# Patient Record
Sex: MALE | Race: OTHER | HISPANIC OR LATINO | URBAN - METROPOLITAN AREA
[De-identification: names, ages, dates, MRNs, and addresses within clinical notes are randomized per-mention and may not be internally consistent; named-entity substitution may affect disease eponyms.]

---

## 2017-06-02 ENCOUNTER — OUTPATIENT (OUTPATIENT)
Dept: OUTPATIENT SERVICES | Facility: HOSPITAL | Age: 62
LOS: 1 days | End: 2017-06-02
Payer: MEDICARE

## 2017-06-02 VITALS
RESPIRATION RATE: 16 BRPM | DIASTOLIC BLOOD PRESSURE: 88 MMHG | SYSTOLIC BLOOD PRESSURE: 140 MMHG | OXYGEN SATURATION: 98 % | HEART RATE: 88 BPM | WEIGHT: 229.06 LBS | HEIGHT: 68.75 IN | TEMPERATURE: 98 F

## 2017-06-02 DIAGNOSIS — C18.9 MALIGNANT NEOPLASM OF COLON, UNSPECIFIED: ICD-10-CM

## 2017-06-02 DIAGNOSIS — C18.5 MALIGNANT NEOPLASM OF SPLENIC FLEXURE: ICD-10-CM

## 2017-06-02 DIAGNOSIS — Z98.890 OTHER SPECIFIED POSTPROCEDURAL STATES: Chronic | ICD-10-CM

## 2017-06-02 DIAGNOSIS — I10 ESSENTIAL (PRIMARY) HYPERTENSION: ICD-10-CM

## 2017-06-02 LAB
ALBUMIN SERPL ELPH-MCNC: 4 G/DL — SIGNIFICANT CHANGE UP (ref 3.3–5)
ALP SERPL-CCNC: 81 U/L — SIGNIFICANT CHANGE UP (ref 40–120)
ALT FLD-CCNC: 15 U/L — SIGNIFICANT CHANGE UP (ref 4–41)
AST SERPL-CCNC: 13 U/L — SIGNIFICANT CHANGE UP (ref 4–40)
BILIRUB SERPL-MCNC: 0.2 MG/DL — SIGNIFICANT CHANGE UP (ref 0.2–1.2)
BLD GP AB SCN SERPL QL: NEGATIVE — SIGNIFICANT CHANGE UP
BUN SERPL-MCNC: 14 MG/DL — SIGNIFICANT CHANGE UP (ref 7–23)
CALCIUM SERPL-MCNC: 9.8 MG/DL — SIGNIFICANT CHANGE UP (ref 8.4–10.5)
CHLORIDE SERPL-SCNC: 101 MMOL/L — SIGNIFICANT CHANGE UP (ref 98–107)
CO2 SERPL-SCNC: 24 MMOL/L — SIGNIFICANT CHANGE UP (ref 22–31)
CREAT SERPL-MCNC: 0.85 MG/DL — SIGNIFICANT CHANGE UP (ref 0.5–1.3)
GLUCOSE SERPL-MCNC: 102 MG/DL — HIGH (ref 70–99)
HCT VFR BLD CALC: 47 % — SIGNIFICANT CHANGE UP (ref 39–50)
HGB BLD-MCNC: 15.8 G/DL — SIGNIFICANT CHANGE UP (ref 13–17)
MCHC RBC-ENTMCNC: 30.4 PG — SIGNIFICANT CHANGE UP (ref 27–34)
MCHC RBC-ENTMCNC: 33.6 % — SIGNIFICANT CHANGE UP (ref 32–36)
MCV RBC AUTO: 90.4 FL — SIGNIFICANT CHANGE UP (ref 80–100)
PLATELET # BLD AUTO: 371 K/UL — SIGNIFICANT CHANGE UP (ref 150–400)
PMV BLD: 9.6 FL — SIGNIFICANT CHANGE UP (ref 7–13)
POTASSIUM SERPL-MCNC: 4.4 MMOL/L — SIGNIFICANT CHANGE UP (ref 3.5–5.3)
POTASSIUM SERPL-SCNC: 4.4 MMOL/L — SIGNIFICANT CHANGE UP (ref 3.5–5.3)
PROT SERPL-MCNC: 7.3 G/DL — SIGNIFICANT CHANGE UP (ref 6–8.3)
RBC # BLD: 5.2 M/UL — SIGNIFICANT CHANGE UP (ref 4.2–5.8)
RBC # FLD: 13.7 % — SIGNIFICANT CHANGE UP (ref 10.3–14.5)
RH IG SCN BLD-IMP: POSITIVE — SIGNIFICANT CHANGE UP
SODIUM SERPL-SCNC: 140 MMOL/L — SIGNIFICANT CHANGE UP (ref 135–145)
WBC # BLD: 13.08 K/UL — HIGH (ref 3.8–10.5)
WBC # FLD AUTO: 13.08 K/UL — HIGH (ref 3.8–10.5)

## 2017-06-02 PROCEDURE — 93010 ELECTROCARDIOGRAM REPORT: CPT

## 2017-06-02 NOTE — H&P PST ADULT - HISTORY OF PRESENT ILLNESS
59 y/o male presents with neck ,back and shoulder arthropathy s/p incident 5 months ago .MRI and Xrays confirmed Brachial neuritis .Physical theraphy x 5 months ,symptons unrelieved .Patient schedule for Anterior cervical discectomy fusion with image on 3/18/2014 Pt is a 61 yr old male scheduled for Laparscopic Left colectomy with Dr Armenta 6/13/17 for Colon mass noted on CT scan done for recent onset of severe pain left lateral abdomen. Pt c/o of constipation - denies melena. PET done and negative. Pt hx of smoking and HTN and cervical neck fusion for injury sustained 2014 - plates and screws in place.

## 2017-06-02 NOTE — H&P PST ADULT - MUSCULOSKELETAL
details… detailed exam diminished strength/cervical neck restriction/decreased ROM/decreased ROM due to pain

## 2017-06-02 NOTE — H&P PST ADULT - NSANTHOSAYNRD_GEN_A_CORE
No. ABRAHAM screening performed.  STOP BANG Legend: 0-2 = LOW Risk; 3-4 = INTERMEDIATE Risk; 5-8 = HIGH Risk/denies

## 2017-06-02 NOTE — H&P PST ADULT - PROBLEM SELECTOR PLAN 1
Pt given pre-op instructions and Famotidine and Chlorhexidine and verbalized understanding.   OR Booking notified of ABRAHAM precautions via fax.   Pt to get CC from Cardiology - stress to be done 6/6/17- forms given.

## 2017-06-02 NOTE — H&P PST ADULT - NEUROLOGICAL SYMPTOMS
loss of sensation/headache/Pt c/o of loss of sensation/headache/Pt c/o of numbness left leg and tingling in right arm - thought to be r/t cervical neck surgery and head injury 2014

## 2017-06-02 NOTE — H&P PST ADULT - NEGATIVE NEUROLOGICAL SYMPTOMS
no vertigo/no generalized seizures/no tremors/no paresthesias/no transient paralysis/no syncope/no weakness/no focal seizures/no loss of sensation

## 2017-06-02 NOTE — H&P PST ADULT - MUSCULOSKELETAL COMMENTS
Pt c/o of cervical neck pain r/t head injury 2014 - radiates to left leg and right shoulder and arm with tingling and numbness -

## 2017-06-02 NOTE — H&P PST ADULT - GASTROINTESTINAL COMMENTS
Hx of recent onset of left lateral abdominal pain - MRI and CT Scan and PET done - mass in colon noted - pt c/o of pain and constipation Pt c/o of Left lateral abdominal pain for past 2 weeks - pain with palpation

## 2017-06-02 NOTE — H&P PST ADULT - PMH
Colon cancer    Head ache    Injury  forehead & had sutures ( 2013 )  Obesity    Vertigo Cervical disc disease    Colon cancer    Head ache    Injury  forehead & had sutures ( 2013 )  Obesity    Smoking    Vertigo

## 2017-06-02 NOTE — H&P PST ADULT - NEUROLOGICAL DETAILS
sensation intact/normal strength/alert and oriented x 3/responds to pain/responds to verbal commands

## 2017-06-12 NOTE — ASU PATIENT PROFILE, ADULT - PMH
Cervical disc disease    Colon cancer    Head ache    Injury  forehead & had sutures ( 2013 )  Obesity    Smoking    Vertigo

## 2017-06-13 ENCOUNTER — INPATIENT (INPATIENT)
Facility: HOSPITAL | Age: 62
LOS: 7 days | Discharge: HOME | End: 2017-06-21
Attending: COLON & RECTAL SURGERY | Admitting: COLON & RECTAL SURGERY
Payer: MEDICARE

## 2017-06-13 ENCOUNTER — RESULT REVIEW (OUTPATIENT)
Age: 62
End: 2017-06-13

## 2017-06-13 VITALS
HEART RATE: 104 BPM | OXYGEN SATURATION: 96 % | HEIGHT: 68.75 IN | SYSTOLIC BLOOD PRESSURE: 145 MMHG | RESPIRATION RATE: 15 BRPM | TEMPERATURE: 98 F | DIASTOLIC BLOOD PRESSURE: 72 MMHG | WEIGHT: 229.06 LBS

## 2017-06-13 DIAGNOSIS — Z98.890 OTHER SPECIFIED POSTPROCEDURAL STATES: Chronic | ICD-10-CM

## 2017-06-13 DIAGNOSIS — C18.5 MALIGNANT NEOPLASM OF SPLENIC FLEXURE: ICD-10-CM

## 2017-06-13 LAB
BASE EXCESS BLDA CALC-SCNC: -5.5 MMOL/L — SIGNIFICANT CHANGE UP
BASE EXCESS BLDA CALC-SCNC: -5.9 MMOL/L — SIGNIFICANT CHANGE UP
CA-I BLDA-SCNC: 1.14 MMOL/L — LOW (ref 1.15–1.29)
CA-I BLDA-SCNC: 1.17 MMOL/L — SIGNIFICANT CHANGE UP (ref 1.15–1.29)
GLUCOSE BLDA-MCNC: 119 MG/DL — HIGH (ref 70–99)
GLUCOSE BLDA-MCNC: 133 MG/DL — HIGH (ref 70–99)
HCO3 BLDA-SCNC: 19 MMOL/L — LOW (ref 22–26)
HCO3 BLDA-SCNC: 19 MMOL/L — LOW (ref 22–26)
HCT VFR BLDA CALC: 37.9 % — LOW (ref 39–51)
HCT VFR BLDA CALC: 42.1 % — SIGNIFICANT CHANGE UP (ref 39–51)
HGB BLDA-MCNC: 12.3 G/DL — LOW (ref 13–17)
HGB BLDA-MCNC: 13.7 G/DL — SIGNIFICANT CHANGE UP (ref 13–17)
PCO2 BLDA: 43 MMHG — SIGNIFICANT CHANGE UP (ref 35–48)
PCO2 BLDA: 48 MMHG — SIGNIFICANT CHANGE UP (ref 35–48)
PH BLDA: 7.25 PH — LOW (ref 7.35–7.45)
PH BLDA: 7.28 PH — LOW (ref 7.35–7.45)
PO2 BLDA: 113 MMHG — HIGH (ref 83–108)
PO2 BLDA: 143 MMHG — HIGH (ref 83–108)
POTASSIUM BLDA-SCNC: 4.5 MMOL/L — SIGNIFICANT CHANGE UP (ref 3.4–4.5)
POTASSIUM BLDA-SCNC: 4.7 MMOL/L — HIGH (ref 3.4–4.5)
RH IG SCN BLD-IMP: POSITIVE — SIGNIFICANT CHANGE UP
SAO2 % BLDA: 97.8 % — SIGNIFICANT CHANGE UP (ref 95–99)
SAO2 % BLDA: 98.4 % — SIGNIFICANT CHANGE UP (ref 95–99)
SODIUM BLDA-SCNC: 135 MMOL/L — LOW (ref 136–146)
SODIUM BLDA-SCNC: 137 MMOL/L — SIGNIFICANT CHANGE UP (ref 136–146)

## 2017-06-13 PROCEDURE — 88341 IMHCHEM/IMCYTCHM EA ADD ANTB: CPT | Mod: 26

## 2017-06-13 PROCEDURE — 88304 TISSUE EXAM BY PATHOLOGIST: CPT | Mod: 26

## 2017-06-13 PROCEDURE — 88342 IMHCHEM/IMCYTCHM 1ST ANTB: CPT | Mod: 26

## 2017-06-13 PROCEDURE — 88307 TISSUE EXAM BY PATHOLOGIST: CPT | Mod: 26

## 2017-06-13 RX ORDER — NALOXONE HYDROCHLORIDE 4 MG/.1ML
0.1 SPRAY NASAL
Qty: 0 | Refills: 0 | Status: DISCONTINUED | OUTPATIENT
Start: 2017-06-13 | End: 2017-06-17

## 2017-06-13 RX ORDER — ALVIMOPAN 12 MG/1
12 CAPSULE ORAL
Qty: 0 | Refills: 0 | Status: COMPLETED | OUTPATIENT
Start: 2017-06-14 | End: 2017-06-20

## 2017-06-13 RX ORDER — ONDANSETRON 8 MG/1
4 TABLET, FILM COATED ORAL EVERY 6 HOURS
Qty: 0 | Refills: 0 | Status: DISCONTINUED | OUTPATIENT
Start: 2017-06-13 | End: 2017-06-17

## 2017-06-13 RX ORDER — HYDROMORPHONE HYDROCHLORIDE 2 MG/ML
0.5 INJECTION INTRAMUSCULAR; INTRAVENOUS; SUBCUTANEOUS
Qty: 0 | Refills: 0 | Status: DISCONTINUED | OUTPATIENT
Start: 2017-06-13 | End: 2017-06-17

## 2017-06-13 RX ORDER — FINASTERIDE 5 MG/1
5 TABLET, FILM COATED ORAL DAILY
Qty: 0 | Refills: 0 | Status: DISCONTINUED | OUTPATIENT
Start: 2017-06-14 | End: 2017-06-21

## 2017-06-13 RX ORDER — SODIUM CHLORIDE 9 MG/ML
1000 INJECTION, SOLUTION INTRAVENOUS
Qty: 0 | Refills: 0 | Status: DISCONTINUED | OUTPATIENT
Start: 2017-06-13 | End: 2017-06-13

## 2017-06-13 RX ORDER — HYDROMORPHONE HYDROCHLORIDE 2 MG/ML
0.5 INJECTION INTRAMUSCULAR; INTRAVENOUS; SUBCUTANEOUS
Qty: 0 | Refills: 0 | Status: DISCONTINUED | OUTPATIENT
Start: 2017-06-13 | End: 2017-06-13

## 2017-06-13 RX ORDER — HYDROMORPHONE HYDROCHLORIDE 2 MG/ML
30 INJECTION INTRAMUSCULAR; INTRAVENOUS; SUBCUTANEOUS
Qty: 0 | Refills: 0 | Status: DISCONTINUED | OUTPATIENT
Start: 2017-06-13 | End: 2017-06-14

## 2017-06-13 RX ORDER — TAMSULOSIN HYDROCHLORIDE 0.4 MG/1
0.4 CAPSULE ORAL AT BEDTIME
Qty: 0 | Refills: 0 | Status: DISCONTINUED | OUTPATIENT
Start: 2017-06-14 | End: 2017-06-21

## 2017-06-13 RX ORDER — ONDANSETRON 8 MG/1
4 TABLET, FILM COATED ORAL ONCE
Qty: 0 | Refills: 0 | Status: DISCONTINUED | OUTPATIENT
Start: 2017-06-13 | End: 2017-06-14

## 2017-06-13 RX ORDER — HEPARIN SODIUM 5000 [USP'U]/ML
5000 INJECTION INTRAVENOUS; SUBCUTANEOUS EVERY 8 HOURS
Qty: 0 | Refills: 0 | Status: DISCONTINUED | OUTPATIENT
Start: 2017-06-14 | End: 2017-06-15

## 2017-06-13 RX ORDER — SODIUM CHLORIDE 9 MG/ML
1000 INJECTION, SOLUTION INTRAVENOUS
Qty: 0 | Refills: 0 | Status: DISCONTINUED | OUTPATIENT
Start: 2017-06-13 | End: 2017-06-14

## 2017-06-13 RX ADMIN — HYDROMORPHONE HYDROCHLORIDE 0.5 MILLIGRAM(S): 2 INJECTION INTRAMUSCULAR; INTRAVENOUS; SUBCUTANEOUS at 21:45

## 2017-06-13 RX ADMIN — HYDROMORPHONE HYDROCHLORIDE 0.5 MILLIGRAM(S): 2 INJECTION INTRAMUSCULAR; INTRAVENOUS; SUBCUTANEOUS at 23:56

## 2017-06-13 RX ADMIN — HYDROMORPHONE HYDROCHLORIDE 0.5 MILLIGRAM(S): 2 INJECTION INTRAMUSCULAR; INTRAVENOUS; SUBCUTANEOUS at 21:20

## 2017-06-13 RX ADMIN — HYDROMORPHONE HYDROCHLORIDE 0.5 MILLIGRAM(S): 2 INJECTION INTRAMUSCULAR; INTRAVENOUS; SUBCUTANEOUS at 21:54

## 2017-06-13 RX ADMIN — HYDROMORPHONE HYDROCHLORIDE 0.5 MILLIGRAM(S): 2 INJECTION INTRAMUSCULAR; INTRAVENOUS; SUBCUTANEOUS at 21:35

## 2017-06-13 RX ADMIN — HYDROMORPHONE HYDROCHLORIDE 30 MILLILITER(S): 2 INJECTION INTRAMUSCULAR; INTRAVENOUS; SUBCUTANEOUS at 23:53

## 2017-06-13 RX ADMIN — HYDROMORPHONE HYDROCHLORIDE 0.5 MILLIGRAM(S): 2 INJECTION INTRAMUSCULAR; INTRAVENOUS; SUBCUTANEOUS at 23:15

## 2017-06-13 RX ADMIN — HYDROMORPHONE HYDROCHLORIDE 0.5 MILLIGRAM(S): 2 INJECTION INTRAMUSCULAR; INTRAVENOUS; SUBCUTANEOUS at 23:30

## 2017-06-13 RX ADMIN — HYDROMORPHONE HYDROCHLORIDE 0.5 MILLIGRAM(S): 2 INJECTION INTRAMUSCULAR; INTRAVENOUS; SUBCUTANEOUS at 21:30

## 2017-06-13 RX ADMIN — SODIUM CHLORIDE 150 MILLILITER(S): 9 INJECTION, SOLUTION INTRAVENOUS at 21:00

## 2017-06-13 NOTE — BRIEF OPERATIVE NOTE - COMMENTS
The tumor was stuck to the peritoneum, which was removed en bloc  Also pt had a severe BPH requiring cysto guided elizabeth insertion. Keep elizabeth in for at least 3 days or until urology notifies us. Flomax/Finasteride

## 2017-06-13 NOTE — PROCEDURE NOTE - NSURITECHNIQUE_GU_A_CORE
Sterile gloves were worn for the duration of the procedure/A sterile drape was used to cover all adjacent areas/Proper hand hygiene was performed/The catheter was appropriately lubricated/The collection bag is below the level of the patient and urinary bladder/The site was cleaned with soap/water and sterile solution (betadine)/The catheter was secured with a securement device (e.g. StatLock)/The urinary drainage system is closed at the end of the procedure/All applicable medical record documentation is completed

## 2017-06-13 NOTE — PROCEDURE NOTE - NSPOSTCAREGUIDE_GEN_A_CORE
Verbal/written post procedure instructions were given to patient/caregiver/Continue elizabeth for 2 days. May start Flomax and Finasteride. ToV preferred after ambulation. F/U w. Dr. Rowell as outpatient

## 2017-06-14 ENCOUNTER — TRANSCRIPTION ENCOUNTER (OUTPATIENT)
Age: 62
End: 2017-06-14

## 2017-06-14 LAB
BUN SERPL-MCNC: 11 MG/DL — SIGNIFICANT CHANGE UP (ref 7–23)
CALCIUM SERPL-MCNC: 8.5 MG/DL — SIGNIFICANT CHANGE UP (ref 8.4–10.5)
CHLORIDE SERPL-SCNC: 103 MMOL/L — SIGNIFICANT CHANGE UP (ref 98–107)
CO2 SERPL-SCNC: 23 MMOL/L — SIGNIFICANT CHANGE UP (ref 22–31)
CREAT SERPL-MCNC: 0.91 MG/DL — SIGNIFICANT CHANGE UP (ref 0.5–1.3)
GLUCOSE SERPL-MCNC: 111 MG/DL — HIGH (ref 70–99)
HCT VFR BLD CALC: 42.2 % — SIGNIFICANT CHANGE UP (ref 39–50)
HGB BLD-MCNC: 13.5 G/DL — SIGNIFICANT CHANGE UP (ref 13–17)
MAGNESIUM SERPL-MCNC: 1.5 MG/DL — LOW (ref 1.6–2.6)
MCHC RBC-ENTMCNC: 29.3 PG — SIGNIFICANT CHANGE UP (ref 27–34)
MCHC RBC-ENTMCNC: 32 % — SIGNIFICANT CHANGE UP (ref 32–36)
MCV RBC AUTO: 91.5 FL — SIGNIFICANT CHANGE UP (ref 80–100)
PHOSPHATE SERPL-MCNC: 3.6 MG/DL — SIGNIFICANT CHANGE UP (ref 2.5–4.5)
PLATELET # BLD AUTO: 341 K/UL — SIGNIFICANT CHANGE UP (ref 150–400)
PMV BLD: 9 FL — SIGNIFICANT CHANGE UP (ref 7–13)
POTASSIUM SERPL-MCNC: 3.8 MMOL/L — SIGNIFICANT CHANGE UP (ref 3.5–5.3)
POTASSIUM SERPL-SCNC: 3.8 MMOL/L — SIGNIFICANT CHANGE UP (ref 3.5–5.3)
RBC # BLD: 4.61 M/UL — SIGNIFICANT CHANGE UP (ref 4.2–5.8)
RBC # FLD: 14.1 % — SIGNIFICANT CHANGE UP (ref 10.3–14.5)
SODIUM SERPL-SCNC: 141 MMOL/L — SIGNIFICANT CHANGE UP (ref 135–145)
WBC # BLD: 14.9 K/UL — HIGH (ref 3.8–10.5)
WBC # FLD AUTO: 14.9 K/UL — HIGH (ref 3.8–10.5)

## 2017-06-14 PROCEDURE — 93010 ELECTROCARDIOGRAM REPORT: CPT

## 2017-06-14 RX ORDER — SODIUM CHLORIDE 9 MG/ML
500 INJECTION INTRAMUSCULAR; INTRAVENOUS; SUBCUTANEOUS ONCE
Qty: 0 | Refills: 0 | Status: COMPLETED | OUTPATIENT
Start: 2017-06-14 | End: 2017-06-14

## 2017-06-14 RX ORDER — HYDROMORPHONE HYDROCHLORIDE 2 MG/ML
30 INJECTION INTRAMUSCULAR; INTRAVENOUS; SUBCUTANEOUS
Qty: 0 | Refills: 0 | Status: DISCONTINUED | OUTPATIENT
Start: 2017-06-14 | End: 2017-06-17

## 2017-06-14 RX ORDER — MAGNESIUM SULFATE 500 MG/ML
2 VIAL (ML) INJECTION ONCE
Qty: 0 | Refills: 0 | Status: COMPLETED | OUTPATIENT
Start: 2017-06-14 | End: 2017-06-14

## 2017-06-14 RX ORDER — DEXTROSE MONOHYDRATE, SODIUM CHLORIDE, AND POTASSIUM CHLORIDE 50; .745; 4.5 G/1000ML; G/1000ML; G/1000ML
1000 INJECTION, SOLUTION INTRAVENOUS
Qty: 0 | Refills: 0 | Status: DISCONTINUED | OUTPATIENT
Start: 2017-06-14 | End: 2017-06-15

## 2017-06-14 RX ADMIN — HEPARIN SODIUM 5000 UNIT(S): 5000 INJECTION INTRAVENOUS; SUBCUTANEOUS at 07:26

## 2017-06-14 RX ADMIN — SODIUM CHLORIDE 500 MILLILITER(S): 9 INJECTION INTRAMUSCULAR; INTRAVENOUS; SUBCUTANEOUS at 20:38

## 2017-06-14 RX ADMIN — HEPARIN SODIUM 5000 UNIT(S): 5000 INJECTION INTRAVENOUS; SUBCUTANEOUS at 21:27

## 2017-06-14 RX ADMIN — TAMSULOSIN HYDROCHLORIDE 0.4 MILLIGRAM(S): 0.4 CAPSULE ORAL at 21:27

## 2017-06-14 RX ADMIN — Medication 1.25 MILLIGRAM(S): at 12:08

## 2017-06-14 RX ADMIN — HYDROMORPHONE HYDROCHLORIDE 0.5 MILLIGRAM(S): 2 INJECTION INTRAMUSCULAR; INTRAVENOUS; SUBCUTANEOUS at 10:46

## 2017-06-14 RX ADMIN — HYDROMORPHONE HYDROCHLORIDE 30 MILLILITER(S): 2 INJECTION INTRAMUSCULAR; INTRAVENOUS; SUBCUTANEOUS at 03:11

## 2017-06-14 RX ADMIN — HEPARIN SODIUM 5000 UNIT(S): 5000 INJECTION INTRAVENOUS; SUBCUTANEOUS at 12:08

## 2017-06-14 RX ADMIN — Medication 50 GRAM(S): at 17:48

## 2017-06-14 RX ADMIN — Medication 1.25 MILLIGRAM(S): at 00:27

## 2017-06-14 RX ADMIN — HYDROMORPHONE HYDROCHLORIDE 30 MILLILITER(S): 2 INJECTION INTRAMUSCULAR; INTRAVENOUS; SUBCUTANEOUS at 20:22

## 2017-06-14 RX ADMIN — FINASTERIDE 5 MILLIGRAM(S): 5 TABLET, FILM COATED ORAL at 12:08

## 2017-06-14 RX ADMIN — ALVIMOPAN 12 MILLIGRAM(S): 12 CAPSULE ORAL at 17:48

## 2017-06-14 RX ADMIN — HYDROMORPHONE HYDROCHLORIDE 30 MILLILITER(S): 2 INJECTION INTRAMUSCULAR; INTRAVENOUS; SUBCUTANEOUS at 18:00

## 2017-06-14 RX ADMIN — Medication 1.25 MILLIGRAM(S): at 17:49

## 2017-06-14 RX ADMIN — DEXTROSE MONOHYDRATE, SODIUM CHLORIDE, AND POTASSIUM CHLORIDE 75 MILLILITER(S): 50; .745; 4.5 INJECTION, SOLUTION INTRAVENOUS at 12:09

## 2017-06-14 RX ADMIN — ALVIMOPAN 12 MILLIGRAM(S): 12 CAPSULE ORAL at 07:26

## 2017-06-14 RX ADMIN — Medication 1.25 MILLIGRAM(S): at 07:26

## 2017-06-14 RX ADMIN — HYDROMORPHONE HYDROCHLORIDE 30 MILLILITER(S): 2 INJECTION INTRAMUSCULAR; INTRAVENOUS; SUBCUTANEOUS at 08:33

## 2017-06-14 RX ADMIN — HYDROMORPHONE HYDROCHLORIDE 30 MILLILITER(S): 2 INJECTION INTRAMUSCULAR; INTRAVENOUS; SUBCUTANEOUS at 10:47

## 2017-06-14 NOTE — PROGRESS NOTE ADULT - SUBJECTIVE AND OBJECTIVE BOX
Having janina-incisional pain. PCA only moderately controlling pain. No nausea or vomiting. No fever or chills. No flatus.    AVSS  ABD: soft, janina-incisional tenderness, nd    LABS: pending    A/P POD#1 s/p left colectomy, cysto with elizabeth placement  -need better pain control with PCA. Pain service on consult. mild increase in HR most likely pain related  -adv to [facility]D. cont entereg  -keep elizabeth.  on consult d/t intra-op cysto and elizabeth placement. will keep for 3dd. cont flomax & finasteride per   -encourage incentive spirometer usage. cont supplemental O2  -OOB to chair    D Juve [phone]

## 2017-06-14 NOTE — CHART NOTE - NSCHARTNOTEFT_GEN_A_CORE
B TEAM POSTOP CHECK    Pt pod 0 s/p lap to open L [place]. Feeling well. Pain controlled with medications. No nausea vomiting SOB or chest pain.      Vital Signs Last 24 Hrs  T(C): 37.1, Max: 37.1 (06-14 @ 02:52)  T(F): 98.8, Max: 98.8 (06-14 @ 02:52)  HR: 111 (101 - 111)  BP: 149/78 (145/72 - 153/80)  BP(mean): --  RR: 18 (15 - 30)  SpO2: 91% (91% - 98%)  I&O's Detail    I & Os for current day (as of 14 Jun 2017 04:25)  =============================================  IN:    IV PiggyBack: 1000 ml    lactated ringers.: 750 ml    Total IN: 1750 ml  ---------------------------------------------  OUT:    Indwelling Catheter - Urethral: 860 ml    Total OUT: 860 ml  ---------------------------------------------  Total NET: 890 ml      62M pod 0 s/p lap to open L [place]on  -Pain control  -NPO  -Await return of bowel function  -Lopez in until cleared by urology

## 2017-06-14 NOTE — PROGRESS NOTE ADULT - SUBJECTIVE AND OBJECTIVE BOX
Day _2_ of Anesthesia Pain Management Service    Allergies    No Known Allergies    Intolerances        SUBJECTIVE: "I'm better now since the nurse gave a bolus. I was able to get out of bed."    Pain Scale Score	At rest: _4-5/10_ 	With Activity: ___ 	[ ] Refer to charted pain scores    THERAPY:    [ ] IV PCA Morphine		[ ] 5 mg/mL	[ ] 1 mg/mL  [X] IV PCA Hydromorphone	[ ] 5 mg/mL	[X] 1 mg/mL  [ ] IV PCA Fentanyl		[ ] 50 micrograms/mL    Demand dose _0.25mg_ lockout _6_ (minutes) Continuous Rate _0_ Total: _8.8mg_  Daily      MEDICATIONS  (STANDING):  heparin  Injectable 5000Unit(s) SubCutaneous every 8 hours  alvimopan 12milliGRAM(s) Oral two times a day  tamsulosin 0.4milliGRAM(s) Oral at bedtime  finasteride 5milliGRAM(s) Oral daily  enalaprilat Injectable 1.25milliGRAM(s) IV Push every 6 hours  HYDROmorphone PCA (1 mG/mL) 30milliLiter(s) PCA Continuous PCA Continuous  magnesium sulfate  IVPB 2Gram(s) IV Intermittent once  sodium chloride 0.45% with potassium chloride 20 mEq/L 1000milliLiter(s) IV Continuous <Continuous>    MEDICATIONS  (PRN):  HYDROmorphone PCA (1 mG/mL) Rescue Clinician Bolus 0.5milliGRAM(s) IV Push every 15 minutes PRN for Pain Scale GREATER THAN 6  naloxone Injectable 0.1milliGRAM(s) IV Push every 3 minutes PRN For ANY of the following changes in patient status:  A. RR LESS THAN 10 breaths per minute, B. Oxygen saturation LESS THAN 90%, C. Sedation score of 6  ondansetron Injectable 4milliGRAM(s) IV Push every 6 hours PRN Nausea      OBJECTIVE: A&Ox3, NAD, sitting up in chair    Sedation Score:	[X] Alert	[ ] Drowsy	[ ] Arousable	[ ] Asleep	[ ] Unresponsive    Side Effects:	[X] None	[ ] Nausea	[ ] Vomiting	[ ] Pruritus  		  [ ] Weakness		[ ] Numbness	[ ] Other:                              13.5   14.90 )-----------( 341      ( 14 Jun 2017 10:40 )             42.2       06-14    141  |  103  |  11  ----------------------------<  111<H>  3.8   |  23  |  0.91    Ca    8.5      14 Jun 2017 10:40  Phos  3.6     06-14  Mg     1.5     06-14        ASSESSMENT/ PLAN    Therapy to  be:	[X] Continue   [ ] Discontinued   [ ] Change to prn Analgesics    Documentation and Verification of current medications:  [X] Done	[ ] Not done, not eligible  [ ] Not done, reason not given      Comments:  Use of IVPCA reviewed.  4 hour limit increased to 5mg

## 2017-06-14 NOTE — PROGRESS NOTE ADULT - SUBJECTIVE AND OBJECTIVE BOX
Patient is a 62y old  Male who presents with a chief complaint of colon mass (02 Jun 2017 14:07)      SUBJECTIVE: Pt complains of abdominal pain not well controlled with PCA. no nausea/vomiting/headache/dizziness/chest pain/shortness of breath    OBJECTIVE:  PHYSICAL EXAM:  GA: NAD  Abdomen:  -  soft, non-distended, mild  incisional tenderness  - Incision: clean/dry/intact   - Drain:     Vitals/Labs:  Vital Signs Last 24 Hrs  T(C): 37.2, Max: 37.2 (06-14 @ 07:24)  T(F): 99, Max: 99 (06-14 @ 07:24)  HR: 110 (101 - 111)  BP: 143/79 (143/79 - 153/80)  BP(mean): --  RR: 18 (15 - 30)  SpO2: 94% (91% - 98%)    I&O's Detail    I & Os for current day (as of 14 Jun 2017 09:04)  =============================================  IN:    lactated ringers.: 1350 ml    IV PiggyBack: 1000 ml    Total IN: 2350 ml  ---------------------------------------------  OUT:    Indwelling Catheter - Urethral: 1860 ml    Total OUT: 1860 ml  ---------------------------------------------  Total NET: 490 ml                  CAPILLARY BLOOD GLUCOSE  88 (13 Jun 2017 09:16)                  ASSESSMENT/PLAN: MARISOL ALFRED 62y Male s/p  Left open colectomy     - pain control - readjust the PCA dose   - Diet advanced to Clears   - OOB to chair as tolerated   - Encourage Incentive Spirometry  - keep the elizabeth for 3 more days, Cont Flomax and Fenestride           MEDICATIONS  (STANDING):  lactated ringers. 1000milliLiter(s) IV Continuous <Continuous>  heparin  Injectable 5000Unit(s) SubCutaneous every 8 hours  alvimopan 12milliGRAM(s) Oral two times a day  tamsulosin 0.4milliGRAM(s) Oral at bedtime  finasteride 5milliGRAM(s) Oral daily  enalaprilat Injectable 1.25milliGRAM(s) IV Push every 6 hours  HYDROmorphone PCA (1 mG/mL) 30milliLiter(s) PCA Continuous PCA Continuous    MEDICATIONS  (PRN):  HYDROmorphone PCA (1 mG/mL) Rescue Clinician Bolus 0.5milliGRAM(s) IV Push every 15 minutes PRN for Pain Scale GREATER THAN 6  naloxone Injectable 0.1milliGRAM(s) IV Push every 3 minutes PRN For ANY of the following changes in patient status:  A. RR LESS THAN 10 breaths per minute, B. Oxygen saturation LESS THAN 90%, C. Sedation score of 6  ondansetron Injectable 4milliGRAM(s) IV Push every 6 hours PRN Nausea      A team   pager : [phone]

## 2017-06-14 NOTE — PROGRESS NOTE ADULT - SUBJECTIVE AND OBJECTIVE BOX
ANESTHESIA POSTOP CHECK    62y Male POSTOP DAY 1 S/P Lap hemicolectomy    Vital Signs Last 24 Hrs  T(C): 36.5, Max: 37.2 (06-14 @ 07:24)  T(F): 97.7, Max: 99 (06-14 @ 07:24)  HR: 110 (101 - 114)  BP: 146/83 (140/76 - 153/80)  BP(mean): --  RR: 18 (15 - 30)  SpO2: 95% (91% - 98%)  I&O's Summary  I & Os for 24h ending 14 Jun 2017 07:00  =============================================  IN: 2350 ml / OUT: 1860 ml / NET: 490 ml    I & Os for current day (as of 14 Jun 2017 14:02)  =============================================  IN: 1025 ml / OUT: 250 ml / NET: 775 ml      [X ] NO APPARENT ANESTHESIA COMPLICATIONS      Comments:

## 2017-06-14 NOTE — PROGRESS NOTE ADULT - SUBJECTIVE AND OBJECTIVE BOX
Anesthesia Pain Management Service- Attending Addendum    SUBJECTIVE: Pt doing well with IV PCA without problems reported.    Therapy:	  [ X] IV PCA	   [ ] Epidural           [ ] s/p Spinal Opoid              [ ] Postpartum infusion	  [ ] Patient controlled regional anesthesia (PCRA)    [ ] prn Analgesics    Allergies    No Known Allergies    Intolerances      MEDICATIONS  (STANDING):  heparin  Injectable 5000Unit(s) SubCutaneous every 8 hours  alvimopan 12milliGRAM(s) Oral two times a day  tamsulosin 0.4milliGRAM(s) Oral at bedtime  finasteride 5milliGRAM(s) Oral daily  enalaprilat Injectable 1.25milliGRAM(s) IV Push every 6 hours  HYDROmorphone PCA (1 mG/mL) 30milliLiter(s) PCA Continuous PCA Continuous  magnesium sulfate  IVPB 2Gram(s) IV Intermittent once  sodium chloride 0.45% with potassium chloride 20 mEq/L 1000milliLiter(s) IV Continuous <Continuous>    MEDICATIONS  (PRN):  HYDROmorphone PCA (1 mG/mL) Rescue Clinician Bolus 0.5milliGRAM(s) IV Push every 15 minutes PRN for Pain Scale GREATER THAN 6  naloxone Injectable 0.1milliGRAM(s) IV Push every 3 minutes PRN For ANY of the following changes in patient status:  A. RR LESS THAN 10 breaths per minute, B. Oxygen saturation LESS THAN 90%, C. Sedation score of 6  ondansetron Injectable 4milliGRAM(s) IV Push every 6 hours PRN Nausea      OBJECTIVE:   [X] No new signs     [ ] Other:    Side Effects:  [X ] None			[ ] Other:    Assessment of Catheter Site:		[ ] Intact		[ ] Other:    ASSESSMENT/PLAN  [ X] Continue current therapy    [ ] Therapy changed to:    [ ] IV PCA       [ ] Epidural     [ ] prn Analgesics     Comments: clears

## 2017-06-15 LAB
APPEARANCE UR: SIGNIFICANT CHANGE UP
BACTERIA # UR AUTO: HIGH
BASE EXCESS BLDA CALC-SCNC: 2.3 MMOL/L — SIGNIFICANT CHANGE UP
BASE EXCESS BLDA CALC-SCNC: 2.6 MMOL/L — SIGNIFICANT CHANGE UP
BILIRUB UR-MCNC: NEGATIVE — SIGNIFICANT CHANGE UP
BLOOD UR QL VISUAL: HIGH
BUN SERPL-MCNC: 11 MG/DL — SIGNIFICANT CHANGE UP (ref 7–23)
BUN SERPL-MCNC: 12 MG/DL — SIGNIFICANT CHANGE UP (ref 7–23)
CA-I BLDA-SCNC: 1.22 MMOL/L — SIGNIFICANT CHANGE UP (ref 1.15–1.29)
CALCIUM SERPL-MCNC: 8.9 MG/DL — SIGNIFICANT CHANGE UP (ref 8.4–10.5)
CALCIUM SERPL-MCNC: 9.2 MG/DL — SIGNIFICANT CHANGE UP (ref 8.4–10.5)
CHLORIDE BLDA-SCNC: 106 MMOL/L — SIGNIFICANT CHANGE UP (ref 96–108)
CHLORIDE SERPL-SCNC: 100 MMOL/L — SIGNIFICANT CHANGE UP (ref 98–107)
CHLORIDE SERPL-SCNC: 102 MMOL/L — SIGNIFICANT CHANGE UP (ref 98–107)
CK MB BLD-MCNC: 1.5 — SIGNIFICANT CHANGE UP (ref 0–2.5)
CK MB BLD-MCNC: 2.73 NG/ML — SIGNIFICANT CHANGE UP (ref 1–6.6)
CK SERPL-CCNC: 181 U/L — SIGNIFICANT CHANGE UP (ref 30–200)
CK SERPL-CCNC: 187 U/L — SIGNIFICANT CHANGE UP (ref 30–200)
CO2 SERPL-SCNC: 25 MMOL/L — SIGNIFICANT CHANGE UP (ref 22–31)
CO2 SERPL-SCNC: 27 MMOL/L — SIGNIFICANT CHANGE UP (ref 22–31)
COLOR SPEC: SIGNIFICANT CHANGE UP
CREAT BLDA-MCNC: 0.79 MG/DL — SIGNIFICANT CHANGE UP (ref 0.5–1.3)
CREAT SERPL-MCNC: 0.81 MG/DL — SIGNIFICANT CHANGE UP (ref 0.5–1.3)
CREAT SERPL-MCNC: 0.85 MG/DL — SIGNIFICANT CHANGE UP (ref 0.5–1.3)
GLUCOSE BLDA-MCNC: 129 MG/DL — HIGH (ref 70–99)
GLUCOSE BLDA-MCNC: 142 MG/DL — HIGH (ref 70–99)
GLUCOSE SERPL-MCNC: 137 MG/DL — HIGH (ref 70–99)
GLUCOSE SERPL-MCNC: 137 MG/DL — HIGH (ref 70–99)
GLUCOSE UR-MCNC: NEGATIVE — SIGNIFICANT CHANGE UP
HCO3 BLDA-SCNC: 26 MMOL/L — SIGNIFICANT CHANGE UP (ref 22–26)
HCO3 BLDA-SCNC: 27 MMOL/L — HIGH (ref 22–26)
HCT VFR BLD CALC: 40.2 % — SIGNIFICANT CHANGE UP (ref 39–50)
HCT VFR BLD CALC: 40.2 % — SIGNIFICANT CHANGE UP (ref 39–50)
HCT VFR BLDA CALC: 40.6 % — SIGNIFICANT CHANGE UP (ref 39–51)
HCT VFR BLDA CALC: 41 % — SIGNIFICANT CHANGE UP (ref 39–51)
HGB BLD-MCNC: 12.9 G/DL — LOW (ref 13–17)
HGB BLD-MCNC: 13 G/DL — SIGNIFICANT CHANGE UP (ref 13–17)
HGB BLDA-MCNC: 13.2 G/DL — SIGNIFICANT CHANGE UP (ref 13–17)
HGB BLDA-MCNC: 13.3 G/DL — SIGNIFICANT CHANGE UP (ref 13–17)
KETONES UR-MCNC: SIGNIFICANT CHANGE UP
LACTATE BLDA-SCNC: 0.8 MMOL/L — SIGNIFICANT CHANGE UP (ref 0.5–2)
LACTATE BLDA-SCNC: 0.9 MMOL/L — SIGNIFICANT CHANGE UP (ref 0.5–2)
LEUKOCYTE ESTERASE UR-ACNC: HIGH
MAGNESIUM SERPL-MCNC: 2.3 MG/DL — SIGNIFICANT CHANGE UP (ref 1.6–2.6)
MCHC RBC-ENTMCNC: 29.5 PG — SIGNIFICANT CHANGE UP (ref 27–34)
MCHC RBC-ENTMCNC: 29.7 PG — SIGNIFICANT CHANGE UP (ref 27–34)
MCHC RBC-ENTMCNC: 32.1 % — SIGNIFICANT CHANGE UP (ref 32–36)
MCHC RBC-ENTMCNC: 32.3 % — SIGNIFICANT CHANGE UP (ref 32–36)
MCV RBC AUTO: 91.8 FL — SIGNIFICANT CHANGE UP (ref 80–100)
MCV RBC AUTO: 91.8 FL — SIGNIFICANT CHANGE UP (ref 80–100)
MUCOUS THREADS # UR AUTO: SIGNIFICANT CHANGE UP
NITRITE UR-MCNC: NEGATIVE — SIGNIFICANT CHANGE UP
PCO2 BLDA: 43 MMHG — SIGNIFICANT CHANGE UP (ref 35–48)
PCO2 BLDA: 44 MMHG — SIGNIFICANT CHANGE UP (ref 35–48)
PH BLDA: 7.41 PH — SIGNIFICANT CHANGE UP (ref 7.35–7.45)
PH BLDA: 7.41 PH — SIGNIFICANT CHANGE UP (ref 7.35–7.45)
PH UR: 6.5 — SIGNIFICANT CHANGE UP (ref 4.6–8)
PHOSPHATE SERPL-MCNC: 2.5 MG/DL — SIGNIFICANT CHANGE UP (ref 2.5–4.5)
PLATELET # BLD AUTO: 350 K/UL — SIGNIFICANT CHANGE UP (ref 150–400)
PLATELET # BLD AUTO: 362 K/UL — SIGNIFICANT CHANGE UP (ref 150–400)
PMV BLD: 9.1 FL — SIGNIFICANT CHANGE UP (ref 7–13)
PMV BLD: 9.6 FL — SIGNIFICANT CHANGE UP (ref 7–13)
PO2 BLDA: 148 MMHG — HIGH (ref 83–108)
PO2 BLDA: 59 MMHG — LOW (ref 83–108)
POTASSIUM BLDA-SCNC: 3.7 MMOL/L — SIGNIFICANT CHANGE UP (ref 3.4–4.5)
POTASSIUM BLDA-SCNC: 3.8 MMOL/L — SIGNIFICANT CHANGE UP (ref 3.4–4.5)
POTASSIUM SERPL-MCNC: 4 MMOL/L — SIGNIFICANT CHANGE UP (ref 3.5–5.3)
POTASSIUM SERPL-MCNC: 4.3 MMOL/L — SIGNIFICANT CHANGE UP (ref 3.5–5.3)
POTASSIUM SERPL-SCNC: 4 MMOL/L — SIGNIFICANT CHANGE UP (ref 3.5–5.3)
POTASSIUM SERPL-SCNC: 4.3 MMOL/L — SIGNIFICANT CHANGE UP (ref 3.5–5.3)
PROT UR-MCNC: 150 — HIGH
RBC # BLD: 4.38 M/UL — SIGNIFICANT CHANGE UP (ref 4.2–5.8)
RBC # BLD: 4.38 M/UL — SIGNIFICANT CHANGE UP (ref 4.2–5.8)
RBC # FLD: 14 % — SIGNIFICANT CHANGE UP (ref 10.3–14.5)
RBC # FLD: 14.2 % — SIGNIFICANT CHANGE UP (ref 10.3–14.5)
RBC CASTS # UR COMP ASSIST: HIGH (ref 0–?)
SAO2 % BLDA: 90.2 % — LOW (ref 95–99)
SAO2 % BLDA: 98.8 % — SIGNIFICANT CHANGE UP (ref 95–99)
SODIUM BLDA-SCNC: 136 MMOL/L — SIGNIFICANT CHANGE UP (ref 136–146)
SODIUM BLDA-SCNC: 137 MMOL/L — SIGNIFICANT CHANGE UP (ref 136–146)
SODIUM SERPL-SCNC: 140 MMOL/L — SIGNIFICANT CHANGE UP (ref 135–145)
SODIUM SERPL-SCNC: 141 MMOL/L — SIGNIFICANT CHANGE UP (ref 135–145)
SP GR SPEC: 1.01 — SIGNIFICANT CHANGE UP (ref 1–1.03)
SQUAMOUS # UR AUTO: SIGNIFICANT CHANGE UP
TROPONIN T SERPL-MCNC: < 0.06 NG/ML — SIGNIFICANT CHANGE UP (ref 0–0.06)
TROPONIN T SERPL-MCNC: < 0.06 NG/ML — SIGNIFICANT CHANGE UP (ref 0–0.06)
UROBILINOGEN FLD QL: NORMAL E.U. — SIGNIFICANT CHANGE UP (ref 0.1–0.2)
WBC # BLD: 17.31 K/UL — HIGH (ref 3.8–10.5)
WBC # BLD: 17.89 K/UL — HIGH (ref 3.8–10.5)
WBC # FLD AUTO: 17.31 K/UL — HIGH (ref 3.8–10.5)
WBC # FLD AUTO: 17.89 K/UL — HIGH (ref 3.8–10.5)
WBC UR QL: >50 — HIGH (ref 0–?)

## 2017-06-15 PROCEDURE — 74177 CT ABD & PELVIS W/CONTRAST: CPT | Mod: 26

## 2017-06-15 PROCEDURE — 71275 CT ANGIOGRAPHY CHEST: CPT | Mod: 26

## 2017-06-15 PROCEDURE — 93010 ELECTROCARDIOGRAM REPORT: CPT

## 2017-06-15 PROCEDURE — 71010: CPT | Mod: 26

## 2017-06-15 RX ORDER — CEFTRIAXONE 500 MG/1
1 INJECTION, POWDER, FOR SOLUTION INTRAMUSCULAR; INTRAVENOUS ONCE
Qty: 0 | Refills: 0 | Status: COMPLETED | OUTPATIENT
Start: 2017-06-15 | End: 2017-06-15

## 2017-06-15 RX ORDER — CEFTRIAXONE 500 MG/1
1 INJECTION, POWDER, FOR SOLUTION INTRAMUSCULAR; INTRAVENOUS EVERY 12 HOURS
Qty: 0 | Refills: 0 | Status: DISCONTINUED | OUTPATIENT
Start: 2017-06-15 | End: 2017-06-15

## 2017-06-15 RX ORDER — AMLODIPINE BESYLATE 2.5 MG/1
5 TABLET ORAL DAILY
Qty: 0 | Refills: 0 | Status: DISCONTINUED | OUTPATIENT
Start: 2017-06-15 | End: 2017-06-21

## 2017-06-15 RX ORDER — ACETYLCYSTEINE 200 MG/ML
10 VIAL (ML) MISCELLANEOUS EVERY 6 HOURS
Qty: 0 | Refills: 0 | Status: DISCONTINUED | OUTPATIENT
Start: 2017-06-15 | End: 2017-06-15

## 2017-06-15 RX ORDER — FAMOTIDINE 10 MG/ML
20 INJECTION INTRAVENOUS ONCE
Qty: 0 | Refills: 0 | Status: COMPLETED | OUTPATIENT
Start: 2017-06-15 | End: 2017-06-15

## 2017-06-15 RX ORDER — IPRATROPIUM/ALBUTEROL SULFATE 18-103MCG
3 AEROSOL WITH ADAPTER (GRAM) INHALATION EVERY 6 HOURS
Qty: 0 | Refills: 0 | Status: DISCONTINUED | OUTPATIENT
Start: 2017-06-15 | End: 2017-06-19

## 2017-06-15 RX ORDER — CEFTRIAXONE 500 MG/1
1 INJECTION, POWDER, FOR SOLUTION INTRAMUSCULAR; INTRAVENOUS EVERY 24 HOURS
Qty: 0 | Refills: 0 | Status: DISCONTINUED | OUTPATIENT
Start: 2017-06-16 | End: 2017-06-17

## 2017-06-15 RX ORDER — SODIUM CHLORIDE 9 MG/ML
1000 INJECTION, SOLUTION INTRAVENOUS
Qty: 0 | Refills: 0 | Status: DISCONTINUED | OUTPATIENT
Start: 2017-06-15 | End: 2017-06-16

## 2017-06-15 RX ORDER — CEFTRIAXONE 500 MG/1
INJECTION, POWDER, FOR SOLUTION INTRAMUSCULAR; INTRAVENOUS
Qty: 0 | Refills: 0 | Status: DISCONTINUED | OUTPATIENT
Start: 2017-06-15 | End: 2017-06-17

## 2017-06-15 RX ORDER — ENOXAPARIN SODIUM 100 MG/ML
40 INJECTION SUBCUTANEOUS DAILY
Qty: 0 | Refills: 0 | Status: DISCONTINUED | OUTPATIENT
Start: 2017-06-15 | End: 2017-06-21

## 2017-06-15 RX ORDER — METOPROLOL TARTRATE 50 MG
5 TABLET ORAL EVERY 6 HOURS
Qty: 0 | Refills: 0 | Status: DISCONTINUED | OUTPATIENT
Start: 2017-06-15 | End: 2017-06-15

## 2017-06-15 RX ORDER — ACETYLCYSTEINE 200 MG/ML
5 VIAL (ML) MISCELLANEOUS EVERY 6 HOURS
Qty: 0 | Refills: 0 | Status: COMPLETED | OUTPATIENT
Start: 2017-06-15 | End: 2017-06-16

## 2017-06-15 RX ADMIN — FAMOTIDINE 20 MILLIGRAM(S): 10 INJECTION INTRAVENOUS at 16:34

## 2017-06-15 RX ADMIN — ALVIMOPAN 12 MILLIGRAM(S): 12 CAPSULE ORAL at 17:44

## 2017-06-15 RX ADMIN — AMLODIPINE BESYLATE 5 MILLIGRAM(S): 2.5 TABLET ORAL at 07:18

## 2017-06-15 RX ADMIN — ALVIMOPAN 12 MILLIGRAM(S): 12 CAPSULE ORAL at 07:18

## 2017-06-15 RX ADMIN — HEPARIN SODIUM 5000 UNIT(S): 5000 INJECTION INTRAVENOUS; SUBCUTANEOUS at 11:58

## 2017-06-15 RX ADMIN — HYDROMORPHONE HYDROCHLORIDE 30 MILLILITER(S): 2 INJECTION INTRAMUSCULAR; INTRAVENOUS; SUBCUTANEOUS at 04:30

## 2017-06-15 RX ADMIN — HYDROMORPHONE HYDROCHLORIDE 30 MILLILITER(S): 2 INJECTION INTRAMUSCULAR; INTRAVENOUS; SUBCUTANEOUS at 19:21

## 2017-06-15 RX ADMIN — Medication 5 MILLILITER(S): at 21:05

## 2017-06-15 RX ADMIN — HYDROMORPHONE HYDROCHLORIDE 30 MILLILITER(S): 2 INJECTION INTRAMUSCULAR; INTRAVENOUS; SUBCUTANEOUS at 11:40

## 2017-06-15 RX ADMIN — DEXTROSE MONOHYDRATE, SODIUM CHLORIDE, AND POTASSIUM CHLORIDE 100 MILLILITER(S): 50; .745; 4.5 INJECTION, SOLUTION INTRAVENOUS at 01:42

## 2017-06-15 RX ADMIN — CEFTRIAXONE 100 GRAM(S): 500 INJECTION, POWDER, FOR SOLUTION INTRAMUSCULAR; INTRAVENOUS at 14:53

## 2017-06-15 RX ADMIN — SODIUM CHLORIDE 100 MILLILITER(S): 9 INJECTION, SOLUTION INTRAVENOUS at 11:45

## 2017-06-15 RX ADMIN — TAMSULOSIN HYDROCHLORIDE 0.4 MILLIGRAM(S): 0.4 CAPSULE ORAL at 22:37

## 2017-06-15 RX ADMIN — SODIUM CHLORIDE 100 MILLILITER(S): 9 INJECTION, SOLUTION INTRAVENOUS at 20:00

## 2017-06-15 RX ADMIN — Medication 3 MILLILITER(S): at 15:30

## 2017-06-15 RX ADMIN — FINASTERIDE 5 MILLIGRAM(S): 5 TABLET, FILM COATED ORAL at 13:17

## 2017-06-15 RX ADMIN — HYDROMORPHONE HYDROCHLORIDE 30 MILLILITER(S): 2 INJECTION INTRAMUSCULAR; INTRAVENOUS; SUBCUTANEOUS at 08:21

## 2017-06-15 RX ADMIN — Medication 1.25 MILLIGRAM(S): at 01:41

## 2017-06-15 RX ADMIN — Medication 3 MILLILITER(S): at 21:05

## 2017-06-15 RX ADMIN — HEPARIN SODIUM 5000 UNIT(S): 5000 INJECTION INTRAVENOUS; SUBCUTANEOUS at 07:19

## 2017-06-15 NOTE — PROGRESS NOTE ADULT - ASSESSMENT
62y Male s/p left open colectomy pod 2 with tachycardia and hypoxia, ABG performed this morning with PaO2 of 59 while on nasal cannula, patient stated on nonrebreather currently saturating 96%, attending called, will get stat CTPA to rule out PE  - CTPA stat, than contrast to eval anastomosis and obtain CT Abd/pelvis  - CXR  - PCA  - NPO  -SICU consult  - OOB to chair as tolerated   - Encourage Incentive Spirometry  - keep the elizabeth for 2 more days, Cont Flomax and Fenestride   - Patient seen and discussed with Dr. An

## 2017-06-15 NOTE — PROGRESS NOTE ADULT - SUBJECTIVE AND OBJECTIVE BOX
INTERVAL HPI/OVERNIGHT EVENTS: Tachycardic, EKG NSR, hypoxic started on NC    STATUS POST:  62y Male s/p  Left open colectomy     POST OPERATIVE DAY #: 2    SUBJECTIVE: Patient seen and examined at bedside, patient complains of chest pain with deep breathing, he denies shortness of breath, nausea and vomiting, denies abdominal pain and flatus and bowel movements.     Vital Signs Last 24 Hrs  T(C): 37.4, Max: 37.4 (06-15 @ 07:30)  T(F): 99.4, Max: 99.4 (15 @ 07:30)  HR: 123 (109 - 126)  BP: 132/76 (131/74 - 161/81)  BP(mean): --  RR: 18 (18 - 18)  SpO2: 96% (90% - 96%)  I&O's Detail    I & Os for current day (as of 15 Faisal 2017 08:50)  =============================================  IN:    sodium chloride 0.45% with potassium chloride 20 mEq/L: 1115 ml    lactated ringers.: 750 ml    Oral Fluid: 560 ml    Sodium Chloride 0.9% IV Bolus: 500 ml    IV PiggyBack: 100 ml    Total IN: 3025 ml  ---------------------------------------------  OUT:    Indwelling Catheter - Urethral: 1600 ml    Total OUT: 1600 ml  ---------------------------------------------  Total NET: 1425 ml    MEDICATIONS  (STANDING):  heparin  Injectable 5000Unit(s) SubCutaneous every 8 hours  alvimopan 12milliGRAM(s) Oral two times a day  tamsulosin 0.4milliGRAM(s) Oral at bedtime  finasteride 5milliGRAM(s) Oral daily  HYDROmorphone PCA (1 mG/mL) 30milliLiter(s) PCA Continuous PCA Continuous  amLODIPine   Tablet 5milliGRAM(s) Oral daily    MEDICATIONS  (PRN):  HYDROmorphone PCA (1 mG/mL) Rescue Clinician Bolus 0.5milliGRAM(s) IV Push every 15 minutes PRN for Pain Scale GREATER THAN 6  naloxone Injectable 0.1milliGRAM(s) IV Push every 3 minutes PRN For ANY of the following changes in patient status:  A. RR LESS THAN 10 breaths per minute, B. Oxygen saturation LESS THAN 90%, C. Sedation score of 6  ondansetron Injectable 4milliGRAM(s) IV Push every 6 hours PRN Nausea      Physical Exam  General: A&Ox3, NAD, on Nasal cannula, pleasant able to speak in complete sentences.  Res: Unlabored breathing  Abdominal: Obese, soft nontender, incision staple line c/d/i    LABS:                        13.0   17.31 )-----------( 350      ( 15 Faisal 2017 06:05 )             40.2     06-15    140  |  102  |  12  ----------------------------<  137<H>  4.0   |  25  |  0.85    Ca    8.9      15 Faisal 2017 06:05  Phos  2.5     06-15  Mg     2.3     06-15    Blood Gas Arterial - Lytes,Hgb,iCa,Lact (06.15.17 @ 07:00)    pH, Arterial: 7.41 pH    pCO2, Arterial: 43 mmHg    pO2, Arterial: 59 mmHg    HCO3, Arterial: 26 mmol/L    Base Excess, Arterial: 2.3: BASE EXCESS: REFERENCE RANGE = 0 (+/-) 2 mmol/l mmol/L    Oxygen Saturation, Arterial: 90.2 %    Blood Gas Arterial - Sodium: 136 mmol/L    Blood Gas Arterial - Potassium: 3.8 mmol/L    Blood Gas Arterial - Glucose: 129 mg/dL    Blood Gas Arterial - Hemoglobin: 13.2 g/dL    Blood Gas Arterial - Hematocrit: 40.6 %    Blood Gas Arterial - Calcium, Ionized: 1.22 mmol/L    Blood Gas Arterial - Lactate: 0.9: Please note updated reference range. mmol/L      Urinalysis Basic - ( 15 Faisal 2017 07:27 )    Color: LIGHT BROWN / Appearance: CLOUDY / S.010 / pH: 6.5  Gluc: NEGATIVE / Ketone: TRACE  / Bili: NEGATIVE / Urobili: NORMAL E.U.   Blood: LARGE / Protein: 150 / Nitrite: NEGATIVE   Leuk Esterase: LARGE / RBC: 5-10 / WBC >50   Sq Epi: OCC / Non Sq Epi: x / Bacteria: MANY

## 2017-06-15 NOTE — CONSULT NOTE ADULT - SUBJECTIVE AND OBJECTIVE BOX
SICU Consultation Note  =====================================================  HPI: 62M, POD #2, s/p L colectomy with primary anastomosis for colon tumor, became tachycardic to 140s on the floor, PaO2 59, SaO2 90% on NC and switched to NRB with SaO2 improved to 96%. Pt c/o pain to L shoulder and pleuritic pain of L upper chest. WBC count     Surgery Information  · Estimated Blood Loss	200 milliLiter(s)	  · IV Infusions - Crystalloids	6000	  · IV Infusions - Colloids	500 albumin	  · Urine Output	750 milliLiter(s)	    HISTORY  62y Male  HPI:  Pt is a 61 yr old male scheduled for Laparscopic Left colectomy with Dr Armenta 17 for Colon mass noted on CT scan done for recent onset of severe pain left lateral abdomen. Pt c/o of constipation - denies melena. PET done and negative. Pt hx of smoking and HTN and cervical neck fusion for injury sustained  - plates and screws in place. (2017 14:07)    Allergies:   PAST MEDICAL & SURGICAL HISTORY:  Smoking  Cervical disc disease  Vertigo  Colon cancer  Injury: forehead &amp; had sutures (  )  Obesity  Head ache  H/O cervical spine surgery: fusion -  with plates and screws  No significant past surgical history    SOCIAL HISTORY:  Current smoker    ADVANCE DIRECTIVES: Presumed Full Code     REVIEW OF SYSTEMS:  General: Non-Contributory  Skin/Breast: Non-Contributory  Ophthalmologic: Non-Contributory  ENMT: Non-Contributory  Respiratory and Thorax: SOB, no cough  Cardiovascular: Chest pain  Gastrointestinal: Abd pain, no vomiting  Genitourinary: Non-Contributory  Musculoskeletal: Non-Contributory  Neurological: Non-Contributory  Psychiatric: Non-Contributory  Hematology/Lymphatics: Non-Contributory  Endocrine: Non-Contributory  Allergic/Immunologic: Non-Contributory    HOME MEDICATIONS:   Advil 200 mg oral tablet: Last Dose Taken:  , 2 tab(s) orally every 6 hours  last dose   Tylenol 500 mg oral tablet: Last Dose Taken:  , 2 tab(s) orally every 6 hours, As Needed  AmLODIPine 10 mg oral tablet: Last Dose Taken:  , 1 tab(s) orally once a day in am    CURRENT MEDICATIONS:   --------------------------------------------------------------------------------------  Neurologic Medications  HYDROmorphone PCA (1 mG/mL) Rescue Clinician Bolus 0.5milliGRAM(s) IV Push every 15 minutes PRN for Pain Scale GREATER THAN 6  ondansetron Injectable 4milliGRAM(s) IV Push every 6 hours PRN Nausea  HYDROmorphone PCA (1 mG/mL) 30milliLiter(s) PCA Continuous PCA Continuous    Respiratory Medications  ALBUTerol/ipratropium for Nebulization 3milliLiter(s) Nebulizer every 6 hours    Cardiovascular Medications  tamsulosin 0.4milliGRAM(s) Oral at bedtime  amLODIPine   Tablet 5milliGRAM(s) Oral daily    Gastrointestinal Medications  alvimopan 12milliGRAM(s) Oral two times a day  lactated ringers. 1000milliLiter(s) IV Continuous <Continuous>    Genitourinary Medications    Hematologic/Oncologic Medications  heparin  Injectable 5000Unit(s) SubCutaneous every 8 hours    Antimicrobial/Immunologic Medications    Endocrine/Metabolic Medications  finasteride 5milliGRAM(s) Oral daily    Topical/Other Medications  naloxone Injectable 0.1milliGRAM(s) IV Push every 3 minutes PRN For ANY of the following changes in patient status:  A. RR LESS THAN 10 breaths per minute, B. Oxygen saturation LESS THAN 90%, C. Sedation score of 6    --------------------------------------------------------------------------------------    VITAL SIGNS, INS/OUTS (last 24 hours):  --------------------------------------------------------------------------------------  T(C): 36.2, Max: 37.4 (06-15 @ 07:30)  HR: 113 (109 - 129)  BP: 156/71 (131/74 - 189/85)  BP(mean): 101 (101 - 106)  ABP: --  ABP(mean): --  RR: 28 (16 - 33)  SpO2: 97% (90% - 98%)  Wt(kg): --  CVP(mm Hg): --  CI: --  CAPILLARY BLOOD GLUCOSE   N/A    I & Os for 24h ending 06-15 @ 07:00  =============================================  IN:    sodium chloride 0.45% with potassium chloride 20 mEq/L: 1115 ml    lactated ringers.: 750 ml    Oral Fluid: 560 ml    Sodium Chloride 0.9% IV Bolus: 500 ml    IV PiggyBack: 100 ml    Total IN: 3025 ml  ---------------------------------------------  OUT:    Indwelling Catheter - Urethral: 2000 ml    Total OUT: 2000 ml  ---------------------------------------------  Total NET: 1025 ml    I & Os for current day (as of 06-15 @ 14:16)  =============================================  IN:    sodium chloride 0.45% with potassium chloride 20 mEq/L: 400 ml    lactated ringers.: 300 ml    Total IN: 700 ml  ---------------------------------------------  OUT:    Indwelling Catheter - Urethral: 1150 ml    Total OUT: 1150 ml  ---------------------------------------------  Total NET: -450 ml    --------------------------------------------------------------------------------------    EXAM  NEUROLOGY  RASS:   	GCS:    Exam: Normal, NAD, alert, oriented x 3, no focal deficits.    HEENT  Exam: Normocephalic, atraumatic.  EOMI    RESPIRATORY  Exam: Tachypneic, clear lungs, no wheezes/rales/rhonchi.    CARDIOVASCULAR  Exam: S1, S2.  Regular rate    GI/NUTRITION  Exam: Abdomen soft, appropriately tender. Midline abdominal incision is clean, no drainage, no signs of infection.    VASCULAR  Exam: Extremities warm, pink, well-perfused.    MUSCULOSKELETAL  Exam: All extremities moving spontaneously without limitations.    SKIN:  Exam: Good skin turgor, no skin breakdown.    METABOLIC/FLUIDS/ELECTROLYTES  lactated ringers. 1000milliLiter(s) IV Continuous <Continuous>    HEMATOLOGIC  [x] DVT Prophylaxis: heparin  Injectable 5000Unit(s) SubCutaneous every 8 hours    Transfusions:	[] PRBC	[] Platelets		[] FFP	[] Cryoprecipitate    INFECTIOUS DISEASE  Antimicrobials/Immunologic Medications:    Tubes/Lines/Drains  [x] Peripheral IV  [] Central Venous Line     	[] R	[] L	[] IJ	[] Fem	[] SC	Date Placed:   [] Arterial Line		[] R	[] L	[] Fem	[] Rad	[] Ax	Date Placed:   [] PICC:         	[] Midline		[] Mediport  [] Urinary Catheter		Date Placed:     LABS  --------------------------------------------------------------------------------------  CBC (06-15 @ 13:11)                          12.9<L>                   17.89<H>  )--------------(  362        --    % Neuts, --    % Lymphs, ANC: --                              40.2    CBC (06-15 @ 06:05)                          13.0                     17.31<H>  )--------------(  350        --    % Neuts, --    % Lymphs, ANC: --                              40.2      BMP (06-15 @ 13:00)       141     |  100     |  11    			Ca++ --      Ca 9.2          ---------------------------------( 137<H>		Mg --           4.3     |  27      |  0.81  			Ph --      BMP (06-15 @ 06:05)       140     |  102     |  12    			Ca++ --      Ca 8.9          ---------------------------------( 137<H>		Mg 2.3          4.0     |  25      |  0.85  			Ph 2.5           Cardiac Markers (06-15 @ 13:00)     Trop: < 0.06 -- / CKMB: -- / CK: 187  Cardiac Markers (06-15 @ 06:05)     Trop: < 0.06 -- / CKMB: 2.73 / CK: 181    ABG (06-15 @ 13:02)     7.41 / 44 / 148<H> / 27<H> / 2.6 / 98.8%     Lactate: 0.8   ABG (06-15 @ 07:00)     7.41 / 43 / 59<L> / 26 / 2.3 / 90.2<L>%     Lactate: 0.9       Urinalysis (06-15 @ 07:27):     Color: LIGHT BROWN / Appearance: CLOUDY / S.010 / pH: 6.5 / Gluc: NEGATIVE / Ketones: TRACE / Bili: NEGATIVE / Urobili: NORMAL / Protein :150<H> / Nitrites: NEGATIVE / Leuk.Est: LARGE<H> / RBC: 5-10<H> / WBC: >50<H> / Sq Epi: OCC / Non Sq Epi:  / Bacteria MANY<H>         --------------------------------------------------------------------------------------    OTHER LABS    IMAGING RESULTS  Echo:   CT:   Xray:     ASSESSMENT:  62y Male with tachycardia, tachypnea, and hypoxia. Also has leukocytosis. C/o pleuritic chest pain and L shoulder pain. Concern for PE, anastomotic leak, sepsis.    PLAN:  ***  Neurologic:   Respiratory:   Cardiovascular:   Gastrointestinal/Nutrition:   Renal/Genitourinary:   Hematologic:   Infectious Disease:   Tubes/Lines/Drains:   Endocrine:   Disposition:     --------------------------------------------------------------------------------------    Critical Care Diagnoses: SICU Consultation Note  =====================================================  HPI: 62M, POD #2, s/p L colectomy with primary anastomosis for colon tumor, became tachycardic to 140s on the floor, PaO2 59, SaO2 90% on NC and switched to NRB with SaO2 improved to 96%. Pt c/o pain to L shoulder and pleuritic pain of L upper chest. WBC count 14.9 --> 17.9.     Surgery Information  · Estimated Blood Loss	200 milliLiter(s)	  · IV Infusions - Crystalloids	6000	  · IV Infusions - Colloids	500 albumin	  · Urine Output	750 milliLiter(s)	    HISTORY  62y Male  HPI:  Pt is a 61 yr old male scheduled for Laparscopic Left colectomy with Dr Armenta 17 for Colon mass noted on CT scan done for recent onset of severe pain left lateral abdomen. Pt c/o of constipation - denies melena. PET done and negative. Pt hx of smoking and HTN and cervical neck fusion for injury sustained  - plates and screws in place. (2017 14:07)    Allergies:   PAST MEDICAL & SURGICAL HISTORY:  Smoking  Cervical disc disease  Vertigo  Colon cancer  Injury: forehead &amp; had sutures (  )  Obesity  Head ache  H/O cervical spine surgery: fusion -  with plates and screws  No significant past surgical history    SOCIAL HISTORY:  Current smoker    ADVANCE DIRECTIVES: Presumed Full Code     REVIEW OF SYSTEMS:  General: Non-Contributory  Skin/Breast: Non-Contributory  Ophthalmologic: Non-Contributory  ENMT: Non-Contributory  Respiratory and Thorax: SOB, no cough  Cardiovascular: Chest pain  Gastrointestinal: Abd pain, no vomiting  Genitourinary: Non-Contributory  Musculoskeletal: Non-Contributory  Neurological: Non-Contributory  Psychiatric: Non-Contributory  Hematology/Lymphatics: Non-Contributory  Endocrine: Non-Contributory  Allergic/Immunologic: Non-Contributory    HOME MEDICATIONS:   Advil 200 mg oral tablet: Last Dose Taken:  , 2 tab(s) orally every 6 hours  last dose   Tylenol 500 mg oral tablet: Last Dose Taken:  , 2 tab(s) orally every 6 hours, As Needed  AmLODIPine 10 mg oral tablet: Last Dose Taken:  , 1 tab(s) orally once a day in am    CURRENT MEDICATIONS:   --------------------------------------------------------------------------------------  Neurologic Medications  HYDROmorphone PCA (1 mG/mL) Rescue Clinician Bolus 0.5milliGRAM(s) IV Push every 15 minutes PRN for Pain Scale GREATER THAN 6  ondansetron Injectable 4milliGRAM(s) IV Push every 6 hours PRN Nausea  HYDROmorphone PCA (1 mG/mL) 30milliLiter(s) PCA Continuous PCA Continuous    Respiratory Medications  ALBUTerol/ipratropium for Nebulization 3milliLiter(s) Nebulizer every 6 hours    Cardiovascular Medications  tamsulosin 0.4milliGRAM(s) Oral at bedtime  amLODIPine   Tablet 5milliGRAM(s) Oral daily    Gastrointestinal Medications  alvimopan 12milliGRAM(s) Oral two times a day  lactated ringers. 1000milliLiter(s) IV Continuous <Continuous>    Genitourinary Medications    Hematologic/Oncologic Medications  heparin  Injectable 5000Unit(s) SubCutaneous every 8 hours    Antimicrobial/Immunologic Medications    Endocrine/Metabolic Medications  finasteride 5milliGRAM(s) Oral daily    Topical/Other Medications  naloxone Injectable 0.1milliGRAM(s) IV Push every 3 minutes PRN For ANY of the following changes in patient status:  A. RR LESS THAN 10 breaths per minute, B. Oxygen saturation LESS THAN 90%, C. Sedation score of 6    --------------------------------------------------------------------------------------    VITAL SIGNS, INS/OUTS (last 24 hours):  --------------------------------------------------------------------------------------  T(C): 36.2, Max: 37.4 (06-15 @ 07:30)  HR: 113 (109 - 129)  BP: 156/71 (131/74 - 189/85)  BP(mean): 101 (101 - 106)  ABP: --  ABP(mean): --  RR: 28 (16 - 33)  SpO2: 97% (90% - 98%)  Wt(kg): --  CVP(mm Hg): --  CI: --  CAPILLARY BLOOD GLUCOSE   N/A    I & Os for 24h ending 06-15 @ 07:00  =============================================  IN:    sodium chloride 0.45% with potassium chloride 20 mEq/L: 1115 ml    lactated ringers.: 750 ml    Oral Fluid: 560 ml    Sodium Chloride 0.9% IV Bolus: 500 ml    IV PiggyBack: 100 ml    Total IN: 3025 ml  ---------------------------------------------  OUT:    Indwelling Catheter - Urethral: 2000 ml    Total OUT: 2000 ml  ---------------------------------------------  Total NET: 1025 ml    I & Os for current day (as of 06-15 @ 14:16)  =============================================  IN:    sodium chloride 0.45% with potassium chloride 20 mEq/L: 400 ml    lactated ringers.: 300 ml    Total IN: 700 ml  ---------------------------------------------  OUT:    Indwelling Catheter - Urethral: 1150 ml    Total OUT: 1150 ml  ---------------------------------------------  Total NET: -450 ml    --------------------------------------------------------------------------------------    EXAM  NEUROLOGY  RASS:   	GCS:    Exam: Normal, NAD, alert, oriented x 3, no focal deficits.    HEENT  Exam: Normocephalic, atraumatic.  EOMI    RESPIRATORY  Exam: Tachypneic, clear lungs, no wheezes/rales/rhonchi.    CARDIOVASCULAR  Exam: S1, S2.  Regular rate    GI/NUTRITION  Exam: Abdomen soft, appropriately tender. Midline abdominal incision is clean, no drainage, no signs of infection.    VASCULAR  Exam: Extremities warm, pink, well-perfused.    MUSCULOSKELETAL  Exam: All extremities moving spontaneously without limitations.    SKIN:  Exam: Good skin turgor, no skin breakdown.    METABOLIC/FLUIDS/ELECTROLYTES  lactated ringers. 1000milliLiter(s) IV Continuous <Continuous>    HEMATOLOGIC  [x] DVT Prophylaxis: heparin  Injectable 5000Unit(s) SubCutaneous every 8 hours    Transfusions:	[] PRBC	[] Platelets		[] FFP	[] Cryoprecipitate    INFECTIOUS DISEASE  Antimicrobials/Immunologic Medications:    Tubes/Lines/Drains  [x] Peripheral IV  [] Central Venous Line     	[] R	[] L	[] IJ	[] Fem	[] SC	Date Placed:   [] Arterial Line		[] R	[] L	[] Fem	[] Rad	[] Ax	Date Placed:   [] PICC:         	[] Midline		[] Mediport  [] Urinary Catheter		Date Placed:     LABS  --------------------------------------------------------------------------------------  CBC (06-15 @ 13:11)                          12.9<L>                   17.89<H>  )--------------(  362        --    % Neuts, --    % Lymphs, ANC: --                              40.2    CBC (06-15 @ 06:05)                          13.0                     17.31<H>  )--------------(  350        --    % Neuts, --    % Lymphs, ANC: --                              40.2      BMP (06-15 @ 13:00)       141     |  100     |  11    			Ca++ --      Ca 9.2          ---------------------------------( 137<H>		Mg --           4.3     |  27      |  0.81  			Ph --      BMP (06-15 @ 06:05)       140     |  102     |  12    			Ca++ --      Ca 8.9          ---------------------------------( 137<H>		Mg 2.3          4.0     |  25      |  0.85  			Ph 2.5           Cardiac Markers (06-15 @ 13:00)     Trop: < 0.06 -- / CKMB: -- / CK: 187  Cardiac Markers (06-15 @ 06:05)     Trop: < 0.06 -- / CKMB: 2.73 / CK: 181    ABG (06-15 @ 13:02)     7.41 / 44 / 148<H> / 27<H> / 2.6 / 98.8%     Lactate: 0.8   ABG (06-15 @ 07:00)     7.41 / 43 / 59<L> / 26 / 2.3 / 90.2<L>%     Lactate: 0.9       Urinalysis (06-15 @ 07:27):     Color: LIGHT BROWN / Appearance: CLOUDY / S.010 / pH: 6.5 / Gluc: NEGATIVE / Ketones: TRACE / Bili: NEGATIVE / Urobili: NORMAL / Protein :150<H> / Nitrites: NEGATIVE / Leuk.Est: LARGE<H> / RBC: 5-10<H> / WBC: >50<H> / Sq Epi: OCC / Non Sq Epi:  / Bacteria MANY<H>         --------------------------------------------------------------------------------------    OTHER LABS    IMAGING RESULTS  Echo:   CT:   Xray:     ASSESSMENT:  62y Male with tachycardia, tachypnea, and hypoxia. Also has leukocytosis. C/o pleuritic chest pain and L shoulder pain. Initial concern for PE, ACS, anastomotic leak, sepsis. CTPA neg for PE. CT abd/pelvis shows expected post-op changes. EKG: sinus tachycardia. Trop neg x1.    PLAN:  Neurologic: Mentating well, no active issue.  Respiratory: CTPA neg for PE. Pt with moderate resp distress. Will start BIPAP, repeat ABG. Duonebs q 6 hrs.  Cardiovascular: Repeat troponin q 6 hrs. Repeat EKG. Monitor HR/BP.  Gastrointestinal/Nutrition: NPO for now, will discuss with primary team.  Renal/Genitourinary: Monitor UOP. LR @ 100/hr.  Hematologic: Trend WBC/platelets.  Infectious Disease: +UA, Ucx sent. Will start Ceftriaxone.  Tubes/Lines/Drains: PIV.  Endocrine: No active issue.  Disposition: SICU.    --------------------------------------------------------------------------------------    Critical Care Diagnoses:  Hypoxia  Tachycaria  s/p major abdominal surgery SICU Consultation Note  =====================================================  HPI: 62M, POD #2, s/p L colectomy with primary anastomosis for colon tumor, became tachycardic to 140s on the floor, PaO2 59, SaO2 90% on NC and switched to NRB with SaO2 improved to 96%. Pt c/o pain to L shoulder and pleuritic pain of L upper chest. WBC count 14.9 --> 17.9.     Surgery Information  · Estimated Blood Loss	200 milliLiter(s)	  · IV Infusions - Crystalloids	6000	  · IV Infusions - Colloids	500 albumin	  · Urine Output	750 milliLiter(s)	    HISTORY  62y Male  HPI:  Pt is a 61 yr old male scheduled for Laparscopic Left colectomy with Dr Armenta 17 for Colon mass noted on CT scan done for recent onset of severe pain left lateral abdomen. Pt c/o of constipation - denies melena. PET done and negative. Pt hx of smoking and HTN and cervical neck fusion for injury sustained  - plates and screws in place. (2017 14:07)    Allergies:   PAST MEDICAL & SURGICAL HISTORY:  Smoking  Cervical disc disease  Vertigo  Colon cancer  Injury: forehead &amp; had sutures (  )  Obesity  Head ache  H/O cervical spine surgery: fusion -  with plates and screws  No significant past surgical history    SOCIAL HISTORY:  Current smoker    ADVANCE DIRECTIVES: Presumed Full Code     REVIEW OF SYSTEMS:  General: Non-Contributory  Skin/Breast: Non-Contributory  Ophthalmologic: Non-Contributory  ENMT: Non-Contributory  Respiratory and Thorax: SOB, no cough  Cardiovascular: Chest pain  Gastrointestinal: Abd pain, no vomiting  Genitourinary: Non-Contributory  Musculoskeletal: Non-Contributory  Neurological: Non-Contributory  Psychiatric: Non-Contributory  Hematology/Lymphatics: Non-Contributory  Endocrine: Non-Contributory  Allergic/Immunologic: Non-Contributory    HOME MEDICATIONS:   Advil 200 mg oral tablet: Last Dose Taken:  , 2 tab(s) orally every 6 hours  last dose   Tylenol 500 mg oral tablet: Last Dose Taken:  , 2 tab(s) orally every 6 hours, As Needed  AmLODIPine 10 mg oral tablet: Last Dose Taken:  , 1 tab(s) orally once a day in am    CURRENT MEDICATIONS:   --------------------------------------------------------------------------------------  Neurologic Medications  HYDROmorphone PCA (1 mG/mL) Rescue Clinician Bolus 0.5milliGRAM(s) IV Push every 15 minutes PRN for Pain Scale GREATER THAN 6  ondansetron Injectable 4milliGRAM(s) IV Push every 6 hours PRN Nausea  HYDROmorphone PCA (1 mG/mL) 30milliLiter(s) PCA Continuous PCA Continuous    Respiratory Medications  ALBUTerol/ipratropium for Nebulization 3milliLiter(s) Nebulizer every 6 hours    Cardiovascular Medications  tamsulosin 0.4milliGRAM(s) Oral at bedtime  amLODIPine   Tablet 5milliGRAM(s) Oral daily    Gastrointestinal Medications  alvimopan 12milliGRAM(s) Oral two times a day  lactated ringers. 1000milliLiter(s) IV Continuous <Continuous>    Genitourinary Medications    Hematologic/Oncologic Medications  heparin  Injectable 5000Unit(s) SubCutaneous every 8 hours    Antimicrobial/Immunologic Medications    Endocrine/Metabolic Medications  finasteride 5milliGRAM(s) Oral daily    Topical/Other Medications  naloxone Injectable 0.1milliGRAM(s) IV Push every 3 minutes PRN For ANY of the following changes in patient status:  A. RR LESS THAN 10 breaths per minute, B. Oxygen saturation LESS THAN 90%, C. Sedation score of 6    --------------------------------------------------------------------------------------    VITAL SIGNS, INS/OUTS (last 24 hours):  --------------------------------------------------------------------------------------  T(C): 36.2, Max: 37.4 (06-15 @ 07:30)  HR: 113 (109 - 129)  BP: 156/71 (131/74 - 189/85)  BP(mean): 101 (101 - 106)  ABP: --  ABP(mean): --  RR: 28 (16 - 33)  SpO2: 97% (90% - 98%)  Wt(kg): --  CVP(mm Hg): --  CI: --  CAPILLARY BLOOD GLUCOSE   N/A    I & Os for 24h ending 06-15 @ 07:00  =============================================  IN:    sodium chloride 0.45% with potassium chloride 20 mEq/L: 1115 ml    lactated ringers.: 750 ml    Oral Fluid: 560 ml    Sodium Chloride 0.9% IV Bolus: 500 ml    IV PiggyBack: 100 ml    Total IN: 3025 ml  ---------------------------------------------  OUT:    Indwelling Catheter - Urethral: 2000 ml    Total OUT: 2000 ml  ---------------------------------------------  Total NET: 1025 ml    I & Os for current day (as of 06-15 @ 14:16)  =============================================  IN:    sodium chloride 0.45% with potassium chloride 20 mEq/L: 400 ml    lactated ringers.: 300 ml    Total IN: 700 ml  ---------------------------------------------  OUT:    Indwelling Catheter - Urethral: 1150 ml    Total OUT: 1150 ml  ---------------------------------------------  Total NET: -450 ml    --------------------------------------------------------------------------------------    EXAM  NEUROLOGY  RASS:   	GCS:    Exam: Normal, NAD, alert, oriented x 3, no focal deficits.    HEENT  Exam: Normocephalic, atraumatic.  EOMI    RESPIRATORY  Exam: Tachypneic, clear lungs, no wheezes/rales/rhonchi.    CARDIOVASCULAR  Exam: S1, S2.  Regular rate    GI/NUTRITION  Exam: Abdomen soft, appropriately tender. Midline abdominal incision is clean, no drainage, no signs of infection.    VASCULAR  Exam: Extremities warm, pink, well-perfused.    MUSCULOSKELETAL  Exam: All extremities moving spontaneously without limitations.    SKIN:  Exam: Good skin turgor, no skin breakdown.    METABOLIC/FLUIDS/ELECTROLYTES  lactated ringers. 1000milliLiter(s) IV Continuous <Continuous>    HEMATOLOGIC  [x] DVT Prophylaxis: heparin  Injectable 5000Unit(s) SubCutaneous every 8 hours    Transfusions:	[] PRBC	[] Platelets		[] FFP	[] Cryoprecipitate    INFECTIOUS DISEASE  Antimicrobials/Immunologic Medications:    Tubes/Lines/Drains  [x] Peripheral IV  [] Central Venous Line     	[] R	[] L	[] IJ	[] Fem	[] SC	Date Placed:   [] Arterial Line		[] R	[] L	[] Fem	[] Rad	[] Ax	Date Placed:   [] PICC:         	[] Midline		[] Mediport  [] Urinary Catheter		Date Placed:     LABS  --------------------------------------------------------------------------------------  CBC (06-15 @ 13:11)                          12.9<L>                   17.89<H>  )--------------(  362        --    % Neuts, --    % Lymphs, ANC: --                              40.2    CBC (06-15 @ 06:05)                          13.0                     17.31<H>  )--------------(  350        --    % Neuts, --    % Lymphs, ANC: --                              40.2      BMP (06-15 @ 13:00)       141     |  100     |  11    			Ca++ --      Ca 9.2          ---------------------------------( 137<H>		Mg --           4.3     |  27      |  0.81  			Ph --      BMP (06-15 @ 06:05)       140     |  102     |  12    			Ca++ --      Ca 8.9          ---------------------------------( 137<H>		Mg 2.3          4.0     |  25      |  0.85  			Ph 2.5           Cardiac Markers (06-15 @ 13:00)     Trop: < 0.06 -- / CKMB: -- / CK: 187  Cardiac Markers (06-15 @ 06:05)     Trop: < 0.06 -- / CKMB: 2.73 / CK: 181    ABG (06-15 @ 13:02)     7.41 / 44 / 148<H> / 27<H> / 2.6 / 98.8%     Lactate: 0.8   ABG (06-15 @ 07:00)     7.41 / 43 / 59<L> / 26 / 2.3 / 90.2<L>%     Lactate: 0.9       Urinalysis (06-15 @ 07:27):     Color: LIGHT BROWN / Appearance: CLOUDY / S.010 / pH: 6.5 / Gluc: NEGATIVE / Ketones: TRACE / Bili: NEGATIVE / Urobili: NORMAL / Protein :150<H> / Nitrites: NEGATIVE / Leuk.Est: LARGE<H> / RBC: 5-10<H> / WBC: >50<H> / Sq Epi: OCC / Non Sq Epi:  / Bacteria MANY<H>         --------------------------------------------------------------------------------------    OTHER LABS    IMAGING RESULTS  Echo:   CT:   Xray:     ASSESSMENT:  62y Male with tachycardia, tachypnea, and hypoxia. Also has leukocytosis. C/o pleuritic chest pain and L shoulder pain. Initial concern for PE, ACS, anastomotic leak, sepsis. CTPA neg for PE. CT abd/pelvis shows expected post-op changes. EKG: sinus tachycardia. Trop neg x1.    PLAN:  Neurologic: PCA for pain control.  Respiratory: CTPA neg for PE. Pt with moderate resp distress. Will start BIPAP, repeat ABG. Duonebs q 6 hrs.  Cardiovascular: Repeat troponin q 6 hrs. Repeat EKG. Monitor HR/BP.  Gastrointestinal/Nutrition: Clear liquid diet, as discussed with primary team.  Renal/Genitourinary: Monitor UOP. LR @ 100/hr.  Hematologic: Trend WBC/platelets.  Infectious Disease: +UA, Ucx sent. Will start Ceftriaxone.  Tubes/Lines/Drains: PIV.  Endocrine: No active issue.  Disposition: SICU.    --------------------------------------------------------------------------------------    Critical Care Diagnoses:  Hypoxia  Tachycaria  s/p major abdominal surgery

## 2017-06-15 NOTE — PROGRESS NOTE ADULT - SUBJECTIVE AND OBJECTIVE BOX
Anesthesia Pain Management Service    SUBJECTIVE: Patient is doing well with IV PCA and no significant problems reported.    Pain Scale Score	At rest: ___ 	With Activity: ___ 	[X ] Refer to charted pain scores    THERAPY:    [ ] IV PCA Morphine		[ ] 5 mg/mL	[ ] 1 mg/mL  [X ] IV PCA Hydromorphone	[ ] 5 mg/mL	[X ] 1 mg/mL  [ ] IV PCA Fentanyl		[ ] 50 micrograms/mL    Demand dose __0.2_ lockout __6_ (minutes) Continuous Rate _0__ Total: _1mg__  Daily      MEDICATIONS  (STANDING):  alvimopan 12milliGRAM(s) Oral two times a day  tamsulosin 0.4milliGRAM(s) Oral at bedtime  finasteride 5milliGRAM(s) Oral daily  HYDROmorphone PCA (1 mG/mL) 30milliLiter(s) PCA Continuous PCA Continuous  amLODIPine   Tablet 5milliGRAM(s) Oral daily  lactated ringers. 1000milliLiter(s) IV Continuous <Continuous>  ALBUTerol/ipratropium for Nebulization 3milliLiter(s) Nebulizer every 6 hours  cefTRIAXone   IVPB  IV Intermittent   enoxaparin Injectable 40milliGRAM(s) SubCutaneous daily  acetylcysteine 10% Inhalation 10milliLiter(s) Inhalation every 6 hours    MEDICATIONS  (PRN):  HYDROmorphone PCA (1 mG/mL) Rescue Clinician Bolus 0.5milliGRAM(s) IV Push every 15 minutes PRN for Pain Scale GREATER THAN 6  naloxone Injectable 0.1milliGRAM(s) IV Push every 3 minutes PRN For ANY of the following changes in patient status:  A. RR LESS THAN 10 breaths per minute, B. Oxygen saturation LESS THAN 90%, C. Sedation score of 6  ondansetron Injectable 4milliGRAM(s) IV Push every 6 hours PRN Nausea      OBJECTIVE:    Sedation Score:	[ X] Alert	[ ] Drowsy 	[ ] Arousable	[ ] Asleep	[ ] Unresponsive    Side Effects:	[X ] None	[ ] Nausea	[ ] Vomiting	[ ] Pruritus  		[ ] Other:    Vital Signs Last 24 Hrs  T(C): 36.7, Max: 37.4 (06-15 @ 07:30)  T(F): 98.1, Max: 99.4 (06-15 @ 07:30)  HR: 106 (106 - 129)  BP: 146/72 (131/74 - 189/85)  BP(mean): 101 (101 - 106)  RR: 19 (16 - 33)  SpO2: 96% (90% - 98%)    ASSESSMENT/ PLAN    Therapy to  be:	[ X] Continue   [ ] Discontinued   [ ] Change to prn Analgesics    Documentation and Verification of current medications:   [X] Done	[ ] Not done, not elligible    Comments: NPO

## 2017-06-15 NOTE — PROGRESS NOTE ADULT - SUBJECTIVE AND OBJECTIVE BOX
No nausea or vomiting. No fever or chills. No flatus. Had some left sided chest discomfort. Pain better controlled.    Afebrile 110-120's 140-150'/70-80's low 90's  I/O: 3.0L/1.6L  PULM: coarse bs  ABD: soft, min distension, incision c/d/i, no rigidity    LABS: reviewed in [place]    A/P POD#2 s/p left colectomy, cysto with elizabeth insertion  -with the tachy cardia and drop in O2 saturation, will obtain CTA to r/o PE. possible pneumonia with his smoking history  -will also obtain CT A/P with oral contrast to r/o intrabdominal source for incr wbc and tachycardia  -EKG obtained. cardiac enzymes pending to r/o cardiac etiology  -make NPO for now  -cont current pain regimen  -keep elizabeth per  recs. cont finasteride and tamsulosin   -further plans pending radiologic studies  -SICU consult obtained in case critical care monitoring required    NARCISO An   [phone]

## 2017-06-15 NOTE — CONSULT NOTE ADULT - ATTENDING COMMENTS
I examined this patient with the SICU team upon his arrival in the SICU on 15 Esmer 2017.  I had previously reviewed both Chest CT's and his abdominal CT with Dr. Harris Dunne of diagnositic radiology.    Critical Care Issues:    Tachycardia:  sinus tachycardia to 130-140.  Hypertension:  modest  Hypoxemia w/ mild respiratory distress    Taken together these elements were suggestive of a possible pulmonary embolism, however, none was demonstrated by CT PA.  An acute coronary syndrome could also have been the etiology, however, initial cardiac enzymes were negative and there were no obvious ST elevations or depressions.    Bilateral atelectasis with bilateral effusions (small)  Leukocytosis - WBC 17    With elevated WBC the possibility of an intraabdominal septic process was entertained, however, abdominal CT was not suggestive of this.  At this point it was unclear what had caused the patients relatively acute change in clinical condition.  We accepted the patient  to the SICU and started him on BiPAP for ventilatory support.  Cultures were sent and careful monitoring of labs, vitals signs, and gas exchange planned.  I spent 40 minutes assessing and directing initial management of this patient.

## 2017-06-15 NOTE — PROGRESS NOTE ADULT - SUBJECTIVE AND OBJECTIVE BOX
Anesthesia Pain Management Service    SUBJECTIVE: Pt doing well with IV PCA without problems reported.    Therapy:	  [ X] IV PCA	   [ ] Epidural           [ ] s/p Spinal Opoid              [ ] Postpartum infusion	  [ ] Patient controlled regional anesthesia (PCRA)    [ ] prn Analgesics    Allergies    No Known Allergies    Intolerances      MEDICATIONS  (STANDING):  alvimopan 12milliGRAM(s) Oral two times a day  tamsulosin 0.4milliGRAM(s) Oral at bedtime  finasteride 5milliGRAM(s) Oral daily  HYDROmorphone PCA (1 mG/mL) 30milliLiter(s) PCA Continuous PCA Continuous  amLODIPine   Tablet 5milliGRAM(s) Oral daily  lactated ringers. 1000milliLiter(s) IV Continuous <Continuous>  ALBUTerol/ipratropium for Nebulization 3milliLiter(s) Nebulizer every 6 hours  cefTRIAXone   IVPB  IV Intermittent   enoxaparin Injectable 40milliGRAM(s) SubCutaneous daily  famotidine Injectable 20milliGRAM(s) IV Push once    MEDICATIONS  (PRN):  HYDROmorphone PCA (1 mG/mL) Rescue Clinician Bolus 0.5milliGRAM(s) IV Push every 15 minutes PRN for Pain Scale GREATER THAN 6  naloxone Injectable 0.1milliGRAM(s) IV Push every 3 minutes PRN For ANY of the following changes in patient status:  A. RR LESS THAN 10 breaths per minute, B. Oxygen saturation LESS THAN 90%, C. Sedation score of 6  ondansetron Injectable 4milliGRAM(s) IV Push every 6 hours PRN Nausea      OBJECTIVE:   [X] No new signs     [ ] Other:    Side Effects:  [X ] None			[ ] Other:    Assessment of Catheter Site:		[ ] Intact		[ ] Other:    ASSESSMENT/PLAN  [ X] Continue current therapy    [ ] Therapy changed to:    [ ] IV PCA       [ ] Epidural     [ ] prn Analgesics     Comments:

## 2017-06-16 ENCOUNTER — TRANSCRIPTION ENCOUNTER (OUTPATIENT)
Age: 62
End: 2017-06-16

## 2017-06-16 LAB
ALBUMIN SERPL ELPH-MCNC: 3.2 G/DL — LOW (ref 3.3–5)
ALP SERPL-CCNC: 64 U/L — SIGNIFICANT CHANGE UP (ref 40–120)
ALT FLD-CCNC: 14 U/L — SIGNIFICANT CHANGE UP (ref 4–41)
AMYLASE P1 CFR SERPL: 25 U/L — SIGNIFICANT CHANGE UP (ref 25–125)
AST SERPL-CCNC: 15 U/L — SIGNIFICANT CHANGE UP (ref 4–40)
BACTERIA UR CULT: SIGNIFICANT CHANGE UP
BASE EXCESS BLDA CALC-SCNC: 2 MMOL/L — SIGNIFICANT CHANGE UP
BASE EXCESS BLDA CALC-SCNC: 2.2 MMOL/L — SIGNIFICANT CHANGE UP
BILIRUB SERPL-MCNC: 0.2 MG/DL — SIGNIFICANT CHANGE UP (ref 0.2–1.2)
BLD GP AB SCN SERPL QL: NEGATIVE — SIGNIFICANT CHANGE UP
BUN SERPL-MCNC: 11 MG/DL — SIGNIFICANT CHANGE UP (ref 7–23)
CA-I BLDA-SCNC: 1.24 MMOL/L — SIGNIFICANT CHANGE UP (ref 1.15–1.29)
CALCIUM SERPL-MCNC: 9.3 MG/DL — SIGNIFICANT CHANGE UP (ref 8.4–10.5)
CHLORIDE BLDA-SCNC: 107 MMOL/L — SIGNIFICANT CHANGE UP (ref 96–108)
CHLORIDE SERPL-SCNC: 101 MMOL/L — SIGNIFICANT CHANGE UP (ref 98–107)
CO2 SERPL-SCNC: 23 MMOL/L — SIGNIFICANT CHANGE UP (ref 22–31)
CREAT BLDA-MCNC: SIGNIFICANT CHANGE UP MG/DL (ref 0.5–1.3)
CREAT SERPL-MCNC: 0.64 MG/DL — SIGNIFICANT CHANGE UP (ref 0.5–1.3)
GLUCOSE BLDA-MCNC: 126 MG/DL — HIGH (ref 70–99)
GLUCOSE BLDA-MCNC: 133 MG/DL — HIGH (ref 70–99)
GLUCOSE SERPL-MCNC: 124 MG/DL — HIGH (ref 70–99)
HCO3 BLDA-SCNC: 26 MMOL/L — SIGNIFICANT CHANGE UP (ref 22–26)
HCO3 BLDA-SCNC: 26 MMOL/L — SIGNIFICANT CHANGE UP (ref 22–26)
HCT VFR BLD CALC: 38.9 % — LOW (ref 39–50)
HCT VFR BLDA CALC: 22.6 % — LOW (ref 39–51)
HCT VFR BLDA CALC: 39.8 % — SIGNIFICANT CHANGE UP (ref 39–51)
HGB BLD-MCNC: 12.4 G/DL — LOW (ref 13–17)
HGB BLDA-MCNC: 12.9 G/DL — LOW (ref 13–17)
HGB BLDA-MCNC: 7.2 G/DL — LOW (ref 13–17)
LACTATE BLDA-SCNC: 0.8 MMOL/L — SIGNIFICANT CHANGE UP (ref 0.5–2)
LACTATE BLDA-SCNC: 1.2 MMOL/L — SIGNIFICANT CHANGE UP (ref 0.5–2)
LIDOCAIN IGE QN: 18.9 U/L — SIGNIFICANT CHANGE UP (ref 7–60)
MAGNESIUM SERPL-MCNC: 2.2 MG/DL — SIGNIFICANT CHANGE UP (ref 1.6–2.6)
MCHC RBC-ENTMCNC: 29.5 PG — SIGNIFICANT CHANGE UP (ref 27–34)
MCHC RBC-ENTMCNC: 31.9 % — LOW (ref 32–36)
MCV RBC AUTO: 92.4 FL — SIGNIFICANT CHANGE UP (ref 80–100)
PCO2 BLDA: 40 MMHG — SIGNIFICANT CHANGE UP (ref 35–48)
PCO2 BLDA: 43 MMHG — SIGNIFICANT CHANGE UP (ref 35–48)
PH BLDA: 7.4 PH — SIGNIFICANT CHANGE UP (ref 7.35–7.45)
PH BLDA: 7.43 PH — SIGNIFICANT CHANGE UP (ref 7.35–7.45)
PHOSPHATE SERPL-MCNC: 2.1 MG/DL — LOW (ref 2.5–4.5)
PLATELET # BLD AUTO: 370 K/UL — SIGNIFICANT CHANGE UP (ref 150–400)
PMV BLD: 9.1 FL — SIGNIFICANT CHANGE UP (ref 7–13)
PO2 BLDA: 59 MMHG — LOW (ref 83–108)
PO2 BLDA: 71 MMHG — LOW (ref 83–108)
POTASSIUM BLDA-SCNC: 3.4 MMOL/L — SIGNIFICANT CHANGE UP (ref 3.4–4.5)
POTASSIUM BLDA-SCNC: 3.5 MMOL/L — SIGNIFICANT CHANGE UP (ref 3.4–4.5)
POTASSIUM SERPL-MCNC: 3.9 MMOL/L — SIGNIFICANT CHANGE UP (ref 3.5–5.3)
POTASSIUM SERPL-SCNC: 3.9 MMOL/L — SIGNIFICANT CHANGE UP (ref 3.5–5.3)
PROT SERPL-MCNC: 6.3 G/DL — SIGNIFICANT CHANGE UP (ref 6–8.3)
RBC # BLD: 4.21 M/UL — SIGNIFICANT CHANGE UP (ref 4.2–5.8)
RBC # FLD: 14.1 % — SIGNIFICANT CHANGE UP (ref 10.3–14.5)
RH IG SCN BLD-IMP: POSITIVE — SIGNIFICANT CHANGE UP
SAO2 % BLDA: 94.7 % — LOW (ref 95–99)
SAO2 % BLDA: 95.4 % — SIGNIFICANT CHANGE UP (ref 95–99)
SODIUM BLDA-SCNC: 138 MMOL/L — SIGNIFICANT CHANGE UP (ref 136–146)
SODIUM BLDA-SCNC: 138 MMOL/L — SIGNIFICANT CHANGE UP (ref 136–146)
SODIUM SERPL-SCNC: 141 MMOL/L — SIGNIFICANT CHANGE UP (ref 135–145)
SPECIMEN SOURCE: SIGNIFICANT CHANGE UP
WBC # BLD: 15.71 K/UL — HIGH (ref 3.8–10.5)
WBC # FLD AUTO: 15.71 K/UL — HIGH (ref 3.8–10.5)

## 2017-06-16 PROCEDURE — 99291 CRITICAL CARE FIRST HOUR: CPT

## 2017-06-16 PROCEDURE — 71010: CPT | Mod: 26

## 2017-06-16 RX ORDER — POTASSIUM PHOSPHATE, MONOBASIC POTASSIUM PHOSPHATE, DIBASIC 236; 224 MG/ML; MG/ML
15 INJECTION, SOLUTION INTRAVENOUS ONCE
Qty: 0 | Refills: 0 | Status: COMPLETED | OUTPATIENT
Start: 2017-06-16 | End: 2017-06-16

## 2017-06-16 RX ORDER — OXYCODONE HYDROCHLORIDE 5 MG/1
10 TABLET ORAL ONCE
Qty: 0 | Refills: 0 | Status: DISCONTINUED | OUTPATIENT
Start: 2017-06-16 | End: 2017-06-16

## 2017-06-16 RX ORDER — OXYCODONE HYDROCHLORIDE 5 MG/1
10 TABLET ORAL EVERY 12 HOURS
Qty: 0 | Refills: 0 | Status: DISCONTINUED | OUTPATIENT
Start: 2017-06-16 | End: 2017-06-21

## 2017-06-16 RX ORDER — SODIUM CHLORIDE 9 MG/ML
1000 INJECTION, SOLUTION INTRAVENOUS
Qty: 0 | Refills: 0 | Status: DISCONTINUED | OUTPATIENT
Start: 2017-06-16 | End: 2017-06-16

## 2017-06-16 RX ORDER — ACETAMINOPHEN 500 MG
650 TABLET ORAL EVERY 6 HOURS
Qty: 0 | Refills: 0 | Status: COMPLETED | OUTPATIENT
Start: 2017-06-16 | End: 2017-06-18

## 2017-06-16 RX ORDER — METOPROLOL TARTRATE 50 MG
50 TABLET ORAL
Qty: 0 | Refills: 0 | Status: DISCONTINUED | OUTPATIENT
Start: 2017-06-16 | End: 2017-06-21

## 2017-06-16 RX ORDER — FAMOTIDINE 10 MG/ML
20 INJECTION INTRAVENOUS ONCE
Qty: 0 | Refills: 0 | Status: COMPLETED | OUTPATIENT
Start: 2017-06-16 | End: 2017-06-16

## 2017-06-16 RX ADMIN — FINASTERIDE 5 MILLIGRAM(S): 5 TABLET, FILM COATED ORAL at 12:09

## 2017-06-16 RX ADMIN — HYDROMORPHONE HYDROCHLORIDE 30 MILLILITER(S): 2 INJECTION INTRAMUSCULAR; INTRAVENOUS; SUBCUTANEOUS at 19:12

## 2017-06-16 RX ADMIN — TAMSULOSIN HYDROCHLORIDE 0.4 MILLIGRAM(S): 0.4 CAPSULE ORAL at 23:08

## 2017-06-16 RX ADMIN — Medication 3 MILLILITER(S): at 03:09

## 2017-06-16 RX ADMIN — FAMOTIDINE 20 MILLIGRAM(S): 10 INJECTION INTRAVENOUS at 07:59

## 2017-06-16 RX ADMIN — Medication 650 MILLIGRAM(S): at 17:19

## 2017-06-16 RX ADMIN — ALVIMOPAN 12 MILLIGRAM(S): 12 CAPSULE ORAL at 05:37

## 2017-06-16 RX ADMIN — HYDROMORPHONE HYDROCHLORIDE 30 MILLILITER(S): 2 INJECTION INTRAMUSCULAR; INTRAVENOUS; SUBCUTANEOUS at 07:08

## 2017-06-16 RX ADMIN — ALVIMOPAN 12 MILLIGRAM(S): 12 CAPSULE ORAL at 17:19

## 2017-06-16 RX ADMIN — Medication 650 MILLIGRAM(S): at 23:37

## 2017-06-16 RX ADMIN — Medication 3 MILLILITER(S): at 21:04

## 2017-06-16 RX ADMIN — Medication 650 MILLIGRAM(S): at 18:47

## 2017-06-16 RX ADMIN — POTASSIUM PHOSPHATE, MONOBASIC POTASSIUM PHOSPHATE, DIBASIC 62.5 MILLIMOLE(S): 236; 224 INJECTION, SOLUTION INTRAVENOUS at 05:38

## 2017-06-16 RX ADMIN — HYDROMORPHONE HYDROCHLORIDE 30 MILLILITER(S): 2 INJECTION INTRAMUSCULAR; INTRAVENOUS; SUBCUTANEOUS at 03:11

## 2017-06-16 RX ADMIN — Medication 50 MILLIGRAM(S): at 17:19

## 2017-06-16 RX ADMIN — OXYCODONE HYDROCHLORIDE 10 MILLIGRAM(S): 5 TABLET ORAL at 14:30

## 2017-06-16 RX ADMIN — Medication 5 MILLILITER(S): at 10:49

## 2017-06-16 RX ADMIN — OXYCODONE HYDROCHLORIDE 10 MILLIGRAM(S): 5 TABLET ORAL at 12:09

## 2017-06-16 RX ADMIN — OXYCODONE HYDROCHLORIDE 10 MILLIGRAM(S): 5 TABLET ORAL at 23:07

## 2017-06-16 RX ADMIN — CEFTRIAXONE 100 GRAM(S): 500 INJECTION, POWDER, FOR SOLUTION INTRAMUSCULAR; INTRAVENOUS at 14:33

## 2017-06-16 RX ADMIN — Medication 650 MILLIGRAM(S): at 23:07

## 2017-06-16 RX ADMIN — Medication 3 MILLILITER(S): at 10:49

## 2017-06-16 RX ADMIN — OXYCODONE HYDROCHLORIDE 10 MILLIGRAM(S): 5 TABLET ORAL at 23:37

## 2017-06-16 RX ADMIN — SODIUM CHLORIDE 30 MILLILITER(S): 9 INJECTION, SOLUTION INTRAVENOUS at 07:59

## 2017-06-16 RX ADMIN — Medication 5 MILLILITER(S): at 03:09

## 2017-06-16 RX ADMIN — ENOXAPARIN SODIUM 40 MILLIGRAM(S): 100 INJECTION SUBCUTANEOUS at 12:09

## 2017-06-16 RX ADMIN — AMLODIPINE BESYLATE 5 MILLIGRAM(S): 2.5 TABLET ORAL at 05:37

## 2017-06-16 RX ADMIN — Medication 3 MILLILITER(S): at 15:48

## 2017-06-16 NOTE — PROGRESS NOTE ADULT - SUBJECTIVE AND OBJECTIVE BOX
Day _2_ of Anesthesia Pain Management Service    Allergies    No Known Allergies    Intolerances      SUBJECTIVE: "It hurts when I go to move or turn in bed. I didn't sleep last night."    Pain Scale Score	At rest: _4-5/10_ 	With Activity: ___ 	[ ] Refer to charted pain scores    THERAPY:    [ ] IV PCA Morphine		[ ] 5 mg/mL	[ ] 1 mg/mL  [X] IV PCA Hydromorphone	[ ] 5 mg/mL	[X] 1 mg/mL  [ ] IV PCA Fentanyl		[ ] 50 micrograms/mL    Demand dose _0.3 mg_ lockout _6_ (minutes) Continuous Rate _0_ Total: _16mg_  Daily      MEDICATIONS  (STANDING):  alvimopan 12milliGRAM(s) Oral two times a day  tamsulosin 0.4milliGRAM(s) Oral at bedtime  finasteride 5milliGRAM(s) Oral daily  HYDROmorphone PCA (1 mG/mL) 30milliLiter(s) PCA Continuous PCA Continuous  amLODIPine   Tablet 5milliGRAM(s) Oral daily  ALBUTerol/ipratropium for Nebulization 3milliLiter(s) Nebulizer every 6 hours  cefTRIAXone   IVPB  IV Intermittent   enoxaparin Injectable 40milliGRAM(s) SubCutaneous daily  cefTRIAXone   IVPB 1Gram(s) IV Intermittent every 24 hours  oxyCODONE ER Tablet 10milliGRAM(s) Oral every 12 hours  oxyCODONE ER Tablet 10milliGRAM(s) Oral once    MEDICATIONS  (PRN):  HYDROmorphone PCA (1 mG/mL) Rescue Clinician Bolus 0.5milliGRAM(s) IV Push every 15 minutes PRN for Pain Scale GREATER THAN 6  naloxone Injectable 0.1milliGRAM(s) IV Push every 3 minutes PRN For ANY of the following changes in patient status:  A. RR LESS THAN 10 breaths per minute, B. Oxygen saturation LESS THAN 90%, C. Sedation score of 6  ondansetron Injectable 4milliGRAM(s) IV Push every 6 hours PRN Nausea      OBJECTIVE: A&Ox3, NAD, sitting up in recliner    Sedation Score:	[X] Alert	[ ] Drowsy	[ ] Arousable	[ ] Asleep	[ ] Unresponsive    Side Effects:	[X] None	[ ] Nausea	[ ] Vomiting	[ ] Pruritus  		  [ ] Weakness		[ ] Numbness	[ ] Other:                              12.4   15.71 )-----------( 370      ( 16 Jun 2017 02:45 )             38.9       06-16    141  |  101  |  11  ----------------------------<  124<H>  3.9   |  23  |  0.64    Ca    9.3      16 Jun 2017 02:56  Phos  2.1     06-16  Mg     2.2     06-16    TPro  6.3  /  Alb  3.2<L>  /  TBili  0.2  /  DBili  x   /  AST  15  /  ALT  14  /  AlkPhos  64  06-16      ASSESSMENT/ PLAN    Therapy to  be:	[X] Continue   [ ] Discontinued   [ ] Change to prn Analgesics    Documentation and Verification of current medications:  [X] Done	[ ] Not done, not eligible  [ ] Not done, reason not given    [ ]  [place]  Reviewed and Copied to Chart    Comments:  Add Oxycontin 10mg PO BID, 1st dose now.  Add Tylenol 650mg PO q6hrs x2 days

## 2017-06-16 NOTE — PROGRESS NOTE ADULT - SUBJECTIVE AND OBJECTIVE BOX
Patient is a 62y old  Male who presents with a chief complaint of colon mass (2017 14:07)      SUBJECTIVE: pain controlled, no nausea/vomiting/headache/dizziness/chest pain/shortness of breath    OBJECTIVE:  PHYSICAL EXAM:  GA: NAD, high flow 73% at 50L  Abdomen:  -  soft, non-distended, appropriate incisional tenderness  - Incision: clean/dry/intact     Vitals/Labs:  Vital Signs Last 24 Hrs  T(C): 36.5, Max: 36.7 (15 @ 15:00)  T(F): 97.7, Max: 98.1 (15 @ 16:00)  HR: 99 (98 - 129)  BP: 160/75 (138/69 - 189/85)  BP(mean): 105 (85 - 107)  RR: 24 (10 - 33)  SpO2: 98% (88% - 99%)    I&O's Detail  I & Os for 24h ending 2017 07:00  =============================================  IN:    lactated ringers.: 1600 ml    sodium chloride 0.45% with potassium chloride 20 mEq/L: 400 ml    IV PiggyBack: 300 ml    lactated ringers.: 120 ml    Total IN: 2420 ml  ---------------------------------------------  OUT:    Indwelling Catheter - Urethral: 3575 ml    Total OUT: 3575 ml  ---------------------------------------------  Total NET: -1155 ml    I & Os for current day (as of 2017 11:11)  =============================================  IN:    Oral Fluid: 200 ml    lactated ringers.: 90 ml    Total IN: 290 ml  ---------------------------------------------  OUT:    Indwelling Catheter - Urethral: 800 ml    Total OUT: 800 ml  ---------------------------------------------  Total NET: -510 ml                            12.4   15.71 )-----------( 370      ( 2017 02:45 )             38.9       06-16    141  |  101  |  11  ----------------------------<  124<H>  3.9   |  23  |  0.64    Ca    9.3      2017 02:56  Phos  2.1     06-16  Mg     2.2     06-16    TPro  6.3  /  Alb  3.2<L>  /  TBili  0.2  /  DBili  x   /  AST  15  /  ALT  14  /  AlkPhos  64  06-16      CAPILLARY BLOOD GLUCOSE      LIVER FUNCTIONS - ( 2017 02:56 )  Alb: 3.2 g/dL / Pro: 6.3 g/dL / ALK PHOS: 64 u/L / ALT: 14 u/L / AST: 15 u/L / GGT: x                 Urinalysis Basic - ( 15 Faisal 2017 07:27 )    Color: LIGHT BROWN / Appearance: CLOUDY / S.010 / pH: 6.5  Gluc: NEGATIVE / Ketone: TRACE  / Bili: NEGATIVE / Urobili: NORMAL E.U.   Blood: LARGE / Protein: 150 / Nitrite: NEGATIVE   Leuk Esterase: LARGE / RBC: 5-10 / WBC >50   Sq Epi: OCC / Non Sq Epi: x / Bacteria: MANY        ASSESSMENT/PLAN: MARISOL ALFRED 62y Male s/p lap to open left colectomy   - Diet: CLD  - Ceftriaxone for UTI  - elizabeth   - Pain control with PCA  - Input and outputs   - DVT ppx  - OOB/incentive spirometry       [facility]  Pager: [phone]

## 2017-06-16 NOTE — DIETITIAN INITIAL EVALUATION ADULT. - ENERGY NEEDS
Estimated nutrition need 115 - 1455 kcal/d ( 11 - 14 kcal/kg actual wt. @ admission ) [id number] - 145 gm/d(  1.5 - 2.0 gm/kg ideal wt. )

## 2017-06-16 NOTE — DIETITIAN INITIAL EVALUATION ADULT. - NS FNS REASON FOR WEIGHT CHANG
altered GI function (specify)/other (specify)/abdominal pain when eating and stress of diagnosis and surgery

## 2017-06-16 NOTE — PROGRESS NOTE ADULT - SUBJECTIVE AND OBJECTIVE BOX
Anesthesia Pain Management Service    SUBJECTIVE: Pt doing well with IV PCA without problems reported.    Therapy:	  [ X] IV PCA	   [ ] Epidural           [ ] s/p Spinal Opoid              [ ] Postpartum infusion	  [ ] Patient controlled regional anesthesia (PCRA)    [ ] prn Analgesics    Allergies    No Known Allergies    Intolerances      MEDICATIONS  (STANDING):  alvimopan 12milliGRAM(s) Oral two times a day  tamsulosin 0.4milliGRAM(s) Oral at bedtime  finasteride 5milliGRAM(s) Oral daily  HYDROmorphone PCA (1 mG/mL) 30milliLiter(s) PCA Continuous PCA Continuous  amLODIPine   Tablet 5milliGRAM(s) Oral daily  ALBUTerol/ipratropium for Nebulization 3milliLiter(s) Nebulizer every 6 hours  cefTRIAXone   IVPB  IV Intermittent   enoxaparin Injectable 40milliGRAM(s) SubCutaneous daily  cefTRIAXone   IVPB 1Gram(s) IV Intermittent every 24 hours  oxyCODONE ER Tablet 10milliGRAM(s) Oral every 12 hours  acetaminophen   Tablet. 650milliGRAM(s) Oral every 6 hours  metoprolol 50milliGRAM(s) Oral two times a day    MEDICATIONS  (PRN):  HYDROmorphone PCA (1 mG/mL) Rescue Clinician Bolus 0.5milliGRAM(s) IV Push every 15 minutes PRN for Pain Scale GREATER THAN 6  naloxone Injectable 0.1milliGRAM(s) IV Push every 3 minutes PRN For ANY of the following changes in patient status:  A. RR LESS THAN 10 breaths per minute, B. Oxygen saturation LESS THAN 90%, C. Sedation score of 6  ondansetron Injectable 4milliGRAM(s) IV Push every 6 hours PRN Nausea      OBJECTIVE:   [X] No new signs     [ ] Other:    Side Effects:  [X ] None			[ ] Other:    Assessment of Catheter Site:		[ ] Intact		[ ] Other:    ASSESSMENT/PLAN  [ X] Continue current therapy    [ ] Therapy changed to:    [ ] IV PCA       [ ] Epidural     [ ] prn Analgesics     Comments:

## 2017-06-16 NOTE — PROGRESS NOTE ADULT - SUBJECTIVE AND OBJECTIVE BOX
Patient feels better, pain well controlled, breathing better.  No nausea or vomiting.  No flatus, tolerating clear liquid diet.    Afebrile, BP stable, HR high 9-'s to low 100's, O2 sats: 97% U.O adequate  Abd: Softly distended          Inc: C/D/I    Labs: reviewed in [place]  CT's reviewed.    A/P POD#3  PAtient improving with SICU care  Cont aggressive pulmonary toilet and treatment.  OOB  Cont abx for UTI  Await GI fxn  [place] per urology.

## 2017-06-16 NOTE — PROGRESS NOTE ADULT - SUBJECTIVE AND OBJECTIVE BOX
SICU Daily Progress Note  =====================================================  Interval/Overnight Events: Patient was transferred to the SICU for likely narcotic induced respiratory insufficiency causing hypoxia requiring Bipap. PaO2 has improved and patient was put on high flow nasal cannula for adequate oxygenation, goal O2 sat 92%. CT Chest/Abd/Pelvis obtained-negative for PE, also showed a 3.4 cm collection or cystic lesion at the pancreatic body.        POD #          	SICU Day #    ***    HPI:  ((insert from previous note)) ***    PMH:   ***    Allergies: No Known Allergies      MEDICATIONS:   --------------------------------------------------------------------------------------  Neurologic Medications  HYDROmorphone PCA (1 mG/mL) Rescue Clinician Bolus 0.5milliGRAM(s) IV Push every 15 minutes PRN for Pain Scale GREATER THAN 6  ondansetron Injectable 4milliGRAM(s) IV Push every 6 hours PRN Nausea  HYDROmorphone PCA (1 mG/mL) 30milliLiter(s) PCA Continuous PCA Continuous    Respiratory Medications  ALBUTerol/ipratropium for Nebulization 3milliLiter(s) Nebulizer every 6 hours  acetylcysteine 20% Inhalation 5milliLiter(s) Inhalation every 6 hours    Cardiovascular Medications  tamsulosin 0.4milliGRAM(s) Oral at bedtime  amLODIPine   Tablet 5milliGRAM(s) Oral daily    Gastrointestinal Medications  alvimopan 12milliGRAM(s) Oral two times a day  lactated ringers. 1000milliLiter(s) IV Continuous <Continuous>    Genitourinary Medications    Hematologic/Oncologic Medications  enoxaparin Injectable 40milliGRAM(s) SubCutaneous daily    Antimicrobial/Immunologic Medications  cefTRIAXone   IVPB  IV Intermittent   cefTRIAXone   IVPB 1Gram(s) IV Intermittent every 24 hours    Endocrine/Metabolic Medications  finasteride 5milliGRAM(s) Oral daily    Topical/Other Medications  naloxone Injectable 0.1milliGRAM(s) IV Push every 3 minutes PRN For ANY of the following changes in patient status:  A. RR LESS THAN 10 breaths per minute, B. Oxygen saturation LESS THAN 90%, C. Sedation score of 6    --------------------------------------------------------------------------------------    VITAL SIGNS, INS/OUTS (last 24 hours):  --------------------------------------------------------------------------------------  ((Insert SICU Vitals/Is+Os here))***  --------------------------------------------------------------------------------------    EXAM  NEUROLOGY  RASS:   	GCS:    Exam: Normal, NAD, alert, oriented x3, no focal deficits. ***    HEENT  Exam: Normocephalic, atraumatic, EOMI.  ***    RESPIRATORY  Exam: Lungs clear to auscultation, Normal expansion/effort. ***  Mechanical Ventilation:     CARDIOVASCULAR  Exam: S1, S2.  Regular rate and rhythm.  Peripheral edema ***    GI/NUTRITION  Exam: Abdomen soft, Non-tender, Non-distended.  Gastrostomy / Jejunostomy / Nasogastric tube in place.  Colostomy / Ileostomy.  ***  Wound:  ***  Current Diet:  NPO***    VASCULAR  Exam: Extremities warm, pink, well-perfused. ***    MUSCULOSKELETAL  Exam: All extremities moving spontaneously without limitations. ***    SKIN  Exam: Good skin turgor, no skin breakdown. ***    METABOLIC/FLUIDS/ELECTROLYTES  lactated ringers. 1000milliLiter(s) IV Continuous <Continuous>      HEMATOLOGIC  [x] VTE Prophylaxis: enoxaparin Injectable 40milliGRAM(s) SubCutaneous daily    Transfusions:	[] PRBC	[] Platelets		[] FFP	[] Cryoprecipitate    INFECTIOUS DISEASE  Antimicrobials/Immunologic Medications:  cefTRIAXone   IVPB  IV Intermittent   cefTRIAXone   IVPB 1Gram(s) IV Intermittent every 24 hours    Day #      of     ***    Tubes/Lines/Drains  ***  [x] Peripheral IV  [] Central Venous Line     	[] R	[] L	[] IJ	[] Fem	[] SC	Date Placed:   [] Arterial Line		[] R	[] L	[] Fem	[] Rad	[] Ax	Date Placed:   [] PICC		[] Midline		[] Mediport  [] Urinary Catheter		Date Placed:   [x] Necessity of urinary, arterial, and venous catheters discussed    LABS  --------------------------------------------------------------------------------------  ((Insert SICU Labs here))***  --------------------------------------------------------------------------------------    OTHER LABORATORY:     IMAGING STUDIES:   CXR:     ASSESSMENT:  62y Male    ((insert from previous))***    PLAN:   ***  Neurologic:   Respiratory:   Cardiovascular:   Gastrointestinal/Nutrition:   Renal/Genitourinary:   Hematologic:   Infectious Disease:   Tubes/Lines/Drains:   Endocrine:   Disposition:     --------------------------------------------------------------------------------------    Critical Care Diagnoses: SICU Daily Progress Note  =====================================================  Interval/Overnight Events: Patient was transferred to the SICU for likely narcotic induced respiratory insufficiency causing hypoxia requiring Bipap. PaO2 has improved and patient was put on high flow nasal cannula for adequate oxygenation, goal O2 sat 92%. CT Chest/Abd/Pelvis obtained-negative for PE, also showed a 3.4 cm collection or cystic lesion at the pancreatic body.        POD # 3         	SICU Day #  1    HPI:  61 yr old male found to have colon mass noted on CT scan done for recent onset of severe pain left lateral abdomen. Pt c/o of constipation - denies melena. PET done and negative. Pt hx of smoking and HTN and cervical neck fusion for injury sustained 2014 - plates and screws in place. Patient is now s/p laparoscopy converted to open left hemicolectomy 6/13.      PMH:      Allergies: No Known Allergies      MEDICATIONS:   --------------------------------------------------------------------------------------  Neurologic Medications  HYDROmorphone PCA (1 mG/mL) Rescue Clinician Bolus 0.5milliGRAM(s) IV Push every 15 minutes PRN for Pain Scale GREATER THAN 6  ondansetron Injectable 4milliGRAM(s) IV Push every 6 hours PRN Nausea  HYDROmorphone PCA (1 mG/mL) 30milliLiter(s) PCA Continuous PCA Continuous    Respiratory Medications  ALBUTerol/ipratropium for Nebulization 3milliLiter(s) Nebulizer every 6 hours  acetylcysteine 20% Inhalation 5milliLiter(s) Inhalation every 6 hours    Cardiovascular Medications  tamsulosin 0.4milliGRAM(s) Oral at bedtime  amLODIPine   Tablet 5milliGRAM(s) Oral daily    Gastrointestinal Medications  alvimopan 12milliGRAM(s) Oral two times a day  lactated ringers. 1000milliLiter(s) IV Continuous <Continuous>    Genitourinary Medications    Hematologic/Oncologic Medications  enoxaparin Injectable 40milliGRAM(s) SubCutaneous daily    Antimicrobial/Immunologic Medications  cefTRIAXone   IVPB  IV Intermittent   cefTRIAXone   IVPB 1Gram(s) IV Intermittent every 24 hours    Endocrine/Metabolic Medications  finasteride 5milliGRAM(s) Oral daily    Topical/Other Medications  naloxone Injectable 0.1milliGRAM(s) IV Push every 3 minutes PRN For ANY of the following changes in patient status:  A. RR LESS THAN 10 breaths per minute, B. Oxygen saturation LESS THAN 90%, C. Sedation score of 6    --------------------------------------------------------------------------------------    VITAL SIGNS, INS/OUTS (last 24 hours):  --------------------------------------------------------------------------------------  ((Insert SICU Vitals/Is+Os here))***  --------------------------------------------------------------------------------------    EXAM  NEUROLOGY  Exam: Normal, NAD, alert, oriented x3, no focal deficits.       RESPIRATORY  Exam: Lungs clear to auscultation, Normal expansion/effort. on High flow NC    CARDIOVASCULAR  Exam: S1, S2.  Regular rate and rhythm. no Peripheral edema    GI/NUTRITION  Exam: Abdomen soft, Non-tender, Non-distended.    Current Diet:  Clear liquids    VASCULAR  Exam: Extremities warm, pink, well-perfused.     MUSCULOSKELETAL  Exam: All extremities moving spontaneously without limitations.    SKIN  Exam: Good skin turgor, no skin breakdown.    METABOLIC/FLUIDS/ELECTROLYTES  lactated ringers. 1000milliLiter(s) IV Continuous <Continuous>      HEMATOLOGIC  [x] VTE Prophylaxis: enoxaparin Injectable 40milliGRAM(s) SubCutaneous daily    Transfusions:	[] PRBC	[] Platelets		[] FFP	[] Cryoprecipitate    INFECTIOUS DISEASE  Antimicrobials/Immunologic Medications:  cefTRIAXone   IVPB  IV Intermittent   cefTRIAXone   IVPB 1Gram(s) IV Intermittent every 24 hours    Day #      of     ***    Tubes/Lines/Drains  ***  [x] Peripheral IV  [] Central Venous Line     	[] R	[] L	[] IJ	[] Fem	[] SC	Date Placed:   [] Arterial Line		[] R	[] L	[] Fem	[] Rad	[] Ax	Date Placed:   [] PICC		[] Midline		[] Mediport  [] Urinary Catheter		Date Placed:   [x] Necessity of urinary, arterial, and venous catheters discussed    LABS  --------------------------------------------------------------------------------------  ((Insert SICU Labs here))***  --------------------------------------------------------------------------------------    OTHER LABORATORY:     IMAGING STUDIES:   CXR:     ASSESSMENT:  62y Male s/p Laparoscopy converted to open left hemicolectomy on 6/13, transferred to the SICU for likely narcotic induced respiratory insufficiency causing hypoxia requiring Bipap, now on high flow nasal cannula.     PLAN:    Neurologic: Dilaudid PCA for adequate pain control, out of bed to chair, ambulate as tolerated. On Alvimopan 12mgBID x 14 doses  Respiratory: wean supplemental oxygen, goal O2 sat >92%. Continue duonebs  Cardiovascular: Continue Norvasc for hypertension.  Gastrointestinal/Nutrition: on CLD, advance to regular diet as tolerated  Renal/Genitourinary: Keep glenn   Hematologic:   Infectious Disease:   Tubes/Lines/Drains:   Endocrine:   Disposition:     --------------------------------------------------------------------------------------    Critical Care Diagnoses: SICU Daily Progress Note  =====================================================  Interval/Overnight Events: Patient was transferred to the SICU for likely narcotic induced respiratory insufficiency causing hypoxia requiring Bipap. PaO2 has improved and patient was put on high flow nasal cannula for adequate oxygenation, goal O2 sat 92%. CT Chest/Abd/Pelvis obtained-negative for PE, also showed a 3.4 cm collection or cystic lesion at the pancreatic body.        POD # 3         	SICU Day #  1    HPI:  61 yr old male found to have colon mass noted on CT scan done for recent onset of severe pain left lateral abdomen. Pt c/o of constipation - denies melena. PET done and negative. Pt hx of smoking and HTN and cervical neck fusion for injury sustained 2014 - plates and screws in place. Patient is now s/p laparoscopy converted to open left hemicolectomy 6/13.      Past Medical History:  Cervical disc disease    Colon cancer    Head ache    Injury  forehead & had sutures ( 2013 )  Obesity    Smoking    Vertigo.    Past Surgical History:  H/O cervical spine surgery  fusion - 2014 with plates and screws.      Allergies: No Known Allergies      MEDICATIONS:   --------------------------------------------------------------------------------------  Neurologic Medications  HYDROmorphone PCA (1 mG/mL) Rescue Clinician Bolus 0.5milliGRAM(s) IV Push every 15 minutes PRN for Pain Scale GREATER THAN 6  ondansetron Injectable 4milliGRAM(s) IV Push every 6 hours PRN Nausea  HYDROmorphone PCA (1 mG/mL) 30milliLiter(s) PCA Continuous PCA Continuous    Respiratory Medications  ALBUTerol/ipratropium for Nebulization 3milliLiter(s) Nebulizer every 6 hours  acetylcysteine 20% Inhalation 5milliLiter(s) Inhalation every 6 hours    Cardiovascular Medications  tamsulosin 0.4milliGRAM(s) Oral at bedtime  amLODIPine   Tablet 5milliGRAM(s) Oral daily    Gastrointestinal Medications  alvimopan 12milliGRAM(s) Oral two times a day  lactated ringers. 1000milliLiter(s) IV Continuous <Continuous>    Genitourinary Medications    Hematologic/Oncologic Medications  enoxaparin Injectable 40milliGRAM(s) SubCutaneous daily    Antimicrobial/Immunologic Medications  cefTRIAXone   IVPB  IV Intermittent   cefTRIAXone   IVPB 1Gram(s) IV Intermittent every 24 hours    Endocrine/Metabolic Medications  finasteride 5milliGRAM(s) Oral daily    Topical/Other Medications  naloxone Injectable 0.1milliGRAM(s) IV Push every 3 minutes PRN For ANY of the following changes in patient status:  A. RR LESS THAN 10 breaths per minute, B. Oxygen saturation LESS THAN 90%, C. Sedation score of 6    --------------------------------------------------------------------------------------    VITAL SIGNS, INS/OUTS (last 24 hours):  --------------------------------------------------------------------------------------  ((Insert SICU Vitals/Is+Os here))***  --------------------------------------------------------------------------------------    EXAM  NEUROLOGY  Exam: Normal, NAD, alert, oriented x3, no focal deficits.       RESPIRATORY  Exam: Lungs clear to auscultation, Normal expansion/effort. on High flow NC    CARDIOVASCULAR  Exam: S1, S2.  Regular rate and rhythm. no Peripheral edema    GI/NUTRITION  Exam: Abdomen soft, Non-tender, Non-distended.    Current Diet:  Clear liquids    VASCULAR  Exam: Extremities warm, pink, well-perfused.     MUSCULOSKELETAL  Exam: All extremities moving spontaneously without limitations.    SKIN  Exam: Good skin turgor, no skin breakdown.    METABOLIC/FLUIDS/ELECTROLYTES  lactated ringers. 1000milliLiter(s) IV Continuous <Continuous>      HEMATOLOGIC  [x] VTE Prophylaxis: enoxaparin Injectable 40milliGRAM(s) SubCutaneous daily    Transfusions:	[] PRBC	[] Platelets		[] FFP	[] Cryoprecipitate    INFECTIOUS DISEASE  Antimicrobials/Immunologic Medications:  cefTRIAXone   IVPB  IV Intermittent   cefTRIAXone   IVPB 1Gram(s) IV Intermittent every 24 hours    Day #      of     ***    Tubes/Lines/Drains  ***  [x] Peripheral IV  [] Central Venous Line     	[] R	[] L	[] IJ	[] Fem	[] SC	Date Placed:   [] Arterial Line		[] R	[] L	[] Fem	[] Rad	[] Ax	Date Placed:   [] PICC		[] Midline		[] Mediport  [] Urinary Catheter		Date Placed:   [x] Necessity of urinary, arterial, and venous catheters discussed    LABS  --------------------------------------------------------------------------------------  ((Insert SICU Labs here))***  --------------------------------------------------------------------------------------    OTHER LABORATORY:     IMAGING STUDIES:   CXR:     ASSESSMENT:  62y Male s/p Laparoscopy converted to open left hemicolectomy on 6/13, transferred to the SICU for likely narcotic induced respiratory insufficiency causing hypoxia requiring Bipap, now on high flow nasal cannula.     PLAN:    Neurologic: Dilaudid PCA for adequate pain control, out of bed to chair, ambulate as tolerated. On Alvimopan 12mgBID x 14 doses  Respiratory: wean supplemental oxygen, goal O2 sat >92%. Continue duonebs and mucomyst  Cardiovascular: Continue Norvasc for hypertension.  Gastrointestinal/Nutrition: on CLD, advance to regular diet as tolerated  Renal/Genitourinary: f/u Urology recs regarding plan for elizabeth removal  Hematologic:   Infectious Disease:   Tubes/Lines/Drains:   Endocrine:   Disposition:     --------------------------------------------------------------------------------------    Critical Care Diagnoses: SICU Daily Progress Note  =====================================================  Interval/Overnight Events: Patient was transferred to the SICU for likely narcotic induced respiratory insufficiency causing hypoxia requiring Bipap. PaO2 has improved and patient was put on high flow nasal cannula for adequate oxygenation, goal O2 sat 92%. CT Chest/Abd/Pelvis obtained-negative for PE, also showed a 3.4 cm collection or cystic lesion at the pancreatic body.        POD # 3         	SICU Day #  1    HPI:  61 yr old male found to have colon mass noted on CT scan done for recent onset of severe pain left lateral abdomen. Pt c/o of constipation - denies melena. PET done and negative. Pt hx of smoking and HTN and cervical neck fusion for injury sustained 2014 - plates and screws in place. Patient is now s/p laparoscopy converted to open left hemicolectomy 6/13.      Past Medical History:  Cervical disc disease    Colon cancer    Head ache    Injury  forehead & had sutures ( 2013 )  Obesity    Smoking    Vertigo.    Past Surgical History:  H/O cervical spine surgery  fusion - 2014 with plates and screws.    Allergies: No Known Allergies    MEDICATIONS:   --------------------------------------------------------------------------------------  Neurologic Medications  HYDROmorphone PCA (1 mG/mL) Rescue Clinician Bolus 0.5milliGRAM(s) IV Push every 15 minutes PRN for Pain Scale GREATER THAN 6  ondansetron Injectable 4milliGRAM(s) IV Push every 6 hours PRN Nausea  HYDROmorphone PCA (1 mG/mL) 30milliLiter(s) PCA Continuous PCA Continuous    Respiratory Medications  ALBUTerol/ipratropium for Nebulization 3milliLiter(s) Nebulizer every 6 hours  acetylcysteine 20% Inhalation 5milliLiter(s) Inhalation every 6 hours    Cardiovascular Medications  tamsulosin 0.4milliGRAM(s) Oral at bedtime  amLODIPine   Tablet 5milliGRAM(s) Oral daily    Gastrointestinal Medications  alvimopan 12milliGRAM(s) Oral two times a day  lactated ringers. 1000milliLiter(s) IV Continuous <Continuous>    Genitourinary Medications    Hematologic/Oncologic Medications  enoxaparin Injectable 40milliGRAM(s) SubCutaneous daily    Antimicrobial/Immunologic Medications  cefTRIAXone   IVPB  IV Intermittent   cefTRIAXone   IVPB 1Gram(s) IV Intermittent every 24 hours    Endocrine/Metabolic Medications  finasteride 5milliGRAM(s) Oral daily    Topical/Other Medications  naloxone Injectable 0.1milliGRAM(s) IV Push every 3 minutes PRN For ANY of the following changes in patient status:  A. RR LESS THAN 10 breaths per minute, B. Oxygen saturation LESS THAN 90%, C. Sedation score of 6    --------------------------------------------------------------------------------------    VITAL SIGNS, INS/OUTS (last 24 hours):  --------------------------------------------------------------------------------------  ((Insert SICU Vitals/Is+Os here))***  --------------------------------------------------------------------------------------    EXAM  NEUROLOGY  Exam: Normal, NAD, alert, oriented x3, no focal deficits.       RESPIRATORY  Exam: Lungs clear to auscultation, Normal expansion/effort. on High flow NC    CARDIOVASCULAR  Exam: S1, S2.  Regular rate and rhythm. no Peripheral edema    GI/NUTRITION  Exam: Abdomen soft, Non-tender, Non-distended.    Current Diet:  Clear liquids    VASCULAR  Exam: Extremities warm, pink, well-perfused.     MUSCULOSKELETAL  Exam: All extremities moving spontaneously without limitations.    SKIN  Exam: Good skin turgor, no skin breakdown.    METABOLIC/FLUIDS/ELECTROLYTES  lactated ringers. 1000milliLiter(s) IV Continuous <Continuous>      HEMATOLOGIC  [x] VTE Prophylaxis: enoxaparin Injectable 40milliGRAM(s) SubCutaneous daily    Transfusions:	[] PRBC	[] Platelets		[] FFP	[] Cryoprecipitate    INFECTIOUS DISEASE  Antimicrobials/Immunologic Medications:  cefTRIAXone   IVPB  IV Intermittent   cefTRIAXone   IVPB 1Gram(s) IV Intermittent every 24 hours    Tubes/Lines/Drains  ***  [x] Peripheral IV  [] Central Venous Line     	[] R	[] L	[] IJ	[] Fem	[] SC	Date Placed:   [] Arterial Line		[] R	[] L	[] Fem	[] Rad	[] Ax	Date Placed:   [] PICC		[] Midline		[] Mediport  [x] Urinary Catheter		Date Placed:   [x] Necessity of urinary, arterial, and venous catheters discussed    LABS  --------------------------------------------------------------------------------------  ((Insert SICU Labs here))***  --------------------------------------------------------------------------------------    OTHER LABORATORY:     IMAGING STUDIES:   CXR:     ASSESSMENT:  62y Male s/p Laparoscopy converted to open left hemicolectomy on 6/13, transferred to the SICU for likely narcotic induced respiratory insufficiency causing hypoxia requiring Bipap, now on high flow nasal cannula.     PLAN:    Neurologic: Dilaudid PCA for adequate pain control, out of bed to chair, ambulate as tolerated. On Alvimopan 12mgBID x 14 doses  Respiratory: wean supplemental oxygen, goal O2 sat >92%. Continue duonebs and mucomyst  Cardiovascular: Continue Norvasc for hypertension.  Gastrointestinal/Nutrition: on CLD, advance to regular diet as tolerated  Renal/Genitourinary: f/u Urology recs regarding plan for elizabeth removal  Hematologic: no issues  Infectious Disease: trend leukocytosis, UTI - Ceftriaxone (6/15- ), on Flomax and Proscar  Endocrine: no issues  Disposition: SICU care    --------------------------------------------------------------------------------------    Critical Care Diagnoses: SICU Daily Progress Note  =====================================================  Interval/Overnight Events: Patient was transferred to the SICU for likely narcotic induced respiratory insufficiency causing hypoxia requiring Bipap. PaO2 has improved and patient was put on high flow nasal cannula for adequate oxygenation, goal O2 sat 92%. CT Chest/Abd/Pelvis obtained-negative for PE, also showed a 3.4 cm collection or cystic lesion at the pancreatic body.        POD # 3         	SICU Day #  1    HPI:  61 yr old male found to have colon mass noted on CT scan done for recent onset of severe pain left lateral abdomen. Pt c/o of constipation - denies melena. PET done and negative. Pt hx of smoking and HTN and cervical neck fusion for injury sustained 2014 - plates and screws in place. Patient is now s/p laparoscopy converted to open left hemicolectomy 6/13.      Past Medical History:  Cervical disc disease    Colon cancer    Head ache    Injury  forehead & had sutures ( 2013 )  Obesity    Smoking    Vertigo.    Past Surgical History:  H/O cervical spine surgery  fusion - 2014 with plates and screws.    Allergies: No Known Allergies    MEDICATIONS:   --------------------------------------------------------------------------------------  Neurologic Medications  HYDROmorphone PCA (1 mG/mL) Rescue Clinician Bolus 0.5milliGRAM(s) IV Push every 15 minutes PRN for Pain Scale GREATER THAN 6  ondansetron Injectable 4milliGRAM(s) IV Push every 6 hours PRN Nausea  HYDROmorphone PCA (1 mG/mL) 30milliLiter(s) PCA Continuous PCA Continuous    Respiratory Medications  ALBUTerol/ipratropium for Nebulization 3milliLiter(s) Nebulizer every 6 hours  acetylcysteine 20% Inhalation 5milliLiter(s) Inhalation every 6 hours    Cardiovascular Medications  tamsulosin 0.4milliGRAM(s) Oral at bedtime  amLODIPine   Tablet 5milliGRAM(s) Oral daily    Gastrointestinal Medications  alvimopan 12milliGRAM(s) Oral two times a day  lactated ringers. 1000milliLiter(s) IV Continuous <Continuous>    Genitourinary Medications    Hematologic/Oncologic Medications  enoxaparin Injectable 40milliGRAM(s) SubCutaneous daily    Antimicrobial/Immunologic Medications  cefTRIAXone   IVPB  IV Intermittent   cefTRIAXone   IVPB 1Gram(s) IV Intermittent every 24 hours    Endocrine/Metabolic Medications  finasteride 5milliGRAM(s) Oral daily    Topical/Other Medications  naloxone Injectable 0.1milliGRAM(s) IV Push every 3 minutes PRN For ANY of the following changes in patient status:  A. RR LESS THAN 10 breaths per minute, B. Oxygen saturation LESS THAN 90%, C. Sedation score of 6    --------------------------------------------------------------------------------------    VITAL SIGNS, INS/OUTS (last 24 hours):  --------------------------------------------------------------------------------------  ((Insert SICU Vitals/Is+Os here))***  --------------------------------------------------------------------------------------    EXAM  NEUROLOGY  Exam: Normal, NAD, alert, oriented x3, no focal deficits.       RESPIRATORY  Exam: Lungs clear to auscultation, Normal expansion/effort. on High flow NC    CARDIOVASCULAR  Exam: S1, S2.  Regular rate and rhythm. no Peripheral edema    GI/NUTRITION  Exam: Abdomen soft, Non-tender, Non-distended.    Current Diet:  Clear liquids    VASCULAR  Exam: Extremities warm, pink, well-perfused.     MUSCULOSKELETAL  Exam: All extremities moving spontaneously without limitations.    SKIN  Exam: Good skin turgor, no skin breakdown.    METABOLIC/FLUIDS/ELECTROLYTES  lactated ringers. 1000milliLiter(s) IV Continuous <Continuous>      HEMATOLOGIC  [x] VTE Prophylaxis: enoxaparin Injectable 40milliGRAM(s) SubCutaneous daily    Transfusions:	[] PRBC	[] Platelets		[] FFP	[] Cryoprecipitate    INFECTIOUS DISEASE  Antimicrobials/Immunologic Medications:  cefTRIAXone   IVPB  IV Intermittent   cefTRIAXone   IVPB 1Gram(s) IV Intermittent every 24 hours    Tubes/Lines/Drains  ***  [x] Peripheral IV  [] Central Venous Line     	[] R	[] L	[] IJ	[] Fem	[] SC	Date Placed:   [] Arterial Line		[] R	[] L	[] Fem	[] Rad	[] Ax	Date Placed:   [] PICC		[] Midline		[] Mediport  [x] Urinary Catheter		Date Placed:   [x] Necessity of urinary, arterial, and venous catheters discussed    LABS  --------------------------------------------------------------------------------------  ((Insert SICU Labs here))***  --------------------------------------------------------------------------------------    OTHER LABORATORY:     IMAGING STUDIES:   CXR:     ASSESSMENT:  62y Male s/p Laparoscopy converted to open left hemicolectomy on 6/13, transferred to the SICU for likely narcotic induced respiratory insufficiency causing hypoxia requiring Bipap, now on high flow nasal cannula.     PLAN:    Neurologic: Dilaudid PCA for adequate pain control, out of bed to chair, ambulate as tolerated. On Alvimopan 12mgBID x 14 doses  Respiratory: wean supplemental oxygen, goal O2 sat >92%. Continue duonebs and mucomyst  Cardiovascular: Continue Norvasc for hypertension.  Gastrointestinal/Nutrition: on CLD, advance to regular diet as tolerated  Renal/Genitourinary: f/u Urology recs regarding plan for elizabeth removal  Hematologic: Lovenox for DVT ppx.  Infectious Disease: trend leukocytosis, UTI - Ceftriaxone (6/15- ), on Flomax and Proscar  Endocrine: no issues  Disposition: SICU care    --------------------------------------------------------------------------------------    Critical Care Diagnoses: SICU Daily Progress Note  =====================================================  Interval/Overnight Events: Patient was transferred to the SICU for likely narcotic induced respiratory insufficiency causing hypoxia requiring Bipap. PaO2 has improved and patient was put on high flow nasal cannula for adequate oxygenation, goal O2 sat 92%. CT Chest/Abd/Pelvis obtained-negative for PE, also showed a 3.4 cm collection or cystic lesion at the pancreatic body.        POD # 3         	SICU Day #  1    HPI:  61 yr old male found to have colon mass noted on CT scan done for recent onset of severe pain left lateral abdomen. Pt c/o of constipation - denies melena. PET done and negative. Pt hx of smoking and HTN and cervical neck fusion for injury sustained 2014 - plates and screws in place. Patient is now s/p laparoscopy converted to open left hemicolectomy 6/13.      Past Medical History:  Cervical disc disease    Colon cancer    Head ache    Injury  forehead & had sutures ( 2013 )  Obesity    Smoking    Vertigo.    Past Surgical History:  H/O cervical spine surgery  fusion - 2014 with plates and screws.    Allergies: No Known Allergies    MEDICATIONS:   --------------------------------------------------------------------------------------  Neurologic Medications  HYDROmorphone PCA (1 mG/mL) Rescue Clinician Bolus 0.5milliGRAM(s) IV Push every 15 minutes PRN for Pain Scale GREATER THAN 6  ondansetron Injectable 4milliGRAM(s) IV Push every 6 hours PRN Nausea  HYDROmorphone PCA (1 mG/mL) 30milliLiter(s) PCA Continuous PCA Continuous    Respiratory Medications  ALBUTerol/ipratropium for Nebulization 3milliLiter(s) Nebulizer every 6 hours  acetylcysteine 20% Inhalation 5milliLiter(s) Inhalation every 6 hours    Cardiovascular Medications  tamsulosin 0.4milliGRAM(s) Oral at bedtime  amLODIPine   Tablet 5milliGRAM(s) Oral daily    Gastrointestinal Medications  alvimopan 12milliGRAM(s) Oral two times a day  lactated ringers. 1000milliLiter(s) IV Continuous <Continuous>    Genitourinary Medications    Hematologic/Oncologic Medications  enoxaparin Injectable 40milliGRAM(s) SubCutaneous daily    Antimicrobial/Immunologic Medications  cefTRIAXone   IVPB  IV Intermittent   cefTRIAXone   IVPB 1Gram(s) IV Intermittent every 24 hours    Endocrine/Metabolic Medications  finasteride 5milliGRAM(s) Oral daily    Topical/Other Medications  naloxone Injectable 0.1milliGRAM(s) IV Push every 3 minutes PRN For ANY of the following changes in patient status:  A. RR LESS THAN 10 breaths per minute, B. Oxygen saturation LESS THAN 90%, C. Sedation score of 6    --------------------------------------------------------------------------------------    VITAL SIGNS, INS/OUTS (last 24 hours):  --------------------------------------------------------------------------------------  ((Insert SICU Vitals/Is+Os here))***  --------------------------------------------------------------------------------------    EXAM  NEUROLOGY  Exam: Normal, NAD, alert, oriented x3, no focal deficits.       RESPIRATORY  Exam: Lungs clear to auscultation, Normal expansion/effort. on High flow NC    CARDIOVASCULAR  Exam: S1, S2.  Regular rate and rhythm. no Peripheral edema    GI/NUTRITION  Exam: Abdomen soft, Non-tender, Non-distended.    Current Diet:  Clear liquids    VASCULAR  Exam: Extremities warm, pink, well-perfused.     MUSCULOSKELETAL  Exam: All extremities moving spontaneously without limitations.    SKIN  Exam: Good skin turgor, no skin breakdown.    METABOLIC/FLUIDS/ELECTROLYTES  lactated ringers. 1000milliLiter(s) IV Continuous <Continuous>      HEMATOLOGIC  [x] VTE Prophylaxis: enoxaparin Injectable 40milliGRAM(s) SubCutaneous daily    Transfusions:	[] PRBC	[] Platelets		[] FFP	[] Cryoprecipitate    INFECTIOUS DISEASE  Antimicrobials/Immunologic Medications:  cefTRIAXone   IVPB  IV Intermittent   cefTRIAXone   IVPB 1Gram(s) IV Intermittent every 24 hours    Tubes/Lines/Drains  ***  [x] Peripheral IV  [] Central Venous Line     	[] R	[] L	[] IJ	[] Fem	[] SC	Date Placed:   [] Arterial Line		[] R	[] L	[] Fem	[] Rad	[] Ax	Date Placed:   [] PICC		[] Midline		[] Mediport  [x] Urinary Catheter		Date Placed:   [x] Necessity of urinary, arterial, and venous catheters discussed    LABS  --------------------------------------------------------------------------------------  ((Insert SICU Labs here))***  --------------------------------------------------------------------------------------    OTHER LABORATORY:     IMAGING STUDIES:   CXR:     ASSESSMENT:  62y Male s/p Laparoscopy converted to open left hemicolectomy on 6/13, transferred to the SICU for likely narcotic induced respiratory insufficiency causing hypoxia requiring Bipap, now on high flow nasal cannula.     PLAN:    Neurologic: Dilaudid PCA for adequate pain control, out of bed to chair, ambulate as tolerated. On Alvimopan 12mgBID x 14 doses  Respiratory: wean supplemental oxygen, goal O2 sat >92%. Continue duonebs and mucomyst  Cardiovascular: Continue Norvasc for hypertension.  Gastrointestinal/Nutrition: on CLD, advance to regular diet as tolerated. follow up   Renal/Genitourinary: f/u Urology recs regarding plan for elizabeth removal  Hematologic: Lovenox for DVT ppx.  Infectious Disease: trend leukocytosis, UTI - Ceftriaxone (6/15- ), on Flomax and Proscar  Endocrine: no issues  Disposition: SICU care    --------------------------------------------------------------------------------------    Critical Care Diagnoses:

## 2017-06-16 NOTE — DIETITIAN INITIAL EVALUATION ADULT. - OTHER INFO
Nutrition assessment initiated for critical care LOS. Pt. alert, oriented , initiated on PO clear liquids , reports abdominal pain when ingested but currently no pain , no nausea , vomiting , had this prior to admission w/ PO but MD recommended to continue PO , eat adequate 2/2 to the scheduled surgery , no known food allergies, no difficulty chewing, swallowing , 1+edema R & L leg ,

## 2017-06-17 LAB
BASE EXCESS BLDA CALC-SCNC: -8.4 MMOL/L — SIGNIFICANT CHANGE UP
BUN SERPL-MCNC: 10 MG/DL — SIGNIFICANT CHANGE UP (ref 7–23)
CA-I BLD-SCNC: 1.13 MMOL/L — SIGNIFICANT CHANGE UP (ref 1.03–1.23)
CALCIUM SERPL-MCNC: 9.3 MG/DL — SIGNIFICANT CHANGE UP (ref 8.4–10.5)
CANCER AG19-9 SERPL-ACNC: 74.6 U/ML — HIGH
CHLORIDE SERPL-SCNC: 103 MMOL/L — SIGNIFICANT CHANGE UP (ref 98–107)
CO2 SERPL-SCNC: 25 MMOL/L — SIGNIFICANT CHANGE UP (ref 22–31)
CREAT SERPL-MCNC: 0.64 MG/DL — SIGNIFICANT CHANGE UP (ref 0.5–1.3)
GLUCOSE SERPL-MCNC: 104 MG/DL — HIGH (ref 70–99)
HCO3 BLDA-SCNC: 19 MMOL/L — LOW (ref 22–26)
HCT VFR BLD CALC: 38.9 % — LOW (ref 39–50)
HGB BLD-MCNC: 12.7 G/DL — LOW (ref 13–17)
LACTATE BLDA-SCNC: 0.9 MMOL/L — SIGNIFICANT CHANGE UP (ref 0.5–2)
MAGNESIUM SERPL-MCNC: 3.4 MG/DL — HIGH (ref 1.6–2.6)
MCHC RBC-ENTMCNC: 29.4 PG — SIGNIFICANT CHANGE UP (ref 27–34)
MCHC RBC-ENTMCNC: 32.6 % — SIGNIFICANT CHANGE UP (ref 32–36)
MCV RBC AUTO: 90 FL — SIGNIFICANT CHANGE UP (ref 80–100)
PCO2 BLDA: 21 MMHG — LOW (ref 35–48)
PH BLDA: 7.46 PH — HIGH (ref 7.35–7.45)
PHOSPHATE SERPL-MCNC: 2.7 MG/DL — SIGNIFICANT CHANGE UP (ref 2.5–4.5)
PLATELET # BLD AUTO: 362 K/UL — SIGNIFICANT CHANGE UP (ref 150–400)
PMV BLD: 9.1 FL — SIGNIFICANT CHANGE UP (ref 7–13)
PO2 BLDA: 83 MMHG — SIGNIFICANT CHANGE UP (ref 83–108)
POTASSIUM SERPL-MCNC: 3.6 MMOL/L — SIGNIFICANT CHANGE UP (ref 3.5–5.3)
POTASSIUM SERPL-SCNC: 3.6 MMOL/L — SIGNIFICANT CHANGE UP (ref 3.5–5.3)
RBC # BLD: 4.32 M/UL — SIGNIFICANT CHANGE UP (ref 4.2–5.8)
RBC # FLD: 13.6 % — SIGNIFICANT CHANGE UP (ref 10.3–14.5)
SAO2 % BLDA: 96.5 % — SIGNIFICANT CHANGE UP (ref 95–99)
SODIUM SERPL-SCNC: 144 MMOL/L — SIGNIFICANT CHANGE UP (ref 135–145)
WBC # BLD: 11.37 K/UL — HIGH (ref 3.8–10.5)
WBC # FLD AUTO: 11.37 K/UL — HIGH (ref 3.8–10.5)

## 2017-06-17 PROCEDURE — 71010: CPT | Mod: 26

## 2017-06-17 PROCEDURE — 99233 SBSQ HOSP IP/OBS HIGH 50: CPT | Mod: GC

## 2017-06-17 RX ORDER — POTASSIUM CHLORIDE 20 MEQ
10 PACKET (EA) ORAL
Qty: 0 | Refills: 0 | Status: COMPLETED | OUTPATIENT
Start: 2017-06-17 | End: 2017-06-17

## 2017-06-17 RX ORDER — OXYCODONE HYDROCHLORIDE 5 MG/1
10 TABLET ORAL EVERY 4 HOURS
Qty: 0 | Refills: 0 | Status: DISCONTINUED | OUTPATIENT
Start: 2017-06-17 | End: 2017-06-21

## 2017-06-17 RX ADMIN — Medication 650 MILLIGRAM(S): at 17:41

## 2017-06-17 RX ADMIN — Medication 100 MILLIEQUIVALENT(S): at 10:57

## 2017-06-17 RX ADMIN — Medication 650 MILLIGRAM(S): at 06:18

## 2017-06-17 RX ADMIN — Medication 3 MILLILITER(S): at 16:33

## 2017-06-17 RX ADMIN — HYDROMORPHONE HYDROCHLORIDE 30 MILLILITER(S): 2 INJECTION INTRAMUSCULAR; INTRAVENOUS; SUBCUTANEOUS at 07:17

## 2017-06-17 RX ADMIN — Medication 650 MILLIGRAM(S): at 06:17

## 2017-06-17 RX ADMIN — Medication 650 MILLIGRAM(S): at 23:44

## 2017-06-17 RX ADMIN — ENOXAPARIN SODIUM 40 MILLIGRAM(S): 100 INJECTION SUBCUTANEOUS at 11:53

## 2017-06-17 RX ADMIN — Medication 50 MILLIGRAM(S): at 06:17

## 2017-06-17 RX ADMIN — Medication 50 MILLIGRAM(S): at 17:41

## 2017-06-17 RX ADMIN — Medication 3 MILLILITER(S): at 09:31

## 2017-06-17 RX ADMIN — OXYCODONE HYDROCHLORIDE 10 MILLIGRAM(S): 5 TABLET ORAL at 22:08

## 2017-06-17 RX ADMIN — AMLODIPINE BESYLATE 5 MILLIGRAM(S): 2.5 TABLET ORAL at 06:17

## 2017-06-17 RX ADMIN — OXYCODONE HYDROCHLORIDE 10 MILLIGRAM(S): 5 TABLET ORAL at 23:00

## 2017-06-17 RX ADMIN — ALVIMOPAN 12 MILLIGRAM(S): 12 CAPSULE ORAL at 06:17

## 2017-06-17 RX ADMIN — ALVIMOPAN 12 MILLIGRAM(S): 12 CAPSULE ORAL at 17:41

## 2017-06-17 RX ADMIN — Medication 650 MILLIGRAM(S): at 18:30

## 2017-06-17 RX ADMIN — Medication 100 MILLIEQUIVALENT(S): at 06:18

## 2017-06-17 RX ADMIN — Medication 100 MILLIEQUIVALENT(S): at 08:26

## 2017-06-17 RX ADMIN — OXYCODONE HYDROCHLORIDE 10 MILLIGRAM(S): 5 TABLET ORAL at 23:44

## 2017-06-17 RX ADMIN — Medication 3 MILLILITER(S): at 03:18

## 2017-06-17 RX ADMIN — Medication 650 MILLIGRAM(S): at 12:57

## 2017-06-17 RX ADMIN — OXYCODONE HYDROCHLORIDE 10 MILLIGRAM(S): 5 TABLET ORAL at 09:42

## 2017-06-17 RX ADMIN — OXYCODONE HYDROCHLORIDE 10 MILLIGRAM(S): 5 TABLET ORAL at 16:34

## 2017-06-17 RX ADMIN — OXYCODONE HYDROCHLORIDE 10 MILLIGRAM(S): 5 TABLET ORAL at 10:25

## 2017-06-17 RX ADMIN — TAMSULOSIN HYDROCHLORIDE 0.4 MILLIGRAM(S): 0.4 CAPSULE ORAL at 22:08

## 2017-06-17 RX ADMIN — Medication 650 MILLIGRAM(S): at 11:53

## 2017-06-17 RX ADMIN — FINASTERIDE 5 MILLIGRAM(S): 5 TABLET, FILM COATED ORAL at 11:54

## 2017-06-17 RX ADMIN — OXYCODONE HYDROCHLORIDE 10 MILLIGRAM(S): 5 TABLET ORAL at 17:10

## 2017-06-17 NOTE — PROGRESS NOTE ADULT - SUBJECTIVE AND OBJECTIVE BOX
Patient feels much better, breathing better.  Tolerating clears, no N/V.  No flatus.  Pain better controlled.    Afebrile, VSS  Abd: Soft, mild distension.  Inc: C/D/I    Labs reviewed in [place], WBC decreased     A/P POD#4  Clinically improving.  Cont pulmonary toilet and treatments.  Advance to low reside diet.  OOB and ambulate.

## 2017-06-17 NOTE — PROGRESS NOTE ADULT - SUBJECTIVE AND OBJECTIVE BOX
Anesthesia Pain Management Service    SUBJECTIVE: Patient is doing well with IV PCA and no significant problems reported.    Pain Scale Score	At rest: ___ 	With Activity: ___ 	[X ] Refer to charted pain scores    THERAPY:    [ ] IV PCA Morphine		[ ] 5 mg/mL	[ ] 1 mg/mL  [X ] IV PCA Hydromorphone	[ ] 5 mg/mL	[X ] 1 mg/mL  [ ] IV PCA Fentanyl		[ ] 50 micrograms/mL    Demand dose __.3_ lockout __6_ (minutes) Continuous Rate _0__ Total: _1.2__  Daily      MEDICATIONS  (STANDING):  alvimopan 12milliGRAM(s) Oral two times a day  tamsulosin 0.4milliGRAM(s) Oral at bedtime  finasteride 5milliGRAM(s) Oral daily  amLODIPine   Tablet 5milliGRAM(s) Oral daily  ALBUTerol/ipratropium for Nebulization 3milliLiter(s) Nebulizer every 6 hours  cefTRIAXone   IVPB  IV Intermittent   enoxaparin Injectable 40milliGRAM(s) SubCutaneous daily  cefTRIAXone   IVPB 1Gram(s) IV Intermittent every 24 hours  oxyCODONE ER Tablet 10milliGRAM(s) Oral every 12 hours  acetaminophen   Tablet. 650milliGRAM(s) Oral every 6 hours  metoprolol 50milliGRAM(s) Oral two times a day  potassium chloride  10 mEq/100 mL IVPB 10milliEquivalent(s) IV Intermittent every 1 hour    MEDICATIONS  (PRN):  oxyCODONE IR 10milliGRAM(s) Oral every 4 hours PRN Moderate Pain (4 - 6)      OBJECTIVE:    Sedation Score:	[ X] Alert	[ ] Drowsy 	[ ] Arousable	[ ] Asleep	[ ] Unresponsive    Side Effects:	[X ] None	[ ] Nausea	[ ] Vomiting	[ ] Pruritus  		[ ] Other:    Vital Signs Last 24 Hrs  T(C): 36.5, Max: 36.6 (06-16 @ 20:00)  T(F): 97.7, Max: 97.9 (06-16 @ 20:00)  HR: 81 (80 - 114)  BP: 155/83 (140/77 - 183/83)  BP(mean): 101 (86 - 105)  RR: 19 (13 - 29)  SpO2: 96% (90% - 100%)    ASSESSMENT/ PLAN    Therapy to  be:	[ ] Continue   [ X] Discontinued   [X ] Change to prn Analgesics    Documentation and Verification of current medications:   [X] Done	[ ] Not done, not elligible    Comments: PRN Oral/IV opioids and/or Adjuvant medication to be ordered at this point.

## 2017-06-17 NOTE — PROGRESS NOTE ADULT - ATTENDING COMMENTS
Dx:  R09.02 Hypoxia   - doing well on NC overnight.  Aggressive pulm toilet.  I10 Essential hypertension   - controlled on current meds  K63.9 Colonic mass   - s/p resection.  abd soft, wound healing well  D64.9 Anemia, unspecified   - no clinical signs of bleeding  Z91.89 At risk for malnutrition  - On diet
The patient is a critical care patient with hemodynamic and metabolic instability in SICU.  I have personally interviewed and examined this patient, reviewed labs and x-rays, discussed with other consultants, House staff and PA's.  I spent  45   minutes  in total providing critical care for the diagnoses, assessment and plan above.  These diagnoses are unrelated to the surgical procedure noted above.  Time involved in performance of separately billable procedures was not counted toward my critical care time.  There is no overlap.    Dx:  J96.01 Acute respiratory failure with hypoxia   - No PE on CT  - Likely related to mucus plugging as evidenced by CT findings.  Patient long time smoker in setting of post-op immobility.    - aggressive pulm toilet.  On high-flow  I10 Essential hypertension   - on metoprolol, amlodipine  K63.9 Colonic mass   - s/p left hemicolectomy  D64.9 Anemia, unspecified   - no clinical signs of bleeding, continue to monitor  Z91.89 At risk for malnutrition   - On CLD

## 2017-06-17 NOTE — PROGRESS NOTE ADULT - SUBJECTIVE AND OBJECTIVE BOX
SICU Daily Progress Note  =====================================================  Interval/Overnight Events:     ***    POD # 4        	SICU Day #  2    HPI:  61 yr old male found to have colon mass noted on CT scan done for recent onset of severe pain left lateral abdomen. Pt c/o of constipation - denies melena. PET done and negative. Pt hx of smoking and HTN and cervical neck fusion for injury sustained 2014 - plates and screws in place. Patient is now s/p laparoscopy converted to open left hemicolectomy 6/13.    Past Medical History:  Cervical disc disease    Colon cancer    Head ache    Injury  forehead & had sutures ( 2013 )  Obesity    Smoking    Vertigo.    Past Surgical History:  H/O cervical spine surgery  fusion - 2014 with plates and screws.    Allergies: No Known Allergies    MEDICATIONS:   --------------------------------------------------------------------------------------  Neurologic Medications  HYDROmorphone PCA (1 mG/mL) Rescue Clinician Bolus 0.5milliGRAM(s) IV Push every 15 minutes PRN for Pain Scale GREATER THAN 6  ondansetron Injectable 4milliGRAM(s) IV Push every 6 hours PRN Nausea  HYDROmorphone PCA (1 mG/mL) 30milliLiter(s) PCA Continuous PCA Continuous  oxyCODONE ER Tablet 10milliGRAM(s) Oral every 12 hours  acetaminophen   Tablet. 650milliGRAM(s) Oral every 6 hours    Respiratory Medications  ALBUTerol/ipratropium for Nebulization 3milliLiter(s) Nebulizer every 6 hours    Cardiovascular Medications  tamsulosin 0.4milliGRAM(s) Oral at bedtime  amLODIPine   Tablet 5milliGRAM(s) Oral daily  metoprolol 50milliGRAM(s) Oral two times a day    Gastrointestinal Medications  alvimopan 12milliGRAM(s) Oral two times a day    Genitourinary Medications    Hematologic/Oncologic Medications  enoxaparin Injectable 40milliGRAM(s) SubCutaneous daily    Antimicrobial/Immunologic Medications  cefTRIAXone   IVPB  IV Intermittent   cefTRIAXone   IVPB 1Gram(s) IV Intermittent every 24 hours    Endocrine/Metabolic Medications  finasteride 5milliGRAM(s) Oral daily    Topical/Other Medications  naloxone Injectable 0.1milliGRAM(s) IV Push every 3 minutes PRN For ANY of the following changes in patient status:  A. RR LESS THAN 10 breaths per minute, B. Oxygen saturation LESS THAN 90%, C. Sedation score of 6    --------------------------------------------------------------------------------------  VITAL SIGNS, INS/OUTS (last 24 hours):  --------------------------------------------------------------------------------------  ((Insert SICU Vitals/Is+Os here))***  --------------------------------------------------------------------------------------    EXAM  NEUROLOGY  Exam: Normal, NAD, alert, oriented x3, no focal deficits.     RESPIRATORY  Exam: Lungs clear to auscultation, Normal expansion/effort. No wheezes/rales/rhonchi. On 2L NC.    CARDIOVASCULAR  Exam: S1, S2.  Regular rate and rhythm. No peripheral edema.    GI/NUTRITION  Exam: Abdomen soft, Non-tender, Non-distended.    Current Diet:  Clear liquids    VASCULAR  Exam: Extremities warm, pink, well-perfused.     MUSCULOSKELETAL  Exam: All extremities moving spontaneously without limitations.    SKIN  Exam: Good skin turgor, no skin breakdown.    METABOLIC/FLUIDS/ELECTROLYTES    HEMATOLOGIC  [x] VTE Prophylaxis: enoxaparin Injectable 40milliGRAM(s) SubCutaneous daily    Transfusions:	[] PRBC	[] Platelets		[] FFP	[] Cryoprecipitate    INFECTIOUS DISEASE  Antimicrobials/Immunologic Medications:  cefTRIAXone   IVPB  IV Intermittent   cefTRIAXone   IVPB 1Gram(s) IV Intermittent every 24 hours    Tubes/Lines/Drains  [x] Peripheral IV  [] Central Venous Line     	[] R	[] L	[] IJ	[] Fem	[] SC	Date Placed:   [] Arterial Line		[] R	[] L	[] Fem	[] Rad	[] Ax	Date Placed:   [] PICC		[] Midline		[] Mediport  [] Urinary Catheter		Date Placed:   [x] Necessity of urinary, arterial, and venous catheters discussed    LABS  --------------------------------------------------------------------------------------  ((Insert SICU Labs here))***  --------------------------------------------------------------------------------------    OTHER LABORATORY:     IMAGING STUDIES:   CXR:     ASSESSMENT:  62y Male s/p Laparoscopy converted to open left hemicolectomy on 6/13, transferred to the SICU for acute respiratory insufficiency likely 2/2 mucous plugging causing hypoxia requiring Bipap, now on 2L NC.    PLAN:    Neurologic: Dilaudid PCA for adequate pain control, out of bed to chair, ambulate as tolerated. On Alvimopan 12mgBID x 14 doses  Respiratory: Continue to wean supplemental O2. Continue duonebs and mucomyst.  Cardiovascular: Continue Norvasc for hypertension.  Gastrointestinal/Nutrition: on CLD, advance to regular diet as tolerated. follow up   Renal/Genitourinary: f/u Urology recs regarding plan for elizabeth removal  Hematologic: Lovenox for DVT ppx.  Infectious Disease: trend leukocytosis, UTI - Ceftriaxone (6/15- ), on Flomax and Proscar  Endocrine: no issues  Disposition: SICU care    --------------------------------------------------------------------------------------    Critical Care Diagnoses:  Acute respiratory insufficiency  s/p L hemicolectomy SICU Daily Progress Note  =====================================================  Interval/Overnight Events:  Pt started on metoprolol 50mg bid for better BP control. Will follow up CA 19-9 for pancreatic lesion. Can d/c elizabeth today if pt ambulates. Pt titrated off HF NC, satting well on 2L NC.    POD # 4        	SICU Day #  2    HPI:  61 yr old male found to have colon mass noted on CT scan done for recent onset of severe pain left lateral abdomen. Pt c/o of constipation - denies melena. PET done and negative. Pt hx of smoking and HTN and cervical neck fusion for injury sustained  - plates and screws in place. Patient is now s/p laparoscopy converted to open left hemicolectomy .    Past Medical History:  Cervical disc disease    Colon cancer    Head ache    Injury  forehead & had sutures (  )  Obesity    Smoking    Vertigo.    Past Surgical History:  H/O cervical spine surgery  fusion -  with plates and screws.    Allergies: No Known Allergies    MEDICATIONS:   --------------------------------------------------------------------------------------  Neurologic Medications  HYDROmorphone PCA (1 mG/mL) Rescue Clinician Bolus 0.5milliGRAM(s) IV Push every 15 minutes PRN for Pain Scale GREATER THAN 6  ondansetron Injectable 4milliGRAM(s) IV Push every 6 hours PRN Nausea  HYDROmorphone PCA (1 mG/mL) 30milliLiter(s) PCA Continuous PCA Continuous  oxyCODONE ER Tablet 10milliGRAM(s) Oral every 12 hours  acetaminophen   Tablet. 650milliGRAM(s) Oral every 6 hours    Respiratory Medications  ALBUTerol/ipratropium for Nebulization 3milliLiter(s) Nebulizer every 6 hours    Cardiovascular Medications  tamsulosin 0.4milliGRAM(s) Oral at bedtime  amLODIPine   Tablet 5milliGRAM(s) Oral daily  metoprolol 50milliGRAM(s) Oral two times a day    Gastrointestinal Medications  alvimopan 12milliGRAM(s) Oral two times a day    Genitourinary Medications    Hematologic/Oncologic Medications  enoxaparin Injectable 40milliGRAM(s) SubCutaneous daily    Antimicrobial/Immunologic Medications  cefTRIAXone   IVPB  IV Intermittent   cefTRIAXone   IVPB 1Gram(s) IV Intermittent every 24 hours    Endocrine/Metabolic Medications  finasteride 5milliGRAM(s) Oral daily    Topical/Other Medications  naloxone Injectable 0.1milliGRAM(s) IV Push every 3 minutes PRN For ANY of the following changes in patient status:  A. RR LESS THAN 10 breaths per minute, B. Oxygen saturation LESS THAN 90%, C. Sedation score of 6    --------------------------------------------------------------------------------------  VITAL SIGNS, INS/OUTS (last 24 hours):  --------------------------------------------------------------------------------------  T(C): 36.4, Max: 36.6 ( @ 20:00)  HR: 82 (82 - 114)  BP: 140/77 (140/77 - 183/83)  BP(mean): 92 (86 - 105)  ABP: --  ABP(mean): --  RR: 19 (13 - 29)  SpO2: 98% (90% - 100%)  Wt(kg): --  CVP(mm Hg): --  CI: --  CAPILLARY BLOOD GLUCOSE   N/A      I & Os for current day (as of  @ 07:15)  =============================================  IN:    Oral Fluid: 300 ml    lactated ringers.: 120 ml    IV PiggyBack: 50 ml    Total IN: 470 ml  ---------------------------------------------  OUT:    Indwelling Catheter - Urethral: 3730 ml    Total OUT: 3730 ml  ---------------------------------------------  Total NET: -3260 ml    --------------------------------------------------------------------------------------    EXAM  NEUROLOGY  Exam: Normal, NAD, alert, oriented x3, no focal deficits.     RESPIRATORY  Exam: Lungs clear to auscultation, Normal expansion/effort. No wheezes/rales/rhonchi. On 2L NC.    CARDIOVASCULAR  Exam: S1, S2.  Regular rate and rhythm. No peripheral edema.    GI/NUTRITION  Exam: Abdomen soft, Non-tender, Non-distended.    Current Diet:  Clear liquids    VASCULAR  Exam: Extremities warm, pink, well-perfused.     MUSCULOSKELETAL  Exam: All extremities moving spontaneously without limitations.    SKIN  Exam: Good skin turgor, no skin breakdown.    METABOLIC/FLUIDS/ELECTROLYTES    HEMATOLOGIC  [x] VTE Prophylaxis: enoxaparin Injectable 40milliGRAM(s) SubCutaneous daily    Transfusions:	[] PRBC	[] Platelets		[] FFP	[] Cryoprecipitate    INFECTIOUS DISEASE  Antimicrobials/Immunologic Medications:  cefTRIAXone   IVPB  IV Intermittent   cefTRIAXone   IVPB 1Gram(s) IV Intermittent every 24 hours    Tubes/Lines/Drains  [x] Peripheral IV  [] Central Venous Line     	[] R	[] L	[] IJ	[] Fem	[] SC	Date Placed:   [] Arterial Line		[] R	[] L	[] Fem	[] Rad	[] Ax	Date Placed:   [] PICC		[] Midline		[] Mediport  [] Urinary Catheter		Date Placed:   [x] Necessity of urinary, arterial, and venous catheters discussed    LABS  --------------------------------------------------------------------------------------  CBC ( @ 04:03)                          12.7<L>                   11.37<H>  )--------------(  362        --    % Neuts, --    % Lymphs, ANC: --                              38.9<L>  CBC ( @ 02:45)                          12.4<L>                   15.71<H>  )--------------(  370        --    % Neuts, --    % Lymphs, ANC: --                              38.9<L>    BMP ( @ 04:03)       144     |  103     |  10    			Ca++ 1.13    Ca 9.3          ---------------------------------( 104<H>		Mg 3.4<H>       3.6     |  25      |  0.64  			Ph 2.7     BMP ( @ 02:56)       141     |  101     |  11    			Ca++ --      Ca 9.3          ---------------------------------( 124<H>		Mg 2.2          3.9     |  23      |  0.64  			Ph 2.1<L>    LFTs ( @ 02:56)      TPro 6.3 / Alb 3.2<L> / TBili 0.2 / DBili -- / AST 15 / ALT 14 / AlkPhos 64        ABG ( @ 04:03)     7.46<H> / 21<L> / 83 / 19<L> / -8.4 / 96.5%     Lactate: 0.9   ABG ( @ 06:05)     7.43 / 40 / 71<L> / 26 / 2.2 / 95.4%     Lactate: 1.2       Urinalysis (06-15 @ 07:27):     Color: LIGHT BROWN / Appearance: CLOUDY / S.010 / pH: 6.5 / Gluc: NEGATIVE / Ketones: TRACE / Bili: NEGATIVE / Urobili: NORMAL / Protein :150<H> / Nitrites: NEGATIVE / Leuk.Est: LARGE<H> / RBC: 5-10<H> / WBC: >50<H> / Sq Epi: OCC / Non Sq Epi:  / Bacteria MANY<H>       -> URINE CATHETER Culture (06-15 @ 08:34)     NG    NG  NG    --------------------------------------------------------------------------------------    OTHER LABORATORY:     IMAGING STUDIES:   CXR:     ASSESSMENT:  62y Male s/p Laparoscopy converted to open left hemicolectomy on , transferred to the SICU for acute respiratory insufficiency likely 2/2 mucous plugging causing hypoxia requiring Bipap, now on 2L NC.    PLAN:    Neurologic: Dilaudid PCA for adequate pain control, out of bed to chair, ambulate as tolerated. On Alvimopan 12mgBID x 14 doses.  Respiratory: Continue to wean supplemental O2. Continue duonebs.  Cardiovascular: Continue Norvasc and Metoprolol for hypertension.  Gastrointestinal/Nutrition: on CLD, advance to regular diet as tolerated. Pt has pancreatic mass on CT - follow up .  Renal/Genitourinary: D/c elizabeth.  Hematologic: Lovenox for DVT ppx.  Infectious Disease: trend leukocytosis, UTI - Ceftriaxone (6/15- ), on Flomax and Proscar.  Endocrine: no issues  Disposition: Transfer to floor.    --------------------------------------------------------------------------------------    Critical Care Diagnoses:  Acute respiratory insufficiency  s/p L hemicolectomy SICU Daily Progress Note  =====================================================  Interval/Overnight Events:  Pt started on metoprolol 50mg bid for better BP control. Will follow up CA 19-9 for pancreatic lesion. Can d/c elizabeth today if pt ambulates. Pt titrated off HF NC, satting well on 2L NC.    POD # 4        	SICU Day #  2    HPI:  61 yr old male found to have colon mass noted on CT scan done for recent onset of severe pain left lateral abdomen. Pt c/o of constipation - denies melena. PET done and negative. Pt hx of smoking and HTN and cervical neck fusion for injury sustained  - plates and screws in place. Patient is now s/p laparoscopy converted to open left hemicolectomy .    Past Medical History:  Cervical disc disease    Colon cancer    Head ache    Injury  forehead & had sutures (  )  Obesity    Smoking    Vertigo.    Past Surgical History:  H/O cervical spine surgery  fusion -  with plates and screws.    Allergies: No Known Allergies    MEDICATIONS:   --------------------------------------------------------------------------------------  Neurologic Medications  HYDROmorphone PCA (1 mG/mL) Rescue Clinician Bolus 0.5milliGRAM(s) IV Push every 15 minutes PRN for Pain Scale GREATER THAN 6  ondansetron Injectable 4milliGRAM(s) IV Push every 6 hours PRN Nausea  HYDROmorphone PCA (1 mG/mL) 30milliLiter(s) PCA Continuous PCA Continuous  oxyCODONE ER Tablet 10milliGRAM(s) Oral every 12 hours  acetaminophen   Tablet. 650milliGRAM(s) Oral every 6 hours    Respiratory Medications  ALBUTerol/ipratropium for Nebulization 3milliLiter(s) Nebulizer every 6 hours    Cardiovascular Medications  tamsulosin 0.4milliGRAM(s) Oral at bedtime  amLODIPine   Tablet 5milliGRAM(s) Oral daily  metoprolol 50milliGRAM(s) Oral two times a day    Gastrointestinal Medications  alvimopan 12milliGRAM(s) Oral two times a day    Genitourinary Medications    Hematologic/Oncologic Medications  enoxaparin Injectable 40milliGRAM(s) SubCutaneous daily    Antimicrobial/Immunologic Medications  cefTRIAXone   IVPB  IV Intermittent   cefTRIAXone   IVPB 1Gram(s) IV Intermittent every 24 hours    Endocrine/Metabolic Medications  finasteride 5milliGRAM(s) Oral daily    Topical/Other Medications  naloxone Injectable 0.1milliGRAM(s) IV Push every 3 minutes PRN For ANY of the following changes in patient status:  A. RR LESS THAN 10 breaths per minute, B. Oxygen saturation LESS THAN 90%, C. Sedation score of 6    --------------------------------------------------------------------------------------  VITAL SIGNS, INS/OUTS (last 24 hours):  --------------------------------------------------------------------------------------  T(C): 36.4, Max: 36.6 ( @ 20:00)  HR: 82 (82 - 114)  BP: 140/77 (140/77 - 183/83)  BP(mean): 92 (86 - 105)  ABP: --  ABP(mean): --  RR: 19 (13 - 29)  SpO2: 98% (90% - 100%)  Wt(kg): --  CVP(mm Hg): --  CI: --  CAPILLARY BLOOD GLUCOSE   N/A      I & Os for current day (as of  @ 07:15)  =============================================  IN:    Oral Fluid: 300 ml    lactated ringers.: 120 ml    IV PiggyBack: 50 ml    Total IN: 470 ml  ---------------------------------------------  OUT:    Indwelling Catheter - Urethral: 3730 ml    Total OUT: 3730 ml  ---------------------------------------------  Total NET: -3260 ml    --------------------------------------------------------------------------------------    EXAM  NEUROLOGY  Exam: Normal, NAD, alert, oriented x3, no focal deficits.     RESPIRATORY  Exam: Lungs clear to auscultation, Normal expansion/effort. No wheezes/rales/rhonchi. On 2L NC.    CARDIOVASCULAR  Exam: S1, S2.  Regular rate and rhythm. No peripheral edema.    GI/NUTRITION  Exam: Abdomen soft, Non-tender, Non-distended.    Current Diet:  Clear liquids    VASCULAR  Exam: Extremities warm, pink, well-perfused.     MUSCULOSKELETAL  Exam: All extremities moving spontaneously without limitations.    SKIN  Exam: Good skin turgor, no skin breakdown.    METABOLIC/FLUIDS/ELECTROLYTES    HEMATOLOGIC  [x] VTE Prophylaxis: enoxaparin Injectable 40milliGRAM(s) SubCutaneous daily    Transfusions:	[] PRBC	[] Platelets		[] FFP	[] Cryoprecipitate    INFECTIOUS DISEASE  Antimicrobials/Immunologic Medications:  cefTRIAXone   IVPB  IV Intermittent   cefTRIAXone   IVPB 1Gram(s) IV Intermittent every 24 hours    Tubes/Lines/Drains  [x] Peripheral IV  [] Central Venous Line     	[] R	[] L	[] IJ	[] Fem	[] SC	Date Placed:   [] Arterial Line		[] R	[] L	[] Fem	[] Rad	[] Ax	Date Placed:   [] PICC		[] Midline		[] Mediport  [] Urinary Catheter		Date Placed:   [x] Necessity of urinary, arterial, and venous catheters discussed    LABS  --------------------------------------------------------------------------------------  CBC ( @ 04:03)                          12.7<L>                   11.37<H>  )--------------(  362        --    % Neuts, --    % Lymphs, ANC: --                              38.9<L>  CBC ( @ 02:45)                          12.4<L>                   15.71<H>  )--------------(  370        --    % Neuts, --    % Lymphs, ANC: --                              38.9<L>    BMP ( @ 04:03)       144     |  103     |  10    			Ca++ 1.13    Ca 9.3          ---------------------------------( 104<H>		Mg 3.4<H>       3.6     |  25      |  0.64  			Ph 2.7     BMP ( @ 02:56)       141     |  101     |  11    			Ca++ --      Ca 9.3          ---------------------------------( 124<H>		Mg 2.2          3.9     |  23      |  0.64  			Ph 2.1<L>    LFTs ( @ 02:56)      TPro 6.3 / Alb 3.2<L> / TBili 0.2 / DBili -- / AST 15 / ALT 14 / AlkPhos 64        ABG ( @ 04:03)     7.46<H> / 21<L> / 83 / 19<L> / -8.4 / 96.5%     Lactate: 0.9   ABG ( @ 06:05)     7.43 / 40 / 71<L> / 26 / 2.2 / 95.4%     Lactate: 1.2       Urinalysis (06-15 @ 07:27):     Color: LIGHT BROWN / Appearance: CLOUDY / S.010 / pH: 6.5 / Gluc: NEGATIVE / Ketones: TRACE / Bili: NEGATIVE / Urobili: NORMAL / Protein :150<H> / Nitrites: NEGATIVE / Leuk.Est: LARGE<H> / RBC: 5-10<H> / WBC: >50<H> / Sq Epi: OCC / Non Sq Epi:  / Bacteria MANY<H>       -> URINE CATHETER Culture (06-15 @ 08:34)     NG    NG  NG    --------------------------------------------------------------------------------------    OTHER LABORATORY:     IMAGING STUDIES:   CXR:     ASSESSMENT:  62y Male s/p Laparoscopy converted to open left hemicolectomy on , transferred to the SICU for acute respiratory insufficiency likely 2/2 mucous plugging causing hypoxia requiring Bipap, now on 2L NC.    PLAN:    Neurologic: Dilaudid PCA for adequate pain control, out of bed to chair, ambulate as tolerated. On Alvimopan 12mgBID x 14 doses.  Respiratory: Continue to wean supplemental O2. Continue duonebs.  Cardiovascular: Continue Norvasc and Metoprolol for hypertension.  Gastrointestinal/Nutrition: on CLD, advance to regular diet as tolerated. Pt has pancreatic mass on CT - follow up .  Renal/Genitourinary: D/w urology re: elizabeth removal.  Hematologic: Lovenox for DVT ppx.  Infectious Disease: trend leukocytosis, UTI - Ceftriaxone (6/15- ), on Flomax and Proscar.  Endocrine: no issues  Disposition: Transfer to floor.    --------------------------------------------------------------------------------------    Critical Care Diagnoses:  Acute respiratory insufficiency  s/p L hemicolectomy

## 2017-06-18 LAB
BUN SERPL-MCNC: 14 MG/DL — SIGNIFICANT CHANGE UP (ref 7–23)
CA-I BLD-SCNC: 1.09 MMOL/L — SIGNIFICANT CHANGE UP (ref 1.03–1.23)
CALCIUM SERPL-MCNC: 8.8 MG/DL — SIGNIFICANT CHANGE UP (ref 8.4–10.5)
CHLORIDE SERPL-SCNC: 104 MMOL/L — SIGNIFICANT CHANGE UP (ref 98–107)
CO2 SERPL-SCNC: 20 MMOL/L — LOW (ref 22–31)
CREAT SERPL-MCNC: 0.64 MG/DL — SIGNIFICANT CHANGE UP (ref 0.5–1.3)
GLUCOSE SERPL-MCNC: 98 MG/DL — SIGNIFICANT CHANGE UP (ref 70–99)
HCT VFR BLD CALC: 41.5 % — SIGNIFICANT CHANGE UP (ref 39–50)
HGB BLD-MCNC: 13.6 G/DL — SIGNIFICANT CHANGE UP (ref 13–17)
MAGNESIUM SERPL-MCNC: 1.7 MG/DL — SIGNIFICANT CHANGE UP (ref 1.6–2.6)
MCHC RBC-ENTMCNC: 29.2 PG — SIGNIFICANT CHANGE UP (ref 27–34)
MCHC RBC-ENTMCNC: 32.8 % — SIGNIFICANT CHANGE UP (ref 32–36)
MCV RBC AUTO: 89.1 FL — SIGNIFICANT CHANGE UP (ref 80–100)
PHOSPHATE SERPL-MCNC: 3.2 MG/DL — SIGNIFICANT CHANGE UP (ref 2.5–4.5)
PLATELET # BLD AUTO: 420 K/UL — HIGH (ref 150–400)
PMV BLD: 8.8 FL — SIGNIFICANT CHANGE UP (ref 7–13)
POTASSIUM SERPL-MCNC: 3.6 MMOL/L — SIGNIFICANT CHANGE UP (ref 3.5–5.3)
POTASSIUM SERPL-SCNC: 3.6 MMOL/L — SIGNIFICANT CHANGE UP (ref 3.5–5.3)
RBC # BLD: 4.66 M/UL — SIGNIFICANT CHANGE UP (ref 4.2–5.8)
RBC # FLD: 13.5 % — SIGNIFICANT CHANGE UP (ref 10.3–14.5)
SODIUM SERPL-SCNC: 142 MMOL/L — SIGNIFICANT CHANGE UP (ref 135–145)
WBC # BLD: 12.28 K/UL — HIGH (ref 3.8–10.5)
WBC # FLD AUTO: 12.28 K/UL — HIGH (ref 3.8–10.5)

## 2017-06-18 RX ORDER — HYDROMORPHONE HYDROCHLORIDE 2 MG/ML
0.5 INJECTION INTRAMUSCULAR; INTRAVENOUS; SUBCUTANEOUS ONCE
Qty: 0 | Refills: 0 | Status: DISCONTINUED | OUTPATIENT
Start: 2017-06-18 | End: 2017-06-18

## 2017-06-18 RX ORDER — MAGNESIUM SULFATE 500 MG/ML
2 VIAL (ML) INJECTION ONCE
Qty: 0 | Refills: 0 | Status: COMPLETED | OUTPATIENT
Start: 2017-06-18 | End: 2017-06-18

## 2017-06-18 RX ORDER — POTASSIUM CHLORIDE 20 MEQ
20 PACKET (EA) ORAL
Qty: 0 | Refills: 0 | Status: COMPLETED | OUTPATIENT
Start: 2017-06-18 | End: 2017-06-18

## 2017-06-18 RX ADMIN — OXYCODONE HYDROCHLORIDE 10 MILLIGRAM(S): 5 TABLET ORAL at 11:15

## 2017-06-18 RX ADMIN — HYDROMORPHONE HYDROCHLORIDE 0.5 MILLIGRAM(S): 2 INJECTION INTRAMUSCULAR; INTRAVENOUS; SUBCUTANEOUS at 02:15

## 2017-06-18 RX ADMIN — OXYCODONE HYDROCHLORIDE 10 MILLIGRAM(S): 5 TABLET ORAL at 22:50

## 2017-06-18 RX ADMIN — OXYCODONE HYDROCHLORIDE 10 MILLIGRAM(S): 5 TABLET ORAL at 16:10

## 2017-06-18 RX ADMIN — Medication 50 MILLIGRAM(S): at 18:10

## 2017-06-18 RX ADMIN — OXYCODONE HYDROCHLORIDE 10 MILLIGRAM(S): 5 TABLET ORAL at 17:10

## 2017-06-18 RX ADMIN — FINASTERIDE 5 MILLIGRAM(S): 5 TABLET, FILM COATED ORAL at 11:43

## 2017-06-18 RX ADMIN — Medication 3 MILLILITER(S): at 09:22

## 2017-06-18 RX ADMIN — ALVIMOPAN 12 MILLIGRAM(S): 12 CAPSULE ORAL at 18:10

## 2017-06-18 RX ADMIN — Medication 50 MILLIGRAM(S): at 05:54

## 2017-06-18 RX ADMIN — AMLODIPINE BESYLATE 5 MILLIGRAM(S): 2.5 TABLET ORAL at 05:54

## 2017-06-18 RX ADMIN — OXYCODONE HYDROCHLORIDE 10 MILLIGRAM(S): 5 TABLET ORAL at 06:42

## 2017-06-18 RX ADMIN — Medication 650 MILLIGRAM(S): at 05:54

## 2017-06-18 RX ADMIN — Medication 650 MILLIGRAM(S): at 11:43

## 2017-06-18 RX ADMIN — OXYCODONE HYDROCHLORIDE 10 MILLIGRAM(S): 5 TABLET ORAL at 22:01

## 2017-06-18 RX ADMIN — HYDROMORPHONE HYDROCHLORIDE 0.5 MILLIGRAM(S): 2 INJECTION INTRAMUSCULAR; INTRAVENOUS; SUBCUTANEOUS at 19:00

## 2017-06-18 RX ADMIN — Medication 3 MILLILITER(S): at 15:36

## 2017-06-18 RX ADMIN — Medication 650 MILLIGRAM(S): at 12:40

## 2017-06-18 RX ADMIN — Medication 650 MILLIGRAM(S): at 06:40

## 2017-06-18 RX ADMIN — ENOXAPARIN SODIUM 40 MILLIGRAM(S): 100 INJECTION SUBCUTANEOUS at 11:44

## 2017-06-18 RX ADMIN — OXYCODONE HYDROCHLORIDE 10 MILLIGRAM(S): 5 TABLET ORAL at 07:30

## 2017-06-18 RX ADMIN — Medication 3 MILLILITER(S): at 05:58

## 2017-06-18 RX ADMIN — OXYCODONE HYDROCHLORIDE 10 MILLIGRAM(S): 5 TABLET ORAL at 12:40

## 2017-06-18 RX ADMIN — HYDROMORPHONE HYDROCHLORIDE 0.5 MILLIGRAM(S): 2 INJECTION INTRAMUSCULAR; INTRAVENOUS; SUBCUTANEOUS at 18:45

## 2017-06-18 RX ADMIN — OXYCODONE HYDROCHLORIDE 10 MILLIGRAM(S): 5 TABLET ORAL at 22:52

## 2017-06-18 RX ADMIN — TAMSULOSIN HYDROCHLORIDE 0.4 MILLIGRAM(S): 0.4 CAPSULE ORAL at 22:02

## 2017-06-18 RX ADMIN — OXYCODONE HYDROCHLORIDE 10 MILLIGRAM(S): 5 TABLET ORAL at 23:40

## 2017-06-18 RX ADMIN — Medication 50 GRAM(S): at 10:16

## 2017-06-18 RX ADMIN — OXYCODONE HYDROCHLORIDE 10 MILLIGRAM(S): 5 TABLET ORAL at 10:14

## 2017-06-18 RX ADMIN — Medication 20 MILLIEQUIVALENT(S): at 10:13

## 2017-06-18 RX ADMIN — Medication 3 MILLILITER(S): at 21:07

## 2017-06-18 RX ADMIN — OXYCODONE HYDROCHLORIDE 10 MILLIGRAM(S): 5 TABLET ORAL at 11:42

## 2017-06-18 RX ADMIN — Medication 650 MILLIGRAM(S): at 00:39

## 2017-06-18 RX ADMIN — HYDROMORPHONE HYDROCHLORIDE 0.5 MILLIGRAM(S): 2 INJECTION INTRAMUSCULAR; INTRAVENOUS; SUBCUTANEOUS at 02:30

## 2017-06-18 RX ADMIN — OXYCODONE HYDROCHLORIDE 10 MILLIGRAM(S): 5 TABLET ORAL at 00:39

## 2017-06-18 RX ADMIN — Medication 20 MILLIEQUIVALENT(S): at 11:42

## 2017-06-18 RX ADMIN — ALVIMOPAN 12 MILLIGRAM(S): 12 CAPSULE ORAL at 05:53

## 2017-06-18 NOTE — PROGRESS NOTE ADULT - SUBJECTIVE AND OBJECTIVE BOX
POD#5 S/p Lap converted to open left hemicolectomy  Feeling better today. No SOB, with some abdominal pain around incision and after having his diet yesterday.  Pain is well controlled on current meds.  No nausea or vomiting. No gas or BM's  He has been OOB and ambulated  Voiding without difficulty    PE:  AVSS  AAOX3  Abdomen: Min distended. Soft, min tenderness. Midline wound is clean. staples in place, intact. Min erythema in distal third  Ext: No edema    Labs: Reviewed: mild increase in WBC    A/P: POD#5  Doing well today. Tolerating small amounts of LRD. No bowel function yet  - Cont pain control  - Cont LRD  - Cont pulmonary treatments  - OOB, ambulate  - DVT ppx  - Follow WBC tomorrow

## 2017-06-18 NOTE — PROGRESS NOTE ADULT - SUBJECTIVE AND OBJECTIVE BOX
SUBJECTIVE: Patient had pain overnight, pain improved with 0.5 mg dilaudid. Tachycardia improved. Patient having pain with diet. Patient saturating well without supplemental O2.    OBJECTIVE:  PHYSICAL EXAM:  GA: NAD,  Abdomen:  -  soft, non-distended, appropriate incisional tenderness  - Incision: clean/dry/intact     T(C): 36.7, Max: 36.7 (06-17 @ 12:00)  HR: 82 (79 - 98)  BP: 168/96 (145/78 - 171/83)  ABP: --  ABP(mean): --  RR: 16 (16 - 27)  SpO2: 98% (93% - 99%)  Wt(kg): --  CVP(mm Hg): --      I & Os for current day (as of 06-18 @ 08:28)  =============================================  IN:    IV PiggyBack: 100 ml    Total IN: 100 ml  ---------------------------------------------  OUT:    Voided: 2000 ml    Indwelling Catheter - Urethral: 550 ml    Total OUT: 2550 ml  ---------------------------------------------  Total NET: -2450 ml                                              13.6                  Neurophils% (auto):   x      (06-18 @ 05:25):    12.28)-----------(420          Lymphocytes% (auto):  x                                             41.5                   Eosinphils% (auto):   x        Manual%: Neutrophils x    ; Lymphocytes x    ; Eosinophils x    ; Bands%: x    ; Blasts x          06-18    142  |  104  |  14  ----------------------------<  98  3.6   |  20<L>  |  0.64    Ca    8.8      18 Jun 2017 05:25  Phos  3.2     06-18  Mg     1.7     06-18        ABG - ( 17 Jun 2017 04:03 )  pH: 7.46  /  pCO2: 21    /  pO2: 83    / HCO3: 19    / Base Excess: -8.4  /  SaO2: 96.5  / Lactate: 0.9        RECENT CULTURES:  06-15 @ 08:34 URINE CATHETER                 ASSESSMENT/PLAN: MARISOL ALFRED 62y Male s/p lap to open left colectomy   - Diet: LRD, monitor Gifx  - Pain control with oral pain meds.  - Input and outputs   - DVT ppx  - OOB/incentive spirometry       [facility]  Pager: [phone]

## 2017-06-19 LAB
HCT VFR BLD CALC: 41.2 % — SIGNIFICANT CHANGE UP (ref 39–50)
HGB BLD-MCNC: 13.4 G/DL — SIGNIFICANT CHANGE UP (ref 13–17)
MCHC RBC-ENTMCNC: 28.9 PG — SIGNIFICANT CHANGE UP (ref 27–34)
MCHC RBC-ENTMCNC: 32.5 % — SIGNIFICANT CHANGE UP (ref 32–36)
MCV RBC AUTO: 88.8 FL — SIGNIFICANT CHANGE UP (ref 80–100)
PLATELET # BLD AUTO: 427 K/UL — HIGH (ref 150–400)
PMV BLD: 9.2 FL — SIGNIFICANT CHANGE UP (ref 7–13)
RBC # BLD: 4.64 M/UL — SIGNIFICANT CHANGE UP (ref 4.2–5.8)
RBC # FLD: 13.6 % — SIGNIFICANT CHANGE UP (ref 10.3–14.5)
WBC # BLD: 13.59 K/UL — HIGH (ref 3.8–10.5)
WBC # FLD AUTO: 13.59 K/UL — HIGH (ref 3.8–10.5)

## 2017-06-19 RX ORDER — HYDROMORPHONE HYDROCHLORIDE 2 MG/ML
0.5 INJECTION INTRAMUSCULAR; INTRAVENOUS; SUBCUTANEOUS ONCE
Qty: 0 | Refills: 0 | Status: DISCONTINUED | OUTPATIENT
Start: 2017-06-19 | End: 2017-06-19

## 2017-06-19 RX ORDER — SODIUM CHLORIDE 9 MG/ML
1000 INJECTION, SOLUTION INTRAVENOUS
Qty: 0 | Refills: 0 | Status: DISCONTINUED | OUTPATIENT
Start: 2017-06-19 | End: 2017-06-20

## 2017-06-19 RX ORDER — IPRATROPIUM/ALBUTEROL SULFATE 18-103MCG
3 AEROSOL WITH ADAPTER (GRAM) INHALATION EVERY 6 HOURS
Qty: 0 | Refills: 0 | Status: DISCONTINUED | OUTPATIENT
Start: 2017-06-19 | End: 2017-06-21

## 2017-06-19 RX ADMIN — AMLODIPINE BESYLATE 5 MILLIGRAM(S): 2.5 TABLET ORAL at 06:37

## 2017-06-19 RX ADMIN — OXYCODONE HYDROCHLORIDE 10 MILLIGRAM(S): 5 TABLET ORAL at 12:30

## 2017-06-19 RX ADMIN — HYDROMORPHONE HYDROCHLORIDE 0.5 MILLIGRAM(S): 2 INJECTION INTRAMUSCULAR; INTRAVENOUS; SUBCUTANEOUS at 00:55

## 2017-06-19 RX ADMIN — SODIUM CHLORIDE 75 MILLILITER(S): 9 INJECTION, SOLUTION INTRAVENOUS at 07:29

## 2017-06-19 RX ADMIN — ALVIMOPAN 12 MILLIGRAM(S): 12 CAPSULE ORAL at 19:09

## 2017-06-19 RX ADMIN — Medication 50 MILLIGRAM(S): at 06:37

## 2017-06-19 RX ADMIN — OXYCODONE HYDROCHLORIDE 10 MILLIGRAM(S): 5 TABLET ORAL at 23:18

## 2017-06-19 RX ADMIN — OXYCODONE HYDROCHLORIDE 10 MILLIGRAM(S): 5 TABLET ORAL at 06:37

## 2017-06-19 RX ADMIN — OXYCODONE HYDROCHLORIDE 10 MILLIGRAM(S): 5 TABLET ORAL at 12:00

## 2017-06-19 RX ADMIN — HYDROMORPHONE HYDROCHLORIDE 0.5 MILLIGRAM(S): 2 INJECTION INTRAMUSCULAR; INTRAVENOUS; SUBCUTANEOUS at 00:39

## 2017-06-19 RX ADMIN — OXYCODONE HYDROCHLORIDE 10 MILLIGRAM(S): 5 TABLET ORAL at 19:34

## 2017-06-19 RX ADMIN — OXYCODONE HYDROCHLORIDE 10 MILLIGRAM(S): 5 TABLET ORAL at 10:30

## 2017-06-19 RX ADMIN — ENOXAPARIN SODIUM 40 MILLIGRAM(S): 100 INJECTION SUBCUTANEOUS at 12:00

## 2017-06-19 RX ADMIN — SODIUM CHLORIDE 75 MILLILITER(S): 9 INJECTION, SOLUTION INTRAVENOUS at 23:19

## 2017-06-19 RX ADMIN — TAMSULOSIN HYDROCHLORIDE 0.4 MILLIGRAM(S): 0.4 CAPSULE ORAL at 21:13

## 2017-06-19 RX ADMIN — Medication 50 MILLIGRAM(S): at 17:58

## 2017-06-19 RX ADMIN — OXYCODONE HYDROCHLORIDE 10 MILLIGRAM(S): 5 TABLET ORAL at 21:13

## 2017-06-19 RX ADMIN — OXYCODONE HYDROCHLORIDE 10 MILLIGRAM(S): 5 TABLET ORAL at 07:20

## 2017-06-19 RX ADMIN — OXYCODONE HYDROCHLORIDE 10 MILLIGRAM(S): 5 TABLET ORAL at 22:10

## 2017-06-19 RX ADMIN — OXYCODONE HYDROCHLORIDE 10 MILLIGRAM(S): 5 TABLET ORAL at 17:58

## 2017-06-19 RX ADMIN — FINASTERIDE 5 MILLIGRAM(S): 5 TABLET, FILM COATED ORAL at 12:00

## 2017-06-19 RX ADMIN — SODIUM CHLORIDE 75 MILLILITER(S): 9 INJECTION, SOLUTION INTRAVENOUS at 12:00

## 2017-06-19 RX ADMIN — OXYCODONE HYDROCHLORIDE 10 MILLIGRAM(S): 5 TABLET ORAL at 09:51

## 2017-06-19 RX ADMIN — ALVIMOPAN 12 MILLIGRAM(S): 12 CAPSULE ORAL at 06:37

## 2017-06-19 NOTE — PROGRESS NOTE ADULT - SUBJECTIVE AND OBJECTIVE BOX
SUBJECTIVE: pain controlled, no nausea/vomiting/headache/dizziness/chest pain/shortness of breath, negative flatus/BM    OBJECTIVE:  PHYSICAL EXAM:  GA: NAD,  Abdomen:  -  soft, non-distended, appropriate incisional tenderness  - Incision: clean/dry/intact     Vital Signs Last 24 Hrs  T(C): 37, Max: 37 (06-19 @ 06:32)  T(F): 98.6, Max: 98.6 (06-19 @ 06:32)  HR: 95 (80 - 101)  BP: 180/96 (111/75 - 180/96)  BP(mean): --  RR: 18 (16 - 18)  SpO2: 96% (96% - 98%)    I&O's Detail    I & Os for current day (as of 19 Jun 2017 07:11)  =============================================  IN:    IV PiggyBack: 50 ml    Total IN: 50 ml  ---------------------------------------------  OUT:    Voided: 1450 ml    Total OUT: 1450 ml  ---------------------------------------------  Total NET: -1400 ml                            13.4   13.59 )-----------( 427      ( 19 Jun 2017 05:10 )             41.2       06-18    142  |  104  |  14  ----------------------------<  98  3.6   |  20<L>  |  0.64    Ca    8.8      18 Jun 2017 05:25  Phos  3.2     06-18  Mg     1.7     06-18        ASSESSMENT/PLAN: MARISOL ALFRED 62y Male s/p lap to open left colectomy   - Diet: LRD, monitor Gifx  - Pain control with oral pain meds.  - Input and outputs   - DVT ppx  - OOB/incentive spirometry       [facility]  Pager: [phone]

## 2017-06-19 NOTE — PROGRESS NOTE ADULT - SUBJECTIVE AND OBJECTIVE BOX
POD#6 S/p Lap converted to open left hemicolectomy  Patient doing better. Min incisional pain. Had a very loose BM this am, passed some gas.  Tolerating LRD in small quantities No nausea or vomiting  No SOB  Ambulating  Voiding    PE:  Afebrile  In NAD, AAOx3  Abdomen: Midline incision with staple in place. Dry and intact. Some erythema in lower third. No discharge. Abdomen is soft, and non tender.  Ext No edema    Labs: Reviewed. WBC up today to 13.5 from 12.1    A/P: POD#6  - Cont LRD   - Ambulation  - Will start some IVF and repeat CBC in AM  - Cont respiratory treatments  - DTV ppx

## 2017-06-20 LAB
HCT VFR BLD CALC: 41.6 % — SIGNIFICANT CHANGE UP (ref 39–50)
HGB BLD-MCNC: 13.9 G/DL — SIGNIFICANT CHANGE UP (ref 13–17)
MCHC RBC-ENTMCNC: 29.8 PG — SIGNIFICANT CHANGE UP (ref 27–34)
MCHC RBC-ENTMCNC: 33.4 % — SIGNIFICANT CHANGE UP (ref 32–36)
MCV RBC AUTO: 89.3 FL — SIGNIFICANT CHANGE UP (ref 80–100)
PLATELET # BLD AUTO: 427 K/UL — HIGH (ref 150–400)
PMV BLD: 9.5 FL — SIGNIFICANT CHANGE UP (ref 7–13)
RBC # BLD: 4.66 M/UL — SIGNIFICANT CHANGE UP (ref 4.2–5.8)
RBC # FLD: 13.7 % — SIGNIFICANT CHANGE UP (ref 10.3–14.5)
WBC # BLD: 12.04 K/UL — HIGH (ref 3.8–10.5)
WBC # FLD AUTO: 12.04 K/UL — HIGH (ref 3.8–10.5)

## 2017-06-20 RX ORDER — HYDRALAZINE HCL 50 MG
10 TABLET ORAL ONCE
Qty: 0 | Refills: 0 | Status: COMPLETED | OUTPATIENT
Start: 2017-06-20 | End: 2017-06-20

## 2017-06-20 RX ORDER — ACETAMINOPHEN 500 MG
650 TABLET ORAL ONCE
Qty: 0 | Refills: 0 | Status: COMPLETED | OUTPATIENT
Start: 2017-06-20 | End: 2017-06-20

## 2017-06-20 RX ORDER — HYDROMORPHONE HYDROCHLORIDE 2 MG/ML
0.5 INJECTION INTRAMUSCULAR; INTRAVENOUS; SUBCUTANEOUS ONCE
Qty: 0 | Refills: 0 | Status: DISCONTINUED | OUTPATIENT
Start: 2017-06-20 | End: 2017-06-20

## 2017-06-20 RX ADMIN — Medication 650 MILLIGRAM(S): at 14:26

## 2017-06-20 RX ADMIN — TAMSULOSIN HYDROCHLORIDE 0.4 MILLIGRAM(S): 0.4 CAPSULE ORAL at 22:54

## 2017-06-20 RX ADMIN — OXYCODONE HYDROCHLORIDE 10 MILLIGRAM(S): 5 TABLET ORAL at 09:20

## 2017-06-20 RX ADMIN — ALVIMOPAN 12 MILLIGRAM(S): 12 CAPSULE ORAL at 06:56

## 2017-06-20 RX ADMIN — OXYCODONE HYDROCHLORIDE 10 MILLIGRAM(S): 5 TABLET ORAL at 16:16

## 2017-06-20 RX ADMIN — OXYCODONE HYDROCHLORIDE 10 MILLIGRAM(S): 5 TABLET ORAL at 10:40

## 2017-06-20 RX ADMIN — Medication 50 MILLIGRAM(S): at 06:55

## 2017-06-20 RX ADMIN — HYDROMORPHONE HYDROCHLORIDE 0.5 MILLIGRAM(S): 2 INJECTION INTRAMUSCULAR; INTRAVENOUS; SUBCUTANEOUS at 02:30

## 2017-06-20 RX ADMIN — OXYCODONE HYDROCHLORIDE 10 MILLIGRAM(S): 5 TABLET ORAL at 06:55

## 2017-06-20 RX ADMIN — FINASTERIDE 5 MILLIGRAM(S): 5 TABLET, FILM COATED ORAL at 11:47

## 2017-06-20 RX ADMIN — OXYCODONE HYDROCHLORIDE 10 MILLIGRAM(S): 5 TABLET ORAL at 00:00

## 2017-06-20 RX ADMIN — OXYCODONE HYDROCHLORIDE 10 MILLIGRAM(S): 5 TABLET ORAL at 12:02

## 2017-06-20 RX ADMIN — Medication 50 MILLIGRAM(S): at 17:32

## 2017-06-20 RX ADMIN — AMLODIPINE BESYLATE 5 MILLIGRAM(S): 2.5 TABLET ORAL at 06:56

## 2017-06-20 RX ADMIN — OXYCODONE HYDROCHLORIDE 10 MILLIGRAM(S): 5 TABLET ORAL at 17:10

## 2017-06-20 RX ADMIN — HYDROMORPHONE HYDROCHLORIDE 0.5 MILLIGRAM(S): 2 INJECTION INTRAMUSCULAR; INTRAVENOUS; SUBCUTANEOUS at 02:18

## 2017-06-20 RX ADMIN — OXYCODONE HYDROCHLORIDE 10 MILLIGRAM(S): 5 TABLET ORAL at 22:54

## 2017-06-20 RX ADMIN — ENOXAPARIN SODIUM 40 MILLIGRAM(S): 100 INJECTION SUBCUTANEOUS at 11:47

## 2017-06-20 RX ADMIN — OXYCODONE HYDROCHLORIDE 10 MILLIGRAM(S): 5 TABLET ORAL at 19:46

## 2017-06-20 RX ADMIN — OXYCODONE HYDROCHLORIDE 10 MILLIGRAM(S): 5 TABLET ORAL at 07:45

## 2017-06-20 RX ADMIN — OXYCODONE HYDROCHLORIDE 10 MILLIGRAM(S): 5 TABLET ORAL at 13:00

## 2017-06-20 RX ADMIN — OXYCODONE HYDROCHLORIDE 10 MILLIGRAM(S): 5 TABLET ORAL at 20:21

## 2017-06-20 RX ADMIN — ALVIMOPAN 12 MILLIGRAM(S): 12 CAPSULE ORAL at 18:32

## 2017-06-20 RX ADMIN — Medication 650 MILLIGRAM(S): at 15:08

## 2017-06-20 NOTE — PROVIDER CONTACT NOTE (OTHER) - SITUATION
electronically
Patient  electronically.  manually
Patient's Hr is 111, b/p 140/76
Pt with BP of 170/87. HR 89. Pt c/o abdominal pain.
Pt with BP of 180/70. HR 88. Pt c/o 9/10 abdominal pain.
Pt with BP of 180/96. HR 95.
Post vasotec administration patient still experiencing high BP and tachycardia

## 2017-06-20 NOTE — PROVIDER CONTACT NOTE (OTHER) - NAME OF MD/NP/PA/DO NOTIFIED:
Carolyn Dimas
Susanne Phillips MD
ALLAN HIGGINS MD
Bonifacio Mayorga MD
Carolyn Dimas
FELIZ MONDRAGON MD
Susanne Phillips MD

## 2017-06-20 NOTE — PROGRESS NOTE ADULT - SUBJECTIVE AND OBJECTIVE BOX
SUBJECTIVE: Pt with minimal pain overnight, controlled with pain medication. Patient doing well overall, tolerating normal diet, BM+    OBJECTIVE:  PHYSICAL EXAM:  GA: NAD, well appearing.  Abdomen:  -  soft, non-distended, appropriate incisional tenderness  - Incision: clean/dry/intact     Vital Signs Last 24 Hrs  T(C): 36.7, Max: 36.9 (06-19 @ 18:35)  HR: 93 (88 - 99)  BP: 152/89 (140/89 - 180/90)  ABP: --  ABP(mean): --  RR: 18 (16 - 18)  SpO2: 96% (96% - 99%)  Wt(kg): --  CVP(mm Hg): --      I & Os for current day (as of 06-20 @ 09:03)  =============================================  IN:    lactated ringers.: 900 ml    Oral Fluid: 600 ml    Total IN: 1500 ml  ---------------------------------------------  OUT:    Voided: 975 ml    Total OUT: 975 ml  ---------------------------------------------  Total NET: 525 ml    ASSESSMENT/PLAN: MARISOL ALFRED 62y Male s/p lap to open left colectomy   - Diet: LRD, monitor for N/v  - Oral pain control  - OOB/ambulate  - D/c planning.    [facility] team Surgery  Pager: [phone]

## 2017-06-20 NOTE — PROGRESS NOTE ADULT - SUBJECTIVE AND OBJECTIVE BOX
Patient is a 62y old  Male who presents with a chief complaint of colon mass (02 Jun 2017 14:07)  s/p  lap to open Left colectomy     POD : 7       SUBJECTIVE: Pain controlled, no nausea/vomiting/headache/dizziness/chest pain/shortness of breath. Tolerating diet . no acute issues overnight. Leukocytosis trending down     OBJECTIVE:  PHYSICAL EXAM:  GA: NAD  Abdomen:  -  soft, non-distended, appropriate incisional tenderness  - Incision: clean/dry/intact       Vitals/Labs:  Vital Signs Last 24 Hrs  T(C): 36.7, Max: 36.9 (06-19 @ 18:35)  T(F): 98, Max: 98.5 (06-19 @ 18:35)  HR: 93 (88 - 99)  BP: 152/89 (140/89 - 180/90)  BP(mean): --  RR: 18 (16 - 18)  SpO2: 96% (96% - 99%)    I&O's Detail  I & Os for 24h ending 20 Jun 2017 07:00  =============================================  IN:    lactated ringers.: 900 ml    Oral Fluid: 600 ml    Total IN: 1500 ml  ---------------------------------------------  OUT:    Voided: 975 ml    Total OUT: 975 ml  ---------------------------------------------  Total NET: 525 ml    I & Os for current day (as of 20 Jun 2017 10:08)  =============================================  IN:    Total IN: 0 ml  ---------------------------------------------  OUT:    Voided: 250 ml    Total OUT: 250 ml  ---------------------------------------------  Total NET: -250 ml                            13.9   12.04 )-----------( 427      ( 20 Jun 2017 05:20 )             41.6               CAPILLARY BLOOD GLUCOSE        ASSESSMENT/PLAN: MARISOL ALFRED 62y Male s/p  lap to open Left colectomy   - Diet: LRD  - Pain control   - Input and outputs   - DVT ppx  - OOB/incentive spirometry   - PT Evaluation   -d/c planning for tomorrow     MEDICATIONS  (STANDING):  alvimopan 12milliGRAM(s) Oral two times a day  tamsulosin 0.4milliGRAM(s) Oral at bedtime  finasteride 5milliGRAM(s) Oral daily  amLODIPine   Tablet 5milliGRAM(s) Oral daily  enoxaparin Injectable 40milliGRAM(s) SubCutaneous daily  oxyCODONE ER Tablet 10milliGRAM(s) Oral every 12 hours  metoprolol 50milliGRAM(s) Oral two times a day    MEDICATIONS  (PRN):  oxyCODONE IR 10milliGRAM(s) Oral every 4 hours PRN Moderate Pain (4 - 6)  ALBUTerol/ipratropium for Nebulization 3milliLiter(s) Nebulizer every 6 hours PRN Shortness of Breath and/or Wheezing

## 2017-06-20 NOTE — PROVIDER CONTACT NOTE (OTHER) - REASON
Elevated BP post vasotec
Hypertension
Hypertension/Pain
Hypertension/Pain
Tachycardia

## 2017-06-20 NOTE — PROVIDER CONTACT NOTE (OTHER) - ACTION/TREATMENT ORDERED:
Carolyn Dimas made aware.
Md made aware. No actions ordered at this time. Patient not on any BP meds at home. Continue to monitor.
Preop , normal for pt to be on O2 after surgery. Keep monitoring.
Provider aware and states to administer pain and BP medication and reassess BP. Will continue to monitor.
Provider aware and will come to bedside to assess patient. Will continue to monitor.
Provider aware and will order Dilaudid. Provider states to administer Dilaudid and reassess pain level and BP. Will administer and continue to monitor.
Susanne Phillips made aware.

## 2017-06-20 NOTE — PROGRESS NOTE ADULT - SUBJECTIVE AND OBJECTIVE BOX
complains of incisional pain after having a forceful BM yesterday. pain has not worsen. stools loose. no persistent diarrhea. no BPR. no nausea or vomiting. no fever or chills. tolerating LRD. no issue. he reports not much ambulation since his return to the floor from the ICU. he doesn't believe he can maneuver the stairs leading to access to his home.    Vital Signs Last 24 Hrs  T(C): 36.7, Max: 36.9 (06-19 @ 18:35)  T(F): 98, Max: 98.5 (06-19 @ 18:35)  HR: 93 (88 - 99)  BP: 152/89 (140/89 - 180/90)  BP(mean): --  RR: 18 (16 - 18)  SpO2: 96% (96% - 99%)    I&O's Detail    I & Os for current day (as of 20 Jun 2017 09:19)  =============================================  IN:    lactated ringers.: 900 ml    Oral Fluid: 600 ml    Total IN: 1500 ml  ---------------------------------------------  OUT:    Voided: 975 ml    Total OUT: 975 ml  ---------------------------------------------  Total NET: 525 ml    ABD: soft, mild janina-incisional tenderness, incision c/d/i - min erythema at inferior aspect but without induration or drainage    LABS:                13.9   12.04 )-----------( 427      ( 20 Jun 2017 05:20 )             41.6       A/P POD#7 s/p left colectomy, cystoscopy  -PT to dc. patient needs evaluation for home stairs  -oob to chair with each meal  -pulm status much improved. pt on RA. cont neb tx and incentive spirometer usage  -cont diet  -cont current pain regimen  -no incr in wbc. no new labs unless clinically changes  -dc plans include f/u with /Dr. Rowell for the BPH and his PCP for BP managment. may dc home with current anti-HTN meds. dc patient with current pain regimen and colace BID    NARCISO An  [phone]                               CAPILLARY BLOOD GLUCOSE

## 2017-06-20 NOTE — PROVIDER CONTACT NOTE (OTHER) - DATE AND TIME:
14-Jun-2017 13:03
14-Jun-2017 10:00
14-Jun-2017 19:55
15-Faisal-2017 01:55
18-Jun-2017 01:15
19-Jun-2017 06:35
20-Jun-2017 02:07

## 2017-06-20 NOTE — PROVIDER CONTACT NOTE (OTHER) - BACKGROUND
s/p cystoscopy, open L hemicolectomy
Patient S/P Hemicolectomy
Pt s/p L open hemicolectomy.
Pt s/p open L hemicolectomy.
Pt s/p open hemicolectomy.
S/P open hemicolectomy
s/p hemicolectomy

## 2017-06-20 NOTE — PROVIDER CONTACT NOTE (OTHER) - RECOMMENDATIONS
1x dose of Dilaudid 0.5mg for breakthrough, reassess BP.
Administer BP and pain meds and reassess.
Continue to monitor patient.
Possible PE?
Provider to bedside.
notify MD.
Vasotec given.

## 2017-06-21 VITALS
OXYGEN SATURATION: 95 % | HEART RATE: 108 BPM | RESPIRATION RATE: 18 BRPM | TEMPERATURE: 97 F | DIASTOLIC BLOOD PRESSURE: 78 MMHG | SYSTOLIC BLOOD PRESSURE: 137 MMHG

## 2017-06-21 LAB
HCT VFR BLD CALC: 41.5 % — SIGNIFICANT CHANGE UP (ref 39–50)
HGB BLD-MCNC: 14 G/DL — SIGNIFICANT CHANGE UP (ref 13–17)
MCHC RBC-ENTMCNC: 30 PG — SIGNIFICANT CHANGE UP (ref 27–34)
MCHC RBC-ENTMCNC: 33.7 % — SIGNIFICANT CHANGE UP (ref 32–36)
MCV RBC AUTO: 89.1 FL — SIGNIFICANT CHANGE UP (ref 80–100)
PLATELET # BLD AUTO: 441 K/UL — HIGH (ref 150–400)
PMV BLD: 9.5 FL — SIGNIFICANT CHANGE UP (ref 7–13)
RBC # BLD: 4.66 M/UL — SIGNIFICANT CHANGE UP (ref 4.2–5.8)
RBC # FLD: 13.6 % — SIGNIFICANT CHANGE UP (ref 10.3–14.5)
WBC # BLD: 12.18 K/UL — HIGH (ref 3.8–10.5)
WBC # FLD AUTO: 12.18 K/UL — HIGH (ref 3.8–10.5)

## 2017-06-21 RX ORDER — TAMSULOSIN HYDROCHLORIDE 0.4 MG/1
1 CAPSULE ORAL
Qty: 0 | Refills: 0 | DISCHARGE
Start: 2017-06-21

## 2017-06-21 RX ORDER — METOPROLOL TARTRATE 50 MG
1 TABLET ORAL
Qty: 0 | Refills: 0 | DISCHARGE
Start: 2017-06-21

## 2017-06-21 RX ORDER — OXYCODONE HYDROCHLORIDE 5 MG/1
1 TABLET ORAL
Qty: 0 | Refills: 0 | DISCHARGE
Start: 2017-06-21

## 2017-06-21 RX ORDER — FINASTERIDE 5 MG/1
1 TABLET, FILM COATED ORAL
Qty: 0 | Refills: 0 | DISCHARGE
Start: 2017-06-21

## 2017-06-21 RX ORDER — FINASTERIDE 5 MG/1
1 TABLET, FILM COATED ORAL
Qty: 30 | Refills: 0
Start: 2017-06-21

## 2017-06-21 RX ORDER — OXYCODONE HYDROCHLORIDE 5 MG/1
1 TABLET ORAL
Qty: 30 | Refills: 0
Start: 2017-06-21 | End: 2017-06-26

## 2017-06-21 RX ORDER — TAMSULOSIN HYDROCHLORIDE 0.4 MG/1
1 CAPSULE ORAL
Qty: 30 | Refills: 0
Start: 2017-06-21

## 2017-06-21 RX ORDER — OXYCODONE HYDROCHLORIDE 5 MG/1
1 TABLET ORAL
Qty: 10 | Refills: 0
Start: 2017-06-21 | End: 2017-06-26

## 2017-06-21 RX ORDER — METOPROLOL TARTRATE 50 MG
1 TABLET ORAL
Qty: 28 | Refills: 0
Start: 2017-06-21 | End: 2017-07-05

## 2017-06-21 RX ADMIN — FINASTERIDE 5 MILLIGRAM(S): 5 TABLET, FILM COATED ORAL at 13:28

## 2017-06-21 RX ADMIN — OXYCODONE HYDROCHLORIDE 10 MILLIGRAM(S): 5 TABLET ORAL at 01:23

## 2017-06-21 RX ADMIN — OXYCODONE HYDROCHLORIDE 10 MILLIGRAM(S): 5 TABLET ORAL at 17:28

## 2017-06-21 RX ADMIN — OXYCODONE HYDROCHLORIDE 10 MILLIGRAM(S): 5 TABLET ORAL at 11:04

## 2017-06-21 RX ADMIN — OXYCODONE HYDROCHLORIDE 10 MILLIGRAM(S): 5 TABLET ORAL at 02:06

## 2017-06-21 RX ADMIN — Medication 50 MILLIGRAM(S): at 17:03

## 2017-06-21 RX ADMIN — OXYCODONE HYDROCHLORIDE 10 MILLIGRAM(S): 5 TABLET ORAL at 13:58

## 2017-06-21 RX ADMIN — ENOXAPARIN SODIUM 40 MILLIGRAM(S): 100 INJECTION SUBCUTANEOUS at 13:28

## 2017-06-21 RX ADMIN — OXYCODONE HYDROCHLORIDE 10 MILLIGRAM(S): 5 TABLET ORAL at 01:21

## 2017-06-21 RX ADMIN — OXYCODONE HYDROCHLORIDE 10 MILLIGRAM(S): 5 TABLET ORAL at 11:34

## 2017-06-21 RX ADMIN — OXYCODONE HYDROCHLORIDE 10 MILLIGRAM(S): 5 TABLET ORAL at 07:17

## 2017-06-21 RX ADMIN — AMLODIPINE BESYLATE 5 MILLIGRAM(S): 2.5 TABLET ORAL at 07:17

## 2017-06-21 RX ADMIN — Medication 50 MILLIGRAM(S): at 07:17

## 2017-06-21 RX ADMIN — OXYCODONE HYDROCHLORIDE 10 MILLIGRAM(S): 5 TABLET ORAL at 07:47

## 2017-06-21 RX ADMIN — OXYCODONE HYDROCHLORIDE 10 MILLIGRAM(S): 5 TABLET ORAL at 17:58

## 2017-06-21 RX ADMIN — OXYCODONE HYDROCHLORIDE 10 MILLIGRAM(S): 5 TABLET ORAL at 13:28

## 2017-06-21 NOTE — DISCHARGE NOTE ADULT - CARE PROVIDER_API CALL
Morgan Armenta), ColonRectal Surgery; Surgery  [address] [place] [address]  Phone: [phone]  Fax: [phone] Morgan Armenta), ColonRectal Surgery; Surgery  [address] [place] [address]  Phone: [phone]  Fax: [phone]    Jayro Rowell), Urology  [address] [place] [address]  Phone: [phone]  Fax: [phone]

## 2017-06-21 NOTE — DISCHARGE NOTE ADULT - NS AS ACTIVITY OBS
Walking-Indoors allowed/Do not drive or operate machinery/Stairs allowed/No Heavy lifting/straining/Do not make important decisions/Do not drive while taking pain medications./Showering allowed/Walking-Outdoors allowed

## 2017-06-21 NOTE — DISCHARGE NOTE ADULT - CARE PLAN
Principal Discharge DX:	Colon cancer  Goal:	status post laparoscopic left hemicolectomy  Instructions for follow-up, activity and diet:	Please call to schedule an appointment with Dr rAmenta for follow up in 1-2 weeks  Please call to make an appointment to follow up with your primary care physician regarding your recent hospitalization. You were started on Metoprolol while in the hospital for blood pressure and heart rate control.  Secondary Diagnosis:	HTN (hypertension)  Goal:	Continue current medication and Metoprolol was added Principal Discharge DX:	Colon cancer  Goal:	status post laparoscopic left hemicolectomy  Instructions for follow-up, activity and diet:	Please call to schedule an appointment with Dr Armenta for follow up in 1-2 weeks  Please call to make an appointment to follow up with your primary care physician regarding your recent hospitalization. You were started on Metoprolol while in the hospital for blood pressure and heart rate control.  Secondary Diagnosis:	HTN (hypertension)  Goal:	Continue current medication and Metoprolol was added Principal Discharge DX:	Colon cancer  Goal:	status post laparoscopic left hemicolectomy  Instructions for follow-up, activity and diet:	Please call to schedule an appointment with Dr Armenat for follow up in 1-2 weeks  Please call to make an appointment to follow up with your primary care physician regarding your recent hospitalization. You were started on Metoprolol while in the hospital for blood pressure and heart rate control.  Secondary Diagnosis:	HTN (hypertension)  Goal:	Continue current medication and Metoprolol was added

## 2017-06-21 NOTE — DISCHARGE NOTE ADULT - PATIENT PORTAL LINK FT
“You can access the FollowHealth Patient Portal, offered by [facility], by registering with the following website: [email or web address]”

## 2017-06-21 NOTE — DISCHARGE NOTE ADULT - CARE PROVIDERS DIRECT ADDRESSES
[email or web address]

## 2017-06-21 NOTE — DISCHARGE NOTE ADULT - PLAN OF CARE
status post laparoscopic left hemicolectomy Please call to schedule an appointment with Dr Armenta for follow up in 1-2 weeks  Please call to make an appointment to follow up with your primary care physician regarding your recent hospitalization. You were started on Metoprolol while in the hospital for blood pressure and heart rate control. Continue current medication and Metoprolol was added

## 2017-06-21 NOTE — DISCHARGE NOTE ADULT - MEDICATION SUMMARY - MEDICATIONS TO TAKE
I will START or STAY ON the medications listed below when I get home from the hospital:    rolling walker  -- 1 MDD:1 rolling walker  -- Indication: For for ambulation assistance    finasteride 5 mg oral tablet  -- 1 tab(s) by mouth once a day  -- Indication: For enlarged prostate    oxyCODONE 10 mg oral tablet, extended release  -- 1 tab(s) by mouth every 12 hours MDD:2  -- Indication: For pain    oxyCODONE 10 mg oral tablet  -- 1 tab(s) by mouth every 4 to 6 hours, As Needed, Moderate Pain (4 - 6) MDD:6  -- Indication: For pain    Tylenol 500 mg oral tablet  -- 2 tab(s) by mouth every 6 hours, As Needed  -- Indication: For pain    tamsulosin 0.4 mg oral capsule  -- 1 cap(s) by mouth once a day (at bedtime)  -- Indication: For enlarged prostate    metoprolol tartrate 50 mg oral tablet  -- 1 tab(s) by mouth 2 times a day MDD:2  -- Indication: For elevated blood pressure    amLODIPine 10 mg oral tablet  -- 1 tab(s) by mouth once a day in am  -- Indication: For elevated blood pressure

## 2017-06-21 NOTE — DISCHARGE NOTE ADULT - HOSPITAL COURSE
Pt is a 61 yr old male scheduled for Laparscopic Left colectomy with Dr Armenta 6/13/17 for Colon mass noted on CT scan done for recent onset of severe pain left lateral abdomen. Pt c/o of constipation - denies melena. PET done and negative. Pt hx of smoking and HTN and cervical neck fusion for injury sustained 2014 - plates and screws in place.    Pt is s/p laparoscopic converted to open left hemicolectomy.  Pt had a cysto guided elizabeth placed due to his severe BPH.    Post operatively, pt used an IV PCA for pain control. On post operative day 1, pt was transferred to the SICU for tachycardia, hypoxia and had a CTPA/ CT of abdomen/pelvis which was negative for PE or anastamotic leak.  Pt remained in SICU for 2 days for HD monitoring and was then transferred to the floor.     Pt did well on the floor and was slowly advanced from clears to regular diet, tolerating well.  Elizabeth was removed and he is voiding without difficulty. He was transitioned to oral pain medication and pain is well controlled.  He was seen by Physical Therapist and they recommend home with a rolling walker. Rx given for rolling walker.    Pt is stable for discharge home. Rx given for Oxycocone,  ISTOP reference number [id number].

## 2017-07-07 PROBLEM — Z00.00 ENCOUNTER FOR PREVENTIVE HEALTH EXAMINATION: Noted: 2017-07-07

## 2017-07-11 ENCOUNTER — APPOINTMENT (OUTPATIENT)
Dept: UROLOGY | Facility: CLINIC | Age: 62
End: 2017-07-11

## 2017-07-11 VITALS
TEMPERATURE: 97.7 F | HEIGHT: 71 IN | WEIGHT: 220 LBS | DIASTOLIC BLOOD PRESSURE: 85 MMHG | BODY MASS INDEX: 30.8 KG/M2 | RESPIRATION RATE: 16 BRPM | SYSTOLIC BLOOD PRESSURE: 142 MMHG | HEART RATE: 93 BPM

## 2017-07-11 RX ORDER — TAMSULOSIN HYDROCHLORIDE 0.4 MG/1
0.4 CAPSULE ORAL
Refills: 0 | Status: DISCONTINUED | COMMUNITY
End: 2017-07-11

## 2017-07-11 RX ORDER — FINASTERIDE 5 MG/1
5 TABLET, FILM COATED ORAL
Refills: 0 | Status: DISCONTINUED | COMMUNITY
End: 2017-07-11

## 2017-08-03 ENCOUNTER — APPOINTMENT (OUTPATIENT)
Dept: SURGICAL ONCOLOGY | Facility: CLINIC | Age: 62
End: 2017-08-03
Payer: MEDICARE

## 2017-08-03 VITALS
BODY MASS INDEX: 28.42 KG/M2 | HEIGHT: 71 IN | RESPIRATION RATE: 16 BRPM | HEART RATE: 82 BPM | TEMPERATURE: 98.1 F | OXYGEN SATURATION: 99 % | DIASTOLIC BLOOD PRESSURE: 45 MMHG | WEIGHT: 203 LBS | SYSTOLIC BLOOD PRESSURE: 183 MMHG

## 2017-08-03 DIAGNOSIS — Z86.59 PERSONAL HISTORY OF OTHER MENTAL AND BEHAVIORAL DISORDERS: ICD-10-CM

## 2017-08-03 PROCEDURE — 99204 OFFICE O/P NEW MOD 45 MIN: CPT

## 2017-09-05 ENCOUNTER — APPOINTMENT (OUTPATIENT)
Dept: CT IMAGING | Facility: IMAGING CENTER | Age: 62
End: 2017-09-05

## 2017-09-05 ENCOUNTER — OUTPATIENT (OUTPATIENT)
Dept: OUTPATIENT SERVICES | Facility: HOSPITAL | Age: 62
LOS: 1 days | End: 2017-09-05
Payer: MEDICARE

## 2017-09-05 DIAGNOSIS — K86.9 DISEASE OF PANCREAS, UNSPECIFIED: ICD-10-CM

## 2017-09-05 DIAGNOSIS — Z98.890 OTHER SPECIFIED POSTPROCEDURAL STATES: Chronic | ICD-10-CM

## 2017-09-05 PROCEDURE — 82565 ASSAY OF CREATININE: CPT

## 2017-09-05 PROCEDURE — 74177 CT ABD & PELVIS W/CONTRAST: CPT | Mod: 26

## 2017-09-05 PROCEDURE — 74177 CT ABD & PELVIS W/CONTRAST: CPT

## 2017-09-27 ENCOUNTER — CHART COPY (OUTPATIENT)
Age: 62
End: 2017-09-27

## 2017-10-06 ENCOUNTER — RESULT REVIEW (OUTPATIENT)
Age: 62
End: 2017-10-06

## 2017-10-06 ENCOUNTER — OUTPATIENT (OUTPATIENT)
Dept: OUTPATIENT SERVICES | Facility: HOSPITAL | Age: 62
LOS: 1 days | End: 2017-10-06
Payer: MEDICARE

## 2017-10-06 ENCOUNTER — APPOINTMENT (OUTPATIENT)
Dept: GASTROENTEROLOGY | Facility: HOSPITAL | Age: 62
End: 2017-10-06
Payer: MEDICARE

## 2017-10-06 DIAGNOSIS — Z98.890 OTHER SPECIFIED POSTPROCEDURAL STATES: Chronic | ICD-10-CM

## 2017-10-06 DIAGNOSIS — K86.2 CYST OF PANCREAS: ICD-10-CM

## 2017-10-06 PROCEDURE — 88307 TISSUE EXAM BY PATHOLOGIST: CPT | Mod: 26

## 2017-10-06 PROCEDURE — 43238 EGD US FINE NEEDLE BX/ASPIR: CPT

## 2017-10-06 PROCEDURE — 88305 TISSUE EXAM BY PATHOLOGIST: CPT

## 2017-10-06 PROCEDURE — 88341 IMHCHEM/IMCYTCHM EA ADD ANTB: CPT | Mod: 26

## 2017-10-06 PROCEDURE — 88307 TISSUE EXAM BY PATHOLOGIST: CPT

## 2017-10-06 PROCEDURE — 88341 IMHCHEM/IMCYTCHM EA ADD ANTB: CPT

## 2017-10-06 PROCEDURE — 88342 IMHCHEM/IMCYTCHM 1ST ANTB: CPT | Mod: 26

## 2017-10-06 PROCEDURE — 43242 EGD US FINE NEEDLE BX/ASPIR: CPT

## 2017-10-06 PROCEDURE — 43239 EGD BIOPSY SINGLE/MULTIPLE: CPT | Mod: XS

## 2017-10-06 PROCEDURE — 88342 IMHCHEM/IMCYTCHM 1ST ANTB: CPT

## 2017-10-06 PROCEDURE — 88305 TISSUE EXAM BY PATHOLOGIST: CPT | Mod: 26

## 2017-10-06 PROCEDURE — 88312 SPECIAL STAINS GROUP 1: CPT | Mod: 26

## 2017-10-06 PROCEDURE — 88312 SPECIAL STAINS GROUP 1: CPT

## 2017-10-12 ENCOUNTER — APPOINTMENT (OUTPATIENT)
Dept: GASTROENTEROLOGY | Facility: CLINIC | Age: 62
End: 2017-10-12
Payer: MEDICARE

## 2017-10-12 ENCOUNTER — APPOINTMENT (OUTPATIENT)
Dept: SURGICAL ONCOLOGY | Facility: CLINIC | Age: 62
End: 2017-10-12
Payer: MEDICARE

## 2017-10-12 VITALS
DIASTOLIC BLOOD PRESSURE: 90 MMHG | SYSTOLIC BLOOD PRESSURE: 150 MMHG | RESPIRATION RATE: 14 BRPM | TEMPERATURE: 98 F | BODY MASS INDEX: 30.52 KG/M2 | HEART RATE: 108 BPM | OXYGEN SATURATION: 98 % | WEIGHT: 218 LBS | HEIGHT: 71 IN

## 2017-10-12 VITALS
BODY MASS INDEX: 30.52 KG/M2 | HEART RATE: 90 BPM | WEIGHT: 218 LBS | DIASTOLIC BLOOD PRESSURE: 90 MMHG | HEIGHT: 71 IN | RESPIRATION RATE: 15 BRPM | SYSTOLIC BLOOD PRESSURE: 170 MMHG

## 2017-10-12 PROCEDURE — 99213 OFFICE O/P EST LOW 20 MIN: CPT

## 2017-10-12 PROCEDURE — 99203 OFFICE O/P NEW LOW 30 MIN: CPT

## 2017-10-12 PROCEDURE — 99215 OFFICE O/P EST HI 40 MIN: CPT

## 2017-10-20 ENCOUNTER — APPOINTMENT (OUTPATIENT)
Dept: NUCLEAR MEDICINE | Facility: IMAGING CENTER | Age: 62
End: 2017-10-20
Payer: MEDICARE

## 2017-10-20 ENCOUNTER — OUTPATIENT (OUTPATIENT)
Dept: OUTPATIENT SERVICES | Facility: HOSPITAL | Age: 62
LOS: 1 days | End: 2017-10-20
Payer: MEDICARE

## 2017-10-20 DIAGNOSIS — Z98.890 OTHER SPECIFIED POSTPROCEDURAL STATES: Chronic | ICD-10-CM

## 2017-10-20 DIAGNOSIS — K86.9 DISEASE OF PANCREAS, UNSPECIFIED: ICD-10-CM

## 2017-10-20 PROCEDURE — 78815 PET IMAGE W/CT SKULL-THIGH: CPT

## 2017-10-20 PROCEDURE — A9552: CPT

## 2017-10-20 PROCEDURE — 78815 PET IMAGE W/CT SKULL-THIGH: CPT | Mod: 26,PS

## 2017-10-24 ENCOUNTER — EMERGENCY (EMERGENCY)
Facility: HOSPITAL | Age: 62
LOS: 1 days | Discharge: HOME | End: 2017-10-24
Attending: EMERGENCY MEDICINE | Admitting: EMERGENCY MEDICINE
Payer: MEDICARE

## 2017-10-24 ENCOUNTER — OUTPATIENT (OUTPATIENT)
Dept: OUTPATIENT SERVICES | Facility: HOSPITAL | Age: 62
LOS: 1 days | End: 2017-10-24
Payer: MEDICARE

## 2017-10-24 VITALS
HEART RATE: 98 BPM | DIASTOLIC BLOOD PRESSURE: 97 MMHG | OXYGEN SATURATION: 100 % | TEMPERATURE: 97 F | RESPIRATION RATE: 16 BRPM | SYSTOLIC BLOOD PRESSURE: 162 MMHG

## 2017-10-24 VITALS
TEMPERATURE: 98 F | DIASTOLIC BLOOD PRESSURE: 110 MMHG | WEIGHT: 214.07 LBS | HEART RATE: 95 BPM | HEIGHT: 69 IN | RESPIRATION RATE: 16 BRPM | SYSTOLIC BLOOD PRESSURE: 160 MMHG

## 2017-10-24 VITALS
HEART RATE: 105 BPM | TEMPERATURE: 98 F | RESPIRATION RATE: 20 BRPM | OXYGEN SATURATION: 99 % | SYSTOLIC BLOOD PRESSURE: 199 MMHG | DIASTOLIC BLOOD PRESSURE: 108 MMHG

## 2017-10-24 DIAGNOSIS — Z90.49 ACQUIRED ABSENCE OF OTHER SPECIFIED PARTS OF DIGESTIVE TRACT: Chronic | ICD-10-CM

## 2017-10-24 DIAGNOSIS — K86.9 DISEASE OF PANCREAS, UNSPECIFIED: ICD-10-CM

## 2017-10-24 DIAGNOSIS — Z98.890 OTHER SPECIFIED POSTPROCEDURAL STATES: Chronic | ICD-10-CM

## 2017-10-24 DIAGNOSIS — G47.33 OBSTRUCTIVE SLEEP APNEA (ADULT) (PEDIATRIC): ICD-10-CM

## 2017-10-24 LAB
ALBUMIN SERPL ELPH-MCNC: 3.8 G/DL — SIGNIFICANT CHANGE UP (ref 3.3–5)
ALBUMIN SERPL ELPH-MCNC: 4.3 G/DL — SIGNIFICANT CHANGE UP (ref 3.3–5)
ALP SERPL-CCNC: 79 U/L — SIGNIFICANT CHANGE UP (ref 40–120)
ALP SERPL-CCNC: 84 U/L — SIGNIFICANT CHANGE UP (ref 40–120)
ALT FLD-CCNC: 14 U/L — SIGNIFICANT CHANGE UP (ref 4–41)
ALT FLD-CCNC: 14 U/L — SIGNIFICANT CHANGE UP (ref 4–41)
AST SERPL-CCNC: 12 U/L — SIGNIFICANT CHANGE UP (ref 4–40)
AST SERPL-CCNC: 15 U/L — SIGNIFICANT CHANGE UP (ref 4–40)
BASOPHILS # BLD AUTO: 0.12 K/UL — SIGNIFICANT CHANGE UP (ref 0–0.2)
BASOPHILS NFR BLD AUTO: 1.1 % — SIGNIFICANT CHANGE UP (ref 0–2)
BILIRUB SERPL-MCNC: < 0.2 MG/DL — LOW (ref 0.2–1.2)
BILIRUB SERPL-MCNC: < 0.2 MG/DL — LOW (ref 0.2–1.2)
BLD GP AB SCN SERPL QL: NEGATIVE — SIGNIFICANT CHANGE UP
BUN SERPL-MCNC: 7 MG/DL — SIGNIFICANT CHANGE UP (ref 7–23)
BUN SERPL-MCNC: 9 MG/DL — SIGNIFICANT CHANGE UP (ref 7–23)
CALCIUM SERPL-MCNC: 9 MG/DL — SIGNIFICANT CHANGE UP (ref 8.4–10.5)
CALCIUM SERPL-MCNC: 9.7 MG/DL — SIGNIFICANT CHANGE UP (ref 8.4–10.5)
CHLORIDE SERPL-SCNC: 100 MMOL/L — SIGNIFICANT CHANGE UP (ref 98–107)
CHLORIDE SERPL-SCNC: 102 MMOL/L — SIGNIFICANT CHANGE UP (ref 98–107)
CK MB BLD-MCNC: 1.07 NG/ML — SIGNIFICANT CHANGE UP (ref 1–6.6)
CK MB BLD-MCNC: 1.16 NG/ML — SIGNIFICANT CHANGE UP (ref 1–6.6)
CK SERPL-CCNC: 40 U/L — SIGNIFICANT CHANGE UP (ref 30–200)
CK SERPL-CCNC: 49 U/L — SIGNIFICANT CHANGE UP (ref 30–200)
CO2 SERPL-SCNC: 21 MMOL/L — LOW (ref 22–31)
CO2 SERPL-SCNC: 25 MMOL/L — SIGNIFICANT CHANGE UP (ref 22–31)
CREAT SERPL-MCNC: 0.67 MG/DL — SIGNIFICANT CHANGE UP (ref 0.5–1.3)
CREAT SERPL-MCNC: 0.71 MG/DL — SIGNIFICANT CHANGE UP (ref 0.5–1.3)
EOSINOPHIL # BLD AUTO: 0.27 K/UL — SIGNIFICANT CHANGE UP (ref 0–0.5)
EOSINOPHIL NFR BLD AUTO: 2.4 % — SIGNIFICANT CHANGE UP (ref 0–6)
GLUCOSE SERPL-MCNC: 86 MG/DL — SIGNIFICANT CHANGE UP (ref 70–99)
GLUCOSE SERPL-MCNC: 97 MG/DL — SIGNIFICANT CHANGE UP (ref 70–99)
HCT VFR BLD CALC: 43.8 % — SIGNIFICANT CHANGE UP (ref 39–50)
HCT VFR BLD CALC: 44.8 % — SIGNIFICANT CHANGE UP (ref 39–50)
HGB BLD-MCNC: 14.5 G/DL — SIGNIFICANT CHANGE UP (ref 13–17)
HGB BLD-MCNC: 14.7 G/DL — SIGNIFICANT CHANGE UP (ref 13–17)
IMM GRANULOCYTES # BLD AUTO: 0.04 # — SIGNIFICANT CHANGE UP
IMM GRANULOCYTES NFR BLD AUTO: 0.4 % — SIGNIFICANT CHANGE UP (ref 0–1.5)
LYMPHOCYTES # BLD AUTO: 2.37 K/UL — SIGNIFICANT CHANGE UP (ref 1–3.3)
LYMPHOCYTES # BLD AUTO: 21.2 % — SIGNIFICANT CHANGE UP (ref 13–44)
MCHC RBC-ENTMCNC: 29.2 PG — SIGNIFICANT CHANGE UP (ref 27–34)
MCHC RBC-ENTMCNC: 29.5 PG — SIGNIFICANT CHANGE UP (ref 27–34)
MCHC RBC-ENTMCNC: 32.8 % — SIGNIFICANT CHANGE UP (ref 32–36)
MCHC RBC-ENTMCNC: 33.1 % — SIGNIFICANT CHANGE UP (ref 32–36)
MCV RBC AUTO: 88.9 FL — SIGNIFICANT CHANGE UP (ref 80–100)
MCV RBC AUTO: 89.2 FL — SIGNIFICANT CHANGE UP (ref 80–100)
MONOCYTES # BLD AUTO: 0.71 K/UL — SIGNIFICANT CHANGE UP (ref 0–0.9)
MONOCYTES NFR BLD AUTO: 6.4 % — SIGNIFICANT CHANGE UP (ref 2–14)
NEUTROPHILS # BLD AUTO: 7.67 K/UL — HIGH (ref 1.8–7.4)
NEUTROPHILS NFR BLD AUTO: 68.5 % — SIGNIFICANT CHANGE UP (ref 43–77)
NRBC # FLD: 0 — SIGNIFICANT CHANGE UP
NRBC # FLD: 0 — SIGNIFICANT CHANGE UP
PLATELET # BLD AUTO: 390 K/UL — SIGNIFICANT CHANGE UP (ref 150–400)
PLATELET # BLD AUTO: 415 K/UL — HIGH (ref 150–400)
PMV BLD: 9.1 FL — SIGNIFICANT CHANGE UP (ref 7–13)
PMV BLD: 9.2 FL — SIGNIFICANT CHANGE UP (ref 7–13)
POTASSIUM SERPL-MCNC: 4.4 MMOL/L — SIGNIFICANT CHANGE UP (ref 3.5–5.3)
POTASSIUM SERPL-MCNC: 4.7 MMOL/L — SIGNIFICANT CHANGE UP (ref 3.5–5.3)
POTASSIUM SERPL-SCNC: 4.4 MMOL/L — SIGNIFICANT CHANGE UP (ref 3.5–5.3)
POTASSIUM SERPL-SCNC: 4.7 MMOL/L — SIGNIFICANT CHANGE UP (ref 3.5–5.3)
PROT SERPL-MCNC: 7.5 G/DL — SIGNIFICANT CHANGE UP (ref 6–8.3)
PROT SERPL-MCNC: 8 G/DL — SIGNIFICANT CHANGE UP (ref 6–8.3)
RBC # BLD: 4.91 M/UL — SIGNIFICANT CHANGE UP (ref 4.2–5.8)
RBC # BLD: 5.04 M/UL — SIGNIFICANT CHANGE UP (ref 4.2–5.8)
RBC # FLD: 12.4 % — SIGNIFICANT CHANGE UP (ref 10.3–14.5)
RBC # FLD: 12.7 % — SIGNIFICANT CHANGE UP (ref 10.3–14.5)
RH IG SCN BLD-IMP: POSITIVE — SIGNIFICANT CHANGE UP
SODIUM SERPL-SCNC: 139 MMOL/L — SIGNIFICANT CHANGE UP (ref 135–145)
SODIUM SERPL-SCNC: 140 MMOL/L — SIGNIFICANT CHANGE UP (ref 135–145)
TROPONIN T SERPL-MCNC: < 0.06 NG/ML — SIGNIFICANT CHANGE UP (ref 0–0.06)
TROPONIN T SERPL-MCNC: < 0.06 NG/ML — SIGNIFICANT CHANGE UP (ref 0–0.06)
WBC # BLD: 11.18 K/UL — HIGH (ref 3.8–10.5)
WBC # BLD: 9.12 K/UL — SIGNIFICANT CHANGE UP (ref 3.8–10.5)
WBC # FLD AUTO: 11.18 K/UL — HIGH (ref 3.8–10.5)
WBC # FLD AUTO: 9.12 K/UL — SIGNIFICANT CHANGE UP (ref 3.8–10.5)

## 2017-10-24 PROCEDURE — 93010 ELECTROCARDIOGRAM REPORT: CPT

## 2017-10-24 PROCEDURE — 99285 EMERGENCY DEPT VISIT HI MDM: CPT | Mod: 25,GC

## 2017-10-24 RX ORDER — SODIUM CHLORIDE 9 MG/ML
1000 INJECTION, SOLUTION INTRAVENOUS
Qty: 0 | Refills: 0 | Status: DISCONTINUED | OUTPATIENT
Start: 2017-11-03 | End: 2017-11-07

## 2017-10-24 RX ORDER — OXYCODONE HYDROCHLORIDE 5 MG/1
5 TABLET ORAL ONCE
Qty: 0 | Refills: 0 | Status: DISCONTINUED | OUTPATIENT
Start: 2017-10-24 | End: 2017-10-24

## 2017-10-24 RX ORDER — OXYCODONE AND ACETAMINOPHEN 5; 325 MG/1; MG/1
1 TABLET ORAL ONCE
Qty: 0 | Refills: 0 | Status: DISCONTINUED | OUTPATIENT
Start: 2017-10-24 | End: 2017-10-24

## 2017-10-24 RX ORDER — HYDRALAZINE HCL 50 MG
10 TABLET ORAL ONCE
Qty: 0 | Refills: 0 | Status: COMPLETED | OUTPATIENT
Start: 2017-10-24 | End: 2017-10-24

## 2017-10-24 RX ORDER — SODIUM CHLORIDE 9 MG/ML
1000 INJECTION INTRAMUSCULAR; INTRAVENOUS; SUBCUTANEOUS ONCE
Qty: 0 | Refills: 0 | Status: COMPLETED | OUTPATIENT
Start: 2017-10-24 | End: 2017-10-24

## 2017-10-24 RX ORDER — METOPROLOL TARTRATE 50 MG
50 TABLET ORAL ONCE
Qty: 0 | Refills: 0 | Status: COMPLETED | OUTPATIENT
Start: 2017-10-24 | End: 2017-10-24

## 2017-10-24 RX ADMIN — Medication 10 MILLIGRAM(S): at 20:15

## 2017-10-24 RX ADMIN — OXYCODONE HYDROCHLORIDE 5 MILLIGRAM(S): 5 TABLET ORAL at 18:12

## 2017-10-24 RX ADMIN — OXYCODONE AND ACETAMINOPHEN 1 TABLET(S): 5; 325 TABLET ORAL at 20:03

## 2017-10-24 RX ADMIN — SODIUM CHLORIDE 1000 MILLILITER(S): 9 INJECTION INTRAMUSCULAR; INTRAVENOUS; SUBCUTANEOUS at 16:13

## 2017-10-24 RX ADMIN — OXYCODONE AND ACETAMINOPHEN 1 TABLET(S): 5; 325 TABLET ORAL at 19:23

## 2017-10-24 RX ADMIN — Medication 50 MILLIGRAM(S): at 19:23

## 2017-10-24 RX ADMIN — OXYCODONE HYDROCHLORIDE 5 MILLIGRAM(S): 5 TABLET ORAL at 17:19

## 2017-10-24 NOTE — H&P PST ADULT - HISTORY OF PRESENT ILLNESS
This is a 62 y.o. male s/p colon resection 6/2017 . Pt complaining of adnominal pain radiates to back , CT with contrast 1 week ago. Pt has disease of pancrease , unspecified . Pt now for surgery .

## 2017-10-24 NOTE — H&P PST ADULT - ATTENDING COMMENTS
D/w pt plan for robotic lap poss open distal panc splenectomy.      Discussed r/b/a post op expectations poss complications incl bleeding infection leak azeem for additional procedures.      Pt understands and agrees to proceed.    PMH/PSH/FH/SH:    Past Medical History:  Cervical disc disease    Colon cancer    Head ache    Hypertension, unspecified type  diet control  Injury  forehead & had sutures ( 2013 )  Obesity    Smoking    Vertigo.     Past Surgical History:  H/O cervical spine surgery  fusion - 2014 with plates and screws  S/P colon resection  6/2017.

## 2017-10-24 NOTE — ED PROVIDER NOTE - MEDICAL DECISION MAKING DETAILS
62M with diaphoresis, r/o acs. check cbc, cmp, cardiac enzymes, ekg, ivf. asa given by EMS. 62M with diaphoresis before, now resolved, no cp/sob, r/o acs. check cbc, cmp, cardiac enzymes, ekg, ivf. asa given by EMS.

## 2017-10-24 NOTE — H&P PST ADULT - RS GEN PE MLT RESP DETAILS PC
no rales/clear to auscultation bilaterally/breath sounds equal/no rhonchi/no wheezes/good air movement/respirations non-labored

## 2017-10-24 NOTE — H&P PST ADULT - NSANTHOSAYNRD_GEN_A_CORE
No. ABRAHAM screening performed.  STOP BANG Legend: 0-2 = LOW Risk; 3-4 = INTERMEDIATE Risk; 5-8 = HIGH Risk

## 2017-10-24 NOTE — ED PROVIDER NOTE - ATTENDING CONTRIBUTION TO CARE
KENNY Wells: I have personally performed a face to face bedside history and physical examination of this patient. I have discussed the history, examination, review of systems, assessment and plan of management with the resident. I have reviewed the electronic medical record and amended it to reflect my history, review of systems, physical exam, assessment and plan.    Patient w/ h/o htn and colon CA (did not take his bp med today bibems from presurgical testing for diaphoresis. Patient has chronic mid abd pain for which he was found to have a pancreatic mass which he will be having surgically removed. Today while in pst, had an episode of the same pain, became diaphoretic, and sent to ed. Denies any concurrent cp, sob, nausea, vomiting, diarrhea, fevers.  Resolved prior to ed arrival. Last stress done 2m ago was normal.  exam  GEN - NAD; well appearing; A+O x3   HEAD - NC/AT   EYES- PERRL, EOMI  ENT: Airway patent, mmm, Oral cavity and pharynx normal. No inflammation, swelling, exudate, or lesions.  NECK: Neck supple, non-tender without lymphadenopathy, no masses.  PULMONARY - CTA b/l, symmetric breath sounds.   CARDIAC -s1s2, RRR, no M,G,R  ABDOMEN - +BS, ND, NT, soft, no guarding, no rebound, no masses   BACK - no CVA tenderness, Normal  spine   EXTREMITIES - symmetric pulses, capillary refill < 2 seconds, no clubbing, no cyanosis, no edema   SKIN - no rash or bruising   NEUROLOGIC - alert, CN 2-12 intact, nl strength/sensation, no focal deficits  PSYCH -nl mood/affect, nl insight.  a/p-chronic intermittent abd pain 2/2 intrabdominal mass, scheduled to have surgical removal, p/w episode of diaphoresis and same abd pain. no cp/sob, recent negative cardiac w/u, now asymptomatic. Will check labs, ekg, cxr, monitoring, reass.

## 2017-10-24 NOTE — H&P PST ADULT - PROBLEM SELECTOR PLAN 1
Laparoscopic robotic assisted distal pancreatectomy , splenenectomy , possible open Laparoscopic robotic assisted distal pancreatectomy , splenenectomy , possible open  Pt is hypertensive , diaphoretic , complaining of abdominal pain. EMS called , pt for evaluation at [facility] ED , call placed to Dr Hager , message left regarding pt status. Laparoscopic robotic assisted distal pancreatectomy , splenenectomy , possible open  Pt is hypertensive , diaphoretic , complaining of abdominal pain. EKG changes noted ,  EMS called , pt for evaluation at [facility] ED , call placed to Dr Hager , message left with Taniqua regarding pt status.

## 2017-10-24 NOTE — H&P PST ADULT - MUSCULOSKELETAL
details… detailed exam no joint warmth/normal strength/no calf tenderness/no joint swelling/no joint erythema

## 2017-10-24 NOTE — H&P PST ADULT - PMH
Cervical disc disease    Colon cancer    Head ache    Hypertension, unspecified type  diet control  Injury  forehead & had sutures ( 2013 )  Obesity    Smoking    Vertigo

## 2017-10-24 NOTE — ED PROVIDER NOTE - OBJECTIVE STATEMENT
62M with hx of Colon ca s/p resection, HTN BPH sent from PST for episode of diaphoresis and abd pain. Patient states the abd pain is chronic since the dx of colon ca. He states he became diaphoretic and sweaty during the physical exam there. No chest pain, nausea, vomiting. Abd pain is currently better, but states what he was feeling earlier is chronic, crampy lower bad pain.

## 2017-10-24 NOTE — ED PROVIDER NOTE - CONSTITUTIONAL, MLM
normal... Well appearing, well nourished, awake, alert, oriented to person, place, time/situation and in no apparent distress. Mildly anxious

## 2017-10-24 NOTE — ED ADULT NURSE NOTE - OBJECTIVE STATEMENT
Facilitator note pt received to room TR C alert and oriented x 3 states he had and episode of abdominal pain and became diaphoretic, pt denies chest pain no c/o of pain skin is dry intact respirations are even unlabored.  An iv was accessed labs sent Md is here to evaluate the patient.

## 2017-11-03 ENCOUNTER — RESULT REVIEW (OUTPATIENT)
Age: 62
End: 2017-11-03

## 2017-11-03 ENCOUNTER — INPATIENT (INPATIENT)
Facility: HOSPITAL | Age: 62
LOS: 4 days | Discharge: HOME HEALTH | End: 2017-11-08
Attending: SURGERY | Admitting: SURGERY
Payer: MEDICARE

## 2017-11-03 ENCOUNTER — APPOINTMENT (OUTPATIENT)
Dept: SURGICAL ONCOLOGY | Facility: HOSPITAL | Age: 62
End: 2017-11-03

## 2017-11-03 VITALS
HEART RATE: 106 BPM | OXYGEN SATURATION: 97 % | WEIGHT: 214.07 LBS | RESPIRATION RATE: 16 BRPM | SYSTOLIC BLOOD PRESSURE: 150 MMHG | TEMPERATURE: 98 F | HEIGHT: 69 IN | DIASTOLIC BLOOD PRESSURE: 84 MMHG

## 2017-11-03 DIAGNOSIS — Z90.49 ACQUIRED ABSENCE OF OTHER SPECIFIED PARTS OF DIGESTIVE TRACT: Chronic | ICD-10-CM

## 2017-11-03 DIAGNOSIS — Z98.890 OTHER SPECIFIED POSTPROCEDURAL STATES: Chronic | ICD-10-CM

## 2017-11-03 DIAGNOSIS — K86.9 DISEASE OF PANCREAS, UNSPECIFIED: ICD-10-CM

## 2017-11-03 LAB
BUN SERPL-MCNC: 10 MG/DL — SIGNIFICANT CHANGE UP (ref 7–23)
CALCIUM SERPL-MCNC: 8.3 MG/DL — LOW (ref 8.4–10.5)
CHLORIDE SERPL-SCNC: 104 MMOL/L — SIGNIFICANT CHANGE UP (ref 98–107)
CO2 SERPL-SCNC: 22 MMOL/L — SIGNIFICANT CHANGE UP (ref 22–31)
CREAT SERPL-MCNC: 0.68 MG/DL — SIGNIFICANT CHANGE UP (ref 0.5–1.3)
GLUCOSE SERPL-MCNC: 164 MG/DL — HIGH (ref 70–99)
HCT VFR BLD CALC: 37.1 % — LOW (ref 39–50)
HGB BLD-MCNC: 13 G/DL — SIGNIFICANT CHANGE UP (ref 13–17)
MCHC RBC-ENTMCNC: 30.9 PG — SIGNIFICANT CHANGE UP (ref 27–34)
MCHC RBC-ENTMCNC: 35 % — SIGNIFICANT CHANGE UP (ref 32–36)
MCV RBC AUTO: 88.1 FL — SIGNIFICANT CHANGE UP (ref 80–100)
NRBC # FLD: 0 — SIGNIFICANT CHANGE UP
PLATELET # BLD AUTO: 298 K/UL — SIGNIFICANT CHANGE UP (ref 150–400)
PMV BLD: 9.3 FL — SIGNIFICANT CHANGE UP (ref 7–13)
POTASSIUM SERPL-MCNC: 3.9 MMOL/L — SIGNIFICANT CHANGE UP (ref 3.5–5.3)
POTASSIUM SERPL-SCNC: 3.9 MMOL/L — SIGNIFICANT CHANGE UP (ref 3.5–5.3)
RBC # BLD: 4.21 M/UL — SIGNIFICANT CHANGE UP (ref 4.2–5.8)
RBC # FLD: 12.8 % — SIGNIFICANT CHANGE UP (ref 10.3–14.5)
SODIUM SERPL-SCNC: 138 MMOL/L — SIGNIFICANT CHANGE UP (ref 135–145)
WBC # BLD: 11.51 K/UL — HIGH (ref 3.8–10.5)
WBC # FLD AUTO: 11.51 K/UL — HIGH (ref 3.8–10.5)

## 2017-11-03 PROCEDURE — 49320 DIAG LAPARO SEPARATE PROC: CPT

## 2017-11-03 PROCEDURE — 93010 ELECTROCARDIOGRAM REPORT: CPT

## 2017-11-03 PROCEDURE — 51702 INSERT TEMP BLADDER CATH: CPT | Mod: 59

## 2017-11-03 PROCEDURE — 49320 DIAG LAPARO SEPARATE PROC: CPT | Mod: 82

## 2017-11-03 PROCEDURE — S2900 ROBOTIC SURGICAL SYSTEM: CPT | Mod: NC

## 2017-11-03 PROCEDURE — 88313 SPECIAL STAINS GROUP 2: CPT | Mod: 26

## 2017-11-03 PROCEDURE — 88307 TISSUE EXAM BY PATHOLOGIST: CPT | Mod: 26

## 2017-11-03 PROCEDURE — 88331 PATH CONSLTJ SURG 1 BLK 1SPC: CPT | Mod: 26

## 2017-11-03 RX ORDER — HYDROMORPHONE HYDROCHLORIDE 2 MG/ML
30 INJECTION INTRAMUSCULAR; INTRAVENOUS; SUBCUTANEOUS
Qty: 0 | Refills: 0 | Status: DISCONTINUED | OUTPATIENT
Start: 2017-11-03 | End: 2017-11-08

## 2017-11-03 RX ORDER — ACETAMINOPHEN 500 MG
650 TABLET ORAL EVERY 6 HOURS
Qty: 0 | Refills: 0 | Status: DISCONTINUED | OUTPATIENT
Start: 2017-11-03 | End: 2017-11-08

## 2017-11-03 RX ORDER — ONDANSETRON 8 MG/1
4 TABLET, FILM COATED ORAL EVERY 6 HOURS
Qty: 0 | Refills: 0 | Status: DISCONTINUED | OUTPATIENT
Start: 2017-11-03 | End: 2017-11-08

## 2017-11-03 RX ORDER — POTASSIUM CHLORIDE 20 MEQ
10 PACKET (EA) ORAL ONCE
Qty: 0 | Refills: 0 | Status: COMPLETED | OUTPATIENT
Start: 2017-11-03 | End: 2017-11-03

## 2017-11-03 RX ORDER — ENOXAPARIN SODIUM 100 MG/ML
40 INJECTION SUBCUTANEOUS DAILY
Qty: 0 | Refills: 0 | Status: DISCONTINUED | OUTPATIENT
Start: 2017-11-03 | End: 2017-11-08

## 2017-11-03 RX ORDER — HYDROMORPHONE HYDROCHLORIDE 2 MG/ML
0.5 INJECTION INTRAMUSCULAR; INTRAVENOUS; SUBCUTANEOUS
Qty: 0 | Refills: 0 | Status: DISCONTINUED | OUTPATIENT
Start: 2017-11-03 | End: 2017-11-08

## 2017-11-03 RX ORDER — METOPROLOL TARTRATE 50 MG
50 TABLET ORAL
Qty: 0 | Refills: 0 | Status: DISCONTINUED | OUTPATIENT
Start: 2017-11-03 | End: 2017-11-04

## 2017-11-03 RX ORDER — KETOROLAC TROMETHAMINE 30 MG/ML
15 SYRINGE (ML) INJECTION EVERY 6 HOURS
Qty: 0 | Refills: 0 | Status: DISCONTINUED | OUTPATIENT
Start: 2017-11-03 | End: 2017-11-06

## 2017-11-03 RX ORDER — NALOXONE HYDROCHLORIDE 4 MG/.1ML
0.1 SPRAY NASAL
Qty: 0 | Refills: 0 | Status: DISCONTINUED | OUTPATIENT
Start: 2017-11-03 | End: 2017-11-08

## 2017-11-03 RX ORDER — TAMSULOSIN HYDROCHLORIDE 0.4 MG/1
0.4 CAPSULE ORAL AT BEDTIME
Qty: 0 | Refills: 0 | Status: DISCONTINUED | OUTPATIENT
Start: 2017-11-03 | End: 2017-11-08

## 2017-11-03 RX ORDER — HYDROMORPHONE HYDROCHLORIDE 2 MG/ML
0.5 INJECTION INTRAMUSCULAR; INTRAVENOUS; SUBCUTANEOUS
Qty: 0 | Refills: 0 | Status: DISCONTINUED | OUTPATIENT
Start: 2017-11-03 | End: 2017-11-03

## 2017-11-03 RX ORDER — METOPROLOL TARTRATE 50 MG
5 TABLET ORAL ONCE
Qty: 0 | Refills: 0 | Status: COMPLETED | OUTPATIENT
Start: 2017-11-03 | End: 2017-11-03

## 2017-11-03 RX ORDER — FENTANYL CITRATE 50 UG/ML
25 INJECTION INTRAVENOUS
Qty: 0 | Refills: 0 | Status: DISCONTINUED | OUTPATIENT
Start: 2017-11-03 | End: 2017-11-03

## 2017-11-03 RX ORDER — LABETALOL HCL 100 MG
10 TABLET ORAL ONCE
Qty: 0 | Refills: 0 | Status: COMPLETED | OUTPATIENT
Start: 2017-11-03 | End: 2017-11-03

## 2017-11-03 RX ORDER — HEPARIN SODIUM 5000 [USP'U]/ML
5000 INJECTION INTRAVENOUS; SUBCUTANEOUS ONCE
Qty: 0 | Refills: 0 | Status: COMPLETED | OUTPATIENT
Start: 2017-11-03 | End: 2017-11-03

## 2017-11-03 RX ORDER — ONDANSETRON 8 MG/1
4 TABLET, FILM COATED ORAL ONCE
Qty: 0 | Refills: 0 | Status: DISCONTINUED | OUTPATIENT
Start: 2017-11-03 | End: 2017-11-03

## 2017-11-03 RX ADMIN — Medication 5 MILLIGRAM(S): at 18:05

## 2017-11-03 RX ADMIN — HYDROMORPHONE HYDROCHLORIDE 30 MILLILITER(S): 2 INJECTION INTRAMUSCULAR; INTRAVENOUS; SUBCUTANEOUS at 20:41

## 2017-11-03 RX ADMIN — HEPARIN SODIUM 5000 UNIT(S): 5000 INJECTION INTRAVENOUS; SUBCUTANEOUS at 07:31

## 2017-11-03 RX ADMIN — Medication 650 MILLIGRAM(S): at 18:09

## 2017-11-03 RX ADMIN — SODIUM CHLORIDE 100 MILLILITER(S): 9 INJECTION, SOLUTION INTRAVENOUS at 16:11

## 2017-11-03 RX ADMIN — Medication 10 MILLIGRAM(S): at 14:41

## 2017-11-03 RX ADMIN — HYDROMORPHONE HYDROCHLORIDE 0.5 MILLIGRAM(S): 2 INJECTION INTRAMUSCULAR; INTRAVENOUS; SUBCUTANEOUS at 19:54

## 2017-11-03 RX ADMIN — Medication 50 MILLIGRAM(S): at 16:47

## 2017-11-03 RX ADMIN — TAMSULOSIN HYDROCHLORIDE 0.4 MILLIGRAM(S): 0.4 CAPSULE ORAL at 21:18

## 2017-11-03 RX ADMIN — ENOXAPARIN SODIUM 40 MILLIGRAM(S): 100 INJECTION SUBCUTANEOUS at 21:18

## 2017-11-03 RX ADMIN — HYDROMORPHONE HYDROCHLORIDE 30 MILLILITER(S): 2 INJECTION INTRAMUSCULAR; INTRAVENOUS; SUBCUTANEOUS at 15:35

## 2017-11-03 RX ADMIN — Medication 100 MILLIEQUIVALENT(S): at 16:28

## 2017-11-03 RX ADMIN — SODIUM CHLORIDE 100 MILLILITER(S): 9 INJECTION, SOLUTION INTRAVENOUS at 21:18

## 2017-11-03 NOTE — PROGRESS NOTE ADULT - ASSESSMENT
A: 62M s/p lap robotic converted to open surgery for a pancreatic mass that ended up being unresectable.    P:  - Lovenox DVT ppx  - Transfer to floor when bed available  - PCA for pain control  - Clear liquid diet  - Keep elizabeth in overnight  - f/u HR and morning labs

## 2017-11-03 NOTE — PROGRESS NOTE ADULT - SUBJECTIVE AND OBJECTIVE BOX
D TEAM SURGERY POST-OP CHECK:    S: Patient seen and examined in the PACU. He said he had a discussion with Dr. Hager about the unresectable nature of his mass. He says he is upset about it. He is currently tachycardic, but EKG is unchanged. He denies any pain- he is using his PCA. He denies any nausea/vomiting/flatus/bowel movements.    O:  Vital Signs Last 24 Hrs  T(C): 36.7 (03 Nov 2017 14:45), Max: 37.1 (03 Nov 2017 13:30)  T(F): 98.1 (03 Nov 2017 14:45), Max: 98.8 (03 Nov 2017 13:30)  HR: 106 (03 Nov 2017 17:30) (100 - 124)  BP: 149/90 (03 Nov 2017 17:30) (114/68 - 158/84)  BP(mean): 90 (03 Nov 2017 13:45) (86 - 90)  RR: 24 (03 Nov 2017 17:30) (15 - 28)  SpO2: 99% (03 Nov 2017 17:30) (93% - 100%)    I&O's Detail    03 Nov 2017 07:01  -  03 Nov 2017 17:55  --------------------------------------------------------  IN:    IV PiggyBack: 100 mL    lactated ringers.: 300 mL  Total IN: 400 mL    OUT:    Indwelling Catheter - Urethral: 322 mL  Total OUT: 322 mL    Total NET: 78 mL    MEDICATIONS  (STANDING):  acetaminophen   Tablet 650 milliGRAM(s) Oral every 6 hours  enoxaparin Injectable 40 milliGRAM(s) SubCutaneous daily  HYDROmorphone PCA (1 mG/mL) 30 milliLiter(s) PCA Continuous PCA Continuous  lactated ringers. 1000 milliLiter(s) (100 mL/Hr) IV Continuous <Continuous>  metoprolol     tartrate 50 milliGRAM(s) Oral two times a day  tamsulosin 0.4 milliGRAM(s) Oral at bedtime    MEDICATIONS  (PRN):  fentaNYL    Injectable 25 MICROGram(s) IV Push every 10 minutes PRN Mild Pain (1 - 3)  HYDROmorphone  Injectable 0.5 milliGRAM(s) IV Push every 10 minutes PRN Moderate Pain  ketorolac   Injectable 15 milliGRAM(s) IV Push every 6 hours PRN Severe Pain (7 - 10)  naloxone Injectable 0.1 milliGRAM(s) IV Push every 3 minutes PRN For ANY of the following changes in patient status:  A. RR LESS THAN 10 breaths per minute, B. Oxygen saturation LESS THAN 90%, C. Sedation score of 6  ondansetron Injectable 4 milliGRAM(s) IV Push every 6 hours PRN Nausea  ondansetron Injectable 4 milliGRAM(s) IV Push once PRN Nausea and/or Vomiting                13.0   11.51 )-----------( 298      ( 03 Nov 2017 13:54 )             37.1     11-03    138  |  104  |  10  ----------------------------<  164<H>  3.9   |  22  |  0.68    Ca    8.3<L>      03 Nov 2017 13:54    Physical Exam:  Gen: Laying in bed, NAD, alert and oriented.   Resp: Unlabored breathing  Abd: soft, mildly distended, non-tender, 4 OpSites and midline incision with clean dressing    Lines:   IV: patent, in place.

## 2017-11-03 NOTE — BRIEF OPERATIVE NOTE - PROCEDURE
<<-----Click on this checkbox to enter Procedure Exploratory laparotomy  11/03/2017    Active  [id number]

## 2017-11-03 NOTE — BRIEF OPERATIVE NOTE - OPERATION/FINDINGS
Robotic converted to exploratory laparotomy for planned distal pancreatectomy and splenectomy for pancreatic adenocarcinoma. Mass noted to be encasing SMA and invading mesentery of small bowel. Unresectable pancreatic mass.

## 2017-11-04 LAB
BUN SERPL-MCNC: 14 MG/DL — SIGNIFICANT CHANGE UP (ref 7–23)
CALCIUM SERPL-MCNC: 8.5 MG/DL — SIGNIFICANT CHANGE UP (ref 8.4–10.5)
CHLORIDE SERPL-SCNC: 101 MMOL/L — SIGNIFICANT CHANGE UP (ref 98–107)
CK MB BLD-MCNC: 0.8 — SIGNIFICANT CHANGE UP (ref 0–2.5)
CK MB BLD-MCNC: 3.19 NG/ML — SIGNIFICANT CHANGE UP (ref 1–6.6)
CK MB BLD-MCNC: 5.08 NG/ML — SIGNIFICANT CHANGE UP (ref 1–6.6)
CK SERPL-CCNC: 386 U/L — HIGH (ref 30–200)
CK SERPL-CCNC: 705 U/L — HIGH (ref 30–200)
CO2 SERPL-SCNC: 24 MMOL/L — SIGNIFICANT CHANGE UP (ref 22–31)
CREAT SERPL-MCNC: 0.92 MG/DL — SIGNIFICANT CHANGE UP (ref 0.5–1.3)
GLUCOSE BLDC GLUCOMTR-MCNC: 124 MG/DL — HIGH (ref 70–99)
GLUCOSE SERPL-MCNC: 125 MG/DL — HIGH (ref 70–99)
HCT VFR BLD CALC: 44.5 % — SIGNIFICANT CHANGE UP (ref 39–50)
HGB BLD-MCNC: 14.8 G/DL — SIGNIFICANT CHANGE UP (ref 13–17)
MAGNESIUM SERPL-MCNC: 1.6 MG/DL — SIGNIFICANT CHANGE UP (ref 1.6–2.6)
MCHC RBC-ENTMCNC: 29.7 PG — SIGNIFICANT CHANGE UP (ref 27–34)
MCHC RBC-ENTMCNC: 33.3 % — SIGNIFICANT CHANGE UP (ref 32–36)
MCV RBC AUTO: 89.4 FL — SIGNIFICANT CHANGE UP (ref 80–100)
NRBC # FLD: 0 — SIGNIFICANT CHANGE UP
PHOSPHATE SERPL-MCNC: 4.5 MG/DL — SIGNIFICANT CHANGE UP (ref 2.5–4.5)
PLATELET # BLD AUTO: 371 K/UL — SIGNIFICANT CHANGE UP (ref 150–400)
PMV BLD: 9.7 FL — SIGNIFICANT CHANGE UP (ref 7–13)
POTASSIUM SERPL-MCNC: 4.4 MMOL/L — SIGNIFICANT CHANGE UP (ref 3.5–5.3)
POTASSIUM SERPL-SCNC: 4.4 MMOL/L — SIGNIFICANT CHANGE UP (ref 3.5–5.3)
RBC # BLD: 4.98 M/UL — SIGNIFICANT CHANGE UP (ref 4.2–5.8)
RBC # FLD: 13.2 % — SIGNIFICANT CHANGE UP (ref 10.3–14.5)
SODIUM SERPL-SCNC: 138 MMOL/L — SIGNIFICANT CHANGE UP (ref 135–145)
TROPONIN T SERPL-MCNC: < 0.06 NG/ML — SIGNIFICANT CHANGE UP (ref 0–0.06)
TROPONIN T SERPL-MCNC: < 0.06 NG/ML — SIGNIFICANT CHANGE UP (ref 0–0.06)
WBC # BLD: 12.31 K/UL — HIGH (ref 3.8–10.5)
WBC # FLD AUTO: 12.31 K/UL — HIGH (ref 3.8–10.5)

## 2017-11-04 PROCEDURE — 71010: CPT | Mod: 26

## 2017-11-04 PROCEDURE — 71275 CT ANGIOGRAPHY CHEST: CPT | Mod: 26

## 2017-11-04 PROCEDURE — 93010 ELECTROCARDIOGRAM REPORT: CPT | Mod: 76

## 2017-11-04 RX ORDER — PANTOPRAZOLE SODIUM 20 MG/1
40 TABLET, DELAYED RELEASE ORAL DAILY
Qty: 0 | Refills: 0 | Status: DISCONTINUED | OUTPATIENT
Start: 2017-11-04 | End: 2017-11-06

## 2017-11-04 RX ORDER — METOPROLOL TARTRATE 50 MG
5 TABLET ORAL EVERY 6 HOURS
Qty: 0 | Refills: 0 | Status: DISCONTINUED | OUTPATIENT
Start: 2017-11-04 | End: 2017-11-05

## 2017-11-04 RX ORDER — DOCUSATE SODIUM 100 MG
100 CAPSULE ORAL THREE TIMES A DAY
Qty: 0 | Refills: 0 | Status: DISCONTINUED | OUTPATIENT
Start: 2017-11-04 | End: 2017-11-08

## 2017-11-04 RX ORDER — MAGNESIUM SULFATE 500 MG/ML
1 VIAL (ML) INJECTION ONCE
Qty: 0 | Refills: 0 | Status: COMPLETED | OUTPATIENT
Start: 2017-11-04 | End: 2017-11-04

## 2017-11-04 RX ORDER — SENNA PLUS 8.6 MG/1
2 TABLET ORAL AT BEDTIME
Qty: 0 | Refills: 0 | Status: DISCONTINUED | OUTPATIENT
Start: 2017-11-04 | End: 2017-11-08

## 2017-11-04 RX ORDER — PANTOPRAZOLE SODIUM 20 MG/1
40 TABLET, DELAYED RELEASE ORAL ONCE
Qty: 0 | Refills: 0 | Status: COMPLETED | OUTPATIENT
Start: 2017-11-04 | End: 2017-11-04

## 2017-11-04 RX ADMIN — PANTOPRAZOLE SODIUM 40 MILLIGRAM(S): 20 TABLET, DELAYED RELEASE ORAL at 22:41

## 2017-11-04 RX ADMIN — PANTOPRAZOLE SODIUM 40 MILLIGRAM(S): 20 TABLET, DELAYED RELEASE ORAL at 15:00

## 2017-11-04 RX ADMIN — ENOXAPARIN SODIUM 40 MILLIGRAM(S): 100 INJECTION SUBCUTANEOUS at 12:05

## 2017-11-04 RX ADMIN — HYDROMORPHONE HYDROCHLORIDE 30 MILLILITER(S): 2 INJECTION INTRAMUSCULAR; INTRAVENOUS; SUBCUTANEOUS at 07:11

## 2017-11-04 RX ADMIN — SODIUM CHLORIDE 100 MILLILITER(S): 9 INJECTION, SOLUTION INTRAVENOUS at 07:13

## 2017-11-04 RX ADMIN — Medication 650 MILLIGRAM(S): at 12:04

## 2017-11-04 RX ADMIN — Medication 650 MILLIGRAM(S): at 05:42

## 2017-11-04 RX ADMIN — Medication 650 MILLIGRAM(S): at 00:16

## 2017-11-04 RX ADMIN — Medication 100 GRAM(S): at 12:04

## 2017-11-04 RX ADMIN — Medication 50 MILLIGRAM(S): at 05:42

## 2017-11-04 RX ADMIN — HYDROMORPHONE HYDROCHLORIDE 0.5 MILLIGRAM(S): 2 INJECTION INTRAMUSCULAR; INTRAVENOUS; SUBCUTANEOUS at 05:46

## 2017-11-04 RX ADMIN — Medication 5 MILLIGRAM(S): at 17:55

## 2017-11-04 RX ADMIN — HYDROMORPHONE HYDROCHLORIDE 30 MILLILITER(S): 2 INJECTION INTRAMUSCULAR; INTRAVENOUS; SUBCUTANEOUS at 23:45

## 2017-11-04 RX ADMIN — HYDROMORPHONE HYDROCHLORIDE 0.5 MILLIGRAM(S): 2 INJECTION INTRAMUSCULAR; INTRAVENOUS; SUBCUTANEOUS at 00:44

## 2017-11-04 NOTE — PROGRESS NOTE ADULT - ASSESSMENT
A: 62M POD 1 s/p lap robotic converted to open surgery for a pancreatic mass that ended up being unresectable.    P:  - Lovenox DVT ppx  - f/u cardiac enzymes  - PCA for pain control  - Clear liquid diet  - d/c elizabeth and trial void  - Patient seen and examined with Dr. Fenton

## 2017-11-04 NOTE — PROGRESS NOTE ADULT - SUBJECTIVE AND OBJECTIVE BOX
D TEAM SURGERY DAILY PROGRESS NOTE:    S: Patient seen and examined. He was transferred from the PACU to the floor overnight. He was tachycardic overnight and an EKG was performed that showed sinus tachycardia. We added on cardiac enzymes to his morning labs.    O:  Vital Signs Last 24 Hrs  T(C): 36.7 (04 Nov 2017 07:28), Max: 37.1 (03 Nov 2017 13:30)  T(F): 98 (04 Nov 2017 07:28), Max: 98.8 (03 Nov 2017 13:30)  HR: 100 (04 Nov 2017 07:28) (96 - 124)  BP: 110/73 (04 Nov 2017 07:28) (110/73 - 158/84)  BP(mean): 90 (03 Nov 2017 13:45) (86 - 90)  RR: 20 (04 Nov 2017 07:28) (15 - 28)  SpO2: 94% (04 Nov 2017 07:28) (93% - 100%)    I&O's Detail    03 Nov 2017 07:01  -  04 Nov 2017 07:00  --------------------------------------------------------  IN:    IV PiggyBack: 100 mL    lactated ringers.: 1600 mL    Oral Fluid: 50 mL  Total IN: 1750 mL    OUT:    Indwelling Catheter - Urethral: 822 mL  Total OUT: 822 mL    Total NET: 928 mL    MEDICATIONS  (STANDING):  acetaminophen   Tablet 650 milliGRAM(s) Oral every 6 hours  enoxaparin Injectable 40 milliGRAM(s) SubCutaneous daily  HYDROmorphone PCA (1 mG/mL) 30 milliLiter(s) PCA Continuous PCA Continuous  lactated ringers. 1000 milliLiter(s) (100 mL/Hr) IV Continuous <Continuous>  magnesium sulfate  IVPB 1 Gram(s) IV Intermittent once  metoprolol     tartrate 50 milliGRAM(s) Oral two times a day  tamsulosin 0.4 milliGRAM(s) Oral at bedtime    MEDICATIONS  (PRN):  HYDROmorphone PCA (1 mG/mL) Rescue Clinician Bolus 0.5 milliGRAM(s) IV Push every 15 minutes PRN for Pain Scale GREATER THAN 6  ketorolac   Injectable 15 milliGRAM(s) IV Push every 6 hours PRN Severe Pain (7 - 10)  naloxone Injectable 0.1 milliGRAM(s) IV Push every 3 minutes PRN For ANY of the following changes in patient status:  A. RR LESS THAN 10 breaths per minute, B. Oxygen saturation LESS THAN 90%, C. Sedation score of 6  ondansetron Injectable 4 milliGRAM(s) IV Push every 6 hours PRN Nausea                        14.8   12.31 )-----------( 371      ( 04 Nov 2017 06:00 )             44.5     11-04    138  |  101  |  14  ----------------------------<  125<H>  4.4   |  24  |  0.92    Ca    8.5      04 Nov 2017 06:00  Phos  4.5     11-04  Mg     1.6     11-04    Physical Exam:  Gen: Laying in bed, NAD, alert and oriented.   Resp: Unlabored breathing  Abd: soft, mildly distended, non-tender, 4 OpSites and midline incision with clean dressing    Lines:   IV: patent, in place.

## 2017-11-04 NOTE — CHART NOTE - NSCHARTNOTEFT_GEN_A_CORE
Patient refused advancement of NGT beyond nasopharynx. This was following two episodes of dark hematemesis and CTA demonstrating debris in his esophagus. He was explained the risks of not having nasogastric intubation including aspiration, and still refused. His head of bed is currently elevated. We will re-evaluate at a later time.

## 2017-11-05 LAB
BASOPHILS # BLD AUTO: 0.06 K/UL — SIGNIFICANT CHANGE UP (ref 0–0.2)
BASOPHILS NFR BLD AUTO: 0.4 % — SIGNIFICANT CHANGE UP (ref 0–2)
BUN SERPL-MCNC: 13 MG/DL — SIGNIFICANT CHANGE UP (ref 7–23)
BUN SERPL-MCNC: 17 MG/DL — SIGNIFICANT CHANGE UP (ref 7–23)
CALCIUM SERPL-MCNC: 7.9 MG/DL — LOW (ref 8.4–10.5)
CALCIUM SERPL-MCNC: 8.8 MG/DL — SIGNIFICANT CHANGE UP (ref 8.4–10.5)
CHLORIDE SERPL-SCNC: 102 MMOL/L — SIGNIFICANT CHANGE UP (ref 98–107)
CHLORIDE SERPL-SCNC: 91 MMOL/L — LOW (ref 98–107)
CO2 SERPL-SCNC: 23 MMOL/L — SIGNIFICANT CHANGE UP (ref 22–31)
CO2 SERPL-SCNC: 24 MMOL/L — SIGNIFICANT CHANGE UP (ref 22–31)
CREAT SERPL-MCNC: 0.62 MG/DL — SIGNIFICANT CHANGE UP (ref 0.5–1.3)
CREAT SERPL-MCNC: 0.65 MG/DL — SIGNIFICANT CHANGE UP (ref 0.5–1.3)
EOSINOPHIL # BLD AUTO: 0.02 K/UL — SIGNIFICANT CHANGE UP (ref 0–0.5)
EOSINOPHIL NFR BLD AUTO: 0.1 % — SIGNIFICANT CHANGE UP (ref 0–6)
GLUCOSE BLDC GLUCOMTR-MCNC: 109 MG/DL — HIGH (ref 70–99)
GLUCOSE SERPL-MCNC: 129 MG/DL — HIGH (ref 70–99)
GLUCOSE SERPL-MCNC: 437 MG/DL — HIGH (ref 70–99)
HCT VFR BLD CALC: 33.9 % — LOW (ref 39–50)
HCT VFR BLD CALC: 39.2 % — SIGNIFICANT CHANGE UP (ref 39–50)
HCT VFR BLD CALC: 41.2 % — SIGNIFICANT CHANGE UP (ref 39–50)
HGB BLD-MCNC: 11.1 G/DL — LOW (ref 13–17)
HGB BLD-MCNC: 13.2 G/DL — SIGNIFICANT CHANGE UP (ref 13–17)
HGB BLD-MCNC: 13.4 G/DL — SIGNIFICANT CHANGE UP (ref 13–17)
IMM GRANULOCYTES # BLD AUTO: 0.11 # — SIGNIFICANT CHANGE UP
IMM GRANULOCYTES NFR BLD AUTO: 0.7 % — SIGNIFICANT CHANGE UP (ref 0–1.5)
LYMPHOCYTES # BLD AUTO: 1.62 K/UL — SIGNIFICANT CHANGE UP (ref 1–3.3)
LYMPHOCYTES # BLD AUTO: 10.9 % — LOW (ref 13–44)
MAGNESIUM SERPL-MCNC: 1.8 MG/DL — SIGNIFICANT CHANGE UP (ref 1.6–2.6)
MAGNESIUM SERPL-MCNC: 2.1 MG/DL — SIGNIFICANT CHANGE UP (ref 1.6–2.6)
MCHC RBC-ENTMCNC: 28.9 PG — SIGNIFICANT CHANGE UP (ref 27–34)
MCHC RBC-ENTMCNC: 29.1 PG — SIGNIFICANT CHANGE UP (ref 27–34)
MCHC RBC-ENTMCNC: 30 PG — SIGNIFICANT CHANGE UP (ref 27–34)
MCHC RBC-ENTMCNC: 32.5 % — SIGNIFICANT CHANGE UP (ref 32–36)
MCHC RBC-ENTMCNC: 32.7 % — SIGNIFICANT CHANGE UP (ref 32–36)
MCHC RBC-ENTMCNC: 33.7 % — SIGNIFICANT CHANGE UP (ref 32–36)
MCV RBC AUTO: 88.8 FL — SIGNIFICANT CHANGE UP (ref 80–100)
MCV RBC AUTO: 89 FL — SIGNIFICANT CHANGE UP (ref 80–100)
MCV RBC AUTO: 89.1 FL — SIGNIFICANT CHANGE UP (ref 80–100)
MONOCYTES # BLD AUTO: 1.09 K/UL — HIGH (ref 0–0.9)
MONOCYTES NFR BLD AUTO: 7.3 % — SIGNIFICANT CHANGE UP (ref 2–14)
NEUTROPHILS # BLD AUTO: 12.02 K/UL — HIGH (ref 1.8–7.4)
NEUTROPHILS NFR BLD AUTO: 80.6 % — HIGH (ref 43–77)
NRBC # FLD: 0 — SIGNIFICANT CHANGE UP
PHOSPHATE SERPL-MCNC: 18.6 MG/DL — HIGH (ref 2.5–4.5)
PHOSPHATE SERPL-MCNC: 2.1 MG/DL — LOW (ref 2.5–4.5)
PLATELET # BLD AUTO: 321 K/UL — SIGNIFICANT CHANGE UP (ref 150–400)
PLATELET # BLD AUTO: 322 K/UL — SIGNIFICANT CHANGE UP (ref 150–400)
PLATELET # BLD AUTO: 345 K/UL — SIGNIFICANT CHANGE UP (ref 150–400)
PMV BLD: 9.3 FL — SIGNIFICANT CHANGE UP (ref 7–13)
PMV BLD: 9.3 FL — SIGNIFICANT CHANGE UP (ref 7–13)
PMV BLD: 9.7 FL — SIGNIFICANT CHANGE UP (ref 7–13)
POTASSIUM SERPL-MCNC: 3.5 MMOL/L — SIGNIFICANT CHANGE UP (ref 3.5–5.3)
POTASSIUM SERPL-MCNC: 4.1 MMOL/L — SIGNIFICANT CHANGE UP (ref 3.5–5.3)
POTASSIUM SERPL-SCNC: 3.5 MMOL/L — SIGNIFICANT CHANGE UP (ref 3.5–5.3)
POTASSIUM SERPL-SCNC: 4.1 MMOL/L — SIGNIFICANT CHANGE UP (ref 3.5–5.3)
RBC # BLD: 3.81 M/UL — LOW (ref 4.2–5.8)
RBC # BLD: 4.4 M/UL — SIGNIFICANT CHANGE UP (ref 4.2–5.8)
RBC # BLD: 4.64 M/UL — SIGNIFICANT CHANGE UP (ref 4.2–5.8)
RBC # FLD: 13.2 % — SIGNIFICANT CHANGE UP (ref 10.3–14.5)
SODIUM SERPL-SCNC: 135 MMOL/L — SIGNIFICANT CHANGE UP (ref 135–145)
SODIUM SERPL-SCNC: 139 MMOL/L — SIGNIFICANT CHANGE UP (ref 135–145)
WBC # BLD: 14.92 K/UL — HIGH (ref 3.8–10.5)
WBC # BLD: 17.03 K/UL — HIGH (ref 3.8–10.5)
WBC # BLD: 17.71 K/UL — HIGH (ref 3.8–10.5)
WBC # FLD AUTO: 14.92 K/UL — HIGH (ref 3.8–10.5)
WBC # FLD AUTO: 17.03 K/UL — HIGH (ref 3.8–10.5)
WBC # FLD AUTO: 17.71 K/UL — HIGH (ref 3.8–10.5)

## 2017-11-05 RX ORDER — IPRATROPIUM/ALBUTEROL SULFATE 18-103MCG
3 AEROSOL WITH ADAPTER (GRAM) INHALATION ONCE
Qty: 0 | Refills: 0 | Status: COMPLETED | OUTPATIENT
Start: 2017-11-05 | End: 2017-11-05

## 2017-11-05 RX ORDER — METOPROLOL TARTRATE 50 MG
5 TABLET ORAL EVERY 4 HOURS
Qty: 0 | Refills: 0 | Status: DISCONTINUED | OUTPATIENT
Start: 2017-11-05 | End: 2017-11-06

## 2017-11-05 RX ORDER — HYDRALAZINE HCL 50 MG
20 TABLET ORAL ONCE
Qty: 0 | Refills: 0 | Status: COMPLETED | OUTPATIENT
Start: 2017-11-05 | End: 2017-11-05

## 2017-11-05 RX ADMIN — HYDROMORPHONE HYDROCHLORIDE 30 MILLILITER(S): 2 INJECTION INTRAMUSCULAR; INTRAVENOUS; SUBCUTANEOUS at 19:12

## 2017-11-05 RX ADMIN — Medication 63.75 MILLIMOLE(S): at 12:46

## 2017-11-05 RX ADMIN — HYDROMORPHONE HYDROCHLORIDE 30 MILLILITER(S): 2 INJECTION INTRAMUSCULAR; INTRAVENOUS; SUBCUTANEOUS at 07:47

## 2017-11-05 RX ADMIN — ENOXAPARIN SODIUM 40 MILLIGRAM(S): 100 INJECTION SUBCUTANEOUS at 11:01

## 2017-11-05 RX ADMIN — Medication 5 MILLIGRAM(S): at 11:01

## 2017-11-05 RX ADMIN — HYDROMORPHONE HYDROCHLORIDE 0.5 MILLIGRAM(S): 2 INJECTION INTRAMUSCULAR; INTRAVENOUS; SUBCUTANEOUS at 00:35

## 2017-11-05 RX ADMIN — SODIUM CHLORIDE 100 MILLILITER(S): 9 INJECTION, SOLUTION INTRAVENOUS at 07:02

## 2017-11-05 RX ADMIN — SODIUM CHLORIDE 100 MILLILITER(S): 9 INJECTION, SOLUTION INTRAVENOUS at 05:24

## 2017-11-05 RX ADMIN — PANTOPRAZOLE SODIUM 40 MILLIGRAM(S): 20 TABLET, DELAYED RELEASE ORAL at 11:01

## 2017-11-05 RX ADMIN — Medication 5 MILLIGRAM(S): at 05:18

## 2017-11-05 RX ADMIN — Medication 5 MILLIGRAM(S): at 01:47

## 2017-11-05 RX ADMIN — Medication 5 MILLIGRAM(S): at 21:27

## 2017-11-05 RX ADMIN — SODIUM CHLORIDE 100 MILLILITER(S): 9 INJECTION, SOLUTION INTRAVENOUS at 21:28

## 2017-11-05 RX ADMIN — Medication 20 MILLIGRAM(S): at 13:27

## 2017-11-05 RX ADMIN — Medication 5 MILLIGRAM(S): at 17:19

## 2017-11-05 RX ADMIN — Medication 3 MILLILITER(S): at 05:10

## 2017-11-05 RX ADMIN — Medication 650 MILLIGRAM(S): at 05:19

## 2017-11-05 NOTE — PROGRESS NOTE ADULT - SUBJECTIVE AND OBJECTIVE BOX
D TEAM SURGERY DAILY PROGRESS NOTE:    S: Patient seen and examined multiple times over the course of the evening. Due to his tachycardia, chest pain, and other risk factors for PE, a CTA was ordered. In the CT scan room, he had an episode of dark hematemesis. The scan performed showed significant debris in his esophagus. He had two more episodes of hematemesis, but refused NGT placement even after being made aware of the risks. We have the head of his bed elevated. Repeat CBC shows that his H/H went from 14.8/44.5 to 13.4/41.2. He is still negative for flatus and bowel movements.    O:  Vital Signs Last 24 Hrs  T(C): 36.7 (05 Nov 2017 00:02), Max: 36.8 (04 Nov 2017 16:59)  T(F): 98 (05 Nov 2017 00:02), Max: 98.3 (04 Nov 2017 16:59)  HR: 118 (05 Nov 2017 00:02) (100 - 118)  BP: 153/78 (05 Nov 2017 00:02) (110/73 - 179/85)  BP(mean): --  RR: 18 (05 Nov 2017 00:02) (18 - 20)  SpO2: 93% (05 Nov 2017 00:02) (90% - 95%)    I&O's Detail    03 Nov 2017 07:01  -  04 Nov 2017 07:00  --------------------------------------------------------  IN:    IV PiggyBack: 100 mL    lactated ringers.: 1600 mL    Oral Fluid: 50 mL  Total IN: 1750 mL    OUT:    Indwelling Catheter - Urethral: 822 mL  Total OUT: 822 mL    Total NET: 928 mL      04 Nov 2017 08:01  -  05 Nov 2017 02:57  --------------------------------------------------------  IN:    lactated ringers.: 1000 mL    Oral Fluid: 360 mL  Total IN: 1360 mL    OUT:    Indwelling Catheter - Urethral: 300 mL    Voided: 875 mL  Total OUT: 1175 mL    Total NET: 185 mL    MEDICATIONS  (STANDING):  acetaminophen   Tablet 650 milliGRAM(s) Oral every 6 hours  ALBUTerol/ipratropium for Nebulization. 3 milliLiter(s) Nebulizer once  docusate sodium 100 milliGRAM(s) Oral three times a day  enoxaparin Injectable 40 milliGRAM(s) SubCutaneous daily  HYDROmorphone PCA (1 mG/mL) 30 milliLiter(s) PCA Continuous PCA Continuous  lactated ringers. 1000 milliLiter(s) (100 mL/Hr) IV Continuous <Continuous>  metoprolol    tartrate Injectable 5 milliGRAM(s) IV Push every 6 hours  pantoprazole  Injectable 40 milliGRAM(s) IV Push daily  senna 2 Tablet(s) Oral at bedtime  tamsulosin 0.4 milliGRAM(s) Oral at bedtime    MEDICATIONS  (PRN):  HYDROmorphone PCA (1 mG/mL) Rescue Clinician Bolus 0.5 milliGRAM(s) IV Push every 15 minutes PRN for Pain Scale GREATER THAN 6  ketorolac   Injectable 15 milliGRAM(s) IV Push every 6 hours PRN Severe Pain (7 - 10)  naloxone Injectable 0.1 milliGRAM(s) IV Push every 3 minutes PRN For ANY of the following changes in patient status:  A. RR LESS THAN 10 breaths per minute, B. Oxygen saturation LESS THAN 90%, C. Sedation score of 6  ondansetron Injectable 4 milliGRAM(s) IV Push every 6 hours PRN Nausea                        13.4   17.71 )-----------( 345      ( 05 Nov 2017 01:50 )             41.2     11-04    138  |  101  |  14  ----------------------------<  125<H>  4.4   |  24  |  0.92    Ca    8.5      04 Nov 2017 06:00  Phos  4.5     11-04  Mg     1.6     11-04    Physical Exam:  Gen: Laying in bed, NAD, alert and oriented.   Resp: Unlabored breathing  Abd: soft, mildly distended, non-tender, 4 OpSites and midline incision with clean dressing    Lines:   IV: patent, in place.

## 2017-11-06 ENCOUNTER — TRANSCRIPTION ENCOUNTER (OUTPATIENT)
Age: 62
End: 2017-11-06

## 2017-11-06 LAB
BUN SERPL-MCNC: 8 MG/DL — SIGNIFICANT CHANGE UP (ref 7–23)
CALCIUM SERPL-MCNC: 8.5 MG/DL — SIGNIFICANT CHANGE UP (ref 8.4–10.5)
CHLORIDE SERPL-SCNC: 98 MMOL/L — SIGNIFICANT CHANGE UP (ref 98–107)
CO2 SERPL-SCNC: 23 MMOL/L — SIGNIFICANT CHANGE UP (ref 22–31)
CREAT SERPL-MCNC: 0.43 MG/DL — LOW (ref 0.5–1.3)
GLUCOSE SERPL-MCNC: 101 MG/DL — HIGH (ref 70–99)
HCT VFR BLD CALC: 37.5 % — LOW (ref 39–50)
HGB BLD-MCNC: 12.2 G/DL — LOW (ref 13–17)
MAGNESIUM SERPL-MCNC: 1.7 MG/DL — SIGNIFICANT CHANGE UP (ref 1.6–2.6)
MCHC RBC-ENTMCNC: 29.3 PG — SIGNIFICANT CHANGE UP (ref 27–34)
MCHC RBC-ENTMCNC: 32.5 % — SIGNIFICANT CHANGE UP (ref 32–36)
MCV RBC AUTO: 90.1 FL — SIGNIFICANT CHANGE UP (ref 80–100)
NRBC # FLD: 0 — SIGNIFICANT CHANGE UP
PHOSPHATE SERPL-MCNC: 2 MG/DL — LOW (ref 2.5–4.5)
PLATELET # BLD AUTO: 330 K/UL — SIGNIFICANT CHANGE UP (ref 150–400)
PMV BLD: 9.5 FL — SIGNIFICANT CHANGE UP (ref 7–13)
POTASSIUM SERPL-MCNC: 3.4 MMOL/L — LOW (ref 3.5–5.3)
POTASSIUM SERPL-SCNC: 3.4 MMOL/L — LOW (ref 3.5–5.3)
RBC # BLD: 4.16 M/UL — LOW (ref 4.2–5.8)
RBC # FLD: 13.1 % — SIGNIFICANT CHANGE UP (ref 10.3–14.5)
SODIUM SERPL-SCNC: 136 MMOL/L — SIGNIFICANT CHANGE UP (ref 135–145)
WBC # BLD: 12.19 K/UL — HIGH (ref 3.8–10.5)
WBC # FLD AUTO: 12.19 K/UL — HIGH (ref 3.8–10.5)

## 2017-11-06 RX ORDER — POTASSIUM PHOSPHATE, MONOBASIC POTASSIUM PHOSPHATE, DIBASIC 236; 224 MG/ML; MG/ML
15 INJECTION, SOLUTION INTRAVENOUS ONCE
Qty: 0 | Refills: 0 | Status: COMPLETED | OUTPATIENT
Start: 2017-11-06 | End: 2017-11-06

## 2017-11-06 RX ORDER — HYDRALAZINE HCL 50 MG
10 TABLET ORAL
Qty: 0 | Refills: 0 | Status: DISCONTINUED | OUTPATIENT
Start: 2017-11-06 | End: 2017-11-06

## 2017-11-06 RX ORDER — METOPROLOL TARTRATE 50 MG
50 TABLET ORAL
Qty: 0 | Refills: 0 | Status: DISCONTINUED | OUTPATIENT
Start: 2017-11-06 | End: 2017-11-06

## 2017-11-06 RX ORDER — METOPROLOL TARTRATE 50 MG
100 TABLET ORAL
Qty: 0 | Refills: 0 | Status: DISCONTINUED | OUTPATIENT
Start: 2017-11-06 | End: 2017-11-08

## 2017-11-06 RX ORDER — POTASSIUM CHLORIDE 20 MEQ
10 PACKET (EA) ORAL
Qty: 0 | Refills: 0 | Status: COMPLETED | OUTPATIENT
Start: 2017-11-06 | End: 2017-11-06

## 2017-11-06 RX ORDER — LEVALBUTEROL 1.25 MG/.5ML
0.63 SOLUTION, CONCENTRATE RESPIRATORY (INHALATION) ONCE
Qty: 0 | Refills: 0 | Status: COMPLETED | OUTPATIENT
Start: 2017-11-06 | End: 2017-11-06

## 2017-11-06 RX ORDER — LABETALOL HCL 100 MG
10 TABLET ORAL ONCE
Qty: 0 | Refills: 0 | Status: COMPLETED | OUTPATIENT
Start: 2017-11-06 | End: 2017-11-06

## 2017-11-06 RX ORDER — PANTOPRAZOLE SODIUM 20 MG/1
40 TABLET, DELAYED RELEASE ORAL
Qty: 0 | Refills: 0 | Status: DISCONTINUED | OUTPATIENT
Start: 2017-11-06 | End: 2017-11-08

## 2017-11-06 RX ORDER — MAGNESIUM SULFATE 500 MG/ML
2 VIAL (ML) INJECTION ONCE
Qty: 0 | Refills: 0 | Status: COMPLETED | OUTPATIENT
Start: 2017-11-06 | End: 2017-11-06

## 2017-11-06 RX ADMIN — Medication 10 MILLIGRAM(S): at 06:29

## 2017-11-06 RX ADMIN — Medication 5 MILLIGRAM(S): at 05:13

## 2017-11-06 RX ADMIN — Medication 100 MILLIEQUIVALENT(S): at 07:53

## 2017-11-06 RX ADMIN — PANTOPRAZOLE SODIUM 40 MILLIGRAM(S): 20 TABLET, DELAYED RELEASE ORAL at 17:44

## 2017-11-06 RX ADMIN — Medication 100 MILLIEQUIVALENT(S): at 10:09

## 2017-11-06 RX ADMIN — HYDROMORPHONE HYDROCHLORIDE 30 MILLILITER(S): 2 INJECTION INTRAMUSCULAR; INTRAVENOUS; SUBCUTANEOUS at 07:31

## 2017-11-06 RX ADMIN — Medication 10 MILLIGRAM(S): at 09:03

## 2017-11-06 RX ADMIN — Medication 100 MILLIEQUIVALENT(S): at 08:42

## 2017-11-06 RX ADMIN — ENOXAPARIN SODIUM 40 MILLIGRAM(S): 100 INJECTION SUBCUTANEOUS at 12:52

## 2017-11-06 RX ADMIN — POTASSIUM PHOSPHATE, MONOBASIC POTASSIUM PHOSPHATE, DIBASIC 62.5 MILLIMOLE(S): 236; 224 INJECTION, SOLUTION INTRAVENOUS at 10:44

## 2017-11-06 RX ADMIN — SODIUM CHLORIDE 100 MILLILITER(S): 9 INJECTION, SOLUTION INTRAVENOUS at 07:53

## 2017-11-06 RX ADMIN — HYDROMORPHONE HYDROCHLORIDE 30 MILLILITER(S): 2 INJECTION INTRAMUSCULAR; INTRAVENOUS; SUBCUTANEOUS at 19:14

## 2017-11-06 RX ADMIN — Medication 50 GRAM(S): at 10:09

## 2017-11-06 RX ADMIN — Medication 50 MILLIGRAM(S): at 12:52

## 2017-11-06 RX ADMIN — Medication 5 MILLIGRAM(S): at 01:24

## 2017-11-06 RX ADMIN — LEVALBUTEROL 0.63 MILLIGRAM(S): 1.25 SOLUTION, CONCENTRATE RESPIRATORY (INHALATION) at 21:50

## 2017-11-06 RX ADMIN — SODIUM CHLORIDE 100 MILLILITER(S): 9 INJECTION, SOLUTION INTRAVENOUS at 06:34

## 2017-11-06 RX ADMIN — HYDROMORPHONE HYDROCHLORIDE 30 MILLILITER(S): 2 INJECTION INTRAMUSCULAR; INTRAVENOUS; SUBCUTANEOUS at 17:33

## 2017-11-06 RX ADMIN — Medication 100 MILLIGRAM(S): at 17:50

## 2017-11-06 NOTE — DISCHARGE NOTE ADULT - MEDICATION SUMMARY - MEDICATIONS TO CHANGE
I will SWITCH the dose or number of times a day I take the medications listed below when I get home from the hospital:    Tylenol 500 mg oral tablet  -- 2 tab(s) by mouth every 6 hours, As Needed    Metoprolol Tartrate 50 mg oral tablet  -- 1 tab(s) by mouth 2 times a day

## 2017-11-06 NOTE — DISCHARGE NOTE ADULT - PLAN OF CARE
return to normal activity BATHING: Please do not submerge wound underwater. You may shower and/or sponge bathe. It is OK to wash drain wound site.  ACTIVITY: No heavy lifting or straining. Otherwise, you may return to your usual level of physical activity. If you are taking narcotic pain medication (such as Percocet) DO NOT drive a car, operate machinery or make important decisions.  DIET: Return to your usual diet.  NOTIFY YOUR SURGEON IF: You have any bleeding that does not stop, any pus draining from your wound(s), any fever (over 100.4 F) or chills, persistent nausea/vomiting, persistent diarrhea, or if your pain is not controlled on your discharge pain medications.  FOLLOW-UP: Please follow up with your primary care physician in one week regarding your hospitalization. Follow up with Dr Hager within 1 week. BATHING: Please do not submerge wound underwater. You may shower and/or sponge bathe. It is OK to wash drain wound site.  Please place gauze and/or ABD pad over incision daily after shower and cover with paper tape  ACTIVITY: No heavy lifting or straining. Otherwise, you may return to your usual level of physical activity. If you are taking narcotic pain medication (such as Percocet) DO NOT drive a car, operate machinery or make important decisions.  DIET: Return to your usual diet.  NOTIFY YOUR SURGEON IF: You have any bleeding that does not stop, any pus draining from your wound(s), any fever (over 100.4 F) or chills, persistent nausea/vomiting, persistent diarrhea, or if your pain is not controlled on your discharge pain medications.  FOLLOW-UP: Please follow up with your primary care physician in one week regarding your hospitalization. Follow up with Dr Hager within 1 week. Do not take NSAIDS at this time (ex. motrin, advil and/or aleve) Continue your new blood pressure medication and your new  metoprolol which was increased from your home dose Continue your new blood pressure medication and your new  metoprolol which was increased from your home dose.  Please follow up with your cardiologist and primary care doctor within 1 week of discharge.  Please bring your discharge paperwork with you to your follow up appointment and discuss your hospitalization.

## 2017-11-06 NOTE — DISCHARGE NOTE ADULT - MEDICATION SUMMARY - MEDICATIONS TO STOP TAKING
I will STOP taking the medications listed below when I get home from the hospital:    Advil 200 mg oral tablet  -- 4 tab(s) by mouth every 6 hours, As Needed last dose 10/24    oxyCODONE-acetaminophen 5 mg-325 mg oral tablet  -- 1 tab(s) by mouth every4- 6 hours, As Needed

## 2017-11-06 NOTE — DISCHARGE NOTE ADULT - CARE PLAN
Principal Discharge DX:	Disease of pancreas  Goal:	return to normal activity  Secondary Diagnosis:	Hypertension, unspecified type Principal Discharge DX:	Disease of pancreas  Goal:	return to normal activity  Instructions for follow-up, activity and diet:	BATHING: Please do not submerge wound underwater. You may shower and/or sponge bathe. It is OK to wash drain wound site.  ACTIVITY: No heavy lifting or straining. Otherwise, you may return to your usual level of physical activity. If you are taking narcotic pain medication (such as Percocet) DO NOT drive a car, operate machinery or make important decisions.  DIET: Return to your usual diet.  NOTIFY YOUR SURGEON IF: You have any bleeding that does not stop, any pus draining from your wound(s), any fever (over 100.4 F) or chills, persistent nausea/vomiting, persistent diarrhea, or if your pain is not controlled on your discharge pain medications.  FOLLOW-UP: Please follow up with your primary care physician in one week regarding your hospitalization. Follow up with Dr Hager within 1 week.  Secondary Diagnosis:	Hypertension, unspecified type Principal Discharge DX:	Disease of pancreas  Goal:	return to normal activity  Instructions for follow-up, activity and diet:	BATHING: Please do not submerge wound underwater. You may shower and/or sponge bathe. It is OK to wash drain wound site.  Please place gauze and/or ABD pad over incision daily after shower and cover with paper tape  ACTIVITY: No heavy lifting or straining. Otherwise, you may return to your usual level of physical activity. If you are taking narcotic pain medication (such as Percocet) DO NOT drive a car, operate machinery or make important decisions.  DIET: Return to your usual diet.  NOTIFY YOUR SURGEON IF: You have any bleeding that does not stop, any pus draining from your wound(s), any fever (over 100.4 F) or chills, persistent nausea/vomiting, persistent diarrhea, or if your pain is not controlled on your discharge pain medications.  FOLLOW-UP: Please follow up with your primary care physician in one week regarding your hospitalization. Follow up with Dr Hager within 1 week. Do not take NSAIDS at this time (ex. motrin, advil and/or aleve)  Secondary Diagnosis:	Hypertension, unspecified type  Goal:	Continue your new blood pressure medication and your new  metoprolol which was increased from your home dose  Instructions for follow-up, activity and diet:	Continue your new blood pressure medication and your new  metoprolol which was increased from your home dose.  Please follow up with your cardiologist and primary care doctor within 1 week of discharge.  Please bring your discharge paperwork with you to your follow up appointment and discuss your hospitalization.

## 2017-11-06 NOTE — DISCHARGE NOTE ADULT - CARE PROVIDER_API CALL
Blayne Hager (MD), Surgery  [address] [place] [address]  Phone: [phone]  Fax: [phone]

## 2017-11-06 NOTE — DISCHARGE NOTE ADULT - PATIENT PORTAL LINK FT
“You can access the [employer] Patient Portal, offered by [facility], by registering with the following website: [email or web address]”

## 2017-11-06 NOTE — PROGRESS NOTE ADULT - SUBJECTIVE AND OBJECTIVE BOX
Day _3_ of Anesthesia Pain Management Service    Allergies  No Known Allergies    SUBJECTIVE: "The pain medicine is working well."    Pain Scale Score	At rest: _3-4/10_ 	With Activity: ___ 	[ ] Refer to charted pain scores    THERAPY:    [ ] IV PCA Morphine		[ ] 5 mg/mL	[ ] 1 mg/mL  [X] IV PCA Hydromorphone	[ ] 5 mg/mL	[X] 1 mg/mL  [ ] IV PCA Fentanyl		[ ] 50 micrograms/mL    Demand dose _0.2mg_ lockout _6_ (minutes) Continuous Rate _0_ Total: _18.3mg_  Daily      MEDICATIONS  (STANDING):  acetaminophen   Tablet 650 milliGRAM(s) Oral every 6 hours  docusate sodium 100 milliGRAM(s) Oral three times a day  enoxaparin Injectable 40 milliGRAM(s) SubCutaneous daily  hydrALAZINE 10 milliGRAM(s) Oral four times a day  HYDROmorphone PCA (1 mG/mL) 30 milliLiter(s) PCA Continuous PCA Continuous  lactated ringers. 1000 milliLiter(s) (75 mL/Hr) IV Continuous <Continuous>  metoprolol     tartrate 50 milliGRAM(s) Oral two times a day  pantoprazole  Injectable 40 milliGRAM(s) IV Push two times a day  senna 2 Tablet(s) Oral at bedtime  tamsulosin 0.4 milliGRAM(s) Oral at bedtime    MEDICATIONS  (PRN):  HYDROmorphone PCA (1 mG/mL) Rescue Clinician Bolus 0.5 milliGRAM(s) IV Push every 15 minutes PRN for Pain Scale GREATER THAN 6  naloxone Injectable 0.1 milliGRAM(s) IV Push every 3 minutes PRN For ANY of the following changes in patient status:  A. RR LESS THAN 10 breaths per minute, B. Oxygen saturation LESS THAN 90%, C. Sedation score of 6  ondansetron Injectable 4 milliGRAM(s) IV Push every 6 hours PRN Nausea      OBJECTIVE: A&Ox3, NAD, sitting up in bed    Sedation Score:	[X] Alert	[ ] Drowsy	[ ] Arousable	[ ] Asleep	[ ] Unresponsive    Side Effects:	[X] None	[ ] Nausea	[ ] Vomiting	[ ] Pruritus  		  [ ] Weakness		[ ] Numbness	[ ] Other:                          12.2   12.19 )-----------( 330      ( 06 Nov 2017 05:30 )             37.5       11-06    136  |  98  |  8   ----------------------------<  101<H>  3.4<L>   |  23  |  0.43<L>    Ca    8.5      06 Nov 2017 05:30  Phos  2.0     11-06  Mg     1.7     11-06        ASSESSMENT/ PLAN    Therapy to  be:	[X] Continue   [ ] Discontinued   [ ] Change to prn Analgesics    Documentation and Verification of current medications:  [X] Done	[ ] Not done, not eligible  [ ] Not done, reason not given    Comments:  Remains NPO

## 2017-11-06 NOTE — PROGRESS NOTE ADULT - SUBJECTIVE AND OBJECTIVE BOX
Anesthesia Pain Management Service    SUBJECTIVE: Pt doing well with IV PCA without problems reported.    Therapy:	  [ X] IV PCA	   [ ] Epidural           [ ] s/p Spinal Opoid              [ ] Postpartum infusion	  [ ] Patient controlled regional anesthesia (PCRA)    [ ] prn Analgesics    Allergies    No Known Allergies    Intolerances      MEDICATIONS  (STANDING):  acetaminophen   Tablet 650 milliGRAM(s) Oral every 6 hours  docusate sodium 100 milliGRAM(s) Oral three times a day  enoxaparin Injectable 40 milliGRAM(s) SubCutaneous daily  HYDROmorphone PCA (1 mG/mL) 30 milliLiter(s) PCA Continuous PCA Continuous  lactated ringers. 1000 milliLiter(s) (75 mL/Hr) IV Continuous <Continuous>  levalbuterol Inhalation 0.63 milliGRAM(s) Inhalation once  metoprolol     tartrate 100 milliGRAM(s) Oral two times a day  pantoprazole  Injectable 40 milliGRAM(s) IV Push two times a day  senna 2 Tablet(s) Oral at bedtime  tamsulosin 0.4 milliGRAM(s) Oral at bedtime    MEDICATIONS  (PRN):  HYDROmorphone PCA (1 mG/mL) Rescue Clinician Bolus 0.5 milliGRAM(s) IV Push every 15 minutes PRN for Pain Scale GREATER THAN 6  naloxone Injectable 0.1 milliGRAM(s) IV Push every 3 minutes PRN For ANY of the following changes in patient status:  A. RR LESS THAN 10 breaths per minute, B. Oxygen saturation LESS THAN 90%, C. Sedation score of 6  ondansetron Injectable 4 milliGRAM(s) IV Push every 6 hours PRN Nausea      OBJECTIVE:   [X] No new signs     [ ] Other:    Side Effects:  [X ] None			[ ] Other:    Assessment of Catheter Site:		[ ] Intact		[ ] Other:    ASSESSMENT/PLAN  [ X] Continue current therapy    [ ] Therapy changed to:    [ ] IV PCA       [ ] Epidural     [ ] prn Analgesics     Comments:    Progress Note written now but Patient was seen earlier.

## 2017-11-06 NOTE — PROGRESS NOTE ADULT - ASSESSMENT
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    P:  - Lovenox DVT ppx  - PCA for pain control  - NPO w/ sips (remains distended), Protonix 40mg BID, restart home meds PO  - Monitor GI fn  - Start Hydralazine 10mg 4xday (q6H) (in addition to home metoprolol 50mg BID)

## 2017-11-06 NOTE — PROGRESS NOTE ADULT - SUBJECTIVE AND OBJECTIVE BOX
Surgery Team D Progress Note:    Hospital Day: 4  Post-Operative Day: 3; s/p laparoscopic robotic converted to open for unresectable pancreatic mass.    Subjective:  Overnight, the patient had episodes of hypertension (SBP 170s-180s) - this appears to be the patient's baseline, per patient.  He was given Hydralazine one time yesterday and his pressure came down to sbp 150s.  Last night, the patient's metoprolol was increased to q4h (from BID) but this had a mild effect; discussed starting labetalol with the patient this morning, and he agrees.  He reports that his pain today is better than yesterday.  Admits to belching a lot, and is +/-. No complaints at this time.     Objective:  T(C): 36.7 (11-06-17 @ 07:45), Max: 37.2 (11-05-17 @ 20:15)  HR: 99 (11-06-17 @ 07:45) (96 - 122)  BP: 181/85 (11-06-17 @ 07:45) (146/76 - 190/85)  RR: 18 (11-06-17 @ 07:45) (18 - 18)  SpO2: 99% (11-06-17 @ 07:45) (93% - 99%)    Labs:                        12.2   12.19 )-----------( 330      ( 06 Nov 2017 05:30 )             37.5       11-06    136  |  98  |  8   ----------------------------<  101<H>  3.4<L>   |  23  |  0.43<L>    Ca    8.5      06 Nov 2017 05:30  Phos  2.0     11-06  Mg     1.7     11-06        I&O's Detail    05 Nov 2017 07:01  -  06 Nov 2017 07:00  --------------------------------------------------------  IN:    IV PiggyBack: 250 mL    lactated ringers.: 2300 mL    Oral Fluid: 240 mL  Total IN: 2790 mL    OUT:    Voided: 2850 mL  Total OUT: 2850 mL    Total NET: -60 mL      06 Nov 2017 07:01  -  06 Nov 2017 08:27  --------------------------------------------------------  IN:    lactated ringers.: 100 mL  Total IN: 100 mL    OUT:    Voided: 600 mL  Total OUT: 600 mL    Total NET: -500 mL      Focused Physical Exam:  General: NAD  Respiratory: Nonlabored breathing  Abdomen: Moderately distended, NT    Medications:  acetaminophen   Tablet 650 milliGRAM(s) Oral every 6 hours  docusate sodium 100 milliGRAM(s) Oral three times a day  enoxaparin Injectable 40 milliGRAM(s) SubCutaneous daily  HYDROmorphone PCA (1 mG/mL) 30 milliLiter(s) PCA Continuous PCA Continuous  HYDROmorphone PCA (1 mG/mL) Rescue Clinician Bolus 0.5 milliGRAM(s) IV Push every 15 minutes PRN  lactated ringers. 1000 milliLiter(s) IV Continuous <Continuous>  magnesium sulfate  IVPB 2 Gram(s) IV Intermittent once  metoprolol     tartrate 50 milliGRAM(s) Oral two times a day  naloxone Injectable 0.1 milliGRAM(s) IV Push every 3 minutes PRN  ondansetron Injectable 4 milliGRAM(s) IV Push every 6 hours PRN  pantoprazole  Injectable 40 milliGRAM(s) IV Push two times a day  potassium chloride  10 mEq/100 mL IVPB 10 milliEquivalent(s) IV Intermittent every 1 hour  potassium phosphate IVPB 15 milliMole(s) IV Intermittent once  senna 2 Tablet(s) Oral at bedtime  tamsulosin 0.4 milliGRAM(s) Oral at bedtime

## 2017-11-06 NOTE — DISCHARGE NOTE ADULT - HOSPITAL COURSE
62 y.o. male s/p colon resection 6/2017. Pt noted to have abdominal pain that radiated to his back for which he underwent CT and noted to have pancreatic mass.    11/3 pt same day admission for scheduled distal pancreatectomy and splenectomy for pancreatic adenocarcinoma. Pt underwent robotic converted to exploratory laparotomy because pt was found to have an unresectable pancreatic mass. The mass was encasing the SMA and invading mesentery of small bowel.     11/4 pt tachycardic, -119bpm, diaphoretic, + tachypnea and c/o chest pain. EKG performed and showed sinus tachycardia. Cardiac enzymes negative x2. CXR performed and showed Heart size cannot be evaluated on this AP projection. There are bibasilar areas of linear or subsegmental atelectasis. There is no pneumothorax. CTA performed to r/o PE and was markedly limited study for pulmonary embolism, as above. No main, right  or left pulmonary artery defects, however the lobar, segmental and subsegmental pulmonary arteries cannot be evaluated. Small bilateral pleural effusions with adjacent atelectasis. Enlarging pancreatic body mass, consistent with known adenocarcinoma. Small volume ascites. Distended esophagus, stomach, small and large bowel loops are incompletely evaluated, question postoperative ileus versus bowel  obstruction. In the CT scan room, pt had an episode of dark hematemesis, and then had two more episodes. Attempted to place NGT but pt refused even after being made aware of the risks.    11/5 patient with episodes of hypertension (SBP 170s-180s) Pt received IV antihypertensive meds while NPO and then restarted home BP medication on 11/6 62 y.o. male s/p colon resection 6/2017. Pt noted to have abdominal pain that radiated to his back for which he underwent CT and noted to have pancreatic mass.    11/3 pt same day admission for scheduled distal pancreatectomy and splenectomy for pancreatic adenocarcinoma. Pt underwent robotic converted to exploratory laparotomy because pt was found to have an unresectable pancreatic mass. The mass was encasing the SMA and invading mesentery of small bowel.     11/4 pt tachycardic, -119bpm, diaphoretic, + tachypnea and c/o chest pain. EKG performed and showed sinus tachycardia. Cardiac enzymes negative x2. CXR performed and showed Heart size cannot be evaluated on this AP projection. There are bibasilar areas of linear or subsegmental atelectasis. There is no pneumothorax. CTA performed to r/o PE and was markedly limited study for pulmonary embolism, as above. No main, right  or left pulmonary artery defects, however the lobar, segmental and subsegmental pulmonary arteries cannot be evaluated. Small bilateral pleural effusions with adjacent atelectasis. Enlarging pancreatic body mass, consistent with known adenocarcinoma. Small volume ascites. Distended esophagus, stomach, small and large bowel loops are incompletely evaluated, question postoperative ileus versus bowel  obstruction. In the CT scan room, pt had an episode of dark hematemesis, and then had two more episodes. Attempted to place NGT but pt refused even after being made aware of the risks.    11/5 patient with episodes of hypertension (SBP 170s-180s) Pt received IV antihypertensive meds while NPO and then restarted home BP medication on 11/6 11/8 patient continued to have hypertensive episodes, lopressor dose increased to 125 mg BID. PCA discontinued and patient trialed on PO pain medications. 62 y.o. male s/p colon resection 6/2017. Pt noted to have abdominal pain that radiated to his back for which he underwent CT and noted to have pancreatic mass.    11/3 pt same day admission for scheduled distal pancreatectomy and splenectomy for pancreatic adenocarcinoma. Pt underwent robotic converted to exploratory laparotomy because pt was found to have an unresectable pancreatic mass. The mass was encasing the SMA and invading mesentery of small bowel.     11/4 pt tachycardic, -119bpm, diaphoretic, + tachypnea and c/o chest pain. EKG performed and showed sinus tachycardia. Cardiac enzymes negative x2. CXR performed and showed Heart size cannot be evaluated on this AP projection. There are bibasilar areas of linear or subsegmental atelectasis. There is no pneumothorax. CTA performed to r/o PE and was markedly limited study for pulmonary embolism, as above. No main, right  or left pulmonary artery defects, however the lobar, segmental and subsegmental pulmonary arteries cannot be evaluated. Small bilateral pleural effusions with adjacent atelectasis. Enlarging pancreatic body mass, consistent with known adenocarcinoma. Small volume ascites. Distended esophagus, stomach, small and large bowel loops are incompletely evaluated, question postoperative ileus versus bowel  obstruction. In the CT scan room, pt had an episode of dark hematemesis, and then had two more episodes. Attempted to place NGT but pt refused even after being made aware of the risks.    11/5 patient with episodes of hypertension (SBP 170s-180s) Pt received IV antihypertensive meds while NPO and then restarted home BP medication on 11/6.    Losartan also started for pt's uncontrolled HTN.     11/8 patient continued to have hypertensive episodes, lopressor dose increased to 125 mg BID. PCA discontinued and patient trialed on PO pain medication.     Pt tolerating a regular diet, passing flatus and having BM.  Pt was given pain recommendations by pain service and will be discharged with current pain medication for about 1 weeks worth of medication. ISTOP was performed and pt last prescribed 10 days of percocet 10 days prior to his admission at [facility]. Pt stable for discharge home at this time with follow up with Dr. Hager and his cardiologist.

## 2017-11-06 NOTE — DISCHARGE NOTE ADULT - MEDICATION SUMMARY - MEDICATIONS TO TAKE
I will START or STAY ON the medications listed below when I get home from the hospital:    fentaNYL 12 mcg/hr transdermal film, extended release  -- 1 patch by transdermal patch every 72 hours MDD:1  -- Indication: For pain control    oxyCODONE 10 mg oral tablet  -- 1 tab(s) by mouth every 4 hours, As Needed -Moderate Pain (4 - 6) MDD:6. DO NOT TAKE WITHIN 4 HOURS OF TAKING ANY OTHER OXYCODONE PILL  -- Indication: For moderate pain    oxyCODONE 15 mg oral tablet  -- 1 tab(s) by mouth every 4 hours, As Needed -Severe Pain (7 - 10) MDD:6. DO NOT TAKE WITHIN 4 HOURS OF TAKING ANY OTHER OXYCODONE PILL  -- Indication: For Severe pain    acetaminophen 325 mg oral tablet  -- 2 tab(s) by mouth every 6 hours  -- Indication: For mild pain    losartan 25 mg oral tablet  -- 1 tab(s) by mouth once a day  -- Indication: For Htn    tamsulosin 0.4 mg oral capsule  -- 1 cap(s) by mouth once a day (at bedtime)  -- Indication: For bph    Lovenox 40 mg/0.4 mL injectable solution  -- 40 milligram(s) injectable once a day   -- It is very important that you take or use this exactly as directed.  Do not skip doses or discontinue unless directed by your doctor.    -- Indication: For DVT prophylaxis    Neurontin 100 mg oral capsule  -- 2 cap(s) by mouth every 8 hours MDD:6  -- Indication: For pain control    metoprolol tartrate 25 mg oral tablet  -- 5 tab(s) by mouth 2 times a day  -- Indication: For HTN    senna oral tablet  -- 2 tab(s) by mouth once a day (at bedtime)  -- Indication: For constipation    docusate sodium 100 mg oral capsule  -- 1 cap(s) by mouth 3 times a day  -- Indication: For Stool softener    pantoprazole 40 mg oral delayed release tablet  -- 1 tab(s) by mouth once a day (before a meal)  -- Indication: For Heart burn

## 2017-11-06 NOTE — DISCHARGE NOTE ADULT - CONDITIONS AT DISCHARGE
Pt A&Ox4, VSS, No shortness of breath or chest pain. Surgical incision is well approximated and clean. Dressing is clean, dry and intact. Pt had bowel movents today and passed gas. Pt voids with out any pain or discomfort.  Pain managed with oral analgesics.  A safe environment was maintained.  Pt was educated about his situation and medication. Pt is discharged.

## 2017-11-06 NOTE — DISCHARGE NOTE ADULT - NS AS ACTIVITY OBS
Showering allowed/Do not drive or operate machinery/No Heavy lifting/straining DO NOT DRIVE WHILE TAKING PAIN MEDICATION/Showering allowed/Walking-Indoors allowed/Do not drive or operate machinery/No Heavy lifting/straining/Walking-Outdoors allowed

## 2017-11-07 LAB
BUN SERPL-MCNC: 10 MG/DL — SIGNIFICANT CHANGE UP (ref 7–23)
CALCIUM SERPL-MCNC: 8.7 MG/DL — SIGNIFICANT CHANGE UP (ref 8.4–10.5)
CHLORIDE SERPL-SCNC: 96 MMOL/L — LOW (ref 98–107)
CO2 SERPL-SCNC: 21 MMOL/L — LOW (ref 22–31)
CREAT SERPL-MCNC: 0.48 MG/DL — LOW (ref 0.5–1.3)
GLUCOSE SERPL-MCNC: 102 MG/DL — HIGH (ref 70–99)
HCT VFR BLD CALC: 39 % — SIGNIFICANT CHANGE UP (ref 39–50)
HGB BLD-MCNC: 13.3 G/DL — SIGNIFICANT CHANGE UP (ref 13–17)
MAGNESIUM SERPL-MCNC: 1.9 MG/DL — SIGNIFICANT CHANGE UP (ref 1.6–2.6)
MCHC RBC-ENTMCNC: 29.6 PG — SIGNIFICANT CHANGE UP (ref 27–34)
MCHC RBC-ENTMCNC: 34.1 % — SIGNIFICANT CHANGE UP (ref 32–36)
MCV RBC AUTO: 86.7 FL — SIGNIFICANT CHANGE UP (ref 80–100)
NRBC # FLD: 0 — SIGNIFICANT CHANGE UP
PHOSPHATE SERPL-MCNC: 2.5 MG/DL — SIGNIFICANT CHANGE UP (ref 2.5–4.5)
PLATELET # BLD AUTO: 362 K/UL — SIGNIFICANT CHANGE UP (ref 150–400)
PMV BLD: 9.2 FL — SIGNIFICANT CHANGE UP (ref 7–13)
POTASSIUM SERPL-MCNC: 3.7 MMOL/L — SIGNIFICANT CHANGE UP (ref 3.5–5.3)
POTASSIUM SERPL-SCNC: 3.7 MMOL/L — SIGNIFICANT CHANGE UP (ref 3.5–5.3)
RBC # BLD: 4.5 M/UL — SIGNIFICANT CHANGE UP (ref 4.2–5.8)
RBC # FLD: 12.7 % — SIGNIFICANT CHANGE UP (ref 10.3–14.5)
SODIUM SERPL-SCNC: 133 MMOL/L — LOW (ref 135–145)
WBC # BLD: 10.3 K/UL — SIGNIFICANT CHANGE UP (ref 3.8–10.5)
WBC # FLD AUTO: 10.3 K/UL — SIGNIFICANT CHANGE UP (ref 3.8–10.5)

## 2017-11-07 RX ORDER — SODIUM CHLORIDE 9 MG/ML
1000 INJECTION, SOLUTION INTRAVENOUS
Qty: 0 | Refills: 0 | Status: DISCONTINUED | OUTPATIENT
Start: 2017-11-07 | End: 2017-11-08

## 2017-11-07 RX ORDER — LOSARTAN POTASSIUM 100 MG/1
25 TABLET, FILM COATED ORAL DAILY
Qty: 0 | Refills: 0 | Status: DISCONTINUED | OUTPATIENT
Start: 2017-11-07 | End: 2017-11-08

## 2017-11-07 RX ORDER — MAGNESIUM SULFATE 500 MG/ML
2 VIAL (ML) INJECTION ONCE
Qty: 0 | Refills: 0 | Status: COMPLETED | OUTPATIENT
Start: 2017-11-07 | End: 2017-11-07

## 2017-11-07 RX ORDER — POTASSIUM CHLORIDE 20 MEQ
10 PACKET (EA) ORAL
Qty: 0 | Refills: 0 | Status: COMPLETED | OUTPATIENT
Start: 2017-11-07 | End: 2017-11-07

## 2017-11-07 RX ADMIN — Medication 50 GRAM(S): at 10:36

## 2017-11-07 RX ADMIN — Medication 100 MILLIGRAM(S): at 17:26

## 2017-11-07 RX ADMIN — PANTOPRAZOLE SODIUM 40 MILLIGRAM(S): 20 TABLET, DELAYED RELEASE ORAL at 17:26

## 2017-11-07 RX ADMIN — Medication 100 MILLIEQUIVALENT(S): at 10:36

## 2017-11-07 RX ADMIN — LOSARTAN POTASSIUM 25 MILLIGRAM(S): 100 TABLET, FILM COATED ORAL at 12:07

## 2017-11-07 RX ADMIN — PANTOPRAZOLE SODIUM 40 MILLIGRAM(S): 20 TABLET, DELAYED RELEASE ORAL at 06:06

## 2017-11-07 RX ADMIN — SODIUM CHLORIDE 75 MILLILITER(S): 9 INJECTION, SOLUTION INTRAVENOUS at 06:10

## 2017-11-07 RX ADMIN — Medication 100 MILLIGRAM(S): at 06:06

## 2017-11-07 RX ADMIN — SODIUM CHLORIDE 75 MILLILITER(S): 9 INJECTION, SOLUTION INTRAVENOUS at 10:36

## 2017-11-07 RX ADMIN — HYDROMORPHONE HYDROCHLORIDE 30 MILLILITER(S): 2 INJECTION INTRAMUSCULAR; INTRAVENOUS; SUBCUTANEOUS at 19:09

## 2017-11-07 RX ADMIN — HYDROMORPHONE HYDROCHLORIDE 30 MILLILITER(S): 2 INJECTION INTRAMUSCULAR; INTRAVENOUS; SUBCUTANEOUS at 07:10

## 2017-11-07 RX ADMIN — ENOXAPARIN SODIUM 40 MILLIGRAM(S): 100 INJECTION SUBCUTANEOUS at 11:57

## 2017-11-07 RX ADMIN — Medication 100 MILLIEQUIVALENT(S): at 09:37

## 2017-11-07 RX ADMIN — Medication 100 MILLIEQUIVALENT(S): at 11:56

## 2017-11-07 NOTE — PROGRESS NOTE ADULT - SUBJECTIVE AND OBJECTIVE BOX
Anesthesia Pain Management Service    SUBJECTIVE: Pt doing well with IV PCA without problems reported.    Therapy:	  [ X] IV PCA	   [ ] Epidural           [ ] s/p Spinal Opoid              [ ] Postpartum infusion	  [ ] Patient controlled regional anesthesia (PCRA)    [ ] prn Analgesics    Allergies    No Known Allergies    Intolerances      MEDICATIONS  (STANDING):  acetaminophen   Tablet 650 milliGRAM(s) Oral every 6 hours  docusate sodium 100 milliGRAM(s) Oral three times a day  enoxaparin Injectable 40 milliGRAM(s) SubCutaneous daily  HYDROmorphone PCA (1 mG/mL) 30 milliLiter(s) PCA Continuous PCA Continuous  lactated ringers. 1000 milliLiter(s) (75 mL/Hr) IV Continuous <Continuous>  losartan 25 milliGRAM(s) Oral daily  metoprolol     tartrate 100 milliGRAM(s) Oral two times a day  pantoprazole  Injectable 40 milliGRAM(s) IV Push two times a day  senna 2 Tablet(s) Oral at bedtime  tamsulosin 0.4 milliGRAM(s) Oral at bedtime    MEDICATIONS  (PRN):  HYDROmorphone PCA (1 mG/mL) Rescue Clinician Bolus 0.5 milliGRAM(s) IV Push every 15 minutes PRN for Pain Scale GREATER THAN 6  naloxone Injectable 0.1 milliGRAM(s) IV Push every 3 minutes PRN For ANY of the following changes in patient status:  A. RR LESS THAN 10 breaths per minute, B. Oxygen saturation LESS THAN 90%, C. Sedation score of 6  ondansetron Injectable 4 milliGRAM(s) IV Push every 6 hours PRN Nausea      OBJECTIVE:   [X] No new signs     [ ] Other:    Side Effects:  [X ] None			[ ] Other:    Assessment of Catheter Site:		[ ] Intact		[ ] Other:    ASSESSMENT/PLAN  [ X] Continue current therapy    [ ] Therapy changed to:    [ ] IV PCA       [ ] Epidural     [ ] prn Analgesics     Comments:    Progress Note written now but Patient was seen earlier.

## 2017-11-07 NOTE — PROGRESS NOTE ADULT - ASSESSMENT
A: 62y M POD 4 s/p lap robotic converted to open surgery for a pancreatic mass that ended up being unresectable.    P:  - Lovenox DVT ppx, OOB  - PCA for pain control  - +/+, and distension improved, patient feels "less bloated", will advance to CLD; continue Protonix 40mg BID  - Continue Metoprolol 100mg BID

## 2017-11-07 NOTE — PROGRESS NOTE ADULT - SUBJECTIVE AND OBJECTIVE BOX
Day _5_ of Anesthesia Pain Management Service    Allergies  No Known Allergies    SUBJECTIVE: "The pain medicine is working very well."    Pain Scale Score	At rest: _3/10_ 	With Activity: ___ 	[ ] Refer to charted pain scores    THERAPY:    [ ] IV PCA Morphine		[ ] 5 mg/mL	[ ] 1 mg/mL  [X] IV PCA Hydromorphone	[ ] 5 mg/mL	[X] 1 mg/mL  [ ] IV PCA Fentanyl		[ ] 50 micrograms/mL    Demand dose _0.2mg_ lockout _6_ (minutes) Continuous Rate _0_ Total: _13.4mg_  Daily      MEDICATIONS  (STANDING):  acetaminophen   Tablet 650 milliGRAM(s) Oral every 6 hours  docusate sodium 100 milliGRAM(s) Oral three times a day  enoxaparin Injectable 40 milliGRAM(s) SubCutaneous daily  HYDROmorphone PCA (1 mG/mL) 30 milliLiter(s) PCA Continuous PCA Continuous  lactated ringers. 1000 milliLiter(s) (75 mL/Hr) IV Continuous <Continuous>  magnesium sulfate  IVPB 2 Gram(s) IV Intermittent once  metoprolol     tartrate 100 milliGRAM(s) Oral two times a day  pantoprazole  Injectable 40 milliGRAM(s) IV Push two times a day  potassium chloride  10 mEq/100 mL IVPB 10 milliEquivalent(s) IV Intermittent every 1 hour  senna 2 Tablet(s) Oral at bedtime  tamsulosin 0.4 milliGRAM(s) Oral at bedtime    MEDICATIONS  (PRN):  HYDROmorphone PCA (1 mG/mL) Rescue Clinician Bolus 0.5 milliGRAM(s) IV Push every 15 minutes PRN for Pain Scale GREATER THAN 6  naloxone Injectable 0.1 milliGRAM(s) IV Push every 3 minutes PRN For ANY of the following changes in patient status:  A. RR LESS THAN 10 breaths per minute, B. Oxygen saturation LESS THAN 90%, C. Sedation score of 6  ondansetron Injectable 4 milliGRAM(s) IV Push every 6 hours PRN Nausea      OBJECTIVE: A&Ox3, NAD, sitting up in bed    Sedation Score:	[X] Alert	[ ] Drowsy	[ ] Arousable	[ ] Asleep	[ ] Unresponsive    Side Effects:	[X] None	[ ] Nausea	[ ] Vomiting	[ ] Pruritus  		  [ ] Weakness		[ ] Numbness	[ ] Other:                            13.3   10.30 )-----------( 362      ( 07 Nov 2017 06:27 )             39.0       11-07    133<L>  |  96<L>  |  10  ----------------------------<  102<H>  3.7   |  21<L>  |  0.48<L>    Ca    8.7      07 Nov 2017 06:27  Phos  2.5     11-07  Mg     1.9     11-07        ASSESSMENT/ PLAN    Therapy to  be:	[X] Continue   [ ] Discontinued   [ ] Change to prn Analgesics    Documentation and Verification of current medications:  [X] Done	[ ] Not done, not eligible  [ ] Not done, reason not given    Comments:  Remains NPO

## 2017-11-07 NOTE — PROGRESS NOTE ADULT - SUBJECTIVE AND OBJECTIVE BOX
Surgery Team D Progress Note:    Hospital Day:5  Post-Operative Day:4; s/p lap robotic converted to open for unresectable pancreatic mass    Subjective:  Overnight, the patient continued to have hypertensive episodes (170s-180s) - his Metoprolol dosage was increased from 50mg bid to 100mg bid, and had his 2nd dose of 100mg given in the evening; his sbp around midnight (after said dose) decreased to 150s.  Informed the patient that we will likely continue this regimen, and if it worsens then we will consult cardiology - he agrees. +/+ overnight. No other issues at this time.      Objective:  T(C): 36.3 (11-07-17 @ 00:09), Max: 36.9 (11-06-17 @ 17:19)  HR: 91 (11-07-17 @ 00:09) (91 - 114)  BP: 156/88 (11-07-17 @ 00:09) (156/88 - 190/85)  RR: 18 (11-07-17 @ 00:09) (18 - 18)  SpO2: 98% (11-07-17 @ 00:09) (93% - 100%)    Labs:                        12.2   12.19 )-----------( 330      ( 06 Nov 2017 05:30 )             37.5       11-06    136  |  98  |  8   ----------------------------<  101<H>  3.4<L>   |  23  |  0.43<L>    Ca    8.5      06 Nov 2017 05:30  Phos  2.0     11-06  Mg     1.7     11-06    I&O's Detail    05 Nov 2017 07:01  -  06 Nov 2017 07:00  --------------------------------------------------------  IN:    IV PiggyBack: 250 mL    lactated ringers.: 2300 mL    Oral Fluid: 240 mL  Total IN: 2790 mL    OUT:    Voided: 2850 mL  Total OUT: 2850 mL    Total NET: -60 mL      06 Nov 2017 07:01  -  07 Nov 2017 04:55  --------------------------------------------------------  IN:    lactated ringers.: 925 mL  Total IN: 925 mL    OUT:    Voided: 1700 mL  Total OUT: 1700 mL    Total NET: -775 mL    Focused Physical Exam:  General: NAD  Respiratory: Nonlabored breathing  Abdomen: soft, nt, nd    Medications:  acetaminophen   Tablet 650 milliGRAM(s) Oral every 6 hours  docusate sodium 100 milliGRAM(s) Oral three times a day  enoxaparin Injectable 40 milliGRAM(s) SubCutaneous daily  HYDROmorphone PCA (1 mG/mL) 30 milliLiter(s) PCA Continuous PCA Continuous  HYDROmorphone PCA (1 mG/mL) Rescue Clinician Bolus 0.5 milliGRAM(s) IV Push every 15 minutes PRN  lactated ringers. 1000 milliLiter(s) IV Continuous <Continuous>  metoprolol     tartrate 100 milliGRAM(s) Oral two times a day  naloxone Injectable 0.1 milliGRAM(s) IV Push every 3 minutes PRN  ondansetron Injectable 4 milliGRAM(s) IV Push every 6 hours PRN  pantoprazole  Injectable 40 milliGRAM(s) IV Push two times a day  senna 2 Tablet(s) Oral at bedtime  tamsulosin 0.4 milliGRAM(s) Oral at bedtime

## 2017-11-08 VITALS
RESPIRATION RATE: 18 BRPM | HEART RATE: 105 BPM | TEMPERATURE: 98 F | DIASTOLIC BLOOD PRESSURE: 84 MMHG | OXYGEN SATURATION: 98 % | SYSTOLIC BLOOD PRESSURE: 152 MMHG

## 2017-11-08 LAB
BUN SERPL-MCNC: 13 MG/DL — SIGNIFICANT CHANGE UP (ref 7–23)
CALCIUM SERPL-MCNC: 8.9 MG/DL — SIGNIFICANT CHANGE UP (ref 8.4–10.5)
CHLORIDE SERPL-SCNC: 96 MMOL/L — LOW (ref 98–107)
CO2 SERPL-SCNC: 23 MMOL/L — SIGNIFICANT CHANGE UP (ref 22–31)
CREAT SERPL-MCNC: 0.57 MG/DL — SIGNIFICANT CHANGE UP (ref 0.5–1.3)
GLUCOSE SERPL-MCNC: 92 MG/DL — SIGNIFICANT CHANGE UP (ref 70–99)
MAGNESIUM SERPL-MCNC: 1.9 MG/DL — SIGNIFICANT CHANGE UP (ref 1.6–2.6)
PHOSPHATE SERPL-MCNC: 3.1 MG/DL — SIGNIFICANT CHANGE UP (ref 2.5–4.5)
POTASSIUM SERPL-MCNC: 4.2 MMOL/L — SIGNIFICANT CHANGE UP (ref 3.5–5.3)
POTASSIUM SERPL-SCNC: 4.2 MMOL/L — SIGNIFICANT CHANGE UP (ref 3.5–5.3)
SODIUM SERPL-SCNC: 134 MMOL/L — LOW (ref 135–145)

## 2017-11-08 PROCEDURE — 99238 HOSP IP/OBS DSCHRG MGMT 30/<: CPT | Mod: 24

## 2017-11-08 RX ORDER — SENNA PLUS 8.6 MG/1
2 TABLET ORAL
Qty: 0 | Refills: 0 | DISCHARGE
Start: 2017-11-08

## 2017-11-08 RX ORDER — ENOXAPARIN SODIUM 100 MG/ML
40 INJECTION SUBCUTANEOUS
Qty: 28 | Refills: 0
Start: 2017-11-08 | End: 2017-12-06

## 2017-11-08 RX ORDER — FENTANYL CITRATE 50 UG/ML
1 INJECTION INTRAVENOUS
Qty: 0 | Refills: 0 | Status: DISCONTINUED | OUTPATIENT
Start: 2017-11-08 | End: 2017-11-08

## 2017-11-08 RX ORDER — GABAPENTIN 400 MG/1
200 CAPSULE ORAL EVERY 8 HOURS
Qty: 0 | Refills: 0 | Status: DISCONTINUED | OUTPATIENT
Start: 2017-11-08 | End: 2017-11-08

## 2017-11-08 RX ORDER — OXYCODONE HYDROCHLORIDE 5 MG/1
1 TABLET ORAL
Qty: 22 | Refills: 0
Start: 2017-11-08

## 2017-11-08 RX ORDER — OXYCODONE HYDROCHLORIDE 5 MG/1
15 TABLET ORAL
Qty: 0 | Refills: 0 | Status: DISCONTINUED | OUTPATIENT
Start: 2017-11-08 | End: 2017-11-08

## 2017-11-08 RX ORDER — LOSARTAN POTASSIUM 100 MG/1
1 TABLET, FILM COATED ORAL
Qty: 14 | Refills: 0
Start: 2017-11-08

## 2017-11-08 RX ORDER — METOPROLOL TARTRATE 50 MG
5 TABLET ORAL
Qty: 140 | Refills: 0
Start: 2017-11-08

## 2017-11-08 RX ORDER — PANTOPRAZOLE SODIUM 20 MG/1
1 TABLET, DELAYED RELEASE ORAL
Qty: 14 | Refills: 0
Start: 2017-11-08

## 2017-11-08 RX ORDER — FENTANYL CITRATE 50 UG/ML
1 INJECTION INTRAVENOUS
Qty: 3 | Refills: 0
Start: 2017-11-08

## 2017-11-08 RX ORDER — PANTOPRAZOLE SODIUM 20 MG/1
40 TABLET, DELAYED RELEASE ORAL
Qty: 0 | Refills: 0 | Status: DISCONTINUED | OUTPATIENT
Start: 2017-11-08 | End: 2017-11-08

## 2017-11-08 RX ORDER — ACETAMINOPHEN 500 MG
2 TABLET ORAL
Qty: 0 | Refills: 0 | DISCHARGE
Start: 2017-11-08

## 2017-11-08 RX ORDER — OXYCODONE HYDROCHLORIDE 5 MG/1
10 TABLET ORAL
Qty: 0 | Refills: 0 | Status: DISCONTINUED | OUTPATIENT
Start: 2017-11-08 | End: 2017-11-08

## 2017-11-08 RX ORDER — DOCUSATE SODIUM 100 MG
1 CAPSULE ORAL
Qty: 0 | Refills: 0 | DISCHARGE
Start: 2017-11-08

## 2017-11-08 RX ORDER — HYDROMORPHONE HYDROCHLORIDE 2 MG/ML
0.5 INJECTION INTRAMUSCULAR; INTRAVENOUS; SUBCUTANEOUS ONCE
Qty: 0 | Refills: 0 | Status: DISCONTINUED | OUTPATIENT
Start: 2017-11-08 | End: 2017-11-08

## 2017-11-08 RX ORDER — GABAPENTIN 400 MG/1
2 CAPSULE ORAL
Qty: 45 | Refills: 0
Start: 2017-11-08

## 2017-11-08 RX ORDER — HYDROMORPHONE HYDROCHLORIDE 2 MG/ML
1 INJECTION INTRAMUSCULAR; INTRAVENOUS; SUBCUTANEOUS EVERY 6 HOURS
Qty: 0 | Refills: 0 | Status: DISCONTINUED | OUTPATIENT
Start: 2017-11-08 | End: 2017-11-08

## 2017-11-08 RX ORDER — METOPROLOL TARTRATE 50 MG
125 TABLET ORAL
Qty: 0 | Refills: 0 | Status: DISCONTINUED | OUTPATIENT
Start: 2017-11-08 | End: 2017-11-08

## 2017-11-08 RX ADMIN — FENTANYL CITRATE 1 PATCH: 50 INJECTION INTRAVENOUS at 10:15

## 2017-11-08 RX ADMIN — HYDROMORPHONE HYDROCHLORIDE 0.5 MILLIGRAM(S): 2 INJECTION INTRAMUSCULAR; INTRAVENOUS; SUBCUTANEOUS at 12:55

## 2017-11-08 RX ADMIN — PANTOPRAZOLE SODIUM 40 MILLIGRAM(S): 20 TABLET, DELAYED RELEASE ORAL at 06:06

## 2017-11-08 RX ADMIN — Medication 100 MILLIGRAM(S): at 06:06

## 2017-11-08 RX ADMIN — HYDROMORPHONE HYDROCHLORIDE 30 MILLILITER(S): 2 INJECTION INTRAMUSCULAR; INTRAVENOUS; SUBCUTANEOUS at 07:22

## 2017-11-08 RX ADMIN — ENOXAPARIN SODIUM 40 MILLIGRAM(S): 100 INJECTION SUBCUTANEOUS at 11:27

## 2017-11-08 RX ADMIN — Medication 100 MILLIGRAM(S): at 13:27

## 2017-11-08 RX ADMIN — GABAPENTIN 200 MILLIGRAM(S): 400 CAPSULE ORAL at 13:27

## 2017-11-08 RX ADMIN — OXYCODONE HYDROCHLORIDE 10 MILLIGRAM(S): 5 TABLET ORAL at 11:26

## 2017-11-08 RX ADMIN — HYDROMORPHONE HYDROCHLORIDE 30 MILLILITER(S): 2 INJECTION INTRAMUSCULAR; INTRAVENOUS; SUBCUTANEOUS at 03:54

## 2017-11-08 RX ADMIN — OXYCODONE HYDROCHLORIDE 10 MILLIGRAM(S): 5 TABLET ORAL at 11:56

## 2017-11-08 RX ADMIN — HYDROMORPHONE HYDROCHLORIDE 0.5 MILLIGRAM(S): 2 INJECTION INTRAMUSCULAR; INTRAVENOUS; SUBCUTANEOUS at 13:25

## 2017-11-08 RX ADMIN — OXYCODONE HYDROCHLORIDE 15 MILLIGRAM(S): 5 TABLET ORAL at 17:35

## 2017-11-08 RX ADMIN — LOSARTAN POTASSIUM 25 MILLIGRAM(S): 100 TABLET, FILM COATED ORAL at 06:08

## 2017-11-08 RX ADMIN — OXYCODONE HYDROCHLORIDE 15 MILLIGRAM(S): 5 TABLET ORAL at 17:05

## 2017-11-08 NOTE — PROVIDER CONTACT NOTE (OTHER) - REASON
/78, 
/81 
BP & HR high
BP and HR high
BP and HR high
Diaphoretic, Tachycardic
Kd Owens midnight BP update: 176/100. HR: 104
Pt had chest pain
Pt. /73
Pt. /81
Pt. /84
Pt. /85
Pt. /86
Pt. /92
Pt. having episodes of coughing up blood.
Pt. refusing NG tube by team.
Pt. went for CT sca, started having hemoptysis episode. (coughing up blood) - Large amount
Pts. /88
pt. stated he had "white" bowel movement.
/90, HR 98
Pt. /98
Pt is still in pain after oral pain med

## 2017-11-08 NOTE — PROVIDER CONTACT NOTE (OTHER) - NAME OF MD/NP/PA/DO NOTIFIED:
Komal Mon MD surgery team D
Lory AMBRIZ
Tala Salter surgery team D resident
Bonita AMBRIZ
Bonita AMBRIZ
Lory STUART team
MD Bonita
MD NARCISO Henley team
MD Yaa
Roxane FRANZ
Roxane FRANZ
SAMUEL HARPER MD
SAMUEL HARPER MD
Surg NARCISO AMBRIZ. Surg A MD in contact with NARCISO AMBRIZ as well.
Team D
Team D, Gaetano
Team D, Gaetano
Yaa AMBRIZ.
Yaa AMBRIZ.
Team D

## 2017-11-08 NOTE — PROVIDER CONTACT NOTE (OTHER) - SITUATION
pt asymptomatic
/85, HR 99, pt is asymptomatic
Pt took Oxycodone 10 mg and no change in pain
Pt /88 and 
Pt BP was 178/78 and HR was 118
Pt is having pain and burning in this chest and going up his throat
Pt morning vitals BP is 156/85  and 
Pt. /81
Pt. /85
Pt. /88
Pt. /92 post  attempted pancreactomy.
Pt. asymptomatic, BP: 175/84.
Pt. stable, /86
Pt. status post pancreactomy.
PtVinny Owens, /73.
patient complaining of being diaphoretic x2 times and abdominal pain.
pt received 50mg of lopressor PO at 1300

## 2017-11-08 NOTE — PROGRESS NOTE ADULT - SUBJECTIVE AND OBJECTIVE BOX
Day _6_ of Anesthesia Pain Management Service    Allergies  No Known Allergies    SUBJECTIVE: "I'm feeling better today."    Pain Scale Score	At rest: _3-4/10_ 	With Activity: ___ 	[ ] Refer to charted pain scores    THERAPY:    [ ] IV PCA Morphine		[ ] 5 mg/mL	[ ] 1 mg/mL  [X] IV PCA Hydromorphone	[ ] 5 mg/mL	[X] 1 mg/mL  [ ] IV PCA Fentanyl		[ ] 50 micrograms/mL    Demand dose _0.2mg_ lockout _6_ (minutes) Continuous Rate _0_ Total: _19.2mg_  Daily      MEDICATIONS  (STANDING):  acetaminophen   Tablet 650 milliGRAM(s) Oral every 6 hours  docusate sodium 100 milliGRAM(s) Oral three times a day  enoxaparin Injectable 40 milliGRAM(s) SubCutaneous daily  fentaNYL   Patch  12 MICROgram(s)/Hr 1 Patch Transdermal every 72 hours  losartan 25 milliGRAM(s) Oral daily  metoprolol     tartrate 125 milliGRAM(s) Oral two times a day  pantoprazole  Injectable 40 milliGRAM(s) IV Push two times a day  senna 2 Tablet(s) Oral at bedtime  tamsulosin 0.4 milliGRAM(s) Oral at bedtime    MEDICATIONS  (PRN):  HYDROmorphone  Injectable 1 milliGRAM(s) IV Push every 6 hours PRN Severe Pain unrelieved by Oxycodone IR  naloxone Injectable 0.1 milliGRAM(s) IV Push every 3 minutes PRN For ANY of the following changes in patient status:  A. RR LESS THAN 10 breaths per minute, B. Oxygen saturation LESS THAN 90%, C. Sedation score of 6  ondansetron Injectable 4 milliGRAM(s) IV Push every 6 hours PRN Nausea  oxyCODONE    IR 10 milliGRAM(s) Oral every 3 hours PRN Moderate Pain (4 - 6)  oxyCODONE    IR 15 milliGRAM(s) Oral every 3 hours PRN Severe Pain (7 - 10)      OBJECTIVE: A&Ox3, NAD, supine in bed    Sedation Score:	[X] Alert	[ ] Drowsy	[ ] Arousable	[ ] Asleep	[ ] Unresponsive    Side Effects:	[X] None	[ ] Nausea	[ ] Vomiting	[ ] Pruritus  		  [ ] Weakness		[ ] Numbness	[ ] Other:                            13.3   10.30 )-----------( 362      ( 07 Nov 2017 06:27 )             39.0       11-08    134<L>  |  96<L>  |  13  ----------------------------<  92  4.2   |  23  |  0.57    Ca    8.9      08 Nov 2017 05:50  Phos  3.1     11-08  Mg     1.9     11-08        ASSESSMENT/ PLAN    Therapy to  be:	[] Continue   [x] Discontinued   [x] Change to prn Analgesics    Documentation and Verification of current medications:  [X] Done	[ ] Not done, not eligible  [ ] Not done, reason not given    Comments:  Tolerates regular diet  Continue with Duragesic q72hrs and Oxycodone PRN  Add Gabapentin TID

## 2017-11-08 NOTE — PROGRESS NOTE ADULT - SUBJECTIVE AND OBJECTIVE BOX
SURGICAL ONCOLOGY PROGRESS NOTE    No complaints.  Karine diet. Pain controlled    Vital Signs Last 24 Hrs  T(C): 36.6 (08 Nov 2017 16:56), Max: 37 (08 Nov 2017 00:20)  T(F): 97.8 (08 Nov 2017 16:56), Max: 98.6 (08 Nov 2017 00:20)  HR: 105 (08 Nov 2017 16:56) (94 - 108)  BP: 152/84 (08 Nov 2017 16:56) (136/83 - 181/88)  BP(mean): --  RR: 18 (08 Nov 2017 16:56) (18 - 18)  SpO2: 98% (08 Nov 2017 16:56) (96% - 99%)  I&O's Detail    07 Nov 2017 07:01  -  08 Nov 2017 07:00  --------------------------------------------------------  IN:    lactated ringers.: 750 mL  Total IN: 750 mL    OUT:    Voided: 2300 mL  Total OUT: 2300 mL    Total NET: -1550 mL      08 Nov 2017 07:01  -  08 Nov 2017 18:40  --------------------------------------------------------  IN:  Total IN: 0 mL    OUT:    Voided: 300 mL  Total OUT: 300 mL    Total NET: -300 mL          PE:    A&A  NAD    soft, NT, ND, No peritoneal signs    inc  c/d/i                          13.3   10.30 )-----------( 362      ( 07 Nov 2017 06:27 )             39.0     11-08    134<L>  |  96<L>  |  13  ----------------------------<  92  4.2   |  23  |  0.57    Ca    8.9      08 Nov 2017 05:50  Phos  3.1     11-08  Mg     1.9     11-08

## 2017-11-08 NOTE — PROVIDER CONTACT NOTE (OTHER) - ACTION/TREATMENT ORDERED:
Will continue to monitor.
Team aware and at bedside. Pt. keeps refusing to get NG tube.
Team at bedside. Will continue to monitor.
Team aware, will continue to monitor.
Team aware. Will continue to monitor.
Gave AM Lopressor IV Push. Team ordered to monitor and will address it to AM team while in rounds. Will continue to monitor.
RN made team aware from unit, team with patient.
Team aware and at bedside, team ordered blood work and attempted putting in NG tube, pt. refused. Will continue to monitor.
1 time IV dose of Dilaudid
Team at bedside, will recheck BP two hours from now. Will continue to monitor.
Team aware, pt. will be given ordered BP med at 9PM.
1 time does of Hydralazine and continue to monitor
Continue to monitor
EKG, troponin and protonix
Give noon metoperolol early
Team at bedside. Will continue to monitor.
Team aware, MD ordered to monitor and stated "will notify AM Primary Team."
Team aware, will continue to monitor.
Team aware, will continue to monitor.
Team aware. Will continue to monitor.

## 2017-11-08 NOTE — PROGRESS NOTE ADULT - SUBJECTIVE AND OBJECTIVE BOX
Day _6__ of Anesthesia Pain Management Service    SUBJECTIVE:    Therapy:	  [ ] IV PCA	   [ ] Epidural           [ ] s/p Spinal Opoid              [ ] Postpartum infusion	  [ ] Patient controlled regional anesthesia (PCRA)    [ ] prn Analgesics    OBJECTIVE:   [x] No new signs     [ ] Other:    Side Effects:  [ x] None			[ ] Other:    Assessment of Catheter Site:		[ ] Intact		[ ] Other:    ASSESSMENT/PLAN  [ ] Continue current therapy    [x ] Therapy changed to:    [ ] IV PCA       [ ] Epidural     [x ] prn Analgesics     Comments:

## 2017-11-08 NOTE — PROVIDER CONTACT NOTE (OTHER) - RECOMMENDATIONS
1 time order of pain meds
Will be given ordered BP med at 9PM.
Continue to monitor
Continue to monitor.
EKG done showing sinus tachycardia. 0.5mg Dilaudid bolus given via PCA pump. tylenol PO administered. will continue to monitor.
EKG, and antiacid
Give noon metoperolol early
Monitor and 1 time does of hydralazine
PRN BP medication should be ordered.
PRN BP medications should be ordered. Will continue to monitor.
Pushed IV Lopressor as per 0100 medication orders. Will recheck BP in 15 min and will continue to monitor.
Should have PRN BP meds.

## 2017-11-08 NOTE — PROVIDER CONTACT NOTE (OTHER) - DATE AND TIME:
07-Nov-2017 22:40
06-Nov-2017 08:00
08-Nov-2017 12:50
04-Nov-2017 00:40
04-Nov-2017 13:20
04-Nov-2017 22:48
04-Nov-2017 22:50
04-Nov-2017 22:52
05-Nov-2017 08:26
05-Nov-2017 10:50
05-Nov-2017 12:25
05-Nov-2017 20:34
06-Nov-2017 01:30
06-Nov-2017 01:55
06-Nov-2017 02:58
06-Nov-2017 05:10
06-Nov-2017 06:46
06-Nov-2017 11:45
06-Nov-2017 14:30
06-Nov-2017 20:23
07-Nov-2017 05:27
07-Nov-2017 06:03
07-Nov-2017 20:28
08-Nov-2017 00:30

## 2017-11-08 NOTE — PROVIDER CONTACT NOTE (OTHER) - BACKGROUND
Pt had abdomen open and closed
Pt. post open/closed pancreatomy, started coughing up large amounts of blood during CT scan.
Pt. post open/closed pancreatomy.
Pt. s/p attempted pancreactomy.
Pt had pancreatectomy and splenectomy with no intervention
Pt had pancreatectomy and splenectomy with no intervention on 11/3/17
Pt had pancreatectomy and splenectomy with out intervention, open and close on 11/3/17
Pt had pancreatectomy and splenectomy without intervention, open and close on 11/3/17
Pt. post pancreatomy.
Pt. s/p attempted pancreactomy
Pt. s/p attempted pancreactomy.
Pt. s/p attempted pancreactomy.
Pt. s/p pancreactomy
S/P Pancreactomy.
s/p ex-lap to open

## 2017-11-08 NOTE — PROGRESS NOTE ADULT - ATTENDING COMMENTS
DC today  Will need Med Onc and Rad onc outpt evals -- D/w pt and family
Clears,  advance as tolerated to reg diet for lunch  hep lock ivf  dc pca.  start oral analgesia if tolerating reg diet  OOB and ambulate  updated t re intraop findings
seen on 11/6

## 2017-11-08 NOTE — PROVIDER CONTACT NOTE (OTHER) - ASSESSMENT
Pt. asymptomatic. Watching TV comfortably.
Kd Owens midnight BP update: 176/100. HR: 104   Pt. sleeping comfortably. Asymptomatic.
Pt. asymptomatic. BP: 168/84 after giving Lobetolol.
Pt is in pain
Pt. asymptomatic. Pt. watching tv. BP elevated to 188/85.
Pt. stable, /84
Pt. stable, VSS. Pt. coughing out dark blood.
RN made team aware from unit, team with patient.
BP: 181/92, pt. asleep comfortably.
Pt asymptomatic
Pt complaining of chest pain and is diaphoretic
Pt is asymptomatic
Pt is asymptomatic
Pt. asymptomatic. Pt. watching tv. Pt. BP: 175/84.
Pt. comfortable, watching tv, asymptomatic. BP: 181/88, 
Pt. comfortably laying in bed, watching tv. Elevated BP: 168/73
Pt. stable, BP elevated. Pt. sleeping.
Pt. stable, sleeping. /86
blood pressure stable. tachycardic -119bpm. patient very diaphoretic and complaining of localized pain in LUQ.

## 2017-11-09 ENCOUNTER — TRANSCRIPTION ENCOUNTER (OUTPATIENT)
Age: 62
End: 2017-11-09

## 2017-11-13 ENCOUNTER — RX RENEWAL (OUTPATIENT)
Age: 62
End: 2017-11-13

## 2017-11-19 LAB — SURGICAL PATHOLOGY STUDY: SIGNIFICANT CHANGE UP

## 2017-11-20 ENCOUNTER — APPOINTMENT (OUTPATIENT)
Dept: SURGICAL ONCOLOGY | Facility: CLINIC | Age: 62
End: 2017-11-20
Payer: MEDICARE

## 2017-11-20 VITALS
HEART RATE: 118 BPM | DIASTOLIC BLOOD PRESSURE: 80 MMHG | HEIGHT: 69 IN | SYSTOLIC BLOOD PRESSURE: 140 MMHG | OXYGEN SATURATION: 100 % | WEIGHT: 230 LBS | BODY MASS INDEX: 34.07 KG/M2 | TEMPERATURE: 97.6 F

## 2017-11-20 PROCEDURE — 99024 POSTOP FOLLOW-UP VISIT: CPT

## 2017-11-21 ENCOUNTER — APPOINTMENT (OUTPATIENT)
Dept: RADIATION ONCOLOGY | Facility: CLINIC | Age: 62
End: 2017-11-21
Payer: MEDICARE

## 2017-11-21 ENCOUNTER — OUTPATIENT (OUTPATIENT)
Dept: OUTPATIENT SERVICES | Facility: HOSPITAL | Age: 62
LOS: 1 days | Discharge: HOME | End: 2017-11-21

## 2017-11-21 ENCOUNTER — CHART COPY (OUTPATIENT)
Age: 62
End: 2017-11-21

## 2017-11-21 VITALS
TEMPERATURE: 98.1 F | OXYGEN SATURATION: 98 % | BODY MASS INDEX: 28.9 KG/M2 | SYSTOLIC BLOOD PRESSURE: 130 MMHG | WEIGHT: 195.11 LBS | HEIGHT: 69 IN | RESPIRATION RATE: 16 BRPM | HEART RATE: 112 BPM | DIASTOLIC BLOOD PRESSURE: 73 MMHG

## 2017-11-21 DIAGNOSIS — Z90.49 ACQUIRED ABSENCE OF OTHER SPECIFIED PARTS OF DIGESTIVE TRACT: Chronic | ICD-10-CM

## 2017-11-21 DIAGNOSIS — Z78.9 OTHER SPECIFIED HEALTH STATUS: ICD-10-CM

## 2017-11-21 DIAGNOSIS — Z98.890 OTHER SPECIFIED POSTPROCEDURAL STATES: Chronic | ICD-10-CM

## 2017-11-21 PROCEDURE — 99205 OFFICE O/P NEW HI 60 MIN: CPT | Mod: 25

## 2017-11-22 ENCOUNTER — OUTPATIENT (OUTPATIENT)
Dept: OUTPATIENT SERVICES | Facility: HOSPITAL | Age: 62
LOS: 1 days | Discharge: HOME | End: 2017-11-22

## 2017-11-22 DIAGNOSIS — Z90.49 ACQUIRED ABSENCE OF OTHER SPECIFIED PARTS OF DIGESTIVE TRACT: Chronic | ICD-10-CM

## 2017-11-22 DIAGNOSIS — C25.9 MALIGNANT NEOPLASM OF PANCREAS, UNSPECIFIED: ICD-10-CM

## 2017-11-22 DIAGNOSIS — Z98.890 OTHER SPECIFIED POSTPROCEDURAL STATES: Chronic | ICD-10-CM

## 2017-11-24 ENCOUNTER — RESULT REVIEW (OUTPATIENT)
Age: 62
End: 2017-11-24

## 2017-11-24 ENCOUNTER — APPOINTMENT (OUTPATIENT)
Age: 62
End: 2017-11-24
Payer: MEDICARE

## 2017-11-24 VITALS
BODY MASS INDEX: 29.33 KG/M2 | DIASTOLIC BLOOD PRESSURE: 90 MMHG | RESPIRATION RATE: 16 BRPM | OXYGEN SATURATION: 100 % | WEIGHT: 195.77 LBS | SYSTOLIC BLOOD PRESSURE: 166 MMHG | TEMPERATURE: 97.5 F | HEART RATE: 94 BPM | HEIGHT: 68.5 IN

## 2017-11-24 DIAGNOSIS — Z85.038 PERSONAL HISTORY OF OTHER MALIGNANT NEOPLASM OF LARGE INTESTINE: ICD-10-CM

## 2017-11-24 DIAGNOSIS — Z87.891 PERSONAL HISTORY OF NICOTINE DEPENDENCE: ICD-10-CM

## 2017-11-24 LAB
BASOPHILS # BLD AUTO: 0 K/UL — SIGNIFICANT CHANGE UP (ref 0–0.2)
BASOPHILS NFR BLD AUTO: 0.4 % — SIGNIFICANT CHANGE UP (ref 0–2)
EOSINOPHIL # BLD AUTO: 0.1 K/UL — SIGNIFICANT CHANGE UP (ref 0–0.5)
EOSINOPHIL NFR BLD AUTO: 1.3 % — SIGNIFICANT CHANGE UP (ref 0–6)
HCT VFR BLD CALC: 40.9 % — SIGNIFICANT CHANGE UP (ref 39–50)
HGB BLD-MCNC: 13.9 G/DL — SIGNIFICANT CHANGE UP (ref 13–17)
LYMPHOCYTES # BLD AUTO: 1.8 K/UL — SIGNIFICANT CHANGE UP (ref 1–3.3)
LYMPHOCYTES # BLD AUTO: 18.9 % — SIGNIFICANT CHANGE UP (ref 13–44)
MCHC RBC-ENTMCNC: 30 PG — SIGNIFICANT CHANGE UP (ref 27–34)
MCHC RBC-ENTMCNC: 34 G/DL — SIGNIFICANT CHANGE UP (ref 32–36)
MCV RBC AUTO: 88.3 FL — SIGNIFICANT CHANGE UP (ref 80–100)
MONOCYTES # BLD AUTO: 0.5 K/UL — SIGNIFICANT CHANGE UP (ref 0–0.9)
MONOCYTES NFR BLD AUTO: 5 % — SIGNIFICANT CHANGE UP (ref 2–14)
NEUTROPHILS # BLD AUTO: 6.9 K/UL — SIGNIFICANT CHANGE UP (ref 1.8–7.4)
NEUTROPHILS NFR BLD AUTO: 74.3 % — SIGNIFICANT CHANGE UP (ref 43–77)
PLATELET # BLD AUTO: 384 K/UL — SIGNIFICANT CHANGE UP (ref 150–400)
RBC # BLD: 4.64 M/UL — SIGNIFICANT CHANGE UP (ref 4.2–5.8)
RBC # FLD: 12.2 % — SIGNIFICANT CHANGE UP (ref 10.3–14.5)
WBC # BLD: 9.3 K/UL — SIGNIFICANT CHANGE UP (ref 3.8–10.5)
WBC # FLD AUTO: 9.3 K/UL — SIGNIFICANT CHANGE UP (ref 3.8–10.5)

## 2017-11-24 PROCEDURE — 99205 OFFICE O/P NEW HI 60 MIN: CPT

## 2017-11-24 RX ORDER — OXYCODONE 10 MG/1
10 TABLET ORAL EVERY 4 HOURS
Qty: 60 | Refills: 0 | Status: DISCONTINUED | COMMUNITY
Start: 2017-11-13 | End: 2017-11-24

## 2017-11-24 RX ORDER — OXYCODONE 10 MG/1
10 TABLET ORAL EVERY 4 HOURS
Qty: 60 | Refills: 0 | Status: DISCONTINUED | COMMUNITY
Start: 2017-11-21 | End: 2017-11-24

## 2017-11-26 LAB
APTT BLD: 35.3 SEC
CANCER AG19-9 SERPL-ACNC: 81.3 U/ML
CEA SERPL-MCNC: 29 NG/ML
HBV CORE IGG+IGM SER QL: NONREACTIVE
HBV CORE IGM SER QL: NONREACTIVE
HBV SURFACE AB SER QL: NONREACTIVE
HBV SURFACE AG SER QL: NONREACTIVE
HCV AB SER QL: NONREACTIVE
HCV S/CO RATIO: 0.15 S/CO
INR PPP: 1.14 RATIO
PT BLD: 12.9 SEC

## 2017-11-27 ENCOUNTER — FORM ENCOUNTER (OUTPATIENT)
Age: 62
End: 2017-11-27

## 2017-11-28 ENCOUNTER — OUTPATIENT (OUTPATIENT)
Dept: OUTPATIENT SERVICES | Facility: HOSPITAL | Age: 62
LOS: 1 days | End: 2017-11-28
Payer: MEDICARE

## 2017-11-28 ENCOUNTER — APPOINTMENT (OUTPATIENT)
Dept: CT IMAGING | Facility: IMAGING CENTER | Age: 62
End: 2017-11-28
Payer: MEDICARE

## 2017-11-28 DIAGNOSIS — Z90.49 ACQUIRED ABSENCE OF OTHER SPECIFIED PARTS OF DIGESTIVE TRACT: Chronic | ICD-10-CM

## 2017-11-28 DIAGNOSIS — Z98.890 OTHER SPECIFIED POSTPROCEDURAL STATES: Chronic | ICD-10-CM

## 2017-11-28 DIAGNOSIS — C25.9 MALIGNANT NEOPLASM OF PANCREAS, UNSPECIFIED: ICD-10-CM

## 2017-11-28 PROCEDURE — 71260 CT THORAX DX C+: CPT | Mod: 26

## 2017-11-28 PROCEDURE — 74177 CT ABD & PELVIS W/CONTRAST: CPT | Mod: 26

## 2017-11-28 PROCEDURE — 74177 CT ABD & PELVIS W/CONTRAST: CPT

## 2017-11-28 PROCEDURE — 71260 CT THORAX DX C+: CPT

## 2017-11-29 ENCOUNTER — INPATIENT (INPATIENT)
Facility: HOSPITAL | Age: 62
LOS: 8 days | Discharge: HOME | DRG: 436 | End: 2017-12-08
Attending: INTERNAL MEDICINE | Admitting: SURGERY
Payer: MEDICARE

## 2017-11-29 VITALS
SYSTOLIC BLOOD PRESSURE: 134 MMHG | WEIGHT: 175.93 LBS | TEMPERATURE: 98 F | OXYGEN SATURATION: 98 % | RESPIRATION RATE: 18 BRPM | HEART RATE: 117 BPM | DIASTOLIC BLOOD PRESSURE: 80 MMHG

## 2017-11-29 DIAGNOSIS — K25.5 CHRONIC OR UNSPECIFIED GASTRIC ULCER WITH PERFORATION: Chronic | ICD-10-CM

## 2017-11-29 DIAGNOSIS — Z98.890 OTHER SPECIFIED POSTPROCEDURAL STATES: Chronic | ICD-10-CM

## 2017-11-29 DIAGNOSIS — Z90.49 ACQUIRED ABSENCE OF OTHER SPECIFIED PARTS OF DIGESTIVE TRACT: Chronic | ICD-10-CM

## 2017-11-29 DIAGNOSIS — K25.5 CHRONIC OR UNSPECIFIED GASTRIC ULCER WITH PERFORATION: ICD-10-CM

## 2017-11-29 LAB
ALBUMIN SERPL ELPH-MCNC: 4 G/DL — SIGNIFICANT CHANGE UP (ref 3.3–5)
ALP SERPL-CCNC: 78 U/L — SIGNIFICANT CHANGE UP (ref 40–120)
ALT FLD-CCNC: 10 U/L RC — SIGNIFICANT CHANGE UP (ref 10–45)
ANION GAP SERPL CALC-SCNC: 14 MMOL/L — SIGNIFICANT CHANGE UP (ref 5–17)
APTT BLD: 25 SEC — LOW (ref 27.5–37.4)
AST SERPL-CCNC: 13 U/L — SIGNIFICANT CHANGE UP (ref 10–40)
BASE EXCESS BLDV CALC-SCNC: 1.2 MMOL/L — SIGNIFICANT CHANGE UP (ref -2–2)
BASOPHILS # BLD AUTO: 0 K/UL — SIGNIFICANT CHANGE UP (ref 0–0.2)
BASOPHILS NFR BLD AUTO: 0.2 % — SIGNIFICANT CHANGE UP (ref 0–2)
BILIRUB SERPL-MCNC: 0.2 MG/DL — SIGNIFICANT CHANGE UP (ref 0.2–1.2)
BLD GP AB SCN SERPL QL: NEGATIVE — SIGNIFICANT CHANGE UP
BUN SERPL-MCNC: 14 MG/DL — SIGNIFICANT CHANGE UP (ref 7–23)
CA-I SERPL-SCNC: 1.3 MMOL/L — SIGNIFICANT CHANGE UP (ref 1.12–1.3)
CALCIUM SERPL-MCNC: 10.2 MG/DL — SIGNIFICANT CHANGE UP (ref 8.4–10.5)
CHLORIDE BLDV-SCNC: 101 MMOL/L — SIGNIFICANT CHANGE UP (ref 96–108)
CHLORIDE SERPL-SCNC: 96 MMOL/L — SIGNIFICANT CHANGE UP (ref 96–108)
CO2 BLDV-SCNC: 29 MMOL/L — SIGNIFICANT CHANGE UP (ref 22–30)
CO2 SERPL-SCNC: 23 MMOL/L — SIGNIFICANT CHANGE UP (ref 22–31)
CREAT SERPL-MCNC: 0.74 MG/DL — SIGNIFICANT CHANGE UP (ref 0.5–1.3)
EOSINOPHIL # BLD AUTO: 0.2 K/UL — SIGNIFICANT CHANGE UP (ref 0–0.5)
EOSINOPHIL NFR BLD AUTO: 1.9 % — SIGNIFICANT CHANGE UP (ref 0–6)
GAS PNL BLDV: 133 MMOL/L — LOW (ref 136–145)
GAS PNL BLDV: SIGNIFICANT CHANGE UP
GLUCOSE BLDV-MCNC: 108 MG/DL — HIGH (ref 70–99)
GLUCOSE SERPL-MCNC: 108 MG/DL — HIGH (ref 70–99)
HCO3 BLDV-SCNC: 27 MMOL/L — SIGNIFICANT CHANGE UP (ref 21–29)
HCT VFR BLD CALC: 38.7 % — LOW (ref 39–50)
HCT VFR BLDA CALC: 44 % — SIGNIFICANT CHANGE UP (ref 39–50)
HGB BLD CALC-MCNC: 14.2 G/DL — SIGNIFICANT CHANGE UP (ref 13–17)
HGB BLD-MCNC: 13.1 G/DL — SIGNIFICANT CHANGE UP (ref 13–17)
INR BLD: 1.16 RATIO — SIGNIFICANT CHANGE UP (ref 0.88–1.16)
LACTATE BLDV-MCNC: 1.9 MMOL/L — SIGNIFICANT CHANGE UP (ref 0.7–2)
LYMPHOCYTES # BLD AUTO: 2 K/UL — SIGNIFICANT CHANGE UP (ref 1–3.3)
LYMPHOCYTES # BLD AUTO: 21.9 % — SIGNIFICANT CHANGE UP (ref 13–44)
MCHC RBC-ENTMCNC: 30.8 PG — SIGNIFICANT CHANGE UP (ref 27–34)
MCHC RBC-ENTMCNC: 33.9 GM/DL — SIGNIFICANT CHANGE UP (ref 32–36)
MCV RBC AUTO: 90.7 FL — SIGNIFICANT CHANGE UP (ref 80–100)
MONOCYTES # BLD AUTO: 0.7 K/UL — SIGNIFICANT CHANGE UP (ref 0–0.9)
MONOCYTES NFR BLD AUTO: 7.3 % — SIGNIFICANT CHANGE UP (ref 2–14)
NEUTROPHILS # BLD AUTO: 6.1 K/UL — SIGNIFICANT CHANGE UP (ref 1.8–7.4)
NEUTROPHILS NFR BLD AUTO: 68.7 % — SIGNIFICANT CHANGE UP (ref 43–77)
PCO2 BLDV: 51 MMHG — HIGH (ref 35–50)
PH BLDV: 7.34 — LOW (ref 7.35–7.45)
PLATELET # BLD AUTO: 389 K/UL — SIGNIFICANT CHANGE UP (ref 150–400)
PO2 BLDV: 20 MMHG — LOW (ref 25–45)
POTASSIUM BLDV-SCNC: 4.5 MMOL/L — SIGNIFICANT CHANGE UP (ref 3.5–5)
POTASSIUM SERPL-MCNC: 5.2 MMOL/L — SIGNIFICANT CHANGE UP (ref 3.5–5.3)
POTASSIUM SERPL-SCNC: 5.2 MMOL/L — SIGNIFICANT CHANGE UP (ref 3.5–5.3)
PROT SERPL-MCNC: 7.5 G/DL — SIGNIFICANT CHANGE UP (ref 6–8.3)
PROTHROM AB SERPL-ACNC: 12.6 SEC — SIGNIFICANT CHANGE UP (ref 9.8–12.7)
RBC # BLD: 4.27 M/UL — SIGNIFICANT CHANGE UP (ref 4.2–5.8)
RBC # FLD: 12.6 % — SIGNIFICANT CHANGE UP (ref 10.3–14.5)
RH IG SCN BLD-IMP: POSITIVE — SIGNIFICANT CHANGE UP
RH IG SCN BLD-IMP: POSITIVE — SIGNIFICANT CHANGE UP
SAO2 % BLDV: 23 % — LOW (ref 67–88)
SODIUM SERPL-SCNC: 133 MMOL/L — LOW (ref 135–145)
WBC # BLD: 8.9 K/UL — SIGNIFICANT CHANGE UP (ref 3.8–10.5)
WBC # FLD AUTO: 8.9 K/UL — SIGNIFICANT CHANGE UP (ref 3.8–10.5)

## 2017-11-29 PROCEDURE — 99285 EMERGENCY DEPT VISIT HI MDM: CPT

## 2017-11-29 PROCEDURE — 74177 CT ABD & PELVIS W/CONTRAST: CPT | Mod: 26

## 2017-11-29 RX ORDER — CIPROFLOXACIN LACTATE 400MG/40ML
400 VIAL (ML) INTRAVENOUS ONCE
Qty: 0 | Refills: 0 | Status: COMPLETED | OUTPATIENT
Start: 2017-11-29 | End: 2017-11-29

## 2017-11-29 RX ORDER — ACETAMINOPHEN 500 MG
1000 TABLET ORAL ONCE
Qty: 0 | Refills: 0 | Status: COMPLETED | OUTPATIENT
Start: 2017-11-29 | End: 2017-11-29

## 2017-11-29 RX ORDER — SODIUM CHLORIDE 9 MG/ML
1000 INJECTION INTRAMUSCULAR; INTRAVENOUS; SUBCUTANEOUS ONCE
Qty: 0 | Refills: 0 | Status: COMPLETED | OUTPATIENT
Start: 2017-11-29 | End: 2017-11-29

## 2017-11-29 RX ORDER — METRONIDAZOLE 500 MG
500 TABLET ORAL ONCE
Qty: 0 | Refills: 0 | Status: COMPLETED | OUTPATIENT
Start: 2017-11-29 | End: 2017-11-29

## 2017-11-29 RX ORDER — MORPHINE SULFATE 50 MG/1
4 CAPSULE, EXTENDED RELEASE ORAL ONCE
Qty: 0 | Refills: 0 | Status: DISCONTINUED | OUTPATIENT
Start: 2017-11-29 | End: 2017-11-29

## 2017-11-29 RX ORDER — OXYCODONE HYDROCHLORIDE 5 MG/1
5 TABLET ORAL ONCE
Qty: 0 | Refills: 0 | Status: DISCONTINUED | OUTPATIENT
Start: 2017-11-29 | End: 2017-11-29

## 2017-11-29 RX ORDER — PIPERACILLIN AND TAZOBACTAM 4; .5 G/20ML; G/20ML
3.38 INJECTION, POWDER, LYOPHILIZED, FOR SOLUTION INTRAVENOUS ONCE
Qty: 0 | Refills: 0 | Status: DISCONTINUED | OUTPATIENT
Start: 2017-11-29 | End: 2017-12-13

## 2017-11-29 RX ADMIN — MORPHINE SULFATE 4 MILLIGRAM(S): 50 CAPSULE, EXTENDED RELEASE ORAL at 21:07

## 2017-11-29 RX ADMIN — OXYCODONE HYDROCHLORIDE 5 MILLIGRAM(S): 5 TABLET ORAL at 22:07

## 2017-11-29 RX ADMIN — MORPHINE SULFATE 4 MILLIGRAM(S): 50 CAPSULE, EXTENDED RELEASE ORAL at 17:41

## 2017-11-29 RX ADMIN — SODIUM CHLORIDE 1000 MILLILITER(S): 9 INJECTION INTRAMUSCULAR; INTRAVENOUS; SUBCUTANEOUS at 17:23

## 2017-11-29 RX ADMIN — Medication 100 MILLIGRAM(S): at 18:51

## 2017-11-29 RX ADMIN — Medication 400 MILLIGRAM(S): at 22:10

## 2017-11-29 RX ADMIN — Medication 200 MILLIGRAM(S): at 17:23

## 2017-11-29 NOTE — ED PROVIDER NOTE - PHYSICAL EXAMINATION
Gen: AAO x 3, NAD  Skin: No rashes or lesions  HEENT: NC/AT, PERRLA, EOMI, MMM  Resp: unlabored CTAB  Cardiac: rrr s1s2, no murmurs, rubs or gallops  GI: Midline abdominal scar, ND, +BS, Soft, NT  Ext: no pedal edema, FROM in all extremities  Neuro: no focal deficits

## 2017-11-29 NOTE — ED ADULT TRIAGE NOTE - CHIEF COMPLAINT QUOTE
abdominal pain s/p abdominal surgery 11/3/17, had follow up abdominal xray today and was sent here for "blood leak in the stomach"

## 2017-11-29 NOTE — ED ADULT NURSE NOTE - CHPI ED SYMPTOMS NEG
no chills/no fever/no hematuria/no diarrhea/no blood in stool/no abdominal distension/no dysuria/no burning urination

## 2017-11-29 NOTE — ED PROVIDER NOTE - ATTENDING CONTRIBUTION TO CARE
63 yo M presents with concern for gastric perforation. He had a CT a/p done yesterday that demonstrated "possible gastric perforation". patient has a h/o colon and pancreatic cancer. he had a colectomy 4 months ago and then they attempted a revision on 11/3, which was cancelled. for the past 4 months patient has had chronic abdominal pain, mild at this time as he just took pain meds at home. He denies any worsening pain, N/V, F/Ch, or urinary complaints. surgeon Dr. Hager  PE with large vertical midline incision, C/D/I. mild diffuse abdominal TTP, with no peritoneal signs.   surgery was consulted. 61 yo M presents with concern for gastric perforation. He had a CT a/p done yesterday that demonstrated "possible gastric perforation". patient has a h/o colon and pancreatic cancer. he had a colectomy 4 months ago and then they attempted a revision on 11/3, which was cancelled. for the past 4 months patient has had chronic abdominal pain, mild at this time as he just took pain meds at home. He denies any worsening pain, N/V, F/Ch, or urinary complaints. surgeon Dr. Hager  PE with large vertical midline incision, C/D/I. mild diffuse abdominal TTP, with no peritoneal signs.   surgery was consulted. They wanted a repeat CAT scan with PO and IV contrast.   repeat CAT scan demonstrated Focal thinning of the gastric fundus wall without definite wall   dehiscence, which may represent ulceration versus perforation.  there were other incidental findings which I did discuss with patient and recommended follow-up with primary care doctor.   patient was treated with IV fluids, pain medication, and empiric antibiotics with zosyn abx in the ER.  general surgery admitted patient to their service  In stable condition    Based on patient's HPI as well as the results of today's workup, I felt that patient warranted admission to the hospital for further workup/evaluation and continued management. I discussed the findings of today's workup with the patient and addressed the patient's questions and concerns. The patient was agreeable with admission. I spoke with  Surgery who accepted the patient for admission and subsequently took over the patient's care.

## 2017-11-29 NOTE — ED ADULT NURSE NOTE - CHIEF COMPLAINT QUOTE
abdominal pain s/p abdominal surgery 11/3/17, had follow up abdominal xray today and was sent here for "blood leak in the stomach". No BM for 2 days

## 2017-11-29 NOTE — ED PROVIDER NOTE - OBJECTIVE STATEMENT
63 yo male with PMHx of colon CA s/p resection, pancreatic mass p/w concern for gastric perforation.  The patient reports that he had an exploratory lap 3 weeks ago for pancreatic mass.  Mass was found to be unresectable as there was invasion into the portal confluence and SMV/SMA.  Patient was discharged to start chemo.  Yesterday patient had a CT chest abd/pelvis for chemo planning which showing ?gastric fundal perforation.  Patient reports that he has some abdominal pain, which has been chronic for the past few month.  Currently on morphine and oxycodone at home.  Denies fevers/chills, CP, SOB, dysuria, NVDC.  Onc: Louann Maloney  Surgery: Dr. Blayne Hager

## 2017-11-30 ENCOUNTER — TRANSCRIPTION ENCOUNTER (OUTPATIENT)
Age: 62
End: 2017-11-30

## 2017-11-30 ENCOUNTER — CLINICAL ADVICE (OUTPATIENT)
Age: 62
End: 2017-11-30

## 2017-11-30 LAB
ANION GAP SERPL CALC-SCNC: 17 MMOL/L — SIGNIFICANT CHANGE UP (ref 5–17)
BUN SERPL-MCNC: 10 MG/DL — SIGNIFICANT CHANGE UP (ref 7–23)
CALCIUM SERPL-MCNC: 9.6 MG/DL — SIGNIFICANT CHANGE UP (ref 8.4–10.5)
CHLORIDE SERPL-SCNC: 96 MMOL/L — SIGNIFICANT CHANGE UP (ref 96–108)
CO2 SERPL-SCNC: 21 MMOL/L — LOW (ref 22–31)
CREAT SERPL-MCNC: 0.6 MG/DL — SIGNIFICANT CHANGE UP (ref 0.5–1.3)
GLUCOSE SERPL-MCNC: 121 MG/DL — HIGH (ref 70–99)
HCT VFR BLD CALC: 36.7 % — LOW (ref 39–50)
HGB BLD-MCNC: 12.4 G/DL — LOW (ref 13–17)
MAGNESIUM SERPL-MCNC: 2 MG/DL — SIGNIFICANT CHANGE UP (ref 1.6–2.6)
MCHC RBC-ENTMCNC: 29.2 PG — SIGNIFICANT CHANGE UP (ref 27–34)
MCHC RBC-ENTMCNC: 33.8 GM/DL — SIGNIFICANT CHANGE UP (ref 32–36)
MCV RBC AUTO: 86.4 FL — SIGNIFICANT CHANGE UP (ref 80–100)
PHOSPHATE SERPL-MCNC: 3.5 MG/DL — SIGNIFICANT CHANGE UP (ref 2.5–4.5)
PLATELET # BLD AUTO: 342 K/UL — SIGNIFICANT CHANGE UP (ref 150–400)
POTASSIUM SERPL-MCNC: 4.1 MMOL/L — SIGNIFICANT CHANGE UP (ref 3.5–5.3)
POTASSIUM SERPL-SCNC: 4.1 MMOL/L — SIGNIFICANT CHANGE UP (ref 3.5–5.3)
RBC # BLD: 4.25 M/UL — SIGNIFICANT CHANGE UP (ref 4.2–5.8)
RBC # FLD: 13.6 % — SIGNIFICANT CHANGE UP (ref 10.3–14.5)
SODIUM SERPL-SCNC: 134 MMOL/L — LOW (ref 135–145)
WBC # BLD: 8.73 K/UL — SIGNIFICANT CHANGE UP (ref 3.8–10.5)
WBC # FLD AUTO: 8.73 K/UL — SIGNIFICANT CHANGE UP (ref 3.8–10.5)

## 2017-11-30 PROCEDURE — 99223 1ST HOSP IP/OBS HIGH 75: CPT

## 2017-11-30 PROCEDURE — 99233 SBSQ HOSP IP/OBS HIGH 50: CPT

## 2017-11-30 RX ORDER — ACETAMINOPHEN 500 MG
1000 TABLET ORAL ONCE
Qty: 0 | Refills: 0 | Status: COMPLETED | OUTPATIENT
Start: 2017-11-30 | End: 2017-11-30

## 2017-11-30 RX ORDER — MORPHINE SULFATE 50 MG/1
15 CAPSULE, EXTENDED RELEASE ORAL EVERY 12 HOURS
Qty: 0 | Refills: 0 | Status: DISCONTINUED | OUTPATIENT
Start: 2017-11-30 | End: 2017-11-30

## 2017-11-30 RX ORDER — HYDROMORPHONE HYDROCHLORIDE 2 MG/ML
2 INJECTION INTRAMUSCULAR; INTRAVENOUS; SUBCUTANEOUS
Qty: 0 | Refills: 0 | Status: DISCONTINUED | OUTPATIENT
Start: 2017-11-30 | End: 2017-11-30

## 2017-11-30 RX ORDER — ENOXAPARIN SODIUM 100 MG/ML
40 INJECTION SUBCUTANEOUS DAILY
Qty: 0 | Refills: 0 | Status: DISCONTINUED | OUTPATIENT
Start: 2017-11-30 | End: 2017-12-08

## 2017-11-30 RX ORDER — OXYCODONE HYDROCHLORIDE 5 MG/1
10 TABLET ORAL EVERY 4 HOURS
Qty: 0 | Refills: 0 | Status: DISCONTINUED | OUTPATIENT
Start: 2017-11-30 | End: 2017-12-01

## 2017-11-30 RX ORDER — OXYCODONE HYDROCHLORIDE 5 MG/1
10 TABLET ORAL EVERY 4 HOURS
Qty: 0 | Refills: 0 | Status: DISCONTINUED | OUTPATIENT
Start: 2017-11-30 | End: 2017-11-30

## 2017-11-30 RX ORDER — HYDROMORPHONE HYDROCHLORIDE 2 MG/ML
1 INJECTION INTRAMUSCULAR; INTRAVENOUS; SUBCUTANEOUS ONCE
Qty: 0 | Refills: 0 | Status: DISCONTINUED | OUTPATIENT
Start: 2017-11-30 | End: 2017-11-30

## 2017-11-30 RX ORDER — MORPHINE SULFATE 50 MG/1
15 CAPSULE, EXTENDED RELEASE ORAL EVERY 12 HOURS
Qty: 0 | Refills: 0 | Status: DISCONTINUED | OUTPATIENT
Start: 2017-11-30 | End: 2017-12-01

## 2017-11-30 RX ORDER — OXYCODONE HYDROCHLORIDE 5 MG/1
5 TABLET ORAL EVERY 4 HOURS
Qty: 0 | Refills: 0 | Status: DISCONTINUED | OUTPATIENT
Start: 2017-11-30 | End: 2017-11-30

## 2017-11-30 RX ORDER — DEXTROSE MONOHYDRATE, SODIUM CHLORIDE, AND POTASSIUM CHLORIDE 50; .745; 4.5 G/1000ML; G/1000ML; G/1000ML
1000 INJECTION, SOLUTION INTRAVENOUS
Qty: 0 | Refills: 0 | Status: DISCONTINUED | OUTPATIENT
Start: 2017-11-30 | End: 2017-11-30

## 2017-11-30 RX ORDER — OXYCODONE HYDROCHLORIDE 5 MG/1
5 TABLET ORAL ONCE
Qty: 0 | Refills: 0 | Status: DISCONTINUED | OUTPATIENT
Start: 2017-11-30 | End: 2017-11-30

## 2017-11-30 RX ORDER — DOCUSATE SODIUM 100 MG
100 CAPSULE ORAL AT BEDTIME
Qty: 0 | Refills: 0 | Status: DISCONTINUED | OUTPATIENT
Start: 2017-11-30 | End: 2017-12-08

## 2017-11-30 RX ORDER — DIPHENHYDRAMINE HCL 50 MG
25 CAPSULE ORAL ONCE
Qty: 0 | Refills: 0 | Status: COMPLETED | OUTPATIENT
Start: 2017-11-30 | End: 2017-11-30

## 2017-11-30 RX ORDER — HYDROMORPHONE HYDROCHLORIDE 2 MG/ML
2 INJECTION INTRAMUSCULAR; INTRAVENOUS; SUBCUTANEOUS
Qty: 0 | Refills: 0 | Status: DISCONTINUED | OUTPATIENT
Start: 2017-11-30 | End: 2017-12-01

## 2017-11-30 RX ORDER — OXYCODONE HYDROCHLORIDE 5 MG/1
15 TABLET ORAL EVERY 4 HOURS
Qty: 0 | Refills: 0 | Status: DISCONTINUED | OUTPATIENT
Start: 2017-11-30 | End: 2017-11-30

## 2017-11-30 RX ORDER — HYDROMORPHONE HYDROCHLORIDE 2 MG/ML
1 INJECTION INTRAMUSCULAR; INTRAVENOUS; SUBCUTANEOUS
Qty: 0 | Refills: 0 | Status: DISCONTINUED | OUTPATIENT
Start: 2017-11-30 | End: 2017-11-30

## 2017-11-30 RX ORDER — SENNA PLUS 8.6 MG/1
2 TABLET ORAL AT BEDTIME
Qty: 0 | Refills: 0 | Status: DISCONTINUED | OUTPATIENT
Start: 2017-11-30 | End: 2017-12-08

## 2017-11-30 RX ORDER — GABAPENTIN 400 MG/1
300 CAPSULE ORAL THREE TIMES A DAY
Qty: 0 | Refills: 0 | Status: DISCONTINUED | OUTPATIENT
Start: 2017-11-30 | End: 2017-12-01

## 2017-11-30 RX ADMIN — GABAPENTIN 300 MILLIGRAM(S): 400 CAPSULE ORAL at 21:41

## 2017-11-30 RX ADMIN — Medication 25 MILLIGRAM(S): at 01:44

## 2017-11-30 RX ADMIN — Medication 1000 MILLIGRAM(S): at 16:50

## 2017-11-30 RX ADMIN — SENNA PLUS 2 TABLET(S): 8.6 TABLET ORAL at 21:41

## 2017-11-30 RX ADMIN — OXYCODONE HYDROCHLORIDE 10 MILLIGRAM(S): 5 TABLET ORAL at 23:54

## 2017-11-30 RX ADMIN — HYDROMORPHONE HYDROCHLORIDE 2 MILLIGRAM(S): 2 INJECTION INTRAMUSCULAR; INTRAVENOUS; SUBCUTANEOUS at 17:37

## 2017-11-30 RX ADMIN — HYDROMORPHONE HYDROCHLORIDE 1 MILLIGRAM(S): 2 INJECTION INTRAMUSCULAR; INTRAVENOUS; SUBCUTANEOUS at 11:24

## 2017-11-30 RX ADMIN — OXYCODONE HYDROCHLORIDE 5 MILLIGRAM(S): 5 TABLET ORAL at 02:08

## 2017-11-30 RX ADMIN — DEXTROSE MONOHYDRATE, SODIUM CHLORIDE, AND POTASSIUM CHLORIDE 100 MILLILITER(S): 50; .745; 4.5 INJECTION, SOLUTION INTRAVENOUS at 03:01

## 2017-11-30 RX ADMIN — HYDROMORPHONE HYDROCHLORIDE 2 MILLIGRAM(S): 2 INJECTION INTRAMUSCULAR; INTRAVENOUS; SUBCUTANEOUS at 21:41

## 2017-11-30 RX ADMIN — HYDROMORPHONE HYDROCHLORIDE 1 MILLIGRAM(S): 2 INJECTION INTRAMUSCULAR; INTRAVENOUS; SUBCUTANEOUS at 09:50

## 2017-11-30 RX ADMIN — OXYCODONE HYDROCHLORIDE 15 MILLIGRAM(S): 5 TABLET ORAL at 08:57

## 2017-11-30 RX ADMIN — HYDROMORPHONE HYDROCHLORIDE 2 MILLIGRAM(S): 2 INJECTION INTRAMUSCULAR; INTRAVENOUS; SUBCUTANEOUS at 13:10

## 2017-11-30 RX ADMIN — Medication 400 MILLIGRAM(S): at 15:58

## 2017-11-30 RX ADMIN — HYDROMORPHONE HYDROCHLORIDE 1 MILLIGRAM(S): 2 INJECTION INTRAMUSCULAR; INTRAVENOUS; SUBCUTANEOUS at 09:35

## 2017-11-30 RX ADMIN — DEXTROSE MONOHYDRATE, SODIUM CHLORIDE, AND POTASSIUM CHLORIDE 100 MILLILITER(S): 50; .745; 4.5 INJECTION, SOLUTION INTRAVENOUS at 08:56

## 2017-11-30 RX ADMIN — HYDROMORPHONE HYDROCHLORIDE 2 MILLIGRAM(S): 2 INJECTION INTRAMUSCULAR; INTRAVENOUS; SUBCUTANEOUS at 13:25

## 2017-11-30 RX ADMIN — HYDROMORPHONE HYDROCHLORIDE 1 MILLIGRAM(S): 2 INJECTION INTRAMUSCULAR; INTRAVENOUS; SUBCUTANEOUS at 06:20

## 2017-11-30 RX ADMIN — OXYCODONE HYDROCHLORIDE 10 MILLIGRAM(S): 5 TABLET ORAL at 19:43

## 2017-11-30 RX ADMIN — OXYCODONE HYDROCHLORIDE 15 MILLIGRAM(S): 5 TABLET ORAL at 09:50

## 2017-11-30 RX ADMIN — OXYCODONE HYDROCHLORIDE 10 MILLIGRAM(S): 5 TABLET ORAL at 20:40

## 2017-11-30 RX ADMIN — HYDROMORPHONE HYDROCHLORIDE 1 MILLIGRAM(S): 2 INJECTION INTRAMUSCULAR; INTRAVENOUS; SUBCUTANEOUS at 06:35

## 2017-11-30 RX ADMIN — HYDROMORPHONE HYDROCHLORIDE 2 MILLIGRAM(S): 2 INJECTION INTRAMUSCULAR; INTRAVENOUS; SUBCUTANEOUS at 21:56

## 2017-11-30 RX ADMIN — Medication 400 MILLIGRAM(S): at 03:01

## 2017-11-30 RX ADMIN — MORPHINE SULFATE 15 MILLIGRAM(S): 50 CAPSULE, EXTENDED RELEASE ORAL at 11:46

## 2017-11-30 RX ADMIN — HYDROMORPHONE HYDROCHLORIDE 1 MILLIGRAM(S): 2 INJECTION INTRAMUSCULAR; INTRAVENOUS; SUBCUTANEOUS at 11:39

## 2017-11-30 RX ADMIN — HYDROMORPHONE HYDROCHLORIDE 2 MILLIGRAM(S): 2 INJECTION INTRAMUSCULAR; INTRAVENOUS; SUBCUTANEOUS at 17:22

## 2017-11-30 RX ADMIN — OXYCODONE HYDROCHLORIDE 5 MILLIGRAM(S): 5 TABLET ORAL at 02:50

## 2017-11-30 RX ADMIN — Medication 1000 MILLIGRAM(S): at 03:16

## 2017-11-30 RX ADMIN — DEXTROSE MONOHYDRATE, SODIUM CHLORIDE, AND POTASSIUM CHLORIDE 100 MILLILITER(S): 50; .745; 4.5 INJECTION, SOLUTION INTRAVENOUS at 09:39

## 2017-11-30 RX ADMIN — Medication 100 MILLIGRAM(S): at 21:41

## 2017-11-30 RX ADMIN — MORPHINE SULFATE 15 MILLIGRAM(S): 50 CAPSULE, EXTENDED RELEASE ORAL at 21:41

## 2017-11-30 NOTE — H&P ADULT - NSHPPHYSICALEXAM_GEN_ALL_CORE
Gen: WD, WN, NAD  HEENT: PERRLA, EOMI, Oropharynx clear  Neck: Supple, no JVD/Bruit, No thyromegaly  Lungs: CTA B/L  Heart: RRR, S1 S2, No m/r/g  Abd: Soft, ND, NT, No HSM, No rebound or guarding, well healed midline scar  Ext: WWP, No clubbing, cyanosis, or edema  Neuro: AAOx3, CN II-XII grossly intact, No focal deficits

## 2017-11-30 NOTE — H&P ADULT - NSHPLABSRESULTS_GEN_ALL_CORE
LABS:  CBC (11-29 @ 18:17)                              13.1                           8.9     )----------------(  389        68.7  % Neutrophils, 21.9  % Lymphocytes, ANC: 6.1                                 38.7<L>                BMP (11-29 @ 16:51)             133<L>  |  96      |  14    		Ca++ --      Ca 10.2               ---------------------------------( 108<H>		Mg --                 5.2     |  23      |  0.74  			Ph --        LFTs (11-29 @ 16:51)      TPro 7.5 / Alb 4.0 / TBili 0.2 / DBili -- / AST 13 / ALT 10 / AlkPhos 78    Coags (11-29 @ 16:51)  aPTT 25.0<L> / INR 1.16 / PT 12.6    ABG (11-29 @ 16:48)      /  /  /  /  / %     Lactate:   1.9    VBG (11-29 @ 16:48)     7.34<L> / 51<H> / 20<L> / 27 / 1.2 / 23<L>%      IMAGING:  < from: CT Abdomen and Pelvis w/ Oral Cont and w/ IV Cont (11.29.17 @ 20:11) >    IMPRESSION:     Focal thinning of the gastric fundus wall without definite wall   dehiscence, which may represent ulceration versus perforation. Small   amount of adjacent free fluid is not significantly changed since   11/28/2017. No extravasation of oral contrast. Recommend correlation with   endoscopy.    Pancreatic tail mass. Chronic splenic vein occlusion.Narrowing and   likely invasion of SMV/portal confluence.    < end of copied text >

## 2017-11-30 NOTE — DISCHARGE NOTE ADULT - MEDICATION SUMMARY - MEDICATIONS TO STOP TAKING
I will STOP taking the medications listed below when I get home from the hospital:    morphine    oxyCODONE

## 2017-11-30 NOTE — PROGRESS NOTE ADULT - ASSESSMENT
62yM w/ abdominal pain    - Pain control, will reach out to patient's outpatient oncologist for pain control regimen recs  - PO challenge  - Serial abdominal exams  - OOB/ambulating  - DVT PPX  - Poss d/c home if patient tolerates diet and pain is controlled      MARBIN LoweC

## 2017-11-30 NOTE — DISCHARGE NOTE ADULT - PROVIDER TOKENS
TOKEN:'[id number]',TOKEN:'[id number]' TOKEN:'[id number]:'[id number]',TOKEN:'[id number]'

## 2017-11-30 NOTE — DISCHARGE NOTE ADULT - PATIENT PORTAL LINK FT
“You can access the [employer] Patient Portal, offered by [facility], by registering with the following website: [email or web address]”

## 2017-11-30 NOTE — PROGRESS NOTE ADULT - SUBJECTIVE AND OBJECTIVE BOX
BLUE SURGERY DAILY PROGRESS NOTE:       SUBJECTIVE/ROS: Patient complaining of abdominal pain and back pain, which has been moderately controlled with Tylenol.  Patient states that pain is controlled by regimen at home by OP oncologist.  Last PO intake was yesterday.  Patient states he has been passing flatus and had a bowel movement.  Denies nausea, vomiting, chest pain, shortness of breath.       MEDICATIONS  (STANDING):  dextrose 5% + sodium chloride 0.45% with potassium chloride 20 mEq/L 1000 milliLiter(s) (100 mL/Hr) IV Continuous <Continuous>  enoxaparin Injectable 40 milliGRAM(s) SubCutaneous daily    MEDICATIONS  (PRN):  OBJECTIVE:    Vital Signs Last 24 Hrs  T(C): 36.3 (30 Nov 2017 07:32), Max: 36.8 (30 Nov 2017 06:07)  T(F): 97.3 (30 Nov 2017 07:32), Max: 98.2 (30 Nov 2017 06:07)  HR: 106 (30 Nov 2017 07:32) (100 - 117)  BP: 145/75 (30 Nov 2017 07:32) (134/80 - 158/95)  BP(mean): --  RR: 19 (30 Nov 2017 07:32) (16 - 20)  SpO2: 100% (30 Nov 2017 07:32) (98% - 100%)        I&O's Detail    29 Nov 2017 07:01  -  30 Nov 2017 07:00  --------------------------------------------------------  IN:    dextrose 5% + sodium chloride 0.45% with potassium chloride 20 mEq/L: 300 mL    IV PiggyBack: 100 mL  Total IN: 400 mL    OUT:  Total OUT: 0 mL    Total NET: 400 mL    Daily Height in cm: 175.26 (29 Nov 2017 23:50)      LABS:                        13.1   8.9   )-----------( 389      ( 29 Nov 2017 18:17 )             38.7     11-29    133<L>  |  96  |  14  ----------------------------<  108<H>  5.2   |  23  |  0.74    Ca    10.2      29 Nov 2017 16:51    TPro  7.5  /  Alb  4.0  /  TBili  0.2  /  DBili  x   /  AST  13  /  ALT  10  /  AlkPhos  78  11-29    PT/INR - ( 29 Nov 2017 16:51 )   PT: 12.6 sec;   INR: 1.16 ratio         PTT - ( 29 Nov 2017 16:51 )  PTT:25.0 sec      PHYSICAL EXAM:  Constitutional: NAD  Neck: supple  Respiratory: no increased WOB  Abdominal: Soft, mild tenderness, non-distended, well healed midline scar, no rebound tenderness, guarding, or other peritoneal signs  Extremities: FROM, warm  Psychiatric: oriented x 3; appropriate

## 2017-11-30 NOTE — CHART NOTE - NSCHARTNOTEFT_GEN_A_CORE
Spoke to patients oncologist, Dr. Drummond regarding patient's usual pain regimen- his pain is usually controlled with MS contin 15 BID and oxycodone 5mg q4h PRN.      Farheen Diggs PA-C

## 2017-11-30 NOTE — CONSULT NOTE ADULT - SUBJECTIVE AND OBJECTIVE BOX
62yM w/ low risk stage II colon CA s/p L colectomy in June 2017, locally advanced pancreatic CA s/p robotic to open distal pancreatectomy 3 weeks ago aborted when mass was found to incorporate SMA. Pt presented to [facility] ED on 11/29/2017 after seeing his oncologist and having a CT scan concerning for gastric perforation. Pt has been having abdominal pain for the past 4 months. Denies any change in characteristics of pain. States he has poor PO intake over the past 4 months secondary to decreased appetite. Has lost approximately 65 pounds over the past 4 months. Denies fever, chills, nausea, emesis, diarrhea, constipation, melena. (30 Nov 2017 01:12)       ROS: as above       PAST MEDICAL & SURGICAL HISTORY:  Colon cancer  Cancer of pancreas  Gastric perforation  H/O exploratory laparotomy  H/O colectomy      SOCIAL HISTORY:    FAMILY HISTORY:  No pertinent family history in first degree relatives      MEDICATIONS  (STANDING):  enoxaparin Injectable 40 milliGRAM(s) SubCutaneous daily  gabapentin 300 milliGRAM(s) Oral three times a day  morphine ER Tablet 15 milliGRAM(s) Oral every 12 hours    MEDICATIONS  (PRN):  HYDROmorphone  Injectable 2 milliGRAM(s) IV Push every 3 hours PRN Severe Pain (7 - 10)  oxyCODONE    IR 10 milliGRAM(s) Oral every 4 hours PRN Moderate Pain (4 - 6)      Allergies    No Known Allergies    Intolerances        Vital Signs Last 24 Hrs  T(C): 37.1 (30 Nov 2017 14:11), Max: 37.1 (30 Nov 2017 14:11)  T(F): 98.8 (30 Nov 2017 14:11), Max: 98.8 (30 Nov 2017 14:11)  HR: 111 (30 Nov 2017 14:11) (100 - 112)  BP: 193/90 (30 Nov 2017 14:11) (142/80 - 203/85)  BP(mean): --  RR: 18 (30 Nov 2017 14:11) (17 - 20)  SpO2: 95% (30 Nov 2017 14:11) (95% - 100%)    PHYSICAL EXAM  General: adult in NAD  HEENT: clear oropharynx, anicteric sclera, pink conjunctiva  Neck: supple  CV: normal S1/S2 with no murmur rubs or gallops  Lungs: positive air movement b/l ant lungs,clear to auscultation, no wheezes, no rales  Abdomen: soft non-tender non-distended, no hepatosplenomegaly  Ext: no clubbing cyanosis or edema  Skin: no rashes and no petechiae  Neuro: alert and oriented X 4, no focal deficits      LABS:                          12.4   8.73  )-----------( 342      ( 30 Nov 2017 07:32 )             36.7         Mean Cell Volume : 86.4 fl  Mean Cell Hemoglobin : 29.2 pg  Mean Cell Hemoglobin Concentration : 33.8 gm/dL  Auto Neutrophil # : x  Auto Lymphocyte # : x  Auto Monocyte # : x  Auto Eosinophil # : x  Auto Basophil # : x  Auto Neutrophil % : x  Auto Lymphocyte % : x  Auto Monocyte % : x  Auto Eosinophil % : x  Auto Basophil % : x      Serial CBC's  11-30 @ 07:32  Hct-36.7 / Hgb-12.4 / Plat-342 / RBC-4.25 / WBC-8.73  Serial CBC's  11-29 @ 18:17  Hct-38.7 / Hgb-13.1 / Plat-389 / RBC-4.27 / WBC-8.9      11-30    134<L>  |  96  |  10  ----------------------------<  121<H>  4.1   |  21<L>  |  0.60    Ca    9.6      30 Nov 2017 08:51  Phos  3.5     11-30  Mg     2.0     11-30    TPro  7.5  /  Alb  4.0  /  TBili  0.2  /  DBili  x   /  AST  13  /  ALT  10  /  AlkPhos  78  11-29      PT/INR - ( 29 Nov 2017 16:51 )   PT: 12.6 sec;   INR: 1.16 ratio         PTT - ( 29 Nov 2017 16:51 )  PTT:25.0 sec                RADIOLOGY & ADDITIONAL STUDIES: 62yM w/ low risk stage II colon CA s/p L colectomy in June 2017, more recently locally advanced pancreatic CA s/p robotic to open distal pancreatectomy 3 weeks ago aborted when mass was found to incorporate SMA. Pt presented to [facility] ED on 11/29/2017 after seeing his oncologist and having a CT scan concerning for gastric perforation. Pt has been having abdominal pain for the past 4 months. Denies any change in characteristics of pain. States he has poor PO intake over the past 4 months secondary to decreased appetite. Has lost approximately 65 pounds over the past 4 months. Denies fever, chills, nausea, emesis, diarrhea, constipation, melena.  He has been experiencing severe abdominal pain radiating to the back. Currrently he c/o severe 11/10 abdominal and back pain. He is not getting all of the pain medications that are prescribed to him.      ROS: as above       PAST MEDICAL & SURGICAL HISTORY:  Colon cancer  Cancer of pancreas  Gastric perforation  H/O exploratory laparotomy  H/O colectomy      SOCIAL HISTORY: lives with wife, no tobacco, no alcohol use    FAMILY HISTORY:  No pertinent family history in first degree relatives      MEDICATIONS  (STANDING):  enoxaparin Injectable 40 milliGRAM(s) SubCutaneous daily  gabapentin 300 milliGRAM(s) Oral three times a day  morphine ER Tablet 15 milliGRAM(s) Oral every 12 hours    MEDICATIONS  (PRN):  HYDROmorphone  Injectable 2 milliGRAM(s) IV Push every 3 hours PRN Severe Pain (7 - 10)  oxyCODONE    IR 10 milliGRAM(s) Oral every 4 hours PRN Moderate Pain (4 - 6)      Allergies    No Known Allergies    Intolerances        Vital Signs Last 24 Hrs  T(C): 37.1 (30 Nov 2017 14:11), Max: 37.1 (30 Nov 2017 14:11)  T(F): 98.8 (30 Nov 2017 14:11), Max: 98.8 (30 Nov 2017 14:11)  HR: 111 (30 Nov 2017 14:11) (100 - 112)  BP: 193/90 (30 Nov 2017 14:11) (142/80 - 203/85)  BP(mean): --  RR: 18 (30 Nov 2017 14:11) (17 - 20)  SpO2: 95% (30 Nov 2017 14:11) (95% - 100%)    PHYSICAL EXAM  General: adult in NAD  HEENT: clear oropharynx, anicteric sclera, pink conjunctiva  Neck: supple  CV: normal S1/S2 with no murmur rubs or gallops  Lungs: positive air movement b/l ant lungs,clear to auscultation, no wheezes, no rales  Abdomen: soft non-tender non-distended, no hepatosplenomegaly  Ext: no clubbing cyanosis or edema  Skin: no rashes and no petechiae  Neuro: alert and oriented X 4, no focal deficits      LABS:                          12.4   8.73  )-----------( 342      ( 30 Nov 2017 07:32 )             36.7         Mean Cell Volume : 86.4 fl  Mean Cell Hemoglobin : 29.2 pg  Mean Cell Hemoglobin Concentration : 33.8 gm/dL  Auto Neutrophil # : x  Auto Lymphocyte # : x  Auto Monocyte # : x  Auto Eosinophil # : x  Auto Basophil # : x  Auto Neutrophil % : x  Auto Lymphocyte % : x  Auto Monocyte % : x  Auto Eosinophil % : x  Auto Basophil % : x      Serial CBC's  11-30 @ 07:32  Hct-36.7 / Hgb-12.4 / Plat-342 / RBC-4.25 / WBC-8.73  Serial CBC's  11-29 @ 18:17  Hct-38.7 / Hgb-13.1 / Plat-389 / RBC-4.27 / WBC-8.9      11-30    134<L>  |  96  |  10  ----------------------------<  121<H>  4.1   |  21<L>  |  0.60    Ca    9.6      30 Nov 2017 08:51  Phos  3.5     11-30  Mg     2.0     11-30    TPro  7.5  /  Alb  4.0  /  TBili  0.2  /  DBili  x   /  AST  13  /  ALT  10  /  AlkPhos  78  11-29      PT/INR - ( 29 Nov 2017 16:51 )   PT: 12.6 sec;   INR: 1.16 ratio         PTT - ( 29 Nov 2017 16:51 )  PTT:25.0 sec                RADIOLOGY & ADDITIONAL STUDIES:

## 2017-11-30 NOTE — DISCHARGE NOTE ADULT - CARE PROVIDERS DIRECT ADDRESSES
,DirectAddress_Unknown,[email or web address] ,DirectAddress_Unknown,[email or web address]

## 2017-11-30 NOTE — DISCHARGE NOTE ADULT - PLAN OF CARE
Improved Continue current pain medications. F/U with your oncologist. Continue current bowel regimen

## 2017-11-30 NOTE — DISCHARGE NOTE ADULT - HOSPITAL COURSE
Mr. Owens is a 62-year-old man with PMHx significant for colon CA s/p L colectomy approximately 4 months ago, Pancreatic CA s/p robotic to open distal pancreatectomy 3 weeks ago aborted when mass was found to incorporate SMA. Pt presented to [facility] ED on 11/29/2017 after seeing his oncologist and having a CT scan concerning for gastric perforation. Pt has been having abdominal pain for the past 4 months. He did not have any change in characteristics of pain. He stated that he has poor PO intake over the past 4 months secondary to decreased appetite. He has lost approximately 65 pounds over the past 4 months.   In the ED, he had CT abdomen. He was hemodynamically stable. His lab showed mild hyponatremia, but otherwise it was unremarkable.  He was made NPO and was admitted to surgical floor for further observation, serial abdominal exam, and pain management. His pain was well managed with IV dilaudid and PO oxycodone. CT showed focal thinning of the gastric fundus wall without definite wall dehiscence. He also did not have any sign of peritonitis. In HD2, his diet was advanced to CLD for breakfast, and he tolerated it well. He received regular diet for lunch and did not have any difficulty tolerating it. The medical team decided not to proceed with any surgical intervention at this admission. He is hemodynamically stable, has no nausea or vomiting and his abdominal pain has improved significantly. He can safely be discharged home on his home pain medication.  Please schedule a follow up appointment with Dr. Hager (surgical attending), Dr. Maloney, and your PCP in 7-10 days after your discharge Mr. Owens is a 62-year-old man with PMHx significant for colon CA s/p L colectomy approximately 4 months ago, Pancreatic CA s/p robotic to open distal pancreatectomy 3 weeks ago aborted when mass was found to incorporate SMA. Pt presented to [facility] ED on 11/29/2017 after seeing his oncologist and having a CT scan concerning for gastric perforation. Pt has been having abdominal pain for the past 4 months. He did not have any change in characteristics of pain. He stated that he has poor PO intake over the past 4 months secondary to decreased appetite. He has lost approximately 65 pounds over the past 4 months.   In the ED, he had CT abdomen. He was hemodynamically stable. His lab showed mild hyponatremia, but otherwise it was unremarkable.  He was made NPO and was admitted to surgical floor for further observation, serial abdominal exam, and pain management. His pain was well managed with IV dilaudid and PO oxycodone. CT showed focal thinning of the gastric fundus wall without definite wall dehiscence. He also did not have any sign of peritonitis. In HD2, his diet was advanced to CLD for breakfast, and he tolerated it well. He received regular diet for lunch and did not have any difficulty tolerating it. The medical team decided not to proceed with any surgical intervention at this admission. He is hemodynamically stable, has no nausea or vomiting and his abdominal pain has improved significantly with dilaudid and methadone. Palliative team was on board for pain management. Received 1st cycle of chemo this admission. F/U with Dr Maloney on 12/15 8:30 am, and [place] [facility] [place] 61 [place] male with locally advanced pancreatic cancer aw worsening epigastric pain.   CT on admission revealed focal thinning of the gastric fundus wall without definite wall dehiscence, which may represent ulceration versus perforation, pancreatic tail mass, chronic splenic vein occlusion, narrowing and likely invasion of SMV/portal confluence.    Seen by surgery, felt not to have perforation- likely ulcer. Started on iv protonix bid. Diet advanced. To avoid endoscopy at time per surgery.    Pt started on pain control with iv dilaudid, methadone added for long acting pain control.    Decision made to given inpatient chemo as pt treatment naive. Mediport placed by IR. Pt given FOLFIRI chemo- tolerated well.     Pain slowly improved. Able to tolerate diet. Transition to po meds.    Dced with follow up.    Diagnoses: Severe pain due to cancer; Inpatient chemotherapy; Pancreatic cancer; Dehydration; HTN; Constipation 63 yo male with locally advanced pancreatic cancer aw worsening epigastric pain.   CT on admission revealed focal thinning of the gastric fundus wall without definite wall dehiscence- may represent ulceration versus perforation, pancreatic tail mass, chronic splenic vein occlusion, narrowing and likely invasion of SMV/portal confluence.    Seen by surgery, felt not to have perforation- likely ulcer. Started on iv protonix bid. Diet advanced. To avoid endoscopy at time.    Pt started on pain control with iv dilaudid, methadone added for long acting pain control.    Decision made to given inpatient chemo as pt treatment naive. Mediport placed by IR. Pt given FOLFIRI chemo- tolerated well.     Pain slowly improved. Able to tolerate diet. Transition to po meds.    Dced with follow up.    Diagnoses: Severe pain due to cancer; Inpatient chemotherapy; Pancreatic cancer; Dehydration; HTN; Constipation; Anxiety 61 yo male with locally advanced pancreatic cancer aw worsening epigastric pain.   CT on admission revealed focal thinning of the gastric fundus wall without definite wall dehiscence- may represent ulceration versus perforation, pancreatic tail mass, chronic splenic vein occlusion, narrowing and likely invasion of SMV/portal confluence.    Seen by surgery, felt not to have perforation- likely ulcer. Started on iv protonix bid. Diet advanced. To avoid endoscopy at time.    Pt started on pain control with iv dilaudid, methadone added for long acting pain control.    Decision made to given inpatient chemo as pt treatment naive. Mediport placed by IR. Pt given FOLFIRI chemo- tolerated well.     Pain slowly improved. Able to tolerate diet. Transition to po meds.    Dced with follow up.    Diagnoses: Severe pain due to cancer; Inpatient chemotherapy; Pancreatic cancer; Hyponatremia; Moderate protein calorie malnutrition; Dehydration; HTN; Constipation; Anxiety

## 2017-11-30 NOTE — DISCHARGE NOTE ADULT - CARE PROVIDER_API CALL
Russell Maloney), Family Medicine  [address] [place] [address]  Phone: [phone]  Fax: [phone]    Blayne Hager), Surgery  [address] [place] [address]  Phone: [phone]  Fax: [phone] Russell Maloney), Family Medicine  [address] [place] [address]  Phone: [phone]  Fax: [phone]    Blayne Hager), Surgery  [address] [place] [address]  Phone: [phone]  Fax: [phone]    Roslyn Galvez), [facility]; Internal Medicine  [address] [place] [address]  Phone: [phone]  Fax: [phone]

## 2017-11-30 NOTE — H&P ADULT - HISTORY OF PRESENT ILLNESS
62yM w/ PMH sig for colon CA s/p L colectomy approximately 4 months ago, Pancreatic CA s/p robotic to open distal pancreatectomy 3 weeks ago aborted when mass was found to incorporate SMA. Pt presented to [facility] ED on 11/29/2017 after seeing his oncologist and having a CT scan concerning for gastric perforation. Pt has been having abdominal pain for the past 4 months. Juliano any change in characteristics of pain. States he has poor PO intake over the past 4 months secondary to decreased appetite. Has lost approximately 65 pounds over the past 4 months. Denies fever, chills, nausea, emesis, diarrhea, constipation, melena. 62yM w/ PMH sig for colon CA s/p L colectomy approximately 4 months ago, Pancreatic CA s/p robotic to open distal pancreatectomy 3 weeks ago aborted when mass was found to incorporate SMA. Pt presented to [facility] ED on 11/29/2017 after seeing his oncologist and having a CT scan concerning for gastric perforation. Pt has been having abdominal pain for the past 4 months. Denies any change in characteristics of pain. States he has poor PO intake over the past 4 months secondary to decreased appetite. Has lost approximately 65 pounds over the past 4 months. Denies fever, chills, nausea, emesis, diarrhea, constipation, melena.

## 2017-11-30 NOTE — H&P ADULT - ASSESSMENT
62yM w/ abdominal pain    - Admit to [facility], Dr. Hager  - NPO/IVF  - Serial abdominal exams  - No acute surgical intervention at this time  - Pt discussed w/ Dr. Hager  - Please page [phone] w/ any questions    ALEXYS Albright PGY-2

## 2017-11-30 NOTE — DISCHARGE NOTE ADULT - ADDITIONAL INSTRUCTIONS
Neulasta at [facility] day after discharge  F/u with Dr Maloney on 12/15 8:30 am,   2nd cycle of chemo on 12/19 at [facility] Neulasta at [facility] day after discharge-12/9/17  F/u with Dr Maloney on 12/15 8:30 am,   2nd cycle of chemo on 12/19 at [facility] Neulasta at [facility] day after discharge-12/9/17 at 12:20 pm  F/u with Dr Maloney on 12/15 8:30 am, Dr Geovanny Larson on 12/15 at 3 pm  2nd cycle of chemo on 12/19  at 9 am at [facility]  Dr Hager on 12/14 at 10 am (please call and confirm the appointment with Dr Hager)

## 2017-11-30 NOTE — PROGRESS NOTE ADULT - SUBJECTIVE AND OBJECTIVE BOX
Patient is a 62y old  Male who presents with a chief complaint of Abdominal pain (30 Nov 2017 12:26)    HPI: Pt with persistent severe pain today. IV dilaudid started and increased. Passing gas. Able to tolerate liquids.    Vital Signs Last 24 Hrs  T(C): 37.1 (30 Nov 2017 14:11), Max: 37.1 (30 Nov 2017 14:11)  T(F): 98.8 (30 Nov 2017 14:11), Max: 98.8 (30 Nov 2017 14:11)  HR: 111 (30 Nov 2017 14:11) (100 - 112)  BP: 193/90 (30 Nov 2017 14:11) (140/78 - 203/85)  BP(mean): --  RR: 18 (30 Nov 2017 14:11) (16 - 20)  SpO2: 95% (30 Nov 2017 14:11) (95% - 100%)                          12.4   8.73  )-----------( 342      ( 30 Nov 2017 07:32 )             36.7     11-30    134<L>  |  96  |  10  ----------------------------<  121<H>  4.1   |  21<L>  |  0.60    Ca    9.6      30 Nov 2017 08:51  Phos  3.5     11-30  Mg     2.0     11-30    TPro  7.5  /  Alb  4.0  /  TBili  0.2  /  DBili  x   /  AST  13  /  ALT  10  /  AlkPhos  78  11-29    MEDICATIONS  (STANDING):  dextrose 5% + sodium chloride 0.45% with potassium chloride 20 mEq/L 1000 milliLiter(s) (100 mL/Hr) IV Continuous <Continuous>  enoxaparin Injectable 40 milliGRAM(s) SubCutaneous daily  gabapentin 300 milliGRAM(s) Oral three times a day  morphine ER Tablet 15 milliGRAM(s) Oral every 12 hours    MEDICATIONS  (PRN):  HYDROmorphone  Injectable 2 milliGRAM(s) IV Push every 3 hours PRN Severe Pain (7 - 10)  oxyCODONE    IR 10 milliGRAM(s) Oral every 4 hours PRN Moderate Pain (4 - 6)

## 2017-11-30 NOTE — DISCHARGE NOTE ADULT - CARE PLAN
Principal Discharge DX:	Pain of metastatic malignancy  Secondary Diagnosis:	Pancreatic cancer  Secondary Diagnosis:	Constipation Principal Discharge DX:	Pain of metastatic malignancy  Goal:	Improved  Secondary Diagnosis:	Pancreatic cancer  Secondary Diagnosis:	Constipation Principal Discharge DX:	Pain of metastatic malignancy  Goal:	Improved  Instructions for follow-up, activity and diet:	Continue current pain medications.  Secondary Diagnosis:	Pancreatic cancer  Instructions for follow-up, activity and diet:	F/U with your oncologist.  Secondary Diagnosis:	Constipation  Instructions for follow-up, activity and diet:	Continue current bowel regimen

## 2017-11-30 NOTE — DISCHARGE NOTE ADULT - MEDICATION SUMMARY - MEDICATIONS TO TAKE
I will START or STAY ON the medications listed below when I get home from the hospital:    HYDROmorphone 2 mg oral tablet  -- 5 tab(s) by mouth every 4 hours, As Needed - 10) for severe pain MDD:6 doses  -- Indication: For Pain due to neoplasm    methadone 10 mg oral tablet  -- 1 tab(s) by mouth every 8 hours- take at 6 am, 2 pm and 10 pm MDD:3  -- Indication: For Pain due to neoplasm    losartan 25 mg oral tablet  -- 1 tab(s) by mouth once a day  -- Indication: For Hypertension, unspecified type    tamsulosin 0.4 mg oral capsule  -- 1 cap(s) by mouth once a day (at bedtime)  -- Indication: For Prostate    ALPRAZolam 0.5 mg oral tablet  -- 1 tab(s) by mouth every 8 hours, As Needed for anxiety MDD:3  -- Indication: For agitation    metoprolol tartrate 25 mg oral tablet  -- 2 tab(s) by mouth 2 times a day   -- Indication: For blood pressure    lactulose 10 g/15 mL oral syrup  -- 22.5 milliliter(s) by mouth every other day as needed for constipation  -- Indication: For Constipation    senna oral tablet  -- 2 tab(s) by mouth once a day (at bedtime)  -- Indication: For Constipation    docusate sodium 100 mg oral capsule  -- 1 cap(s) by mouth 3 times a day  -- Indication: For Constipation    pantoprazole 40 mg oral delayed release tablet  -- 1 tab(s) by mouth once a day (before a meal)  -- Indication: For Stomach

## 2017-11-30 NOTE — PROGRESS NOTE ADULT - ASSESSMENT
62yM w/ colon CA s/p L colectomy approximately 4 months ago, pancreatic CA s/p robotic to open distal pancreatectomy 3 weeks ago aborted when mass was found to incorporate SMA aw worsening abdominal pain.    CT with focal thinning of the gastric fundus wall without definite wall dehiscence, which may represent ulceration versus perforation.  Pancreatic tail mass. Narrowing and likely invasion of SMV/portal confluence.    Per surgery, no evidence of perforation- transferred to medicine for persistent severe pain.    Dx:     1. Pancreatic ca  2. Pain due to above- home MS contin resumed by onc. Continue iv dilaudid. Palliative consult called for pain management. Continue clears for now.

## 2017-12-01 DIAGNOSIS — K59.01 SLOW TRANSIT CONSTIPATION: ICD-10-CM

## 2017-12-01 DIAGNOSIS — K59.00 CONSTIPATION, UNSPECIFIED: ICD-10-CM

## 2017-12-01 DIAGNOSIS — C25.2 MALIGNANT NEOPLASM OF TAIL OF PANCREAS: ICD-10-CM

## 2017-12-01 DIAGNOSIS — G89.3 NEOPLASM RELATED PAIN (ACUTE) (CHRONIC): ICD-10-CM

## 2017-12-01 DIAGNOSIS — C25.9 MALIGNANT NEOPLASM OF PANCREAS, UNSPECIFIED: ICD-10-CM

## 2017-12-01 DIAGNOSIS — K25.9 GASTRIC ULCER, UNSPECIFIED AS ACUTE OR CHRONIC, WITHOUT HEMORRHAGE OR PERFORATION: ICD-10-CM

## 2017-12-01 LAB
ANION GAP SERPL CALC-SCNC: 16 MMOL/L — SIGNIFICANT CHANGE UP (ref 5–17)
BUN SERPL-MCNC: 7 MG/DL — SIGNIFICANT CHANGE UP (ref 7–23)
CALCIUM SERPL-MCNC: 9.6 MG/DL — SIGNIFICANT CHANGE UP (ref 8.4–10.5)
CHLORIDE SERPL-SCNC: 96 MMOL/L — SIGNIFICANT CHANGE UP (ref 96–108)
CO2 SERPL-SCNC: 22 MMOL/L — SIGNIFICANT CHANGE UP (ref 22–31)
CREAT SERPL-MCNC: 0.5 MG/DL — SIGNIFICANT CHANGE UP (ref 0.5–1.3)
GLUCOSE SERPL-MCNC: 109 MG/DL — HIGH (ref 70–99)
HCT VFR BLD CALC: 36.6 % — LOW (ref 39–50)
HGB BLD-MCNC: 12.7 G/DL — LOW (ref 13–17)
MCHC RBC-ENTMCNC: 30 PG — SIGNIFICANT CHANGE UP (ref 27–34)
MCHC RBC-ENTMCNC: 34.7 GM/DL — SIGNIFICANT CHANGE UP (ref 32–36)
MCV RBC AUTO: 86.5 FL — SIGNIFICANT CHANGE UP (ref 80–100)
PLATELET # BLD AUTO: 385 K/UL — SIGNIFICANT CHANGE UP (ref 150–400)
POTASSIUM SERPL-MCNC: 4.6 MMOL/L — SIGNIFICANT CHANGE UP (ref 3.5–5.3)
POTASSIUM SERPL-SCNC: 4.6 MMOL/L — SIGNIFICANT CHANGE UP (ref 3.5–5.3)
RBC # BLD: 4.23 M/UL — SIGNIFICANT CHANGE UP (ref 4.2–5.8)
RBC # FLD: 14.1 % — SIGNIFICANT CHANGE UP (ref 10.3–14.5)
SODIUM SERPL-SCNC: 134 MMOL/L — LOW (ref 135–145)
WBC # BLD: 9.7 K/UL — SIGNIFICANT CHANGE UP (ref 3.8–10.5)
WBC # FLD AUTO: 9.7 K/UL — SIGNIFICANT CHANGE UP (ref 3.8–10.5)

## 2017-12-01 PROCEDURE — 93010 ELECTROCARDIOGRAM REPORT: CPT

## 2017-12-01 PROCEDURE — 99223 1ST HOSP IP/OBS HIGH 75: CPT | Mod: GC

## 2017-12-01 PROCEDURE — 99233 SBSQ HOSP IP/OBS HIGH 50: CPT

## 2017-12-01 PROCEDURE — 99232 SBSQ HOSP IP/OBS MODERATE 35: CPT

## 2017-12-01 RX ORDER — POLYETHYLENE GLYCOL 3350 17 G/17G
17 POWDER, FOR SOLUTION ORAL DAILY
Qty: 0 | Refills: 0 | Status: DISCONTINUED | OUTPATIENT
Start: 2017-12-01 | End: 2017-12-05

## 2017-12-01 RX ORDER — HYDROMORPHONE HYDROCHLORIDE 2 MG/ML
10 INJECTION INTRAMUSCULAR; INTRAVENOUS; SUBCUTANEOUS EVERY 4 HOURS
Qty: 0 | Refills: 0 | Status: DISCONTINUED | OUTPATIENT
Start: 2017-12-01 | End: 2017-12-02

## 2017-12-01 RX ORDER — METHADONE HYDROCHLORIDE 40 MG/1
5 TABLET ORAL EVERY 8 HOURS
Qty: 0 | Refills: 0 | Status: DISCONTINUED | OUTPATIENT
Start: 2017-12-01 | End: 2017-12-04

## 2017-12-01 RX ORDER — HYDROMORPHONE HYDROCHLORIDE 2 MG/ML
1 INJECTION INTRAMUSCULAR; INTRAVENOUS; SUBCUTANEOUS ONCE
Qty: 0 | Refills: 0 | Status: DISCONTINUED | OUTPATIENT
Start: 2017-12-01 | End: 2017-12-01

## 2017-12-01 RX ORDER — METHADONE HYDROCHLORIDE 40 MG/1
5 TABLET ORAL EVERY 6 HOURS
Qty: 0 | Refills: 0 | Status: DISCONTINUED | OUTPATIENT
Start: 2017-12-01 | End: 2017-12-01

## 2017-12-01 RX ORDER — HYDROMORPHONE HYDROCHLORIDE 2 MG/ML
6 INJECTION INTRAMUSCULAR; INTRAVENOUS; SUBCUTANEOUS EVERY 4 HOURS
Qty: 0 | Refills: 0 | Status: DISCONTINUED | OUTPATIENT
Start: 2017-12-01 | End: 2017-12-02

## 2017-12-01 RX ORDER — PANTOPRAZOLE SODIUM 20 MG/1
40 TABLET, DELAYED RELEASE ORAL
Qty: 0 | Refills: 0 | Status: DISCONTINUED | OUTPATIENT
Start: 2017-12-01 | End: 2017-12-08

## 2017-12-01 RX ADMIN — METHADONE HYDROCHLORIDE 5 MILLIGRAM(S): 40 TABLET ORAL at 13:54

## 2017-12-01 RX ADMIN — HYDROMORPHONE HYDROCHLORIDE 2 MILLIGRAM(S): 2 INJECTION INTRAMUSCULAR; INTRAVENOUS; SUBCUTANEOUS at 03:12

## 2017-12-01 RX ADMIN — OXYCODONE HYDROCHLORIDE 10 MILLIGRAM(S): 5 TABLET ORAL at 09:57

## 2017-12-01 RX ADMIN — GABAPENTIN 300 MILLIGRAM(S): 400 CAPSULE ORAL at 05:46

## 2017-12-01 RX ADMIN — HYDROMORPHONE HYDROCHLORIDE 10 MILLIGRAM(S): 2 INJECTION INTRAMUSCULAR; INTRAVENOUS; SUBCUTANEOUS at 21:33

## 2017-12-01 RX ADMIN — HYDROMORPHONE HYDROCHLORIDE 1 MILLIGRAM(S): 2 INJECTION INTRAMUSCULAR; INTRAVENOUS; SUBCUTANEOUS at 01:12

## 2017-12-01 RX ADMIN — PANTOPRAZOLE SODIUM 40 MILLIGRAM(S): 20 TABLET, DELAYED RELEASE ORAL at 12:39

## 2017-12-01 RX ADMIN — HYDROMORPHONE HYDROCHLORIDE 2 MILLIGRAM(S): 2 INJECTION INTRAMUSCULAR; INTRAVENOUS; SUBCUTANEOUS at 08:50

## 2017-12-01 RX ADMIN — HYDROMORPHONE HYDROCHLORIDE 6 MILLIGRAM(S): 2 INJECTION INTRAMUSCULAR; INTRAVENOUS; SUBCUTANEOUS at 15:04

## 2017-12-01 RX ADMIN — HYDROMORPHONE HYDROCHLORIDE 10 MILLIGRAM(S): 2 INJECTION INTRAMUSCULAR; INTRAVENOUS; SUBCUTANEOUS at 12:19

## 2017-12-01 RX ADMIN — HYDROMORPHONE HYDROCHLORIDE 10 MILLIGRAM(S): 2 INJECTION INTRAMUSCULAR; INTRAVENOUS; SUBCUTANEOUS at 22:30

## 2017-12-01 RX ADMIN — ENOXAPARIN SODIUM 40 MILLIGRAM(S): 100 INJECTION SUBCUTANEOUS at 12:38

## 2017-12-01 RX ADMIN — POLYETHYLENE GLYCOL 3350 17 GRAM(S): 17 POWDER, FOR SOLUTION ORAL at 12:39

## 2017-12-01 RX ADMIN — PANTOPRAZOLE SODIUM 40 MILLIGRAM(S): 20 TABLET, DELAYED RELEASE ORAL at 18:10

## 2017-12-01 RX ADMIN — HYDROMORPHONE HYDROCHLORIDE 1 MILLIGRAM(S): 2 INJECTION INTRAMUSCULAR; INTRAVENOUS; SUBCUTANEOUS at 01:27

## 2017-12-01 RX ADMIN — HYDROMORPHONE HYDROCHLORIDE 6 MILLIGRAM(S): 2 INJECTION INTRAMUSCULAR; INTRAVENOUS; SUBCUTANEOUS at 20:50

## 2017-12-01 RX ADMIN — SENNA PLUS 2 TABLET(S): 8.6 TABLET ORAL at 21:33

## 2017-12-01 RX ADMIN — HYDROMORPHONE HYDROCHLORIDE 10 MILLIGRAM(S): 2 INJECTION INTRAMUSCULAR; INTRAVENOUS; SUBCUTANEOUS at 12:49

## 2017-12-01 RX ADMIN — HYDROMORPHONE HYDROCHLORIDE 1 MILLIGRAM(S): 2 INJECTION INTRAMUSCULAR; INTRAVENOUS; SUBCUTANEOUS at 23:24

## 2017-12-01 RX ADMIN — HYDROMORPHONE HYDROCHLORIDE 2 MILLIGRAM(S): 2 INJECTION INTRAMUSCULAR; INTRAVENOUS; SUBCUTANEOUS at 06:00

## 2017-12-01 RX ADMIN — MORPHINE SULFATE 15 MILLIGRAM(S): 50 CAPSULE, EXTENDED RELEASE ORAL at 10:00

## 2017-12-01 RX ADMIN — HYDROMORPHONE HYDROCHLORIDE 2 MILLIGRAM(S): 2 INJECTION INTRAMUSCULAR; INTRAVENOUS; SUBCUTANEOUS at 02:57

## 2017-12-01 RX ADMIN — HYDROMORPHONE HYDROCHLORIDE 2 MILLIGRAM(S): 2 INJECTION INTRAMUSCULAR; INTRAVENOUS; SUBCUTANEOUS at 05:46

## 2017-12-01 RX ADMIN — HYDROMORPHONE HYDROCHLORIDE 2 MILLIGRAM(S): 2 INJECTION INTRAMUSCULAR; INTRAVENOUS; SUBCUTANEOUS at 08:35

## 2017-12-01 RX ADMIN — OXYCODONE HYDROCHLORIDE 10 MILLIGRAM(S): 5 TABLET ORAL at 10:27

## 2017-12-01 RX ADMIN — HYDROMORPHONE HYDROCHLORIDE 6 MILLIGRAM(S): 2 INJECTION INTRAMUSCULAR; INTRAVENOUS; SUBCUTANEOUS at 14:34

## 2017-12-01 RX ADMIN — Medication 100 MILLIGRAM(S): at 21:32

## 2017-12-01 RX ADMIN — Medication 10 MILLIGRAM(S): at 12:38

## 2017-12-01 RX ADMIN — HYDROMORPHONE HYDROCHLORIDE 10 MILLIGRAM(S): 2 INJECTION INTRAMUSCULAR; INTRAVENOUS; SUBCUTANEOUS at 16:50

## 2017-12-01 RX ADMIN — OXYCODONE HYDROCHLORIDE 10 MILLIGRAM(S): 5 TABLET ORAL at 00:10

## 2017-12-01 RX ADMIN — METHADONE HYDROCHLORIDE 5 MILLIGRAM(S): 40 TABLET ORAL at 21:33

## 2017-12-01 RX ADMIN — HYDROMORPHONE HYDROCHLORIDE 1 MILLIGRAM(S): 2 INJECTION INTRAMUSCULAR; INTRAVENOUS; SUBCUTANEOUS at 23:39

## 2017-12-01 RX ADMIN — HYDROMORPHONE HYDROCHLORIDE 6 MILLIGRAM(S): 2 INJECTION INTRAMUSCULAR; INTRAVENOUS; SUBCUTANEOUS at 19:56

## 2017-12-01 RX ADMIN — HYDROMORPHONE HYDROCHLORIDE 10 MILLIGRAM(S): 2 INJECTION INTRAMUSCULAR; INTRAVENOUS; SUBCUTANEOUS at 17:20

## 2017-12-01 NOTE — PROGRESS NOTE ADULT - ASSESSMENT
62yM w/ colon CA s/p L colectomy approximately 4 months ago, pancreatic CA s/p attempted robotic to open distal pancreatectomy 3 weeks ago- aborted when mass was found to incorporate SMA aw worsening abdominal pain.    CT with focal thinning of the gastric fundus wall without definite wall dehiscence, pancreatic tail mass, narrowing and likely invasion of SMV/portal confluence.    Per surgery, no evidence of perforation- transferred to medicine for persistent severe pain. 62yM w/ colon CA s/p L colectomy approximately 4 months ago, pancreatic CA s/p attempted robotic to open distal pancreatectomy 3 weeks ago- aborted when mass was found to incorporate SMA- aw worsening abdominal pain.    CT with focal thinning of the gastric fundus wall without definite wall dehiscence, pancreatic tail mass, narrowing and likely invasion of SMV/portal confluence.    Per surgery, no evidence of perforation- transferred to medicine for persistent severe pain.

## 2017-12-01 NOTE — CONSULT NOTE ADULT - PROBLEM SELECTOR RECOMMENDATION 2
Added miralax to present regimen.
d/w surg onc. There is no clear evidence of perforation, however EGD would be risky as it involves inflating the stomach which can result in a perforation.   Clear diet for now. Serial abdominal exams  Will d/w surg on to make sure its ok to start chemo

## 2017-12-01 NOTE — PROGRESS NOTE ADULT - PROBLEM SELECTOR PLAN 1
Pathologist is favoring a pancreatic primary at this time rather than metastatic disease from colon.   Discussed with pt plan for FOLFIRINOX for Neville Matos to be placed on monday   discussed possible SE including but not limited to hair loss, neuropathy, diarrhea,  cytopenia, risk of infection, bleeding, weakness, change in taste, appetite, n/v  Discussed goals of treatment which are to shrink tumor and possibly be able to resect it in the future. Pt understands the change of this is low in which case, disease control will be the goal, not cure. Pt agrees to proceed.   Chemo consent to be signed next week prior to treatment.

## 2017-12-01 NOTE — PROGRESS NOTE ADULT - SUBJECTIVE AND OBJECTIVE BOX
Patient is a 62y old  Male who presents with a chief complaint of Abdominal pain (30 Nov 2017 12:26)    HPI: Required iv dilaudid every 3 hours overnight. Pain somewhat improved today. No BM for 4 days.    Vital Signs Last 24 Hrs  T(C): 36.6 (01 Dec 2017 07:57), Max: 37.1 (30 Nov 2017 14:11)  T(F): 97.9 (01 Dec 2017 07:57), Max: 98.8 (30 Nov 2017 14:11)  HR: 113 (01 Dec 2017 07:57) (101 - 113)  BP: 168/96 (01 Dec 2017 07:57) (147/79 - 203/85)  BP(mean): --  RR: 18 (01 Dec 2017 07:57) (18 - 20)  SpO2: 95% (01 Dec 2017 07:57) (95% - 99%)                          12.4   8.73  )-----------( 342      ( 30 Nov 2017 07:32 )             36.7     12-01    134<L>  |  96  |  7   ----------------------------<  109<H>  4.6   |  22  |  0.50    Ca    9.6      01 Dec 2017 10:34  Phos  3.5     11-30  Mg     2.0     11-30    TPro  7.5  /  Alb  4.0  /  TBili  0.2  /  DBili  x   /  AST  13  /  ALT  10  /  AlkPhos  78  11-29    MEDICATIONS  (STANDING):  docusate sodium 100 milliGRAM(s) Oral at bedtime  enoxaparin Injectable 40 milliGRAM(s) SubCutaneous daily  gabapentin 300 milliGRAM(s) Oral three times a day  morphine ER Tablet 15 milliGRAM(s) Oral every 12 hours  senna 2 Tablet(s) Oral at bedtime    MEDICATIONS  (PRN):  HYDROmorphone  Injectable 2 milliGRAM(s) IV Push every 3 hours PRN Severe Pain (7 - 10)  oxyCODONE    IR 10 milliGRAM(s) Oral every 4 hours PRN Moderate Pain (4 - 6)

## 2017-12-01 NOTE — PROGRESS NOTE ADULT - SUBJECTIVE AND OBJECTIVE BOX
Chief Complaint: pancreatic ca    INTERVAL HPI/OVERNIGHT EVENTS: Pain better controlled with change in pain regimen.     MEDICATIONS  (STANDING):  docusate sodium 100 milliGRAM(s) Oral at bedtime  enoxaparin Injectable 40 milliGRAM(s) SubCutaneous daily  methadone    Tablet 5 milliGRAM(s) Oral every 8 hours  pantoprazole  Injectable 40 milliGRAM(s) IV Push two times a day  polyethylene glycol 3350 17 Gram(s) Oral daily  senna 2 Tablet(s) Oral at bedtime    MEDICATIONS  (PRN):  HYDROmorphone   Tablet 10 milliGRAM(s) Oral every 4 hours PRN Severe Pain (7 - 10)  HYDROmorphone   Tablet 6 milliGRAM(s) Oral every 4 hours PRN Moderate Pain (4 - 6)      Allergies    No Known Allergies    Intolerances        ROS: as above     Vital Signs Last 24 Hrs  T(C): 36.6 (02 Dec 2017 02:30), Max: 36.9 (01 Dec 2017 21:56)  T(F): 97.9 (02 Dec 2017 02:30), Max: 98.4 (01 Dec 2017 21:56)  HR: 108 (02 Dec 2017 02:30) (107 - 113)  BP: 155/94 (02 Dec 2017 02:30) (152/87 - 177/91)  BP(mean): --  RR: 18 (02 Dec 2017 02:30) (16 - 18)  SpO2: 97% (02 Dec 2017 02:30) (95% - 99%)    Physical Exam:   AAO x 3, NAD   RRR S1S2  CTA b/l   soft NTNDBS+  no c/c/e    LABS:                        12.7   9.70  )-----------( 385      ( 01 Dec 2017 10:34 )             36.6     12-01    134<L>  |  96  |  7   ----------------------------<  109<H>  4.6   |  22  |  0.50    Ca    9.6      01 Dec 2017 10:34  Phos  3.5     11-30  Mg     2.0     11-30 Chief Complaint: pancreatic ca    INTERVAL HPI/OVERNIGHT EVENTS: Pain better controlled with change in pain regimen. Still on clear diet, tolerating well. Would like to have a regular diet.     MEDICATIONS  (STANDING):  docusate sodium 100 milliGRAM(s) Oral at bedtime  enoxaparin Injectable 40 milliGRAM(s) SubCutaneous daily  methadone    Tablet 5 milliGRAM(s) Oral every 8 hours  pantoprazole  Injectable 40 milliGRAM(s) IV Push two times a day  polyethylene glycol 3350 17 Gram(s) Oral daily  senna 2 Tablet(s) Oral at bedtime    MEDICATIONS  (PRN):  HYDROmorphone   Tablet 10 milliGRAM(s) Oral every 4 hours PRN Severe Pain (7 - 10)  HYDROmorphone   Tablet 6 milliGRAM(s) Oral every 4 hours PRN Moderate Pain (4 - 6)      Allergies    No Known Allergies    Intolerances        ROS: as above     Vital Signs Last 24 Hrs  T(C): 36.6 (02 Dec 2017 02:30), Max: 36.9 (01 Dec 2017 21:56)  T(F): 97.9 (02 Dec 2017 02:30), Max: 98.4 (01 Dec 2017 21:56)  HR: 108 (02 Dec 2017 02:30) (107 - 113)  BP: 155/94 (02 Dec 2017 02:30) (152/87 - 177/91)  BP(mean): --  RR: 18 (02 Dec 2017 02:30) (16 - 18)  SpO2: 97% (02 Dec 2017 02:30) (95% - 99%)    Physical Exam:   AAO x 3, NAD   RRR S1S2  CTA b/l   soft TTP diffusely   no c/c/e    LABS:                        12.7   9.70  )-----------( 385      ( 01 Dec 2017 10:34 )             36.6     12-01    134<L>  |  96  |  7   ----------------------------<  109<H>  4.6   |  22  |  0.50    Ca    9.6      01 Dec 2017 10:34  Phos  3.5     11-30  Mg     2.0     11-30

## 2017-12-01 NOTE — PROGRESS NOTE ADULT - PROBLEM SELECTOR PLAN 3
CT with focal thinning of gastric fundus- possible ulcer. Started on iv protonix bid. Would avoid an EGD at this time to avoid gastric dilatation in the presence of wall thinning- d/w surgery by onc

## 2017-12-01 NOTE — PROGRESS NOTE ADULT - ASSESSMENT
62 M w/ h/o stage II colon cancer s/p resection in May 2017, now diagnosed with locally advanced unresectable pancreatic cancer c/b severe abdominal/back pain, admitted for concern of gastric perforation based on CT

## 2017-12-01 NOTE — CONSULT NOTE ADULT - PROBLEM SELECTOR RECOMMENDATION 3
DDT as per oncology.
Will add MS Contin 15 mg BID  Neurontin 200 mg TID   Diaudid 2 mg Q 3 hrs PRN breathrough pain

## 2017-12-01 NOTE — CONSULT NOTE ADULT - PROBLEM SELECTOR RECOMMENDATION 9
Patient requiring 2mg IV dilaudid to control pain.  Chronically on MS ER 15mg BID and oxycodone 10mg for BTP  Recently started on Gabapentin.  Methadone would be an ideal drug for this patient.  Started on 5mg every eight hours.  Can increase to 5mg every six hours if pain reoccurs prior to the 8 hour trey.  Dilaudid 6mg po for moderate and 10mg po for severe pain.  ECG reviewed.  QTC well within limits.
Unresectable disease, plan for systemic chemotherapy with the goals of shrinking the tumor and potentially become resectable.   Awaiting to hear from pathology regarding comparison of pancreatic mass and colon mass to determine if same disease or different primaries. 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 First cycle may be done in house depending on pt's symptom control.

## 2017-12-01 NOTE — CONSULT NOTE ADULT - ASSESSMENT
62 year old male with pancreatic tail mass, weight loss, abdominal pain.
62 M w/ h/o stage II colon cancer s/p resection in May 2017, now diagnosed with locally advanced unresectable pancreatic cancer c/b severe abdominal/back pain, admitted for concern of gastric perforation based on CT

## 2017-12-01 NOTE — PROGRESS NOTE ADULT - PROBLEM SELECTOR PLAN 2
Not started on chemo yet- might need treatment this admission. Message left with IR to schedule mediport placement

## 2017-12-01 NOTE — CONSULT NOTE ADULT - SUBJECTIVE AND OBJECTIVE BOX
HPI:  62yM w/ PMH sig for colon CA s/p L colectomy approximately 4 months ago, Pancreatic CA s/p robotic to open distal pancreatectomy 3 weeks ago aborted when mass was found to incorporate SMA. Pt presented to [facility] ED on 11/29/2017 after seeing his oncologist and having a CT scan concerning for gastric perforation. Pt has been having abdominal pain for the past 4 months. Denies any change in characteristics of pain. States he has poor PO intake over the past 4 months secondary to decreased appetite. Has lost approximately 65 pounds over the past 4 months. Denies fever, chills, nausea, emesis, diarrhea, constipation, melena. (30 Nov 2017 01:12)  We are asked to see for pain management.      PERTINENT PM/SXH:   Colon cancer  Cancer of pancreas    Gastric perforation  H/O exploratory laparotomy  H/O colectomy    SOCIAL HISTORY:   Significant other/partner:  [x]YES  [ ]NO*  Children:  [x ]YES  [ ]NO*  Mu-ism/Spirituality:  Substance hx:  [ ]YES  [x ]NO*  Tobacco hx:  [ ]YES  [x ]NO*  Alcohol hx: [ ]YES  [ x]NO*    Home Opioid hx:  [x ]YES  [ ]NO   Living Situation: [x ]Home  [ ]Long term care  [ ]Rehab [ ]Other    FAMILY HISTORY:  No pertinent family history in first degree relatives    [ ]Family history non-contributory     BASELINE (I)ADLs (prior to admission):  [place]: [ ]total  [ x] moderate [ ]dependent    ADVANCE DIRECTIVES:    DNR   MOLST  [ ]YES [x ]NO                      [ ]Completed  Health Care Proxy [ ]YES  [x ]NO                   [ ]Completed  Living Will  [ ]YES [x]NO           [x ]Surrogate  [ ]HCP  [ ]Guardian:  Phone#: Wife see emr    Allergies    No Known Allergies    Intolerances      MEDICATIONS  (STANDING):  docusate sodium 100 milliGRAM(s) Oral at bedtime  enoxaparin Injectable 40 milliGRAM(s) SubCutaneous daily  methadone    Tablet 5 milliGRAM(s) Oral every 8 hours  pantoprazole  Injectable 40 milliGRAM(s) IV Push two times a day  polyethylene glycol 3350 17 Gram(s) Oral daily  senna 2 Tablet(s) Oral at bedtime    MEDICATIONS  (PRN):  HYDROmorphone   Tablet 10 milliGRAM(s) Oral every 4 hours PRN Severe Pain (7 - 10)  HYDROmorphone   Tablet 6 milliGRAM(s) Oral every 4 hours PRN Moderate Pain (4 - 6)      PRESENT SYMPTOMS:  Source: [x ]Patient   [ ]Family   [ ]Team     Pain:                        [ ]No [x ]Yes             [ ]Mild [ ]Moderate [x ]Severe  Onset - x 4 months  Location - mid to low abdomen  Duration - weeks,   Character - deep aching boring through to back  Alleviating/Aggravating - aggrevated by constipation  Radiation -  Timing -    Dyspnea:                [ x]No [ ]Yes             [ ]Mild [ ]Moderate [ ]Severe  Anxiety:                  [ x]No [ ]Yes             [ ]Mild [ ]Moderate [ ]Severe  Fatigue:                  [ ]No [ x]Yes             [x ]Mild [ ]Moderate [ ]Severe  Nausea:                  [ x]No [ ]Yes             [ ]Mild [ ]Moderate [ ]Severe  Loss of appetite:   [ ]No [x ]Yes             [ ]Mild [x ]Moderate [ ]Severe  Constipation:        [ ]No [ x]Yes             [ ]Mild [ ]Moderate [x ]Severe    Other Symptoms:  [x ]All other review of systems negative   [ ]Unable to obtain due to poor mentation     Karnofsky Performance Score/Palliative Performance Status Version 2:   60      %  PHYSICAL EXAM:  Vital Signs Last 24 Hrs  T(C): 36.4 (01 Dec 2017 15:49), Max: 36.9 (01 Dec 2017 00:37)  T(F): 97.6 (01 Dec 2017 15:49), Max: 98.5 (01 Dec 2017 00:37)  HR: 113 (01 Dec 2017 15:49) (101 - 113)  BP: 177/91 (01 Dec 2017 15:49) (147/79 - 178/97)  BP(mean): --  RR: 16 (01 Dec 2017 15:49) (16 - 18)  SpO2: 97% (01 Dec 2017 15:49) (95% - 99%) I&O's Summary    30 Nov 2017 07:01  -  01 Dec 2017 07:00  --------------------------------------------------------  IN: 480 mL / OUT: 0 mL / NET: 480 mL      General:  [x ]Alert  x]Oriented x 3  [ ]Lethargic  [ ]Agitated   [ ]Cachexia   [ ]Unarousable  [ x]Verbal  [ ]Non-Verbal    HEENT:  [x ]Normal   [ ]Dry mouth   [ ]ET Tube/Trach  [ ]Oral lesions    Lungs:   [x ]Clear [ ]Tachypnea  [ ]Audible excessive secretions   [ ]Rhonchi        [ ]Right [ ]Left [ ]Bilateral  [ ]Crackles        [ ]Right [ ]Left [ ]Bilateral  [ ]Wheezing     [ ]Right [ ]Left [ ]Bilateral    Cardiovascular:  [x ]Regular [ ]Irregular [ ]Tachycardia  [ ]Bradycardia  [x ]Murmur  "click" [ ]Other +S1 +S2   Abdomen: [x ]Soft  [ ]Distended   [ x]+BS  [ ]Non tender [x ]Tender  as above, no rebound, soft sub-umbilical [ ]PEG/]OGT/ NGT   Last BM:  See RN documentation in the EMR which I have reviewed.   Genitourinary: [ x]Normal [  Incontinent   [ ]Oliguria/Anuria   [ ]Lopez  Musculoskeletal:  [ x]Normal   [ ]Weakness  [ ]Bedbound/Wheelchair bound [ ]Edema  Neurological: [x ]No focal deficits  [ ] Cognitive impairment  [ ] Dysphagia [ ]Dysarthria [ ] Paresis [ ]Other     Skin: [ x]Normal   [ ]Pressure ulcer(s)  [ ]Rash    LABS:                        12.7   9.70  )-----------( 385      ( 01 Dec 2017 10:34 )             36.6     12-01    134<L>  |  96  |  7   ----------------------------<  109<H>  4.6   |  22  |  0.50    Ca    9.6      01 Dec 2017 10:34  Phos  3.5     11-30  Mg     2.0     11-30    TPro  7.5  /  Alb  4.0  /  TBili  0.2  /  DBili  x   /  AST  13  /  ALT  10  /  AlkPhos  78  11-29    PT/INR - ( 29 Nov 2017 16:51 )   PT: 12.6 sec;   INR: 1.16 ratio         PTT - ( 29 Nov 2017 16:51 )  PTT:25.0 sec      Shock: [ ]Septic [ ]Cardiogenic [ ]Neurologic [ ]Hypovolemic  Vasopressors x   Inotrops x     Protein Calorie Malnutrition: [ ]Mild [ ]Moderatw [ ]Severe    Oral Intake: [ ]Unable/mouth care only [ ]Minimal [ ]Moderate [ ]Full Capability  Diet: [ ]NPO [ ]Tube feeds [ ]TPN [ ]Other     RADIOLOGY & ADDITIONAL STUDIES: reviewed IMPRESSION:    Focal thinning of the gastric fundus wall without definite wall dehiscence,  which may represent ulceration versus perforation. Small amount of adjacent  free fluid is not significantly changed since 11/28/2017. No extravasation  of oral contrast. Recommend correlation with endoscopy.    Pancreatic tail mass. 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            REFERRALS:   [ ]Chaplaincy  [  Hospice  [ ]Child Life  [ ]Social Work  [ ]Case management [ ]Holistic Therapy

## 2017-12-02 DIAGNOSIS — I10 ESSENTIAL (PRIMARY) HYPERTENSION: ICD-10-CM

## 2017-12-02 DIAGNOSIS — G47.00 INSOMNIA, UNSPECIFIED: ICD-10-CM

## 2017-12-02 DIAGNOSIS — K59.00 CONSTIPATION, UNSPECIFIED: ICD-10-CM

## 2017-12-02 DIAGNOSIS — G89.3 NEOPLASM RELATED PAIN (ACUTE) (CHRONIC): ICD-10-CM

## 2017-12-02 DIAGNOSIS — K25.5 CHRONIC OR UNSPECIFIED GASTRIC ULCER WITH PERFORATION: ICD-10-CM

## 2017-12-02 DIAGNOSIS — Z51.5 ENCOUNTER FOR PALLIATIVE CARE: ICD-10-CM

## 2017-12-02 PROCEDURE — 99233 SBSQ HOSP IP/OBS HIGH 50: CPT

## 2017-12-02 RX ORDER — HYDROMORPHONE HYDROCHLORIDE 2 MG/ML
10 INJECTION INTRAMUSCULAR; INTRAVENOUS; SUBCUTANEOUS
Qty: 0 | Refills: 0 | Status: DISCONTINUED | OUTPATIENT
Start: 2017-12-02 | End: 2017-12-03

## 2017-12-02 RX ORDER — HYDROMORPHONE HYDROCHLORIDE 2 MG/ML
1 INJECTION INTRAMUSCULAR; INTRAVENOUS; SUBCUTANEOUS
Qty: 0 | Refills: 0 | Status: DISCONTINUED | OUTPATIENT
Start: 2017-12-02 | End: 2017-12-07

## 2017-12-02 RX ORDER — LOSARTAN POTASSIUM 100 MG/1
25 TABLET, FILM COATED ORAL DAILY
Qty: 0 | Refills: 0 | Status: DISCONTINUED | OUTPATIENT
Start: 2017-12-02 | End: 2017-12-08

## 2017-12-02 RX ORDER — DIPHENHYDRAMINE HCL 50 MG
25 CAPSULE ORAL AT BEDTIME
Qty: 0 | Refills: 0 | Status: DISCONTINUED | OUTPATIENT
Start: 2017-12-02 | End: 2017-12-08

## 2017-12-02 RX ORDER — HYDROMORPHONE HYDROCHLORIDE 2 MG/ML
6 INJECTION INTRAMUSCULAR; INTRAVENOUS; SUBCUTANEOUS
Qty: 0 | Refills: 0 | Status: DISCONTINUED | OUTPATIENT
Start: 2017-12-02 | End: 2017-12-03

## 2017-12-02 RX ORDER — AMLODIPINE BESYLATE 2.5 MG/1
5 TABLET ORAL ONCE
Qty: 0 | Refills: 0 | Status: COMPLETED | OUTPATIENT
Start: 2017-12-02 | End: 2017-12-02

## 2017-12-02 RX ADMIN — HYDROMORPHONE HYDROCHLORIDE 1 MILLIGRAM(S): 2 INJECTION INTRAMUSCULAR; INTRAVENOUS; SUBCUTANEOUS at 17:42

## 2017-12-02 RX ADMIN — HYDROMORPHONE HYDROCHLORIDE 1 MILLIGRAM(S): 2 INJECTION INTRAMUSCULAR; INTRAVENOUS; SUBCUTANEOUS at 10:27

## 2017-12-02 RX ADMIN — METHADONE HYDROCHLORIDE 5 MILLIGRAM(S): 40 TABLET ORAL at 22:01

## 2017-12-02 RX ADMIN — HYDROMORPHONE HYDROCHLORIDE 10 MILLIGRAM(S): 2 INJECTION INTRAMUSCULAR; INTRAVENOUS; SUBCUTANEOUS at 13:25

## 2017-12-02 RX ADMIN — HYDROMORPHONE HYDROCHLORIDE 1 MILLIGRAM(S): 2 INJECTION INTRAMUSCULAR; INTRAVENOUS; SUBCUTANEOUS at 17:27

## 2017-12-02 RX ADMIN — METHADONE HYDROCHLORIDE 5 MILLIGRAM(S): 40 TABLET ORAL at 13:36

## 2017-12-02 RX ADMIN — HYDROMORPHONE HYDROCHLORIDE 10 MILLIGRAM(S): 2 INJECTION INTRAMUSCULAR; INTRAVENOUS; SUBCUTANEOUS at 09:33

## 2017-12-02 RX ADMIN — HYDROMORPHONE HYDROCHLORIDE 10 MILLIGRAM(S): 2 INJECTION INTRAMUSCULAR; INTRAVENOUS; SUBCUTANEOUS at 23:41

## 2017-12-02 RX ADMIN — AMLODIPINE BESYLATE 5 MILLIGRAM(S): 2.5 TABLET ORAL at 18:04

## 2017-12-02 RX ADMIN — HYDROMORPHONE HYDROCHLORIDE 10 MILLIGRAM(S): 2 INJECTION INTRAMUSCULAR; INTRAVENOUS; SUBCUTANEOUS at 21:00

## 2017-12-02 RX ADMIN — HYDROMORPHONE HYDROCHLORIDE 6 MILLIGRAM(S): 2 INJECTION INTRAMUSCULAR; INTRAVENOUS; SUBCUTANEOUS at 02:59

## 2017-12-02 RX ADMIN — HYDROMORPHONE HYDROCHLORIDE 10 MILLIGRAM(S): 2 INJECTION INTRAMUSCULAR; INTRAVENOUS; SUBCUTANEOUS at 12:24

## 2017-12-02 RX ADMIN — HYDROMORPHONE HYDROCHLORIDE 6 MILLIGRAM(S): 2 INJECTION INTRAMUSCULAR; INTRAVENOUS; SUBCUTANEOUS at 03:50

## 2017-12-02 RX ADMIN — HYDROMORPHONE HYDROCHLORIDE 1 MILLIGRAM(S): 2 INJECTION INTRAMUSCULAR; INTRAVENOUS; SUBCUTANEOUS at 14:40

## 2017-12-02 RX ADMIN — HYDROMORPHONE HYDROCHLORIDE 10 MILLIGRAM(S): 2 INJECTION INTRAMUSCULAR; INTRAVENOUS; SUBCUTANEOUS at 17:05

## 2017-12-02 RX ADMIN — HYDROMORPHONE HYDROCHLORIDE 10 MILLIGRAM(S): 2 INJECTION INTRAMUSCULAR; INTRAVENOUS; SUBCUTANEOUS at 16:05

## 2017-12-02 RX ADMIN — PANTOPRAZOLE SODIUM 40 MILLIGRAM(S): 20 TABLET, DELAYED RELEASE ORAL at 05:54

## 2017-12-02 RX ADMIN — HYDROMORPHONE HYDROCHLORIDE 1 MILLIGRAM(S): 2 INJECTION INTRAMUSCULAR; INTRAVENOUS; SUBCUTANEOUS at 21:10

## 2017-12-02 RX ADMIN — METHADONE HYDROCHLORIDE 5 MILLIGRAM(S): 40 TABLET ORAL at 05:48

## 2017-12-02 RX ADMIN — Medication 100 MILLIGRAM(S): at 22:01

## 2017-12-02 RX ADMIN — POLYETHYLENE GLYCOL 3350 17 GRAM(S): 17 POWDER, FOR SOLUTION ORAL at 12:26

## 2017-12-02 RX ADMIN — ENOXAPARIN SODIUM 40 MILLIGRAM(S): 100 INJECTION SUBCUTANEOUS at 12:26

## 2017-12-02 RX ADMIN — HYDROMORPHONE HYDROCHLORIDE 1 MILLIGRAM(S): 2 INJECTION INTRAMUSCULAR; INTRAVENOUS; SUBCUTANEOUS at 20:55

## 2017-12-02 RX ADMIN — HYDROMORPHONE HYDROCHLORIDE 10 MILLIGRAM(S): 2 INJECTION INTRAMUSCULAR; INTRAVENOUS; SUBCUTANEOUS at 02:51

## 2017-12-02 RX ADMIN — HYDROMORPHONE HYDROCHLORIDE 1 MILLIGRAM(S): 2 INJECTION INTRAMUSCULAR; INTRAVENOUS; SUBCUTANEOUS at 14:25

## 2017-12-02 RX ADMIN — PANTOPRAZOLE SODIUM 40 MILLIGRAM(S): 20 TABLET, DELAYED RELEASE ORAL at 17:28

## 2017-12-02 RX ADMIN — SENNA PLUS 2 TABLET(S): 8.6 TABLET ORAL at 22:01

## 2017-12-02 RX ADMIN — HYDROMORPHONE HYDROCHLORIDE 10 MILLIGRAM(S): 2 INJECTION INTRAMUSCULAR; INTRAVENOUS; SUBCUTANEOUS at 05:49

## 2017-12-02 RX ADMIN — HYDROMORPHONE HYDROCHLORIDE 10 MILLIGRAM(S): 2 INJECTION INTRAMUSCULAR; INTRAVENOUS; SUBCUTANEOUS at 01:51

## 2017-12-02 RX ADMIN — HYDROMORPHONE HYDROCHLORIDE 10 MILLIGRAM(S): 2 INJECTION INTRAMUSCULAR; INTRAVENOUS; SUBCUTANEOUS at 10:26

## 2017-12-02 RX ADMIN — HYDROMORPHONE HYDROCHLORIDE 10 MILLIGRAM(S): 2 INJECTION INTRAMUSCULAR; INTRAVENOUS; SUBCUTANEOUS at 20:00

## 2017-12-02 RX ADMIN — HYDROMORPHONE HYDROCHLORIDE 10 MILLIGRAM(S): 2 INJECTION INTRAMUSCULAR; INTRAVENOUS; SUBCUTANEOUS at 06:39

## 2017-12-02 NOTE — PROGRESS NOTE ADULT - PROBLEM SELECTOR PLAN 4
Benadryl 25mg PO QHS for insomnia PRN added. Patient instructed not to use IV or PO opioids solely for insomnia.

## 2017-12-02 NOTE — PROGRESS NOTE ADULT - PROBLEM SELECTOR PLAN 1
Uncontrolled pain overnight, with end-of-dose failure for oral dilaudid Q4. Will change frequency to Q3, and added 1mg IV Q3 for breakthrough pain unrelieved with oral medication. Methadone started yesterday, can uptitrate at the very earliest on Monday 12/4. Patient agrees with current plan.

## 2017-12-02 NOTE — PROGRESS NOTE ADULT - PROBLEM SELECTOR PLAN 1
Pathologist is favoring a pancreatic primary at this time rather than metastatic disease from colon.   Plan for [facility] es  [facility]   Goals of treatment are to shrink tumor and possibly be able to resect it in the future.

## 2017-12-02 NOTE — PROGRESS NOTE ADULT - SUBJECTIVE AND OBJECTIVE BOX
Patient is a 62y old  Male who presents with a chief complaint of Abdominal pain (30 Nov 2017 12:26)      SUBJECTIVE / OVERNIGHT EVENTS: Pain localized to left side of abdomen. Usually relieved with dilaudid but not lasting the full time. No nausea or vomiting. Denies chest pain or SOB. +po intake. Normal bowel movements. Awaiting mediport placement.     All other review of systems negative    MEDICATIONS  (STANDING):  docusate sodium 100 milliGRAM(s) Oral at bedtime  enoxaparin Injectable 40 milliGRAM(s) SubCutaneous daily  methadone    Tablet 5 milliGRAM(s) Oral every 8 hours  pantoprazole  Injectable 40 milliGRAM(s) IV Push two times a day  polyethylene glycol 3350 17 Gram(s) Oral daily  senna 2 Tablet(s) Oral at bedtime    MEDICATIONS  (PRN):  diphenhydrAMINE   Capsule 25 milliGRAM(s) Oral at bedtime PRN Insomnia  HYDROmorphone   Tablet 10 milliGRAM(s) Oral every 3 hours PRN Severe Pain (7 - 10)  HYDROmorphone   Tablet 6 milliGRAM(s) Oral every 3 hours PRN Moderate Pain (4 - 6)  HYDROmorphone  Injectable 1 milliGRAM(s) IV Push every 3 hours PRN pain (4-10) unrelieved with oral medication      Vital Signs Last 24 Hrs  T(C): 36.8 (02 Dec 2017 07:46), Max: 36.9 (01 Dec 2017 21:56)  T(F): 98.3 (02 Dec 2017 07:46), Max: 98.4 (01 Dec 2017 21:56)  HR: 111 (02 Dec 2017 07:46) (107 - 113)  BP: 164/88 (02 Dec 2017 07:46) (152/87 - 177/91)  BP(mean): --  RR: 18 (02 Dec 2017 07:46) (16 - 18)  SpO2: 97% (02 Dec 2017 07:46) (97% - 99%)  CAPILLARY BLOOD GLUCOSE        I&O's Summary    01 Dec 2017 07:01  -  02 Dec 2017 07:00  --------------------------------------------------------  IN: 500 mL / OUT: 0 mL / NET: 500 mL    02 Dec 2017 07:01  -  02 Dec 2017 10:56  --------------------------------------------------------  IN: 320 mL / OUT: 0 mL / NET: 320 mL        TELEMETRY:    PHYSICAL EXAM:  GENERAL: NAD, well-developed  EYES: EOMI, conjunctiva and sclera clear  NECK: Supple  CHEST/LUNG: Clear to auscultation bilaterally; full excursion, nonlabored  HEART: Regular rate and rhythm; No murmurs, rubs, or gallops  ABDOMEN: Soft, +tenderness on left side of abdomen without guarding or rebound, mild distention;  Bowel sounds present  EXTREMITIES:  No clubbing, cyanosis, or edema  PSYCH: AAOx3, normal affect  NEUROLOGY: non-focal; no gross sensory deficits; muscle strength 5/5 b/l UE and LE  SKIN: warm and dry    LABS:                        12.7   9.70  )-----------( 385      ( 01 Dec 2017 10:34 )             36.6     12-01    134<L>  |  96  |  7   ----------------------------<  109<H>  4.6   |  22  |  0.50    Ca    9.6      01 Dec 2017 10:34                  RADIOLOGY & ADDITIONAL TESTS:    Imaging Personally Reviewed:    Consultant(s) Notes Reviewed:  Palliative and Oncology    Care Discussed with Consultants/Other Providers:    Contact Number: [facility] [phone]

## 2017-12-02 NOTE — PROGRESS NOTE ADULT - PROBLEM SELECTOR PLAN 3
Uncontrolled pain overnight, with end-of-dose failure for oral dilaudid Q4. Appreciate pallative recs. Changed frequency to Q3, and added 1mg IV Q3 for breakthrough pain unrelieved with oral medication. Methadone started yesterday, can uptitrate at the very earliest on Monday 12/4.  Bowel regimen as above. Uncontrolled pain overnight, with end-of-dose failure for oral dilaudid Q4. Appreciate palliative recs. Changed frequency to Q3, and added 1mg IV Q3 for breakthrough pain unrelieved with oral medication. Methadone started yesterday, can uptitrate at the very earliest on Monday 12/4.  Bowel regimen as above.

## 2017-12-02 NOTE — PROGRESS NOTE ADULT - ASSESSMENT
62 M w/ stage II colon CA s/p resection in May 2017, now diagnosed with locally advanced unresectable pancreatic cancer c/b severe abdominal/back pain.

## 2017-12-02 NOTE — PROGRESS NOTE ADULT - SUBJECTIVE AND OBJECTIVE BOX
INTERVAL HPI/OVERNIGHT EVENTS: Pain uncontrolled overnight, states having "15 out of 10" pain, relieved with oral meds but only for about 2.5-3 hours, and unable to get any extra pain medication.     Allergies    No Known Allergies    Intolerances        ADVANCE DIRECTIVES:    DNR: [X ] NO [ ] YES (Date) MOLST [ ] NO [ ] YES (Date)    MEDICATIONS  (STANDING):  docusate sodium 100 milliGRAM(s) Oral at bedtime  enoxaparin Injectable 40 milliGRAM(s) SubCutaneous daily  methadone    Tablet 5 milliGRAM(s) Oral every 8 hours  pantoprazole  Injectable 40 milliGRAM(s) IV Push two times a day  polyethylene glycol 3350 17 Gram(s) Oral daily  senna 2 Tablet(s) Oral at bedtime    MEDICATIONS  (PRN):  diphenhydrAMINE   Capsule 25 milliGRAM(s) Oral at bedtime PRN Insomnia  HYDROmorphone   Tablet 10 milliGRAM(s) Oral every 3 hours PRN Severe Pain (7 - 10)  HYDROmorphone   Tablet 6 milliGRAM(s) Oral every 3 hours PRN Moderate Pain (4 - 6)  HYDROmorphone  Injectable 1 milliGRAM(s) IV Push every 3 hours PRN pain (4-10) unrelieved with oral medication      PRESENT SYMPTOMS:  Source: [X ] Patient   [ ] Family   [X ] Team     Pain:                        [ ] No [X ] Yes             [ ] Mild [ ] Moderate [ ] Severe    Pain:                        [ ]No [x ]Yes             [ ]Mild [ ]Moderate [x ]Severe  Onset - x 4 months  Location - mid to low abdomen  Duration - weeks   Character - deep aching boring through to back  Alleviating/Aggravating - aggravated by constipation, better with opioids  Radiation -  Timing -    Dyspnea:                [x ] No [ ] Yes             [ ] Mild [ ] Moderate [ ] Severe    Anxiety:                  [x ] No [ ] Yes             [ ] Mild [ ] Moderate [ ] Severe    Fatigue:                  [x ] No [ ] Yes             [ ] Mild [ ] Moderate [ ] Severe    Nausea:                  [x ] No [ ] Yes             [ ] Mild [ ] Moderate [ ] Severe    Loss of appetite:   [x ] No [ ] Yes             [ ] Mild [ ] Moderate [ ] Severe    Constipation:        [x ] No [ ] Yes             [ ] Mild [ ] Moderate [ ] Severe  Last BM 12/1    Other Symptoms:  [ ] All other review of systems negative   [ ] Unable to obtain due to poor mentation     Karnofsky Performance Score/Palliative Performance Status Version 2:  80%    PHYSICAL EXAM:  Vital Signs Last 24 Hrs  T(C): 36.8 (02 Dec 2017 07:46), Max: 36.9 (01 Dec 2017 21:56)  T(F): 98.3 (02 Dec 2017 07:46), Max: 98.4 (01 Dec 2017 21:56)  HR: 111 (02 Dec 2017 07:46) (107 - 113)  BP: 164/88 (02 Dec 2017 07:46) (152/87 - 177/91)  BP(mean): --  RR: 18 (02 Dec 2017 07:46) (16 - 18)  SpO2: 97% (02 Dec 2017 07:46) (97% - 99%) I&O's Summary    01 Dec 2017 07:01  -  02 Dec 2017 07:00  --------------------------------------------------------  IN: 500 mL / OUT: 0 mL / NET: 500 mL        General:  [ x] Alert  [ ] Oriented x      [ ] Lethargic  [ ] Agitated   [ ] Cachexia   [ ] Unarousable  [x ] Verbal  [ ] Non-Verbal  [ ] Sedated    HEENT:  [ ] Normal   [ ] Dry mouth   [ ] ET Tube    [ ] Trach  [ ] Oral lesions    Lungs:   [x ] Clear [ ] Tachypnea  [ ] Audible excessive secretions   [ ] Rhonchi        [ ] Right [ ] Left [ ] Bilateral  [ ] Crackles        [ ] Right [ ] Left [ ] Bilateral  [ ] Wheezing     [ ] Right [ ] Left [ ] Bilateral    Cardiovascular:  [ x] Regular [ ] Irregular [ ] Tachycardia   [ ] Bradycardia  [ ] Murmur [ ] Other    Abdomen: [x ] Soft  [ ] Distended   [ x] +BS  [x ] Non tender [ ] Tender  [ ]PEG   [ ]OGT/ NGT   [ ] Ostomy   Last BM:       Genitourinary: [ ] Normal [ ] Incontinent   [ ] Oliguria/Anuria   [ ] Elizabeth   [x ] No elizabeth    Musculoskeletal:  [x ] Normal   [ ] Weakness  [ ] Bedbound/Wheelchair bound [ ] Edema    Neurological: [x ] No focal deficits  [ ] Cognitive impairment  [ ] Dysphagia [ ] Dysarthria [ ] Paresis [ ] Other     Skin: [x ] Normal   [ ] Pressure ulcer(s)     [ ] Rash   [ ] Other    LABS: no AM labs currently available for review      Oral Intake: [ ] Unable/mouth care only [ ] Minimal [ ] Moderate [X ] Full Capability  Diet: [ ] NPO [ ] Tube feeds [ ] TPN [ ] Other     RADIOLOGY & ADDITIONAL STUDIES: no new imaging for review    REFERRALS:   [ ] Chaplaincy  [ ] Hospice  [ ] Child Life  [ ] Social Work  [ ] Case management [ ] Holistic Therapy

## 2017-12-03 LAB
ANION GAP SERPL CALC-SCNC: 13 MMOL/L — SIGNIFICANT CHANGE UP (ref 5–17)
BUN SERPL-MCNC: 13 MG/DL — SIGNIFICANT CHANGE UP (ref 7–23)
CALCIUM SERPL-MCNC: 9.9 MG/DL — SIGNIFICANT CHANGE UP (ref 8.4–10.5)
CHLORIDE SERPL-SCNC: 98 MMOL/L — SIGNIFICANT CHANGE UP (ref 96–108)
CO2 SERPL-SCNC: 23 MMOL/L — SIGNIFICANT CHANGE UP (ref 22–31)
CREAT SERPL-MCNC: 0.63 MG/DL — SIGNIFICANT CHANGE UP (ref 0.5–1.3)
GLUCOSE SERPL-MCNC: 98 MG/DL — SIGNIFICANT CHANGE UP (ref 70–99)
HCT VFR BLD CALC: 37.6 % — LOW (ref 39–50)
HGB BLD-MCNC: 13.3 G/DL — SIGNIFICANT CHANGE UP (ref 13–17)
MCHC RBC-ENTMCNC: 30.5 PG — SIGNIFICANT CHANGE UP (ref 27–34)
MCHC RBC-ENTMCNC: 35.4 GM/DL — SIGNIFICANT CHANGE UP (ref 32–36)
MCV RBC AUTO: 86.2 FL — SIGNIFICANT CHANGE UP (ref 80–100)
PLATELET # BLD AUTO: 391 K/UL — SIGNIFICANT CHANGE UP (ref 150–400)
POTASSIUM SERPL-MCNC: 4.2 MMOL/L — SIGNIFICANT CHANGE UP (ref 3.5–5.3)
POTASSIUM SERPL-SCNC: 4.2 MMOL/L — SIGNIFICANT CHANGE UP (ref 3.5–5.3)
RBC # BLD: 4.36 M/UL — SIGNIFICANT CHANGE UP (ref 4.2–5.8)
RBC # FLD: 13.9 % — SIGNIFICANT CHANGE UP (ref 10.3–14.5)
SODIUM SERPL-SCNC: 134 MMOL/L — LOW (ref 135–145)
WBC # BLD: 8.9 K/UL — SIGNIFICANT CHANGE UP (ref 3.8–10.5)
WBC # FLD AUTO: 8.9 K/UL — SIGNIFICANT CHANGE UP (ref 3.8–10.5)

## 2017-12-03 PROCEDURE — 99233 SBSQ HOSP IP/OBS HIGH 50: CPT

## 2017-12-03 RX ORDER — LACTULOSE 10 G/15ML
10 SOLUTION ORAL ONCE
Qty: 0 | Refills: 0 | Status: COMPLETED | OUTPATIENT
Start: 2017-12-03 | End: 2017-12-03

## 2017-12-03 RX ORDER — HYDROMORPHONE HYDROCHLORIDE 2 MG/ML
14 INJECTION INTRAMUSCULAR; INTRAVENOUS; SUBCUTANEOUS
Qty: 0 | Refills: 0 | Status: DISCONTINUED | OUTPATIENT
Start: 2017-12-03 | End: 2017-12-08

## 2017-12-03 RX ORDER — LACTULOSE 10 G/15ML
15 SOLUTION ORAL DAILY
Qty: 0 | Refills: 0 | Status: DISCONTINUED | OUTPATIENT
Start: 2017-12-03 | End: 2017-12-08

## 2017-12-03 RX ADMIN — POLYETHYLENE GLYCOL 3350 17 GRAM(S): 17 POWDER, FOR SOLUTION ORAL at 11:57

## 2017-12-03 RX ADMIN — HYDROMORPHONE HYDROCHLORIDE 1 MILLIGRAM(S): 2 INJECTION INTRAMUSCULAR; INTRAVENOUS; SUBCUTANEOUS at 13:17

## 2017-12-03 RX ADMIN — HYDROMORPHONE HYDROCHLORIDE 10 MILLIGRAM(S): 2 INJECTION INTRAMUSCULAR; INTRAVENOUS; SUBCUTANEOUS at 00:41

## 2017-12-03 RX ADMIN — HYDROMORPHONE HYDROCHLORIDE 14 MILLIGRAM(S): 2 INJECTION INTRAMUSCULAR; INTRAVENOUS; SUBCUTANEOUS at 20:02

## 2017-12-03 RX ADMIN — PANTOPRAZOLE SODIUM 40 MILLIGRAM(S): 20 TABLET, DELAYED RELEASE ORAL at 05:54

## 2017-12-03 RX ADMIN — HYDROMORPHONE HYDROCHLORIDE 14 MILLIGRAM(S): 2 INJECTION INTRAMUSCULAR; INTRAVENOUS; SUBCUTANEOUS at 17:25

## 2017-12-03 RX ADMIN — HYDROMORPHONE HYDROCHLORIDE 1 MILLIGRAM(S): 2 INJECTION INTRAMUSCULAR; INTRAVENOUS; SUBCUTANEOUS at 18:08

## 2017-12-03 RX ADMIN — HYDROMORPHONE HYDROCHLORIDE 1 MILLIGRAM(S): 2 INJECTION INTRAMUSCULAR; INTRAVENOUS; SUBCUTANEOUS at 13:32

## 2017-12-03 RX ADMIN — HYDROMORPHONE HYDROCHLORIDE 14 MILLIGRAM(S): 2 INJECTION INTRAMUSCULAR; INTRAVENOUS; SUBCUTANEOUS at 11:54

## 2017-12-03 RX ADMIN — HYDROMORPHONE HYDROCHLORIDE 1 MILLIGRAM(S): 2 INJECTION INTRAMUSCULAR; INTRAVENOUS; SUBCUTANEOUS at 18:23

## 2017-12-03 RX ADMIN — HYDROMORPHONE HYDROCHLORIDE 1 MILLIGRAM(S): 2 INJECTION INTRAMUSCULAR; INTRAVENOUS; SUBCUTANEOUS at 01:02

## 2017-12-03 RX ADMIN — HYDROMORPHONE HYDROCHLORIDE 1 MILLIGRAM(S): 2 INJECTION INTRAMUSCULAR; INTRAVENOUS; SUBCUTANEOUS at 09:22

## 2017-12-03 RX ADMIN — HYDROMORPHONE HYDROCHLORIDE 10 MILLIGRAM(S): 2 INJECTION INTRAMUSCULAR; INTRAVENOUS; SUBCUTANEOUS at 07:59

## 2017-12-03 RX ADMIN — HYDROMORPHONE HYDROCHLORIDE 14 MILLIGRAM(S): 2 INJECTION INTRAMUSCULAR; INTRAVENOUS; SUBCUTANEOUS at 16:25

## 2017-12-03 RX ADMIN — METHADONE HYDROCHLORIDE 5 MILLIGRAM(S): 40 TABLET ORAL at 05:53

## 2017-12-03 RX ADMIN — HYDROMORPHONE HYDROCHLORIDE 10 MILLIGRAM(S): 2 INJECTION INTRAMUSCULAR; INTRAVENOUS; SUBCUTANEOUS at 08:59

## 2017-12-03 RX ADMIN — ENOXAPARIN SODIUM 40 MILLIGRAM(S): 100 INJECTION SUBCUTANEOUS at 11:57

## 2017-12-03 RX ADMIN — HYDROMORPHONE HYDROCHLORIDE 1 MILLIGRAM(S): 2 INJECTION INTRAMUSCULAR; INTRAVENOUS; SUBCUTANEOUS at 09:27

## 2017-12-03 RX ADMIN — PANTOPRAZOLE SODIUM 40 MILLIGRAM(S): 20 TABLET, DELAYED RELEASE ORAL at 18:10

## 2017-12-03 RX ADMIN — LACTULOSE 10 GRAM(S): 10 SOLUTION ORAL at 11:56

## 2017-12-03 RX ADMIN — METHADONE HYDROCHLORIDE 5 MILLIGRAM(S): 40 TABLET ORAL at 22:05

## 2017-12-03 RX ADMIN — METHADONE HYDROCHLORIDE 5 MILLIGRAM(S): 40 TABLET ORAL at 13:41

## 2017-12-03 RX ADMIN — HYDROMORPHONE HYDROCHLORIDE 1 MILLIGRAM(S): 2 INJECTION INTRAMUSCULAR; INTRAVENOUS; SUBCUTANEOUS at 01:17

## 2017-12-03 RX ADMIN — HYDROMORPHONE HYDROCHLORIDE 14 MILLIGRAM(S): 2 INJECTION INTRAMUSCULAR; INTRAVENOUS; SUBCUTANEOUS at 12:54

## 2017-12-03 RX ADMIN — HYDROMORPHONE HYDROCHLORIDE 10 MILLIGRAM(S): 2 INJECTION INTRAMUSCULAR; INTRAVENOUS; SUBCUTANEOUS at 03:46

## 2017-12-03 RX ADMIN — HYDROMORPHONE HYDROCHLORIDE 10 MILLIGRAM(S): 2 INJECTION INTRAMUSCULAR; INTRAVENOUS; SUBCUTANEOUS at 04:01

## 2017-12-03 RX ADMIN — HYDROMORPHONE HYDROCHLORIDE 14 MILLIGRAM(S): 2 INJECTION INTRAMUSCULAR; INTRAVENOUS; SUBCUTANEOUS at 21:00

## 2017-12-03 RX ADMIN — LOSARTAN POTASSIUM 25 MILLIGRAM(S): 100 TABLET, FILM COATED ORAL at 05:53

## 2017-12-03 NOTE — PROGRESS NOTE ADULT - SUBJECTIVE AND OBJECTIVE BOX
INTERVAL HPI/OVERNIGHT EVENTS: Pain better overnight, but needed 5 doses of 1mg dilaudid IV and 6 doses of 10mg PO.     Allergies    No Known Allergies    Intolerances        ADVANCE DIRECTIVES:    DNR: [X ] NO [ ] YES (Date) MOLST [ ] NO [ ] YES (Date)    MEDICATIONS  (STANDING):  docusate sodium 100 milliGRAM(s) Oral at bedtime  enoxaparin Injectable 40 milliGRAM(s) SubCutaneous daily  losartan 25 milliGRAM(s) Oral daily  methadone    Tablet 5 milliGRAM(s) Oral every 8 hours  pantoprazole  Injectable 40 milliGRAM(s) IV Push two times a day  polyethylene glycol 3350 17 Gram(s) Oral daily  senna 2 Tablet(s) Oral at bedtime    MEDICATIONS  (PRN):  diphenhydrAMINE   Capsule 25 milliGRAM(s) Oral at bedtime PRN Insomnia  HYDROmorphone   Tablet 14 milliGRAM(s) Oral every 3 hours PRN Severe Pain (7 - 10)  HYDROmorphone  Injectable 1 milliGRAM(s) IV Push every 3 hours PRN pain (4-10) unrelieved with oral medication    PRESENT SYMPTOMS:  Source: [X ] Patient   [ ] Family   [X ] Team     Pain:                        [ ] No [X ] Yes             [ ] Mild [ ] Moderate [ ] Severe    Pain:                        [ ]No [x ]Yes             [ ]Mild [ ]Moderate [x ]Severe  Onset - x 4 months  Location - mid to low abdomen  Duration - weeks   Character - deep aching boring through to back  Alleviating/Aggravating - aggravated by constipation, better with opioids  Radiation -  Timing -    Dyspnea:                [x ] No [ ] Yes             [ ] Mild [ ] Moderate [ ] Severe    Anxiety:                  [x ] No [ ] Yes             [ ] Mild [ ] Moderate [ ] Severe    Fatigue:                  [x ] No [ ] Yes             [ ] Mild [ ] Moderate [ ] Severe    Nausea:                  [x ] No [ ] Yes             [ ] Mild [ ] Moderate [ ] Severe    Loss of appetite:   [x ] No [ ] Yes             [ ] Mild [ ] Moderate [ ] Severe    Constipation:        [x ] No [ ] Yes             [ ] Mild [ ] Moderate [ ] Severe    Other Symptoms:  [X ] All other review of systems negative   [ ] Unable to obtain due to poor mentation     Karnofsky Performance Score/Palliative Performance Status Version 2:  80%    PHYSICAL EXAM:  Vital Signs Last 24 Hrs  T(C): 37.1 (03 Dec 2017 08:09), Max: 37.1 (03 Dec 2017 08:09)  T(F): 98.8 (03 Dec 2017 08:09), Max: 98.8 (03 Dec 2017 08:09)  HR: 86 (03 Dec 2017 08:09) (86 - 110)  BP: 136/83 (03 Dec 2017 08:09) (130/77 - 170/92)  BP(mean): --  RR: 18 (03 Dec 2017 08:09) (18 - 18)  SpO2: 96% (03 Dec 2017 08:09) (96% - 98%)    General:  [ x] Alert  [ ] Oriented x      [ ] Lethargic  [ ] Agitated   [ ] Cachexia   [ ] Unarousable  [x ] Verbal  [ ] Non-Verbal  [ ] Sedated    HEENT:  [ ] Normal   [ ] Dry mouth   [ ] ET Tube    [ ] Trach  [ ] Oral lesions    Lungs:   [x ] Clear [ ] Tachypnea  [ ] Audible excessive secretions   [ ] Rhonchi        [ ] Right [ ] Left [ ] Bilateral  [ ] Crackles        [ ] Right [ ] Left [ ] Bilateral  [ ] Wheezing     [ ] Right [ ] Left [ ] Bilateral    Cardiovascular:  [ x] Regular [ ] Irregular [ ] Tachycardia   [ ] Bradycardia  [ ] Murmur [ ] Other    Abdomen: [x ] Soft  [ ] Distended   [ x] +BS  [x ] Non tender [ ] Tender  [ ]PEG   [ ]OGT/ NGT   [ ] Ostomy   Last BM:       Genitourinary: [ ] Normal [ ] Incontinent   [ ] Oliguria/Anuria   [ ] Elizabeth   [x ] No elizabeth    Musculoskeletal:  [x ] Normal   [ ] Weakness  [ ] Bedbound/Wheelchair bound [ ] Edema    Neurological: [x ] No focal deficits  [ ] Cognitive impairment  [ ] Dysphagia [ ] Dysarthria [ ] Paresis [ ] Other     Skin: [x ] Normal   [ ] Pressure ulcer(s)     [ ] Rash   [ ] Other    LABS:                         12.7   9.70  )-----------( 385      ( 01 Dec 2017 10:34 )             36.6   12-01    134<L>  |  96  |  7   ----------------------------<  109<H>  4.6   |  22  |  0.50    Ca    9.6      01 Dec 2017 10:34    Oral Intake: [ ] Unable/mouth care only [ ] Minimal [ ] Moderate [X ] Full Capability  Diet: [ ] NPO [ ] Tube feeds [ ] TPN [ ] Other     RADIOLOGY & ADDITIONAL STUDIES: no new imaging for review    REFERRALS:   [ ] Chaplaincy  [ ] Hospice  [ ] Child Life  [ ] Social Work  [ ] Case management [ ] Holistic Therapy

## 2017-12-03 NOTE — PROGRESS NOTE ADULT - PROBLEM SELECTOR PLAN 3
Uncontrolled pain overnight, with end-of-dose failure for oral dilaudid Q4. Appreciate palliative recs. Changed frequency to Q3, and added 1mg IV Q3 for breakthrough pain unrelieved with oral medication. Methadone started yesterday, can uptitrate at the very earliest on Monday 12/4.  Bowel regimen as above.

## 2017-12-03 NOTE — PROGRESS NOTE ADULT - SUBJECTIVE AND OBJECTIVE BOX
Patient is a 62y old  Male who presents with a chief complaint of Abdominal pain (30 Nov 2017 12:26)      SUBJECTIVE / OVERNIGHT EVENTS: Abdominal pain improved from yesterday but now complains of increased constipation with discomfort and "turning sensation in abdomen" also noticed increased firmness of abdomen especially on the left side. Denies chest pain, SOB, nausea or vomiting, PO intake limited by constipation and symptoms.     All other review of systems negative    MEDICATIONS  (STANDING):  docusate sodium 100 milliGRAM(s) Oral at bedtime  enoxaparin Injectable 40 milliGRAM(s) SubCutaneous daily  lactulose Syrup 15 Gram(s) Oral daily  losartan 25 milliGRAM(s) Oral daily  methadone    Tablet 5 milliGRAM(s) Oral every 8 hours  pantoprazole  Injectable 40 milliGRAM(s) IV Push two times a day  polyethylene glycol 3350 17 Gram(s) Oral daily  senna 2 Tablet(s) Oral at bedtime    MEDICATIONS  (PRN):  diphenhydrAMINE   Capsule 25 milliGRAM(s) Oral at bedtime PRN Insomnia  HYDROmorphone   Tablet 14 milliGRAM(s) Oral every 3 hours PRN Severe Pain (7 - 10)  HYDROmorphone  Injectable 1 milliGRAM(s) IV Push every 3 hours PRN pain (4-10) unrelieved with oral medication      Vital Signs Last 24 Hrs  T(C): 37.1 (03 Dec 2017 08:09), Max: 37.1 (03 Dec 2017 08:09)  T(F): 98.8 (03 Dec 2017 08:09), Max: 98.8 (03 Dec 2017 08:09)  HR: 86 (03 Dec 2017 08:09) (86 - 110)  BP: 136/83 (03 Dec 2017 08:09) (130/77 - 170/92)  BP(mean): --  RR: 18 (03 Dec 2017 08:09) (18 - 18)  SpO2: 96% (03 Dec 2017 08:09) (96% - 98%)  CAPILLARY BLOOD GLUCOSE        I&O's Summary    02 Dec 2017 07:01  -  03 Dec 2017 07:00  --------------------------------------------------------  IN: 960 mL / OUT: 0 mL / NET: 960 mL    03 Dec 2017 07:01  -  03 Dec 2017 12:15  --------------------------------------------------------  IN: 320 mL / OUT: 0 mL / NET: 320 mL        TELEMETRY:    PHYSICAL EXAM:  GENERAL: NAD, well-developed  EYES: EOMI, conjunctiva and sclera clear  NECK: Supple  CHEST/LUNG: Clear to auscultation bilaterally; full excursion, nonlabored  HEART: Regular rate and rhythm; No murmurs, rubs, or gallops  ABDOMEN: Soft, mild tenderness to palpation on left, firmly distended, Bowel sounds present  EXTREMITIES:  No clubbing, cyanosis, or edema  PSYCH: AAOx3, normal affect  NEUROLOGY: non-focal; no gross sensory deficits; muscle strength 5/5 b/l UE and LE  SKIN: warm and dry    LABS:                        13.3   8.90  )-----------( 391      ( 03 Dec 2017 08:24 )             37.6     12-03    134<L>  |  98  |  13  ----------------------------<  98  4.2   |  23  |  0.63    Ca    9.9      03 Dec 2017 08:19                  RADIOLOGY & ADDITIONAL TESTS:    Imaging Personally Reviewed:    Consultant(s) Notes Reviewed:      Care Discussed with Consultants/Other Providers:    Contact Number: [facility] [phone]

## 2017-12-03 NOTE — PROGRESS NOTE ADULT - PROBLEM SELECTOR PLAN 2
Continue with senna and colace, c/w Miralax, added lactulose given increased constipation on previous regimen.

## 2017-12-03 NOTE — PROGRESS NOTE ADULT - PROBLEM SELECTOR PLAN 1
Methadone started 12/1, can possibly uptitrate tomorrow. Has been needing both IV and PO for pain control; uptitrate PO to 14mg from 10mg, told to use PO first. Continue with 1mg IV dilaudid. Please give benadryl for sleep, not opioids, patient instructed to not use opioids for sleep.

## 2017-12-03 NOTE — PROGRESS NOTE ADULT - PROBLEM SELECTOR PLAN 1
Pathologist is favoring a pancreatic primary at this time rather than metastatic disease from colon.   Plan for [facility]   Goals of treatment are to shrink tumor and possibly be able to resect it in the future.

## 2017-12-04 LAB
CULTURE RESULTS: SIGNIFICANT CHANGE UP
CULTURE RESULTS: SIGNIFICANT CHANGE UP
SPECIMEN SOURCE: SIGNIFICANT CHANGE UP
SPECIMEN SOURCE: SIGNIFICANT CHANGE UP

## 2017-12-04 PROCEDURE — 99232 SBSQ HOSP IP/OBS MODERATE 35: CPT

## 2017-12-04 PROCEDURE — 36561 INSERT TUNNELED CV CATH: CPT

## 2017-12-04 PROCEDURE — 77001 FLUOROGUIDE FOR VEIN DEVICE: CPT | Mod: 26

## 2017-12-04 PROCEDURE — 99233 SBSQ HOSP IP/OBS HIGH 50: CPT

## 2017-12-04 PROCEDURE — 76937 US GUIDE VASCULAR ACCESS: CPT | Mod: 26

## 2017-12-04 RX ORDER — METHADONE HYDROCHLORIDE 40 MG/1
7.5 TABLET ORAL THREE TIMES A DAY
Qty: 0 | Refills: 0 | Status: DISCONTINUED | OUTPATIENT
Start: 2017-12-04 | End: 2017-12-07

## 2017-12-04 RX ADMIN — LOSARTAN POTASSIUM 25 MILLIGRAM(S): 100 TABLET, FILM COATED ORAL at 05:34

## 2017-12-04 RX ADMIN — METHADONE HYDROCHLORIDE 7.5 MILLIGRAM(S): 40 TABLET ORAL at 21:54

## 2017-12-04 RX ADMIN — HYDROMORPHONE HYDROCHLORIDE 14 MILLIGRAM(S): 2 INJECTION INTRAMUSCULAR; INTRAVENOUS; SUBCUTANEOUS at 15:54

## 2017-12-04 RX ADMIN — PANTOPRAZOLE SODIUM 40 MILLIGRAM(S): 20 TABLET, DELAYED RELEASE ORAL at 05:34

## 2017-12-04 RX ADMIN — HYDROMORPHONE HYDROCHLORIDE 1 MILLIGRAM(S): 2 INJECTION INTRAMUSCULAR; INTRAVENOUS; SUBCUTANEOUS at 17:06

## 2017-12-04 RX ADMIN — PANTOPRAZOLE SODIUM 40 MILLIGRAM(S): 20 TABLET, DELAYED RELEASE ORAL at 17:11

## 2017-12-04 RX ADMIN — SENNA PLUS 2 TABLET(S): 8.6 TABLET ORAL at 21:54

## 2017-12-04 RX ADMIN — HYDROMORPHONE HYDROCHLORIDE 14 MILLIGRAM(S): 2 INJECTION INTRAMUSCULAR; INTRAVENOUS; SUBCUTANEOUS at 12:49

## 2017-12-04 RX ADMIN — HYDROMORPHONE HYDROCHLORIDE 1 MILLIGRAM(S): 2 INJECTION INTRAMUSCULAR; INTRAVENOUS; SUBCUTANEOUS at 20:49

## 2017-12-04 RX ADMIN — HYDROMORPHONE HYDROCHLORIDE 1 MILLIGRAM(S): 2 INJECTION INTRAMUSCULAR; INTRAVENOUS; SUBCUTANEOUS at 14:17

## 2017-12-04 RX ADMIN — HYDROMORPHONE HYDROCHLORIDE 1 MILLIGRAM(S): 2 INJECTION INTRAMUSCULAR; INTRAVENOUS; SUBCUTANEOUS at 17:21

## 2017-12-04 RX ADMIN — HYDROMORPHONE HYDROCHLORIDE 1 MILLIGRAM(S): 2 INJECTION INTRAMUSCULAR; INTRAVENOUS; SUBCUTANEOUS at 08:01

## 2017-12-04 RX ADMIN — HYDROMORPHONE HYDROCHLORIDE 14 MILLIGRAM(S): 2 INJECTION INTRAMUSCULAR; INTRAVENOUS; SUBCUTANEOUS at 16:54

## 2017-12-04 RX ADMIN — Medication 100 MILLIGRAM(S): at 21:54

## 2017-12-04 RX ADMIN — HYDROMORPHONE HYDROCHLORIDE 14 MILLIGRAM(S): 2 INJECTION INTRAMUSCULAR; INTRAVENOUS; SUBCUTANEOUS at 19:57

## 2017-12-04 RX ADMIN — HYDROMORPHONE HYDROCHLORIDE 14 MILLIGRAM(S): 2 INJECTION INTRAMUSCULAR; INTRAVENOUS; SUBCUTANEOUS at 01:12

## 2017-12-04 RX ADMIN — ENOXAPARIN SODIUM 40 MILLIGRAM(S): 100 INJECTION SUBCUTANEOUS at 17:30

## 2017-12-04 RX ADMIN — HYDROMORPHONE HYDROCHLORIDE 14 MILLIGRAM(S): 2 INJECTION INTRAMUSCULAR; INTRAVENOUS; SUBCUTANEOUS at 19:27

## 2017-12-04 RX ADMIN — HYDROMORPHONE HYDROCHLORIDE 1 MILLIGRAM(S): 2 INJECTION INTRAMUSCULAR; INTRAVENOUS; SUBCUTANEOUS at 20:19

## 2017-12-04 RX ADMIN — LACTULOSE 15 GRAM(S): 10 SOLUTION ORAL at 17:17

## 2017-12-04 RX ADMIN — HYDROMORPHONE HYDROCHLORIDE 1 MILLIGRAM(S): 2 INJECTION INTRAMUSCULAR; INTRAVENOUS; SUBCUTANEOUS at 08:16

## 2017-12-04 RX ADMIN — HYDROMORPHONE HYDROCHLORIDE 14 MILLIGRAM(S): 2 INJECTION INTRAMUSCULAR; INTRAVENOUS; SUBCUTANEOUS at 03:45

## 2017-12-04 RX ADMIN — HYDROMORPHONE HYDROCHLORIDE 14 MILLIGRAM(S): 2 INJECTION INTRAMUSCULAR; INTRAVENOUS; SUBCUTANEOUS at 13:49

## 2017-12-04 RX ADMIN — METHADONE HYDROCHLORIDE 7.5 MILLIGRAM(S): 40 TABLET ORAL at 14:01

## 2017-12-04 RX ADMIN — METHADONE HYDROCHLORIDE 5 MILLIGRAM(S): 40 TABLET ORAL at 05:34

## 2017-12-04 RX ADMIN — HYDROMORPHONE HYDROCHLORIDE 1 MILLIGRAM(S): 2 INJECTION INTRAMUSCULAR; INTRAVENOUS; SUBCUTANEOUS at 14:02

## 2017-12-04 NOTE — PROGRESS NOTE ADULT - SUBJECTIVE AND OBJECTIVE BOX
Patient is a 62y old  Male who presents with a chief complaint of Abdominal pain (30 Nov 2017 12:26)    HPI: Mediport placed by IR today.     Vital Signs Last 24 Hrs  T(C): 36.8 (04 Dec 2017 12:51), Max: 36.9 (04 Dec 2017 00:56)  T(F): 98.2 (04 Dec 2017 12:51), Max: 98.4 (04 Dec 2017 00:56)  HR: 104 (04 Dec 2017 12:51) (101 - 120)  BP: 147/85 (04 Dec 2017 12:51) (110/73 - 147/85)  BP(mean): --  RR: 18 (04 Dec 2017 12:51) (18 - 18)  SpO2: 98% (04 Dec 2017 12:51) (96% - 98%)                          13.3   8.90  )-----------( 391      ( 03 Dec 2017 08:24 )             37.6     12-03    134<L>  |  98  |  13  ----------------------------<  98  4.2   |  23  |  0.63    Ca    9.9      03 Dec 2017 08:19    MEDICATIONS  (STANDING):  docusate sodium 100 milliGRAM(s) Oral at bedtime  enoxaparin Injectable 40 milliGRAM(s) SubCutaneous daily  lactulose Syrup 15 Gram(s) Oral daily  losartan 25 milliGRAM(s) Oral daily  methadone    Tablet 7.5 milliGRAM(s) Oral three times a day  pantoprazole  Injectable 40 milliGRAM(s) IV Push two times a day  polyethylene glycol 3350 17 Gram(s) Oral daily  senna 2 Tablet(s) Oral at bedtime    MEDICATIONS  (PRN):  diphenhydrAMINE   Capsule 25 milliGRAM(s) Oral at bedtime PRN Insomnia  HYDROmorphone   Tablet 14 milliGRAM(s) Oral every 3 hours PRN Severe Pain (7 - 10)  HYDROmorphone  Injectable 1 milliGRAM(s) IV Push every 3 hours PRN pain (4-10) unrelieved with oral medication Patient is a 62y old  Male who presents with a chief complaint of Abdominal pain (30 Nov 2017 12:26)    HPI: Mediport placed by IR today. Persistent severe pain- requiring frequent iv and po dilaudid.    Vital Signs Last 24 Hrs  T(C): 36.8 (04 Dec 2017 12:51), Max: 36.9 (04 Dec 2017 00:56)  T(F): 98.2 (04 Dec 2017 12:51), Max: 98.4 (04 Dec 2017 00:56)  HR: 104 (04 Dec 2017 12:51) (101 - 120)  BP: 147/85 (04 Dec 2017 12:51) (110/73 - 147/85)  BP(mean): --  RR: 18 (04 Dec 2017 12:51) (18 - 18)  SpO2: 98% (04 Dec 2017 12:51) (96% - 98%)                          13.3   8.90  )-----------( 391      ( 03 Dec 2017 08:24 )             37.6     12-03    134<L>  |  98  |  13  ----------------------------<  98  4.2   |  23  |  0.63    Ca    9.9      03 Dec 2017 08:19    MEDICATIONS  (STANDING):  docusate sodium 100 milliGRAM(s) Oral at bedtime  enoxaparin Injectable 40 milliGRAM(s) SubCutaneous daily  lactulose Syrup 15 Gram(s) Oral daily  losartan 25 milliGRAM(s) Oral daily  methadone    Tablet 7.5 milliGRAM(s) Oral three times a day  pantoprazole  Injectable 40 milliGRAM(s) IV Push two times a day  polyethylene glycol 3350 17 Gram(s) Oral daily  senna 2 Tablet(s) Oral at bedtime    MEDICATIONS  (PRN):  diphenhydrAMINE   Capsule 25 milliGRAM(s) Oral at bedtime PRN Insomnia  HYDROmorphone   Tablet 14 milliGRAM(s) Oral every 3 hours PRN Severe Pain (7 - 10)  HYDROmorphone  Injectable 1 milliGRAM(s) IV Push every 3 hours PRN pain (4-10) unrelieved with oral medication

## 2017-12-04 NOTE — PROGRESS NOTE ADULT - SUBJECTIVE AND OBJECTIVE BOX
INTERVAL HPI/OVERNIGHT EVENTS:  Pt with pain in abdomen.  Relieved with dilaudd    Allergies    No Known Allergies    Intolerances        ADVANCE DIRECTIVES:    DNR: [ ] NO [ ] YES (Date) MOLST [ ] NO [ ] YES (Date)    MEDICATIONS  (STANDING):  docusate sodium 100 milliGRAM(s) Oral at bedtime  enoxaparin Injectable 40 milliGRAM(s) SubCutaneous daily  lactulose Syrup 15 Gram(s) Oral daily  losartan 25 milliGRAM(s) Oral daily  methadone    Tablet 7.5 milliGRAM(s) Oral three times a day  pantoprazole  Injectable 40 milliGRAM(s) IV Push two times a day  polyethylene glycol 3350 17 Gram(s) Oral daily  senna 2 Tablet(s) Oral at bedtime    MEDICATIONS  (PRN):  diphenhydrAMINE   Capsule 25 milliGRAM(s) Oral at bedtime PRN Insomnia  HYDROmorphone   Tablet 14 milliGRAM(s) Oral every 3 hours PRN Severe Pain (7 - 10)  HYDROmorphone  Injectable 1 milliGRAM(s) IV Push every 3 hours PRN pain (4-10) unrelieved with oral medication      PRESENT SYMPTOMS:  Source: [x ] Patient   [ ] Family   [ ] Team     Pain:   abdomen                     [ ] No [x ] Yes             [ ] Mild [ x] Moderate [ ] Severe    Onset - last few months  Location -generalized abdomen  Duration - worsens when dilaudid wears off  Character -dull  Alleviating/Aggravating -pain meds improve  Radiation -non radiating  Timing -    Dyspnea:                [ x] No [ ] Yes             [ ] Mild [ ] Moderate [ ] Severe    Anxiety:                  [ ] No [ x] Yes             [ ] Mild [ ] Moderate [ ] Severe    Fatigue:                  [x ] No [ ] Yes             [ ] Mild [ ] Moderate [ ] Severe    Nausea:                  [x ] No [ ] Yes             [ ] Mild [ ] Moderate [ ] Severe    Loss of appetite:   [x ] No [ ] Yes             [ ] Mild [ ] Moderate [ ] Severe    Constipation:        [ x] No [ ] Yes             [ ] Mild [ ] Moderate [ ] Severe    Other Symptoms:  [x ] All other review of systems negative   [ ] Unable to obtain due to poor mentation     Karnofsky Performance Score/Palliative Performance Status Version 2:     60    %    PHYSICAL EXAM:  Vital Signs Last 24 Hrs  T(C): 36.8 (04 Dec 2017 12:51), Max: 36.9 (04 Dec 2017 00:56)  T(F): 98.2 (04 Dec 2017 12:51), Max: 98.4 (04 Dec 2017 00:56)  HR: 104 (04 Dec 2017 12:51) (101 - 120)  BP: 147/85 (04 Dec 2017 12:51) (110/73 - 147/85)  BP(mean): --  RR: 18 (04 Dec 2017 12:51) (18 - 18)  SpO2: 98% (04 Dec 2017 12:51) (96% - 98%) I&O's Summary    03 Dec 2017 07:01  -  04 Dec 2017 07:00  --------------------------------------------------------  IN: 1160 mL / OUT: 0 mL / NET: 1160 mL    04 Dec 2017 07:01  -  04 Dec 2017 17:03  --------------------------------------------------------  IN: 0 mL / OUT: 0 mL / NET: 0 mL        General:  [x ] Alert  [ x] Oriented x  3    [ ] Lethargic  [ ] Agitated   [ ] Cachexia   [ ] Unarousable  [ ] Verbal  [ ] Non-Verbal    HEENT:  [ x] Normal   [ ] Dry mouth   [ ] ET Tube    [ ] Trach  [ ] Oral lesions    Lungs:   [x ] Clear [ ] Tachypnea  [ ] Audible excessive secretions   [ ] Rhonchi        [ ] Right [ ] Left [ ] Bilateral  [ ] Crackles        [ ] Right [ ] Left [ ] Bilateral  [ ] Wheezing     [ ] Right [ ] Left [ ] Bilateral    Cardiovascular:  [x ] Regular [ ] Irregular [ ] Tachycardia   [ ] Bradycardia  [ ] Murmur [ ] Other    Abdomen: [x ] Soft  [ ] Distended   [x ] +BS  [ ] Non tender [ x] Tender  [ ]PEG   [ ]OGT/ NGT   Last BM:       Genitourinary: [x ] Normal [ ] Incontinent   [ ] Oliguria/Anuria   [ ] Lopez    Musculoskeletal:  [ x] Normal   [ ] Weakness  [ ] Bedbound/Wheelchair bound [ ] Edema    Neurological: [x ] No focal deficits  [ ] Cognitive impairment  [ ] Dysphagia [ ] Dysarthria [ ] Paresis [ ] Other     Skin: x[ ] Normal   [ ] Pressure ulcer(s)                  [ ] Rash    LABS:  [ ] Critical Care time for unstable patient with organ failure.  Total Time:                 minutes  12-03    134<L>  |  98  |  13  ----------------------------<  98  4.2   |  23  |  0.63    Ca    9.9      03 Dec 2017 08:19            Shock: [ ] Septic [ ] Cardiogenic [ ] Neurologic [ ] Hypovolemic  Vasopressors x   Inotrophs x     Oral Intake: [ ] Unable/mouth care only [ ] Minimal [ ] Moderate [ ] Full Capability  Diet: [ ] NPO [ ] Tube feeds [ ] TPN [ ] Other     RADIOLOGY & ADDITIONAL STUDIES:    REFERRALS:   [ ] Chaplaincy  [ ] Hospice  [ ] Child Life  [ ] Social Work  [ ] Case management [ ] Holistic Therapy       RADIOLOGY & ADDITIONAL STUDIES:

## 2017-12-04 NOTE — PROGRESS NOTE ADULT - ASSESSMENT
62 M w/ stage II colon CA s/p resection in May 2017, now diagnosed with locally advanced unresectable pancreatic cancer c/b severe abdominal/back pain. 62yM w locally advanced pancreatic ca aw severe pain due to ca.

## 2017-12-04 NOTE — PROGRESS NOTE ADULT - PROBLEM SELECTOR PLAN 1
Pathologist is favoring a pancreatic primary at this time rather than metastatic disease from colon.   Plan for FOLFIRINOX for es  [facility] [place] to [facility] on monday   Goals of treatment are to shrink tumor and possibly be able to resect it in the future. Persistent severe pain- methadone increased today. Continue to monitor

## 2017-12-04 NOTE — PROGRESS NOTE ADULT - SUBJECTIVE AND OBJECTIVE BOX
SURGICAL ONCOLOGY PROGRESS NOTE    Pain better controlled    Vital Signs Last 24 Hrs  T(C): 36.6 (04 Dec 2017 22:09), Max: 36.9 (04 Dec 2017 00:56)  T(F): 97.9 (04 Dec 2017 22:09), Max: 98.4 (04 Dec 2017 00:56)  HR: 109 (04 Dec 2017 22:09) (104 - 120)  BP: 133/81 (04 Dec 2017 22:09) (110/73 - 147/85)  BP(mean): --  RR: 18 (04 Dec 2017 22:09) (18 - 18)  SpO2: 96% (04 Dec 2017 22:09) (95% - 98%)  I&O's Detail    03 Dec 2017 07:01  -  04 Dec 2017 07:00  --------------------------------------------------------  IN:    Oral Fluid: 1160 mL  Total IN: 1160 mL    OUT:  Total OUT: 0 mL    Total NET: 1160 mL      04 Dec 2017 07:01  -  04 Dec 2017 23:05  --------------------------------------------------------  IN:    Oral Fluid: 320 mL  Total IN: 320 mL    OUT:  Total OUT: 0 mL    Total NET: 320 mL          PE:    A&A  NAD    soft, NT, ND, No peritoneal signs                             13.3   8.90  )-----------( 391      ( 03 Dec 2017 08:24 )             37.6     12-03    134<L>  |  98  |  13  ----------------------------<  98  4.2   |  23  |  0.63    Ca    9.9      03 Dec 2017 08:19

## 2017-12-04 NOTE — PROGRESS NOTE ADULT - PROBLEM SELECTOR PLAN 1
Pathologist is favoring a pancreatic primary at this time rather than metastatic disease from colon.   s/p mediport placement   Discussed with pt plan for FOLFIRINOX tomorrow. Goal of therapy is mostly palliative, with a small (15% chance) of decreasing size of tumor to get to a possible resection. We discussed side effects of chemo and informed consent was signed.   Will plan to proceed with modified dose reduced FOLFIRINOX (5FU infusion 2400 mg/m2, Oxaliplatin 70 mg/m2, Leucovorin 200 mg/m2, Irinotecan 150 mg/m2). Will omit 5FU bolus. Antiemetic therapy and PRN atropine to be provided.   Neulasta at [facility] the day after completion of therapy or will start Neupogen if pt still in the hospital   All questions answered. Pt is hopefully for disease control and improvement in his symptoms.

## 2017-12-04 NOTE — PROGRESS NOTE ADULT - PROBLEM SELECTOR PLAN 3
Uncontrolled pain overnight, with end-of-dose failure for oral dilaudid Q4. Appreciate palliative recs. Changed frequency to Q3, and added 1mg IV Q3 for breakthrough pain unrelieved with oral medication. Methadone started yesterday, can uptitrate at the very earliest on Monday 12/4.  Bowel regimen as above. CT with focal thinning of gastric fundus- possible ulcer. Started on iv protonix bid. Would avoid an EGD at this time to avoid gastric dilatation in the presence of wall thinning- d/w surgery by onc 1 BM today

## 2017-12-04 NOTE — PROGRESS NOTE ADULT - PROBLEM SELECTOR PLAN 2
Continue with senna and colace, c/w Miralax, added lactulose given increased constipation on previous regimen. Carlo placed. To start inpatient chemo tomorrow

## 2017-12-04 NOTE — PROGRESS NOTE ADULT - PROBLEM SELECTOR PLAN 2
CLinically stable. No evidence of perforation on exam.   Spoke with surg onc with no objections to starting chemotherapy

## 2017-12-04 NOTE — PROGRESS NOTE ADULT - SUBJECTIVE AND OBJECTIVE BOX
Chief Complaint: locally advanced pancreatic ca     INTERVAL HPI/OVERNIGHT EVENTS:    MEDICATIONS  (STANDING):  docusate sodium 100 milliGRAM(s) Oral at bedtime  enoxaparin Injectable 40 milliGRAM(s) SubCutaneous daily  lactulose Syrup 15 Gram(s) Oral daily  losartan 25 milliGRAM(s) Oral daily  methadone    Tablet 7.5 milliGRAM(s) Oral three times a day  pantoprazole  Injectable 40 milliGRAM(s) IV Push two times a day  polyethylene glycol 3350 17 Gram(s) Oral daily  senna 2 Tablet(s) Oral at bedtime    MEDICATIONS  (PRN):  diphenhydrAMINE   Capsule 25 milliGRAM(s) Oral at bedtime PRN Insomnia  HYDROmorphone   Tablet 14 milliGRAM(s) Oral every 3 hours PRN Severe Pain (7 - 10)  HYDROmorphone  Injectable 1 milliGRAM(s) IV Push every 3 hours PRN pain (4-10) unrelieved with oral medication      Allergies    No Known Allergies    Intolerances        ROS: as above     Vital Signs Last 24 Hrs  T(C): 36.6 (04 Dec 2017 22:09), Max: 36.9 (04 Dec 2017 00:56)  T(F): 97.9 (04 Dec 2017 22:09), Max: 98.4 (04 Dec 2017 00:56)  HR: 109 (04 Dec 2017 22:09) (104 - 120)  BP: 133/81 (04 Dec 2017 22:09) (110/73 - 147/85)  BP(mean): --  RR: 18 (04 Dec 2017 22:09) (18 - 18)  SpO2: 96% (04 Dec 2017 22:09) (95% - 98%)    Physical Exam:   AAO x 3, NAD   RRR S1S2  CTA b/l   soft NTNDBS+  no c/c/e    LABS:                        13.3   8.90  )-----------( 391      ( 03 Dec 2017 08:24 )             37.6     12-03    134<L>  |  98  |  13  ----------------------------<  98  4.2   |  23  |  0.63    Ca    9.9      03 Dec 2017 08:19 Chief Complaint: locally advanced pancreatic ca     INTERVAL HPI/OVERNIGHT EVENTS: Tolerated regular diet well over the weekend. Pain is better controlled. Mediport placed today. Denies any N/V.     MEDICATIONS  (STANDING):  docusate sodium 100 milliGRAM(s) Oral at bedtime  enoxaparin Injectable 40 milliGRAM(s) SubCutaneous daily  lactulose Syrup 15 Gram(s) Oral daily  losartan 25 milliGRAM(s) Oral daily  methadone    Tablet 7.5 milliGRAM(s) Oral three times a day  pantoprazole  Injectable 40 milliGRAM(s) IV Push two times a day  polyethylene glycol 3350 17 Gram(s) Oral daily  senna 2 Tablet(s) Oral at bedtime    MEDICATIONS  (PRN):  diphenhydrAMINE   Capsule 25 milliGRAM(s) Oral at bedtime PRN Insomnia  HYDROmorphone   Tablet 14 milliGRAM(s) Oral every 3 hours PRN Severe Pain (7 - 10)  HYDROmorphone  Injectable 1 milliGRAM(s) IV Push every 3 hours PRN pain (4-10) unrelieved with oral medication      Allergies    No Known Allergies    Intolerances        ROS: as above     Vital Signs Last 24 Hrs  T(C): 36.6 (04 Dec 2017 22:09), Max: 36.9 (04 Dec 2017 00:56)  T(F): 97.9 (04 Dec 2017 22:09), Max: 98.4 (04 Dec 2017 00:56)  HR: 109 (04 Dec 2017 22:09) (104 - 120)  BP: 133/81 (04 Dec 2017 22:09) (110/73 - 147/85)  BP(mean): --  RR: 18 (04 Dec 2017 22:09) (18 - 18)  SpO2: 96% (04 Dec 2017 22:09) (95% - 98%)    Physical Exam:   AAO x 3, NAD   RRR S1S2  CTA b/l   soft TTP diffusely   no c/c/e    LABS:                        13.3   8.90  )-----------( 391      ( 03 Dec 2017 08:24 )             37.6     12-03    134<L>  |  98  |  13  ----------------------------<  98  4.2   |  23  |  0.63    Ca    9.9      03 Dec 2017 08:19

## 2017-12-04 NOTE — PROGRESS NOTE ADULT - PROBLEM SELECTOR PLAN 4
Benadryl 25mg PO QHS for insomnia PRN added. Patient instructed not to use IV or PO opioids solely for insomnia. Start bowel regimen

## 2017-12-04 NOTE — PROGRESS NOTE ADULT - ASSESSMENT
62 M w/ h/o stage II colon cancer s/p resection in May 2017, now diagnosed with locally advanced unresectable pancreatic cancer c/b severe abdominal/back pain, admitted for concern of gastric perforation based on CT however, clinically stable, tolerating PO

## 2017-12-05 LAB
ANION GAP SERPL CALC-SCNC: 16 MMOL/L — SIGNIFICANT CHANGE UP (ref 5–17)
BUN SERPL-MCNC: 21 MG/DL — SIGNIFICANT CHANGE UP (ref 7–23)
CALCIUM SERPL-MCNC: 9.7 MG/DL — SIGNIFICANT CHANGE UP (ref 8.4–10.5)
CHLORIDE SERPL-SCNC: 98 MMOL/L — SIGNIFICANT CHANGE UP (ref 96–108)
CO2 SERPL-SCNC: 23 MMOL/L — SIGNIFICANT CHANGE UP (ref 22–31)
CREAT SERPL-MCNC: 0.8 MG/DL — SIGNIFICANT CHANGE UP (ref 0.5–1.3)
GLUCOSE SERPL-MCNC: 107 MG/DL — HIGH (ref 70–99)
HCT VFR BLD CALC: 39.4 % — SIGNIFICANT CHANGE UP (ref 39–50)
HGB BLD-MCNC: 13.3 G/DL — SIGNIFICANT CHANGE UP (ref 13–17)
MCHC RBC-ENTMCNC: 30.2 PG — SIGNIFICANT CHANGE UP (ref 27–34)
MCHC RBC-ENTMCNC: 33.8 GM/DL — SIGNIFICANT CHANGE UP (ref 32–36)
MCV RBC AUTO: 89.3 FL — SIGNIFICANT CHANGE UP (ref 80–100)
PLATELET # BLD AUTO: 433 K/UL — HIGH (ref 150–400)
POTASSIUM SERPL-MCNC: 4.1 MMOL/L — SIGNIFICANT CHANGE UP (ref 3.5–5.3)
POTASSIUM SERPL-SCNC: 4.1 MMOL/L — SIGNIFICANT CHANGE UP (ref 3.5–5.3)
RBC # BLD: 4.41 M/UL — SIGNIFICANT CHANGE UP (ref 4.2–5.8)
RBC # FLD: 14.1 % — SIGNIFICANT CHANGE UP (ref 10.3–14.5)
SODIUM SERPL-SCNC: 137 MMOL/L — SIGNIFICANT CHANGE UP (ref 135–145)
WBC # BLD: 9.98 K/UL — SIGNIFICANT CHANGE UP (ref 3.8–10.5)
WBC # FLD AUTO: 9.98 K/UL — SIGNIFICANT CHANGE UP (ref 3.8–10.5)

## 2017-12-05 PROCEDURE — 99233 SBSQ HOSP IP/OBS HIGH 50: CPT

## 2017-12-05 PROCEDURE — 99232 SBSQ HOSP IP/OBS MODERATE 35: CPT

## 2017-12-05 PROCEDURE — 99233 SBSQ HOSP IP/OBS HIGH 50: CPT | Mod: GC

## 2017-12-05 RX ORDER — FLUOROURACIL 50 MG/ML
2400 INJECTION, SOLUTION INTRAVENOUS DAILY
Qty: 0 | Refills: 0 | Status: DISCONTINUED | OUTPATIENT
Start: 2017-12-05 | End: 2017-12-08

## 2017-12-05 RX ORDER — METOCLOPRAMIDE HCL 10 MG
10 TABLET ORAL EVERY 6 HOURS
Qty: 0 | Refills: 0 | Status: DISCONTINUED | OUTPATIENT
Start: 2017-12-05 | End: 2017-12-08

## 2017-12-05 RX ORDER — DEXAMETHASONE 0.5 MG/5ML
12 ELIXIR ORAL ONCE
Qty: 0 | Refills: 0 | Status: COMPLETED | OUTPATIENT
Start: 2017-12-05 | End: 2017-12-05

## 2017-12-05 RX ORDER — LEUCOVORIN CALCIUM 5 MG
400 TABLET ORAL ONCE
Qty: 0 | Refills: 0 | Status: COMPLETED | OUTPATIENT
Start: 2017-12-05 | End: 2017-12-05

## 2017-12-05 RX ORDER — FOSAPREPITANT DIMEGLUMINE 150 MG/5ML
150 INJECTION, POWDER, LYOPHILIZED, FOR SOLUTION INTRAVENOUS ONCE
Qty: 0 | Refills: 0 | Status: COMPLETED | OUTPATIENT
Start: 2017-12-05 | End: 2017-12-05

## 2017-12-05 RX ORDER — ATROPINE SULFATE 0.1 MG/ML
0.25 SYRINGE (ML) INJECTION ONCE
Qty: 0 | Refills: 0 | Status: DISCONTINUED | OUTPATIENT
Start: 2017-12-05 | End: 2017-12-08

## 2017-12-05 RX ORDER — OXALIPLATIN 5 MG/ML
140 INJECTION, SOLUTION INTRAVENOUS ONCE
Qty: 0 | Refills: 0 | Status: DISCONTINUED | OUTPATIENT
Start: 2017-12-05 | End: 2017-12-08

## 2017-12-05 RX ORDER — ONDANSETRON 8 MG/1
16 TABLET, FILM COATED ORAL DAILY
Qty: 0 | Refills: 0 | Status: COMPLETED | OUTPATIENT
Start: 2017-12-05 | End: 2017-12-08

## 2017-12-05 RX ORDER — DEXAMETHASONE 0.5 MG/5ML
8 ELIXIR ORAL DAILY
Qty: 0 | Refills: 0 | Status: COMPLETED | OUTPATIENT
Start: 2017-12-06 | End: 2017-12-07

## 2017-12-05 RX ORDER — IRINOTECAN HYDROCHLORIDE 100 MG/5ML
300 INJECTION, SOLUTION INTRAVENOUS ONCE
Qty: 0 | Refills: 0 | Status: DISCONTINUED | OUTPATIENT
Start: 2017-12-05 | End: 2017-12-08

## 2017-12-05 RX ADMIN — PANTOPRAZOLE SODIUM 40 MILLIGRAM(S): 20 TABLET, DELAYED RELEASE ORAL at 05:56

## 2017-12-05 RX ADMIN — METHADONE HYDROCHLORIDE 7.5 MILLIGRAM(S): 40 TABLET ORAL at 22:41

## 2017-12-05 RX ADMIN — Medication 135 MILLIGRAM(S): at 16:00

## 2017-12-05 RX ADMIN — HYDROMORPHONE HYDROCHLORIDE 14 MILLIGRAM(S): 2 INJECTION INTRAMUSCULAR; INTRAVENOUS; SUBCUTANEOUS at 20:24

## 2017-12-05 RX ADMIN — HYDROMORPHONE HYDROCHLORIDE 1 MILLIGRAM(S): 2 INJECTION INTRAMUSCULAR; INTRAVENOUS; SUBCUTANEOUS at 20:48

## 2017-12-05 RX ADMIN — HYDROMORPHONE HYDROCHLORIDE 14 MILLIGRAM(S): 2 INJECTION INTRAMUSCULAR; INTRAVENOUS; SUBCUTANEOUS at 04:30

## 2017-12-05 RX ADMIN — HYDROMORPHONE HYDROCHLORIDE 14 MILLIGRAM(S): 2 INJECTION INTRAMUSCULAR; INTRAVENOUS; SUBCUTANEOUS at 00:12

## 2017-12-05 RX ADMIN — ONDANSETRON 116 MILLIGRAM(S): 8 TABLET, FILM COATED ORAL at 12:06

## 2017-12-05 RX ADMIN — METHADONE HYDROCHLORIDE 7.5 MILLIGRAM(S): 40 TABLET ORAL at 05:56

## 2017-12-05 RX ADMIN — LOSARTAN POTASSIUM 25 MILLIGRAM(S): 100 TABLET, FILM COATED ORAL at 05:56

## 2017-12-05 RX ADMIN — HYDROMORPHONE HYDROCHLORIDE 1 MILLIGRAM(S): 2 INJECTION INTRAMUSCULAR; INTRAVENOUS; SUBCUTANEOUS at 01:34

## 2017-12-05 RX ADMIN — HYDROMORPHONE HYDROCHLORIDE 1 MILLIGRAM(S): 2 INJECTION INTRAMUSCULAR; INTRAVENOUS; SUBCUTANEOUS at 09:35

## 2017-12-05 RX ADMIN — PANTOPRAZOLE SODIUM 40 MILLIGRAM(S): 20 TABLET, DELAYED RELEASE ORAL at 16:53

## 2017-12-05 RX ADMIN — HYDROMORPHONE HYDROCHLORIDE 1 MILLIGRAM(S): 2 INJECTION INTRAMUSCULAR; INTRAVENOUS; SUBCUTANEOUS at 20:33

## 2017-12-05 RX ADMIN — HYDROMORPHONE HYDROCHLORIDE 14 MILLIGRAM(S): 2 INJECTION INTRAMUSCULAR; INTRAVENOUS; SUBCUTANEOUS at 11:34

## 2017-12-05 RX ADMIN — HYDROMORPHONE HYDROCHLORIDE 1 MILLIGRAM(S): 2 INJECTION INTRAMUSCULAR; INTRAVENOUS; SUBCUTANEOUS at 09:20

## 2017-12-05 RX ADMIN — HYDROMORPHONE HYDROCHLORIDE 14 MILLIGRAM(S): 2 INJECTION INTRAMUSCULAR; INTRAVENOUS; SUBCUTANEOUS at 12:04

## 2017-12-05 RX ADMIN — HYDROMORPHONE HYDROCHLORIDE 14 MILLIGRAM(S): 2 INJECTION INTRAMUSCULAR; INTRAVENOUS; SUBCUTANEOUS at 15:13

## 2017-12-05 RX ADMIN — HYDROMORPHONE HYDROCHLORIDE 14 MILLIGRAM(S): 2 INJECTION INTRAMUSCULAR; INTRAVENOUS; SUBCUTANEOUS at 19:24

## 2017-12-05 RX ADMIN — LACTULOSE 15 GRAM(S): 10 SOLUTION ORAL at 11:36

## 2017-12-05 RX ADMIN — HYDROMORPHONE HYDROCHLORIDE 14 MILLIGRAM(S): 2 INJECTION INTRAMUSCULAR; INTRAVENOUS; SUBCUTANEOUS at 05:00

## 2017-12-05 RX ADMIN — ENOXAPARIN SODIUM 40 MILLIGRAM(S): 100 INJECTION SUBCUTANEOUS at 16:54

## 2017-12-05 RX ADMIN — HYDROMORPHONE HYDROCHLORIDE 14 MILLIGRAM(S): 2 INJECTION INTRAMUSCULAR; INTRAVENOUS; SUBCUTANEOUS at 08:03

## 2017-12-05 RX ADMIN — HYDROMORPHONE HYDROCHLORIDE 14 MILLIGRAM(S): 2 INJECTION INTRAMUSCULAR; INTRAVENOUS; SUBCUTANEOUS at 15:43

## 2017-12-05 RX ADMIN — HYDROMORPHONE HYDROCHLORIDE 14 MILLIGRAM(S): 2 INJECTION INTRAMUSCULAR; INTRAVENOUS; SUBCUTANEOUS at 01:05

## 2017-12-05 RX ADMIN — HYDROMORPHONE HYDROCHLORIDE 1 MILLIGRAM(S): 2 INJECTION INTRAMUSCULAR; INTRAVENOUS; SUBCUTANEOUS at 16:45

## 2017-12-05 RX ADMIN — Medication 106 MILLIGRAM(S): at 13:09

## 2017-12-05 RX ADMIN — METHADONE HYDROCHLORIDE 7.5 MILLIGRAM(S): 40 TABLET ORAL at 13:15

## 2017-12-05 RX ADMIN — HYDROMORPHONE HYDROCHLORIDE 1 MILLIGRAM(S): 2 INJECTION INTRAMUSCULAR; INTRAVENOUS; SUBCUTANEOUS at 17:15

## 2017-12-05 RX ADMIN — HYDROMORPHONE HYDROCHLORIDE 1 MILLIGRAM(S): 2 INJECTION INTRAMUSCULAR; INTRAVENOUS; SUBCUTANEOUS at 01:04

## 2017-12-05 RX ADMIN — FOSAPREPITANT DIMEGLUMINE 300 MILLIGRAM(S): 150 INJECTION, POWDER, LYOPHILIZED, FOR SOLUTION INTRAVENOUS at 12:06

## 2017-12-05 RX ADMIN — HYDROMORPHONE HYDROCHLORIDE 14 MILLIGRAM(S): 2 INJECTION INTRAMUSCULAR; INTRAVENOUS; SUBCUTANEOUS at 23:50

## 2017-12-05 RX ADMIN — HYDROMORPHONE HYDROCHLORIDE 14 MILLIGRAM(S): 2 INJECTION INTRAMUSCULAR; INTRAVENOUS; SUBCUTANEOUS at 08:33

## 2017-12-05 NOTE — DIETITIAN INITIAL EVALUATION ADULT. - ENERGY NEEDS
ht: 69", wt:187.1 pounds, BMI:27.6 kg/m2, IBW:160 pounds +/- 10%     pt admitted c abdominal pain, locally advanced unresectable pancreatic cancer, now plan for chemotherapy to shrink tumor and possible resection in future. pt S/P Mediport placement 12/4.

## 2017-12-05 NOTE — DIETITIAN INITIAL EVALUATION ADULT. - FACTORS AFF FOOD INTAKE
pt reports over the last few months he had taste changes which is getting better now; reports he had some trouble swallowing before but not anymore; states he did not have a BM for about 1-1.5 weeks but had a BM yesterday and is feeling better

## 2017-12-05 NOTE — PROGRESS NOTE ADULT - SUBJECTIVE AND OBJECTIVE BOX
INTERVAL HPI/OVERNIGHT EVENTS:  Pt's pain fairly well controlled.  Anxious about treatment  Allergies    No Known Allergies    Intolerances        ADVANCE DIRECTIVES:    DNR: [ ] NO [ ] YES (Date) MOLST [ ] NO [ ] YES (Date)    MEDICATIONS  (STANDING):  docusate sodium 100 milliGRAM(s) Oral at bedtime  enoxaparin Injectable 40 milliGRAM(s) SubCutaneous daily  fluorouracil IVPB 2400 milliGRAM(s) IV Intermittent daily  irinotecan IVPB 300 milliGRAM(s) IV Intermittent once  lactulose Syrup 15 Gram(s) Oral daily  leucovorin IVPB (eMAR) 400 milliGRAM(s) IV Intermittent once  losartan 25 milliGRAM(s) Oral daily  methadone    Tablet 7.5 milliGRAM(s) Oral three times a day  ondansetron  IVPB 16 milliGRAM(s) IV Intermittent daily  OXALIplatin IVPB 140 milliGRAM(s) IV Intermittent once  pantoprazole  Injectable 40 milliGRAM(s) IV Push two times a day  senna 2 Tablet(s) Oral at bedtime    MEDICATIONS  (PRN):  atropine Injectable 0.25 milliGRAM(s) SubCutaneous once PRN cramps,flushing,diarrhea immediately and during chemo administration  diphenhydrAMINE   Capsule 25 milliGRAM(s) Oral at bedtime PRN Insomnia  HYDROmorphone   Tablet 14 milliGRAM(s) Oral every 3 hours PRN Severe Pain (7 - 10)  HYDROmorphone  Injectable 1 milliGRAM(s) IV Push every 3 hours PRN pain (4-10) unrelieved with oral medication  metoclopramide Injectable 10 milliGRAM(s) IV Push every 6 hours PRN nausea and or vomiting      PRESENT SYMPTOMS:  Source: [ x] Patient   [ ] Family   [ ] Team     Pain:                        [ x] No [ ] Yes             [ ] Mild [ ] Moderate [ ] Severe    Onset -  Location -  Duration -  Character -  Alleviating/Aggravating -  Radiation -  Timing -    Dyspnea:                [x ] No [ ] Yes             [ ] Mild [ ] Moderate [ ] Severe    Anxiety:                  [ ] No [x ] Yes             [ ] Mild [ ] Moderate [ ] Severe    Fatigue:                  [ ] No [x ] Yes             [ ] Mild [ ] Moderate [ ] Severe    Nausea:                  [x ] No [ ] Yes             [ ] Mild [ ] Moderate [ ] Severe    Loss of appetite:   [x ] No [ ] Yes             [ ] Mild [ ] Moderate [ ] Severe    Constipation:        [x ] No [ ] Yes             [ ] Mild [ ] Moderate [ ] Severe    Other Symptoms:  [xAll other review of systems negative   [ ] Unable to obtain due to poor mentation     Karnofsky Performance Score/Palliative Performance Status Version 2: 60        %    PHYSICAL EXAM:  Vital Signs Last 24 Hrs  T(C): 36.3 (05 Dec 2017 14:15), Max: 36.8 (05 Dec 2017 05:52)  T(F): 97.3 (05 Dec 2017 14:15), Max: 98.2 (05 Dec 2017 05:52)  HR: 95 (05 Dec 2017 14:15) (95 - 117)  BP: 104/55 (05 Dec 2017 14:15) (104/55 - 138/92)  BP(mean): --  RR: 18 (05 Dec 2017 14:15) (18 - 18)  SpO2: 96% (05 Dec 2017 14:15) (95% - 97%) I&O's Summary    04 Dec 2017 07:01  -  05 Dec 2017 07:00  --------------------------------------------------------  IN: 320 mL / OUT: 0 mL / NET: 320 mL    05 Dec 2017 07:01  -  05 Dec 2017 16:19  --------------------------------------------------------  IN: 690 mL / OUT: 0 mL / NET: 690 mL        General:  [x ] Alert  [x ] Oriented x   3   [ ] Lethargic  [ ] Agitated   [ ] Cachexia   [ ] Unarousable  [ ] Verbal  [ ] Non-Verbal    HEENT:  [x ] Normal   [ ] Dry mouth   [ ] ET Tube    [ ] Trach  [ ] Oral lesions    Lungs:   [x ] Clear [ ] Tachypnea  [ ] Audible excessive secretions   [ ] Rhonchi        [ ] Right [ ] Left [ ] Bilateral  [ ] Crackles        [ ] Right [ ] Left [ ] Bilateral  [ ] Wheezing     [ ] Right [ ] Left [ ] Bilateral    Cardiovascular:  [x ] Regular [ ] Irregular [ ] Tachycardia   [ ] Bradycardia  [ ] Murmur [ ] Other    Abdomen: [ x] Soft  [ ] Distended   [x ] +BS  [x] Non tender [ ] Tender  [ ]PEG   [ ]OGT/ NGT   Last BM:       Genitourinary: [ x] Normal [ ] Incontinent   [ ] Oliguria/Anuria   [ ] Lopez    Musculoskeletal:  [x ] Normal   [ ] Weakness  [ ] Bedbound/Wheelchair bound [ ] Edema    Neurological: [x ] No focal deficits  [ ] Cognitive impairment  [ ] Dysphagia [ ] Dysarthria [ ] Paresis [ ] Other     Skin: [ x] Normal   [ ] Pressure ulcer(s)                  [ ] Rash    LABS:  [ ] Critical Care time for unstable patient with organ failure.  Total Time:                 minutes  12-05    137  |  98  |  21  ----------------------------<  107<H>  4.1   |  23  |  0.80    Ca    9.7      05 Dec 2017 08:48            Shock: [ ] Septic [ ] Cardiogenic [ ] Neurologic [ ] Hypovolemic  Vasopressors x   Inotrophs x     Oral Intake: [ ] Unable/mouth care only [ ] Minimal [ ] Moderate [ ] Full Capability  Diet: [ ] NPO [ ] Tube feeds [ ] TPN [ ] Other     RADIOLOGY & ADDITIONAL STUDIES:    REFERRALS:   [ ] Chaplaincy  [ ] Hospice  [ ] Child Life  [ ] Social Work  [ ] Case management [ ] Holistic Therapy       RADIOLOGY & ADDITIONAL STUDIES:

## 2017-12-05 NOTE — DIETITIAN INITIAL EVALUATION ADULT. - NS AS NUTRI INTERV ED CONTENT
Encouraged PO intake-small, frequent meals and nutrient dense snacks. Discussed protein rich foods available on menu. Discussed consuming protein first at meal times and then eating the other foods. Discussed ideas for nutrient dense snacks and when home. Encouraged adequate fiber and fluid as well to promote regularity of BM.

## 2017-12-05 NOTE — PROGRESS NOTE ADULT - PROBLEM SELECTOR PLAN 1
High dilaudid requirement. Will increase methadone tomorrow and attempt to wean to po meds in 1-2 days

## 2017-12-05 NOTE — DIETITIAN INITIAL EVALUATION ADULT. - ORAL INTAKE PTA
poor/pt reports suboptimal appetite and intake for about 3-4 months PTA; states he was mostly picking on foods at home, reports he was taking 2 Ensure shakes daily though

## 2017-12-05 NOTE — PROGRESS NOTE ADULT - PROBLEM SELECTOR PLAN 3
Appreciate palliative consult.   Pain seems to be better controlled.   To transition to oral regimen tomorrow.

## 2017-12-05 NOTE — DIETITIAN INITIAL EVALUATION ADULT. - OTHER INFO
pt seen for LOS. pt reports he was losing wt due to lack of appetite/intake and stress over his situation PTA. Noted admit wt of 194 pounds and standing wt today of 187 pounds- pt reports his wt was around 187 pounds PTA and he has not lost wt in house. pt reports his room mate has shared some Ensure Enlive shakes with him and he is agreeable to having 2 daily in house. pt reports since he is starting to have BMs he is able to eat a little better- had all of roll and coffee and some juice for breakfast. States he is afraid of eating too much though since he feels that will increase his pain.

## 2017-12-05 NOTE — CHART NOTE - NSCHARTNOTEFT_GEN_A_CORE
Upon Nutritional Assessment by the Registered Dietitian your patient was determined to meet criteria / has evidence of the following diagnosis/diagnoses:          [ ]  Mild Protein Calorie Malnutrition        [x]  Moderate Protein Calorie Malnutrition        [ ] Severe Protein Calorie Malnutrition        [ ] Unspecified Protein Calorie Malnutrition        [ ] Underweight / BMI <19        [ ] Morbid Obesity / BMI > 40      Findings as based on:  [x] Comprehensive nutrition assessment- suboptimal po intake/appetite PTA, wt loss    [x] Nutrition Focused Physical Exam- muscle and fat loss   [ ] Other:       Nutrition Plan/Recommendations:      Recommend Ensure Enlive x 2 daily (350kcal, 20g protein per 8 ounces).     PROVIDER Section:     By signing this assessment you are acknowledging and agree with the diagnosis/diagnoses assigned by the Registered Dietitian    Comments:

## 2017-12-05 NOTE — DIETITIAN INITIAL EVALUATION ADULT. - PHYSICAL APPEARANCE
pt agreeable to nutrition focused physical exam- mild temporal wasting; mild muscle loss around shoulder, clavicles and deltoids; decreased muscle tone to calves; mild-moderate fat loss around orbital region, triceps and buccal fat

## 2017-12-05 NOTE — PROGRESS NOTE ADULT - PROBLEM SELECTOR PLAN 1
Tolerating FOLFIRINOX well. Will [place] [facility] [place]  C2 will be [place] 12[place]19 [place] [facility]. Pt will see me in f/u on 12/15 at 8:30 am.

## 2017-12-05 NOTE — PROGRESS NOTE ADULT - SUBJECTIVE AND OBJECTIVE BOX
Chief Complaint: pancreatic ca    INTERVAL HPI/OVERNIGHT EVENTS:    MEDICATIONS  (STANDING):  dexamethasone  IVPB 8 milliGRAM(s) IV Intermittent daily  docusate sodium 100 milliGRAM(s) Oral at bedtime  enoxaparin Injectable 40 milliGRAM(s) SubCutaneous daily  fluorouracil IVPB 2400 milliGRAM(s) IV Intermittent daily  irinotecan IVPB 300 milliGRAM(s) IV Intermittent once  lactulose Syrup 15 Gram(s) Oral daily  losartan 25 milliGRAM(s) Oral daily  methadone    Tablet 7.5 milliGRAM(s) Oral three times a day  ondansetron  IVPB 16 milliGRAM(s) IV Intermittent daily  OXALIplatin IVPB 140 milliGRAM(s) IV Intermittent once  pantoprazole  Injectable 40 milliGRAM(s) IV Push two times a day  senna 2 Tablet(s) Oral at bedtime    MEDICATIONS  (PRN):  atropine Injectable 0.25 milliGRAM(s) SubCutaneous once PRN cramps,flushing,diarrhea immediately and during chemo administration  diphenhydrAMINE   Capsule 25 milliGRAM(s) Oral at bedtime PRN Insomnia  HYDROmorphone   Tablet 14 milliGRAM(s) Oral every 3 hours PRN Severe Pain (7 - 10)  HYDROmorphone  Injectable 1 milliGRAM(s) IV Push every 3 hours PRN pain (4-10) unrelieved with oral medication  metoclopramide Injectable 10 milliGRAM(s) IV Push every 6 hours PRN nausea and or vomiting      Allergies    No Known Allergies    Intolerances        ROS: as above     Vital Signs Last 24 Hrs  T(C): 36.6 (05 Dec 2017 22:35), Max: 36.8 (05 Dec 2017 05:52)  T(F): 97.8 (05 Dec 2017 22:35), Max: 98.2 (05 Dec 2017 05:52)  HR: 101 (05 Dec 2017 22:35) (95 - 112)  BP: 135/84 (05 Dec 2017 22:35) (104/55 - 144/88)  BP(mean): --  RR: 15 (05 Dec 2017 22:35) (15 - 20)  SpO2: 98% (05 Dec 2017 22:35) (95% - 98%)    Physical Exam:   AAO x 3, NAD   RRR S1S2  CTA b/l   soft NTNDBS+  no c/c/e    LABS:                        13.3   9.98  )-----------( 433      ( 05 Dec 2017 08:46 )             39.4     12-05    137  |  98  |  21  ----------------------------<  107<H>  4.1   |  23  |  0.80    Ca    9.7      05 Dec 2017 08:48 Chief Complaint: pancreatic ca    INTERVAL HPI/OVERNIGHT EVENTS: Tolerating chemo well. Eating. Pain is controlled, being transitioned to oral opiods. No n/v. Moving bowels. Wife at bedside.     MEDICATIONS  (STANDING):  dexamethasone  IVPB 8 milliGRAM(s) IV Intermittent daily  docusate sodium 100 milliGRAM(s) Oral at bedtime  enoxaparin Injectable 40 milliGRAM(s) SubCutaneous daily  fluorouracil IVPB 2400 milliGRAM(s) IV Intermittent daily  irinotecan IVPB 300 milliGRAM(s) IV Intermittent once  lactulose Syrup 15 Gram(s) Oral daily  losartan 25 milliGRAM(s) Oral daily  methadone    Tablet 7.5 milliGRAM(s) Oral three times a day  ondansetron  IVPB 16 milliGRAM(s) IV Intermittent daily  OXALIplatin IVPB 140 milliGRAM(s) IV Intermittent once  pantoprazole  Injectable 40 milliGRAM(s) IV Push two times a day  senna 2 Tablet(s) Oral at bedtime    MEDICATIONS  (PRN):  atropine Injectable 0.25 milliGRAM(s) SubCutaneous once PRN cramps,flushing,diarrhea immediately and during chemo administration  diphenhydrAMINE   Capsule 25 milliGRAM(s) Oral at bedtime PRN Insomnia  HYDROmorphone   Tablet 14 milliGRAM(s) Oral every 3 hours PRN Severe Pain (7 - 10)  HYDROmorphone  Injectable 1 milliGRAM(s) IV Push every 3 hours PRN pain (4-10) unrelieved with oral medication  metoclopramide Injectable 10 milliGRAM(s) IV Push every 6 hours PRN nausea and or vomiting      Allergies    No Known Allergies    Intolerances        ROS: as above     Vital Signs Last 24 Hrs  T(C): 36.6 (05 Dec 2017 22:35), Max: 36.8 (05 Dec 2017 05:52)  T(F): 97.8 (05 Dec 2017 22:35), Max: 98.2 (05 Dec 2017 05:52)  HR: 101 (05 Dec 2017 22:35) (95 - 112)  BP: 135/84 (05 Dec 2017 22:35) (104/55 - 144/88)  BP(mean): --  RR: 15 (05 Dec 2017 22:35) (15 - 20)  SpO2: 98% (05 Dec 2017 22:35) (95% - 98%)    Physical Exam:   AAO x 3, NAD   RRR S1S2  CTA b/l   soft NTNDBS+  no c/c/e    LABS:                        13.3   9.98  )-----------( 433      ( 05 Dec 2017 08:46 )             39.4     12-05    137  |  98  |  21  ----------------------------<  107<H>  4.1   |  23  |  0.80    Ca    9.7      05 Dec 2017 08:48

## 2017-12-05 NOTE — PROGRESS NOTE ADULT - SUBJECTIVE AND OBJECTIVE BOX
Patient is a 62y old  Male who presents with a chief complaint of Abdominal pain (30 Nov 2017 12:26)    HPI: Required 7 doses of 14 mg po dilaudid and 5 doses of 1 mg iv dilaudid since yesterday. Eating. 1 BM today.    Vital Signs Last 24 Hrs  T(C): 36.3 (05 Dec 2017 12:11), Max: 36.8 (05 Dec 2017 05:52)  T(F): 97.4 (05 Dec 2017 12:11), Max: 98.2 (05 Dec 2017 05:52)  HR: 104 (05 Dec 2017 12:11) (104 - 117)  BP: 134/84 (05 Dec 2017 12:11) (111/76 - 138/92)  BP(mean): --  RR: 18 (05 Dec 2017 12:11) (18 - 18)  SpO2: 95% (05 Dec 2017 12:11) (95% - 97%)                          13.3   9.98  )-----------( 433      ( 05 Dec 2017 08:46 )             39.4     12-05    137  |  98  |  21  ----------------------------<  107<H>  4.1   |  23  |  0.80    Ca    9.7      05 Dec 2017 08:48    MEDICATIONS  (STANDING):  docusate sodium 100 milliGRAM(s) Oral at bedtime  enoxaparin Injectable 40 milliGRAM(s) SubCutaneous daily  fluorouracil IVPB 2400 milliGRAM(s) IV Intermittent daily  irinotecan IVPB 300 milliGRAM(s) IV Intermittent once  lactulose Syrup 15 Gram(s) Oral daily  leucovorin IVPB (eMAR) 400 milliGRAM(s) IV Intermittent once  losartan 25 milliGRAM(s) Oral daily  methadone    Tablet 7.5 milliGRAM(s) Oral three times a day  ondansetron  IVPB 16 milliGRAM(s) IV Intermittent daily  OXALIplatin IVPB 140 milliGRAM(s) IV Intermittent once  pantoprazole  Injectable 40 milliGRAM(s) IV Push two times a day  polyethylene glycol 3350 17 Gram(s) Oral daily  senna 2 Tablet(s) Oral at bedtime    MEDICATIONS  (PRN):  diphenhydrAMINE   Capsule 25 milliGRAM(s) Oral at bedtime PRN Insomnia  HYDROmorphone   Tablet 14 milliGRAM(s) Oral every 3 hours PRN Severe Pain (7 - 10)  HYDROmorphone  Injectable 1 milliGRAM(s) IV Push every 3 hours PRN pain (4-10) unrelieved with oral medication  metoclopramide Injectable 10 milliGRAM(s) IV Push every 6 hours PRN nausea and or vomiting

## 2017-12-05 NOTE — PROGRESS NOTE ADULT - ASSESSMENT
62 M w/ h/o stage II colon cancer s/p resection in May 2017, now diagnosed with locally advanced unresectable pancreatic cancer c/b severe abdominal/back pain, admitted for concern of gastric perforation based on CT however, clinically stable, tolerating PO, D2 of chemotherapy

## 2017-12-05 NOTE — ADVANCED PRACTICE NURSE CONSULT - ASSESSMENT
Cycle #1 day 1/2.Height and weight verified. Lab results as per Md joseph aware of same. Vital signs stable prior to the start of chemotherapy, see sunrise.  Pt educated on the importance of saving urine, verbalize understanding of same.Pt education done re chemo regimen, drug effects and potential side effects, written material provided, pt states understanding. Pt with rt chest wall mediport accessed as per protocol with good blood return pt premed with emend 150mg ivssx1 , zofran 16mg ivss, decadron 12mg ivss  pt started on oxaliplatin 70mg/x8=738zs iv infuse over 2 hrs intiated at 1355 pt resting in bed completed infusion with no adverse effects post v/s stable pt started on leucovorin 200mg/o6=653jp iv infuse over 2hrs intiated at 1600 30min into the infusion started ironetecan 150mg/d7=267mo iv over 90 min intiated at 1630 pt resting in bed no complaints Cycle #1 day 1/2.Height and weight verified. Lab results as per Md joseph aware of same. Vital signs stable prior to the start of chemotherapy, see sunrise.  Pt educated on the importance of saving urine, verbalize understanding of same.Pt education done re chemo regimen, drug effects and potential side effects, written material provided, pt states understanding. Pt with rt chest wall mediport accessed as per protocol with good blood return pt premed with emend 150mg ivssx1 , zofran 16mg ivss, decadron 12mg ivss  pt started on oxaliplatin 70mg/s0=911yz iv infuse over 2 hrs intiated at 1355 pt resting in bed completed infusion with no adverse effects post v/s stable pt started on leucovorin 200mg/n2=932vz iv infuse over 2hrs intiated at 1600 30min into the infusion started ironetecan 150mg/w9=700ib iv over 90 min intiated at 1630 pt resting in bed no complaints pt with episode of diarrhoea dr Maloney made aware pt received dose of lactulose today diarrhoea must be secondary to lactulose Cycle #1 day 1/2.Height and weight verified. Lab results as per Md joseph aware of same. Vital signs stable prior to the start of chemotherapy, see sunrise.  Pt educated on the importance of saving urine, verbalize understanding of same.Pt education done re chemo regimen, drug effects and potential side effects, written material provided, pt states understanding. Pt with rt chest wall mediport accessed as per protocol with good blood return pt premed with emend 150mg ivssx1 , zofran 16mg ivss, decadron 12mg ivss  pt started on oxaliplatin 70mg/x8=245qn iv infuse over 2 hrs intiated at 1355 pt resting in bed completed infusion with no adverse effects post v/s stable pt started on leucovorin 200mg/m9=495mv iv infuse over 2hrs intiated at 1600 30min into the infusion started ironetecan 150mg/l4=187um iv over 90 min intiated at 1630 pt resting in bed no complaints pt with episode of diarrhoea dr Maloney made aware pt received dose of lactulose today diarrhoea must be secondary to lactulose pt been sweating prior to chemotheraphy and still sweating dr maloney aware pt completed with leucovorin and ironetecan with no adverse effects post v/s stable pt started on fluorouracil 1200mg/m2=24oomg CI Iv infuse over 23hrs intiated at 1800 at 24cc/hr report given to the area nurse

## 2017-12-06 LAB
ANION GAP SERPL CALC-SCNC: 13 MMOL/L — SIGNIFICANT CHANGE UP (ref 5–17)
BASOPHILS # BLD AUTO: 0.01 K/UL — SIGNIFICANT CHANGE UP (ref 0–0.2)
BASOPHILS NFR BLD AUTO: 0.1 % — SIGNIFICANT CHANGE UP (ref 0–2)
BUN SERPL-MCNC: 16 MG/DL — SIGNIFICANT CHANGE UP (ref 7–23)
CALCIUM SERPL-MCNC: 9.6 MG/DL — SIGNIFICANT CHANGE UP (ref 8.4–10.5)
CHLORIDE SERPL-SCNC: 96 MMOL/L — SIGNIFICANT CHANGE UP (ref 96–108)
CO2 SERPL-SCNC: 24 MMOL/L — SIGNIFICANT CHANGE UP (ref 22–31)
CREAT SERPL-MCNC: 0.61 MG/DL — SIGNIFICANT CHANGE UP (ref 0.5–1.3)
EOSINOPHIL # BLD AUTO: 0 K/UL — SIGNIFICANT CHANGE UP (ref 0–0.5)
EOSINOPHIL NFR BLD AUTO: 0 % — SIGNIFICANT CHANGE UP (ref 0–6)
GLUCOSE SERPL-MCNC: 141 MG/DL — HIGH (ref 70–99)
HCT VFR BLD CALC: 36.5 % — LOW (ref 39–50)
HGB BLD-MCNC: 12.3 G/DL — LOW (ref 13–17)
IMM GRANULOCYTES NFR BLD AUTO: 0.1 % — SIGNIFICANT CHANGE UP (ref 0–1.5)
LYMPHOCYTES # BLD AUTO: 0.6 K/UL — LOW (ref 1–3.3)
LYMPHOCYTES # BLD AUTO: 7.7 % — LOW (ref 13–44)
MCHC RBC-ENTMCNC: 29.3 PG — SIGNIFICANT CHANGE UP (ref 27–34)
MCHC RBC-ENTMCNC: 33.7 GM/DL — SIGNIFICANT CHANGE UP (ref 32–36)
MCV RBC AUTO: 86.9 FL — SIGNIFICANT CHANGE UP (ref 80–100)
MONOCYTES # BLD AUTO: 0.25 K/UL — SIGNIFICANT CHANGE UP (ref 0–0.9)
MONOCYTES NFR BLD AUTO: 3.2 % — SIGNIFICANT CHANGE UP (ref 2–14)
NEUTROPHILS # BLD AUTO: 6.88 K/UL — SIGNIFICANT CHANGE UP (ref 1.8–7.4)
NEUTROPHILS NFR BLD AUTO: 88.9 % — HIGH (ref 43–77)
PLATELET # BLD AUTO: 395 K/UL — SIGNIFICANT CHANGE UP (ref 150–400)
POTASSIUM SERPL-MCNC: 4.4 MMOL/L — SIGNIFICANT CHANGE UP (ref 3.5–5.3)
POTASSIUM SERPL-SCNC: 4.4 MMOL/L — SIGNIFICANT CHANGE UP (ref 3.5–5.3)
RBC # BLD: 4.2 M/UL — SIGNIFICANT CHANGE UP (ref 4.2–5.8)
RBC # FLD: 13.8 % — SIGNIFICANT CHANGE UP (ref 10.3–14.5)
SODIUM SERPL-SCNC: 133 MMOL/L — LOW (ref 135–145)
WBC # BLD: 7.75 K/UL — SIGNIFICANT CHANGE UP (ref 3.8–10.5)
WBC # FLD AUTO: 7.75 K/UL — SIGNIFICANT CHANGE UP (ref 3.8–10.5)

## 2017-12-06 PROCEDURE — 93010 ELECTROCARDIOGRAM REPORT: CPT

## 2017-12-06 PROCEDURE — 99233 SBSQ HOSP IP/OBS HIGH 50: CPT | Mod: GC

## 2017-12-06 PROCEDURE — 99232 SBSQ HOSP IP/OBS MODERATE 35: CPT

## 2017-12-06 PROCEDURE — 99233 SBSQ HOSP IP/OBS HIGH 50: CPT

## 2017-12-06 RX ORDER — ALPRAZOLAM 0.25 MG
0.5 TABLET ORAL EVERY 6 HOURS
Qty: 0 | Refills: 0 | Status: DISCONTINUED | OUTPATIENT
Start: 2017-12-06 | End: 2017-12-08

## 2017-12-06 RX ADMIN — HYDROMORPHONE HYDROCHLORIDE 14 MILLIGRAM(S): 2 INJECTION INTRAMUSCULAR; INTRAVENOUS; SUBCUTANEOUS at 17:26

## 2017-12-06 RX ADMIN — HYDROMORPHONE HYDROCHLORIDE 14 MILLIGRAM(S): 2 INJECTION INTRAMUSCULAR; INTRAVENOUS; SUBCUTANEOUS at 12:51

## 2017-12-06 RX ADMIN — PANTOPRAZOLE SODIUM 40 MILLIGRAM(S): 20 TABLET, DELAYED RELEASE ORAL at 05:17

## 2017-12-06 RX ADMIN — HYDROMORPHONE HYDROCHLORIDE 14 MILLIGRAM(S): 2 INJECTION INTRAMUSCULAR; INTRAVENOUS; SUBCUTANEOUS at 04:21

## 2017-12-06 RX ADMIN — HYDROMORPHONE HYDROCHLORIDE 14 MILLIGRAM(S): 2 INJECTION INTRAMUSCULAR; INTRAVENOUS; SUBCUTANEOUS at 00:50

## 2017-12-06 RX ADMIN — METHADONE HYDROCHLORIDE 7.5 MILLIGRAM(S): 40 TABLET ORAL at 05:16

## 2017-12-06 RX ADMIN — HYDROMORPHONE HYDROCHLORIDE 14 MILLIGRAM(S): 2 INJECTION INTRAMUSCULAR; INTRAVENOUS; SUBCUTANEOUS at 08:05

## 2017-12-06 RX ADMIN — PANTOPRAZOLE SODIUM 40 MILLIGRAM(S): 20 TABLET, DELAYED RELEASE ORAL at 17:25

## 2017-12-06 RX ADMIN — HYDROMORPHONE HYDROCHLORIDE 14 MILLIGRAM(S): 2 INJECTION INTRAMUSCULAR; INTRAVENOUS; SUBCUTANEOUS at 07:35

## 2017-12-06 RX ADMIN — HYDROMORPHONE HYDROCHLORIDE 1 MILLIGRAM(S): 2 INJECTION INTRAMUSCULAR; INTRAVENOUS; SUBCUTANEOUS at 01:14

## 2017-12-06 RX ADMIN — HYDROMORPHONE HYDROCHLORIDE 1 MILLIGRAM(S): 2 INJECTION INTRAMUSCULAR; INTRAVENOUS; SUBCUTANEOUS at 13:56

## 2017-12-06 RX ADMIN — HYDROMORPHONE HYDROCHLORIDE 14 MILLIGRAM(S): 2 INJECTION INTRAMUSCULAR; INTRAVENOUS; SUBCUTANEOUS at 12:20

## 2017-12-06 RX ADMIN — HYDROMORPHONE HYDROCHLORIDE 14 MILLIGRAM(S): 2 INJECTION INTRAMUSCULAR; INTRAVENOUS; SUBCUTANEOUS at 17:56

## 2017-12-06 RX ADMIN — HYDROMORPHONE HYDROCHLORIDE 14 MILLIGRAM(S): 2 INJECTION INTRAMUSCULAR; INTRAVENOUS; SUBCUTANEOUS at 03:21

## 2017-12-06 RX ADMIN — HYDROMORPHONE HYDROCHLORIDE 1 MILLIGRAM(S): 2 INJECTION INTRAMUSCULAR; INTRAVENOUS; SUBCUTANEOUS at 00:59

## 2017-12-06 RX ADMIN — HYDROMORPHONE HYDROCHLORIDE 1 MILLIGRAM(S): 2 INJECTION INTRAMUSCULAR; INTRAVENOUS; SUBCUTANEOUS at 08:56

## 2017-12-06 RX ADMIN — METHADONE HYDROCHLORIDE 7.5 MILLIGRAM(S): 40 TABLET ORAL at 14:02

## 2017-12-06 RX ADMIN — ENOXAPARIN SODIUM 40 MILLIGRAM(S): 100 INJECTION SUBCUTANEOUS at 17:25

## 2017-12-06 RX ADMIN — HYDROMORPHONE HYDROCHLORIDE 1 MILLIGRAM(S): 2 INJECTION INTRAMUSCULAR; INTRAVENOUS; SUBCUTANEOUS at 13:41

## 2017-12-06 RX ADMIN — ONDANSETRON 116 MILLIGRAM(S): 8 TABLET, FILM COATED ORAL at 14:01

## 2017-12-06 RX ADMIN — METHADONE HYDROCHLORIDE 7.5 MILLIGRAM(S): 40 TABLET ORAL at 21:05

## 2017-12-06 RX ADMIN — LOSARTAN POTASSIUM 25 MILLIGRAM(S): 100 TABLET, FILM COATED ORAL at 05:17

## 2017-12-06 RX ADMIN — HYDROMORPHONE HYDROCHLORIDE 1 MILLIGRAM(S): 2 INJECTION INTRAMUSCULAR; INTRAVENOUS; SUBCUTANEOUS at 08:41

## 2017-12-06 RX ADMIN — Medication 101.6 MILLIGRAM(S): at 12:19

## 2017-12-06 RX ADMIN — Medication 0.5 MILLIGRAM(S): at 13:41

## 2017-12-06 RX ADMIN — HYDROMORPHONE HYDROCHLORIDE 14 MILLIGRAM(S): 2 INJECTION INTRAMUSCULAR; INTRAVENOUS; SUBCUTANEOUS at 23:13

## 2017-12-06 NOTE — PROGRESS NOTE ADULT - PROBLEM SELECTOR PLAN 1
Persistent sever pain. Will increase Methadone today. Will attempt to transition to po pain meds tomorrow

## 2017-12-06 NOTE — PROGRESS NOTE ADULT - SUBJECTIVE AND OBJECTIVE BOX
INTERVAL HPI/OVERNIGHT EVENTS:  Pt still taking many breakthroughs.  States the pain is controlled with the regimen.  Still receiving chemo    Allergies    No Known Allergies    Intolerances        ADVANCE DIRECTIVES:    DNR: [ ] NO [ ] YES (Date) MOLST [ ] NO [ ] YES (Date)    MEDICATIONS  (STANDING):  dexamethasone  IVPB 8 milliGRAM(s) IV Intermittent daily  docusate sodium 100 milliGRAM(s) Oral at bedtime  enoxaparin Injectable 40 milliGRAM(s) SubCutaneous daily  fluorouracil IVPB 2400 milliGRAM(s) IV Intermittent daily  irinotecan IVPB 300 milliGRAM(s) IV Intermittent once  lactulose Syrup 15 Gram(s) Oral daily  losartan 25 milliGRAM(s) Oral daily  methadone    Tablet 7.5 milliGRAM(s) Oral three times a day  ondansetron  IVPB 16 milliGRAM(s) IV Intermittent daily  OXALIplatin IVPB 140 milliGRAM(s) IV Intermittent once  pantoprazole  Injectable 40 milliGRAM(s) IV Push two times a day  senna 2 Tablet(s) Oral at bedtime    MEDICATIONS  (PRN):  ALPRAZolam 0.5 milliGRAM(s) Oral every 6 hours PRN Anxiety  atropine Injectable 0.25 milliGRAM(s) SubCutaneous once PRN cramps,flushing,diarrhea immediately and during chemo administration  diphenhydrAMINE   Capsule 25 milliGRAM(s) Oral at bedtime PRN Insomnia  HYDROmorphone   Tablet 14 milliGRAM(s) Oral every 3 hours PRN Severe Pain (7 - 10)  HYDROmorphone  Injectable 1 milliGRAM(s) IV Push every 3 hours PRN pain (4-10) unrelieved with oral medication  metoclopramide Injectable 10 milliGRAM(s) IV Push every 6 hours PRN nausea and or vomiting      PRESENT SYMPTOMS:  Source: [x ] Patient   [ ] Family   [ ] Team     Pain:                        [ ] No [ x] Yes             [ ] Mild [ x] Moderate [ ] Severe    Onset - comes and goes  Location -abdomen  Duration - lasts a few hours  Character -dull  Alleviating/Aggravating - movement  Radiation - to back  Timing -    Dyspnea:                [ x] No [ ] Yes             [ ] Mild [ ] Moderate [ ] Severe    Anxiety:                  [ ] No [x ] Yes             [ ] Mild [ ] Moderate [x ] Severe    Fatigue:                  [ x] No [ ] Yes             [ ] Mild [ ] Moderate [ ] Severe    Nausea:                  [x ] No [ ] Yes             [ ] Mild [ ] Moderate [ ] Severe    Loss of appetite:   [ x] No [ ] Yes             [ ] Mild [ ] Moderate [ ] Severe    Constipation:        [x ] No [ ] Yes             [ ] Mild [ ] Moderate [ ] Severe    Other Symptoms:  [x ] All other review of systems negative   [ ] Unable to obtain due to poor mentation     Karnofsky Performance Score/Palliative Performance Status Version 2:   60      %    PHYSICAL EXAM:  Vital Signs Last 24 Hrs  T(C): 36.3 (06 Dec 2017 15:16), Max: 36.6 (05 Dec 2017 22:35)  T(F): 97.4 (06 Dec 2017 15:16), Max: 97.9 (06 Dec 2017 03:26)  HR: 93 (06 Dec 2017 15:16) (86 - 104)  BP: 153/83 (06 Dec 2017 15:16) (135/84 - 160/74)  BP(mean): --  RR: 17 (06 Dec 2017 15:16) (15 - 20)  SpO2: 94% (06 Dec 2017 15:16) (94% - 98%) I&O's Summary    05 Dec 2017 07:01  -  06 Dec 2017 07:00  --------------------------------------------------------  IN: 1611 mL / OUT: 325 mL / NET: 1286 mL    06 Dec 2017 07:01  -  06 Dec 2017 15:21  --------------------------------------------------------  IN: 650 mL / OUT: 300 mL / NET: 350 mL        General:  [ x] Alert  [x ] Oriented x  3    [ ] Lethargic  [ ] Agitated   [ ] Cachexia   [ ] Unarousable  [ ] Verbal  [ ] Non-Verbal    HEENT:  [x ] Normal   [ ] Dry mouth   [ ] ET Tube    [ ] Trach  [ ] Oral lesions    Lungs:   [x ] Clear [ ] Tachypnea  [ ] Audible excessive secretions   [ ] Rhonchi        [ ] Right [ ] Left [ ] Bilateral  [ ] Crackles        [ ] Right [ ] Left [ ] Bilateral  [ ] Wheezing     [ ] Right [ ] Left [ ] Bilateral    Cardiovascular:  [x ] Regular [ ] Irregular [ ] Tachycardia   [ ] Bradycardia  [ ] Murmur [ ] Other    Abdomen: [x ] Soft  [x ] Distended   [x ] +BS  [ ] Non tender [ ] Tender  [ ]PEG   [ ]OGT/ NGT   Last BM:       Genitourinary: [x ] Normal [ ] Incontinent   [ ] Oliguria/Anuria   [ ] Lopez    Musculoskeletal:  [ x] Normal   [ ] Weakness  [ ] Bedbound/Wheelchair bound [ ] Edema    Neurological: [x ] No focal deficits  [ ] Cognitive impairment  [ ] Dysphagia [ ] Dysarthria [ ] Paresis [ ] Other     Skin: [x ] Normal   [ ] Pressure ulcer(s)                  [ ] Rash    LABS:  [ ] Critical Care time for unstable patient with organ failure.  Total Time:                 minutes  12-06    133<L>  |  96  |  16  ----------------------------<  141<H>  4.4   |  24  |  0.61    Ca    9.6      06 Dec 2017 07:31            Shock: [ ] Septic [ ] Cardiogenic [ ] Neurologic [ ] Hypovolemic  Vasopressors x   Inotrophs x     Oral Intake: [ ] Unable/mouth care only [ ] Minimal [ ] Moderate [ ] Full Capability  Diet: [ ] NPO [ ] Tube feeds [ ] TPN [ ] Other     RADIOLOGY & ADDITIONAL STUDIES:    REFERRALS:   [ ] Chaplaincy  [ ] Hospice  [ ] Child Life  [ ] Social Work  [ ] Case management [ ] Holistic Therapy       RADIOLOGY & ADDITIONAL STUDIES:

## 2017-12-06 NOTE — PROGRESS NOTE ADULT - PROBLEM SELECTOR PLAN 1
continue methadone to 7.5mg tid with dilaudid 14mg po prn  may increase to 10mg tid tomorrow. Will evaluated then

## 2017-12-06 NOTE — PROGRESS NOTE ADULT - ASSESSMENT
62yM w locally advanced pancreatic ca aw severe pain. Mediport placed on 12/4. Started on FOLFIRINOX chemo yesterday.

## 2017-12-06 NOTE — ADVANCED PRACTICE NURSE CONSULT - ASSESSMENT
Cycle # 1day 2/2.. Lab results as per Md joseph aware of same. Vital signs stable prior to the start of chemotherapy, see sunrise.  Pt educated on the importance of saving urine, verbalize understanding of same.Pt education done re chemo regimen, drug effects and potential side effects, written material provided, pt states understanding. Pt reports the he has tolerated previous day chemotherapy without side effects.Pt with rt chest wall mediport site free from signs and symptoms of infection, positive blood return obtained. Pre-medicated with zofran 16mg ivss, decadron 8mg ivss, Cycle # 1day 2/2.. Lab results as per Md joseph aware of same. Vital signs stable prior to the start of chemotherapy, see sunrise.  Pt educated on the importance of saving urine, verbalize understanding of same.Pt education done re chemo regimen, drug effects and potential side effects,  pt states understanding. Pt reports the he has tolerated previous day chemotherapy without side effects.Pt with rt chest wall mediport site free from signs and symptoms of infection, positive blood return obtained. Pre-medicated with zofran 16mg ivss, decadron 8mg ivss,pt completed the day infusion with no adverse effects pt started on fluorouracil 1200mg/v1=1536ai CI iv at 25cchr intiated at 1700 pt resting in bed report given to the area nurse

## 2017-12-06 NOTE — PROGRESS NOTE ADULT - SUBJECTIVE AND OBJECTIVE BOX
Patient is a 62y old  Male who presents with a chief complaint of Abdominal pain (30 Nov 2017 12:26)    HPI: Started on chemo yesterday. Tolerating well. Continues to require frequent dilaudid.    Vital Signs Last 24 Hrs  T(C): 36.3 (06 Dec 2017 07:44), Max: 36.6 (05 Dec 2017 22:35)  T(F): 97.4 (06 Dec 2017 07:44), Max: 97.9 (06 Dec 2017 03:26)  HR: 90 (06 Dec 2017 07:44) (86 - 104)  BP: 160/74 (06 Dec 2017 07:44) (104/55 - 160/74)  BP(mean): --  RR: 18 (06 Dec 2017 07:44) (15 - 20)  SpO2: 96% (06 Dec 2017 07:44) (95% - 98%)                          12.3   7.75  )-----------( 395      ( 06 Dec 2017 07:28 )             36.5     12-06    133<L>  |  96  |  16  ----------------------------<  141<H>  4.4   |  24  |  0.61    Ca    9.6      06 Dec 2017 07:31      MEDICATIONS  (STANDING):  dexamethasone  IVPB 8 milliGRAM(s) IV Intermittent daily  docusate sodium 100 milliGRAM(s) Oral at bedtime  enoxaparin Injectable 40 milliGRAM(s) SubCutaneous daily  fluorouracil IVPB 2400 milliGRAM(s) IV Intermittent daily  irinotecan IVPB 300 milliGRAM(s) IV Intermittent once  lactulose Syrup 15 Gram(s) Oral daily  losartan 25 milliGRAM(s) Oral daily  methadone    Tablet 7.5 milliGRAM(s) Oral three times a day  ondansetron  IVPB 16 milliGRAM(s) IV Intermittent daily  OXALIplatin IVPB 140 milliGRAM(s) IV Intermittent once  pantoprazole  Injectable 40 milliGRAM(s) IV Push two times a day  senna 2 Tablet(s) Oral at bedtime    MEDICATIONS  (PRN):  atropine Injectable 0.25 milliGRAM(s) SubCutaneous once PRN cramps,flushing,diarrhea immediately and during chemo administration  diphenhydrAMINE   Capsule 25 milliGRAM(s) Oral at bedtime PRN Insomnia  HYDROmorphone   Tablet 14 milliGRAM(s) Oral every 3 hours PRN Severe Pain (7 - 10)  HYDROmorphone  Injectable 1 milliGRAM(s) IV Push every 3 hours PRN pain (4-10) unrelieved with oral medication  metoclopramide Injectable 10 milliGRAM(s) IV Push every 6 hours PRN nausea and or vomiting

## 2017-12-07 LAB
ALBUMIN SERPL ELPH-MCNC: 3.4 G/DL — SIGNIFICANT CHANGE UP (ref 3.3–5)
ALP SERPL-CCNC: 81 U/L — SIGNIFICANT CHANGE UP (ref 40–120)
ALT FLD-CCNC: 9 U/L — LOW (ref 10–45)
ANION GAP SERPL CALC-SCNC: 12 MMOL/L — SIGNIFICANT CHANGE UP (ref 5–17)
AST SERPL-CCNC: 12 U/L — SIGNIFICANT CHANGE UP (ref 10–40)
BASOPHILS # BLD AUTO: 0 K/UL — SIGNIFICANT CHANGE UP (ref 0–0.2)
BASOPHILS NFR BLD AUTO: 0 % — SIGNIFICANT CHANGE UP (ref 0–2)
BILIRUB SERPL-MCNC: 0.2 MG/DL — SIGNIFICANT CHANGE UP (ref 0.2–1.2)
BUN SERPL-MCNC: 19 MG/DL — SIGNIFICANT CHANGE UP (ref 7–23)
CALCIUM SERPL-MCNC: 9.6 MG/DL — SIGNIFICANT CHANGE UP (ref 8.4–10.5)
CHLORIDE SERPL-SCNC: 95 MMOL/L — LOW (ref 96–108)
CO2 SERPL-SCNC: 26 MMOL/L — SIGNIFICANT CHANGE UP (ref 22–31)
CREAT SERPL-MCNC: 0.69 MG/DL — SIGNIFICANT CHANGE UP (ref 0.5–1.3)
EOSINOPHIL # BLD AUTO: 0.02 K/UL — SIGNIFICANT CHANGE UP (ref 0–0.5)
EOSINOPHIL NFR BLD AUTO: 0.2 % — SIGNIFICANT CHANGE UP (ref 0–6)
GLUCOSE SERPL-MCNC: 108 MG/DL — HIGH (ref 70–99)
HCT VFR BLD CALC: 33.9 % — LOW (ref 39–50)
HGB BLD-MCNC: 11.6 G/DL — LOW (ref 13–17)
IMM GRANULOCYTES NFR BLD AUTO: 0.2 % — SIGNIFICANT CHANGE UP (ref 0–1.5)
LYMPHOCYTES # BLD AUTO: 1.5 K/UL — SIGNIFICANT CHANGE UP (ref 1–3.3)
LYMPHOCYTES # BLD AUTO: 14 % — SIGNIFICANT CHANGE UP (ref 13–44)
MCHC RBC-ENTMCNC: 29.7 PG — SIGNIFICANT CHANGE UP (ref 27–34)
MCHC RBC-ENTMCNC: 34.2 GM/DL — SIGNIFICANT CHANGE UP (ref 32–36)
MCV RBC AUTO: 86.9 FL — SIGNIFICANT CHANGE UP (ref 80–100)
MONOCYTES # BLD AUTO: 0.7 K/UL — SIGNIFICANT CHANGE UP (ref 0–0.9)
MONOCYTES NFR BLD AUTO: 6.5 % — SIGNIFICANT CHANGE UP (ref 2–14)
NEUTROPHILS # BLD AUTO: 8.45 K/UL — HIGH (ref 1.8–7.4)
NEUTROPHILS NFR BLD AUTO: 79.1 % — HIGH (ref 43–77)
PLATELET # BLD AUTO: 398 K/UL — SIGNIFICANT CHANGE UP (ref 150–400)
POTASSIUM SERPL-MCNC: 4.2 MMOL/L — SIGNIFICANT CHANGE UP (ref 3.5–5.3)
POTASSIUM SERPL-SCNC: 4.2 MMOL/L — SIGNIFICANT CHANGE UP (ref 3.5–5.3)
PROT SERPL-MCNC: 6.8 G/DL — SIGNIFICANT CHANGE UP (ref 6–8.3)
RBC # BLD: 3.9 M/UL — LOW (ref 4.2–5.8)
RBC # FLD: 13.8 % — SIGNIFICANT CHANGE UP (ref 10.3–14.5)
SODIUM SERPL-SCNC: 133 MMOL/L — LOW (ref 135–145)
WBC # BLD: 10.69 K/UL — HIGH (ref 3.8–10.5)
WBC # FLD AUTO: 10.69 K/UL — HIGH (ref 3.8–10.5)

## 2017-12-07 PROCEDURE — 99233 SBSQ HOSP IP/OBS HIGH 50: CPT | Mod: GC

## 2017-12-07 PROCEDURE — 99233 SBSQ HOSP IP/OBS HIGH 50: CPT

## 2017-12-07 PROCEDURE — 99232 SBSQ HOSP IP/OBS MODERATE 35: CPT

## 2017-12-07 RX ORDER — METHADONE HYDROCHLORIDE 40 MG/1
10 TABLET ORAL
Qty: 0 | Refills: 0 | Status: DISCONTINUED | OUTPATIENT
Start: 2017-12-07 | End: 2017-12-07

## 2017-12-07 RX ORDER — METHADONE HYDROCHLORIDE 40 MG/1
10 TABLET ORAL
Qty: 0 | Refills: 0 | Status: DISCONTINUED | OUTPATIENT
Start: 2017-12-07 | End: 2017-12-08

## 2017-12-07 RX ADMIN — HYDROMORPHONE HYDROCHLORIDE 14 MILLIGRAM(S): 2 INJECTION INTRAMUSCULAR; INTRAVENOUS; SUBCUTANEOUS at 18:29

## 2017-12-07 RX ADMIN — METHADONE HYDROCHLORIDE 10 MILLIGRAM(S): 40 TABLET ORAL at 15:09

## 2017-12-07 RX ADMIN — Medication 101.6 MILLIGRAM(S): at 13:12

## 2017-12-07 RX ADMIN — Medication 10 MILLIGRAM(S): at 00:32

## 2017-12-07 RX ADMIN — METHADONE HYDROCHLORIDE 7.5 MILLIGRAM(S): 40 TABLET ORAL at 06:42

## 2017-12-07 RX ADMIN — Medication 0.5 MILLIGRAM(S): at 19:39

## 2017-12-07 RX ADMIN — METHADONE HYDROCHLORIDE 10 MILLIGRAM(S): 40 TABLET ORAL at 22:10

## 2017-12-07 RX ADMIN — PANTOPRAZOLE SODIUM 40 MILLIGRAM(S): 20 TABLET, DELAYED RELEASE ORAL at 06:42

## 2017-12-07 RX ADMIN — LACTULOSE 15 GRAM(S): 10 SOLUTION ORAL at 13:13

## 2017-12-07 RX ADMIN — HYDROMORPHONE HYDROCHLORIDE 14 MILLIGRAM(S): 2 INJECTION INTRAMUSCULAR; INTRAVENOUS; SUBCUTANEOUS at 13:23

## 2017-12-07 RX ADMIN — Medication 0.5 MILLIGRAM(S): at 00:32

## 2017-12-07 RX ADMIN — HYDROMORPHONE HYDROCHLORIDE 14 MILLIGRAM(S): 2 INJECTION INTRAMUSCULAR; INTRAVENOUS; SUBCUTANEOUS at 00:13

## 2017-12-07 RX ADMIN — HYDROMORPHONE HYDROCHLORIDE 14 MILLIGRAM(S): 2 INJECTION INTRAMUSCULAR; INTRAVENOUS; SUBCUTANEOUS at 13:53

## 2017-12-07 RX ADMIN — HYDROMORPHONE HYDROCHLORIDE 14 MILLIGRAM(S): 2 INJECTION INTRAMUSCULAR; INTRAVENOUS; SUBCUTANEOUS at 18:36

## 2017-12-07 RX ADMIN — PANTOPRAZOLE SODIUM 40 MILLIGRAM(S): 20 TABLET, DELAYED RELEASE ORAL at 18:30

## 2017-12-07 RX ADMIN — SENNA PLUS 2 TABLET(S): 8.6 TABLET ORAL at 22:10

## 2017-12-07 RX ADMIN — ENOXAPARIN SODIUM 40 MILLIGRAM(S): 100 INJECTION SUBCUTANEOUS at 18:30

## 2017-12-07 RX ADMIN — ONDANSETRON 116 MILLIGRAM(S): 8 TABLET, FILM COATED ORAL at 13:12

## 2017-12-07 RX ADMIN — Medication 100 MILLIGRAM(S): at 22:10

## 2017-12-07 RX ADMIN — LOSARTAN POTASSIUM 25 MILLIGRAM(S): 100 TABLET, FILM COATED ORAL at 06:42

## 2017-12-07 NOTE — PROGRESS NOTE ADULT - ASSESSMENT
62 M w/ h/o stage II colon cancer s/p resection in May 2017, now diagnosed with locally advanced unresectable pancreatic cancer c/b severe abdominal/back pain, admitted for concern of gastric perforation based on CT however, clinically stable, tolerating PO, D3 of chemotherapy

## 2017-12-07 NOTE — PROGRESS NOTE ADULT - ASSESSMENT
62yM w locally advanced pancreatic ca aw severe pain. Mediport placed on 12/4. Started on FOLFIRINOX chemo on 12/5.

## 2017-12-07 NOTE — PROGRESS NOTE ADULT - PROBLEM SELECTOR PLAN 5
Pain and symptom management as above. Will follow, please call [phone] with any issues this weekend.
Pt very anxious about his prognosis.  Onc very clear with him that this is palliative chemo and not curative.  His support is his wife and daughter  emotional and spiritual support provided
Improving now that pain is better controlled. Continue to monitor.
Likely due to pain, associated with tachycardia. Reassess once pain controlled.
Pain and symptom management as above. Will follow, please call [phone] with any issues this weekend.
Pt very anxious about his prognosis.  Onc very clear with him that this is palliative chemo and not curative.  His support is his wife and daughter  emotional and spiritual support provided
Pt very anxious about his prognosis.  Onc very clear with him that this is palliative chemo and not curative.  His support is his wife and daughter  emotional and spiritual support provided
Pt very anxious about his prognosis.  Onc very clear with him that this is palliative chemo and not curative.  His support is his wife and daughter  emotional and spiritual support provided  Discharge home tomorrow
Improving now that pain is better controlled. Continue to monitor.

## 2017-12-07 NOTE — PROGRESS NOTE ADULT - PROBLEM SELECTOR PLAN 1
Tolerating FOLFIRINOX well. To finish 5FU infusion tonight.   Neulasta apt scheduled for 12:20 on 12/9/17 at [facility].   C2 will be on 12/19 at [facility]. Pt will see me in f/u on 12/15 at 8:30 am.

## 2017-12-07 NOTE — PROGRESS NOTE ADULT - PROBLEM SELECTOR PROBLEM 5
Encounter for palliative care
Encounter for palliative care
Hypertension, unspecified type
Encounter for palliative care
Hypertension, unspecified type
Hypertension, unspecified type

## 2017-12-07 NOTE — PROGRESS NOTE ADULT - PROBLEM SELECTOR PLAN 3
Appreciate palliative consult.   Pain seems to be better controlled.   To f/u with Dr. Huffman on 12/15 at 3 pm.

## 2017-12-07 NOTE — PROGRESS NOTE ADULT - SUBJECTIVE AND OBJECTIVE BOX
Patient is a 62y old  Male who presents with a chief complaint of Abdominal pain (30 Nov 2017 12:26)    HPI: Tolerating chemo. Ate well today. Pain improved.  Xanax added for anxiety yesterday. Received 3 doses of oral dilaudid since yesterday, no iv dilaudid overnight.    Vital Signs Last 24 Hrs  T(C): 36.5 (07 Dec 2017 08:19), Max: 37.2 (06 Dec 2017 22:04)  T(F): 97.7 (07 Dec 2017 08:19), Max: 98.9 (06 Dec 2017 22:04)  HR: 94 (07 Dec 2017 08:19) (93 - 98)  BP: 130/72 (07 Dec 2017 08:19) (130/72 - 153/83)  BP(mean): --  RR: 18 (07 Dec 2017 08:19) (17 - 18)  SpO2: 99% (07 Dec 2017 08:19) (94% - 99%)                          11.6   10.69 )-----------( 398      ( 07 Dec 2017 08:30 )             33.9     12-07    133<L>  |  95<L>  |  19  ----------------------------<  108<H>  4.2   |  26  |  0.69    Ca    9.6      07 Dec 2017 08:32    TPro  6.8  /  Alb  3.4  /  TBili  0.2  /  DBili  x   /  AST  12  /  ALT  9<L>  /  AlkPhos  81  12-07    MEDICATIONS  (STANDING):  dexamethasone  IVPB 8 milliGRAM(s) IV Intermittent daily  docusate sodium 100 milliGRAM(s) Oral at bedtime  enoxaparin Injectable 40 milliGRAM(s) SubCutaneous daily  fluorouracil IVPB 2400 milliGRAM(s) IV Intermittent daily  irinotecan IVPB 300 milliGRAM(s) IV Intermittent once  lactulose Syrup 15 Gram(s) Oral daily  losartan 25 milliGRAM(s) Oral daily  methadone    Tablet 10 milliGRAM(s) Oral <User Schedule>  ondansetron  IVPB 16 milliGRAM(s) IV Intermittent daily  OXALIplatin IVPB 140 milliGRAM(s) IV Intermittent once  pantoprazole  Injectable 40 milliGRAM(s) IV Push two times a day  senna 2 Tablet(s) Oral at bedtime    MEDICATIONS  (PRN):  ALPRAZolam 0.5 milliGRAM(s) Oral every 6 hours PRN Anxiety  atropine Injectable 0.25 milliGRAM(s) SubCutaneous once PRN cramps,flushing,diarrhea immediately and during chemo administration  diphenhydrAMINE   Capsule 25 milliGRAM(s) Oral at bedtime PRN Insomnia  HYDROmorphone   Tablet 14 milliGRAM(s) Oral every 3 hours PRN Severe Pain (7 - 10)  metoclopramide Injectable 10 milliGRAM(s) IV Push every 6 hours PRN nausea and or vomiting Patient is a 62y old  Male who presents with a chief complaint of Abdominal pain (30 Nov 2017 12:26)    HPI: Tolerating chemo. Ate well today. Pain improved.    Xanax added for anxiety yesterday. Received 3 doses of oral dilaudid since yesterday, no iv dilaudid overnight.    Vital Signs Last 24 Hrs  T(C): 36.5 (07 Dec 2017 08:19), Max: 37.2 (06 Dec 2017 22:04)  T(F): 97.7 (07 Dec 2017 08:19), Max: 98.9 (06 Dec 2017 22:04)  HR: 94 (07 Dec 2017 08:19) (93 - 98)  BP: 130/72 (07 Dec 2017 08:19) (130/72 - 153/83)  BP(mean): --  RR: 18 (07 Dec 2017 08:19) (17 - 18)  SpO2: 99% (07 Dec 2017 08:19) (94% - 99%)                          11.6   10.69 )-----------( 398      ( 07 Dec 2017 08:30 )             33.9     12-07    133<L>  |  95<L>  |  19  ----------------------------<  108<H>  4.2   |  26  |  0.69    Ca    9.6      07 Dec 2017 08:32    TPro  6.8  /  Alb  3.4  /  TBili  0.2  /  DBili  x   /  AST  12  /  ALT  9<L>  /  AlkPhos  81  12-07    MEDICATIONS  (STANDING):  dexamethasone  IVPB 8 milliGRAM(s) IV Intermittent daily  docusate sodium 100 milliGRAM(s) Oral at bedtime  enoxaparin Injectable 40 milliGRAM(s) SubCutaneous daily  fluorouracil IVPB 2400 milliGRAM(s) IV Intermittent daily  irinotecan IVPB 300 milliGRAM(s) IV Intermittent once  lactulose Syrup 15 Gram(s) Oral daily  losartan 25 milliGRAM(s) Oral daily  methadone    Tablet 10 milliGRAM(s) Oral <User Schedule>  ondansetron  IVPB 16 milliGRAM(s) IV Intermittent daily  OXALIplatin IVPB 140 milliGRAM(s) IV Intermittent once  pantoprazole  Injectable 40 milliGRAM(s) IV Push two times a day  senna 2 Tablet(s) Oral at bedtime    MEDICATIONS  (PRN):  ALPRAZolam 0.5 milliGRAM(s) Oral every 6 hours PRN Anxiety  atropine Injectable 0.25 milliGRAM(s) SubCutaneous once PRN cramps,flushing,diarrhea immediately and during chemo administration  diphenhydrAMINE   Capsule 25 milliGRAM(s) Oral at bedtime PRN Insomnia  HYDROmorphone   Tablet 14 milliGRAM(s) Oral every 3 hours PRN Severe Pain (7 - 10)  metoclopramide Injectable 10 milliGRAM(s) IV Push every 6 hours PRN nausea and or vomiting

## 2017-12-07 NOTE — PROGRESS NOTE ADULT - SUBJECTIVE AND OBJECTIVE BOX
INTERVAL HPI/OVERNIGHT EVENTS:  Pt pain ok. Agree to increase methadone 10mg tid  Allergies    No Known Allergies    Intolerances        ADVANCE DIRECTIVES:    DNR: [ ] NO [ ] YES (Date) MOLST [ ] NO [ ] YES (Date)    MEDICATIONS  (STANDING):  docusate sodium 100 milliGRAM(s) Oral at bedtime  enoxaparin Injectable 40 milliGRAM(s) SubCutaneous daily  fluorouracil IVPB 2400 milliGRAM(s) IV Intermittent daily  irinotecan IVPB 300 milliGRAM(s) IV Intermittent once  lactulose Syrup 15 Gram(s) Oral daily  losartan 25 milliGRAM(s) Oral daily  methadone    Tablet 10 milliGRAM(s) Oral <User Schedule>  ondansetron  IVPB 16 milliGRAM(s) IV Intermittent daily  OXALIplatin IVPB 140 milliGRAM(s) IV Intermittent once  pantoprazole  Injectable 40 milliGRAM(s) IV Push two times a day  senna 2 Tablet(s) Oral at bedtime    MEDICATIONS  (PRN):  ALPRAZolam 0.5 milliGRAM(s) Oral every 6 hours PRN Anxiety  atropine Injectable 0.25 milliGRAM(s) SubCutaneous once PRN cramps,flushing,diarrhea immediately and during chemo administration  diphenhydrAMINE   Capsule 25 milliGRAM(s) Oral at bedtime PRN Insomnia  HYDROmorphone   Tablet 14 milliGRAM(s) Oral every 3 hours PRN Severe Pain (7 - 10)  metoclopramide Injectable 10 milliGRAM(s) IV Push every 6 hours PRN nausea and or vomiting      PRESENT SYMPTOMS:  Source: [ x] Patient   [ ] Family   [ ] Team     Pain:                        [x ] No [ ] Yes             [ ] Mild [ ] Moderate [ ] Severe    Onset -  Location -  Duration -  Character -  Alleviating/Aggravating -  Radiation -  Timing -    Dyspnea:                [x ] No [ ] Yes             [ ] Mild [ ] Moderate [ ] Severe    Anxiety:                  [x ] No [ ] Yes             [ ] Mild [ ] Moderate [ ] Severe    Fatigue:                  [x ] No [ ] Yes             [ ] Mild [ ] Moderate [ ] Severe    Nausea:                  [ x] No [ ] Yes             [ ] Mild [ ] Moderate [ ] Severe    Loss of appetite:   [x ] No [ ] Yes             [ ] Mild [ ] Moderate [ ] Severe    Constipation:        [ x] No [ ] Yes             [ ] Mild [ ] Moderate [ ] Severe    Other Symptoms:  [ ] All other review of systems negative   [ ] Unable to obtain due to poor mentation     Karnofsky Performance Score/Palliative Performance Status Version 2:      60   %    PHYSICAL EXAM:  Vital Signs Last 24 Hrs  T(C): 36.5 (07 Dec 2017 08:19), Max: 37.2 (06 Dec 2017 22:04)  T(F): 97.7 (07 Dec 2017 08:19), Max: 98.9 (06 Dec 2017 22:04)  HR: 94 (07 Dec 2017 08:19) (94 - 98)  BP: 130/72 (07 Dec 2017 08:19) (130/72 - 140/74)  BP(mean): --  RR: 18 (07 Dec 2017 08:19) (18 - 18)  SpO2: 99% (07 Dec 2017 08:19) (94% - 99%) I&O's Summary    06 Dec 2017 07:01  -  07 Dec 2017 07:00  --------------------------------------------------------  IN: 1680 mL / OUT: 825 mL / NET: 855 mL        General:  [ x] Alert  [x ] Oriented x3      [ ] Lethargic  [ ] Agitated   [ ] Cachexia   [ ] Unarousable  [ ] Verbal  [ ] Non-Verbal    HEENT:  [ x] Normal   [ ] Dry mouth   [ ] ET Tube    [ ] Trach  [ ] Oral lesions    Lungs:   [x ] Clear [ ] Tachypnea  [ ] Audible excessive secretions   [ ] Rhonchi        [ ] Right [ ] Left [ ] Bilateral  [ ] Crackles        [ ] Right [ ] Left [ ] Bilateral  [ ] Wheezing     [ ] Right [ ] Left [ ] Bilateral    Cardiovascular:  [ x] Regular [ ] Irregular [ ] Tachycardia   [ ] Bradycardia  [ ] Murmur [ ] Other    Abdomen: [x ] Soft  [x ] Distended   [x ] +BS  [ ] Non tender [ ] Tender  [ ]PEG   [ ]OGT/ NGT   Last BM:       Genitourinary: [ x] Normal [ ] Incontinent   [ ] Oliguria/Anuria   [ ] Lopez    Musculoskeletal:  [ x] Normal   [ ] Weakness  [ ] Bedbound/Wheelchair bound [ ] Edema    Neurological: [x ] No focal deficits  [ ] Cognitive impairment  [ ] Dysphagia [ ] Dysarthria [ ] Paresis [ ] Other     Skin: [ x] Normal   [ ] Pressure ulcer(s)                  [ ] Rash    LABS:  [ ] Critical Care time for unstable patient with organ failure.  Total Time:                 minutes  12-07    133<L>  |  95<L>  |  19  ----------------------------<  108<H>  4.2   |  26  |  0.69    Ca    9.6      07 Dec 2017 08:32    TPro  6.8  /  Alb  3.4  /  TBili  0.2  /  DBili  x   /  AST  12  /  ALT  9<L>  /  AlkPhos  81  12-07          Shock: [ ] Septic [ ] Cardiogenic [ ] Neurologic [ ] Hypovolemic  Vasopressors x   Inotrophs x     Oral Intake: [ ] Unable/mouth care only [ ] Minimal [ ] Moderate [ ] Full Capability  Diet: [ ] NPO [ ] Tube feeds [ ] TPN [ ] Other     RADIOLOGY & ADDITIONAL STUDIES:    REFERRALS:   [ ] Chaplaincy  [ ] Hospice  [ ] Child Life  [ ] Social Work  [ ] Case management [ ] Holistic Therapy       RADIOLOGY & ADDITIONAL STUDIES:

## 2017-12-08 VITALS
HEART RATE: 80 BPM | SYSTOLIC BLOOD PRESSURE: 165 MMHG | TEMPERATURE: 98 F | DIASTOLIC BLOOD PRESSURE: 83 MMHG | RESPIRATION RATE: 18 BRPM | OXYGEN SATURATION: 99 %

## 2017-12-08 LAB
ANION GAP SERPL CALC-SCNC: 12 MMOL/L — SIGNIFICANT CHANGE UP (ref 5–17)
BUN SERPL-MCNC: 16 MG/DL — SIGNIFICANT CHANGE UP (ref 7–23)
CALCIUM SERPL-MCNC: 9.2 MG/DL — SIGNIFICANT CHANGE UP (ref 8.4–10.5)
CHLORIDE SERPL-SCNC: 97 MMOL/L — SIGNIFICANT CHANGE UP (ref 96–108)
CO2 SERPL-SCNC: 25 MMOL/L — SIGNIFICANT CHANGE UP (ref 22–31)
CREAT SERPL-MCNC: 0.67 MG/DL — SIGNIFICANT CHANGE UP (ref 0.5–1.3)
GLUCOSE SERPL-MCNC: 92 MG/DL — SIGNIFICANT CHANGE UP (ref 70–99)
HCT VFR BLD CALC: 36.4 % — LOW (ref 39–50)
HGB BLD-MCNC: 12.4 G/DL — LOW (ref 13–17)
MCHC RBC-ENTMCNC: 31.3 PG — SIGNIFICANT CHANGE UP (ref 27–34)
MCHC RBC-ENTMCNC: 34.2 GM/DL — SIGNIFICANT CHANGE UP (ref 32–36)
MCV RBC AUTO: 91.7 FL — SIGNIFICANT CHANGE UP (ref 80–100)
PLATELET # BLD AUTO: 351 K/UL — SIGNIFICANT CHANGE UP (ref 150–400)
POTASSIUM SERPL-MCNC: 4.1 MMOL/L — SIGNIFICANT CHANGE UP (ref 3.5–5.3)
POTASSIUM SERPL-SCNC: 4.1 MMOL/L — SIGNIFICANT CHANGE UP (ref 3.5–5.3)
RBC # BLD: 3.97 M/UL — LOW (ref 4.2–5.8)
RBC # FLD: 12.4 % — SIGNIFICANT CHANGE UP (ref 10.3–14.5)
SODIUM SERPL-SCNC: 134 MMOL/L — LOW (ref 135–145)
WBC # BLD: 7.9 K/UL — SIGNIFICANT CHANGE UP (ref 3.8–10.5)
WBC # FLD AUTO: 7.9 K/UL — SIGNIFICANT CHANGE UP (ref 3.8–10.5)

## 2017-12-08 PROCEDURE — 99239 HOSP IP/OBS DSCHRG MGMT >30: CPT

## 2017-12-08 RX ORDER — LACTULOSE 10 G/15ML
22.5 SOLUTION ORAL
Qty: 225 | Refills: 0
Start: 2017-12-08

## 2017-12-08 RX ORDER — HYDROMORPHONE HYDROCHLORIDE 2 MG/ML
5 INJECTION INTRAMUSCULAR; INTRAVENOUS; SUBCUTANEOUS
Qty: 600 | Refills: 0
Start: 2017-12-08 | End: 2017-12-28

## 2017-12-08 RX ORDER — METHADONE HYDROCHLORIDE 40 MG/1
1 TABLET ORAL
Qty: 90 | Refills: 0
Start: 2017-12-08 | End: 2018-01-07

## 2017-12-08 RX ORDER — METOPROLOL TARTRATE 50 MG
2 TABLET ORAL
Qty: 120 | Refills: 0
Start: 2017-12-08 | End: 2018-01-07

## 2017-12-08 RX ORDER — ALPRAZOLAM 0.25 MG
1 TABLET ORAL
Qty: 45 | Refills: 0
Start: 2017-12-08 | End: 2017-12-23

## 2017-12-08 RX ORDER — METHADONE HYDROCHLORIDE 40 MG/1
1 TABLET ORAL
Qty: 0 | Refills: 0 | DISCHARGE
Start: 2017-12-08

## 2017-12-08 RX ORDER — LACTULOSE 10 G/15ML
22.5 SOLUTION ORAL
Qty: 0 | Refills: 0 | DISCHARGE
Start: 2017-12-08

## 2017-12-08 RX ORDER — HYDROMORPHONE HYDROCHLORIDE 2 MG/ML
5 INJECTION INTRAMUSCULAR; INTRAVENOUS; SUBCUTANEOUS
Qty: 0 | Refills: 0 | DISCHARGE
Start: 2017-12-08

## 2017-12-08 RX ADMIN — METHADONE HYDROCHLORIDE 10 MILLIGRAM(S): 40 TABLET ORAL at 13:24

## 2017-12-08 RX ADMIN — METHADONE HYDROCHLORIDE 10 MILLIGRAM(S): 40 TABLET ORAL at 05:22

## 2017-12-08 RX ADMIN — Medication 25 MILLIGRAM(S): at 01:57

## 2017-12-08 RX ADMIN — HYDROMORPHONE HYDROCHLORIDE 14 MILLIGRAM(S): 2 INJECTION INTRAMUSCULAR; INTRAVENOUS; SUBCUTANEOUS at 09:17

## 2017-12-08 RX ADMIN — ONDANSETRON 116 MILLIGRAM(S): 8 TABLET, FILM COATED ORAL at 11:54

## 2017-12-08 RX ADMIN — HYDROMORPHONE HYDROCHLORIDE 14 MILLIGRAM(S): 2 INJECTION INTRAMUSCULAR; INTRAVENOUS; SUBCUTANEOUS at 00:44

## 2017-12-08 RX ADMIN — HYDROMORPHONE HYDROCHLORIDE 14 MILLIGRAM(S): 2 INJECTION INTRAMUSCULAR; INTRAVENOUS; SUBCUTANEOUS at 08:47

## 2017-12-08 RX ADMIN — PANTOPRAZOLE SODIUM 40 MILLIGRAM(S): 20 TABLET, DELAYED RELEASE ORAL at 05:22

## 2017-12-08 RX ADMIN — Medication 0.5 MILLIGRAM(S): at 11:09

## 2017-12-08 RX ADMIN — HYDROMORPHONE HYDROCHLORIDE 14 MILLIGRAM(S): 2 INJECTION INTRAMUSCULAR; INTRAVENOUS; SUBCUTANEOUS at 01:45

## 2017-12-08 RX ADMIN — LOSARTAN POTASSIUM 25 MILLIGRAM(S): 100 TABLET, FILM COATED ORAL at 05:22

## 2017-12-08 NOTE — PROGRESS NOTE ADULT - PSYCHIATRIC
Affect and characteristics of appearance, verbalizations, behaviors are appropriate

## 2017-12-08 NOTE — PROGRESS NOTE ADULT - NEUROLOGICAL
Alert & oriented; no sensory, motor or coordination deficits, normal reflexes

## 2017-12-08 NOTE — PROGRESS NOTE ADULT - ENMT
No oral lesions; no gross abnormalities

## 2017-12-08 NOTE — PROGRESS NOTE ADULT - ATTENDING COMMENTS
Cont pain regimen.  OK to start chemotherapy from my perspective
No evidence of gastric perf on repeat exams and clinically  Pain management regimen
[phone]
[phone]
[phone]
[phone]
D/c planning after completion of chemo
D/c home. D/c time 45 minutes

## 2017-12-08 NOTE — PROGRESS NOTE ADULT - GASTROINTESTINAL
Soft, non-tender, no hepatosplenomegaly, normal bowel sounds

## 2017-12-08 NOTE — PROGRESS NOTE ADULT - BACK
No deformity or limitation of movement

## 2017-12-08 NOTE — PROGRESS NOTE ADULT - SUBJECTIVE AND OBJECTIVE BOX
Patient is a 62y old  Male who presents with a chief complaint of Abdominal pain (30 Nov 2017 12:26)    HPI: Pt feels well. Required 2 doses of pain meds overnight.     Vital Signs Last 24 Hrs  T(C): 36.6 (08 Dec 2017 07:37), Max: 36.9 (07 Dec 2017 21:35)  T(F): 97.8 (08 Dec 2017 07:37), Max: 98.5 (07 Dec 2017 21:35)  HR: 80 (08 Dec 2017 07:37) (80 - 90)  BP: 165/83 (08 Dec 2017 07:37) (146/71 - 165/83)  BP(mean): --  RR: 18 (08 Dec 2017 07:37) (18 - 18)  SpO2: 99% (08 Dec 2017 07:37) (97% - 99%)                          12.4   7.9   )-----------( 351      ( 08 Dec 2017 06:21 )             36.4     12-08    134<L>  |  97  |  16  ----------------------------<  92  4.1   |  25  |  0.67    Ca    9.2      08 Dec 2017 06:21    TPro  6.8  /  Alb  3.4  /  TBili  0.2  /  DBili  x   /  AST  12  /  ALT  9<L>  /  AlkPhos  81  12-07    MEDICATIONS  (STANDING):  docusate sodium 100 milliGRAM(s) Oral at bedtime  enoxaparin Injectable 40 milliGRAM(s) SubCutaneous daily  fluorouracil IVPB 2400 milliGRAM(s) IV Intermittent daily  irinotecan IVPB 300 milliGRAM(s) IV Intermittent once  lactulose Syrup 15 Gram(s) Oral daily  losartan 25 milliGRAM(s) Oral daily  methadone    Tablet 10 milliGRAM(s) Oral <User Schedule>  OXALIplatin IVPB 140 milliGRAM(s) IV Intermittent once  pantoprazole  Injectable 40 milliGRAM(s) IV Push two times a day  senna 2 Tablet(s) Oral at bedtime    MEDICATIONS  (PRN):  ALPRAZolam 0.5 milliGRAM(s) Oral every 6 hours PRN Anxiety  atropine Injectable 0.25 milliGRAM(s) SubCutaneous once PRN cramps,flushing,diarrhea immediately and during chemo administration  diphenhydrAMINE   Capsule 25 milliGRAM(s) Oral at bedtime PRN Insomnia  HYDROmorphone   Tablet 14 milliGRAM(s) Oral every 3 hours PRN Severe Pain (7 - 10)  metoclopramide Injectable 10 milliGRAM(s) IV Push every 6 hours PRN nausea and or vomiting

## 2017-12-08 NOTE — PROGRESS NOTE ADULT - CONSTITUTIONAL
Well-developed, well nourished
detailed exam
Well-developed, well nourished
Well-developed, well nourished

## 2017-12-08 NOTE — PROGRESS NOTE ADULT - MUSCULOSKELETAL
No joint pain, swelling or deformity; no limitation of movement

## 2017-12-08 NOTE — PROGRESS NOTE ADULT - ASSESSMENT
62yM w locally advanced pancreatic ca aw pain. Given inpatient chemo and pain meds. Pain now improved.

## 2017-12-08 NOTE — PROGRESS NOTE ADULT - PROVIDER SPECIALTY LIST ADULT
Heme/Onc
Hospitalist
Intervent Radiology
Palliative Care
Surgery
Surgery
Hospitalist
Palliative Care
Palliative Care

## 2017-12-08 NOTE — PROGRESS NOTE ADULT - LYMPH NODES
No lymphadedenopathy

## 2017-12-09 ENCOUNTER — APPOINTMENT (OUTPATIENT)
Age: 62
End: 2017-12-09

## 2017-12-11 ENCOUNTER — MEDICATION RENEWAL (OUTPATIENT)
Age: 62
End: 2017-12-11

## 2017-12-12 DIAGNOSIS — Z51.89 ENCOUNTER FOR OTHER SPECIFIED AFTERCARE: ICD-10-CM

## 2017-12-14 ENCOUNTER — APPOINTMENT (OUTPATIENT)
Dept: SURGICAL ONCOLOGY | Facility: CLINIC | Age: 62
End: 2017-12-14
Payer: MEDICARE

## 2017-12-14 ENCOUNTER — FORM ENCOUNTER (OUTPATIENT)
Age: 62
End: 2017-12-14

## 2017-12-14 VITALS
WEIGHT: 195 LBS | OXYGEN SATURATION: 100 % | DIASTOLIC BLOOD PRESSURE: 57 MMHG | SYSTOLIC BLOOD PRESSURE: 91 MMHG | HEART RATE: 81 BPM | RESPIRATION RATE: 16 BRPM | HEIGHT: 68.5 IN | BODY MASS INDEX: 29.21 KG/M2

## 2017-12-14 PROBLEM — Z85.038 H/O COLON CANCER, STAGE II: Status: RESOLVED | Noted: 2017-11-24 | Resolved: 2017-12-14

## 2017-12-14 PROBLEM — Z87.891 FORMER SMOKER: Status: ACTIVE | Noted: 2017-11-21

## 2017-12-14 LAB
ALBUMIN SERPL ELPH-MCNC: 4.3 G/DL
ALP BLD-CCNC: 84 U/L
ALT SERPL-CCNC: 11 U/L
ANION GAP SERPL CALC-SCNC: 15 MMOL/L
AST SERPL-CCNC: 13 U/L
BILIRUB SERPL-MCNC: 0.2 MG/DL
BUN SERPL-MCNC: 16 MG/DL
CALCIUM SERPL-MCNC: 10 MG/DL
CHLORIDE SERPL-SCNC: 96 MMOL/L
CO2 SERPL-SCNC: 24 MMOL/L
CREAT SERPL-MCNC: 0.71 MG/DL
GLUCOSE SERPL-MCNC: 115 MG/DL
POTASSIUM SERPL-SCNC: 4.7 MMOL/L
PROT SERPL-MCNC: 7.5 G/DL
SODIUM SERPL-SCNC: 135 MMOL/L

## 2017-12-14 PROCEDURE — 99024 POSTOP FOLLOW-UP VISIT: CPT

## 2017-12-14 RX ORDER — PNEUMOCOCCAL VACCINE POLYVALENT 25; 25; 25; 25; 25; 25; 25; 25; 25; 25; 25; 25; 25; 25; 25; 25; 25; 25; 25; 25; 25; 25; 25 UG/.5ML; UG/.5ML; UG/.5ML; UG/.5ML; UG/.5ML; UG/.5ML; UG/.5ML; UG/.5ML; UG/.5ML; UG/.5ML; UG/.5ML; UG/.5ML; UG/.5ML; UG/.5ML; UG/.5ML; UG/.5ML; UG/.5ML; UG/.5ML; UG/.5ML; UG/.5ML; UG/.5ML; UG/.5ML; UG/.5ML
25 INJECTION, SOLUTION INTRAMUSCULAR; SUBCUTANEOUS
Qty: 0.5 | Refills: 0 | Status: DISCONTINUED | COMMUNITY
Start: 2017-10-16 | End: 2017-12-14

## 2017-12-14 RX ORDER — AMITRIPTYLINE HYDROCHLORIDE 100 MG/1
100 TABLET, FILM COATED ORAL
Refills: 0 | Status: DISCONTINUED | COMMUNITY
End: 2017-12-14

## 2017-12-14 RX ORDER — AMLODIPINE BESYLATE 10 MG/1
10 TABLET ORAL
Refills: 0 | Status: DISCONTINUED | COMMUNITY
End: 2017-12-14

## 2017-12-14 RX ORDER — TRAMADOL HYDROCHLORIDE 50 MG/1
50 TABLET, COATED ORAL
Qty: 60 | Refills: 0 | Status: DISCONTINUED | COMMUNITY
Start: 2017-10-06 | End: 2017-12-14

## 2017-12-14 RX ORDER — OXYCODONE AND ACETAMINOPHEN 5; 325 MG/1; MG/1
5-325 TABLET ORAL
Qty: 40 | Refills: 0 | Status: DISCONTINUED | COMMUNITY
Start: 2017-10-24 | End: 2017-12-14

## 2017-12-14 RX ORDER — CYCLOBENZAPRINE HYDROCHLORIDE 10 MG/1
10 TABLET, FILM COATED ORAL
Refills: 0 | Status: DISCONTINUED | COMMUNITY
End: 2017-12-14

## 2017-12-14 RX ORDER — OXYCODONE 10 MG/1
10 TABLET ORAL
Refills: 0 | Status: DISCONTINUED | COMMUNITY
End: 2017-12-14

## 2017-12-15 ENCOUNTER — RESULT REVIEW (OUTPATIENT)
Age: 62
End: 2017-12-15

## 2017-12-15 ENCOUNTER — APPOINTMENT (OUTPATIENT)
Dept: HEMATOLOGY ONCOLOGY | Facility: CLINIC | Age: 62
End: 2017-12-15
Payer: MEDICARE

## 2017-12-15 ENCOUNTER — OUTPATIENT (OUTPATIENT)
Dept: OUTPATIENT SERVICES | Facility: HOSPITAL | Age: 62
LOS: 1 days | End: 2017-12-15
Payer: MEDICARE

## 2017-12-15 ENCOUNTER — APPOINTMENT (OUTPATIENT)
Dept: GERIATRICS | Facility: CLINIC | Age: 62
End: 2017-12-15
Payer: MEDICARE

## 2017-12-15 ENCOUNTER — APPOINTMENT (OUTPATIENT)
Dept: RADIOLOGY | Facility: IMAGING CENTER | Age: 62
End: 2017-12-15
Payer: MEDICARE

## 2017-12-15 VITALS
DIASTOLIC BLOOD PRESSURE: 68 MMHG | HEART RATE: 92 BPM | WEIGHT: 185.19 LBS | RESPIRATION RATE: 16 BRPM | TEMPERATURE: 98.4 F | SYSTOLIC BLOOD PRESSURE: 106 MMHG | OXYGEN SATURATION: 97 % | BODY MASS INDEX: 27.75 KG/M2

## 2017-12-15 DIAGNOSIS — Z98.890 OTHER SPECIFIED POSTPROCEDURAL STATES: Chronic | ICD-10-CM

## 2017-12-15 DIAGNOSIS — R05 COUGH: ICD-10-CM

## 2017-12-15 DIAGNOSIS — Z90.49 ACQUIRED ABSENCE OF OTHER SPECIFIED PARTS OF DIGESTIVE TRACT: Chronic | ICD-10-CM

## 2017-12-15 DIAGNOSIS — K25.5 CHRONIC OR UNSPECIFIED GASTRIC ULCER WITH PERFORATION: Chronic | ICD-10-CM

## 2017-12-15 LAB
ALBUMIN SERPL ELPH-MCNC: 3.7 G/DL
ALP BLD-CCNC: 124 U/L
ALT SERPL-CCNC: 15 U/L
ANION GAP SERPL CALC-SCNC: 17 MMOL/L
AST SERPL-CCNC: 15 U/L
BASOPHILS # BLD AUTO: 0.1 K/UL — SIGNIFICANT CHANGE UP (ref 0–0.2)
BILIRUB SERPL-MCNC: 0.2 MG/DL
BUN SERPL-MCNC: 8 MG/DL
CALCIUM SERPL-MCNC: 9.6 MG/DL
CHLORIDE SERPL-SCNC: 97 MMOL/L
CO2 SERPL-SCNC: 22 MMOL/L
CREAT SERPL-MCNC: 0.86 MG/DL
DOHLE BOD BLD QL SMEAR: PRESENT — SIGNIFICANT CHANGE UP
EOSINOPHIL # BLD AUTO: 0.2 K/UL — SIGNIFICANT CHANGE UP (ref 0–0.5)
EOSINOPHIL NFR BLD AUTO: 3 % — SIGNIFICANT CHANGE UP (ref 0–6)
GLUCOSE SERPL-MCNC: 94 MG/DL
HCT VFR BLD CALC: 35.1 % — LOW (ref 39–50)
HGB BLD-MCNC: 12.1 G/DL — LOW (ref 13–17)
LG PLATELETS BLD QL AUTO: SLIGHT — SIGNIFICANT CHANGE UP
LYMPHOCYTES # BLD AUTO: 10 % — LOW (ref 13–44)
LYMPHOCYTES # BLD AUTO: 2 K/UL — SIGNIFICANT CHANGE UP (ref 1–3.3)
MCHC RBC-ENTMCNC: 31 PG — SIGNIFICANT CHANGE UP (ref 27–34)
MCHC RBC-ENTMCNC: 34.5 G/DL — SIGNIFICANT CHANGE UP (ref 32–36)
MCV RBC AUTO: 89.8 FL — SIGNIFICANT CHANGE UP (ref 80–100)
MONOCYTES # BLD AUTO: 1.8 K/UL — HIGH (ref 0–0.9)
MONOCYTES NFR BLD AUTO: 14 % — SIGNIFICANT CHANGE UP (ref 2–14)
NEUTROPHILS # BLD AUTO: 13.9 K/UL — HIGH (ref 1.8–7.4)
NEUTROPHILS NFR BLD AUTO: 73 % — SIGNIFICANT CHANGE UP (ref 43–77)
PLAT MORPH BLD: NORMAL — SIGNIFICANT CHANGE UP
PLATELET # BLD AUTO: 282 K/UL — SIGNIFICANT CHANGE UP (ref 150–400)
POTASSIUM SERPL-SCNC: 4.8 MMOL/L
PROT SERPL-MCNC: 6.4 G/DL
RBC # BLD: 3.91 M/UL — LOW (ref 4.2–5.8)
RBC # FLD: 12.8 % — SIGNIFICANT CHANGE UP (ref 10.3–14.5)
RBC BLD AUTO: SIGNIFICANT CHANGE UP
SODIUM SERPL-SCNC: 136 MMOL/L
WBC # BLD: 18 K/UL — HIGH (ref 3.8–10.5)
WBC # FLD AUTO: 18 K/UL — HIGH (ref 3.8–10.5)

## 2017-12-15 PROCEDURE — 99215 OFFICE O/P EST HI 40 MIN: CPT

## 2017-12-15 PROCEDURE — 71020: CPT | Mod: 26

## 2017-12-15 PROCEDURE — 99205 OFFICE O/P NEW HI 60 MIN: CPT

## 2017-12-15 PROCEDURE — 71046 X-RAY EXAM CHEST 2 VIEWS: CPT

## 2017-12-15 RX ORDER — OXYCODONE 10 MG/1
10 TABLET ORAL
Qty: 180 | Refills: 0 | Status: DISCONTINUED | COMMUNITY
Start: 2017-11-24 | End: 2017-12-15

## 2017-12-15 RX ORDER — LOSARTAN POTASSIUM 25 MG/1
25 TABLET, FILM COATED ORAL DAILY
Refills: 0 | Status: DISCONTINUED | COMMUNITY
Start: 2017-12-14 | End: 2017-12-15

## 2017-12-15 RX ORDER — MORPHINE SULFATE 15 MG/1
15 TABLET, FILM COATED, EXTENDED RELEASE ORAL
Qty: 60 | Refills: 0 | Status: DISCONTINUED | COMMUNITY
Start: 2017-11-24 | End: 2017-12-15

## 2017-12-15 RX ORDER — GABAPENTIN 100 MG/1
100 CAPSULE ORAL
Qty: 90 | Refills: 0 | Status: DISCONTINUED | COMMUNITY
Start: 2017-11-24 | End: 2017-12-15

## 2017-12-18 ENCOUNTER — MESSAGE (OUTPATIENT)
Age: 62
End: 2017-12-18

## 2017-12-18 ENCOUNTER — MEDICATION RENEWAL (OUTPATIENT)
Age: 62
End: 2017-12-18

## 2017-12-19 ENCOUNTER — RESULT REVIEW (OUTPATIENT)
Age: 62
End: 2017-12-19

## 2017-12-19 ENCOUNTER — APPOINTMENT (OUTPATIENT)
Age: 62
End: 2017-12-19

## 2017-12-19 ENCOUNTER — OTHER (OUTPATIENT)
Age: 62
End: 2017-12-19

## 2017-12-19 LAB
BASOPHILS # BLD AUTO: 0.1 K/UL — SIGNIFICANT CHANGE UP (ref 0–0.2)
BASOPHILS NFR BLD AUTO: 0.5 % — SIGNIFICANT CHANGE UP (ref 0–2)
EOSINOPHIL # BLD AUTO: 0.3 K/UL — SIGNIFICANT CHANGE UP (ref 0–0.5)
EOSINOPHIL NFR BLD AUTO: 1.5 % — SIGNIFICANT CHANGE UP (ref 0–6)
HCT VFR BLD CALC: 33.8 % — LOW (ref 39–50)
HGB BLD-MCNC: 11.8 G/DL — LOW (ref 13–17)
LYMPHOCYTES # BLD AUTO: 11.8 % — LOW (ref 13–44)
LYMPHOCYTES # BLD AUTO: 2.1 K/UL — SIGNIFICANT CHANGE UP (ref 1–3.3)
MCHC RBC-ENTMCNC: 30.5 PG — SIGNIFICANT CHANGE UP (ref 27–34)
MCHC RBC-ENTMCNC: 34.8 G/DL — SIGNIFICANT CHANGE UP (ref 32–36)
MCV RBC AUTO: 87.6 FL — SIGNIFICANT CHANGE UP (ref 80–100)
MONOCYTES # BLD AUTO: 1.2 K/UL — HIGH (ref 0–0.9)
MONOCYTES NFR BLD AUTO: 6.6 % — SIGNIFICANT CHANGE UP (ref 2–14)
NEUTROPHILS # BLD AUTO: 14.4 K/UL — HIGH (ref 1.8–7.4)
NEUTROPHILS NFR BLD AUTO: 79.6 % — HIGH (ref 43–77)
PLATELET # BLD AUTO: 287 K/UL — SIGNIFICANT CHANGE UP (ref 150–400)
RBC # BLD: 3.86 M/UL — LOW (ref 4.2–5.8)
RBC # FLD: 12.7 % — SIGNIFICANT CHANGE UP (ref 10.3–14.5)
WBC # BLD: 18.1 K/UL — HIGH (ref 3.8–10.5)
WBC # FLD AUTO: 18.1 K/UL — HIGH (ref 3.8–10.5)

## 2017-12-19 PROCEDURE — 82947 ASSAY GLUCOSE BLOOD QUANT: CPT

## 2017-12-19 PROCEDURE — 71260 CT THORAX DX C+: CPT

## 2017-12-19 PROCEDURE — 77001 FLUOROGUIDE FOR VEIN DEVICE: CPT

## 2017-12-19 PROCEDURE — 85027 COMPLETE CBC AUTOMATED: CPT

## 2017-12-19 PROCEDURE — 36561 INSERT TUNNELED CV CATH: CPT

## 2017-12-19 PROCEDURE — 84132 ASSAY OF SERUM POTASSIUM: CPT

## 2017-12-19 PROCEDURE — C1894: CPT

## 2017-12-19 PROCEDURE — 76937 US GUIDE VASCULAR ACCESS: CPT

## 2017-12-19 PROCEDURE — 74177 CT ABD & PELVIS W/CONTRAST: CPT

## 2017-12-19 PROCEDURE — 82330 ASSAY OF CALCIUM: CPT

## 2017-12-19 PROCEDURE — 82803 BLOOD GASES ANY COMBINATION: CPT

## 2017-12-19 PROCEDURE — 84100 ASSAY OF PHOSPHORUS: CPT

## 2017-12-19 PROCEDURE — 85610 PROTHROMBIN TIME: CPT

## 2017-12-19 PROCEDURE — 82435 ASSAY OF BLOOD CHLORIDE: CPT

## 2017-12-19 PROCEDURE — 99285 EMERGENCY DEPT VISIT HI MDM: CPT | Mod: 25

## 2017-12-19 PROCEDURE — 83735 ASSAY OF MAGNESIUM: CPT

## 2017-12-19 PROCEDURE — 96375 TX/PRO/DX INJ NEW DRUG ADDON: CPT

## 2017-12-19 PROCEDURE — 86901 BLOOD TYPING SEROLOGIC RH(D): CPT

## 2017-12-19 PROCEDURE — 85730 THROMBOPLASTIN TIME PARTIAL: CPT

## 2017-12-19 PROCEDURE — 93005 ELECTROCARDIOGRAM TRACING: CPT

## 2017-12-19 PROCEDURE — 80053 COMPREHEN METABOLIC PANEL: CPT

## 2017-12-19 PROCEDURE — 85014 HEMATOCRIT: CPT

## 2017-12-19 PROCEDURE — C1788: CPT

## 2017-12-19 PROCEDURE — C1769: CPT

## 2017-12-19 PROCEDURE — 83605 ASSAY OF LACTIC ACID: CPT

## 2017-12-19 PROCEDURE — 86900 BLOOD TYPING SEROLOGIC ABO: CPT

## 2017-12-19 PROCEDURE — 84295 ASSAY OF SERUM SODIUM: CPT

## 2017-12-19 PROCEDURE — 96374 THER/PROPH/DIAG INJ IV PUSH: CPT | Mod: XU

## 2017-12-19 PROCEDURE — 87040 BLOOD CULTURE FOR BACTERIA: CPT

## 2017-12-19 PROCEDURE — 80048 BASIC METABOLIC PNL TOTAL CA: CPT

## 2017-12-19 PROCEDURE — 86850 RBC ANTIBODY SCREEN: CPT

## 2017-12-20 DIAGNOSIS — R11.2 NAUSEA WITH VOMITING, UNSPECIFIED: ICD-10-CM

## 2017-12-20 DIAGNOSIS — Z51.11 ENCOUNTER FOR ANTINEOPLASTIC CHEMOTHERAPY: ICD-10-CM

## 2017-12-21 ENCOUNTER — OUTPATIENT (OUTPATIENT)
Dept: OUTPATIENT SERVICES | Facility: HOSPITAL | Age: 62
LOS: 1 days | Discharge: HOME | End: 2017-12-21

## 2017-12-21 DIAGNOSIS — Z98.890 OTHER SPECIFIED POSTPROCEDURAL STATES: Chronic | ICD-10-CM

## 2017-12-21 DIAGNOSIS — Z90.49 ACQUIRED ABSENCE OF OTHER SPECIFIED PARTS OF DIGESTIVE TRACT: Chronic | ICD-10-CM

## 2017-12-21 DIAGNOSIS — K25.5 CHRONIC OR UNSPECIFIED GASTRIC ULCER WITH PERFORATION: Chronic | ICD-10-CM

## 2017-12-21 DIAGNOSIS — C25.9 MALIGNANT NEOPLASM OF PANCREAS, UNSPECIFIED: ICD-10-CM

## 2017-12-22 ENCOUNTER — APPOINTMENT (OUTPATIENT)
Age: 62
End: 2017-12-22

## 2017-12-26 DIAGNOSIS — Z51.89 ENCOUNTER FOR OTHER SPECIFIED AFTERCARE: ICD-10-CM

## 2017-12-29 ENCOUNTER — RESULT REVIEW (OUTPATIENT)
Age: 62
End: 2017-12-29

## 2017-12-29 ENCOUNTER — APPOINTMENT (OUTPATIENT)
Dept: HEMATOLOGY ONCOLOGY | Facility: CLINIC | Age: 62
End: 2017-12-29
Payer: MEDICARE

## 2017-12-29 VITALS
RESPIRATION RATE: 16 BRPM | SYSTOLIC BLOOD PRESSURE: 135 MMHG | HEART RATE: 77 BPM | TEMPERATURE: 97.8 F | OXYGEN SATURATION: 99 % | BODY MASS INDEX: 27.02 KG/M2 | WEIGHT: 180.34 LBS | DIASTOLIC BLOOD PRESSURE: 73 MMHG

## 2017-12-29 LAB
BASOPHILS # BLD AUTO: 0.1 K/UL — SIGNIFICANT CHANGE UP (ref 0–0.2)
BASOPHILS NFR BLD AUTO: 0.6 % — SIGNIFICANT CHANGE UP (ref 0–2)
EOSINOPHIL # BLD AUTO: 0.2 K/UL — SIGNIFICANT CHANGE UP (ref 0–0.5)
EOSINOPHIL NFR BLD AUTO: 1 % — SIGNIFICANT CHANGE UP (ref 0–6)
HCT VFR BLD CALC: 36.5 % — LOW (ref 39–50)
HGB BLD-MCNC: 12.3 G/DL — LOW (ref 13–17)
LYMPHOCYTES # BLD AUTO: 15.7 % — SIGNIFICANT CHANGE UP (ref 13–44)
LYMPHOCYTES # BLD AUTO: 2.9 K/UL — SIGNIFICANT CHANGE UP (ref 1–3.3)
MCHC RBC-ENTMCNC: 29.9 PG — SIGNIFICANT CHANGE UP (ref 27–34)
MCHC RBC-ENTMCNC: 33.6 G/DL — SIGNIFICANT CHANGE UP (ref 32–36)
MCV RBC AUTO: 88.9 FL — SIGNIFICANT CHANGE UP (ref 80–100)
MONOCYTES # BLD AUTO: 1.3 K/UL — HIGH (ref 0–0.9)
MONOCYTES NFR BLD AUTO: 7.2 % — SIGNIFICANT CHANGE UP (ref 2–14)
NEUTROPHILS # BLD AUTO: 14 K/UL — HIGH (ref 1.8–7.4)
NEUTROPHILS NFR BLD AUTO: 75.5 % — SIGNIFICANT CHANGE UP (ref 43–77)
PLATELET # BLD AUTO: 331 K/UL — SIGNIFICANT CHANGE UP (ref 150–400)
RBC # BLD: 4.11 M/UL — LOW (ref 4.2–5.8)
RBC # FLD: 13.7 % — SIGNIFICANT CHANGE UP (ref 10.3–14.5)
WBC # BLD: 18.5 K/UL — HIGH (ref 3.8–10.5)
WBC # FLD AUTO: 18.5 K/UL — HIGH (ref 3.8–10.5)

## 2017-12-29 PROCEDURE — 99214 OFFICE O/P EST MOD 30 MIN: CPT

## 2017-12-29 RX ORDER — HYDROMORPHONE HYDROCHLORIDE 2 MG/1
2 TABLET ORAL
Refills: 0 | Status: DISCONTINUED | COMMUNITY
Start: 2017-12-15 | End: 2017-12-29

## 2017-12-29 RX ORDER — ALPRAZOLAM 0.5 MG/1
0.5 TABLET ORAL
Refills: 0 | Status: DISCONTINUED | COMMUNITY
Start: 2017-12-15 | End: 2017-12-29

## 2017-12-29 RX ORDER — METHADONE HYDROCHLORIDE 10 MG/1
10 TABLET ORAL 3 TIMES DAILY
Refills: 0 | Status: DISCONTINUED | COMMUNITY
Start: 2017-12-15 | End: 2017-12-29

## 2018-01-01 LAB
ALBUMIN SERPL ELPH-MCNC: 3.9 G/DL
ALP BLD-CCNC: 127 U/L
ALT SERPL-CCNC: 16 U/L
ANION GAP SERPL CALC-SCNC: 18 MMOL/L
AST SERPL-CCNC: 13 U/L
BILIRUB SERPL-MCNC: <0.2 MG/DL
BUN SERPL-MCNC: 12 MG/DL
CALCIUM SERPL-MCNC: 10 MG/DL
CHLORIDE SERPL-SCNC: 99 MMOL/L
CO2 SERPL-SCNC: 23 MMOL/L
CREAT SERPL-MCNC: 0.8 MG/DL
GLUCOSE SERPL-MCNC: 119 MG/DL
MAGNESIUM SERPL-MCNC: 2 MG/DL
PHOSPHATE SERPL-MCNC: 3.8 MG/DL
POTASSIUM SERPL-SCNC: 4.2 MMOL/L
PROT SERPL-MCNC: 6.6 G/DL
SODIUM SERPL-SCNC: 140 MMOL/L

## 2018-01-02 ENCOUNTER — RESULT REVIEW (OUTPATIENT)
Age: 63
End: 2018-01-02

## 2018-01-02 ENCOUNTER — APPOINTMENT (OUTPATIENT)
Age: 63
End: 2018-01-02

## 2018-01-02 LAB
BASOPHILS # BLD AUTO: 0.1 K/UL — SIGNIFICANT CHANGE UP (ref 0–0.2)
BASOPHILS NFR BLD AUTO: 0.4 % — SIGNIFICANT CHANGE UP (ref 0–2)
EOSINOPHIL # BLD AUTO: 0.3 K/UL — SIGNIFICANT CHANGE UP (ref 0–0.5)
EOSINOPHIL NFR BLD AUTO: 2.2 % — SIGNIFICANT CHANGE UP (ref 0–6)
HCT VFR BLD CALC: 34.7 % — LOW (ref 39–50)
HGB BLD-MCNC: 11.8 G/DL — LOW (ref 13–17)
LYMPHOCYTES # BLD AUTO: 14.5 % — SIGNIFICANT CHANGE UP (ref 13–44)
LYMPHOCYTES # BLD AUTO: 2 K/UL — SIGNIFICANT CHANGE UP (ref 1–3.3)
MCHC RBC-ENTMCNC: 30.1 PG — SIGNIFICANT CHANGE UP (ref 27–34)
MCHC RBC-ENTMCNC: 34.1 G/DL — SIGNIFICANT CHANGE UP (ref 32–36)
MCV RBC AUTO: 88.4 FL — SIGNIFICANT CHANGE UP (ref 80–100)
MONOCYTES # BLD AUTO: 1.1 K/UL — HIGH (ref 0–0.9)
MONOCYTES NFR BLD AUTO: 8 % — SIGNIFICANT CHANGE UP (ref 2–14)
NEUTROPHILS # BLD AUTO: 10.5 K/UL — HIGH (ref 1.8–7.4)
NEUTROPHILS NFR BLD AUTO: 74.9 % — SIGNIFICANT CHANGE UP (ref 43–77)
PLATELET # BLD AUTO: 312 K/UL — SIGNIFICANT CHANGE UP (ref 150–400)
RBC # BLD: 3.93 M/UL — LOW (ref 4.2–5.8)
RBC # FLD: 13.4 % — SIGNIFICANT CHANGE UP (ref 10.3–14.5)
WBC # BLD: 14 K/UL — HIGH (ref 3.8–10.5)
WBC # FLD AUTO: 14 K/UL — HIGH (ref 3.8–10.5)

## 2018-01-02 RX ORDER — METOPROLOL TARTRATE 100 MG/1
100 TABLET, FILM COATED ORAL
Refills: 0 | Status: DISCONTINUED | COMMUNITY
End: 2018-01-02

## 2018-01-03 DIAGNOSIS — R11.2 NAUSEA WITH VOMITING, UNSPECIFIED: ICD-10-CM

## 2018-01-03 DIAGNOSIS — Z51.11 ENCOUNTER FOR ANTINEOPLASTIC CHEMOTHERAPY: ICD-10-CM

## 2018-01-05 ENCOUNTER — APPOINTMENT (OUTPATIENT)
Age: 63
End: 2018-01-05

## 2018-01-12 ENCOUNTER — RESULT REVIEW (OUTPATIENT)
Age: 63
End: 2018-01-12

## 2018-01-12 ENCOUNTER — APPOINTMENT (OUTPATIENT)
Dept: HEMATOLOGY ONCOLOGY | Facility: CLINIC | Age: 63
End: 2018-01-12
Payer: MEDICARE

## 2018-01-12 VITALS
RESPIRATION RATE: 16 BRPM | TEMPERATURE: 98.2 F | SYSTOLIC BLOOD PRESSURE: 130 MMHG | WEIGHT: 179.68 LBS | HEART RATE: 78 BPM | BODY MASS INDEX: 26.92 KG/M2 | DIASTOLIC BLOOD PRESSURE: 76 MMHG | OXYGEN SATURATION: 98 %

## 2018-01-12 LAB
BASOPHILS # BLD AUTO: 0.1 K/UL — SIGNIFICANT CHANGE UP (ref 0–0.2)
BASOPHILS NFR BLD AUTO: 0.5 % — SIGNIFICANT CHANGE UP (ref 0–2)
EOSINOPHIL # BLD AUTO: 0.2 K/UL — SIGNIFICANT CHANGE UP (ref 0–0.5)
EOSINOPHIL NFR BLD AUTO: 0.9 % — SIGNIFICANT CHANGE UP (ref 0–6)
HCT VFR BLD CALC: 35.4 % — LOW (ref 39–50)
HGB BLD-MCNC: 11.9 G/DL — LOW (ref 13–17)
LYMPHOCYTES # BLD AUTO: 14.5 % — SIGNIFICANT CHANGE UP (ref 13–44)
LYMPHOCYTES # BLD AUTO: 2.5 K/UL — SIGNIFICANT CHANGE UP (ref 1–3.3)
MCHC RBC-ENTMCNC: 30.3 PG — SIGNIFICANT CHANGE UP (ref 27–34)
MCHC RBC-ENTMCNC: 33.6 G/DL — SIGNIFICANT CHANGE UP (ref 32–36)
MCV RBC AUTO: 90.2 FL — SIGNIFICANT CHANGE UP (ref 80–100)
MONOCYTES # BLD AUTO: 1.5 K/UL — HIGH (ref 0–0.9)
MONOCYTES NFR BLD AUTO: 8.6 % — SIGNIFICANT CHANGE UP (ref 2–14)
NEUTROPHILS # BLD AUTO: 13.1 K/UL — HIGH (ref 1.8–7.4)
NEUTROPHILS NFR BLD AUTO: 75.5 % — SIGNIFICANT CHANGE UP (ref 43–77)
PLATELET # BLD AUTO: 277 K/UL — SIGNIFICANT CHANGE UP (ref 150–400)
RBC # BLD: 3.93 M/UL — LOW (ref 4.2–5.8)
RBC # FLD: 14.8 % — HIGH (ref 10.3–14.5)
WBC # BLD: 17.4 K/UL — HIGH (ref 3.8–10.5)
WBC # FLD AUTO: 17.4 K/UL — HIGH (ref 3.8–10.5)

## 2018-01-12 PROCEDURE — 99213 OFFICE O/P EST LOW 20 MIN: CPT

## 2018-01-16 ENCOUNTER — APPOINTMENT (OUTPATIENT)
Age: 63
End: 2018-01-16

## 2018-01-16 ENCOUNTER — RESULT REVIEW (OUTPATIENT)
Age: 63
End: 2018-01-16

## 2018-01-16 LAB
BASOPHILS # BLD AUTO: 0.1 K/UL — SIGNIFICANT CHANGE UP (ref 0–0.2)
BASOPHILS NFR BLD AUTO: 0.6 % — SIGNIFICANT CHANGE UP (ref 0–2)
EOSINOPHIL # BLD AUTO: 0.3 K/UL — SIGNIFICANT CHANGE UP (ref 0–0.5)
EOSINOPHIL NFR BLD AUTO: 2.9 % — SIGNIFICANT CHANGE UP (ref 0–6)
HCT VFR BLD CALC: 33.7 % — LOW (ref 39–50)
HGB BLD-MCNC: 11.5 G/DL — LOW (ref 13–17)
LYMPHOCYTES # BLD AUTO: 19.5 % — SIGNIFICANT CHANGE UP (ref 13–44)
LYMPHOCYTES # BLD AUTO: 2.1 K/UL — SIGNIFICANT CHANGE UP (ref 1–3.3)
MCHC RBC-ENTMCNC: 30.6 PG — SIGNIFICANT CHANGE UP (ref 27–34)
MCHC RBC-ENTMCNC: 34.2 G/DL — SIGNIFICANT CHANGE UP (ref 32–36)
MCV RBC AUTO: 89.4 FL — SIGNIFICANT CHANGE UP (ref 80–100)
MONOCYTES # BLD AUTO: 1.1 K/UL — HIGH (ref 0–0.9)
MONOCYTES NFR BLD AUTO: 10.3 % — SIGNIFICANT CHANGE UP (ref 2–14)
NEUTROPHILS # BLD AUTO: 7.3 K/UL — SIGNIFICANT CHANGE UP (ref 1.8–7.4)
NEUTROPHILS NFR BLD AUTO: 66.8 % — SIGNIFICANT CHANGE UP (ref 43–77)
PLATELET # BLD AUTO: 271 K/UL — SIGNIFICANT CHANGE UP (ref 150–400)
RBC # BLD: 3.77 M/UL — LOW (ref 4.2–5.8)
RBC # FLD: 14.4 % — SIGNIFICANT CHANGE UP (ref 10.3–14.5)
WBC # BLD: 11 K/UL — HIGH (ref 3.8–10.5)
WBC # FLD AUTO: 11 K/UL — HIGH (ref 3.8–10.5)

## 2018-01-19 ENCOUNTER — APPOINTMENT (OUTPATIENT)
Dept: GERIATRICS | Facility: CLINIC | Age: 63
End: 2018-01-19
Payer: MEDICARE

## 2018-01-19 ENCOUNTER — APPOINTMENT (OUTPATIENT)
Age: 63
End: 2018-01-19

## 2018-01-19 ENCOUNTER — FORM ENCOUNTER (OUTPATIENT)
Age: 63
End: 2018-01-19

## 2018-01-19 PROCEDURE — 99214 OFFICE O/P EST MOD 30 MIN: CPT

## 2018-01-20 ENCOUNTER — APPOINTMENT (OUTPATIENT)
Dept: CT IMAGING | Facility: IMAGING CENTER | Age: 63
End: 2018-01-20
Payer: MEDICARE

## 2018-01-20 ENCOUNTER — OUTPATIENT (OUTPATIENT)
Dept: OUTPATIENT SERVICES | Facility: HOSPITAL | Age: 63
LOS: 1 days | End: 2018-01-20
Payer: MEDICARE

## 2018-01-20 DIAGNOSIS — C25.9 MALIGNANT NEOPLASM OF PANCREAS, UNSPECIFIED: ICD-10-CM

## 2018-01-20 DIAGNOSIS — Z98.890 OTHER SPECIFIED POSTPROCEDURAL STATES: Chronic | ICD-10-CM

## 2018-01-20 DIAGNOSIS — K25.5 CHRONIC OR UNSPECIFIED GASTRIC ULCER WITH PERFORATION: Chronic | ICD-10-CM

## 2018-01-20 DIAGNOSIS — Z90.49 ACQUIRED ABSENCE OF OTHER SPECIFIED PARTS OF DIGESTIVE TRACT: Chronic | ICD-10-CM

## 2018-01-20 PROCEDURE — 71260 CT THORAX DX C+: CPT

## 2018-01-20 PROCEDURE — 74177 CT ABD & PELVIS W/CONTRAST: CPT | Mod: 26

## 2018-01-20 PROCEDURE — 71260 CT THORAX DX C+: CPT | Mod: 26

## 2018-01-20 PROCEDURE — 74177 CT ABD & PELVIS W/CONTRAST: CPT

## 2018-01-25 ENCOUNTER — OUTPATIENT (OUTPATIENT)
Dept: OUTPATIENT SERVICES | Facility: HOSPITAL | Age: 63
LOS: 1 days | Discharge: HOME | End: 2018-01-25

## 2018-01-25 DIAGNOSIS — Z98.890 OTHER SPECIFIED POSTPROCEDURAL STATES: Chronic | ICD-10-CM

## 2018-01-25 DIAGNOSIS — Z90.49 ACQUIRED ABSENCE OF OTHER SPECIFIED PARTS OF DIGESTIVE TRACT: Chronic | ICD-10-CM

## 2018-01-25 DIAGNOSIS — C25.9 MALIGNANT NEOPLASM OF PANCREAS, UNSPECIFIED: ICD-10-CM

## 2018-01-25 DIAGNOSIS — K25.5 CHRONIC OR UNSPECIFIED GASTRIC ULCER WITH PERFORATION: Chronic | ICD-10-CM

## 2018-01-28 LAB
ALBUMIN SERPL ELPH-MCNC: 3.8 G/DL
ALP BLD-CCNC: 126 U/L
ALT SERPL-CCNC: 15 U/L
ANION GAP SERPL CALC-SCNC: 17 MMOL/L
AST SERPL-CCNC: 9 U/L
BILIRUB SERPL-MCNC: <0.2 MG/DL
BUN SERPL-MCNC: 9 MG/DL
CALCIUM SERPL-MCNC: 10 MG/DL
CANCER AG19-9 SERPL-ACNC: 69.8 U/ML
CHLORIDE SERPL-SCNC: 103 MMOL/L
CO2 SERPL-SCNC: 21 MMOL/L
CREAT SERPL-MCNC: 0.69 MG/DL
GLUCOSE SERPL-MCNC: 106 MG/DL
POTASSIUM SERPL-SCNC: 5.1 MMOL/L
PROT SERPL-MCNC: 6.2 G/DL
SODIUM SERPL-SCNC: 141 MMOL/L

## 2018-01-29 ENCOUNTER — RESULT REVIEW (OUTPATIENT)
Age: 63
End: 2018-01-29

## 2018-01-29 ENCOUNTER — APPOINTMENT (OUTPATIENT)
Dept: HEMATOLOGY ONCOLOGY | Facility: CLINIC | Age: 63
End: 2018-01-29
Payer: MEDICARE

## 2018-01-29 VITALS
WEIGHT: 175.47 LBS | BODY MASS INDEX: 26.29 KG/M2 | DIASTOLIC BLOOD PRESSURE: 79 MMHG | TEMPERATURE: 97.6 F | SYSTOLIC BLOOD PRESSURE: 134 MMHG | RESPIRATION RATE: 16 BRPM | HEART RATE: 67 BPM | OXYGEN SATURATION: 100 %

## 2018-01-29 LAB
BASOPHILS # BLD AUTO: 0.1 K/UL — SIGNIFICANT CHANGE UP (ref 0–0.2)
BASOPHILS NFR BLD AUTO: 0.7 % — SIGNIFICANT CHANGE UP (ref 0–2)
EOSINOPHIL # BLD AUTO: 0.4 K/UL — SIGNIFICANT CHANGE UP (ref 0–0.5)
EOSINOPHIL NFR BLD AUTO: 3.4 % — SIGNIFICANT CHANGE UP (ref 0–6)
HCT VFR BLD CALC: 38.8 % — LOW (ref 39–50)
HGB BLD-MCNC: 13.2 G/DL — SIGNIFICANT CHANGE UP (ref 13–17)
LYMPHOCYTES # BLD AUTO: 2.2 K/UL — SIGNIFICANT CHANGE UP (ref 1–3.3)
LYMPHOCYTES # BLD AUTO: 20.9 % — SIGNIFICANT CHANGE UP (ref 13–44)
MCHC RBC-ENTMCNC: 30.5 PG — SIGNIFICANT CHANGE UP (ref 27–34)
MCHC RBC-ENTMCNC: 34.1 G/DL — SIGNIFICANT CHANGE UP (ref 32–36)
MCV RBC AUTO: 89.7 FL — SIGNIFICANT CHANGE UP (ref 80–100)
MONOCYTES # BLD AUTO: 1 K/UL — HIGH (ref 0–0.9)
MONOCYTES NFR BLD AUTO: 9.7 % — SIGNIFICANT CHANGE UP (ref 2–14)
NEUTROPHILS # BLD AUTO: 7 K/UL — SIGNIFICANT CHANGE UP (ref 1.8–7.4)
NEUTROPHILS NFR BLD AUTO: 65.3 % — SIGNIFICANT CHANGE UP (ref 43–77)
PLATELET # BLD AUTO: 321 K/UL — SIGNIFICANT CHANGE UP (ref 150–400)
RBC # BLD: 4.33 M/UL — SIGNIFICANT CHANGE UP (ref 4.2–5.8)
RBC # FLD: 15.6 % — HIGH (ref 10.3–14.5)
WBC # BLD: 10.6 K/UL — HIGH (ref 3.8–10.5)
WBC # FLD AUTO: 10.6 K/UL — HIGH (ref 3.8–10.5)

## 2018-01-29 PROCEDURE — 99214 OFFICE O/P EST MOD 30 MIN: CPT

## 2018-01-30 ENCOUNTER — LABORATORY RESULT (OUTPATIENT)
Age: 63
End: 2018-01-30

## 2018-01-30 ENCOUNTER — RESULT REVIEW (OUTPATIENT)
Age: 63
End: 2018-01-30

## 2018-01-30 ENCOUNTER — APPOINTMENT (OUTPATIENT)
Age: 63
End: 2018-01-30

## 2018-01-30 LAB
BASOPHILS # BLD AUTO: 0.1 K/UL — SIGNIFICANT CHANGE UP (ref 0–0.2)
BASOPHILS NFR BLD AUTO: 0.8 % — SIGNIFICANT CHANGE UP (ref 0–2)
EOSINOPHIL # BLD AUTO: 0.4 K/UL — SIGNIFICANT CHANGE UP (ref 0–0.5)
EOSINOPHIL NFR BLD AUTO: 3.4 % — SIGNIFICANT CHANGE UP (ref 0–6)
HCT VFR BLD CALC: 35.8 % — LOW (ref 39–50)
HGB BLD-MCNC: 12.4 G/DL — LOW (ref 13–17)
LYMPHOCYTES # BLD AUTO: 1.8 K/UL — SIGNIFICANT CHANGE UP (ref 1–3.3)
LYMPHOCYTES # BLD AUTO: 17.5 % — SIGNIFICANT CHANGE UP (ref 13–44)
MCHC RBC-ENTMCNC: 31.2 PG — SIGNIFICANT CHANGE UP (ref 27–34)
MCHC RBC-ENTMCNC: 34.7 G/DL — SIGNIFICANT CHANGE UP (ref 32–36)
MCV RBC AUTO: 90 FL — SIGNIFICANT CHANGE UP (ref 80–100)
MONOCYTES # BLD AUTO: 1.1 K/UL — HIGH (ref 0–0.9)
MONOCYTES NFR BLD AUTO: 11.1 % — SIGNIFICANT CHANGE UP (ref 2–14)
NEUTROPHILS # BLD AUTO: 6.9 K/UL — SIGNIFICANT CHANGE UP (ref 1.8–7.4)
NEUTROPHILS NFR BLD AUTO: 67.2 % — SIGNIFICANT CHANGE UP (ref 43–77)
PLATELET # BLD AUTO: 297 K/UL — SIGNIFICANT CHANGE UP (ref 150–400)
RBC # BLD: 3.97 M/UL — LOW (ref 4.2–5.8)
RBC # FLD: 15.8 % — HIGH (ref 10.3–14.5)
WBC # BLD: 10.3 K/UL — SIGNIFICANT CHANGE UP (ref 3.8–10.5)
WBC # FLD AUTO: 10.3 K/UL — SIGNIFICANT CHANGE UP (ref 3.8–10.5)

## 2018-01-31 DIAGNOSIS — Z51.11 ENCOUNTER FOR ANTINEOPLASTIC CHEMOTHERAPY: ICD-10-CM

## 2018-01-31 DIAGNOSIS — R11.2 NAUSEA WITH VOMITING, UNSPECIFIED: ICD-10-CM

## 2018-02-02 ENCOUNTER — RESULT REVIEW (OUTPATIENT)
Age: 63
End: 2018-02-02

## 2018-02-02 ENCOUNTER — APPOINTMENT (OUTPATIENT)
Age: 63
End: 2018-02-02

## 2018-02-02 LAB
ALBUMIN SERPL ELPH-MCNC: 4.2 G/DL
ALP BLD-CCNC: 111 U/L
ALT SERPL-CCNC: 21 U/L
ANION GAP SERPL CALC-SCNC: 16 MMOL/L
AST SERPL-CCNC: 20 U/L
BILIRUB SERPL-MCNC: 0.3 MG/DL
BUN SERPL-MCNC: 12 MG/DL
CALCIUM SERPL-MCNC: 10 MG/DL
CHLORIDE SERPL-SCNC: 98 MMOL/L
CO2 SERPL-SCNC: 24 MMOL/L
CREAT SERPL-MCNC: 0.77 MG/DL
GLUCOSE SERPL-MCNC: 121 MG/DL
POTASSIUM SERPL-SCNC: 4.9 MMOL/L
PROT SERPL-MCNC: 6.6 G/DL
SODIUM SERPL-SCNC: 138 MMOL/L

## 2018-02-03 ENCOUNTER — APPOINTMENT (OUTPATIENT)
Age: 63
End: 2018-02-03

## 2018-02-05 ENCOUNTER — APPOINTMENT (OUTPATIENT)
Dept: GERIATRICS | Facility: CLINIC | Age: 63
End: 2018-02-05
Payer: MEDICARE

## 2018-02-05 VITALS
RESPIRATION RATE: 16 BRPM | DIASTOLIC BLOOD PRESSURE: 85 MMHG | BODY MASS INDEX: 26.69 KG/M2 | TEMPERATURE: 98.3 F | OXYGEN SATURATION: 99 % | HEART RATE: 85 BPM | WEIGHT: 178.13 LBS | SYSTOLIC BLOOD PRESSURE: 147 MMHG

## 2018-02-05 DIAGNOSIS — Z51.89 ENCOUNTER FOR OTHER SPECIFIED AFTERCARE: ICD-10-CM

## 2018-02-05 PROCEDURE — 99215 OFFICE O/P EST HI 40 MIN: CPT

## 2018-02-06 ENCOUNTER — OUTPATIENT (OUTPATIENT)
Dept: OUTPATIENT SERVICES | Facility: HOSPITAL | Age: 63
LOS: 1 days | End: 2018-02-06
Payer: MEDICARE

## 2018-02-06 ENCOUNTER — RESULT REVIEW (OUTPATIENT)
Age: 63
End: 2018-02-06

## 2018-02-06 DIAGNOSIS — Z90.49 ACQUIRED ABSENCE OF OTHER SPECIFIED PARTS OF DIGESTIVE TRACT: Chronic | ICD-10-CM

## 2018-02-06 DIAGNOSIS — Z98.890 OTHER SPECIFIED POSTPROCEDURAL STATES: Chronic | ICD-10-CM

## 2018-02-06 DIAGNOSIS — K25.5 CHRONIC OR UNSPECIFIED GASTRIC ULCER WITH PERFORATION: Chronic | ICD-10-CM

## 2018-02-06 DIAGNOSIS — C80.1 MALIGNANT (PRIMARY) NEOPLASM, UNSPECIFIED: ICD-10-CM

## 2018-02-06 PROCEDURE — 88342 IMHCHEM/IMCYTCHM 1ST ANTB: CPT | Mod: 26

## 2018-02-06 PROCEDURE — 88342 IMHCHEM/IMCYTCHM 1ST ANTB: CPT

## 2018-02-13 ENCOUNTER — APPOINTMENT (OUTPATIENT)
Dept: HEMATOLOGY ONCOLOGY | Facility: CLINIC | Age: 63
End: 2018-02-13
Payer: MEDICARE

## 2018-02-13 ENCOUNTER — RESULT REVIEW (OUTPATIENT)
Age: 63
End: 2018-02-13

## 2018-02-13 ENCOUNTER — LABORATORY RESULT (OUTPATIENT)
Age: 63
End: 2018-02-13

## 2018-02-13 ENCOUNTER — APPOINTMENT (OUTPATIENT)
Age: 63
End: 2018-02-13

## 2018-02-13 LAB
BASOPHILS # BLD AUTO: 0.1 K/UL — SIGNIFICANT CHANGE UP (ref 0–0.2)
BASOPHILS NFR BLD AUTO: 0.5 % — SIGNIFICANT CHANGE UP (ref 0–2)
EOSINOPHIL # BLD AUTO: 0.3 K/UL — SIGNIFICANT CHANGE UP (ref 0–0.5)
EOSINOPHIL NFR BLD AUTO: 2.7 % — SIGNIFICANT CHANGE UP (ref 0–6)
HCT VFR BLD CALC: 33.4 % — LOW (ref 39–50)
HGB BLD-MCNC: 11.3 G/DL — LOW (ref 13–17)
LYMPHOCYTES # BLD AUTO: 17.7 % — SIGNIFICANT CHANGE UP (ref 13–44)
LYMPHOCYTES # BLD AUTO: 2.2 K/UL — SIGNIFICANT CHANGE UP (ref 1–3.3)
MCHC RBC-ENTMCNC: 30.9 PG — SIGNIFICANT CHANGE UP (ref 27–34)
MCHC RBC-ENTMCNC: 33.7 G/DL — SIGNIFICANT CHANGE UP (ref 32–36)
MCV RBC AUTO: 91.6 FL — SIGNIFICANT CHANGE UP (ref 80–100)
MONOCYTES # BLD AUTO: 1 K/UL — HIGH (ref 0–0.9)
MONOCYTES NFR BLD AUTO: 8 % — SIGNIFICANT CHANGE UP (ref 2–14)
NEUTROPHILS # BLD AUTO: 8.9 K/UL — HIGH (ref 1.8–7.4)
NEUTROPHILS NFR BLD AUTO: 71 % — SIGNIFICANT CHANGE UP (ref 43–77)
PLATELET # BLD AUTO: 187 K/UL — SIGNIFICANT CHANGE UP (ref 150–400)
RBC # BLD: 3.65 M/UL — LOW (ref 4.2–5.8)
RBC # FLD: 16.2 % — HIGH (ref 10.3–14.5)
WBC # BLD: 12.5 K/UL — HIGH (ref 3.8–10.5)
WBC # FLD AUTO: 12.5 K/UL — HIGH (ref 3.8–10.5)

## 2018-02-13 PROCEDURE — 99213 OFFICE O/P EST LOW 20 MIN: CPT

## 2018-02-16 ENCOUNTER — APPOINTMENT (OUTPATIENT)
Age: 63
End: 2018-02-16

## 2018-02-20 ENCOUNTER — APPOINTMENT (OUTPATIENT)
Dept: RADIATION ONCOLOGY | Facility: CLINIC | Age: 63
End: 2018-02-20
Payer: MEDICARE

## 2018-02-20 ENCOUNTER — MEDICATION RENEWAL (OUTPATIENT)
Age: 63
End: 2018-02-20

## 2018-02-20 VITALS
SYSTOLIC BLOOD PRESSURE: 154 MMHG | BODY MASS INDEX: 27.01 KG/M2 | RESPIRATION RATE: 16 BRPM | DIASTOLIC BLOOD PRESSURE: 85 MMHG | HEIGHT: 68 IN | HEART RATE: 80 BPM | WEIGHT: 178.24 LBS | TEMPERATURE: 97.8 F | OXYGEN SATURATION: 99 %

## 2018-02-20 PROCEDURE — 99214 OFFICE O/P EST MOD 30 MIN: CPT

## 2018-02-26 ENCOUNTER — OUTPATIENT (OUTPATIENT)
Dept: OUTPATIENT SERVICES | Facility: HOSPITAL | Age: 63
LOS: 1 days | Discharge: HOME | End: 2018-02-26

## 2018-02-26 DIAGNOSIS — C25.9 MALIGNANT NEOPLASM OF PANCREAS, UNSPECIFIED: ICD-10-CM

## 2018-02-26 DIAGNOSIS — Z98.890 OTHER SPECIFIED POSTPROCEDURAL STATES: Chronic | ICD-10-CM

## 2018-02-26 DIAGNOSIS — K25.5 CHRONIC OR UNSPECIFIED GASTRIC ULCER WITH PERFORATION: Chronic | ICD-10-CM

## 2018-02-26 DIAGNOSIS — Z90.49 ACQUIRED ABSENCE OF OTHER SPECIFIED PARTS OF DIGESTIVE TRACT: Chronic | ICD-10-CM

## 2018-02-27 ENCOUNTER — LABORATORY RESULT (OUTPATIENT)
Age: 63
End: 2018-02-27

## 2018-02-27 ENCOUNTER — APPOINTMENT (OUTPATIENT)
Age: 63
End: 2018-02-27

## 2018-02-27 ENCOUNTER — RESULT REVIEW (OUTPATIENT)
Age: 63
End: 2018-02-27

## 2018-02-27 LAB
BASOPHILS # BLD AUTO: 0.1 K/UL — SIGNIFICANT CHANGE UP (ref 0–0.2)
BASOPHILS NFR BLD AUTO: 0.7 % — SIGNIFICANT CHANGE UP (ref 0–2)
EOSINOPHIL # BLD AUTO: 0.3 K/UL — SIGNIFICANT CHANGE UP (ref 0–0.5)
EOSINOPHIL NFR BLD AUTO: 2.4 % — SIGNIFICANT CHANGE UP (ref 0–6)
HCT VFR BLD CALC: 34.6 % — LOW (ref 39–50)
HGB BLD-MCNC: 12.1 G/DL — LOW (ref 13–17)
LYMPHOCYTES # BLD AUTO: 18 % — SIGNIFICANT CHANGE UP (ref 13–44)
LYMPHOCYTES # BLD AUTO: 2 K/UL — SIGNIFICANT CHANGE UP (ref 1–3.3)
MCHC RBC-ENTMCNC: 31.9 PG — SIGNIFICANT CHANGE UP (ref 27–34)
MCHC RBC-ENTMCNC: 34.8 G/DL — SIGNIFICANT CHANGE UP (ref 32–36)
MCV RBC AUTO: 91.6 FL — SIGNIFICANT CHANGE UP (ref 80–100)
MONOCYTES # BLD AUTO: 0.9 K/UL — SIGNIFICANT CHANGE UP (ref 0–0.9)
MONOCYTES NFR BLD AUTO: 8 % — SIGNIFICANT CHANGE UP (ref 2–14)
NEUTROPHILS # BLD AUTO: 7.7 K/UL — HIGH (ref 1.8–7.4)
NEUTROPHILS NFR BLD AUTO: 70.9 % — SIGNIFICANT CHANGE UP (ref 43–77)
PLATELET # BLD AUTO: 208 K/UL — SIGNIFICANT CHANGE UP (ref 150–400)
RBC # BLD: 3.78 M/UL — LOW (ref 4.2–5.8)
RBC # FLD: 16.3 % — HIGH (ref 10.3–14.5)
WBC # BLD: 10.9 K/UL — HIGH (ref 3.8–10.5)
WBC # FLD AUTO: 10.9 K/UL — HIGH (ref 3.8–10.5)

## 2018-02-28 ENCOUNTER — APPOINTMENT (OUTPATIENT)
Dept: RADIATION ONCOLOGY | Facility: CLINIC | Age: 63
End: 2018-02-28

## 2018-02-28 DIAGNOSIS — R11.2 NAUSEA WITH VOMITING, UNSPECIFIED: ICD-10-CM

## 2018-02-28 DIAGNOSIS — Z51.11 ENCOUNTER FOR ANTINEOPLASTIC CHEMOTHERAPY: ICD-10-CM

## 2018-03-02 ENCOUNTER — APPOINTMENT (OUTPATIENT)
Age: 63
End: 2018-03-02

## 2018-03-05 DIAGNOSIS — Z51.89 ENCOUNTER FOR OTHER SPECIFIED AFTERCARE: ICD-10-CM

## 2018-03-12 ENCOUNTER — APPOINTMENT (OUTPATIENT)
Dept: HEMATOLOGY ONCOLOGY | Facility: CLINIC | Age: 63
End: 2018-03-12
Payer: MEDICARE

## 2018-03-12 VITALS
RESPIRATION RATE: 16 BRPM | WEIGHT: 171.96 LBS | OXYGEN SATURATION: 99 % | HEART RATE: 71 BPM | BODY MASS INDEX: 26.15 KG/M2 | TEMPERATURE: 98.9 F | DIASTOLIC BLOOD PRESSURE: 80 MMHG | SYSTOLIC BLOOD PRESSURE: 129 MMHG

## 2018-03-12 PROCEDURE — 99214 OFFICE O/P EST MOD 30 MIN: CPT

## 2018-03-13 ENCOUNTER — RESULT REVIEW (OUTPATIENT)
Age: 63
End: 2018-03-13

## 2018-03-13 ENCOUNTER — LABORATORY RESULT (OUTPATIENT)
Age: 63
End: 2018-03-13

## 2018-03-13 ENCOUNTER — APPOINTMENT (OUTPATIENT)
Dept: INFUSION THERAPY | Facility: HOSPITAL | Age: 63
End: 2018-03-13

## 2018-03-13 LAB
BASOPHILS # BLD AUTO: 0.1 K/UL — SIGNIFICANT CHANGE UP (ref 0–0.2)
BASOPHILS NFR BLD AUTO: 0.9 % — SIGNIFICANT CHANGE UP (ref 0–2)
EOSINOPHIL # BLD AUTO: 0.3 K/UL — SIGNIFICANT CHANGE UP (ref 0–0.5)
EOSINOPHIL NFR BLD AUTO: 3.5 % — SIGNIFICANT CHANGE UP (ref 0–6)
HCT VFR BLD CALC: 35.7 % — LOW (ref 39–50)
HGB BLD-MCNC: 12.2 G/DL — LOW (ref 13–17)
LYMPHOCYTES # BLD AUTO: 1.6 K/UL — SIGNIFICANT CHANGE UP (ref 1–3.3)
LYMPHOCYTES # BLD AUTO: 20.5 % — SIGNIFICANT CHANGE UP (ref 13–44)
MCHC RBC-ENTMCNC: 31.3 PG — SIGNIFICANT CHANGE UP (ref 27–34)
MCHC RBC-ENTMCNC: 34.3 G/DL — SIGNIFICANT CHANGE UP (ref 32–36)
MCV RBC AUTO: 91.4 FL — SIGNIFICANT CHANGE UP (ref 80–100)
MONOCYTES # BLD AUTO: 0.9 K/UL — SIGNIFICANT CHANGE UP (ref 0–0.9)
MONOCYTES NFR BLD AUTO: 11 % — SIGNIFICANT CHANGE UP (ref 2–14)
NEUTROPHILS # BLD AUTO: 5 K/UL — SIGNIFICANT CHANGE UP (ref 1.8–7.4)
NEUTROPHILS NFR BLD AUTO: 64 % — SIGNIFICANT CHANGE UP (ref 43–77)
PLATELET # BLD AUTO: 196 K/UL — SIGNIFICANT CHANGE UP (ref 150–400)
RBC # BLD: 3.91 M/UL — LOW (ref 4.2–5.8)
RBC # FLD: 15.6 % — HIGH (ref 10.3–14.5)
WBC # BLD: 7.8 K/UL — SIGNIFICANT CHANGE UP (ref 3.8–10.5)
WBC # FLD AUTO: 7.8 K/UL — SIGNIFICANT CHANGE UP (ref 3.8–10.5)

## 2018-03-16 ENCOUNTER — APPOINTMENT (OUTPATIENT)
Dept: INFUSION THERAPY | Facility: HOSPITAL | Age: 63
End: 2018-03-16

## 2018-03-17 ENCOUNTER — INPATIENT (INPATIENT)
Facility: HOSPITAL | Age: 63
LOS: 5 days | Discharge: HOME | End: 2018-03-23
Attending: INTERNAL MEDICINE | Admitting: INTERNAL MEDICINE
Payer: MEDICARE

## 2018-03-17 ENCOUNTER — APPOINTMENT (OUTPATIENT)
Age: 63
End: 2018-03-17

## 2018-03-17 VITALS
DIASTOLIC BLOOD PRESSURE: 87 MMHG | HEART RATE: 94 BPM | SYSTOLIC BLOOD PRESSURE: 148 MMHG | OXYGEN SATURATION: 100 % | RESPIRATION RATE: 18 BRPM | TEMPERATURE: 98 F

## 2018-03-17 DIAGNOSIS — Z90.49 ACQUIRED ABSENCE OF OTHER SPECIFIED PARTS OF DIGESTIVE TRACT: Chronic | ICD-10-CM

## 2018-03-17 DIAGNOSIS — R10.9 UNSPECIFIED ABDOMINAL PAIN: ICD-10-CM

## 2018-03-17 DIAGNOSIS — K25.5 CHRONIC OR UNSPECIFIED GASTRIC ULCER WITH PERFORATION: Chronic | ICD-10-CM

## 2018-03-17 DIAGNOSIS — Z98.890 OTHER SPECIFIED POSTPROCEDURAL STATES: Chronic | ICD-10-CM

## 2018-03-17 LAB
ALBUMIN SERPL ELPH-MCNC: 4 G/DL — SIGNIFICANT CHANGE UP (ref 3.3–5)
ALP SERPL-CCNC: 115 U/L — SIGNIFICANT CHANGE UP (ref 40–120)
ALT FLD-CCNC: 20 U/L — SIGNIFICANT CHANGE UP (ref 4–41)
AST SERPL-CCNC: 23 U/L — SIGNIFICANT CHANGE UP (ref 4–40)
BASE EXCESS BLDV CALC-SCNC: 2.7 MMOL/L — SIGNIFICANT CHANGE UP
BASOPHILS # BLD AUTO: 0.05 K/UL — SIGNIFICANT CHANGE UP (ref 0–0.2)
BASOPHILS NFR BLD AUTO: 0.8 % — SIGNIFICANT CHANGE UP (ref 0–2)
BILIRUB SERPL-MCNC: 0.4 MG/DL — SIGNIFICANT CHANGE UP (ref 0.2–1.2)
BLOOD GAS VENOUS - CREATININE: 0.54 MG/DL — SIGNIFICANT CHANGE UP (ref 0.5–1.3)
BUN SERPL-MCNC: 15 MG/DL — SIGNIFICANT CHANGE UP (ref 7–23)
BUN SERPL-MCNC: 17 MG/DL — SIGNIFICANT CHANGE UP (ref 7–23)
CALCIUM SERPL-MCNC: 8.5 MG/DL — SIGNIFICANT CHANGE UP (ref 8.4–10.5)
CALCIUM SERPL-MCNC: 9.9 MG/DL — SIGNIFICANT CHANGE UP (ref 8.4–10.5)
CHLORIDE BLDV-SCNC: 102 MMOL/L — SIGNIFICANT CHANGE UP (ref 96–108)
CHLORIDE SERPL-SCNC: 96 MMOL/L — LOW (ref 98–107)
CHLORIDE SERPL-SCNC: 99 MMOL/L — SIGNIFICANT CHANGE UP (ref 98–107)
CO2 SERPL-SCNC: 24 MMOL/L — SIGNIFICANT CHANGE UP (ref 22–31)
CO2 SERPL-SCNC: 25 MMOL/L — SIGNIFICANT CHANGE UP (ref 22–31)
CREAT SERPL-MCNC: 0.6 MG/DL — SIGNIFICANT CHANGE UP (ref 0.5–1.3)
CREAT SERPL-MCNC: 0.71 MG/DL — SIGNIFICANT CHANGE UP (ref 0.5–1.3)
EOSINOPHIL # BLD AUTO: 0.13 K/UL — SIGNIFICANT CHANGE UP (ref 0–0.5)
EOSINOPHIL NFR BLD AUTO: 2.1 % — SIGNIFICANT CHANGE UP (ref 0–6)
GAS PNL BLDV: 137 MMOL/L — SIGNIFICANT CHANGE UP (ref 136–146)
GLUCOSE BLDV-MCNC: 114 — HIGH (ref 70–99)
GLUCOSE SERPL-MCNC: 116 MG/DL — HIGH (ref 70–99)
GLUCOSE SERPL-MCNC: 92 MG/DL — SIGNIFICANT CHANGE UP (ref 70–99)
HCO3 BLDV-SCNC: 24 MMOL/L — SIGNIFICANT CHANGE UP (ref 20–27)
HCT VFR BLD CALC: 41.4 % — SIGNIFICANT CHANGE UP (ref 39–50)
HCT VFR BLDV CALC: 43.5 % — SIGNIFICANT CHANGE UP (ref 39–51)
HGB BLD-MCNC: 13.6 G/DL — SIGNIFICANT CHANGE UP (ref 13–17)
HGB BLDV-MCNC: 14.2 G/DL — SIGNIFICANT CHANGE UP (ref 13–17)
IMM GRANULOCYTES # BLD AUTO: 0.02 # — SIGNIFICANT CHANGE UP
IMM GRANULOCYTES NFR BLD AUTO: 0.3 % — SIGNIFICANT CHANGE UP (ref 0–1.5)
LACTATE BLDV-MCNC: 3.1 MMOL/L — HIGH (ref 0.5–2)
LIDOCAIN IGE QN: 27.5 U/L — SIGNIFICANT CHANGE UP (ref 7–60)
LYMPHOCYTES # BLD AUTO: 1.47 K/UL — SIGNIFICANT CHANGE UP (ref 1–3.3)
LYMPHOCYTES # BLD AUTO: 23.5 % — SIGNIFICANT CHANGE UP (ref 13–44)
MCHC RBC-ENTMCNC: 29.6 PG — SIGNIFICANT CHANGE UP (ref 27–34)
MCHC RBC-ENTMCNC: 32.9 % — SIGNIFICANT CHANGE UP (ref 32–36)
MCV RBC AUTO: 90.2 FL — SIGNIFICANT CHANGE UP (ref 80–100)
MONOCYTES # BLD AUTO: 0.16 K/UL — SIGNIFICANT CHANGE UP (ref 0–0.9)
MONOCYTES NFR BLD AUTO: 2.6 % — SIGNIFICANT CHANGE UP (ref 2–14)
NEUTROPHILS # BLD AUTO: 4.42 K/UL — SIGNIFICANT CHANGE UP (ref 1.8–7.4)
NEUTROPHILS NFR BLD AUTO: 70.7 % — SIGNIFICANT CHANGE UP (ref 43–77)
NRBC # FLD: 0 — SIGNIFICANT CHANGE UP
PCO2 BLDV: 54 MMHG — HIGH (ref 41–51)
PH BLDV: 7.33 PH — SIGNIFICANT CHANGE UP (ref 7.32–7.43)
PLATELET # BLD AUTO: 218 K/UL — SIGNIFICANT CHANGE UP (ref 150–400)
PMV BLD: 9.3 FL — SIGNIFICANT CHANGE UP (ref 7–13)
PO2 BLDV: < 24 MMHG — LOW (ref 35–40)
POTASSIUM BLDV-SCNC: 4.9 MMOL/L — HIGH (ref 3.4–4.5)
POTASSIUM SERPL-MCNC: 3.9 MMOL/L — SIGNIFICANT CHANGE UP (ref 3.5–5.3)
POTASSIUM SERPL-MCNC: 5.7 MMOL/L — HIGH (ref 3.5–5.3)
POTASSIUM SERPL-SCNC: 3.9 MMOL/L — SIGNIFICANT CHANGE UP (ref 3.5–5.3)
POTASSIUM SERPL-SCNC: 5.7 MMOL/L — HIGH (ref 3.5–5.3)
PROT SERPL-MCNC: 7.2 G/DL — SIGNIFICANT CHANGE UP (ref 6–8.3)
RBC # BLD: 4.59 M/UL — SIGNIFICANT CHANGE UP (ref 4.2–5.8)
RBC # FLD: 15.7 % — HIGH (ref 10.3–14.5)
SAO2 % BLDV: 14.9 % — LOW (ref 60–85)
SODIUM SERPL-SCNC: 133 MMOL/L — LOW (ref 135–145)
SODIUM SERPL-SCNC: 138 MMOL/L — SIGNIFICANT CHANGE UP (ref 135–145)
WBC # BLD: 6.25 K/UL — SIGNIFICANT CHANGE UP (ref 3.8–10.5)
WBC # FLD AUTO: 6.25 K/UL — SIGNIFICANT CHANGE UP (ref 3.8–10.5)

## 2018-03-17 PROCEDURE — 74177 CT ABD & PELVIS W/CONTRAST: CPT | Mod: 26

## 2018-03-17 RX ORDER — SODIUM CHLORIDE 9 MG/ML
1000 INJECTION INTRAMUSCULAR; INTRAVENOUS; SUBCUTANEOUS ONCE
Qty: 0 | Refills: 0 | Status: COMPLETED | OUTPATIENT
Start: 2018-03-17 | End: 2018-03-17

## 2018-03-17 RX ORDER — ACETAMINOPHEN 500 MG
650 TABLET ORAL EVERY 6 HOURS
Qty: 0 | Refills: 0 | Status: DISCONTINUED | OUTPATIENT
Start: 2018-03-17 | End: 2018-03-18

## 2018-03-17 RX ORDER — HYDROMORPHONE HYDROCHLORIDE 2 MG/ML
1 INJECTION INTRAMUSCULAR; INTRAVENOUS; SUBCUTANEOUS ONCE
Qty: 0 | Refills: 0 | Status: DISCONTINUED | OUTPATIENT
Start: 2018-03-17 | End: 2018-03-17

## 2018-03-17 RX ORDER — HYDROMORPHONE HYDROCHLORIDE 2 MG/ML
2 INJECTION INTRAMUSCULAR; INTRAVENOUS; SUBCUTANEOUS ONCE
Qty: 0 | Refills: 0 | Status: DISCONTINUED | OUTPATIENT
Start: 2018-03-17 | End: 2018-03-17

## 2018-03-17 RX ORDER — ENOXAPARIN SODIUM 100 MG/ML
40 INJECTION SUBCUTANEOUS EVERY 24 HOURS
Qty: 0 | Refills: 0 | Status: DISCONTINUED | OUTPATIENT
Start: 2018-03-17 | End: 2018-03-23

## 2018-03-17 RX ORDER — ACETAMINOPHEN 500 MG
1000 TABLET ORAL ONCE
Qty: 0 | Refills: 0 | Status: COMPLETED | OUTPATIENT
Start: 2018-03-17 | End: 2018-03-17

## 2018-03-17 RX ADMIN — HYDROMORPHONE HYDROCHLORIDE 1 MILLIGRAM(S): 2 INJECTION INTRAMUSCULAR; INTRAVENOUS; SUBCUTANEOUS at 16:55

## 2018-03-17 RX ADMIN — SODIUM CHLORIDE 1000 MILLILITER(S): 9 INJECTION INTRAMUSCULAR; INTRAVENOUS; SUBCUTANEOUS at 16:55

## 2018-03-17 RX ADMIN — SODIUM CHLORIDE 1000 MILLILITER(S): 9 INJECTION INTRAMUSCULAR; INTRAVENOUS; SUBCUTANEOUS at 15:13

## 2018-03-17 RX ADMIN — HYDROMORPHONE HYDROCHLORIDE 1 MILLIGRAM(S): 2 INJECTION INTRAMUSCULAR; INTRAVENOUS; SUBCUTANEOUS at 15:12

## 2018-03-17 RX ADMIN — HYDROMORPHONE HYDROCHLORIDE 2 MILLIGRAM(S): 2 INJECTION INTRAMUSCULAR; INTRAVENOUS; SUBCUTANEOUS at 19:38

## 2018-03-17 RX ADMIN — HYDROMORPHONE HYDROCHLORIDE 1 MILLIGRAM(S): 2 INJECTION INTRAMUSCULAR; INTRAVENOUS; SUBCUTANEOUS at 19:20

## 2018-03-17 RX ADMIN — HYDROMORPHONE HYDROCHLORIDE 1 MILLIGRAM(S): 2 INJECTION INTRAMUSCULAR; INTRAVENOUS; SUBCUTANEOUS at 17:10

## 2018-03-17 RX ADMIN — Medication 400 MILLIGRAM(S): at 21:03

## 2018-03-17 RX ADMIN — HYDROMORPHONE HYDROCHLORIDE 1 MILLIGRAM(S): 2 INJECTION INTRAMUSCULAR; INTRAVENOUS; SUBCUTANEOUS at 18:53

## 2018-03-17 RX ADMIN — HYDROMORPHONE HYDROCHLORIDE 2 MILLIGRAM(S): 2 INJECTION INTRAMUSCULAR; INTRAVENOUS; SUBCUTANEOUS at 19:53

## 2018-03-17 RX ADMIN — HYDROMORPHONE HYDROCHLORIDE 1 MILLIGRAM(S): 2 INJECTION INTRAMUSCULAR; INTRAVENOUS; SUBCUTANEOUS at 15:40

## 2018-03-17 RX ADMIN — Medication 1000 MILLIGRAM(S): at 21:39

## 2018-03-17 NOTE — ED PROVIDER NOTE - ATTENDING CONTRIBUTION TO CARE
62M with colon ca, pancreatic ca, currently on chemo, presents with abdominal pain beginning yesterday. Upper, severe, L>R, radiates around sides. No alleviation with oral narcotics. No exacerbating factors. Denies n/v, f/c, cp, sob, cough, congestion. Few episodes of non-bloody diarrhea.    PE: NAD, NCAT, MMM, Trachea midline, Normal conjunctiva, lungs CTAB, S1/S2 RRR, Normal perfusion, Abdomen Soft, +L upper ttp, No rebound/guarding, No LE edema, No deformity of extremities, No rashes,  No focal motor or sensory deficits.     62M with colon ca, pancreatic ca, with abd pain, diarrhea. To obtain labs eval for anemia, leukocytosis, metabolic derangement, elevated lactate, elevated lipase. Obtain ct a/p eval for sbo, perforation, expanding mass, colitis. Analgesia, IVF, will likely require admission. - Janak Henry MD

## 2018-03-17 NOTE — ED ADULT TRIAGE NOTE - CHIEF COMPLAINT QUOTE
Pt c/o abd pain since last night.  Denies N/V/D.  Seen by Hem/Onc across the street who gave him an injection.  Was told to come here.  PMH colon cancer and pancreatic cancer.  Last chemo Thursday

## 2018-03-17 NOTE — ED ADULT NURSE NOTE - OBJECTIVE STATEMENT
Received pt in room 22, pt A&Ox4, respirations even and unlabored b/l. Pt c/o abd pain, relieved with IV tylenol. Denies any pain/discomfort at this time. Will continue to monitor.

## 2018-03-17 NOTE — ED ADULT NURSE NOTE - CHIEF COMPLAINT
The patient is a 62y Male complaining of abdominal pain, last chemo x4 days ago at [facility].

## 2018-03-17 NOTE — ED PROVIDER NOTE - CARE PLAN
Principal Discharge DX:	Abdominal pain, unspecified abdominal location  Secondary Diagnosis:	Pancreatic cancer

## 2018-03-17 NOTE — ED PROVIDER NOTE - PMH
Cancer of pancreas    Cervical disc disease    Colon cancer    Colon cancer    Head ache    Hypertension, unspecified type  diet control  Injury  forehead & had sutures ( 2013 )  Obesity    Smoking    Vertigo

## 2018-03-17 NOTE — ED PROVIDER NOTE - PROGRESS NOTE DETAILS
pt complaining of back pain, s/p BM in bed - s/s intact LE, rectal tone intact (chaperone Jose Luis ROBERTS) d/w Dr. Coombs - text page MAR

## 2018-03-17 NOTE — ED PROVIDER NOTE - MEDICAL DECISION MAKING DETAILS
Pt with pancreatic CA p/w worsening abd pain, tender LUQ. Will obtain labs, CT r/o perf/sbo, Dilaudid for pain, IVF, reassess

## 2018-03-17 NOTE — ED ADULT NURSE REASSESSMENT NOTE - NS ED NURSE REASSESS COMMENT FT1
RN Break Coverage : Alert and oriented x 4 . Pt received from ARMANDO aWy in no distress. Pt complaining of pain. Medication given. VSS. Will continue to monitor.

## 2018-03-17 NOTE — ED PROVIDER NOTE - OBJECTIVE STATEMENT
62M with colon CA s/p surgery, pancreatic CA on chemo (last chemo 4 days ago) p/w abd pain. Reports severe upper abd pain L > R, intermittent, started yesterday but worse today. Not relieved by oral Dilaudid/methadone. Endorses mild NB diarrhea. Denies N/V, fever, SOB, CP. Never had this pain before.    Onc: Jorge Alberto

## 2018-03-18 DIAGNOSIS — C25.9 MALIGNANT NEOPLASM OF PANCREAS, UNSPECIFIED: ICD-10-CM

## 2018-03-18 DIAGNOSIS — Z29.9 ENCOUNTER FOR PROPHYLACTIC MEASURES, UNSPECIFIED: ICD-10-CM

## 2018-03-18 DIAGNOSIS — R63.8 OTHER SYMPTOMS AND SIGNS CONCERNING FOOD AND FLUID INTAKE: ICD-10-CM

## 2018-03-18 DIAGNOSIS — R10.9 UNSPECIFIED ABDOMINAL PAIN: ICD-10-CM

## 2018-03-18 DIAGNOSIS — D72.829 ELEVATED WHITE BLOOD CELL COUNT, UNSPECIFIED: ICD-10-CM

## 2018-03-18 DIAGNOSIS — R19.7 DIARRHEA, UNSPECIFIED: ICD-10-CM

## 2018-03-18 DIAGNOSIS — I10 ESSENTIAL (PRIMARY) HYPERTENSION: ICD-10-CM

## 2018-03-18 DIAGNOSIS — M54.9 DORSALGIA, UNSPECIFIED: ICD-10-CM

## 2018-03-18 LAB
ANISOCYTOSIS BLD QL: SLIGHT — SIGNIFICANT CHANGE UP
BASOPHILS NFR SPEC: 0 % — SIGNIFICANT CHANGE UP (ref 0–2)
BUN SERPL-MCNC: 12 MG/DL — SIGNIFICANT CHANGE UP (ref 7–23)
C DIFF TOX GENS STL QL NAA+PROBE: SIGNIFICANT CHANGE UP
CALCIUM SERPL-MCNC: 8.8 MG/DL — SIGNIFICANT CHANGE UP (ref 8.4–10.5)
CHLORIDE SERPL-SCNC: 102 MMOL/L — SIGNIFICANT CHANGE UP (ref 98–107)
CO2 SERPL-SCNC: 22 MMOL/L — SIGNIFICANT CHANGE UP (ref 22–31)
CREAT SERPL-MCNC: 0.52 MG/DL — SIGNIFICANT CHANGE UP (ref 0.5–1.3)
EOSINOPHIL NFR FLD: 0 % — SIGNIFICANT CHANGE UP (ref 0–6)
GLUCOSE SERPL-MCNC: 97 MG/DL — SIGNIFICANT CHANGE UP (ref 70–99)
HCT VFR BLD CALC: 34.3 % — LOW (ref 39–50)
HCT VFR BLD CALC: 35.3 % — LOW (ref 39–50)
HGB BLD-MCNC: 11.6 G/DL — LOW (ref 13–17)
HGB BLD-MCNC: 11.7 G/DL — LOW (ref 13–17)
LYMPHOCYTES NFR SPEC AUTO: 3.5 % — LOW (ref 13–44)
MACROCYTES BLD QL: SLIGHT — SIGNIFICANT CHANGE UP
MAGNESIUM SERPL-MCNC: 1.8 MG/DL — SIGNIFICANT CHANGE UP (ref 1.6–2.6)
MCHC RBC-ENTMCNC: 30.2 PG — SIGNIFICANT CHANGE UP (ref 27–34)
MCHC RBC-ENTMCNC: 30.5 PG — SIGNIFICANT CHANGE UP (ref 27–34)
MCHC RBC-ENTMCNC: 33.1 % — SIGNIFICANT CHANGE UP (ref 32–36)
MCHC RBC-ENTMCNC: 33.8 % — SIGNIFICANT CHANGE UP (ref 32–36)
MCV RBC AUTO: 90.3 FL — SIGNIFICANT CHANGE UP (ref 80–100)
MCV RBC AUTO: 91 FL — SIGNIFICANT CHANGE UP (ref 80–100)
MONOCYTES NFR BLD: 0 % — LOW (ref 2–9)
NEUTROPHIL AB SER-ACNC: 94.7 % — HIGH (ref 43–77)
NRBC # FLD: 0 — SIGNIFICANT CHANGE UP
NRBC # FLD: 0 — SIGNIFICANT CHANGE UP
PHOSPHATE SERPL-MCNC: 3.4 MG/DL — SIGNIFICANT CHANGE UP (ref 2.5–4.5)
PLATELET # BLD AUTO: 162 K/UL — SIGNIFICANT CHANGE UP (ref 150–400)
PLATELET # BLD AUTO: 169 K/UL — SIGNIFICANT CHANGE UP (ref 150–400)
PLATELET COUNT - ESTIMATE: NORMAL — SIGNIFICANT CHANGE UP
PMV BLD: 9.3 FL — SIGNIFICANT CHANGE UP (ref 7–13)
PMV BLD: 9.6 FL — SIGNIFICANT CHANGE UP (ref 7–13)
POIKILOCYTOSIS BLD QL AUTO: SLIGHT — SIGNIFICANT CHANGE UP
POTASSIUM SERPL-MCNC: 4.5 MMOL/L — SIGNIFICANT CHANGE UP (ref 3.5–5.3)
POTASSIUM SERPL-SCNC: 4.5 MMOL/L — SIGNIFICANT CHANGE UP (ref 3.5–5.3)
RBC # BLD: 3.8 M/UL — LOW (ref 4.2–5.8)
RBC # BLD: 3.88 M/UL — LOW (ref 4.2–5.8)
RBC # FLD: 15.7 % — HIGH (ref 10.3–14.5)
RBC # FLD: 15.9 % — HIGH (ref 10.3–14.5)
SODIUM SERPL-SCNC: 136 MMOL/L — SIGNIFICANT CHANGE UP (ref 135–145)
VARIANT LYMPHS # BLD: 1.8 % — SIGNIFICANT CHANGE UP
WBC # BLD: 47.82 K/UL — CRITICAL HIGH (ref 3.8–10.5)
WBC # BLD: 51.38 K/UL — CRITICAL HIGH (ref 3.8–10.5)
WBC # FLD AUTO: 47.82 K/UL — CRITICAL HIGH (ref 3.8–10.5)
WBC # FLD AUTO: 51.38 K/UL — CRITICAL HIGH (ref 3.8–10.5)

## 2018-03-18 PROCEDURE — 99222 1ST HOSP IP/OBS MODERATE 55: CPT | Mod: GC

## 2018-03-18 PROCEDURE — 99223 1ST HOSP IP/OBS HIGH 75: CPT | Mod: GC

## 2018-03-18 PROCEDURE — 12345: CPT | Mod: NC

## 2018-03-18 PROCEDURE — 72131 CT LUMBAR SPINE W/O DYE: CPT | Mod: 26

## 2018-03-18 PROCEDURE — 72128 CT CHEST SPINE W/O DYE: CPT | Mod: 26

## 2018-03-18 RX ORDER — ACETAMINOPHEN 500 MG
1000 TABLET ORAL ONCE
Qty: 0 | Refills: 0 | Status: COMPLETED | OUTPATIENT
Start: 2018-03-18 | End: 2018-03-18

## 2018-03-18 RX ORDER — FINASTERIDE 5 MG/1
5 TABLET, FILM COATED ORAL DAILY
Qty: 0 | Refills: 0 | Status: DISCONTINUED | OUTPATIENT
Start: 2018-03-18 | End: 2018-03-23

## 2018-03-18 RX ORDER — MIRTAZAPINE 45 MG/1
15 TABLET, ORALLY DISINTEGRATING ORAL AT BEDTIME
Qty: 0 | Refills: 0 | Status: DISCONTINUED | OUTPATIENT
Start: 2018-03-18 | End: 2018-03-23

## 2018-03-18 RX ORDER — HYDROMORPHONE HYDROCHLORIDE 2 MG/ML
1 INJECTION INTRAMUSCULAR; INTRAVENOUS; SUBCUTANEOUS ONCE
Qty: 0 | Refills: 0 | Status: DISCONTINUED | OUTPATIENT
Start: 2018-03-18 | End: 2018-03-18

## 2018-03-18 RX ORDER — HYDROMORPHONE HYDROCHLORIDE 2 MG/ML
4 INJECTION INTRAMUSCULAR; INTRAVENOUS; SUBCUTANEOUS EVERY 4 HOURS
Qty: 0 | Refills: 0 | Status: DISCONTINUED | OUTPATIENT
Start: 2018-03-18 | End: 2018-03-19

## 2018-03-18 RX ORDER — PANTOPRAZOLE SODIUM 20 MG/1
40 TABLET, DELAYED RELEASE ORAL
Qty: 0 | Refills: 0 | Status: DISCONTINUED | OUTPATIENT
Start: 2018-03-18 | End: 2018-03-23

## 2018-03-18 RX ORDER — METHADONE HYDROCHLORIDE 40 MG/1
10 TABLET ORAL THREE TIMES A DAY
Qty: 0 | Refills: 0 | Status: DISCONTINUED | OUTPATIENT
Start: 2018-03-18 | End: 2018-03-22

## 2018-03-18 RX ORDER — METOPROLOL TARTRATE 50 MG
25 TABLET ORAL EVERY 12 HOURS
Qty: 0 | Refills: 0 | Status: DISCONTINUED | OUTPATIENT
Start: 2018-03-18 | End: 2018-03-23

## 2018-03-18 RX ORDER — LANOLIN ALCOHOL/MO/W.PET/CERES
6 CREAM (GRAM) TOPICAL AT BEDTIME
Qty: 0 | Refills: 0 | Status: DISCONTINUED | OUTPATIENT
Start: 2018-03-18 | End: 2018-03-23

## 2018-03-18 RX ORDER — HYDROMORPHONE HYDROCHLORIDE 2 MG/ML
8 INJECTION INTRAMUSCULAR; INTRAVENOUS; SUBCUTANEOUS EVERY 4 HOURS
Qty: 0 | Refills: 0 | Status: DISCONTINUED | OUTPATIENT
Start: 2018-03-18 | End: 2018-03-19

## 2018-03-18 RX ORDER — ALPRAZOLAM 0.25 MG
0.5 TABLET ORAL THREE TIMES A DAY
Qty: 0 | Refills: 0 | Status: DISCONTINUED | OUTPATIENT
Start: 2018-03-18 | End: 2018-03-22

## 2018-03-18 RX ORDER — SODIUM CHLORIDE 9 MG/ML
1000 INJECTION INTRAMUSCULAR; INTRAVENOUS; SUBCUTANEOUS
Qty: 0 | Refills: 0 | Status: DISCONTINUED | OUTPATIENT
Start: 2018-03-18 | End: 2018-03-23

## 2018-03-18 RX ORDER — TAMSULOSIN HYDROCHLORIDE 0.4 MG/1
0.4 CAPSULE ORAL AT BEDTIME
Qty: 0 | Refills: 0 | Status: DISCONTINUED | OUTPATIENT
Start: 2018-03-18 | End: 2018-03-23

## 2018-03-18 RX ORDER — LOSARTAN POTASSIUM 100 MG/1
25 TABLET, FILM COATED ORAL DAILY
Qty: 0 | Refills: 0 | Status: DISCONTINUED | OUTPATIENT
Start: 2018-03-18 | End: 2018-03-23

## 2018-03-18 RX ORDER — LANOLIN ALCOHOL/MO/W.PET/CERES
5 CREAM (GRAM) TOPICAL AT BEDTIME
Qty: 0 | Refills: 0 | Status: DISCONTINUED | OUTPATIENT
Start: 2018-03-18 | End: 2018-03-18

## 2018-03-18 RX ADMIN — Medication 1000 MILLIGRAM(S): at 10:00

## 2018-03-18 RX ADMIN — Medication 6 MILLIGRAM(S): at 00:40

## 2018-03-18 RX ADMIN — Medication 6 MILLIGRAM(S): at 21:34

## 2018-03-18 RX ADMIN — HYDROMORPHONE HYDROCHLORIDE 1 MILLIGRAM(S): 2 INJECTION INTRAMUSCULAR; INTRAVENOUS; SUBCUTANEOUS at 21:01

## 2018-03-18 RX ADMIN — PANTOPRAZOLE SODIUM 40 MILLIGRAM(S): 20 TABLET, DELAYED RELEASE ORAL at 05:12

## 2018-03-18 RX ADMIN — Medication 0.5 MILLIGRAM(S): at 13:30

## 2018-03-18 RX ADMIN — Medication 400 MILLIGRAM(S): at 09:33

## 2018-03-18 RX ADMIN — ENOXAPARIN SODIUM 40 MILLIGRAM(S): 100 INJECTION SUBCUTANEOUS at 23:50

## 2018-03-18 RX ADMIN — Medication 400 MILLIGRAM(S): at 03:01

## 2018-03-18 RX ADMIN — SODIUM CHLORIDE 125 MILLILITER(S): 9 INJECTION INTRAMUSCULAR; INTRAVENOUS; SUBCUTANEOUS at 23:50

## 2018-03-18 RX ADMIN — Medication 0.5 MILLIGRAM(S): at 02:40

## 2018-03-18 RX ADMIN — Medication 1000 MILLIGRAM(S): at 03:16

## 2018-03-18 RX ADMIN — HYDROMORPHONE HYDROCHLORIDE 8 MILLIGRAM(S): 2 INJECTION INTRAMUSCULAR; INTRAVENOUS; SUBCUTANEOUS at 13:26

## 2018-03-18 RX ADMIN — HYDROMORPHONE HYDROCHLORIDE 1 MILLIGRAM(S): 2 INJECTION INTRAMUSCULAR; INTRAVENOUS; SUBCUTANEOUS at 21:16

## 2018-03-18 RX ADMIN — Medication 25 MILLIGRAM(S): at 18:04

## 2018-03-18 RX ADMIN — HYDROMORPHONE HYDROCHLORIDE 1 MILLIGRAM(S): 2 INJECTION INTRAMUSCULAR; INTRAVENOUS; SUBCUTANEOUS at 16:05

## 2018-03-18 RX ADMIN — LOSARTAN POTASSIUM 25 MILLIGRAM(S): 100 TABLET, FILM COATED ORAL at 05:12

## 2018-03-18 RX ADMIN — Medication 1000 MILLIGRAM(S): at 23:37

## 2018-03-18 RX ADMIN — HYDROMORPHONE HYDROCHLORIDE 1 MILLIGRAM(S): 2 INJECTION INTRAMUSCULAR; INTRAVENOUS; SUBCUTANEOUS at 16:47

## 2018-03-18 RX ADMIN — HYDROMORPHONE HYDROCHLORIDE 8 MILLIGRAM(S): 2 INJECTION INTRAMUSCULAR; INTRAVENOUS; SUBCUTANEOUS at 02:00

## 2018-03-18 RX ADMIN — FINASTERIDE 5 MILLIGRAM(S): 5 TABLET, FILM COATED ORAL at 13:33

## 2018-03-18 RX ADMIN — MIRTAZAPINE 15 MILLIGRAM(S): 45 TABLET, ORALLY DISINTEGRATING ORAL at 21:34

## 2018-03-18 RX ADMIN — ENOXAPARIN SODIUM 40 MILLIGRAM(S): 100 INJECTION SUBCUTANEOUS at 00:09

## 2018-03-18 RX ADMIN — HYDROMORPHONE HYDROCHLORIDE 8 MILLIGRAM(S): 2 INJECTION INTRAMUSCULAR; INTRAVENOUS; SUBCUTANEOUS at 14:08

## 2018-03-18 RX ADMIN — HYDROMORPHONE HYDROCHLORIDE 1 MILLIGRAM(S): 2 INJECTION INTRAMUSCULAR; INTRAVENOUS; SUBCUTANEOUS at 18:19

## 2018-03-18 RX ADMIN — Medication 25 MILLIGRAM(S): at 05:12

## 2018-03-18 RX ADMIN — Medication 400 MILLIGRAM(S): at 23:22

## 2018-03-18 RX ADMIN — HYDROMORPHONE HYDROCHLORIDE 8 MILLIGRAM(S): 2 INJECTION INTRAMUSCULAR; INTRAVENOUS; SUBCUTANEOUS at 07:06

## 2018-03-18 RX ADMIN — HYDROMORPHONE HYDROCHLORIDE 8 MILLIGRAM(S): 2 INJECTION INTRAMUSCULAR; INTRAVENOUS; SUBCUTANEOUS at 02:58

## 2018-03-18 RX ADMIN — HYDROMORPHONE HYDROCHLORIDE 1 MILLIGRAM(S): 2 INJECTION INTRAMUSCULAR; INTRAVENOUS; SUBCUTANEOUS at 20:28

## 2018-03-18 RX ADMIN — HYDROMORPHONE HYDROCHLORIDE 8 MILLIGRAM(S): 2 INJECTION INTRAMUSCULAR; INTRAVENOUS; SUBCUTANEOUS at 08:06

## 2018-03-18 RX ADMIN — TAMSULOSIN HYDROCHLORIDE 0.4 MILLIGRAM(S): 0.4 CAPSULE ORAL at 21:34

## 2018-03-18 RX ADMIN — METHADONE HYDROCHLORIDE 10 MILLIGRAM(S): 40 TABLET ORAL at 21:37

## 2018-03-18 RX ADMIN — Medication 0.5 MILLIGRAM(S): at 21:00

## 2018-03-18 RX ADMIN — METHADONE HYDROCHLORIDE 10 MILLIGRAM(S): 40 TABLET ORAL at 13:26

## 2018-03-18 RX ADMIN — SODIUM CHLORIDE 125 MILLILITER(S): 9 INJECTION INTRAMUSCULAR; INTRAVENOUS; SUBCUTANEOUS at 16:22

## 2018-03-18 NOTE — PROGRESS NOTE ADULT - PROBLEM SELECTOR PLAN 2
-Patient received Neulasta yesterday with WBC count increasing from 6 to 47 within 24hrs. Spoke with oncology who stated it is possible for this to happen from Neulasta  -Will c/s ID to ensure nothing else to do and that this is purely from Neulasta. Will check C. difficile, GI PCR, stool cultures, O/P  -Patient non-toxic appearing

## 2018-03-18 NOTE — H&P ADULT - HISTORY OF PRESENT ILLNESS
62M h/o colon cancer s/p resection, stage III borderline resectable pancreatic cancer on FOLFIRINOX (last tx 3/13/18) who presents with abdominal pain.    L-sided abdominal pain  got meds  pain is better    In the ED, Tmax 98.2F, HR , -169/77-87, SpO2 100% RA.  The patient received NS 1L x2, IV acetaminophen x1, hydromorphone 1mg x3, 2mg x1.  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    Regarding his oncologic history, the patient was dx with colon cancer 03/2017 after developing abd pain.  05/2017, the patient was found to have mod diff colonic adenoca, PET   scan with regional metastatic disease.  The patient underwent open left colectomy, omentectomy, and mobilization of the splenic flexure 6/13/17.  The patient was then noted to   have a mass on the pancreatic body 6/15/17.  MRI 7/25/17 showed a mass in the distal pancreatic body, initially thought to be pseudocyst.  The patient then had an EUS with FNA   10/6/17, which confirmed mod diff pancreatic adenocarcinoma.  On 11/9/17, the patient underwent open ex lap for possible resection.  However, the SMA was encased by tumor and   could not be resected.  Liver bx negative for malignancy.  The patient was started on FOLFIRINOX 12/5/17.  His last dose was 3/13/18.  RT is on hold at this time.  He may be   switched to immunotherapy in the future.    I-STOP Reference #: [id number] 62M h/o colon cancer s/p resection, stage III borderline resectable pancreatic cancer on FOLFIRINOX (last tx 3/13/18) who presents with abdominal pain.    L-sided abdominal pain  got meds  pain is better    In the ED, Tmax 98.2F, HR , -169/77-87, SpO2 100% RA.  The patient received NS 1L x2, IV acetaminophen x1, hydromorphone 1mg x3, 2mg x1.  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    Regarding his oncologic history, the patient was dx with colon cancer 03/2017 after developing abd pain.  05/2017, the patient was found to have mod diff colonic adenoca, PET scan with regional metastatic disease.  The patient underwent open left colectomy, omentectomy, and mobilization of the splenic flexure 6/13/17.  The patient was then noted to have a mass on the pancreatic body 6/15/17.  MRI 7/25/17 showed a mass in the distal pancreatic body, initially thought to be pseudocyst.  The patient then had an EUS with FNA 10/6/17, which confirmed mod diff pancreatic adenocarcinoma.  On 11/9/17, the patient underwent open ex lap for possible resection.  However, the SMA was encased by tumor and could not be resected.  Liver bx negative for malignancy.  The patient was started on FOLFIRINOX 12/5/17.  His last dose was 3/13/18.  RT is on hold at this time.  He may be switched to immunotherapy in the future.    I-STOP Reference #: [id number] 62M h/o HTN, colon cancer s/p resection, stage III borderline resectable pancreatic cancer on FOLFIRINOX (last tx 3/13/18) who presents with abdominal and back pain.    The patient states that starting yesterday morning, he began to have lower back pain, radiating in a band-like pattern to his epigastric region, 5/10 in severity, stabbing in character.  He states that the pain became more focused on his back rather than his abdomen over the course of the day, to the point where he was unable to walk yesterday due to pain.  He was unable to sleep due to pain as well.  He tried taking his oral hydromorphone without relief.  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  In the ED, Tmax 98.2F, HR , -169/77-87, SpO2 100% RA.  The patient received NS 1L x2, IV acetaminophen x1, hydromorphone 1mg x3, 2mg x1.  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    Currently, the patient reports near-complete resolution of his back pain after IV acetaminophen.  He states that the IV hydromorphone did not help with his pain.  He denies numbness / weakness of the lower extremities.  However, while in the ED, the patient was incontinent of stool x1.  He states that the stool was watery in character and brown in color.  He then had a second episode of watery brown diarrhea after being transferred to his room.  He states that he has not taken his stool softeners / laxatives in days and takes them PRN, after he has not had a BM for ~2 days.    Regarding his oncologic history, the patient was dx with colon cancer 03/2017 after developing abd pain.  05/2017, the patient was found to have mod diff colonic adenoca, PET scan with regional metastatic disease.  The patient underwent open left colectomy, omentectomy, and mobilization of the splenic flexure 6/13/17.  The patient was then noted to have a mass on the pancreatic body 6/15/17.  MRI 7/25/17 showed a mass in the distal pancreatic body, initially thought to be pseudocyst.  The patient then had an EUS with FNA 10/6/17, which confirmed mod diff pancreatic adenocarcinoma.  On 11/9/17, the patient underwent open ex lap for possible resection.  However, the SMA was encased by tumor and could not be resected.  Liver bx negative for malignancy.  The patient was started on FOLFIRINOX 12/5/17.  His last dose was 3/13/18.  RT is on hold at this time.  He may be switched to immunotherapy in the future.    I-STOP Reference #: [id number] 62M h/o HTN, colon cancer s/p resection, stage III borderline resectable pancreatic cancer on FOLFIRINOX (last tx 3/13/18) who presents with abdominal and back pain.    The patient states that starting yesterday morning, he began to have lower back pain, radiating in a band-like pattern to his epigastric region, 5/10 in severity, stabbing in character.  He states that the pain became more focused on his back rather than his abdomen over the course of the day, to the point where he was unable to walk yesterday due to pain.  He was unable to sleep due to pain as well.  He tried taking his oral hydromorphone without relief.  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    In the ED, Tmax 98.2F, HR , -169/77-87, SpO2 100% RA.  The patient received NS 1L x2, IV acetaminophen x1, hydromorphone 1mg x3, 2mg x1.  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    Currently, the patient reports near-complete resolution of his back pain after IV acetaminophen.  He states that the IV hydromorphone did not help with his pain.  He denies numbness / weakness of the lower extremities.  However, while in the ED, the patient was incontinent of stool x1.  He states that the stool was watery in character and brown in color.  He then had a second episode of watery brown diarrhea after being transferred to his room.  He states that he has not taken his stool softeners / laxatives in days and takes them PRN, after he has not had a BM for ~2 days.    Regarding his oncologic history, the patient was dx with colon cancer 03/2017 after developing abd pain.  05/2017, the patient was found to have mod diff colonic adenoca, PET scan with regional metastatic disease.  The patient underwent open left colectomy, omentectomy, and mobilization of the splenic flexure 6/13/17.  The patient was then noted to have a mass on the pancreatic body 6/15/17.  MRI 7/25/17 showed a mass in the distal pancreatic body, initially thought to be pseudocyst.  The patient then had an EUS with FNA 10/6/17, which confirmed mod diff pancreatic adenocarcinoma.  On 11/9/17, the patient underwent open ex lap for possible resection.  However, the SMA was encased by tumor and could not be resected.  Liver bx negative for malignancy.  The patient was started on FOLFIRINOX 12/5/17.  His last dose was 3/13/18.  RT is on hold at this time.  He may be switched to immunotherapy in the future.    I-STOP Reference #: [id number] 62M h/o HTN, colon cancer s/p resection, stage III borderline resectable pancreatic cancer on FOLFIRINOX (last tx 3/13/18) who presents with abdominal and back pain.    The patient states that starting yesterday morning, he began to have lower back pain, radiating in a band-like pattern to his epigastric region, 5/10 in severity, stabbing in character.  He states that the pain became more focused on his back rather than his abdomen over the course of the day, to the point where he was unable to walk yesterday due to pain.  He was unable to sleep due to pain as well.  He tried taking his oral hydromorphone without relief.  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    In the ED, Tmax 98.2F, HR , -169/77-87, SpO2 100% RA.  The patient received NS 1L x2, IV acetaminophen x1, hydromorphone 1mg x3, 2mg x1.  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    Currently, the patient reports near-complete resolution of his back pain after IV acetaminophen.  He states that the IV hydromorphone did not help with his pain.  He denies numbness / weakness of the lower extremities.  However, while in the ED, the patient was incontinent of stool x1.  He states that the stool was watery in character and brown in color.  He then had a second episode of watery brown diarrhea after being transferred to his room.  He states that he has not taken his stool softeners / laxatives in days and takes them PRN, after he has not had a BM for ~2 days.  He states his abdominal pain is mild and is the same quality as his chronic cancer pain.    Regarding his oncologic history, the patient was dx with colon cancer 03/2017 after developing abd pain.  05/2017, the patient was found to have mod diff colonic adenoca, PET scan with regional metastatic disease.  The patient underwent open left colectomy, omentectomy, and mobilization of the splenic flexure 6/13/17.  The patient was then noted to have a mass on the pancreatic body 6/15/17.  MRI 7/25/17 showed a mass in the distal pancreatic body, initially thought to be pseudocyst.  The patient then had an EUS with FNA 10/6/17, which confirmed mod diff pancreatic adenocarcinoma.  On 11/9/17, the patient underwent open ex lap for possible resection.  However, the SMA was encased by tumor and could not be resected.  Liver bx negative for malignancy.  The patient was started on FOLFIRINOX 12/5/17.  His last dose was 3/13/18.  RT is on hold at this time.  He may be switched to immunotherapy in the future per Onc note. 62M h/o HTN, colon cancer s/p resection, stage III borderline resectable pancreatic cancer on FOLFIRINOX (last tx 3/13/18) who presents with abdominal and back pain.    The patient states that starting yesterday morning, he began to have lower back pain, radiating in a band-like pattern to his epigastric region, 5/10 in severity, stabbing in character.  He states that the pain became more focused on his back rather than his abdomen over the course of the day, to the point where he was unable to walk yesterday due to pain.  He was unable to sleep due to pain as well.  He tried taking his oral hydromorphone without relief.  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    In the ED, Tmax 98.2F, HR , -169/77-87, SpO2 100% RA.  The patient received NS 1L x2, IV acetaminophen x1, hydromorphone 1mg x3, 2mg x1.  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    Currently, the patient reports near-complete resolution of his back pain after IV acetaminophen.  He states that the IV hydromorphone did not help with his pain.  He denies numbness / weakness of the lower extremities.  However, while in the ED, the patient was unable to make it to the restroom and had a BM in his bed.  He states that the stool was watery in character and brown in color.  He then had a second episode of watery brown diarrhea after being transferred to his room -- he was able to make it to the restroom this time.  The patient was aware both times that he needed to use the restroom.  He states that he has not taken his stool softeners / laxatives in days and takes them PRN, after he has not had a BM for ~2 days.  He states his abdominal pain is mild and is the same quality as his chronic cancer pain.    Regarding his oncologic history, the patient was dx with colon cancer 03/2017 after developing abd pain.  05/2017, the patient was found to have mod diff colonic adenoca, PET scan with regional metastatic disease.  The patient underwent open left colectomy, omentectomy, and mobilization of the splenic flexure 6/13/17.  The patient was then noted to have a mass on the pancreatic body 6/15/17.  MRI 7/25/17 showed a mass in the distal pancreatic body, initially thought to be pseudocyst.  The patient then had an EUS with FNA 10/6/17, which confirmed mod diff pancreatic adenocarcinoma.  On 11/9/17, the patient underwent open ex lap for possible resection.  However, the SMA was encased by tumor and could not be resected.  Liver bx negative for malignancy.  The patient was started on FOLFIRINOX 12/5/17.  His last dose was 3/13/18.  RT is on hold at this time.  He may be switched to immunotherapy in the future per Onc note. 62M h/o HTN, colon cancer s/p resection, stage III borderline resectable pancreatic cancer on FOLFIRINOX (last tx 3/13/18) who presents with abdominal and back pain.    The patient states that starting yesterday morning, he began to have lower back pain, radiating in a band-like pattern to his epigastric region, 5/10 in severity, stabbing in character.  He states that the pain became more focused on his back rather than his abdomen over the course of the day, to the point where he was unable to walk yesterday due to pain.  He was unable to sleep due to pain as well.  He tried taking his oral hydromorphone without relief.    In the ED, Tmax 98.2F, HR , -169/77-87, SpO2 100% RA.  The patient received NS 1L x2, IV acetaminophen x1, hydromorphone 1mg x3, 2mg x1.  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    Currently, the patient reports near-complete resolution of his back pain after IV acetaminophen.  He states that the IV hydromorphone did not help with his pain.  He denies numbness / weakness of the lower extremities.  However, while in the ED, the patient was unable to make it to the restroom and had a BM in his bed.  He states that the stool was watery in character and brown in color.  He then had a second episode of watery brown diarrhea after being transferred to his room -- he was able to make it to the restroom this time.  The patient was aware both times that he needed to use the restroom.  He states that he has not taken his stool softeners / laxatives in days and takes them PRN, after he has not had a BM for ~2 days.  He states his abdominal pain is mild and is the same quality as his chronic cancer pain.    Regarding his oncologic history, the patient was dx with colon cancer 03/2017 after developing abd pain.  05/2017, the patient was found to have mod diff colonic adenoca, PET scan with regional metastatic disease.  The patient underwent open left colectomy, omentectomy, and mobilization of the splenic flexure 6/13/17.  The patient was then noted to have a mass on the pancreatic body 6/15/17.  MRI 7/25/17 showed a mass in the distal pancreatic body, initially thought to be pseudocyst.  The patient then had an EUS with FNA 10/6/17, which confirmed mod diff pancreatic adenocarcinoma.  On 11/9/17, the patient underwent open ex lap for possible resection.  However, the SMA was encased by tumor and could not be resected.  Liver bx negative for malignancy.  The patient was started on FOLFIRINOX 12/5/17.  His last dose was 3/13/18.  RT is on hold at this time.  He may be switched to immunotherapy in the future per Onc note. 62M h/o HTN, colon cancer s/p resection, stage III borderline resectable pancreatic cancer on FOLFIRINOX (last tx 3/13/18) who presents with abdominal and back pain.    The patient states that starting yesterday morning, he began to have lower back pain, radiating in a band-like pattern to his epigastric region, 5/10 in severity, stabbing in character.  He states that the pain became more focused on his back rather than his abdomen over the course of the day, to the point where he was unable to walk yesterday due to pain.  He was unable to sleep due to pain as well.  He tried taking his oral hydromorphone without relief.  The patient then went to [facility] today, where he received a neulasta shot.  His back pain worsened after that, so he decided to come to the hospital.    In the ED, Tmax 98.2F, HR , -169/77-87, SpO2 100% RA.  The patient received NS 1L x2, IV acetaminophen x1, hydromorphone 1mg x3, 2mg x1.  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    Currently, the patient reports near-complete resolution of his back pain after IV acetaminophen.  He states that the IV hydromorphone did not help with his pain.  He denies numbness / weakness of the lower extremities.  However, while in the ED, the patient was unable to make it to the restroom and had a BM in his bed.  He states that the stool was watery in character and brown in color.  He then had a second episode of watery brown diarrhea after being transferred to his room -- he was able to make it to the restroom this time.  The patient was aware both times that he needed to use the restroom.  He states that he has not taken his stool softeners / laxatives in days and takes them PRN, after he has not had a BM for ~2 days.  He states his abdominal pain is mild and is the same quality as his chronic cancer pain.    Regarding his oncologic history, the patient was dx with colon cancer 03/2017 after developing abd pain.  05/2017, the patient was found to have mod diff colonic adenoca, PET scan with regional metastatic disease.  The patient underwent open left colectomy, omentectomy, and mobilization of the splenic flexure 6/13/17.  The patient was then noted to have a mass on the pancreatic body 6/15/17.  MRI 7/25/17 showed a mass in the distal pancreatic body, initially thought to be pseudocyst.  The patient then had an EUS with FNA 10/6/17, which confirmed mod diff pancreatic adenocarcinoma.  On 11/9/17, the patient underwent open ex lap for possible resection.  However, the SMA was encased by tumor and could not be resected.  Liver bx negative for malignancy.  The patient was started on FOLFIRINOX 12/5/17.  His last dose was 3/13/18.  RT is on hold at this time.  He may be switched to immunotherapy in the future per Onc note.

## 2018-03-18 NOTE — PROVIDER CONTACT NOTE (CRITICAL VALUE NOTIFICATION) - BACKGROUND
Pt admitted for ABD and Back pain. Previous WBC on 03/17/2018 @ 1310 was 6.25 Pt admitted for ABD and Back pain. Previous WBC on 03/17/2018 @ 1310 was 6.25. As per pt, he received CSF yesterday.

## 2018-03-18 NOTE — H&P ADULT - PROBLEM SELECTOR PLAN 1
- Given stool incontinence today in the setting of cancer, there is concern for impending cord compression.  Neurological exam of lower extremities unremarkable, rectal exam performed in ED with intact rectal tone.  No emergent findings at this time.  - MRI ordered.  - Pain control.  - Neulasta may be contributing to back pain as well. - Given stool incontinence today in the setting of cancer, there is concern for impending cord compression.  Neurological exam of lower extremities unremarkable, rectal exam performed in ED with intact rectal tone.  No emergent findings at this time.  - MRI ordered.  - Pain control - hydromorphone, IV acetaminophen PRN.  - Neulasta may be contributing to back pain as well. - Low suspicion for cord compression.  However given active cancer and question of stool incontinence today vs pt being unable to get to the restroom due to immobility from pain, there is concern for cord compression.  Neurological exam of lower extremities unremarkable, rectal exam performed in ED with intact rectal tone.  No emergent findings at this time.  - MRI ordered.  - Pain control - hydromorphone, IV acetaminophen PRN.  - Neulasta may be contributing to back pain as well. - Low suspicion for cord compression.  However given active cancer and question of stool incontinence today vs pt being unable to get to the restroom (likely due to immobility from pain), there is concern for cord compression.  Neurological exam of lower extremities unremarkable, rectal exam performed in ED with intact rectal tone.  No emergent findings at this time.  - MRI ordered.  - Pain control - hydromorphone, IV acetaminophen PRN.  - Neulasta may be contributing to back pain as well; oncology consult in the AM

## 2018-03-18 NOTE — CONSULT NOTE ADULT - SUBJECTIVE AND OBJECTIVE BOX
HPI:    61 yo M PMH Colon Cancer s/p Resection, Stage III Borderline resectable Pancreatic Cancer on FOLFIRINOX (Last Dose 3/13/18) who presented to [facility] on 3/17/18 with abdominal pain and back pain. Patient notes that the day prior to admission   he began to have lower back pain, radiating in a band-like pattern to his epigastric region, 5/10 in severity, stabbing in character.    62M h/o HTN, colon cancer s/p resection, stage III borderline resectable pancreatic cancer on FOLFIRINOX (last tx 3/13/18) who presents with abdominal and back pain.    The patient states that starting yesterday morning, he began to have lower back pain, radiating in a band-like pattern to his epigastric region, 5/10 in severity, stabbing in character.  He states that the pain became more focused on his back rather than his abdomen over the course of the day, to the point where he was unable to walk yesterday due to pain.  He was unable to sleep due to pain as well.  He tried taking his oral hydromorphone without relief.  The patient then went to [facility] today, where he received a neulasta shot.  His back pain worsened after that, so he decided to come to the hospital.    In the ED, Tmax 98.2F, HR , -169/77-87, SpO2 100% RA.  The patient received NS 1L x2, IV acetaminophen x1, hydromorphone 1mg x3, 2mg x1.  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    Currently, the patient reports near-complete resolution of his back pain after IV acetaminophen.  He states that the IV hydromorphone did not help with his pain.  He denies numbness / weakness of the lower extremities.  However, while in the ED, the patient was unable to make it to the restroom and had a BM in his bed.  He states that the stool was watery in character and brown in color.  He then had a second episode of watery brown diarrhea after being transferred to his room -- he was able to make it to the restroom this time.  The patient was aware both times that he needed to use the restroom.  He states that he has not taken his stool softeners / laxatives in days and takes them PRN, after he has not had a BM for ~2 days.  He states his abdominal pain is mild and is the same quality as his chronic cancer pain.    Regarding his oncologic history, the patient was dx with colon cancer 03/2017 after developing abd pain.  05/2017, the patient was found to have mod diff colonic adenoca, PET scan with regional metastatic disease.  The patient underwent open left colectomy, omentectomy, and mobilization of the splenic flexure 6/13/17.  The patient was then noted to have a mass on the pancreatic body 6/15/17.  MRI 7/25/17 showed a mass in the distal pancreatic body, initially thought to be pseudocyst.  The patient then had an EUS with FNA 10/6/17, which confirmed mod diff pancreatic adenocarcinoma.  On 11/9/17, the patient underwent open ex lap for possible resection.  However, the SMA was encased by tumor and could not be resected.  Liver bx negative for malignancy.  The patient was started on FOLFIRINOX 12/5/17.  His last dose was 3/13/18.  RT is on hold at this time.  He may be switched to immunotherapy in the future per Onc note. (18 Mar 2018 00:14)      PAST MEDICAL & SURGICAL HISTORY:  Colon cancer  Cancer of pancreas  Hypertension, unspecified type: diet control  Smoking  Cervical disc disease  Vertigo  Colon cancer  Injury: forehead &amp; had sutures ( 2013 )  Obesity  Head ache  Gastric perforation  H/O exploratory laparotomy  H/O colectomy  S/P colon resection: 6/2017  H/O cervical spine surgery: fusion - 2014 with plates and screws      Allergies  No Known Allergies        ANTIMICROBIALS:      OTHER MEDS: MEDICATIONS  (STANDING):  ALPRAZolam 0.5 three times a day PRN  enoxaparin Injectable 40 every 24 hours  finasteride 5 daily  HYDROmorphone   Tablet 4 every 4 hours PRN  HYDROmorphone   Tablet 8 every 4 hours PRN  losartan 25 daily  melatonin 6 at bedtime  methadone    Tablet 10 three times a day  metoprolol     tartrate 25 every 12 hours  mirtazapine 15 at bedtime  pantoprazole    Tablet 40 before breakfast  tamsulosin 0.4 at bedtime      SOCIAL HISTORY:  [ ] etoh [ ] tobacco [ ] former smoker [ ] IVDU    FAMILY HISTORY:  No pertinent family history in first degree relatives      REVIEW OF SYSTEMS  [  ] ROS unobtainable because:    [  ] All other systems negative except as noted below:	    Constitutional:  [ ] fever [ ] chills  [ ] weight loss  [ ] weakness  Skin:  [ ] rash [ ] phlebitis	  Eyes: [ ] icterus [ ] pain  [ ] discharge	  ENMT: [ ] sore throat  [ ] thrush [ ] ulcers [ ] exudates  Respiratory: [ ] dyspnea [ ] hemoptysis [ ] cough [ ] sputum	  Cardiovascular:  [ ] chest pain [ ] palpitations [ ] edema	  Gastrointestinal:  [ ] nausea [ ] vomiting [ ] diarrhea [ ] constipation [ ] pain	  Genitourinary:  [ ] dysuria [ ] frequency [ ] hematuria [ ] discharge [ ] flank pain  [ ] incontinence  Musculoskeletal:  [ ] myalgias [ ] arthralgias [ ] arthritis  [ ] back pain  Neurological:  [ ] headache [ ] seizures  [ ] confusion/altered mental status  Psychiatric:  [ ] anxiety [ ] depression	  Hematology/Lymphatics:  [ ] lymphadenopathy  Endocrine:  [ ] adrenal [ ] thyroid  Allergic/Immunologic:	 [ ] transplant [ ] seasonal    Vital Signs Last 24 Hrs  T(F): 97.9 (03-18-18 @ 09:37), Max: 98.3 (03-13-18 @ 11:47)    Vital Signs Last 24 Hrs  HR: 92 (03-18-18 @ 09:37) (79 - 102)  BP: 130/62 (03-18-18 @ 09:37) (130/62 - 169/77)  RR: 19 (03-18-18 @ 09:37)  SpO2: 98% (03-18-18 @ 09:37) (98% - 100%)  Wt(kg): --    PHYSICAL EXAM:  General: non-toxic  HEAD/EYES: anicteric, PERRL  ENT:  supple  Cardiovascular:   S1, S2  Respiratory:  clear bilaterally  GI:  soft, non-tender, normal bowel sounds  :  no CVA tenderness   Musculoskeletal:  no synovitis  Neurologic:  grossly non-focal  Skin:  no rash  Lymph: no lymphadenopathy  Psychiatric:  appropriate affect  Vascular:  no phlebitis                                11.7   51.38 )-----------( 169      ( 18 Mar 2018 09:15 )             35.3       03-18    136  |  102  |  12  ----------------------------<  97  4.5   |  22  |  0.52    Ca    8.8      18 Mar 2018 05:48  Phos  3.4     03-18  Mg     1.8     03-18    TPro  7.2  /  Alb  4.0  /  TBili  0.4  /  DBili  x   /  AST  23  /  ALT  20  /  AlkPhos  115  03-17          MICROBIOLOGY:          v            RADIOLOGY: HPI:    61 yo M PMH Colon Cancer s/p Resection, Stage III Borderline resectable Pancreatic Cancer on FOLFIRINOX (Last Dose 3/13/18) who presented to [facility] on 3/17/18 with abdominal pain and back pain. Patient notes that the day prior to admission he began to have lower back pain, radiating in a band-like pattern to his epigastric region, 5/10 in severity, stabbing in character. Of note he also received a dose of Neulasta on the day of admission. Developed diarrhea on the day of admission.     On presentation afebrile, normotensive but tachycardic to > 100. On presentation WBC 6.25 with no bands. Lactic acid of 3.1. CT Abdomen/Pelvis without any acute abnormality. WBC began to rise after admission to max of 51.38 on day of consultation.         PAST MEDICAL & SURGICAL HISTORY:  Colon cancer  Cancer of pancreas  Hypertension, unspecified type: diet control  Smoking  Cervical disc disease  Vertigo  Colon cancer  Injury: forehead &amp; had sutures ( 2013 )  Obesity  Head ache  Gastric perforation  H/O exploratory laparotomy  H/O colectomy  S/P colon resection: 6/2017  H/O cervical spine surgery: fusion - 2014 with plates and screws    Regarding his oncologic history, the patient was dx with colon cancer 03/2017 after developing abd pain.  05/2017, the patient was found to have mod diff colonic adenoca, PET scan with regional metastatic disease.  The patient underwent open left colectomy, omentectomy, and mobilization of the splenic flexure 6/13/17.  The patient was then noted to have a mass on the pancreatic body 6/15/17.  MRI 7/25/17 showed a mass in the distal pancreatic body, initially thought to be pseudocyst.  The patient then had an EUS with FNA 10/6/17, which confirmed mod diff pancreatic adenocarcinoma.  On 11/9/17, the patient underwent open ex lap for possible resection.  However, the SMA was encased by tumor and could not be resected.  Liver bx negative for malignancy.  The patient was started on FOLFIRINOX 12/5/17.  His last dose was 3/13/18.  RT is on hold at this time.  He may be switched to immunotherapy in the future per Onc note. (18 Mar 2018 00:14)    Allergies  No Known Allergies        ANTIMICROBIALS:      OTHER MEDS: MEDICATIONS  (STANDING):  ALPRAZolam 0.5 three times a day PRN  enoxaparin Injectable 40 every 24 hours  finasteride 5 daily  HYDROmorphone   Tablet 4 every 4 hours PRN  HYDROmorphone   Tablet 8 every 4 hours PRN  losartan 25 daily  melatonin 6 at bedtime  methadone    Tablet 10 three times a day  metoprolol     tartrate 25 every 12 hours  mirtazapine 15 at bedtime  pantoprazole    Tablet 40 before breakfast  tamsulosin 0.4 at bedtime      SOCIAL HISTORY:  [ ] etoh [ ] tobacco [ ] former smoker [ ] IVDU    FAMILY HISTORY:  No pertinent family history in first degree relatives      REVIEW OF SYSTEMS  [  ] ROS unobtainable because:    [  ] All other systems negative except as noted below:	    Constitutional:  [ ] fever [ ] chills  [ ] weight loss  [ ] weakness  Skin:  [ ] rash [ ] phlebitis	  Eyes: [ ] icterus [ ] pain  [ ] discharge	  ENMT: [ ] sore throat  [ ] thrush [ ] ulcers [ ] exudates  Respiratory: [ ] dyspnea [ ] hemoptysis [ ] cough [ ] sputum	  Cardiovascular:  [ ] chest pain [ ] palpitations [ ] edema	  Gastrointestinal:  [ ] nausea [ ] vomiting [ ] diarrhea [ ] constipation [ ] pain	  Genitourinary:  [ ] dysuria [ ] frequency [ ] hematuria [ ] discharge [ ] flank pain  [ ] incontinence  Musculoskeletal:  [ ] myalgias [ ] arthralgias [ ] arthritis  [ ] back pain  Neurological:  [ ] headache [ ] seizures  [ ] confusion/altered mental status  Psychiatric:  [ ] anxiety [ ] depression	  Hematology/Lymphatics:  [ ] lymphadenopathy  Endocrine:  [ ] adrenal [ ] thyroid  Allergic/Immunologic:	 [ ] transplant [ ] seasonal    Vital Signs Last 24 Hrs  T(F): 97.9 (03-18-18 @ 09:37), Max: 98.3 (03-13-18 @ 11:47)    Vital Signs Last 24 Hrs  HR: 92 (03-18-18 @ 09:37) (79 - 102)  BP: 130/62 (03-18-18 @ 09:37) (130/62 - 169/77)  RR: 19 (03-18-18 @ 09:37)  SpO2: 98% (03-18-18 @ 09:37) (98% - 100%)  Wt(kg): --    PHYSICAL EXAM:  General: non-toxic  HEAD/EYES: anicteric, PERRL  ENT:  supple  Cardiovascular:   S1, S2  Respiratory:  clear bilaterally  GI:  soft, non-tender, normal bowel sounds  :  no CVA tenderness   Musculoskeletal:  no synovitis  Neurologic:  grossly non-focal  Skin:  no rash  Lymph: no lymphadenopathy  Psychiatric:  appropriate affect  Vascular:  no phlebitis                                11.7   51.38 )-----------( 169      ( 18 Mar 2018 09:15 )             35.3       03-18    136  |  102  |  12  ----------------------------<  97  4.5   |  22  |  0.52    Ca    8.8      18 Mar 2018 05:48  Phos  3.4     03-18  Mg     1.8     03-18    TPro  7.2  /  Alb  4.0  /  TBili  0.4  /  DBili  x   /  AST  23  /  ALT  20  /  AlkPhos  115  03-17          MICROBIOLOGY:    RADIOLOGY:    EXAM:  CT ABDOMEN AND PELVIS OC IC    PROCEDURE DATE:  Mar 17 2018   Mass within the distal pancreatic body/tail slightly decreased in size as compared to 1/20/2018.   Focal wall thickening and surrounding mesenteric fat stranding of the gastric body adjacent to the patient's pancreatic mass is unchanged from 1/20/2018.  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 HPI:    61 yo M PMH Colon Cancer s/p Resection, Stage III Borderline resectable Pancreatic Cancer on FOLFIRINOX (Last Dose 3/13/18) who presented to [facility] on 3/17/18 with abdominal pain and back pain. Patient notes that the day prior to admission he began to have lower back pain, radiating in a band-like pattern to his epigastric region, 5/10 in severity, stabbing in character. Of note he also received a dose of Neulasta on the day of admission. Developed diarrhea on the day of admission. 3-4 BM per day which were watery in consistency. He also developed midline back pain in the thoracic and lumbar areas at same time of abdominal pain onset.     On presentation afebrile, normotensive but tachycardic to > 100. On presentation WBC 6.25 with no bands. Lactic acid of 3.1. CT Abdomen/Pelvis without any acute abnormality. WBC began to rise after admission to max of 51.38 on day of consultation. Notes that his abdominal pain and back pain are minimally improved. However, he does note that his         PAST MEDICAL & SURGICAL HISTORY:  Colon cancer  Cancer of pancreas  Hypertension, unspecified type: diet control  Smoking  Cervical disc disease  Vertigo  Colon cancer  Injury: forehead &amp; had sutures ( 2013 )  Obesity  Head ache  Gastric perforation  H/O exploratory laparotomy  H/O colectomy  S/P colon resection: 6/2017  H/O cervical spine surgery: fusion - 2014 with plates and screws    Regarding his oncologic history, the patient was dx with colon cancer 03/2017 after developing abd pain.  05/2017, the patient was found to have mod diff colonic adenoca, PET scan with regional metastatic disease.  The patient underwent open left colectomy, omentectomy, and mobilization of the splenic flexure 6/13/17.  The patient was then noted to have a mass on the pancreatic body 6/15/17.  MRI 7/25/17 showed a mass in the distal pancreatic body, initially thought to be pseudocyst.  The patient then had an EUS with FNA 10/6/17, which confirmed mod diff pancreatic adenocarcinoma.  On 11/9/17, the patient underwent open ex lap for possible resection.  However, the SMA was encased by tumor and could not be resected.  Liver bx negative for malignancy.  The patient was started on FOLFIRINOX 12/5/17.  His last dose was 3/13/18.  RT is on hold at this time.  He may be switched to immunotherapy in the future per Onc note. (18 Mar 2018 00:14)    Allergies  No Known Allergies        ANTIMICROBIALS:      OTHER MEDS: MEDICATIONS  (STANDING):  ALPRAZolam 0.5 three times a day PRN  enoxaparin Injectable 40 every 24 hours  finasteride 5 daily  HYDROmorphone   Tablet 4 every 4 hours PRN  HYDROmorphone   Tablet 8 every 4 hours PRN  losartan 25 daily  melatonin 6 at bedtime  methadone    Tablet 10 three times a day  metoprolol     tartrate 25 every 12 hours  mirtazapine 15 at bedtime  pantoprazole    Tablet 40 before breakfast  tamsulosin 0.4 at bedtime      SOCIAL HISTORY:  [ ] etoh [ ] tobacco [ ] former smoker [ ] IVDU    FAMILY HISTORY:  No pertinent family history in first degree relatives      REVIEW OF SYSTEMS  [  ] ROS unobtainable because:    [  ] All other systems negative except as noted below:	    Constitutional:  [ ] fever [ ] chills  [ ] weight loss  [ ] weakness  Skin:  [ ] rash [ ] phlebitis	  Eyes: [ ] icterus [ ] pain  [ ] discharge	  ENMT: [ ] sore throat  [ ] thrush [ ] ulcers [ ] exudates  Respiratory: [ ] dyspnea [ ] hemoptysis [ ] cough [ ] sputum	  Cardiovascular:  [ ] chest pain [ ] palpitations [ ] edema	  Gastrointestinal:  [ ] nausea [ ] vomiting [ ] diarrhea [ ] constipation [ ] pain	  Genitourinary:  [ ] dysuria [ ] frequency [ ] hematuria [ ] discharge [ ] flank pain  [ ] incontinence  Musculoskeletal:  [ ] myalgias [ ] arthralgias [ ] arthritis  [ ] back pain  Neurological:  [ ] headache [ ] seizures  [ ] confusion/altered mental status  Psychiatric:  [ ] anxiety [ ] depression	  Hematology/Lymphatics:  [ ] lymphadenopathy  Endocrine:  [ ] adrenal [ ] thyroid  Allergic/Immunologic:	 [ ] transplant [ ] seasonal    Vital Signs Last 24 Hrs  T(F): 97.9 (03-18-18 @ 09:37), Max: 98.3 (03-13-18 @ 11:47)    Vital Signs Last 24 Hrs  HR: 92 (03-18-18 @ 09:37) (79 - 102)  BP: 130/62 (03-18-18 @ 09:37) (130/62 - 169/77)  RR: 19 (03-18-18 @ 09:37)  SpO2: 98% (03-18-18 @ 09:37) (98% - 100%)  Wt(kg): --    PHYSICAL EXAM:  General: non-toxic  HEAD/EYES: anicteric, PERRL  ENT:  supple  Cardiovascular:   S1, S2  Respiratory:  clear bilaterally  GI:  soft, non-tender, normal bowel sounds  :  no CVA tenderness   Musculoskeletal:  no synovitis  Neurologic:  grossly non-focal  Skin:  no rash  Lymph: no lymphadenopathy  Psychiatric:  appropriate affect  Vascular:  no phlebitis                                11.7   51.38 )-----------( 169      ( 18 Mar 2018 09:15 )             35.3       03-18    136  |  102  |  12  ----------------------------<  97  4.5   |  22  |  0.52    Ca    8.8      18 Mar 2018 05:48  Phos  3.4     03-18  Mg     1.8     03-18    TPro  7.2  /  Alb  4.0  /  TBili  0.4  /  DBili  x   /  AST  23  /  ALT  20  /  AlkPhos  115  03-17          MICROBIOLOGY:    RADIOLOGY:    EXAM:  CT ABDOMEN AND PELVIS OC IC    PROCEDURE DATE:  Mar 17 2018   Mass within the distal pancreatic body/tail slightly decreased in size as compared to 1/20/2018.   Focal wall thickening and surrounding mesenteric fat stranding of the gastric body adjacent to the patient's pancreatic mass is unchanged from 1/20/2018.  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 HPI:    61 yo M PMH Colon Cancer s/p Resection, Stage III Borderline resectable Pancreatic Cancer on FOLFIRINOX (Last Dose 3/13/18) who presented to [facility] on 3/17/18 with abdominal pain and back pain. Patient notes that the day prior to admission he began to have lower back pain, radiating in a band-like pattern to his epigastric region, 5/10 in severity, stabbing in character. Of note he also received a dose of Neulasta on the day of admission. Developed diarrhea on the day of admission. 3-4 BM per day which were watery in consistency. He also developed midline back pain in the thoracic and lumbar areas at same time of abdominal pain onset.     On presentation afebrile, normotensive but tachycardic to > 100. On presentation WBC 6.25 with no bands. Lactic acid of 3.1. CT Abdomen/Pelvis without any acute abnormality. WBC began to rise after admission to max of 51.38 on day of consultation. Notes that his abdominal pain and back pain are minimally improved. However, he does note that his bowel movements are more solid in consistency. No recent chills but no fever.         PAST MEDICAL & SURGICAL HISTORY:  Colon cancer  Cancer of pancreas  Hypertension, unspecified type: diet control  Smoking  Cervical disc disease  Vertigo  Colon cancer  Injury: forehead &amp; had sutures ( 2013 )  Obesity  Head ache  Gastric perforation  H/O exploratory laparotomy  H/O colectomy  S/P colon resection: 6/2017  H/O cervical spine surgery: fusion - 2014 with plates and screws    Regarding his oncologic history, the patient was dx with colon cancer 03/2017 after developing abd pain.  05/2017, the patient was found to have mod diff colonic adenoca, PET scan with regional metastatic disease.  The patient underwent open left colectomy, omentectomy, and mobilization of the splenic flexure 6/13/17.  The patient was then noted to have a mass on the pancreatic body 6/15/17.  MRI 7/25/17 showed a mass in the distal pancreatic body, initially thought to be pseudocyst.  The patient then had an EUS with FNA 10/6/17, which confirmed mod diff pancreatic adenocarcinoma.  On 11/9/17, the patient underwent open ex lap for possible resection.  However, the SMA was encased by tumor and could not be resected.  Liver bx negative for malignancy.  The patient was started on FOLFIRINOX 12/5/17.  His last dose was 3/13/18.  RT is on hold at this time.  He may be switched to immunotherapy in the future per Onc note. (18 Mar 2018 00:14)    Allergies  No Known Allergies        ANTIMICROBIALS:      OTHER MEDS: MEDICATIONS  (STANDING):  ALPRAZolam 0.5 three times a day PRN  enoxaparin Injectable 40 every 24 hours  finasteride 5 daily  HYDROmorphone   Tablet 4 every 4 hours PRN  HYDROmorphone   Tablet 8 every 4 hours PRN  losartan 25 daily  melatonin 6 at bedtime  methadone    Tablet 10 three times a day  metoprolol     tartrate 25 every 12 hours  mirtazapine 15 at bedtime  pantoprazole    Tablet 40 before breakfast  tamsulosin 0.4 at bedtime      SOCIAL HISTORY:  >50 pack year smoking history but quit ~1 year ago. Remote EtOH use. no recreational drug use. No recent travel. Worked in construction.     FAMILY HISTORY:  No pertinent family history in first degree relatives      REVIEW OF SYSTEMS  [  ] ROS unobtainable because:    [x  ] All other systems negative except as noted below:	    Constitutional:  [ ] fever [ x] chills  [ ] weight loss  [ ] weakness  Skin:  [ ] rash [ ] phlebitis	  Eyes: [ ] icterus [ ] pain  [ ] discharge	  ENMT: [ ] sore throat  [ ] thrush [ ] ulcers [ ] exudates  Respiratory: [ ] dyspnea [ ] hemoptysis [ ] cough [ ] sputum	  Cardiovascular:  [ ] chest pain [ ] palpitations [ ] edema	  Gastrointestinal:  [ ] nausea [ ] vomiting [ ] diarrhea [ ] constipation [x ] pain	  Genitourinary:  [ ] dysuria [ ] frequency [ ] hematuria [ ] discharge [ ] flank pain  [ ] incontinence  Musculoskeletal:  [ ] myalgias [ ] arthralgias [ ] arthritis  [x ] back pain  Neurological:  [ ] headache [ ] seizures  [ ] confusion/altered mental status  Psychiatric:  [ ] anxiety [ ] depression	  Hematology/Lymphatics:  [ ] lymphadenopathy  Endocrine:  [ ] adrenal [ ] thyroid  Allergic/Immunologic:	 [ ] transplant [ ] seasonal    Vital Signs Last 24 Hrs  T(F): 97.9 (03-18-18 @ 09:37), Max: 98.3 (03-13-18 @ 11:47)    Vital Signs Last 24 Hrs  HR: 92 (03-18-18 @ 09:37) (79 - 102)  BP: 130/62 (03-18-18 @ 09:37) (130/62 - 169/77)  RR: 19 (03-18-18 @ 09:37)  SpO2: 98% (03-18-18 @ 09:37) (98% - 100%)  Wt(kg): --    PHYSICAL EXAMINATION:  General: Alert and Awake, NAD  HEENT: PERRL, EOMI, No subconjunctival hemorrhages, Oropharynx Clear, MMM  Neck: Supple, No ZANA  Cardiac: RRR, No M/R/G  Resp: CTAB, No Wh/Rh/Ra  Abdomen: NBS, ND, Tender to palpation diffusely but worst in the LLQ, , No HSM, No rigidity or guarding  MSK: No LE edema. No stigmata of IE. No evidence of phlebitis. No evidence of synovitis.  Back: No CVA Tenderness, Mild tenderness to percussion at the lumbar and thoracic spine  Skin: No rashes or lesions. Skin is warm and dry to the touch.   Neuro: Alert and Awake. CN 2-12 Grossly intact. Moves all four extremities spontaneously.  Psych: Calm, Pleasant, Cooperative                          11.7   51.38 )-----------( 169      ( 18 Mar 2018 09:15 )             35.3       03-18    136  |  102  |  12  ----------------------------<  97  4.5   |  22  |  0.52    Ca    8.8      18 Mar 2018 05:48  Phos  3.4     03-18  Mg     1.8     03-18    TPro  7.2  /  Alb  4.0  /  TBili  0.4  /  DBili  x   /  AST  23  /  ALT  20  /  AlkPhos  115  03-17          MICROBIOLOGY:    RADIOLOGY:    EXAM:  CT ABDOMEN AND PELVIS OC IC    PROCEDURE DATE:  Mar 17 2018   Mass within the distal pancreatic body/tail slightly decreased in size as compared to 1/20/2018.   Focal wall thickening and surrounding mesenteric fat stranding of the gastric body adjacent to the patient's pancreatic mass is unchanged from 1/20/2018.  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

## 2018-03-18 NOTE — H&P ADULT - NSHPREVIEWOFSYSTEMS_GEN_ALL_CORE
Gen: denies fever, chills  HENT: denies throat pain, nasal congestion  Eye: denies eye pain  CV: denies chest pain, palpitations  Pulm: denies SOB, cough  Abd: + abd pain, diarrhea, incontinence of stool, denies N/V/C  : denies hematuria, dysuria, urinary incontinence  Skin: denies rash, itching  Ext: +back pain, denies LE edema, pain, weakness, numbness  Neuro: denies HA, dizziness, lightheadedness

## 2018-03-18 NOTE — H&P ADULT - ASSESSMENT
62M h/o HTN, colon cancer s/p resection, stage III borderline resectable pancreatic cancer on FOLFIRINOX (last tx 3/13/18) who presents with abdominal and back pain.

## 2018-03-18 NOTE — H&P ADULT - PROBLEM SELECTOR PLAN 2
- Pt states pain is similar to his cancer pain.  - Pain not severe at this time.  - Pain control. - Pt states pain is similar to his cancer pain.  - Pain not severe at this time.  - Pain control as above.

## 2018-03-18 NOTE — CONSULT NOTE ADULT - SUBJECTIVE AND OBJECTIVE BOX
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  He states that the pain became more focused on his back rather than his abdomen over the course of the day, to the point where he was unable to walk yesterday due to pain.  He was unable to sleep due to pain as well.  He tried taking his oral hydromorphone without relief.  The patient then went to [facility] on day of presentation, where he received a neulasta shot.  His back pain worsened after that, so he decided to come to the hospital. 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  He states that the IV hydromorphone did not help with his pain.  He denies numbness / weakness of the lower extremities.  However, while in the ED, the patient was unable to make it to the restroom and had a BM in his bed.  He states that the stool was watery in character and brown in color.  He then had a second episode of watery brown diarrhea after being transferred to his room -- he was able to make it to the restroom this time.  The patient was aware both times that he needed to use the restroom.  He states that he has not taken his stool softeners / laxatives in days and takes them PRN, after he has not had a BM for ~2 days.  He states his abdominal pain is mild and is the same quality as his chronic cancer pain.    PAST MEDICAL & SURGICAL HISTORY:  Colon cancer  Cancer of pancreas  Hypertension, unspecified type: diet control  Smoking  Cervical disc disease  Vertigo  Colon cancer  Injury: forehead &amp; had sutures ( 2013 )  Obesity  Head ache  Gastric perforation  H/O exploratory laparotomy  H/O colectomy  S/P colon resection: 6/2017  H/O cervical spine surgery: fusion - 2014 with plates and screws      Allergies    No Known Allergies    Intolerances        MEDICATIONS  (STANDING):  enoxaparin Injectable 40 milliGRAM(s) SubCutaneous every 24 hours  finasteride 5 milliGRAM(s) Oral daily  losartan 25 milliGRAM(s) Oral daily  melatonin 6 milliGRAM(s) Oral at bedtime  methadone    Tablet 10 milliGRAM(s) Oral three times a day  metoprolol     tartrate 25 milliGRAM(s) Oral every 12 hours  mirtazapine 15 milliGRAM(s) Oral at bedtime  pantoprazole    Tablet 40 milliGRAM(s) Oral before breakfast  tamsulosin 0.4 milliGRAM(s) Oral at bedtime    MEDICATIONS  (PRN):  ALPRAZolam 0.5 milliGRAM(s) Oral three times a day PRN anxiety or insomnia  HYDROmorphone   Tablet 4 milliGRAM(s) Oral every 4 hours PRN Moderate Pain (4 - 6)  HYDROmorphone   Tablet 8 milliGRAM(s) Oral every 4 hours PRN Severe Pain (7 - 10)      FAMILY HISTORY:  No pertinent family history in first degree relatives      SOCIAL HISTORY: No EtOH, no tobacco    REVIEW OF SYSTEMS:    CONSTITUTIONAL: No weakness, fevers or chills  EYES/ENT: No visual changes;  No vertigo or throat pain   NECK: No pain or stiffness  RESPIRATORY: No cough, wheezing, hemoptysis; No shortness of breath  CARDIOVASCULAR: No chest pain or palpitations  GASTROINTESTINAL: No abdominal or epigastric pain. No nausea, vomiting, or hematemesis; No diarrhea or constipation. No melena or hematochezia.  GENITOURINARY: No dysuria, frequency or hematuria  NEUROLOGICAL: No numbness or weakness  SKIN: No itching, burning, rashes, or lesions   All other review of systems is negative unless indicated above.    Height (cm): 180.34 (03-17 @ 23:46)  Weight (kg): 78 (03-17 @ 23:46)  BMI (kg/m2): 24 (03-17 @ 23:46)  BSA (m2): 1.98 (03-17 @ 23:46)    T(F): 97.9 (03-18-18 @ 09:37), Max: 98.2 (03-17-18 @ 21:16)  HR: 92 (03-18-18 @ 09:37)  BP: 130/62 (03-18-18 @ 09:37)  RR: 19 (03-18-18 @ 09:37)  SpO2: 98% (03-18-18 @ 09:37)  Wt(kg): --    GENERAL: NAD, well-developed  HEAD:  Atraumatic, Normocephalic  EYES: EOMI, PERRLA, conjunctiva and sclera clear  NECK: Supple, No JVD  CHEST/LUNG: Clear to auscultation bilaterally; No wheeze  HEART: Regular rate and rhythm; No murmurs, rubs, or gallops  ABDOMEN: Soft, Nontender, Nondistended; Bowel sounds present  EXTREMITIES:  2+ Peripheral Pulses, No clubbing, cyanosis, or edema  NEUROLOGY: non-focal  SKIN: No rashes or lesions                          11.7   51.38 )-----------( 169      ( 18 Mar 2018 09:15 )             35.3       03-18    136  |  102  |  12  ----------------------------<  97  4.5   |  22  |  0.52    Ca    8.8      18 Mar 2018 05:48  Phos  3.4     03-18  Mg     1.8     03-18    TPro  7.2  /  Alb  4.0  /  TBili  0.4  /  DBili  x   /  AST  23  /  ALT  20  /  AlkPhos  115  03-17      Magnesium, Serum: 1.8 mg/dL (03-18 @ 05:48)  Phosphorus Level, Serum: 3.4 mg/dL (03-18 @ 05:48)      < from: CT Abdomen and Pelvis w/ Oral Cont and w/ IV Cont (03.17.18 @ 17:58) >  IMPRESSION: Mass within the distal pancreatic body/tail slightly   decreased in size as compared to 1/20/2018.     Focal wall thickening and surrounding mesenteric fat stranding of the   gastric body adjacent to the patient's pancreatic mass is unchanged from   1/20/2018.    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    < end of copied text >

## 2018-03-18 NOTE — CHART NOTE - NSCHARTNOTEFT_GEN_A_CORE
ADS NIGHT COVERAGE:    Notified by RN that pt does not want Dilaudid 1mg IV anymore as it is not helping his pain. Pt seen at bedside. States that IV Tylenol helps for pain and is requesting if he can have it again. Pt states that pain is preventing him from sleeping. Tylenol IV x1 ordered. Will continue to monitor.    Sussy ADS PA-C  [id number]

## 2018-03-18 NOTE — PROGRESS NOTE ADULT - PROBLEM SELECTOR PLAN 5
- Onc consulted via email.  - Alprazolam PRN anxiety.  - Pain control  - Pt was previously on methadone but was weaned off this medication 01/2018, per pt's wife.  - I-STOP Reference #: [id number]. - Onc recs appreciated  - Continue with alprazolam PRN anxiety.  - Pain control. Per patient and oncology, patient was being weaned off methadone but is still on 10mg po TID. Will resume this dosing.

## 2018-03-18 NOTE — H&P ADULT - FAMILY HISTORY
No pertinent family history in first degree relatives     No pertinent family history in first degree relatives No pertinent family history in first degree relatives

## 2018-03-18 NOTE — CONSULT NOTE ADULT - PROBLEM SELECTOR RECOMMENDATION 9
Back pain mostly resolved, optimized pain control.   Patient unable to tolerate MRI.   Planning on CT spine.   Follow up results, if no dangerous spinal sequelae would be stable from an oncologic standpoint for discharge.   Methadone with hydromorphone PRN for pain control at home. Back pain mostly resolved, optimized pain control.   Patient unable to tolerate MRI.   Planning on CT spine.   Follow up results, if no dangerous spinal sequelae would be stable from an oncologic standpoint for discharge.   Methadone with hydromorphone PRN for pain control - continue pain regimen.

## 2018-03-18 NOTE — H&P ADULT - NSHPPHYSICALEXAM_GEN_ALL_CORE
T(C): 36.2 (03-17-18 @ 23:46), Max: 36.8 (03-17-18 @ 13:40)  HR: 79 (03-17-18 @ 23:46) (79 - 102)  BP: 145/80 (03-17-18 @ 23:46) (130/86 - 169/77)  RR: 18 (03-17-18 @ 23:46) (16 - 18)  SpO2: 100% (03-17-18 @ 23:46) (100% - 100%)    Gen: awake, alert  HENT: neck soft / supple; MMM  Lymph: no LAD noted in neck  Eye: PERRL, sclerae anicteric  CV: normal rate, regular rhythm  Pulm: CTAB, no w/r/r auscultated  Abd: +BS, soft, NT, ND  Skin: warm, dry  Ext: no LE edema  Back: no paraspinal tenderness  Neuro: strength 5/5 in dorsiflexion / plantarflexion, knee flexion and extension, hip flexion; sensation intact to light touch in bilateral lower extremities (thigh and calf), answering questions appropriately, following commands appropriately, recent and remote memory intact  Psych: normal mood / affect T(C): 36.2 (03-17-18 @ 23:46), Max: 36.8 (03-17-18 @ 13:40)  HR: 79 (03-17-18 @ 23:46) (79 - 102)  BP: 145/80 (03-17-18 @ 23:46) (130/86 - 169/77)  RR: 18 (03-17-18 @ 23:46) (16 - 18)  SpO2: 100% (03-17-18 @ 23:46) (100% - 100%)    Gen: awake, alert  HENT: neck soft / supple; MMM  Lymph: no LAD noted in neck  Eye: PERRL, sclerae anicteric  CV: normal rate, regular rhythm  Pulm: CTAB, no w/r/r auscultated  Abd: +BS, soft, TTP epigastric pain without rigidity / guarding, ND  Skin: warm, dry  Ext: no LE edema  Back: no paraspinal tenderness  Neuro: strength 5/5 in dorsiflexion / plantarflexion, knee flexion and extension, hip flexion; sensation intact to light touch in bilateral lower extremities (thigh and calf), answering questions appropriately, following commands appropriately, recent and remote memory intact  Psych: normal mood / affect

## 2018-03-18 NOTE — PROVIDER CONTACT NOTE (CRITICAL VALUE NOTIFICATION) - ASSESSMENT
Pt is A&OX4, VS stable. Denies all complains other than ABD and back pain which has improved post IVPB Tylenol.

## 2018-03-18 NOTE — H&P ADULT - NSHPSOCIALHISTORY_GEN_ALL_CORE
former smoker (1-2ppd x52y, quit 4 mos ago), denies EtOH use, denies illicit drug use  lives with wife, no pets, retired [employer]

## 2018-03-18 NOTE — PROGRESS NOTE ADULT - SUBJECTIVE AND OBJECTIVE BOX
Patient is a 62y old  Male who presents with a chief complaint of abd pain / back pain (18 Mar 2018 00:14)      SUBJECTIVE / OVERNIGHT EVENTS: Pt seen and evaluated at MRI today. Was unable to tolerate the scan 2/2 noise. Reports no further episodes of bowel incontinence, never had bladder incontinence. No numbness or tingling sensation. Back pain has improved. Of note, patient is being tapered off methadone and is not of it yet-currently 10mg po TID.    MEDICATIONS  (STANDING):  enoxaparin Injectable 40 milliGRAM(s) SubCutaneous every 24 hours  finasteride 5 milliGRAM(s) Oral daily  losartan 25 milliGRAM(s) Oral daily  melatonin 6 milliGRAM(s) Oral at bedtime  methadone    Tablet 10 milliGRAM(s) Oral three times a day  metoprolol     tartrate 25 milliGRAM(s) Oral every 12 hours  mirtazapine 15 milliGRAM(s) Oral at bedtime  pantoprazole    Tablet 40 milliGRAM(s) Oral before breakfast  sodium chloride 0.9%. 1000 milliLiter(s) (125 mL/Hr) IV Continuous <Continuous>  tamsulosin 0.4 milliGRAM(s) Oral at bedtime    MEDICATIONS  (PRN):  ALPRAZolam 0.5 milliGRAM(s) Oral three times a day PRN anxiety or insomnia  HYDROmorphone   Tablet 4 milliGRAM(s) Oral every 4 hours PRN Moderate Pain (4 - 6)  HYDROmorphone   Tablet 8 milliGRAM(s) Oral every 4 hours PRN Severe Pain (7 - 10)      Vital Signs Last 24 Hrs  T(C): 36.6 (03-18-18 @ 09:37)  T(F): 97.9 (03-18-18 @ 09:37), Max: 98.2 (03-17-18 @ 21:16)  HR: 92 (03-18-18 @ 09:37) (79 - 102)  BP: 130/62 (03-18-18 @ 09:37)  BP(mean): --  RR: 19 (03-18-18 @ 09:37) (16 - 20)  SpO2: 98% (03-18-18 @ 09:37) (98% - 100%)  Wt(kg): --    CAPILLARY BLOOD GLUCOSE        I&O's Summary      PHYSICAL EXAM:  GENERAL: NAD, well-developed  HEAD:  Atraumatic, Normocephalic  EYES: EOMI, PERRLA, conjunctiva and sclera clear  NECK: Supple, No JVD  CHEST/LUNG: Clear to auscultation bilaterally; No wheeze  HEART: Regular rate and rhythm; No murmurs, rubs, or gallops  ABDOMEN: Soft, Nontender, Nondistended; Bowel sounds present  EXTREMITIES:  2+ Peripheral Pulses, No clubbing, cyanosis, or edema  PSYCH: AAOx3  NEUROLOGY: non-focal  SKIN: No rashes or lesions    LABS:                        11.7   51.38 )-----------( 169      ( 18 Mar 2018 09:15 )             35.3     WBC Count: 47.82 K/uL (03.18.18 @ 05:48)  WBC Count: 6.25 K/uL (03.17.18 @ 13:10)      03-18    136  |  102  |  12  ----------------------------<  97  4.5   |  22  |  0.52    Ca    8.8      18 Mar 2018 05:48  Phos  3.4     03-18  Mg     1.8     03-18    TPro  7.2  /  Alb  4.0  /  TBili  0.4  /  DBili  x   /  AST  23  /  ALT  20  /  AlkPhos  115  03-17      RADIOLOGY & ADDITIONAL TESTS:    Imaging Personally Reviewed:  < from: CT Abdomen and Pelvis w/ Oral Cont and w/ IV Cont (03.17.18 @ 17:58) >  IMPRESSION: Mass within the distal pancreatic body/tail slightly   decreased in size as compared to 1/20/2018.     Focal wall thickening and surrounding mesenteric fat stranding of the   gastric body adjacent to the patient's pancreatic mass is unchanged from   1/20/2018.    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    < end of copied text >      Consultant(s) Notes Reviewed:  Onc. ID    Care Discussed with Consultants/Other Providers: Dr. Arteaga re: leukocytosis - Neulasta can cause this drastic increase in WBC count in 24hrs    Assessment and Plan: Patient is a 62y old  Male who presents with a chief complaint of abd pain / back pain (18 Mar 2018 00:14)      SUBJECTIVE / OVERNIGHT EVENTS: Pt seen and evaluated at MRI today. Was unable to tolerate the scan 2/2 noise. Reports no further episodes of bowel incontinence, never had bladder incontinence. No numbness or tingling sensation. Back pain has improved. Of note, patient is being tapered off methadone and is not off it yet-currently 10mg po TID.    MEDICATIONS  (STANDING):  enoxaparin Injectable 40 milliGRAM(s) SubCutaneous every 24 hours  finasteride 5 milliGRAM(s) Oral daily  losartan 25 milliGRAM(s) Oral daily  melatonin 6 milliGRAM(s) Oral at bedtime  methadone    Tablet 10 milliGRAM(s) Oral three times a day  metoprolol     tartrate 25 milliGRAM(s) Oral every 12 hours  mirtazapine 15 milliGRAM(s) Oral at bedtime  pantoprazole    Tablet 40 milliGRAM(s) Oral before breakfast  sodium chloride 0.9%. 1000 milliLiter(s) (125 mL/Hr) IV Continuous <Continuous>  tamsulosin 0.4 milliGRAM(s) Oral at bedtime    MEDICATIONS  (PRN):  ALPRAZolam 0.5 milliGRAM(s) Oral three times a day PRN anxiety or insomnia  HYDROmorphone   Tablet 4 milliGRAM(s) Oral every 4 hours PRN Moderate Pain (4 - 6)  HYDROmorphone   Tablet 8 milliGRAM(s) Oral every 4 hours PRN Severe Pain (7 - 10)      Vital Signs Last 24 Hrs  T(C): 36.6 (03-18-18 @ 09:37)  T(F): 97.9 (03-18-18 @ 09:37), Max: 98.2 (03-17-18 @ 21:16)  HR: 92 (03-18-18 @ 09:37) (79 - 102)  BP: 130/62 (03-18-18 @ 09:37)  BP(mean): --  RR: 19 (03-18-18 @ 09:37) (16 - 20)  SpO2: 98% (03-18-18 @ 09:37) (98% - 100%)  Wt(kg): --    CAPILLARY BLOOD GLUCOSE        I&O's Summary      PHYSICAL EXAM:  GENERAL: NAD, well-developed  HEAD:  Atraumatic, Normocephalic  EYES: EOMI, PERRLA, conjunctiva and sclera clear  NECK: Supple, No JVD  CHEST/LUNG: Clear to auscultation bilaterally;  HEART: Regular rate and rhythm; No murmurs,  ABDOMEN: Soft, Nontender, Nondistended; Bowel sounds present  EXTREMITIES:  2+ Peripheral Pulses, No clubbing, cyanosis, or edema  PSYCH: AAOx3, calm, cooperative  NEUROLOGY: non-focal, 5/5 strength in UE and LE b/L, no numbness or tingling sensation, rectal tone during incontinence episodes in tact  SKIN: No rashes or lesions    LABS:                        11.7   51.38 )-----------( 169      ( 18 Mar 2018 09:15 )             35.3     WBC Count: 47.82 K/uL (03.18.18 @ 05:48)  WBC Count: 6.25 K/uL (03.17.18 @ 13:10)      03-18    136  |  102  |  12  ----------------------------<  97  4.5   |  22  |  0.52    Ca    8.8      18 Mar 2018 05:48  Phos  3.4     03-18  Mg     1.8     03-18    TPro  7.2  /  Alb  4.0  /  TBili  0.4  /  DBili  x   /  AST  23  /  ALT  20  /  AlkPhos  115  03-17      RADIOLOGY & ADDITIONAL TESTS:    Imaging Personally Reviewed:  < from: CT Abdomen and Pelvis w/ Oral Cont and w/ IV Cont (03.17.18 @ 17:58) >  IMPRESSION: Mass within the distal pancreatic body/tail slightly   decreased in size as compared to 1/20/2018.     Focal wall thickening and surrounding mesenteric fat stranding of the   gastric body adjacent to the patient's pancreatic mass is unchanged from   1/20/2018.    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    < end of copied text >      Consultant(s) Notes Reviewed:  Onc. ID    Care Discussed with Consultants/Other Providers: Dr. Arteaga re: leukocytosis - Neulasta can cause this drastic increase in WBC count in 24hrs, Neurosurgery re: suspicion for cord compression extremely low and can't tolerate MRI    Assessment and Plan:

## 2018-03-18 NOTE — H&P ADULT - PROBLEM SELECTOR PLAN 6
- Enoxaparin.    Dispo: pending MRI.    Shelia Blair MD  PGY-2 | Internal Medicine  [phone] / [phone] - Enoxaparin.  - Consider physical therapy evaluation    Dispo: pending MRI.    Shelia Blair MD  PGY-2 | Internal Medicine  [phone] / [phone]

## 2018-03-18 NOTE — H&P ADULT - PROBLEM SELECTOR PLAN 3
- Onc consulted via email.  - Alprazolam PRN anxiety.  - Pain control.  - I-STOP Reference #: [id number]. - Onc consulted via email.  - Alprazolam PRN anxiety.  - Pain control.  - Pt was previously on methadone but was weaned off this medication 01/2018, per pt's wife.  - I-STOP Reference #: [id number].

## 2018-03-18 NOTE — PROGRESS NOTE ADULT - PROBLEM SELECTOR PLAN 8
- Enoxaparin, IMPROVE=4  - physical therapy evaluation when pain controlled    Dispo: pending MRI.    Shelia Blair MD  PGY-2 | Internal Medicine  [phone] / [phone]

## 2018-03-18 NOTE — H&P ADULT - PSH
Gastric perforation    H/O cervical spine surgery  fusion - 2014 with plates and screws  H/O colectomy    H/O exploratory laparotomy    S/P colon resection  6/2017

## 2018-03-18 NOTE — H&P ADULT - NSHPLABSRESULTS_GEN_ALL_CORE
.  Labs reviewed personally.                          13.6   6.25  )-----------( 218      ( 17 Mar 2018 13:10 )             41.4     Hgb Trend: 13.6<--, 12.2<--  03-17    133<L>  |  96<L>  |  15  ----------------------------<  92  3.9   |  24  |  0.60    Ca    8.5      17 Mar 2018 18:30    TPro  7.2  /  Alb  4.0  /  TBili  0.4  /  DBili  x   /  AST  23  /  ALT  20  /  AlkPhos  115  03-17    Creatinine Trend: 0.60<--, 0.71<--        Imaging reviewed personally.  CT A/P slightly decreased size of pancreatic mass, stable occlusion of splenic vein / proximal SMV    EKG NSR, HR 74, QTc 441

## 2018-03-19 DIAGNOSIS — Z51.5 ENCOUNTER FOR PALLIATIVE CARE: ICD-10-CM

## 2018-03-19 DIAGNOSIS — C18.9 MALIGNANT NEOPLASM OF COLON, UNSPECIFIED: ICD-10-CM

## 2018-03-19 DIAGNOSIS — E44.1 MILD PROTEIN-CALORIE MALNUTRITION: ICD-10-CM

## 2018-03-19 DIAGNOSIS — F41.9 ANXIETY DISORDER, UNSPECIFIED: ICD-10-CM

## 2018-03-19 LAB
ALBUMIN SERPL ELPH-MCNC: 3.5 G/DL — SIGNIFICANT CHANGE UP (ref 3.3–5)
ALP SERPL-CCNC: 142 U/L — HIGH (ref 40–120)
ALT FLD-CCNC: 14 U/L — SIGNIFICANT CHANGE UP (ref 4–41)
AST SERPL-CCNC: 17 U/L — SIGNIFICANT CHANGE UP (ref 4–40)
BILIRUB SERPL-MCNC: 0.2 MG/DL — SIGNIFICANT CHANGE UP (ref 0.2–1.2)
BUN SERPL-MCNC: 10 MG/DL — SIGNIFICANT CHANGE UP (ref 7–23)
CALCIUM SERPL-MCNC: 8.8 MG/DL — SIGNIFICANT CHANGE UP (ref 8.4–10.5)
CHLORIDE SERPL-SCNC: 103 MMOL/L — SIGNIFICANT CHANGE UP (ref 98–107)
CO2 SERPL-SCNC: 21 MMOL/L — LOW (ref 22–31)
CREAT SERPL-MCNC: 0.57 MG/DL — SIGNIFICANT CHANGE UP (ref 0.5–1.3)
GLUCOSE SERPL-MCNC: 105 MG/DL — HIGH (ref 70–99)
HCT VFR BLD CALC: 33.5 % — LOW (ref 39–50)
HGB BLD-MCNC: 11.2 G/DL — LOW (ref 13–17)
MANUAL SMEAR VERIFICATION: SIGNIFICANT CHANGE UP
MCHC RBC-ENTMCNC: 30.4 PG — SIGNIFICANT CHANGE UP (ref 27–34)
MCHC RBC-ENTMCNC: 33.4 % — SIGNIFICANT CHANGE UP (ref 32–36)
MCV RBC AUTO: 91 FL — SIGNIFICANT CHANGE UP (ref 80–100)
NRBC # FLD: 0 — SIGNIFICANT CHANGE UP
PLATELET # BLD AUTO: 160 K/UL — SIGNIFICANT CHANGE UP (ref 150–400)
PMV BLD: 9.7 FL — SIGNIFICANT CHANGE UP (ref 7–13)
POTASSIUM SERPL-MCNC: 3.5 MMOL/L — SIGNIFICANT CHANGE UP (ref 3.5–5.3)
POTASSIUM SERPL-SCNC: 3.5 MMOL/L — SIGNIFICANT CHANGE UP (ref 3.5–5.3)
PROT SERPL-MCNC: 6 G/DL — SIGNIFICANT CHANGE UP (ref 6–8.3)
RBC # BLD: 3.68 M/UL — LOW (ref 4.2–5.8)
RBC # FLD: 15.6 % — HIGH (ref 10.3–14.5)
SODIUM SERPL-SCNC: 141 MMOL/L — SIGNIFICANT CHANGE UP (ref 135–145)
SPECIMEN SOURCE: SIGNIFICANT CHANGE UP
WBC # BLD: 44.15 K/UL — CRITICAL HIGH (ref 3.8–10.5)
WBC # FLD AUTO: 44.15 K/UL — CRITICAL HIGH (ref 3.8–10.5)

## 2018-03-19 PROCEDURE — 99231 SBSQ HOSP IP/OBS SF/LOW 25: CPT

## 2018-03-19 PROCEDURE — 99233 SBSQ HOSP IP/OBS HIGH 50: CPT

## 2018-03-19 RX ORDER — HYDROMORPHONE HYDROCHLORIDE 2 MG/ML
1.5 INJECTION INTRAMUSCULAR; INTRAVENOUS; SUBCUTANEOUS
Qty: 0 | Refills: 0 | Status: DISCONTINUED | OUTPATIENT
Start: 2018-03-19 | End: 2018-03-20

## 2018-03-19 RX ORDER — HYDROMORPHONE HYDROCHLORIDE 2 MG/ML
1 INJECTION INTRAMUSCULAR; INTRAVENOUS; SUBCUTANEOUS ONCE
Qty: 0 | Refills: 0 | Status: DISCONTINUED | OUTPATIENT
Start: 2018-03-19 | End: 2018-03-19

## 2018-03-19 RX ORDER — HYDROMORPHONE HYDROCHLORIDE 2 MG/ML
8 INJECTION INTRAMUSCULAR; INTRAVENOUS; SUBCUTANEOUS
Qty: 0 | Refills: 0 | Status: DISCONTINUED | OUTPATIENT
Start: 2018-03-19 | End: 2018-03-23

## 2018-03-19 RX ORDER — ACETAMINOPHEN 500 MG
1000 TABLET ORAL ONCE
Qty: 0 | Refills: 0 | Status: COMPLETED | OUTPATIENT
Start: 2018-03-19 | End: 2018-03-19

## 2018-03-19 RX ADMIN — MIRTAZAPINE 15 MILLIGRAM(S): 45 TABLET, ORALLY DISINTEGRATING ORAL at 21:30

## 2018-03-19 RX ADMIN — METHADONE HYDROCHLORIDE 10 MILLIGRAM(S): 40 TABLET ORAL at 21:30

## 2018-03-19 RX ADMIN — METHADONE HYDROCHLORIDE 10 MILLIGRAM(S): 40 TABLET ORAL at 14:05

## 2018-03-19 RX ADMIN — HYDROMORPHONE HYDROCHLORIDE 1.5 MILLIGRAM(S): 2 INJECTION INTRAMUSCULAR; INTRAVENOUS; SUBCUTANEOUS at 16:00

## 2018-03-19 RX ADMIN — HYDROMORPHONE HYDROCHLORIDE 1.5 MILLIGRAM(S): 2 INJECTION INTRAMUSCULAR; INTRAVENOUS; SUBCUTANEOUS at 21:21

## 2018-03-19 RX ADMIN — PANTOPRAZOLE SODIUM 40 MILLIGRAM(S): 20 TABLET, DELAYED RELEASE ORAL at 04:50

## 2018-03-19 RX ADMIN — HYDROMORPHONE HYDROCHLORIDE 8 MILLIGRAM(S): 2 INJECTION INTRAMUSCULAR; INTRAVENOUS; SUBCUTANEOUS at 17:06

## 2018-03-19 RX ADMIN — Medication 20 MILLIGRAM(S): at 17:02

## 2018-03-19 RX ADMIN — METHADONE HYDROCHLORIDE 10 MILLIGRAM(S): 40 TABLET ORAL at 04:51

## 2018-03-19 RX ADMIN — HYDROMORPHONE HYDROCHLORIDE 1 MILLIGRAM(S): 2 INJECTION INTRAMUSCULAR; INTRAVENOUS; SUBCUTANEOUS at 04:49

## 2018-03-19 RX ADMIN — HYDROMORPHONE HYDROCHLORIDE 1.5 MILLIGRAM(S): 2 INJECTION INTRAMUSCULAR; INTRAVENOUS; SUBCUTANEOUS at 18:04

## 2018-03-19 RX ADMIN — Medication 1000 MILLIGRAM(S): at 06:41

## 2018-03-19 RX ADMIN — HYDROMORPHONE HYDROCHLORIDE 8 MILLIGRAM(S): 2 INJECTION INTRAMUSCULAR; INTRAVENOUS; SUBCUTANEOUS at 17:34

## 2018-03-19 RX ADMIN — TAMSULOSIN HYDROCHLORIDE 0.4 MILLIGRAM(S): 0.4 CAPSULE ORAL at 21:29

## 2018-03-19 RX ADMIN — HYDROMORPHONE HYDROCHLORIDE 1 MILLIGRAM(S): 2 INJECTION INTRAMUSCULAR; INTRAVENOUS; SUBCUTANEOUS at 05:04

## 2018-03-19 RX ADMIN — Medication 6 MILLIGRAM(S): at 21:30

## 2018-03-19 RX ADMIN — FINASTERIDE 5 MILLIGRAM(S): 5 TABLET, FILM COATED ORAL at 14:05

## 2018-03-19 RX ADMIN — SODIUM CHLORIDE 125 MILLILITER(S): 9 INJECTION INTRAMUSCULAR; INTRAVENOUS; SUBCUTANEOUS at 17:37

## 2018-03-19 RX ADMIN — Medication 0.5 MILLIGRAM(S): at 12:56

## 2018-03-19 RX ADMIN — Medication 0.5 MILLIGRAM(S): at 21:29

## 2018-03-19 RX ADMIN — SODIUM CHLORIDE 125 MILLILITER(S): 9 INJECTION INTRAMUSCULAR; INTRAVENOUS; SUBCUTANEOUS at 21:29

## 2018-03-19 RX ADMIN — Medication 25 MILLIGRAM(S): at 04:50

## 2018-03-19 RX ADMIN — LOSARTAN POTASSIUM 25 MILLIGRAM(S): 100 TABLET, FILM COATED ORAL at 04:51

## 2018-03-19 RX ADMIN — HYDROMORPHONE HYDROCHLORIDE 1.5 MILLIGRAM(S): 2 INJECTION INTRAMUSCULAR; INTRAVENOUS; SUBCUTANEOUS at 13:47

## 2018-03-19 RX ADMIN — Medication 400 MILLIGRAM(S): at 06:26

## 2018-03-19 RX ADMIN — HYDROMORPHONE HYDROCHLORIDE 1.5 MILLIGRAM(S): 2 INJECTION INTRAMUSCULAR; INTRAVENOUS; SUBCUTANEOUS at 15:10

## 2018-03-19 RX ADMIN — HYDROMORPHONE HYDROCHLORIDE 1.5 MILLIGRAM(S): 2 INJECTION INTRAMUSCULAR; INTRAVENOUS; SUBCUTANEOUS at 21:36

## 2018-03-19 RX ADMIN — Medication 25 MILLIGRAM(S): at 18:03

## 2018-03-19 RX ADMIN — HYDROMORPHONE HYDROCHLORIDE 1.5 MILLIGRAM(S): 2 INJECTION INTRAMUSCULAR; INTRAVENOUS; SUBCUTANEOUS at 12:40

## 2018-03-19 RX ADMIN — HYDROMORPHONE HYDROCHLORIDE 1.5 MILLIGRAM(S): 2 INJECTION INTRAMUSCULAR; INTRAVENOUS; SUBCUTANEOUS at 19:30

## 2018-03-19 NOTE — PROVIDER CONTACT NOTE (CRITICAL VALUE NOTIFICATION) - SITUATION
Patient's WBCs elevated - has been elevated since admission; patient has pancreatic cancer and colon cancer, on chemotherapy.

## 2018-03-19 NOTE — CONSULT NOTE ADULT - SUBJECTIVE AND OBJECTIVE BOX
HPI:  62M h/o HTN, colon cancer s/p resection, stage III borderline resectable pancreatic cancer on FOLFIRINOX (last tx 3/13/18) who presents with abdominal and back pain.    The patient states that starting yesterday morning, he began to have lower back pain, radiating in a band-like pattern to his epigastric region, 5/10 in severity, stabbing in character.  He states that the pain became more focused on his back rather than his abdomen over the course of the day, to the point where he was unable to walk yesterday due to pain.  He was unable to sleep due to pain as well.  He tried taking his oral hydromorphone without relief.  The patient then went to [facility] today, where he received a neulasta shot.  His back pain worsened after that, so he decided to come to the hospital.    In the ED, Tmax 98.2F, HR , -169/77-87, SpO2 100% RA.  The patient received NS 1L x2, IV acetaminophen x1, hydromorphone 1mg x3, 2mg x1.  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    Currently, the patient reports near-complete resolution of his back pain after IV acetaminophen.  He states that the IV hydromorphone did not help with his pain.  He denies numbness / weakness of the lower extremities.  However, while in the ED, the patient was unable to make it to the restroom and had a BM in his bed.  He states that the stool was watery in character and brown in color.  He then had a second episode of watery brown diarrhea after being transferred to his room -- he was able to make it to the restroom this time.  The patient was aware both times that he needed to use the restroom.  He states that he has not taken his stool softeners / laxatives in days and takes them PRN, after he has not had a BM for ~2 days.  He states his abdominal pain is mild and is the same quality as his chronic cancer pain.    Regarding his oncologic history, the patient was dx with colon cancer 03/2017 after developing abd pain.  05/2017, the patient was found to have mod diff colonic adenoca, PET scan with regional metastatic disease.  The patient underwent open left colectomy, omentectomy, and mobilization of the splenic flexure 6/13/17.  The patient was then noted to have a mass on the pancreatic body 6/15/17.  MRI 7/25/17 showed a mass in the distal pancreatic body, initially thought to be pseudocyst.  The patient then had an EUS with FNA 10/6/17, which confirmed mod diff pancreatic adenocarcinoma.  On 11/9/17, the patient underwent open ex lap for possible resection.  However, the SMA was encased by tumor and could not be resected.  Liver bx negative for malignancy.  The patient was started on FOLFIRINOX 12/5/17.  His last dose was 3/13/18.  RT is on hold at this time.  He may be switched to immunotherapy in the future per Onc note. (18 Mar 2018 00:14)      PERTINENT PMH REVIEWED:  [ ] YES [ ] NO           SOCIAL HISTORY:  Significant other/partner:  [ ] YES  [ ] NO            Children:  [ ] YES  [ ] NO                   Church/Spirituality:  Subtance hx:  [ ] YES   [ ] NO           Tobacco hx:  [ ] YES  [ ] NO             Alcohol hx: [ ] YES  [ ] NO        Home Opiod hx:  [ ] YES  [ ] NO   Living Situation: [ ] Home  [ ] Long term care  [ ] Rehab    REFERRALS:   [ ] Chaplaincy  [ ] Hospice  [ ] Child Life  [ ] Social Work  [ ] Case management [ ] Holistic Therapy     FAMILY HISTORY:  No pertinent family history in first degree relatives    [ ] Family history non contributory     BASELINE ADLs (prior to admission):  Independent [ ] moderately [ ] fully   Dependent   [ ] moderately [ ] fully    ADVANCE DIRECTIVES:  [ ] YES [ ] NO   DNR [ ] YES [ ] NO                      MOLST  [ ] YES [ ] NO    Living Will  [ ] YES [ ] NO    Health Care Proxy [ ] YES  [ ] NO      [ ] Surrogate  [ ] HCP  [ ] Guardian:                                                                  Phone#:    Allergies    No Known Allergies    Intolerances        MEDICATIONS  (STANDING):  enoxaparin Injectable 40 milliGRAM(s) SubCutaneous every 24 hours  finasteride 5 milliGRAM(s) Oral daily  losartan 25 milliGRAM(s) Oral daily  melatonin 6 milliGRAM(s) Oral at bedtime  methadone    Tablet 10 milliGRAM(s) Oral three times a day  metoprolol     tartrate 25 milliGRAM(s) Oral every 12 hours  mirtazapine 15 milliGRAM(s) Oral at bedtime  pantoprazole    Tablet 40 milliGRAM(s) Oral before breakfast  sodium chloride 0.9%. 1000 milliLiter(s) (125 mL/Hr) IV Continuous <Continuous>  tamsulosin 0.4 milliGRAM(s) Oral at bedtime    MEDICATIONS  (PRN):  ALPRAZolam 0.5 milliGRAM(s) Oral three times a day PRN anxiety or insomnia  dicyclomine 20 milliGRAM(s) Oral four times a day before meals PRN crampy abdominal pain  HYDROmorphone   Tablet 8 milliGRAM(s) Oral every 3 hours PRN mild to moderate  HYDROmorphone  Injectable 1.5 milliGRAM(s) IV Push every 3 hours PRN Severe Pain (7 - 10)      PRESENT SYMPTOMS:  Source: [ ] Patient   [ ] Family   [ ] Team     Pain: [ ] YES [ ] NO  OLDCARTS:     Dyspnea: [ ] YES [ ] NO   Anxiety: [ ] YES [ ] NO  Fatigue: [ ] YES [ ] NO   Nausea: [ ] YES [ ] NO  Loss of appetite: [ ] YES [ ] NO   Constipation: [ ] YES [ ] NO     Other Symptoms:  [ ] All other review of systems negative   [ ] Unable to obtain due to poor mentation     Karnofsky Performance Score/Palliative Performance Status Version 2:         %  Protein Calorie Malutrition:  [ ] Mild   [ ] Moderate   [ ] Severe     Vital Signs Last 24 Hrs  T(C): 36.9 (19 Mar 2018 14:00), Max: 37 (19 Mar 2018 04:48)  T(F): 98.4 (19 Mar 2018 14:00), Max: 98.6 (19 Mar 2018 04:48)  HR: 95 (19 Mar 2018 14:00) (93 - 104)  BP: 136/76 (19 Mar 2018 14:00) (124/82 - 136/76)  BP(mean): --  RR: 19 (19 Mar 2018 14:00) (19 - 22)  SpO2: 99% (19 Mar 2018 14:00) (99% - 100%)    Physical Exam:    General: [ ] Alert,  A&O x     [ ] lethargic   [ ] Agitated   [ ] Cachexia   HEENT: [ ] Normal   [ ] Dry mouth   [ ] ET Tube    [ ] Trach   Lungs: [ ] Clear [ ] Rhonchi  [ ] Crackles [ ] Wheezing [ ] Tachypnea  [ ] Audible excessive secretions   Cardiovascular:  [ ] Regular rate and rhythm  [ ] Irregular [ ] Tachycardia   [ ] Bradycardia   Abdomen: [ ] Soft  [ ] Distended  [ ]  [ ] +BS  [ ] Non tender [ ] Tender  [ ]PEG   [ ] NGT   Last BM:     Genitourinary: [ ] Normal [ ] Incontinent   [ ] Oliguria/Anuria   [ ] Lopez  Musculoskeletal:  [ ] Normal   [ ] Generalized weakness  [ ] Bedbound   Neurological: [ ] No focal deficits  [ ] Cognitive impairment     Skin: [ ] Normal   [ ] Pressure ulcers     LABS:                        11.2   44.15 )-----------( 160      ( 19 Mar 2018 05:24 )             33.5     03-19    141  |  103  |  10  ----------------------------<  105<H>  3.5   |  21<L>  |  0.57    Ca    8.8      19 Mar 2018 05:24  Phos  3.4     03-18  Mg     1.8     03-18    TPro  6.0  /  Alb  3.5  /  TBili  0.2  /  DBili  x   /  AST  17  /  ALT  14  /  AlkPhos  142<H>  03-19        I&O's Summary    19 Mar 2018 07:01  -  19 Mar 2018 15:09  --------------------------------------------------------  IN: 240 mL / OUT: 0 mL / NET: 240 mL        RADIOLOGY & ADDITIONAL STUDIES: HPI:  62M h/o HTN, colon cancer s/p resection, stage III borderline resectable pancreatic cancer on FOLFIRINOX (last tx 3/13/18) who presents with abdominal and back pain.    The patient states that starting yesterday morning, he began to have lower back pain, radiating in a band-like pattern to his epigastric region, 5/10 in severity, stabbing in character.  He states that the pain became more focused on his back rather than his abdomen over the course of the day, to the point where he was unable to walk yesterday due to pain.  He was unable to sleep due to pain as well.  He tried taking his oral hydromorphone without relief.  The patient then went to [facility] today, where he received a neulasta shot.  His back pain worsened after that, so he decided to come to the hospital.    In the ED, Tmax 98.2F, HR , -169/77-87, SpO2 100% RA.  The patient received NS 1L x2, IV acetaminophen x1, hydromorphone 1mg x3, 2mg x1.  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    Currently, the patient reports near-complete resolution of his back pain after IV acetaminophen.  He states that the IV hydromorphone did not help with his pain.  He denies numbness / weakness of the lower extremities.  However, while in the ED, the patient was unable to make it to the restroom and had a BM in his bed.  He states that the stool was watery in character and brown in color.  He then had a second episode of watery brown diarrhea after being transferred to his room -- he was able to make it to the restroom this time.  The patient was aware both times that he needed to use the restroom.  He states that he has not taken his stool softeners / laxatives in days and takes them PRN, after he has not had a BM for ~2 days.  He states his abdominal pain is mild and is the same quality as his chronic cancer pain.    Regarding his oncologic history, the patient was dx with colon cancer 03/2017 after developing abd pain.  05/2017, the patient was found to have mod diff colonic adenoca, PET scan with regional metastatic disease.  The patient underwent open left colectomy, omentectomy, and mobilization of the splenic flexure 6/13/17.  The patient was then noted to have a mass on the pancreatic body 6/15/17.  MRI 7/25/17 showed a mass in the distal pancreatic body, initially thought to be pseudocyst.  The patient then had an EUS with FNA 10/6/17, which confirmed mod diff pancreatic adenocarcinoma.  On 11/9/17, the patient underwent open ex lap for possible resection.  However, the SMA was encased by tumor and could not be resected.  Liver bx negative for malignancy.  The patient was started on FOLFIRINOX 12/5/17.  His last dose was 3/13/18.  RT is on hold at this time.  He may be switched to immunotherapy in the future per Onc note. (18 Mar 2018 00:14)      PERTINENT PMH REVIEWED:  [x ] YES [ ] NO           SOCIAL HISTORY:  Significant other/partner:  [x ] YES  [ ] NO            Children:  [x ] YES  [ ] NO                   Judaism/Spirituality: TBD  Subtance hx:  [ ] YES   [ x] NO           Tobacco hx:  [  x] YES  [  ] NO             Alcohol hx: [x ] YES  [ ] NO        Home Opiod hx:  [ ] YES  [x ] NO   Living Situation: [ ] Home  [ ] Long term care  [ ] Rehab    REFERRALS:   [ ] Chaplaincy  [ ] Hospice  [ ] Child Life  [ ] Social Work  [ ] Case management [ ] Holistic Therapy     FAMILY HISTORY:  No pertinent family history in first degree relatives    [ x] Family history non contributory     BASELINE ADLs (prior to admission):  Independent [ ] moderately [ ] fully   Dependent   [ x] moderately [ ] fully    ADVANCE DIRECTIVES:  [ ] YES [ x ] NO   DNR [ ] YES [x ] NO                      MOLST  [ ] YES [x ] NO    Living Will  [ ] YES [x ] NO    Health Care Proxy [ ] YES  [x ] NO      [ ] Surrogate  [ ] HCP  [ ] Guardian:                                                                  Phone#:    Allergies    No Known Allergies    Intolerances        MEDICATIONS  (STANDING):  enoxaparin Injectable 40 milliGRAM(s) SubCutaneous every 24 hours  finasteride 5 milliGRAM(s) Oral daily  losartan 25 milliGRAM(s) Oral daily  melatonin 6 milliGRAM(s) Oral at bedtime  methadone    Tablet 10 milliGRAM(s) Oral three times a day  metoprolol     tartrate 25 milliGRAM(s) Oral every 12 hours  mirtazapine 15 milliGRAM(s) Oral at bedtime  pantoprazole    Tablet 40 milliGRAM(s) Oral before breakfast  sodium chloride 0.9%. 1000 milliLiter(s) (125 mL/Hr) IV Continuous <Continuous>  tamsulosin 0.4 milliGRAM(s) Oral at bedtime    MEDICATIONS  (PRN):  ALPRAZolam 0.5 milliGRAM(s) Oral three times a day PRN anxiety or insomnia  dicyclomine 20 milliGRAM(s) Oral four times a day before meals PRN crampy abdominal pain  HYDROmorphone   Tablet 8 milliGRAM(s) Oral every 3 hours PRN mild to moderate  HYDROmorphone  Injectable 1.5 milliGRAM(s) IV Push every 3 hours PRN Severe Pain (7 - 10)      PRESENT SYMPTOMS:  Source: [x ] Patient   [ ] Family   [ ] Team     Pain: [x ] YES [ ] NO  OLDCARTS:     Dyspnea: [ ] YES [x ] NO   Anxiety: [ x] YES [ ] NO  Fatigue: [ ] YES [ x] NO   Nausea: [ ] YES [x ] NO  Loss of appetite: [x ] YES [ ] NO   Constipation: [ ] YES [ x] NO     Other Symptoms:  [ ] All other review of systems negative   [x ] Unable to obtain due to poor mentation     Karnofsky Performance Score/Palliative Performance Status Version 2:   50      %  Protein Calorie Malutrition:  [x ] Mild   [ ] Moderate   [ ] Severe     Vital Signs Last 24 Hrs  T(C): 36.9 (19 Mar 2018 14:00), Max: 37 (19 Mar 2018 04:48)  T(F): 98.4 (19 Mar 2018 14:00), Max: 98.6 (19 Mar 2018 04:48)  HR: 95 (19 Mar 2018 14:00) (93 - 104)  BP: 136/76 (19 Mar 2018 14:00) (124/82 - 136/76)  BP(mean): --  RR: 19 (19 Mar 2018 14:00) (19 - 22)  SpO2: 99% (19 Mar 2018 14:00) (99% - 100%)    Physical Exam:    General: [ x] Alert,  A&O x     [ ] lethargic   [ ] Agitated   [ ] Cachexia   HEENT: [ ] Normal   [x ] Dry mouth   [ ] ET Tube    [ ] Trach   Lungs: x[x ] Clear [ ] Rhonchi  [ ] Crackles [ ] Wheezing [ ] Tachypnea  [ ] Audible excessive secretions   Cardiovascular:  [x ] Regular rate and rhythm  [ ] Irregular [ ] Tachycardia   [ ] Bradycardia   Abdomen: [x ] Soft  [ ] Distended  [ ]  [ ] +BS  [ ] Non tender [x ] Tender  [ ]PEG   [ ] NGT   Last BM:     Genitourinary: [ x] Normal [ ] Incontinent   [ ] Oliguria/Anuria   [ ] Lopez  Musculoskeletal:  [ x] Normal   [ ] Generalized weakness  [ ] Bedbound   Neurological: [x ] No focal deficits  [ ] Cognitive impairment     Skin: [ x] Normal   [ ] Pressure ulcers     LABS:                        11.2   44.15 )-----------( 160      ( 19 Mar 2018 05:24 )             33.5     03-19    141  |  103  |  10  ----------------------------<  105<H>  3.5   |  21<L>  |  0.57    Ca    8.8      19 Mar 2018 05:24  Phos  3.4     03-18  Mg     1.8     03-18    TPro  6.0  /  Alb  3.5  /  TBili  0.2  /  DBili  x   /  AST  17  /  ALT  14  /  AlkPhos  142<H>  03-19        I&O's Summary    19 Mar 2018 07:01  -  19 Mar 2018 15:09  --------------------------------------------------------  IN: 240 mL / OUT: 0 mL / NET: 240 mL        RADIOLOGY & ADDITIONAL STUDIES: HPI:  62M h/o HTN, colon cancer s/p resection, stage III borderline resectable pancreatic cancer on FOLFIRINOX (last tx 3/13/18) who presents with abdominal and back pain.    The patient states that starting yesterday morning, he began to have lower back pain, radiating in a band-like pattern to his epigastric region, 5/10 in severity, stabbing in character.  He states that the pain became more focused on his back rather than his abdomen over the course of the day, to the point where he was unable to walk yesterday due to pain.  He was unable to sleep due to pain as well.  He tried taking his oral hydromorphone without relief.  The patient then went to [facility] today, where he received a neulasta shot.  His back pain worsened after that, so he decided to come to the hospital.    In the ED, Tmax 98.2F, HR , -169/77-87, SpO2 100% RA.  The patient received NS 1L x2, IV acetaminophen x1, hydromorphone 1mg x3, 2mg x1.  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    Currently, the patient reports near-complete resolution of his back pain after IV acetaminophen.  He states that the IV hydromorphone did not help with his pain.  He denies numbness / weakness of the lower extremities.  However, while in the ED, the patient was unable to make it to the restroom and had a BM in his bed.  He states that the stool was watery in character and brown in color.  He then had a second episode of watery brown diarrhea after being transferred to his room -- he was able to make it to the restroom this time.  The patient was aware both times that he needed to use the restroom.  He states that he has not taken his stool softeners / laxatives in days and takes them PRN, after he has not had a BM for ~2 days.  He states his abdominal pain is mild and is the same quality as his chronic cancer pain.    Regarding his oncologic history, the patient was dx with colon cancer 03/2017 after developing abd pain.  05/2017, the patient was found to have mod diff colonic adenoca, PET scan with regional metastatic disease.  The patient underwent open left colectomy, omentectomy, and mobilization of the splenic flexure 6/13/17.  The patient was then noted to have a mass on the pancreatic body 6/15/17.  MRI 7/25/17 showed a mass in the distal pancreatic body, initially thought to be pseudocyst.  The patient then had an EUS with FNA 10/6/17, which confirmed mod diff pancreatic adenocarcinoma.  On 11/9/17, the patient underwent open ex lap for possible resection.  However, the SMA was encased by tumor and could not be resected.  Liver bx negative for malignancy.  The patient was started on FOLFIRINOX 12/5/17.  His last dose was 3/13/18.  RT is on hold at this time.  He may be switched to immunotherapy in the future per Onc note. (18 Mar 2018 00:14)      PERTINENT PMH REVIEWED:  [x ] YES [ ] NO           SOCIAL HISTORY:  Significant other/partner:  [x ] YES  [ ] NO            Children:  [x ] YES  [ ] NO                   Nondenominational/Spirituality: Mormon  Subtance hx:  [ ] YES   [ x] NO           Tobacco hx:  [  x] YES  [  ] NO             Alcohol hx: [x ] YES  [ ] NO        Home Opiod hx:  [x ] YES  [ ] NO   Living Situation: [x ] Home  [ ] Long term care  [ ] Rehab    REFERRALS:   [ ] Chaplaincy  [ ] Hospice  [ ] Child Life  [ ] Social Work  [ ] Case management [ ] Holistic Therapy     FAMILY HISTORY:  No pertinent family history in first degree relatives    [ x] Family history non contributory     BASELINE ADLs (prior to admission):  Independent [ ] moderately [ ] fully   Dependent   [ x] moderately [ ] fully    ADVANCE DIRECTIVES:  [ x] YES [  ] NO   DNR [ ] YES [x ] NO                      MOLST  [ ] YES [x ] NO    Living Will  [ ] YES [x ] NO    Health Care Proxy [x ] YES  [ ] NO      [ ] Surrogate  [ x] HCP Muriel Amezquita (dtr)          Phone#:[phone]    Allergies    No Known Allergies    Intolerances        MEDICATIONS  (STANDING):  enoxaparin Injectable 40 milliGRAM(s) SubCutaneous every 24 hours  finasteride 5 milliGRAM(s) Oral daily  losartan 25 milliGRAM(s) Oral daily  melatonin 6 milliGRAM(s) Oral at bedtime  methadone    Tablet 10 milliGRAM(s) Oral three times a day  metoprolol     tartrate 25 milliGRAM(s) Oral every 12 hours  mirtazapine 15 milliGRAM(s) Oral at bedtime  pantoprazole    Tablet 40 milliGRAM(s) Oral before breakfast  sodium chloride 0.9%. 1000 milliLiter(s) (125 mL/Hr) IV Continuous <Continuous>  tamsulosin 0.4 milliGRAM(s) Oral at bedtime    MEDICATIONS  (PRN):  ALPRAZolam 0.5 milliGRAM(s) Oral three times a day PRN anxiety or insomnia  dicyclomine 20 milliGRAM(s) Oral four times a day before meals PRN crampy abdominal pain  HYDROmorphone   Tablet 8 milliGRAM(s) Oral every 3 hours PRN mild to moderate  HYDROmorphone  Injectable 1.5 milliGRAM(s) IV Push every 3 hours PRN Severe Pain (7 - 10)      PRESENT SYMPTOMS:  Source: [x ] Patient   [ ] Family   [ ] Team     Pain: [x ] YES [ ] NO  OLDCARTS: LLQ sharp stabbing pain intermittent, radiating across to RLQ, crampy, worse after eating, relieved partially by BM and IV dilaudid.     Dyspnea: [ ] YES [x ] NO   Anxiety: [ x] YES [ ] NO  Fatigue: [ ] YES [ x] NO   Nausea: [ ] YES [x ] NO  Loss of appetite: [x ] YES [ ] NO   Constipation: [ ] YES [ x] NO     Other Symptoms:  [x ] diarrhea  [ ] Unable to obtain due to poor mentation     Karnofsky Performance Score/Palliative Performance Status Version 2:   50-60     %  Protein Calorie Malnutrition:  [x ] Mild   [ ] Moderate   [ ] Severe     Vital Signs Last 24 Hrs  T(C): 36.9 (19 Mar 2018 14:00), Max: 37 (19 Mar 2018 04:48)  T(F): 98.4 (19 Mar 2018 14:00), Max: 98.6 (19 Mar 2018 04:48)  HR: 95 (19 Mar 2018 14:00) (93 - 104)  BP: 136/76 (19 Mar 2018 14:00) (124/82 - 136/76)  BP(mean): --  RR: 19 (19 Mar 2018 14:00) (19 - 22)  SpO2: 99% (19 Mar 2018 14:00) (99% - 100%)    Physical Exam:    General: [ x] Alert,  A&O x     [ ] lethargic   [ ] Agitated   [ ] Cachexia   HEENT: [ ] Normal   [x ] Dry mouth   [ ] ET Tube    [ ] Trach   Lungs: [x ] Clear [ ] Rhonchi  [ ] Crackles [ ] Wheezing [ ] Tachypnea  [ ] Audible excessive secretions   Cardiovascular:  [x ] Regular rate and rhythm  [ ] Irregular [ ] Tachycardia   [ ] Bradycardia   Abdomen: [x ] Soft  [ ] Distended   [x ] +BS  [ ] Non tender [x ] Tender  [ ]PEG   [ ] NGT   Last BM:   3/19  Genitourinary: [ x] Normal [ ] Incontinent   [ ] Oliguria/Anuria   [ ] Lopez  Musculoskeletal:  [ x] Normal   [ ] Generalized weakness  [ ] Bedbound   Neurological: [x ] No focal deficits  [ ] Cognitive impairment     Skin: [ x] Normal   [ ] Pressure ulcers     LABS:                        11.2   44.15 )-----------( 160      ( 19 Mar 2018 05:24 )             33.5     03-19    141  |  103  |  10  ----------------------------<  105<H>  3.5   |  21<L>  |  0.57    Ca    8.8      19 Mar 2018 05:24  Phos  3.4     03-18  Mg     1.8     03-18    TPro  6.0  /  Alb  3.5  /  TBili  0.2  /  DBili  x   /  AST  17  /  ALT  14  /  AlkPhos  142<H>  03-19        I&O's Summary    19 Mar 2018 07:01  -  19 Mar 2018 15:09  --------------------------------------------------------  IN: 240 mL / OUT: 0 mL / NET: 240 mL        RADIOLOGY & ADDITIONAL STUDIES:  < from: CT Abdomen and Pelvis w/ Oral Cont and w/ IV Cont (03.17.18 @ 17:58) >    EXAM:  CT ABDOMEN AND PELVIS OC IC        PROCEDURE DATE:  Mar 17 2018         INTERPRETATION:  CLINICAL INFORMATION: Pancreatic cancer with worsening   left upper quadrant pain.    PROCEDURE:   CT of the Abdomen and Pelvis was performed with intravenous contrast.   Intravenous contrast: 90 ml Omnipaque 350. 10 ml discarded.  Oral contrast: positive contrast was administered.  Sagittal and coronal reformats were performed.    COMPARISON: CT abdomen pelvis 11/29/2017 and 1/20/2018.    FINDINGS:    LOWER CHEST: Within normal limits.    LIVER: Within normal limits.  BILE DUCTS: Normal caliber.  GALLBLADDER: Within normal limits.  SPLEEN: Within normal limits.  PANCREAS: Pancreatic distal body/tail hypodense mass measures 6.0 cm x   4.0 cm, slightly decreased from prior study.  ADRENALS: Smooth thickening of the left adrenal gland is unchanged.  KIDNEYS/URETERS: Bilateral renal cysts. No hydronephrosis.    BLADDER: A few layering hyperdensities within the bladder may represent   layering bladder calculi.  REPRODUCTIVE ORGANS: The prostate is within normal limits.    BOWEL: No bowel obstruction. Focal thickening of the gastric antral wall   with surrounding mesenteric fat stranding is unchanged and likely related   to the adjacent pancreatic mass. Status post partial colectomy.  PERITONEUM: No ascites.  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 The   portal vein is patent. Atherosclerotic disease.  RETROPERITONEUM: No lymphadenopathy.    ABDOMINAL WALL: Post surgical changes of the ventral abdominal wall.  BONES: Degenerative changes.    IMPRESSION: Mass within the distal pancreatic body/tail slightly   decreased in size as compared to 1/20/2018.     Focal wall thickening and surrounding mesenteric fat stranding of the   gastric body adjacent to the patient's pancreatic mass is unchanged from   1/20/2018.    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      SORAIDA HUGHES M.D., RADIOLOGY RESIDENT  This document has been electronically signed.  BESSY RUTHERFORD M.D., ATTENDING RADIOLOGIST  This document has been electronically signed. Mar 17 2018  9:12PM    < from: CT Lumbar Spine No Cont (03.18.18 @ 19:08) >    EXAM:  CT LUMBAR SPINE      EXAM:  CT THORACIC SPINE        PROCEDURE DATE:  Mar 18 2018         INTERPRETATION:  History: Back pain. Cancer. Unable to tolerate MRI.   Severe back pain.    TECHNIQUE: Noncontrast CT of the thoracic and lumbar spine was performed.    Helically acquired thin section axial imaging through the thoracic and   lumbar spine were reformatted into coronal and sagittal planes and viewed   in bone and soft tissue however the.    COMPARISON: CT chest abdomen pelvis dated 1/20/2018.    FINDINGS:  Thoracic CT:  Thoracic alignment is maintained. Vertebrae demonstrate intact height   without evidence for fracture. There is no evidence for prevertebral or   paraspinal soft tissue swelling.    Though evaluation of the intraspinal compartment is limited on CT   imaging, there is no definite evidence for abnormal epidural fluid   collection. There is no significant spinal canal narrowing related to   epidural soft tissue or bony abnormality. Multilevel degenerative   spondylosis is observed with mild spinal canal narrowing at C3-C4, T4-T5,   T6-T7, T7-T8, T9-T10, T11-T12.      Lumbar CT:  Lumbar alignment is maintained. Vertebrae demonstrate normal height   without evidence for fracture. There is hypertrophy of the spinous   processes which do not contact or chest. There is multilevel degenerative   spondylosis.    At L5-S1, there is a broad-based disc herniation with bilateral facet   arthropathy. There is mild to moderate spinal canal, moderate to severe   bilateral foraminal narrowing.    At L4-L5, there is diffuse annular bulge with bilateral facet arthropathy   and ligamentum flavum thickening. This results in severe spinal canal   stenosis, severe bilateral lateral recess narrowing and mild to moderate   bilateral foraminal stenosis. There is likely impingement of descending   nerves.    At L3-L4, there is diffuse annular bulge, bilateral facet arthropathy and   ligamentum flavum hypertrophy with moderate spinal canal, likely moderate   to severe bilateral lateral recess stenosis. There is moderate bilateral   foraminal narrowing.    At L2-L3, there is bilateral facet arthropathy and ligamentum flavum   hypertrophy with mild spinal canal stenosis.    At L1-L2, there is no significant canal or foraminal stenosis.      There is no definite paraspinal fatty reticulation or fluid collection   seen.    Low-density lesions within the left kidneys may represent cysts,   incompletely evaluated. Please refer to prior CT abdomen pelvis for   further discussion.        IMPRESSION:  No fracture.  Degenerative spondylosis as described, most notable along the lumbar   spine where there is severe spinal canal stenosis at L4-L5 with likely   impingement of all descending nerves. Otherwise please see discussion   above.      DARIUS SPAULDING M.D., ATTENDING RADIOLOGIST  This document has been electronically signed. Mar 19 2018  9:53AM

## 2018-03-19 NOTE — CONSULT NOTE ADULT - PROBLEM SELECTOR RECOMMENDATION 9
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 Follows with Dr Louann Maloney at [facility] [place].

## 2018-03-19 NOTE — PROGRESS NOTE ADULT - PROBLEM SELECTOR PLAN 2
-Patient received Neulasta yesterday with WBC count increasing from 6 to 47 within 24hrs. Spoke with oncology who stated it is possible for this to happen from Neulasta  -Will c/s ID to ensure nothing else to do and that this is purely from Neulasta. GI PCR, stool cultures, O/P  -cdiff neg   -Patient non-toxic appearing  - continue to monitor off antibiotics

## 2018-03-19 NOTE — PROGRESS NOTE ADULT - PROBLEM SELECTOR PLAN 8
- Enoxaparin, IMPROVE=4      Dispo: pending MRI.    Shelia Blair MD  PGY-2 | Internal Medicine  [phone] / [phone]

## 2018-03-19 NOTE — PROGRESS NOTE ADULT - PROBLEM SELECTOR PLAN 5
- Onc recs appreciated  - Continue with alprazolam PRN anxiety.  - Pain control. Per patient and oncology, patient was being weaned off methadone but is still on 10mg po TID- continue with home dose

## 2018-03-19 NOTE — CONSULT NOTE ADULT - ASSESSMENT
61 y/o M with history of colon cancer stage II MSIH s/p resection with subsequent finding of pancreatic tail mass s/p biopsy with similar phenotype, deemed unresectable and now s/p 8 cycles of FOLFIRINOX and presenting for back pain.
62y M hx of colon CA sp resection June 2017 now with lesion to pancreatic tail which is non operable. Curretnly on FOLFIRNOX last tx was 3/13/18, sp neulasta 3/17/18. Plan for OP RT to panc tail lesion post chemo. Pt here with abdominal pain and diarrhea. Follows as OP woth Pall Care Dr Roslyn Matute. Palliative consulted for pain mgmt.
63 yo M PMH Colon Cancer s/p Resection, Stage III Borderline resectable Pancreatic Cancer on FOLFIRINOX (Last Dose 3/13/18) who presented to [facility] on 3/17/18 with abdominal pain and back pain. On presentation afebrile, normotensive but tachycardic to > 100. On presentation WBC 6.25 with no bands. Lactic acid of 3.1. CT Abdomen/Pelvis without any acute abnormality. WBC began to rise after admission to max of 51.38 on day of consultation. Of note, he has received his Neulasta on the day of admission. BM have began to become more formed and C. Diff negative. Would monitor off of antibiotics at this point. Would get blood cultures and start meropenem if any clinical change and/or if febrile.  --Monitor off of antibiotics  --F/U GI PCR Panel and Stool Culture  --F/U MRI T and L Spine  --If any clinical change would obtain two sets of blood cultures and start Meropenem 1g IV Q8H

## 2018-03-19 NOTE — CONSULT NOTE ADULT - PROBLEM SELECTOR RECOMMENDATION 8
Met with pt and wife at bedside. Pain recs made. No plan to uptitrate methadone at present at this appears to be an acute process associated w crampy abd pain and diarrhea. CT shows no evidence of colitis, C dif neg, afebrile. Will FU for pain and sx tomorrow. HCP on file w pt's OP provider Dr Ethan Matute.

## 2018-03-19 NOTE — PROGRESS NOTE ADULT - SUBJECTIVE AND OBJECTIVE BOX
Patient is a 62y old  Male who presents with a chief complaint of abd pain / back pain (18 Mar 2018 00:14)      SUBJECTIVE / OVERNIGHT EVENTS:  feels like back pain has resolved - still complaining of abd pain and diarrhea.  3 diarrhea bowel movements so far today.  tolerating po - no n/v.      MEDICATIONS  (STANDING):  enoxaparin Injectable 40 milliGRAM(s) SubCutaneous every 24 hours  finasteride 5 milliGRAM(s) Oral daily  losartan 25 milliGRAM(s) Oral daily  melatonin 6 milliGRAM(s) Oral at bedtime  methadone    Tablet 10 milliGRAM(s) Oral three times a day  metoprolol     tartrate 25 milliGRAM(s) Oral every 12 hours  mirtazapine 15 milliGRAM(s) Oral at bedtime  pantoprazole    Tablet 40 milliGRAM(s) Oral before breakfast  sodium chloride 0.9%. 1000 milliLiter(s) (125 mL/Hr) IV Continuous <Continuous>  tamsulosin 0.4 milliGRAM(s) Oral at bedtime    MEDICATIONS  (PRN):  ALPRAZolam 0.5 milliGRAM(s) Oral three times a day PRN anxiety or insomnia  dicyclomine 20 milliGRAM(s) Oral four times a day before meals PRN crampy abdominal pain  HYDROmorphone   Tablet 8 milliGRAM(s) Oral every 3 hours PRN mild to moderate  HYDROmorphone  Injectable 1.5 milliGRAM(s) IV Push every 3 hours PRN Severe Pain (7 - 10)      Vital Signs Last 24 Hrs  T(C): 36.7 (19 Mar 2018 10:05), Max: 37 (19 Mar 2018 04:48)  T(F): 98.1 (19 Mar 2018 10:05), Max: 98.6 (19 Mar 2018 04:48)  HR: 94 (19 Mar 2018 10:05) (93 - 104)  BP: 135/69 (19 Mar 2018 10:05) (124/82 - 135/88)  BP(mean): --  RR: 20 (19 Mar 2018 10:05) (20 - 22)  SpO2: 100% (19 Mar 2018 10:05) (99% - 100%)  CAPILLARY BLOOD GLUCOSE        I&O's Summary    19 Mar 2018 07:01  -  19 Mar 2018 14:06  --------------------------------------------------------  IN: 240 mL / OUT: 0 mL / NET: 240 mL        PHYSICAL EXAM:  GENERAL: NAD, well-developed  HEAD:  Atraumatic, Normocephalic  EYES: EOMI, conjunctiva and sclera clear  NECK: Supple, No JVD  CHEST/LUNG: Clear to auscultation bilaterally; No wheeze  HEART: Regular rate and rhythm; No murmurs  ABDOMEN: Soft, Nondistended, TTP in LLQ; Bowel sounds present  EXTREMITIES:  2+ Peripheral Pulses, No edema  PSYCH: AAOx3  NEUROLOGY: non-focal  SKIN: No rashes or lesions    LABS:                        11.2   44.15 )-----------( 160      ( 19 Mar 2018 05:24 )             33.5     03-19    141  |  103  |  10  ----------------------------<  105<H>  3.5   |  21<L>  |  0.57    Ca    8.8      19 Mar 2018 05:24  Phos  3.4     03-18  Mg     1.8     03-18    TPro  6.0  /  Alb  3.5  /  TBili  0.2  /  DBili  x   /  AST  17  /  ALT  14  /  AlkPhos  142<H>  03-19

## 2018-03-19 NOTE — CONSULT NOTE ADULT - PROBLEM SELECTOR RECOMMENDATION 5
Acute worsening of chronic abd pain. Continue on Methadone 10mg TID, Dilaudid 8mg PO Q3-4 hours PRN moderate pain. Start Dilaudid 1.5mg IV Q3-4 hours PRN severe pain. Consider add Bentyl 20mg QID PRN crampy abd pain. MYRANDA [facility] iSTOP ref # [id number]. Acute worsening of chronic abd pain. Continue on Methadone 10mg TID, Dilaudid 8mg PO Q3-4 hours PRN moderate pain. Start Dilaudid 1.5mg IV Q3-4 hours PRN severe pain. Consider add Bentyl 20mg QID PRN crampy abd pain.

## 2018-03-19 NOTE — PROGRESS NOTE ADULT - SUBJECTIVE AND OBJECTIVE BOX
62yPatient is a 62y old  Male who presents with a chief complaint of abd pain / back pain (18 Mar 2018 00:14)      Interval history:  F/up for leukocytosis.  Pt reports he still has abdominal and back pain  +diarrhea  no fevers      Antimicrobials:  non      Vital Signs Last 24 Hrs  T(C): 36.9 (03-19-18 @ 14:00), Max: 37 (03-19-18 @ 04:48)  T(F): 98.4 (03-19-18 @ 14:00), Max: 98.6 (03-19-18 @ 04:48)  HR: 95 (03-19-18 @ 14:00) (93 - 104)  BP: 136/76 (03-19-18 @ 14:00) (124/82 - 136/76)  BP(mean): --  RR: 19 (03-19-18 @ 14:00) (19 - 22)  SpO2: 99% (03-19-18 @ 14:00) (99% - 100%)    PHYSICAL EXAMINATION:  General: Alert and Awake, NAD  Walking in the hallway  Exam deferred                        11.2   44.15 )-----------( 160      ( 19 Mar 2018 05:24 )             33.5   03-19    141  |  103  |  10  ----------------------------<  105<H>  3.5   |  21<L>  |  0.57    Ca    8.8      19 Mar 2018 05:24  Phos  3.4     03-18  Mg     1.8     03-18    TPro  6.0  /  Alb  3.5  /  TBili  0.2  /  DBili  x   /  AST  17  /  ALT  14  /  AlkPhos  142<H>  03-19      LIVER FUNCTIONS - ( 19 Mar 2018 05:24 )  Alb: 3.5 g/dL / Pro: 6.0 g/dL / ALK PHOS: 142 u/L / ALT: 14 u/L / AST: 17 u/L / GGT: x             RECENT CULTURES:  Culture - Stool (03.18.18 @ 16:20)    Culture - Stool:   CULTURE IN PROGRESS, FURTHER REPORT TO FOLLOW.    Specimen Source: FECES    Clostridium difficile Toxin by PCR (03.18.18 @ 12:10)    Clostridium difficile Toxin by PCR: NotDetec:     Culture - Blood (11.29.17 @ 21:15)    Specimen Source: .Blood Blood-Peripheral    Culture Results:   No growth at 5 days.    Culture - Blood (11.29.17 @ 21:13)    Specimen Source: .Blood Blood-Peripheral    Culture Results:   No growth at 5 days.      Radiology:  < from: CT Lumbar Spine No Cont (03.18.18 @ 19:08) >  IMPRESSION:  No fracture.  Degenerative spondylosis as described, most notable along the lumbar   spine where there is severe spinal canal stenosis at L4-L5 with likely   impingement of all descending nerves. Otherwise please see discussion   above.    < end of copied text >    < from: CT Abdomen and Pelvis w/ Oral Cont and w/ IV Cont (03.17.18 @ 17:58) >  IMPRESSION: Mass within the distal pancreatic body/tail slightly   decreased in size as compared to 1/20/2018.     Focal wall thickening and surrounding mesenteric fat stranding of the   gastric body adjacent to the patient's pancreatic mass is unchanged from   1/20/2018.    < end of copied text >

## 2018-03-19 NOTE — CONSULT NOTE ADULT - ATTENDING COMMENTS
63 yo M PMH Colon Cancer s/p Resection, Stage III Borderline resectable Pancreatic Cancer on FOLFIRINOX (Last Dose 3/13/18) who presented to [facility] on 3/17/18 with abdominal pain and back pain.  On presentation afebrile, normotensive but tachycardic to > 100. On presentation WBC 6.25 with no bands. Lactic acid of 3.1. CT Abdomen/Pelvis without any acute abnormality. WBC elevated hospital day 2 to 51.38 and ID was called for leukocytosis.  He received Neulasta day of presentation to hospital.  Stool now more formed and C. Diff negative.     Recommend:  1) Pt clinically non-toxic.  Leukocytosis likely secondary to neulasta.  Would observe off of antibiotics.  2) If further diarrhea - check GI PCR.  3) If clinically deteriorates, start meropenem 1 gram IV q8 and check blood cultures x 2 prior to antibiotics.  4) F/U MRI T and L Spine    Shameka Maguire MD  [phone] (pager)  [phone] (office)
Agree with above

## 2018-03-19 NOTE — CONSULT NOTE ADULT - PROBLEM SELECTOR RECOMMENDATION 2
Currently plan is for post chemo RT.   Unclear if this represents metasynchronous primary cancer versus metastasis from colon given similar phenotype.   Follow up with Dr. Maloney on discharge.
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 Unclear if primary pancreatic lesion or colon met. 11/9/17 underwent open ex lap for possible resection of pancreatic adenoca, however, SMA was encased by tumor and therefore could not be resected. Follows with Dr Louann Maloney at [facility] [place]. On chemo FOLFIRINOX, last tx 3/13/18. Being considered for palliative RT to panc lesion with Dr Bryan Ochoa vs tx with immunotherapy as OP.

## 2018-03-20 LAB
BUN SERPL-MCNC: 6 MG/DL — LOW (ref 7–23)
CALCIUM SERPL-MCNC: 8.5 MG/DL — SIGNIFICANT CHANGE UP (ref 8.4–10.5)
CHLORIDE SERPL-SCNC: 104 MMOL/L — SIGNIFICANT CHANGE UP (ref 98–107)
CO2 SERPL-SCNC: 22 MMOL/L — SIGNIFICANT CHANGE UP (ref 22–31)
CREAT SERPL-MCNC: 0.45 MG/DL — LOW (ref 0.5–1.3)
GLUCOSE SERPL-MCNC: 103 MG/DL — HIGH (ref 70–99)
HCT VFR BLD CALC: 34 % — LOW (ref 39–50)
HGB BLD-MCNC: 11.4 G/DL — LOW (ref 13–17)
MAGNESIUM SERPL-MCNC: 2.1 MG/DL — SIGNIFICANT CHANGE UP (ref 1.6–2.6)
MCHC RBC-ENTMCNC: 30 PG — SIGNIFICANT CHANGE UP (ref 27–34)
MCHC RBC-ENTMCNC: 33.5 % — SIGNIFICANT CHANGE UP (ref 32–36)
MCV RBC AUTO: 89.5 FL — SIGNIFICANT CHANGE UP (ref 80–100)
NRBC # FLD: 0 — SIGNIFICANT CHANGE UP
O+P SPEC CONC: SIGNIFICANT CHANGE UP
PLATELET # BLD AUTO: 132 K/UL — LOW (ref 150–400)
PMV BLD: 9.5 FL — SIGNIFICANT CHANGE UP (ref 7–13)
POTASSIUM SERPL-MCNC: 3.5 MMOL/L — SIGNIFICANT CHANGE UP (ref 3.5–5.3)
POTASSIUM SERPL-SCNC: 3.5 MMOL/L — SIGNIFICANT CHANGE UP (ref 3.5–5.3)
RBC # BLD: 3.8 M/UL — LOW (ref 4.2–5.8)
RBC # FLD: 15.9 % — HIGH (ref 10.3–14.5)
SODIUM SERPL-SCNC: 138 MMOL/L — SIGNIFICANT CHANGE UP (ref 135–145)
SPECIMEN SOURCE: SIGNIFICANT CHANGE UP
TRI STN SPEC: SIGNIFICANT CHANGE UP
WBC # BLD: 37.3 K/UL — HIGH (ref 3.8–10.5)
WBC # FLD AUTO: 37.3 K/UL — HIGH (ref 3.8–10.5)

## 2018-03-20 PROCEDURE — 99233 SBSQ HOSP IP/OBS HIGH 50: CPT

## 2018-03-20 RX ADMIN — HYDROMORPHONE HYDROCHLORIDE 8 MILLIGRAM(S): 2 INJECTION INTRAMUSCULAR; INTRAVENOUS; SUBCUTANEOUS at 14:57

## 2018-03-20 RX ADMIN — Medication 25 MILLIGRAM(S): at 17:09

## 2018-03-20 RX ADMIN — SODIUM CHLORIDE 125 MILLILITER(S): 9 INJECTION INTRAMUSCULAR; INTRAVENOUS; SUBCUTANEOUS at 06:11

## 2018-03-20 RX ADMIN — MIRTAZAPINE 15 MILLIGRAM(S): 45 TABLET, ORALLY DISINTEGRATING ORAL at 21:08

## 2018-03-20 RX ADMIN — HYDROMORPHONE HYDROCHLORIDE 1.5 MILLIGRAM(S): 2 INJECTION INTRAMUSCULAR; INTRAVENOUS; SUBCUTANEOUS at 06:25

## 2018-03-20 RX ADMIN — HYDROMORPHONE HYDROCHLORIDE 8 MILLIGRAM(S): 2 INJECTION INTRAMUSCULAR; INTRAVENOUS; SUBCUTANEOUS at 18:14

## 2018-03-20 RX ADMIN — HYDROMORPHONE HYDROCHLORIDE 8 MILLIGRAM(S): 2 INJECTION INTRAMUSCULAR; INTRAVENOUS; SUBCUTANEOUS at 17:09

## 2018-03-20 RX ADMIN — FINASTERIDE 5 MILLIGRAM(S): 5 TABLET, FILM COATED ORAL at 11:47

## 2018-03-20 RX ADMIN — Medication 0.5 MILLIGRAM(S): at 21:09

## 2018-03-20 RX ADMIN — METHADONE HYDROCHLORIDE 10 MILLIGRAM(S): 40 TABLET ORAL at 14:58

## 2018-03-20 RX ADMIN — TAMSULOSIN HYDROCHLORIDE 0.4 MILLIGRAM(S): 0.4 CAPSULE ORAL at 21:08

## 2018-03-20 RX ADMIN — HYDROMORPHONE HYDROCHLORIDE 1.5 MILLIGRAM(S): 2 INJECTION INTRAMUSCULAR; INTRAVENOUS; SUBCUTANEOUS at 10:30

## 2018-03-20 RX ADMIN — METHADONE HYDROCHLORIDE 10 MILLIGRAM(S): 40 TABLET ORAL at 21:09

## 2018-03-20 RX ADMIN — Medication 6 MILLIGRAM(S): at 21:09

## 2018-03-20 RX ADMIN — HYDROMORPHONE HYDROCHLORIDE 1.5 MILLIGRAM(S): 2 INJECTION INTRAMUSCULAR; INTRAVENOUS; SUBCUTANEOUS at 03:27

## 2018-03-20 RX ADMIN — ENOXAPARIN SODIUM 40 MILLIGRAM(S): 100 INJECTION SUBCUTANEOUS at 00:11

## 2018-03-20 RX ADMIN — PANTOPRAZOLE SODIUM 40 MILLIGRAM(S): 20 TABLET, DELAYED RELEASE ORAL at 06:11

## 2018-03-20 RX ADMIN — HYDROMORPHONE HYDROCHLORIDE 1.5 MILLIGRAM(S): 2 INJECTION INTRAMUSCULAR; INTRAVENOUS; SUBCUTANEOUS at 06:10

## 2018-03-20 RX ADMIN — HYDROMORPHONE HYDROCHLORIDE 1.5 MILLIGRAM(S): 2 INJECTION INTRAMUSCULAR; INTRAVENOUS; SUBCUTANEOUS at 00:10

## 2018-03-20 RX ADMIN — HYDROMORPHONE HYDROCHLORIDE 8 MILLIGRAM(S): 2 INJECTION INTRAMUSCULAR; INTRAVENOUS; SUBCUTANEOUS at 21:09

## 2018-03-20 RX ADMIN — HYDROMORPHONE HYDROCHLORIDE 1.5 MILLIGRAM(S): 2 INJECTION INTRAMUSCULAR; INTRAVENOUS; SUBCUTANEOUS at 00:25

## 2018-03-20 RX ADMIN — Medication 25 MILLIGRAM(S): at 06:11

## 2018-03-20 RX ADMIN — HYDROMORPHONE HYDROCHLORIDE 8 MILLIGRAM(S): 2 INJECTION INTRAMUSCULAR; INTRAVENOUS; SUBCUTANEOUS at 15:30

## 2018-03-20 RX ADMIN — LOSARTAN POTASSIUM 25 MILLIGRAM(S): 100 TABLET, FILM COATED ORAL at 06:11

## 2018-03-20 RX ADMIN — HYDROMORPHONE HYDROCHLORIDE 1.5 MILLIGRAM(S): 2 INJECTION INTRAMUSCULAR; INTRAVENOUS; SUBCUTANEOUS at 03:13

## 2018-03-20 RX ADMIN — HYDROMORPHONE HYDROCHLORIDE 1.5 MILLIGRAM(S): 2 INJECTION INTRAMUSCULAR; INTRAVENOUS; SUBCUTANEOUS at 10:12

## 2018-03-20 RX ADMIN — HYDROMORPHONE HYDROCHLORIDE 8 MILLIGRAM(S): 2 INJECTION INTRAMUSCULAR; INTRAVENOUS; SUBCUTANEOUS at 21:39

## 2018-03-20 RX ADMIN — METHADONE HYDROCHLORIDE 10 MILLIGRAM(S): 40 TABLET ORAL at 06:11

## 2018-03-20 NOTE — DIETITIAN INITIAL EVALUATION ADULT. - OTHER INFO
Nutrition Consult X Assessment, Education, Calorie Count. Pt 63 yo male with h/o colon cancer s/p resection, stage III borderline resectable pancreatic cancer. Pt appears alert, oriented. Per Pt his appetite/PO intake poor for past few months. Pt also stated he lost weight: ~80# in 4 months. PTA Pt was eating Regular diet with PO supplement: Ensure Shake 2x daily. Noted PO diet changed to Clear Liquids (this morning). Pt may benefit from adding PO supplement: Ensure Clear 3x daily to his diet order. Case discussed with ADS. No chew/swallow problem voiced; no nausea/vomiting reported @ present. But Pt C/O diarrhea, abdominal pain @ time of visit. No records/documentation on Calorie Count available @ present. Spoke to nurse. RDN remains available, Pt made aware.

## 2018-03-20 NOTE — DIETITIAN INITIAL EVALUATION ADULT. - NS AS NUTRI INTERV MEDICAL AND FOOD SUPPLEMENTS
Commercial beverage/PO supplement: Ensure Clear 1 can x 3 daily (will provide additional ~600 Kcal, ~21 gm Protein)

## 2018-03-20 NOTE — DIETITIAN INITIAL EVALUATION ADULT. - PROBLEM SELECTOR PLAN 2
- Pt states pain is similar to his cancer pain.  - Pain not severe at this time.  - Pain control as above.

## 2018-03-20 NOTE — CHART NOTE - NSCHARTNOTEFT_GEN_A_CORE
ADS NIGHT COVERAGE:    CT Chest w/IV contrast ordered as per Oncology request.     BUDDY QUINN PA-C  [id number]

## 2018-03-20 NOTE — DIETITIAN INITIAL EVALUATION ADULT. - NS AS NUTRI INTERV MEALS SNACK
1. Suggest: PO diet rx: Clear Liquids; PO supplement: PO supplement: Ensure Clear 1 can x 3 daily (will provide additional ~600 Kcal, ~21 gm Protein);             2. Encourage & assist Pt with meals; Monitor PO diet tolerance;              3. Suggest: Lactobacillus Acidophilus daily per MD;         4. Suggest: Advance PO diet to Full Liquids once/when clinically indicated;           5. Monitor labs, weights, hydration status;/Other (specify)

## 2018-03-20 NOTE — PROGRESS NOTE ADULT - PROBLEM SELECTOR PLAN 8
- Enoxaparin, IMPROVE=4      Dispo: pending MRI.    Shelia Blair MD  PGY-2 | Internal Medicine  [phone] / [phone] - Enoxaparin, IMPROVE=4

## 2018-03-20 NOTE — PROGRESS NOTE ADULT - PROBLEM SELECTOR PLAN 2
- Pt states pain is similar to his cancer pain.  - pt using several prns overnight   - he feels the pain is worse with po intake - denies n/v - will start liquid diet and monitor. - Pt states pain is similar to his cancer pain.  - pt using several prns overnight   - he feels the pain is worse with po intake - denies n/v - will start liquid diet and monitor  - will transition off IV pain meds - will discuss with palliative regarding increasing methadone  - encourage the use of bentyl during painful episodes

## 2018-03-20 NOTE — DIETITIAN INITIAL EVALUATION ADULT. - PROBLEM SELECTOR PLAN 1
- Low suspicion for cord compression.  However given active cancer and question of stool incontinence today vs pt being unable to get to the restroom (likely due to immobility from pain), there is concern for cord compression.  Neurological exam of lower extremities unremarkable, rectal exam performed in ED with intact rectal tone.  No emergent findings at this time.  - MRI ordered.  - Pain control - hydromorphone, IV acetaminophen PRN.  - Neulasta may be contributing to back pain as well; oncology consult in the AM

## 2018-03-20 NOTE — DIETITIAN INITIAL EVALUATION ADULT. - PROBLEM SELECTOR PLAN 3
- Onc consulted via email.  - Alprazolam PRN anxiety.  - Pain control.  - Pt was previously on methadone but was weaned off this medication 01/2018, per pt's wife.  - I-STOP Reference #: [id number].

## 2018-03-20 NOTE — DIETITIAN INITIAL EVALUATION ADULT. - PROBLEM SELECTOR PLAN 6
- Enoxaparin.  - Consider physical therapy evaluation    Dispo: pending MRI.    Shelia Blair MD  PGY-2 | Internal Medicine  [phone] / [phone]

## 2018-03-20 NOTE — PROGRESS NOTE ADULT - PROBLEM SELECTOR PLAN 5
- Onc recs appreciated  - Continue with alprazolam PRN anxiety.  - Pain control. Per patient and oncology, patient was being weaned off methadone but is still on 10mg po TID- continue with home dose - Onc recs appreciated  - Continue with alprazolam PRN anxiety.  - Pain control. Per patient and oncology, patient was being weaned off methadone but is still on 10mg po TID- continue with home dose, discuss with palliative about increasing methadone

## 2018-03-20 NOTE — PROGRESS NOTE ADULT - SUBJECTIVE AND OBJECTIVE BOX
Patient is a 62y old  Male who presents with a chief complaint of abd pain / back pain (18 Mar 2018 00:14)      SUBJECTIVE / OVERNIGHT EVENTS:  pt complaining of severe abd pain worsened with po intake.  pain is located in LLQ - pt with 2 loose BMs yesterday.      MEDICATIONS  (STANDING):  enoxaparin Injectable 40 milliGRAM(s) SubCutaneous every 24 hours  finasteride 5 milliGRAM(s) Oral daily  losartan 25 milliGRAM(s) Oral daily  melatonin 6 milliGRAM(s) Oral at bedtime  methadone    Tablet 10 milliGRAM(s) Oral three times a day  metoprolol     tartrate 25 milliGRAM(s) Oral every 12 hours  mirtazapine 15 milliGRAM(s) Oral at bedtime  pantoprazole    Tablet 40 milliGRAM(s) Oral before breakfast  sodium chloride 0.9%. 1000 milliLiter(s) (125 mL/Hr) IV Continuous <Continuous>  tamsulosin 0.4 milliGRAM(s) Oral at bedtime    MEDICATIONS  (PRN):  ALPRAZolam 0.5 milliGRAM(s) Oral three times a day PRN anxiety or insomnia  dicyclomine 20 milliGRAM(s) Oral four times a day before meals PRN crampy abdominal pain  HYDROmorphone   Tablet 8 milliGRAM(s) Oral every 3 hours PRN mild to moderate  HYDROmorphone  Injectable 1.5 milliGRAM(s) IV Push every 3 hours PRN Severe Pain (7 - 10)      Vital Signs Last 24 Hrs  T(C): 36.8 (20 Mar 2018 06:07), Max: 37.3 (19 Mar 2018 21:17)  T(F): 98.2 (20 Mar 2018 06:07), Max: 99.1 (19 Mar 2018 21:17)  HR: 88 (20 Mar 2018 06:07) (75 - 95)  BP: 149/88 (20 Mar 2018 06:07) (136/76 - 159/86)  BP(mean): --  RR: 18 (20 Mar 2018 06:07) (17 - 19)  SpO2: 98% (20 Mar 2018 06:07) (98% - 100%)  CAPILLARY BLOOD GLUCOSE        I&O's Summary    19 Mar 2018 07:01  -  20 Mar 2018 07:00  --------------------------------------------------------  IN: 240 mL / OUT: 0 mL / NET: 240 mL        PHYSICAL EXAM:  GENERAL: NAD, well-developed  HEAD:  Atraumatic, Normocephalic  EYES: EOMI, conjunctiva and sclera clear  NECK: Supple, No JVD  CHEST/LUNG: Clear to auscultation bilaterally;  HEART: Regular rate and rhythm; No murmurs,  ABDOMEN: Soft, Nontender, Nondistended; Bowel sounds present  EXTREMITIES:  2+ Peripheral Pulses, No clubbing, cyanosis, or edema  PSYCH: AAOx3, calm, cooperative  NEUROLOGY: non-focal, 5/5 strength in UE and LE b/L, no numbness or tingling sensation  SKIN: No rashes or lesions      LABS:                        11.4   37.30 )-----------( 132      ( 20 Mar 2018 05:22 )             34.0     03-20    138  |  104  |  6<L>  ----------------------------<  103<H>  3.5   |  22  |  0.45<L>    Ca    8.5      20 Mar 2018 05:22  Mg     2.1     03-20    TPro  6.0  /  Alb  3.5  /  TBili  0.2  /  DBili  x   /  AST  17  /  ALT  14  /  AlkPhos  142<H>  03-19 Patient is a 62y old  Male who presents with a chief complaint of abd pain / back pain (18 Mar 2018 00:14)      SUBJECTIVE / OVERNIGHT EVENTS:  pt complaining of severe abd pain worsened with po intake.  pain is located in LLQ - pt with 2 loose BMs yesterday.  currently he appears very comfortable in bed - he is tolerating apple juice.      MEDICATIONS  (STANDING):  enoxaparin Injectable 40 milliGRAM(s) SubCutaneous every 24 hours  finasteride 5 milliGRAM(s) Oral daily  losartan 25 milliGRAM(s) Oral daily  melatonin 6 milliGRAM(s) Oral at bedtime  methadone    Tablet 10 milliGRAM(s) Oral three times a day  metoprolol     tartrate 25 milliGRAM(s) Oral every 12 hours  mirtazapine 15 milliGRAM(s) Oral at bedtime  pantoprazole    Tablet 40 milliGRAM(s) Oral before breakfast  sodium chloride 0.9%. 1000 milliLiter(s) (125 mL/Hr) IV Continuous <Continuous>  tamsulosin 0.4 milliGRAM(s) Oral at bedtime    MEDICATIONS  (PRN):  ALPRAZolam 0.5 milliGRAM(s) Oral three times a day PRN anxiety or insomnia  dicyclomine 20 milliGRAM(s) Oral four times a day before meals PRN crampy abdominal pain  HYDROmorphone   Tablet 8 milliGRAM(s) Oral every 3 hours PRN mild to moderate  HYDROmorphone  Injectable 1.5 milliGRAM(s) IV Push every 3 hours PRN Severe Pain (7 - 10)      Vital Signs Last 24 Hrs  T(C): 36.8 (20 Mar 2018 06:07), Max: 37.3 (19 Mar 2018 21:17)  T(F): 98.2 (20 Mar 2018 06:07), Max: 99.1 (19 Mar 2018 21:17)  HR: 88 (20 Mar 2018 06:07) (75 - 95)  BP: 149/88 (20 Mar 2018 06:07) (136/76 - 159/86)  BP(mean): --  RR: 18 (20 Mar 2018 06:07) (17 - 19)  SpO2: 98% (20 Mar 2018 06:07) (98% - 100%)  CAPILLARY BLOOD GLUCOSE        I&O's Summary    19 Mar 2018 07:01  -  20 Mar 2018 07:00  --------------------------------------------------------  IN: 240 mL / OUT: 0 mL / NET: 240 mL        PHYSICAL EXAM:  GENERAL: NAD, well-developed  HEAD:  Atraumatic, Normocephalic  EYES: EOMI, conjunctiva and sclera clear  NECK: Supple, No JVD  CHEST/LUNG: Clear to auscultation bilaterally;  HEART: Regular rate and rhythm; No murmurs,  ABDOMEN: Soft, Nondistended; Bowel sounds present; mild TTP in LLQ  EXTREMITIES:  2+ Peripheral Pulses, No clubbing, cyanosis, or edema  PSYCH: AAOx3, calm, cooperative  NEUROLOGY: non-focal, 5/5 strength in UE and LE b/L, no numbness or tingling sensation  SKIN: No rashes or lesions      LABS:                        11.4   37.30 )-----------( 132      ( 20 Mar 2018 05:22 )             34.0     03-20    138  |  104  |  6<L>  ----------------------------<  103<H>  3.5   |  22  |  0.45<L>    Ca    8.5      20 Mar 2018 05:22  Mg     2.1     03-20    TPro  6.0  /  Alb  3.5  /  TBili  0.2  /  DBili  x   /  AST  17  /  ALT  14  /  AlkPhos  142<H>  03-19

## 2018-03-21 DIAGNOSIS — E43 UNSPECIFIED SEVERE PROTEIN-CALORIE MALNUTRITION: ICD-10-CM

## 2018-03-21 LAB
BACTERIA STL CULT: SIGNIFICANT CHANGE UP
BUN SERPL-MCNC: 6 MG/DL — LOW (ref 7–23)
CALCIUM SERPL-MCNC: 8.5 MG/DL — SIGNIFICANT CHANGE UP (ref 8.4–10.5)
CHLORIDE SERPL-SCNC: 101 MMOL/L — SIGNIFICANT CHANGE UP (ref 98–107)
CO2 SERPL-SCNC: 22 MMOL/L — SIGNIFICANT CHANGE UP (ref 22–31)
CREAT SERPL-MCNC: 0.46 MG/DL — LOW (ref 0.5–1.3)
GLUCOSE SERPL-MCNC: 90 MG/DL — SIGNIFICANT CHANGE UP (ref 70–99)
HCT VFR BLD CALC: 32.8 % — LOW (ref 39–50)
HGB BLD-MCNC: 11 G/DL — LOW (ref 13–17)
MAGNESIUM SERPL-MCNC: 1.7 MG/DL — SIGNIFICANT CHANGE UP (ref 1.6–2.6)
MCHC RBC-ENTMCNC: 30.3 PG — SIGNIFICANT CHANGE UP (ref 27–34)
MCHC RBC-ENTMCNC: 33.5 % — SIGNIFICANT CHANGE UP (ref 32–36)
MCV RBC AUTO: 90.4 FL — SIGNIFICANT CHANGE UP (ref 80–100)
NRBC # FLD: 0 — SIGNIFICANT CHANGE UP
PHOSPHATE SERPL-MCNC: 3.1 MG/DL — SIGNIFICANT CHANGE UP (ref 2.5–4.5)
PLATELET # BLD AUTO: 136 K/UL — LOW (ref 150–400)
PMV BLD: 10 FL — SIGNIFICANT CHANGE UP (ref 7–13)
POTASSIUM SERPL-MCNC: 3.5 MMOL/L — SIGNIFICANT CHANGE UP (ref 3.5–5.3)
POTASSIUM SERPL-SCNC: 3.5 MMOL/L — SIGNIFICANT CHANGE UP (ref 3.5–5.3)
RBC # BLD: 3.63 M/UL — LOW (ref 4.2–5.8)
RBC # FLD: 15.7 % — HIGH (ref 10.3–14.5)
SODIUM SERPL-SCNC: 136 MMOL/L — SIGNIFICANT CHANGE UP (ref 135–145)
WBC # BLD: 12.27 K/UL — HIGH (ref 3.8–10.5)
WBC # FLD AUTO: 12.27 K/UL — HIGH (ref 3.8–10.5)

## 2018-03-21 PROCEDURE — 71260 CT THORAX DX C+: CPT | Mod: 26

## 2018-03-21 PROCEDURE — 99233 SBSQ HOSP IP/OBS HIGH 50: CPT

## 2018-03-21 RX ADMIN — HYDROMORPHONE HYDROCHLORIDE 8 MILLIGRAM(S): 2 INJECTION INTRAMUSCULAR; INTRAVENOUS; SUBCUTANEOUS at 00:15

## 2018-03-21 RX ADMIN — METHADONE HYDROCHLORIDE 10 MILLIGRAM(S): 40 TABLET ORAL at 14:17

## 2018-03-21 RX ADMIN — HYDROMORPHONE HYDROCHLORIDE 8 MILLIGRAM(S): 2 INJECTION INTRAMUSCULAR; INTRAVENOUS; SUBCUTANEOUS at 00:30

## 2018-03-21 RX ADMIN — MIRTAZAPINE 15 MILLIGRAM(S): 45 TABLET, ORALLY DISINTEGRATING ORAL at 21:04

## 2018-03-21 RX ADMIN — Medication 25 MILLIGRAM(S): at 06:27

## 2018-03-21 RX ADMIN — SODIUM CHLORIDE 125 MILLILITER(S): 9 INJECTION INTRAMUSCULAR; INTRAVENOUS; SUBCUTANEOUS at 00:15

## 2018-03-21 RX ADMIN — SODIUM CHLORIDE 125 MILLILITER(S): 9 INJECTION INTRAMUSCULAR; INTRAVENOUS; SUBCUTANEOUS at 06:27

## 2018-03-21 RX ADMIN — HYDROMORPHONE HYDROCHLORIDE 8 MILLIGRAM(S): 2 INJECTION INTRAMUSCULAR; INTRAVENOUS; SUBCUTANEOUS at 06:28

## 2018-03-21 RX ADMIN — HYDROMORPHONE HYDROCHLORIDE 8 MILLIGRAM(S): 2 INJECTION INTRAMUSCULAR; INTRAVENOUS; SUBCUTANEOUS at 19:04

## 2018-03-21 RX ADMIN — HYDROMORPHONE HYDROCHLORIDE 8 MILLIGRAM(S): 2 INJECTION INTRAMUSCULAR; INTRAVENOUS; SUBCUTANEOUS at 11:44

## 2018-03-21 RX ADMIN — HYDROMORPHONE HYDROCHLORIDE 8 MILLIGRAM(S): 2 INJECTION INTRAMUSCULAR; INTRAVENOUS; SUBCUTANEOUS at 18:04

## 2018-03-21 RX ADMIN — METHADONE HYDROCHLORIDE 10 MILLIGRAM(S): 40 TABLET ORAL at 21:04

## 2018-03-21 RX ADMIN — ENOXAPARIN SODIUM 40 MILLIGRAM(S): 100 INJECTION SUBCUTANEOUS at 00:15

## 2018-03-21 RX ADMIN — HYDROMORPHONE HYDROCHLORIDE 8 MILLIGRAM(S): 2 INJECTION INTRAMUSCULAR; INTRAVENOUS; SUBCUTANEOUS at 03:23

## 2018-03-21 RX ADMIN — HYDROMORPHONE HYDROCHLORIDE 8 MILLIGRAM(S): 2 INJECTION INTRAMUSCULAR; INTRAVENOUS; SUBCUTANEOUS at 03:53

## 2018-03-21 RX ADMIN — HYDROMORPHONE HYDROCHLORIDE 8 MILLIGRAM(S): 2 INJECTION INTRAMUSCULAR; INTRAVENOUS; SUBCUTANEOUS at 21:34

## 2018-03-21 RX ADMIN — FINASTERIDE 5 MILLIGRAM(S): 5 TABLET, FILM COATED ORAL at 11:45

## 2018-03-21 RX ADMIN — HYDROMORPHONE HYDROCHLORIDE 8 MILLIGRAM(S): 2 INJECTION INTRAMUSCULAR; INTRAVENOUS; SUBCUTANEOUS at 14:58

## 2018-03-21 RX ADMIN — METHADONE HYDROCHLORIDE 10 MILLIGRAM(S): 40 TABLET ORAL at 06:28

## 2018-03-21 RX ADMIN — TAMSULOSIN HYDROCHLORIDE 0.4 MILLIGRAM(S): 0.4 CAPSULE ORAL at 21:04

## 2018-03-21 RX ADMIN — HYDROMORPHONE HYDROCHLORIDE 8 MILLIGRAM(S): 2 INJECTION INTRAMUSCULAR; INTRAVENOUS; SUBCUTANEOUS at 21:04

## 2018-03-21 RX ADMIN — Medication 6 MILLIGRAM(S): at 21:04

## 2018-03-21 RX ADMIN — PANTOPRAZOLE SODIUM 40 MILLIGRAM(S): 20 TABLET, DELAYED RELEASE ORAL at 06:28

## 2018-03-21 RX ADMIN — LOSARTAN POTASSIUM 25 MILLIGRAM(S): 100 TABLET, FILM COATED ORAL at 06:27

## 2018-03-21 RX ADMIN — HYDROMORPHONE HYDROCHLORIDE 8 MILLIGRAM(S): 2 INJECTION INTRAMUSCULAR; INTRAVENOUS; SUBCUTANEOUS at 06:58

## 2018-03-21 RX ADMIN — Medication 25 MILLIGRAM(S): at 17:08

## 2018-03-21 RX ADMIN — HYDROMORPHONE HYDROCHLORIDE 8 MILLIGRAM(S): 2 INJECTION INTRAMUSCULAR; INTRAVENOUS; SUBCUTANEOUS at 16:00

## 2018-03-21 RX ADMIN — Medication 0.5 MILLIGRAM(S): at 21:04

## 2018-03-21 RX ADMIN — HYDROMORPHONE HYDROCHLORIDE 8 MILLIGRAM(S): 2 INJECTION INTRAMUSCULAR; INTRAVENOUS; SUBCUTANEOUS at 12:30

## 2018-03-21 NOTE — PROGRESS NOTE ADULT - SUBJECTIVE AND OBJECTIVE BOX
INTERVAL HPI/OVERNIGHT EVENTS: Following pt for pain. Took Dilaudid 8mg PO x6. Also took Xanax 0.5mg x1. Pt states ate home he is on Dilaudid 8mg at home which he generally takes every 3-4 hours. Current pain med requirement similar to home requirement. Pt states he is feeling better, pain is improved, tolerating PO, no further diarrhea.     Allergies    No Known Allergies    Intolerances      ADVANCE DIRECTIVES:    DNR: [ ] YES [ x] NO           PRESENT SYMPTOMS:   SOURCE:  [x ] Patient   [ ] Family   [ ] Team     Pain: [x ] YES [ ] NO  Dyspnea:  [ ] YES [x ] NO  Anxiety:  [ ] YES [ x] NO  Fatigue: [ ] YES [x ] NO  Nausea: [ ] YES [ x] NO  Loss of Appetite: [ ] YES [x ] NO  Constipation [ ] YES   [x ] No     OTHER SYMPTOMS:  [x ] All other ROS negative     [ ] Unable to obtain due to poor mentation    MEDICATIONS  (STANDING):  enoxaparin Injectable 40 milliGRAM(s) SubCutaneous every 24 hours  finasteride 5 milliGRAM(s) Oral daily  losartan 25 milliGRAM(s) Oral daily  melatonin 6 milliGRAM(s) Oral at bedtime  methadone    Tablet 10 milliGRAM(s) Oral three times a day  metoprolol     tartrate 25 milliGRAM(s) Oral every 12 hours  mirtazapine 15 milliGRAM(s) Oral at bedtime  pantoprazole    Tablet 40 milliGRAM(s) Oral before breakfast  sodium chloride 0.9%. 1000 milliLiter(s) (125 mL/Hr) IV Continuous <Continuous>  tamsulosin 0.4 milliGRAM(s) Oral at bedtime    MEDICATIONS  (PRN):  ALPRAZolam 0.5 milliGRAM(s) Oral three times a day PRN anxiety or insomnia  dicyclomine 20 milliGRAM(s) Oral four times a day before meals PRN crampy abdominal pain  HYDROmorphone   Tablet 8 milliGRAM(s) Oral every 3 hours PRN mild to moderate      Karnofsky Performance Score/Palliative Performance Status Version 2:    60     %  Protein Calorie Malnutrition:  [x ] Mild   [ ] Moderate   [ ] Severe     Physical Exam:    General: [ x] Alert,  A&O x 3    [ ] lethargic   [ ] Agitated   [ ] Cachexia   HEENT: [ ] Normal   [x ] Dry mouth   [ ] ET Tube    [ ] Trach   Lungs: [x ] Clear [ ] Rhonchi  [ ] Crackles [ ] Wheezing [ ] Tachypnea  [ ] Audible excessive secretions   Cardiovascular:  [x ] Regular rate and rhythm  [ ] Irregular [ ] Tachycardia   [ ] Bradycardia   Abdomen: [x ] Soft  [ ] Distended   [x ] +BS  [ ] Non tender [x ] Tender  [ ]PEG   [ ] NGT   Last BM:   3/20  Genitourinary: [ x] Normal [ ] Incontinent   [ ] Oliguria/Anuria   [ ] Lopez  Musculoskeletal:  [ x] Normal   [ ] Generalized weakness  [ ] Bedbound   Neurological: [x ] No focal deficits  [ ] Cognitive impairment     Skin: [ x] Normal   [ ] Pressure ulcers     Vital Signs Last 24 Hrs  T(C): 36.7 (21 Mar 2018 10:50), Max: 36.7 (21 Mar 2018 10:50)  T(F): 98 (21 Mar 2018 10:50), Max: 98 (21 Mar 2018 10:50)  HR: 89 (21 Mar 2018 10:50) (65 - 89)  BP: 123/85 (21 Mar 2018 10:50) (123/85 - 152/78)  BP(mean): --  RR: 18 (21 Mar 2018 10:50) (18 - 181)  SpO2: 99% (21 Mar 2018 10:50) (98% - 100%)    LABS:                        11.0   12.27 )-----------( 136      ( 21 Mar 2018 05:33 )             32.8     03-21    136  |  101  |  6<L>  ----------------------------<  90  3.5   |  22  |  0.46<L>    Ca    8.5      21 Mar 2018 05:33  Phos  3.1     03-21  Mg     1.7     03-21          I&O's Summary    20 Mar 2018 07:01  -  21 Mar 2018 07:00  --------------------------------------------------------  IN: 0 mL / OUT: 750 mL / NET: -750 mL        RADIOLOGY & ADDITIONAL STUDIES:  CT chest pending read

## 2018-03-21 NOTE — PROGRESS NOTE ADULT - SUBJECTIVE AND OBJECTIVE BOX
Patient is a 62y old  Male who presents with a chief complaint of abd pain / back pain (18 Mar 2018 00:14)      SUBJECTIVE / OVERNIGHT EVENTS:  pain is improving - tolerating liquid diet.  pain is well controlled with po dilaudid.  pt with 1 BM yesterday - well formed.     MEDICATIONS  (STANDING):  enoxaparin Injectable 40 milliGRAM(s) SubCutaneous every 24 hours  finasteride 5 milliGRAM(s) Oral daily  losartan 25 milliGRAM(s) Oral daily  melatonin 6 milliGRAM(s) Oral at bedtime  methadone    Tablet 10 milliGRAM(s) Oral three times a day  metoprolol     tartrate 25 milliGRAM(s) Oral every 12 hours  mirtazapine 15 milliGRAM(s) Oral at bedtime  pantoprazole    Tablet 40 milliGRAM(s) Oral before breakfast  sodium chloride 0.9%. 1000 milliLiter(s) (125 mL/Hr) IV Continuous <Continuous>  tamsulosin 0.4 milliGRAM(s) Oral at bedtime    MEDICATIONS  (PRN):  ALPRAZolam 0.5 milliGRAM(s) Oral three times a day PRN anxiety or insomnia  dicyclomine 20 milliGRAM(s) Oral four times a day before meals PRN crampy abdominal pain  HYDROmorphone   Tablet 8 milliGRAM(s) Oral every 3 hours PRN mild to moderate      Vital Signs Last 24 Hrs  T(C): 36.6 (21 Mar 2018 06:26), Max: 36.6 (20 Mar 2018 21:05)  T(F): 97.9 (21 Mar 2018 06:26), Max: 97.9 (21 Mar 2018 06:26)  HR: 81 (21 Mar 2018 06:26) (65 - 81)  BP: 143/83 (21 Mar 2018 06:26) (127/86 - 152/78)  BP(mean): --  RR: 18 (21 Mar 2018 06:26) (18 - 181)  SpO2: 98% (21 Mar 2018 06:26) (98% - 100%)  CAPILLARY BLOOD GLUCOSE        I&O's Summary    20 Mar 2018 07:01  -  21 Mar 2018 07:00  --------------------------------------------------------  IN: 0 mL / OUT: 750 mL / NET: -750 mL        PHYSICAL EXAM:  GENERAL: NAD, well-developed  HEAD:  Atraumatic, Normocephalic  EYES: EOMI, conjunctiva and sclera clear  NECK: Supple, No JVD  CHEST/LUNG: Clear to auscultation bilaterally; No wheeze  HEART: Regular rate and rhythm; No murmurs  ABDOMEN: Soft, Nondistended; Bowel sounds present; mild TTP in LLQ  EXTREMITIES:  2+ Peripheral Pulses, No edema  PSYCH: AAOx3  NEUROLOGY: non-focal  SKIN: No rashes or lesions    LABS:                        11.0   12.27 )-----------( 136      ( 21 Mar 2018 05:33 )             32.8     03-21    136  |  101  |  6<L>  ----------------------------<  90  3.5   |  22  |  0.46<L>    Ca    8.5      21 Mar 2018 05:33  Phos  3.1     03-21  Mg     1.7     03-21

## 2018-03-21 NOTE — PROGRESS NOTE ADULT - PROBLEM SELECTOR PLAN 2
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 Unclear if primary pancreatic lesion or colon met. 11/9/17 underwent open ex lap for possible resection of pancreatic adenoca, however, SMA was encased by tumor and therefore could not be resected. Follows with Dr Louann Maloney at [facility]. On chemo FOLFIRINOX, last tx 3/13/18. Being considered for eventual palliative RT to panc lesion with Dr Bryan Ochoa vs tx with immunotherapy as OP.

## 2018-03-21 NOTE — PROGRESS NOTE ADULT - PROBLEM SELECTOR PLAN 2
- Pt states pain is similar to his cancer pain  - pt now on po dilaudid - pain is being well controlled with current dose   - since pain is improving will advance diet as tolerated.    - encourage the use of bentyl during painful episodes

## 2018-03-21 NOTE — PROGRESS NOTE ADULT - PROBLEM SELECTOR PLAN 5
- Onc recs appreciated  - Continue with alprazolam PRN anxiety.  - Pain control. Per patient and oncology, patient was being weaned off methadone but is still on 10mg po TID- continue with home dose - Onc recs appreciated  - Continue with alprazolam PRN anxiety.  - Pain control. Per patient and oncology, patient was being weaned off methadone but is still on 10mg po TID- continue with home dose  - onc requesting CT chest for surveillance

## 2018-03-22 ENCOUNTER — TRANSCRIPTION ENCOUNTER (OUTPATIENT)
Age: 63
End: 2018-03-22

## 2018-03-22 LAB
ALBUMIN SERPL ELPH-MCNC: 3.4 G/DL — SIGNIFICANT CHANGE UP (ref 3.3–5)
ALP SERPL-CCNC: 114 U/L — SIGNIFICANT CHANGE UP (ref 40–120)
ALT FLD-CCNC: 20 U/L — SIGNIFICANT CHANGE UP (ref 4–41)
AST SERPL-CCNC: 22 U/L — SIGNIFICANT CHANGE UP (ref 4–40)
BASOPHILS # BLD AUTO: 0.07 K/UL — SIGNIFICANT CHANGE UP (ref 0–0.2)
BASOPHILS NFR BLD AUTO: 0.7 % — SIGNIFICANT CHANGE UP (ref 0–2)
BILIRUB SERPL-MCNC: 0.2 MG/DL — SIGNIFICANT CHANGE UP (ref 0.2–1.2)
BUN SERPL-MCNC: 5 MG/DL — LOW (ref 7–23)
CALCIUM SERPL-MCNC: 8.7 MG/DL — SIGNIFICANT CHANGE UP (ref 8.4–10.5)
CHLORIDE SERPL-SCNC: 99 MMOL/L — SIGNIFICANT CHANGE UP (ref 98–107)
CO2 SERPL-SCNC: 23 MMOL/L — SIGNIFICANT CHANGE UP (ref 22–31)
CREAT SERPL-MCNC: 0.47 MG/DL — LOW (ref 0.5–1.3)
EOSINOPHIL # BLD AUTO: 0.38 K/UL — SIGNIFICANT CHANGE UP (ref 0–0.5)
EOSINOPHIL NFR BLD AUTO: 3.6 % — SIGNIFICANT CHANGE UP (ref 0–6)
GLUCOSE SERPL-MCNC: 83 MG/DL — SIGNIFICANT CHANGE UP (ref 70–99)
HCT VFR BLD CALC: 33.3 % — LOW (ref 39–50)
HGB BLD-MCNC: 11.4 G/DL — LOW (ref 13–17)
IMM GRANULOCYTES # BLD AUTO: 0.06 # — SIGNIFICANT CHANGE UP
IMM GRANULOCYTES NFR BLD AUTO: 0.6 % — SIGNIFICANT CHANGE UP (ref 0–1.5)
LYMPHOCYTES # BLD AUTO: 1.89 K/UL — SIGNIFICANT CHANGE UP (ref 1–3.3)
LYMPHOCYTES # BLD AUTO: 17.7 % — SIGNIFICANT CHANGE UP (ref 13–44)
MCHC RBC-ENTMCNC: 31.1 PG — SIGNIFICANT CHANGE UP (ref 27–34)
MCHC RBC-ENTMCNC: 34.2 % — SIGNIFICANT CHANGE UP (ref 32–36)
MCV RBC AUTO: 91 FL — SIGNIFICANT CHANGE UP (ref 80–100)
MONOCYTES # BLD AUTO: 1.54 K/UL — HIGH (ref 0–0.9)
MONOCYTES NFR BLD AUTO: 14.4 % — HIGH (ref 2–14)
NEUTROPHILS # BLD AUTO: 6.76 K/UL — SIGNIFICANT CHANGE UP (ref 1.8–7.4)
NEUTROPHILS NFR BLD AUTO: 63 % — SIGNIFICANT CHANGE UP (ref 43–77)
NRBC # FLD: 0 — SIGNIFICANT CHANGE UP
PLATELET # BLD AUTO: 144 K/UL — LOW (ref 150–400)
PMV BLD: 9.7 FL — SIGNIFICANT CHANGE UP (ref 7–13)
POTASSIUM SERPL-MCNC: 3.8 MMOL/L — SIGNIFICANT CHANGE UP (ref 3.5–5.3)
POTASSIUM SERPL-SCNC: 3.8 MMOL/L — SIGNIFICANT CHANGE UP (ref 3.5–5.3)
PROT SERPL-MCNC: 5.8 G/DL — LOW (ref 6–8.3)
RBC # BLD: 3.66 M/UL — LOW (ref 4.2–5.8)
RBC # FLD: 15.8 % — HIGH (ref 10.3–14.5)
SODIUM SERPL-SCNC: 136 MMOL/L — SIGNIFICANT CHANGE UP (ref 135–145)
WBC # BLD: 10.7 K/UL — HIGH (ref 3.8–10.5)
WBC # FLD AUTO: 10.7 K/UL — HIGH (ref 3.8–10.5)

## 2018-03-22 PROCEDURE — 99233 SBSQ HOSP IP/OBS HIGH 50: CPT

## 2018-03-22 RX ORDER — METHADONE HYDROCHLORIDE 40 MG/1
10 TABLET ORAL THREE TIMES A DAY
Qty: 0 | Refills: 0 | Status: DISCONTINUED | OUTPATIENT
Start: 2018-03-22 | End: 2018-03-23

## 2018-03-22 RX ORDER — ALPRAZOLAM 0.25 MG
0.5 TABLET ORAL THREE TIMES A DAY
Qty: 0 | Refills: 0 | Status: DISCONTINUED | OUTPATIENT
Start: 2018-03-22 | End: 2018-03-23

## 2018-03-22 RX ADMIN — HYDROMORPHONE HYDROCHLORIDE 8 MILLIGRAM(S): 2 INJECTION INTRAMUSCULAR; INTRAVENOUS; SUBCUTANEOUS at 00:09

## 2018-03-22 RX ADMIN — HYDROMORPHONE HYDROCHLORIDE 8 MILLIGRAM(S): 2 INJECTION INTRAMUSCULAR; INTRAVENOUS; SUBCUTANEOUS at 00:39

## 2018-03-22 RX ADMIN — HYDROMORPHONE HYDROCHLORIDE 8 MILLIGRAM(S): 2 INJECTION INTRAMUSCULAR; INTRAVENOUS; SUBCUTANEOUS at 06:44

## 2018-03-22 RX ADMIN — HYDROMORPHONE HYDROCHLORIDE 8 MILLIGRAM(S): 2 INJECTION INTRAMUSCULAR; INTRAVENOUS; SUBCUTANEOUS at 09:55

## 2018-03-22 RX ADMIN — HYDROMORPHONE HYDROCHLORIDE 8 MILLIGRAM(S): 2 INJECTION INTRAMUSCULAR; INTRAVENOUS; SUBCUTANEOUS at 04:20

## 2018-03-22 RX ADMIN — HYDROMORPHONE HYDROCHLORIDE 8 MILLIGRAM(S): 2 INJECTION INTRAMUSCULAR; INTRAVENOUS; SUBCUTANEOUS at 19:44

## 2018-03-22 RX ADMIN — ENOXAPARIN SODIUM 40 MILLIGRAM(S): 100 INJECTION SUBCUTANEOUS at 00:09

## 2018-03-22 RX ADMIN — HYDROMORPHONE HYDROCHLORIDE 8 MILLIGRAM(S): 2 INJECTION INTRAMUSCULAR; INTRAVENOUS; SUBCUTANEOUS at 10:25

## 2018-03-22 RX ADMIN — SODIUM CHLORIDE 125 MILLILITER(S): 9 INJECTION INTRAMUSCULAR; INTRAVENOUS; SUBCUTANEOUS at 00:10

## 2018-03-22 RX ADMIN — HYDROMORPHONE HYDROCHLORIDE 8 MILLIGRAM(S): 2 INJECTION INTRAMUSCULAR; INTRAVENOUS; SUBCUTANEOUS at 13:03

## 2018-03-22 RX ADMIN — SODIUM CHLORIDE 125 MILLILITER(S): 9 INJECTION INTRAMUSCULAR; INTRAVENOUS; SUBCUTANEOUS at 09:55

## 2018-03-22 RX ADMIN — METHADONE HYDROCHLORIDE 10 MILLIGRAM(S): 40 TABLET ORAL at 13:03

## 2018-03-22 RX ADMIN — METHADONE HYDROCHLORIDE 10 MILLIGRAM(S): 40 TABLET ORAL at 06:44

## 2018-03-22 RX ADMIN — PANTOPRAZOLE SODIUM 40 MILLIGRAM(S): 20 TABLET, DELAYED RELEASE ORAL at 06:44

## 2018-03-22 RX ADMIN — HYDROMORPHONE HYDROCHLORIDE 8 MILLIGRAM(S): 2 INJECTION INTRAMUSCULAR; INTRAVENOUS; SUBCUTANEOUS at 07:15

## 2018-03-22 RX ADMIN — MIRTAZAPINE 15 MILLIGRAM(S): 45 TABLET, ORALLY DISINTEGRATING ORAL at 21:07

## 2018-03-22 RX ADMIN — HYDROMORPHONE HYDROCHLORIDE 8 MILLIGRAM(S): 2 INJECTION INTRAMUSCULAR; INTRAVENOUS; SUBCUTANEOUS at 13:33

## 2018-03-22 RX ADMIN — HYDROMORPHONE HYDROCHLORIDE 8 MILLIGRAM(S): 2 INJECTION INTRAMUSCULAR; INTRAVENOUS; SUBCUTANEOUS at 03:43

## 2018-03-22 RX ADMIN — Medication 6 MILLIGRAM(S): at 21:06

## 2018-03-22 RX ADMIN — HYDROMORPHONE HYDROCHLORIDE 8 MILLIGRAM(S): 2 INJECTION INTRAMUSCULAR; INTRAVENOUS; SUBCUTANEOUS at 23:00

## 2018-03-22 RX ADMIN — TAMSULOSIN HYDROCHLORIDE 0.4 MILLIGRAM(S): 0.4 CAPSULE ORAL at 21:07

## 2018-03-22 RX ADMIN — METHADONE HYDROCHLORIDE 10 MILLIGRAM(S): 40 TABLET ORAL at 21:06

## 2018-03-22 RX ADMIN — LOSARTAN POTASSIUM 25 MILLIGRAM(S): 100 TABLET, FILM COATED ORAL at 06:44

## 2018-03-22 RX ADMIN — Medication 25 MILLIGRAM(S): at 19:44

## 2018-03-22 RX ADMIN — FINASTERIDE 5 MILLIGRAM(S): 5 TABLET, FILM COATED ORAL at 13:03

## 2018-03-22 RX ADMIN — Medication 25 MILLIGRAM(S): at 06:44

## 2018-03-22 RX ADMIN — HYDROMORPHONE HYDROCHLORIDE 8 MILLIGRAM(S): 2 INJECTION INTRAMUSCULAR; INTRAVENOUS; SUBCUTANEOUS at 16:36

## 2018-03-22 RX ADMIN — HYDROMORPHONE HYDROCHLORIDE 8 MILLIGRAM(S): 2 INJECTION INTRAMUSCULAR; INTRAVENOUS; SUBCUTANEOUS at 16:06

## 2018-03-22 NOTE — DISCHARGE NOTE ADULT - MEDICATION SUMMARY - MEDICATIONS TO TAKE
I will START or STAY ON the medications listed below when I get home from the hospital:    finasteride 5 mg oral tablet  -- 1 tab(s) by mouth once a day  -- Indication: For Need for prophylactic measure    HYDROmorphone 8 mg oral tablet  -- 1 tab(s) by mouth every 3 hours, As needed, mild to moderate MDD:8 tabs  -- Indication: For Pancreatic cancer    methadone 10 mg oral tablet  -- 1 tab(s) by mouth 3 times a day MDD:3 tabs  -- Indication: For Pancreatic cancer    losartan 25 mg oral tablet  -- 1 tab(s) by mouth once a day  -- Indication: For Hypertension, unspecified type    tamsulosin 0.4 mg oral capsule  -- 1 cap(s) by mouth once a day (at bedtime)  -- Indication: For Need for prophylactic measure    mirtazapine 15 mg oral tablet  -- 1 tab(s) by mouth once a day (at bedtime)  -- Indication: For Need for prophylactic measure    ALPRAZolam 0.5 mg oral tablet  -- 1 tab(s) by mouth 3 times a day, As Needed  -- Indication: For Anxiety    Metoprolol Tartrate 25 mg oral tablet  -- 1 tab(s) by mouth 2 times a day  -- Indication: For Hypertension, unspecified type    lactulose 10 g/15 mL oral syrup  -- 22.5 milliliter(s) by mouth once a day, As Needed  -- Indication: For Need for prophylactic measure    docusate sodium 100 mg oral capsule  -- 1 cap(s) by mouth 3 times a day, As Needed  -- Indication: For Need for prophylactic measure    Senna 8.6 mg oral tablet  -- 2 tab(s) by mouth once a day (at bedtime), As Needed  -- Indication: For Need for prophylactic measure    pantoprazole 40 mg oral delayed release tablet  -- 1 tab(s) by mouth once a day  -- Indication: For Need for prophylactic measure

## 2018-03-22 NOTE — DISCHARGE NOTE ADULT - HOSPITAL COURSE
62M h/o HTN, colon cancer s/p resection, stage III borderline resectable pancreatic cancer on FOLFIRINOX (last tx 3/13/18) who presents with abdominal and back pain c/b episodes of diarrhea and profound leukocytosis after receiving Neulasta.      Problem/Plan - 1:  ·  Problem: Back pain.  Plan: - Patient was unable to tolerate MRI. However, patient has no focal neurologic findings and normal rectal tone, as well as no known history of spinal metastatic disease. Case discussed with neurosurgery who recommend CT of T/L-spine. CT is negative for met disease on spine. Highly unlikely for the patient to have cord compression  - Pain control with dilaudid, methadone  - Neulasta may be contributing to back pain as well, especially if it has caused this significant of a WBC response  - pt has bowel/urine control and is ambulatory.      Problem/Plan - 2:  ·  Problem: Abdominal pain, unspecified abdominal location.  Plan: - Pt states pain is similar to his cancer pain  - pt now on po dilaudid - pain is being well controlled with current dose   - since pain is improving will advance diet as tolerated.    - encourage the use of bentyl during painful episodes.      Problem/Plan - 3:  ·  Problem: Leukocytosis.  Plan: -Patient received Neulasta with WBC count increasing from 6 to 47 within 24hrs. Spoke with oncology who stated it is possible for this to happen from Neulasta  -monitor off antibiotics   -cdiff and stool studies neg   -Patient non-toxic appearing  - continue to monitor off antibiotics.      Problem/Plan - 4:  ·  Problem: Diarrhea.  Plan: - cdiff and stool studies neg  - diarrhea frequency is decreasing.      Problem/Plan - 5:  ·  Problem: Pancreatic cancer.  Plan: - Onc recs appreciated  - Continue with alprazolam PRN anxiety.  - Pain control. Per patient and oncology, patient was being weaned off methadone but is still on 10mg po TID- continue with home dose  - onc requesting CT chest for surveillance.      Problem/Plan - 6:  Problem: Hypertension, unspecified type. Plan: - No acute issues presently  - Continue losartan, metoprolol.     Problem/Plan - 7:  ·  Problem: Nutrition, metabolism, and development symptoms.  Plan: - Low Na diet.      Problem/Plan - 8:  ·  Problem: Need for prophylactic measure.  Plan: - Enoxaparin, IMPROVE=4.     3/22 Advance diet to regular and pain management - will re-evaluate - possible discharge today and/or tomorrow 62M h/o HTN, colon cancer s/p resection, stage III borderline resectable pancreatic cancer on FOLFIRINOX (last tx 3/13/18) who presents with abdominal and back pain c/b episodes of diarrhea and profound leukocytosis after receiving Neulasta.      Problem/Plan - 1:  ·  Problem: Back pain.  Plan: - Patient was unable to tolerate MRI. However, patient has no focal neurologic findings and normal rectal tone, as well as no known history of spinal metastatic disease. Case discussed with neurosurgery who recommend CT of T/L-spine. CT is negative for met disease on spine. Highly unlikely for the patient to have cord compression  - Pain control with dilaudid, methadone  - Neulasta may be contributing to back pain as well, especially if it has caused this significant of a WBC response  - pt has bowel/urine control and is ambulatory.      Problem/Plan - 2:  ·  Problem: Abdominal pain, unspecified abdominal location.  Plan: - Pt states pain is similar to his cancer pain  - pt now on po dilaudid - pain is being well controlled with current dose   - pt tolerating regular diet.      Problem/Plan - 3:  ·  Problem: Leukocytosis.  Plan: -Patient received Neulasta with WBC count increasing from 6 to 47 within 24hrs. Spoke with oncology who stated it is possible for this to happen from Neulasta  -monitor off antibiotics   -cdiff and stool studies neg   -Patient non-toxic appearing  - continue to monitor off antibiotics.      Problem/Plan - 4:  ·  Problem: Diarrhea.  Plan: - cdiff and stool studies neg  - diarrhea resolved.      Problem/Plan - 5:  ·  Problem: Pancreatic cancer.  Plan: - Onc recs appreciated  - Continue with alprazolam PRN anxiety.  - continue with methadone and dilaudid   - CT chest neg for lung mets  - outpt onc followup.      Problem/Plan - 6:  Problem: Hypertension, unspecified type. Plan: - No acute issues presently  - Continue losartan, metoprolol.     Problem/Plan - 7:  ·  Problem: Nutrition, metabolism, and development symptoms.  Plan: - Low Na diet.      Problem/Plan - 8:  ·  Problem: Need for prophylactic measure.  Plan: - Enoxaparin, IMPROVE=4.     Attending Attestation:   Istop ref # [id number]  discharge planning and coordination ~ 45mins.

## 2018-03-22 NOTE — PROGRESS NOTE ADULT - PROBLEM SELECTOR PLAN 5
- Onc recs appreciated  - Continue with alprazolam PRN anxiety.  - Pain control. Per patient and oncology, patient was being weaned off methadone but is still on 10mg po TID- continue with home dose  - onc requesting CT chest for surveillance - Onc recs appreciated  - Continue with alprazolam PRN anxiety.  - Pain control. Per patient and oncology, patient was being weaned off methadone but is still on 10mg po TID- continue with home dose  - CT chest neg for lung mets

## 2018-03-22 NOTE — DISCHARGE NOTE ADULT - PLAN OF CARE
continue current regimen Please follow up at [facility] and Palliative w Dr Roslyn Matute via outpatient, call to schedule appointment Currently plan is for post chemo RT. Please follow up with Dr. Maloney on discharge, call to schedule appointment

## 2018-03-22 NOTE — DISCHARGE NOTE ADULT - PATIENT PORTAL LINK FT
You can access the [facility] Patient Portal, offered by [facility], by registering with the following website: [email or web address]

## 2018-03-22 NOTE — DISCHARGE NOTE ADULT - MEDICATION SUMMARY - MEDICATIONS TO STOP TAKING
I will STOP taking the medications listed below when I get home from the hospital:    HYDROmorphone 4 mg oral tablet  -- 1 tab(s) by mouth every 4 hours, As Needed for breakthrough

## 2018-03-22 NOTE — PROGRESS NOTE ADULT - SUBJECTIVE AND OBJECTIVE BOX
Patient is a 62y old  Male who presents with a chief complaint of abd pain / back pain (18 Mar 2018 00:14)      SUBJECTIVE / OVERNIGHT EVENTS:  pain improves with current pain regimen    MEDICATIONS  (STANDING):  enoxaparin Injectable 40 milliGRAM(s) SubCutaneous every 24 hours  finasteride 5 milliGRAM(s) Oral daily  losartan 25 milliGRAM(s) Oral daily  melatonin 6 milliGRAM(s) Oral at bedtime  methadone    Tablet 10 milliGRAM(s) Oral three times a day  metoprolol     tartrate 25 milliGRAM(s) Oral every 12 hours  mirtazapine 15 milliGRAM(s) Oral at bedtime  pantoprazole    Tablet 40 milliGRAM(s) Oral before breakfast  sodium chloride 0.9%. 1000 milliLiter(s) (125 mL/Hr) IV Continuous <Continuous>  tamsulosin 0.4 milliGRAM(s) Oral at bedtime    MEDICATIONS  (PRN):  ALPRAZolam 0.5 milliGRAM(s) Oral three times a day PRN anxiety or insomnia  dicyclomine 20 milliGRAM(s) Oral four times a day before meals PRN crampy abdominal pain  HYDROmorphone   Tablet 8 milliGRAM(s) Oral every 3 hours PRN mild to moderate      Vital Signs Last 24 Hrs  T(C): 36.6 (22 Mar 2018 06:41), Max: 37.1 (21 Mar 2018 20:58)  T(F): 97.9 (22 Mar 2018 06:41), Max: 98.7 (21 Mar 2018 20:58)  HR: 90 (22 Mar 2018 06:41) (70 - 90)  BP: 142/93 (22 Mar 2018 06:41) (120/81 - 155/80)  BP(mean): --  RR: 18 (22 Mar 2018 06:41) (18 - 18)  SpO2: 98% (22 Mar 2018 06:41) (98% - 100%)  CAPILLARY BLOOD GLUCOSE      PHYSICAL EXAM:  GENERAL: NAD, well-developed  HEAD:  Atraumatic, Normocephalic  EYES: EOMI, conjunctiva and sclera clear  NECK: Supple, No JVD  CHEST/LUNG: Clear to auscultation bilaterally; No wheeze  HEART: Regular rate and rhythm; No murmurs  ABDOMEN: Soft, Nontender, Nondistended; Bowel sounds present  EXTREMITIES:  2+ Peripheral Pulses, No edema  PSYCH: AAOx3  NEUROLOGY: non-focal  SKIN: No rashes or lesions    LABS:                        11.4   10.70 )-----------( 144      ( 22 Mar 2018 05:27 )             33.3     03-22    136  |  99  |  5<L>  ----------------------------<  83  3.8   |  23  |  0.47<L>    Ca    8.7      22 Mar 2018 05:27  Phos  3.1     03-21  Mg     1.7     03-21    TPro  5.8<L>  /  Alb  3.4  /  TBili  0.2  /  DBili  x   /  AST  22  /  ALT  20  /  AlkPhos  114  03-22 Patient is a 62y old  Male who presents with a chief complaint of abd pain / back pain (18 Mar 2018 00:14)      SUBJECTIVE / OVERNIGHT EVENTS:  pain improves with current pain regimen.  tolerating liquid diet    MEDICATIONS  (STANDING):  enoxaparin Injectable 40 milliGRAM(s) SubCutaneous every 24 hours  finasteride 5 milliGRAM(s) Oral daily  losartan 25 milliGRAM(s) Oral daily  melatonin 6 milliGRAM(s) Oral at bedtime  methadone    Tablet 10 milliGRAM(s) Oral three times a day  metoprolol     tartrate 25 milliGRAM(s) Oral every 12 hours  mirtazapine 15 milliGRAM(s) Oral at bedtime  pantoprazole    Tablet 40 milliGRAM(s) Oral before breakfast  sodium chloride 0.9%. 1000 milliLiter(s) (125 mL/Hr) IV Continuous <Continuous>  tamsulosin 0.4 milliGRAM(s) Oral at bedtime    MEDICATIONS  (PRN):  ALPRAZolam 0.5 milliGRAM(s) Oral three times a day PRN anxiety or insomnia  dicyclomine 20 milliGRAM(s) Oral four times a day before meals PRN crampy abdominal pain  HYDROmorphone   Tablet 8 milliGRAM(s) Oral every 3 hours PRN mild to moderate      Vital Signs Last 24 Hrs  T(C): 36.6 (22 Mar 2018 06:41), Max: 37.1 (21 Mar 2018 20:58)  T(F): 97.9 (22 Mar 2018 06:41), Max: 98.7 (21 Mar 2018 20:58)  HR: 90 (22 Mar 2018 06:41) (70 - 90)  BP: 142/93 (22 Mar 2018 06:41) (120/81 - 155/80)  BP(mean): --  RR: 18 (22 Mar 2018 06:41) (18 - 18)  SpO2: 98% (22 Mar 2018 06:41) (98% - 100%)  CAPILLARY BLOOD GLUCOSE      PHYSICAL EXAM:  GENERAL: NAD, well-developed  HEAD:  Atraumatic, Normocephalic  EYES: EOMI, conjunctiva and sclera clear  NECK: Supple, No JVD  CHEST/LUNG: Clear to auscultation bilaterally; No wheeze  HEART: Regular rate and rhythm; No murmurs  ABDOMEN: Soft, Nondistended; Bowel sounds present; mild TTP in LLQ  EXTREMITIES:  2+ Peripheral Pulses, No edema  PSYCH: AAOx3  NEUROLOGY: non-focal  SKIN: No rashes or lesions    LABS:                        11.4   10.70 )-----------( 144      ( 22 Mar 2018 05:27 )             33.3     03-22    136  |  99  |  5<L>  ----------------------------<  83  3.8   |  23  |  0.47<L>    Ca    8.7      22 Mar 2018 05:27  Phos  3.1     03-21  Mg     1.7     03-21    TPro  5.8<L>  /  Alb  3.4  /  TBili  0.2  /  DBili  x   /  AST  22  /  ALT  20  /  AlkPhos  114  03-22

## 2018-03-22 NOTE — DISCHARGE NOTE ADULT - CARE PLAN
Principal Discharge DX:	Colon cancer  Goal:	continue current regimen  Assessment and plan of treatment:	Please follow up at [facility] and Palliative w Dr Roslyn Matute via outpatient, call to schedule appointment  Secondary Diagnosis:	Pancreatic cancer  Goal:	Currently plan is for post chemo RT.  Assessment and plan of treatment:	Please follow up with Dr. Maloney on discharge, call to schedule appointment

## 2018-03-22 NOTE — PROGRESS NOTE ADULT - PROBLEM SELECTOR PLAN 2
- Pt states pain is similar to his cancer pain  - pt now on po dilaudid - pain is being well controlled with current dose   - since pain is improving will advance diet as tolerated.    - encourage the use of bentyl during painful episodes - Pt states pain is similar to his cancer pain  - pt now on po dilaudid - pain is being well controlled with current dose   - since pain is improving will advance diet today and monitor   - encourage the use of bentyl during painful episodes

## 2018-03-23 VITALS
OXYGEN SATURATION: 96 % | SYSTOLIC BLOOD PRESSURE: 115 MMHG | TEMPERATURE: 98 F | DIASTOLIC BLOOD PRESSURE: 72 MMHG | RESPIRATION RATE: 17 BRPM | HEART RATE: 79 BPM

## 2018-03-23 LAB
ALBUMIN SERPL ELPH-MCNC: 3.6 G/DL — SIGNIFICANT CHANGE UP (ref 3.3–5)
ALP SERPL-CCNC: 130 U/L — HIGH (ref 40–120)
ALT FLD-CCNC: 23 U/L — SIGNIFICANT CHANGE UP (ref 4–41)
AST SERPL-CCNC: 23 U/L — SIGNIFICANT CHANGE UP (ref 4–40)
BASOPHILS # BLD AUTO: 0.09 K/UL — SIGNIFICANT CHANGE UP (ref 0–0.2)
BASOPHILS NFR BLD AUTO: 0.8 % — SIGNIFICANT CHANGE UP (ref 0–2)
BILIRUB SERPL-MCNC: 0.2 MG/DL — SIGNIFICANT CHANGE UP (ref 0.2–1.2)
BUN SERPL-MCNC: 7 MG/DL — SIGNIFICANT CHANGE UP (ref 7–23)
CALCIUM SERPL-MCNC: 9.3 MG/DL — SIGNIFICANT CHANGE UP (ref 8.4–10.5)
CHLORIDE SERPL-SCNC: 99 MMOL/L — SIGNIFICANT CHANGE UP (ref 98–107)
CO2 SERPL-SCNC: 26 MMOL/L — SIGNIFICANT CHANGE UP (ref 22–31)
CREAT SERPL-MCNC: 0.55 MG/DL — SIGNIFICANT CHANGE UP (ref 0.5–1.3)
EOSINOPHIL # BLD AUTO: 0.25 K/UL — SIGNIFICANT CHANGE UP (ref 0–0.5)
EOSINOPHIL NFR BLD AUTO: 2.3 % — SIGNIFICANT CHANGE UP (ref 0–6)
GLUCOSE SERPL-MCNC: 86 MG/DL — SIGNIFICANT CHANGE UP (ref 70–99)
HCT VFR BLD CALC: 34.2 % — LOW (ref 39–50)
HGB BLD-MCNC: 11.7 G/DL — LOW (ref 13–17)
IMM GRANULOCYTES # BLD AUTO: 0.1 # — SIGNIFICANT CHANGE UP
IMM GRANULOCYTES NFR BLD AUTO: 0.9 % — SIGNIFICANT CHANGE UP (ref 0–1.5)
LYMPHOCYTES # BLD AUTO: 1.97 K/UL — SIGNIFICANT CHANGE UP (ref 1–3.3)
LYMPHOCYTES # BLD AUTO: 18.4 % — SIGNIFICANT CHANGE UP (ref 13–44)
MCHC RBC-ENTMCNC: 30.9 PG — SIGNIFICANT CHANGE UP (ref 27–34)
MCHC RBC-ENTMCNC: 34.2 % — SIGNIFICANT CHANGE UP (ref 32–36)
MCV RBC AUTO: 90.2 FL — SIGNIFICANT CHANGE UP (ref 80–100)
MONOCYTES # BLD AUTO: 1.59 K/UL — HIGH (ref 0–0.9)
MONOCYTES NFR BLD AUTO: 14.9 % — HIGH (ref 2–14)
NEUTROPHILS # BLD AUTO: 6.68 K/UL — SIGNIFICANT CHANGE UP (ref 1.8–7.4)
NEUTROPHILS NFR BLD AUTO: 62.7 % — SIGNIFICANT CHANGE UP (ref 43–77)
NRBC # FLD: 0 — SIGNIFICANT CHANGE UP
PLATELET # BLD AUTO: 166 K/UL — SIGNIFICANT CHANGE UP (ref 150–400)
PMV BLD: 9.8 FL — SIGNIFICANT CHANGE UP (ref 7–13)
POTASSIUM SERPL-MCNC: 4 MMOL/L — SIGNIFICANT CHANGE UP (ref 3.5–5.3)
POTASSIUM SERPL-SCNC: 4 MMOL/L — SIGNIFICANT CHANGE UP (ref 3.5–5.3)
PROT SERPL-MCNC: 6.1 G/DL — SIGNIFICANT CHANGE UP (ref 6–8.3)
RBC # BLD: 3.79 M/UL — LOW (ref 4.2–5.8)
RBC # FLD: 15.9 % — HIGH (ref 10.3–14.5)
SODIUM SERPL-SCNC: 138 MMOL/L — SIGNIFICANT CHANGE UP (ref 135–145)
WBC # BLD: 10.68 K/UL — HIGH (ref 3.8–10.5)
WBC # FLD AUTO: 10.68 K/UL — HIGH (ref 3.8–10.5)

## 2018-03-23 PROCEDURE — 99239 HOSP IP/OBS DSCHRG MGMT >30: CPT

## 2018-03-23 RX ORDER — METHADONE HYDROCHLORIDE 40 MG/1
1 TABLET ORAL
Qty: 21 | Refills: 0
Start: 2018-03-23 | End: 2018-03-29

## 2018-03-23 RX ORDER — HYDROMORPHONE HYDROCHLORIDE 2 MG/ML
1 INJECTION INTRAMUSCULAR; INTRAVENOUS; SUBCUTANEOUS
Qty: 40 | Refills: 0
Start: 2018-03-23 | End: 2018-03-27

## 2018-03-23 RX ADMIN — HYDROMORPHONE HYDROCHLORIDE 8 MILLIGRAM(S): 2 INJECTION INTRAMUSCULAR; INTRAVENOUS; SUBCUTANEOUS at 10:00

## 2018-03-23 RX ADMIN — HYDROMORPHONE HYDROCHLORIDE 8 MILLIGRAM(S): 2 INJECTION INTRAMUSCULAR; INTRAVENOUS; SUBCUTANEOUS at 05:58

## 2018-03-23 RX ADMIN — METHADONE HYDROCHLORIDE 10 MILLIGRAM(S): 40 TABLET ORAL at 05:58

## 2018-03-23 RX ADMIN — PANTOPRAZOLE SODIUM 40 MILLIGRAM(S): 20 TABLET, DELAYED RELEASE ORAL at 06:05

## 2018-03-23 RX ADMIN — HYDROMORPHONE HYDROCHLORIDE 8 MILLIGRAM(S): 2 INJECTION INTRAMUSCULAR; INTRAVENOUS; SUBCUTANEOUS at 00:00

## 2018-03-23 RX ADMIN — HYDROMORPHONE HYDROCHLORIDE 8 MILLIGRAM(S): 2 INJECTION INTRAMUSCULAR; INTRAVENOUS; SUBCUTANEOUS at 11:00

## 2018-03-23 RX ADMIN — HYDROMORPHONE HYDROCHLORIDE 8 MILLIGRAM(S): 2 INJECTION INTRAMUSCULAR; INTRAVENOUS; SUBCUTANEOUS at 02:53

## 2018-03-23 RX ADMIN — LOSARTAN POTASSIUM 25 MILLIGRAM(S): 100 TABLET, FILM COATED ORAL at 05:59

## 2018-03-23 RX ADMIN — Medication 25 MILLIGRAM(S): at 05:59

## 2018-03-23 RX ADMIN — FINASTERIDE 5 MILLIGRAM(S): 5 TABLET, FILM COATED ORAL at 11:20

## 2018-03-23 RX ADMIN — HYDROMORPHONE HYDROCHLORIDE 8 MILLIGRAM(S): 2 INJECTION INTRAMUSCULAR; INTRAVENOUS; SUBCUTANEOUS at 03:53

## 2018-03-23 NOTE — PROGRESS NOTE ADULT - SUBJECTIVE AND OBJECTIVE BOX
Patient is a 62y old  Male who presents with a chief complaint of abd pain / back pain (22 Mar 2018 10:34)    SUBJECTIVE / OVERNIGHT EVENTS:  pt tolerating regular diet.  pain is well controlled with his present regimen.      MEDICATIONS  (STANDING):  enoxaparin Injectable 40 milliGRAM(s) SubCutaneous every 24 hours  finasteride 5 milliGRAM(s) Oral daily  losartan 25 milliGRAM(s) Oral daily  melatonin 6 milliGRAM(s) Oral at bedtime  methadone    Tablet 10 milliGRAM(s) Oral three times a day  metoprolol     tartrate 25 milliGRAM(s) Oral every 12 hours  mirtazapine 15 milliGRAM(s) Oral at bedtime  pantoprazole    Tablet 40 milliGRAM(s) Oral before breakfast  sodium chloride 0.9%. 1000 milliLiter(s) (125 mL/Hr) IV Continuous <Continuous>  tamsulosin 0.4 milliGRAM(s) Oral at bedtime    MEDICATIONS  (PRN):  ALPRAZolam 0.5 milliGRAM(s) Oral three times a day PRN anxiety or insomnia  dicyclomine 20 milliGRAM(s) Oral four times a day before meals PRN crampy abdominal pain  HYDROmorphone   Tablet 8 milliGRAM(s) Oral every 3 hours PRN mild to moderate      Vital Signs Last 24 Hrs  T(C): 36.9 (23 Mar 2018 05:56), Max: 37.2 (22 Mar 2018 17:35)  T(F): 98.4 (23 Mar 2018 05:56), Max: 99 (22 Mar 2018 17:35)  HR: 79 (23 Mar 2018 05:56) (57 - 89)  BP: 115/72 (23 Mar 2018 05:56) (115/72 - 139/76)  BP(mean): --  RR: 17 (23 Mar 2018 05:56) (17 - 18)  SpO2: 96% (23 Mar 2018 05:56) (95% - 99%)  CAPILLARY BLOOD GLUCOSE    PHYSICAL EXAM:  GENERAL: NAD, well-developed  HEAD:  Atraumatic, Normocephalic  EYES: EOMI, conjunctiva and sclera clear  NECK: Supple, No JVD  CHEST/LUNG: Clear to auscultation bilaterally; No wheeze  HEART: Regular rate and rhythm; No murmurs  ABDOMEN: Soft, Nondistended; Bowel sounds present; very minimal TTP in LLQ  EXTREMITIES:  2+ Peripheral Pulses, No edema  PSYCH: AAOx3  NEUROLOGY: non-focal  SKIN: No rashes or lesions    LABS:                        11.7   10.68 )-----------( 166      ( 23 Mar 2018 05:15 )             34.2     03-23    138  |  99  |  7   ----------------------------<  86  4.0   |  26  |  0.55    Ca    9.3      23 Mar 2018 05:15    TPro  6.1  /  Alb  3.6  /  TBili  0.2  /  DBili  x   /  AST  23  /  ALT  23  /  AlkPhos  130<H>  03-23

## 2018-03-23 NOTE — PROGRESS NOTE ADULT - PROBLEM SELECTOR PROBLEM 6
Hypertension, unspecified type
Anxiety
Hypertension, unspecified type

## 2018-03-23 NOTE — PROGRESS NOTE ADULT - PROBLEM SELECTOR PROBLEM 7
Nutrition, metabolism, and development symptoms
Palliative care encounter
Nutrition, metabolism, and development symptoms

## 2018-03-23 NOTE — PROGRESS NOTE ADULT - PROBLEM SELECTOR PLAN 2
- Pt states pain is similar to his cancer pain  - pt now on po dilaudid - pain is being well controlled with current dose   - pt tolerating regular diet

## 2018-03-23 NOTE — PROGRESS NOTE ADULT - ASSESSMENT
62M h/o HTN, colon cancer s/p resection, stage III borderline resectable pancreatic cancer on FOLFIRINOX (last tx 3/13/18) who presents with abdominal and back pain c/b episodes of diarrhea and profound leukocytosis after receiving Neulasta.
62M h/o HTN, colon cancer s/p resection, stage III borderline resectable pancreatic cancer on FOLFIRINOX (last tx 3/13/18) who presents with abdominal and back pain c/b episodes of diarrhea and profound leukocytosis after receiving Neulasta.
62y M hx of colon CA sp resection June 2017 now with lesion to pancreatic tail which is non operable. Curretnly on FOLFIRNOX last tx was 3/13/18, sp neulasta 3/17/18. Plan for OP RT to panc tail lesion post chemo. Pt here with abdominal pain and diarrhea. Follows as OP woth Pall Care Dr Roslyn Matute. Palliative consulted for pain mgmt.
63 yo M PMH Colon Cancer s/p Resection, Stage III Borderline resectable Pancreatic Cancer on FOLFIRINOX (Last Dose 3/13/18) who presented to [facility] on 3/17/18 with abdominal pain and back pain.  On presentation afebrile, normotensive but tachycardic to > 100. On presentation WBC 6.25 with no bands. Lactic acid of 3.1. CT Abdomen/Pelvis without any acute abnormality. WBC elevated hospital day 2 to 51.38 and ID was called for leukocytosis.  He received Neulasta day of presentation to hospital.  +Diarrhea, c diff pcr negative.    Recommend:  1) Pt clinically non-toxic.  Leukocytosis likely secondary to neulasta.  Would observe off of antibiotics.  2) F/up stool culture  3) If clinically deteriorates, start meropenem 1 gram IV q8 and check blood cultures x 2 prior to antibiotics.  4) F/U MRI T and L Spine  5) Will sign off, please call ID service with change in status or questions.    Shameka Maguire MD  [phone] (pager)  [phone] (office)
62M h/o HTN, colon cancer s/p resection, stage III borderline resectable pancreatic cancer on FOLFIRINOX (last tx 3/13/18) who presents with abdominal and back pain c/b episodes of diarrhea and profound leukocytosis.
62M h/o HTN, colon cancer s/p resection, stage III borderline resectable pancreatic cancer on FOLFIRINOX (last tx 3/13/18) who presents with abdominal and back pain c/b episodes of diarrhea and profound leukocytosis after receiving Neulasta.
62M h/o HTN, colon cancer s/p resection, stage III borderline resectable pancreatic cancer on FOLFIRINOX (last tx 3/13/18) who presents with abdominal and back pain c/b episodes of diarrhea and profound leukocytosis.
62M h/o HTN, colon cancer s/p resection, stage III borderline resectable pancreatic cancer on FOLFIRINOX (last tx 3/13/18) who presents with abdominal and back pain c/b episodes of diarrhea and profound leukocytosis after receiving Neulasta.

## 2018-03-23 NOTE — PROGRESS NOTE ADULT - PROBLEM SELECTOR PLAN 6
- No acute issues presently  - Continue losartan, metoprolol
C/w Xanax 0.5mg Q 8h PRN, Remeron 15mg QHS.
- No acute issues presently  - Continue losartan, metoprolol

## 2018-03-23 NOTE — PROGRESS NOTE ADULT - ATTENDING COMMENTS
discharge planning and coordination ~ 45mins. Istop ref # [id number]  discharge planning and coordination ~ 45mins.

## 2018-03-23 NOTE — PROGRESS NOTE ADULT - PROBLEM SELECTOR PLAN 4
-stool O/P, cultures, GI PCR to r/o infectious etiology  - cdiff neg  - diarrhea frequency is decreasing
- cdiff and stool studies neg  - diarrhea frequency is decreasing
- cdiff and stool studies neg  - diarrhea frequency is decreasing
Continue on Methadone 10mg TID, Dilaudid 8mg PO Q3-4 hours PRN moderate pain.
- Pt states pain is similar to his cancer pain.  - Pain not severe at this time.  - Pain control as above.
- cdiff and stool studies neg  - diarrhea resolved
- Pt states pain is similar to his cancer pain.  - Pain not severe at this time.  - Pain control as above.

## 2018-03-23 NOTE — PROGRESS NOTE ADULT - PROBLEM SELECTOR PROBLEM 5
Pancreatic cancer
Severe protein-calorie malnutrition
Pancreatic cancer

## 2018-03-23 NOTE — PROGRESS NOTE ADULT - PROBLEM SELECTOR PLAN 3
-Patient received Neulasta with WBC count increasing from 6 to 47 within 24hrs. Spoke with oncology who stated it is possible for this to happen from Neulasta  -monitor off antibiotics   -GI PCR, stool cultures, O/P  -cdiff neg   -Patient non-toxic appearing  - continue to monitor off antibiotics
-Patient received Neulasta with WBC count increasing from 6 to 47 within 24hrs. Spoke with oncology who stated it is possible for this to happen from Neulasta  -monitor off antibiotics   -cdiff and stool studies neg   -Patient non-toxic appearing  - continue to monitor off antibiotics
-Patient received Neulasta with WBC count increasing from 6 to 47 within 24hrs. Spoke with oncology who stated it is possible for this to happen from Neulasta  -monitor off antibiotics   -cdiff and stool studies neg   -Patient non-toxic appearing  - continue to monitor off antibiotics
Resolving. Stool studies negative.
-check C. difficile, stool O/P, cultures, GI PCR to r/o infectious etiology  -May also be in the setting of methadone withdrawal as did not receive it in ED vs recent chemo
-Patient received Neulasta with WBC count increasing from 6 to 47 within 24hrs. Spoke with oncology who stated it is possible for this to happen from Neulasta  -monitor off antibiotics   -cdiff and stool studies neg   -Patient non-toxic appearing  - continue to monitor off antibiotics
-stool O/P, cultures, GI PCR to r/o infectious etiology  - cdiff neg  - monitor diarrhea frequency

## 2018-03-23 NOTE — PROGRESS NOTE ADULT - PROBLEM SELECTOR PLAN 5
- Onc recs appreciated  - Continue with alprazolam PRN anxiety.  - continue with methadone and dilaudid   - CT chest neg for lung mets  - outpt onc followup

## 2018-03-23 NOTE — PROGRESS NOTE ADULT - PROVIDER SPECIALTY LIST ADULT
Hospitalist
Infectious Disease
Palliative Care
Hospitalist
Hospitalist

## 2018-03-23 NOTE — PROGRESS NOTE ADULT - PROBLEM SELECTOR PROBLEM 4
Diarrhea
Abdominal pain, unspecified abdominal location
Diarrhea
Diarrhea
Abdominal pain, unspecified abdominal location
Diarrhea
Abdominal pain, unspecified abdominal location

## 2018-03-23 NOTE — PROGRESS NOTE ADULT - PROBLEM SELECTOR PLAN 1
- Patient was unable to tolerate MRI. However, patient has no focal neurologic findings and normal rectal tone, as well as no known history of spinal metastatic disease. Case discussed with neurosurgery who recommend CT of T/L-spine. CT is negative for met disease on spine. Highly unlikely for the patient to have cord compression  - Pain control with dilaudid, methadone  - Neulasta may be contributing to back pain as well, especially if it has caused this significant of a WBC response  - pt has bowel/urine control and is ambulatory.
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 Follows with Dr Louann Maloney at [facility].
- Patient was unable to tolerate MRI. However, patient has no focal neurologic findings and normal rectal tone, as well as no known history of spinal metastatic disease. Case discussed with neurosurgery who recommend CT of T/L-spine. If no metastatic disease is seen, it would be highly unlikely for the patient to have cord compression  - Pain control with dilaudid, methadone  - Neulasta may be contributing to back pain as well, especially if it has caused this significant of a WBC response
- Patient was unable to tolerate MRI. However, patient has no focal neurologic findings and normal rectal tone, as well as no known history of spinal metastatic disease. Case discussed with neurosurgery who recommend CT of T/L-spine. CT is negative for met disease on spine. Highly unlikely for the patient to have cord compression  - Pain control with dilaudid, methadone  - Neulasta may be contributing to back pain as well, especially if it has caused this significant of a WBC response  - pt has bowel/urine control and is ambulatory.
- Patient was unable to tolerate MRI. However, patient has no focal neurologic findings and normal rectal tone, as well as no known history of spinal metastatic disease. Case discussed with neurosurgery who recommend CT of T/L-spine. CT is negative for met disease on spine. Highly unlikely for the patient to have cord compression  - Pain control with dilaudid, methadone  - Neulasta may be contributing to back pain as well, especially if it has caused this significant of a WBC response  - pt has bowel/urine control and is ambulatory.

## 2018-03-23 NOTE — PROGRESS NOTE ADULT - PROBLEM SELECTOR PROBLEM 2
Abdominal pain, unspecified abdominal location
Pancreatic cancer
Leukocytosis
Abdominal pain, unspecified abdominal location
Leukocytosis

## 2018-03-23 NOTE — PROGRESS NOTE ADULT - PROBLEM SELECTOR PLAN 7
- Low Na diet.
Pain mgmt needs stable at present. Back on home regimen. Will sign off. Reconsult as needed. Pt to FU as OP w Dr Roslyn Matute on DC.
- Low Na diet.

## 2018-03-26 ENCOUNTER — APPOINTMENT (OUTPATIENT)
Dept: HEMATOLOGY ONCOLOGY | Facility: CLINIC | Age: 63
End: 2018-03-26
Payer: MEDICARE

## 2018-03-26 ENCOUNTER — APPOINTMENT (OUTPATIENT)
Dept: HEMATOLOGY ONCOLOGY | Facility: CLINIC | Age: 63
End: 2018-03-26

## 2018-03-26 VITALS
DIASTOLIC BLOOD PRESSURE: 78 MMHG | RESPIRATION RATE: 16 BRPM | SYSTOLIC BLOOD PRESSURE: 145 MMHG | HEART RATE: 82 BPM | OXYGEN SATURATION: 99 % | BODY MASS INDEX: 24.81 KG/M2 | TEMPERATURE: 98.1 F | WEIGHT: 163.14 LBS

## 2018-03-26 PROCEDURE — 99214 OFFICE O/P EST MOD 30 MIN: CPT

## 2018-03-26 RX ORDER — SODIUM CHLORIDE 9 G/ML
0.9 INJECTION, SOLUTION INTRAVENOUS
Qty: 1000 | Refills: 11 | Status: DISCONTINUED | COMMUNITY
Start: 2018-01-29 | End: 2018-03-26

## 2018-03-26 RX ORDER — FLUOROURACIL 50 MG/ML
2.5 INJECTION, SOLUTION INTRAVENOUS
Qty: 100 | Refills: 8 | Status: DISCONTINUED | COMMUNITY
Start: 2017-12-11 | End: 2018-03-26

## 2018-03-26 RX ORDER — HYDROMORPHONE HYDROCHLORIDE 4 MG/1
4 TABLET ORAL
Qty: 90 | Refills: 0 | Status: DISCONTINUED | COMMUNITY
Start: 2018-03-12 | End: 2018-03-26

## 2018-03-26 RX ORDER — DEXAMETHASONE 4 MG/1
4 TABLET ORAL
Qty: 24 | Refills: 6 | Status: DISCONTINUED | COMMUNITY
Start: 2017-12-15 | End: 2018-03-26

## 2018-03-27 ENCOUNTER — RESULT REVIEW (OUTPATIENT)
Age: 63
End: 2018-03-27

## 2018-03-27 ENCOUNTER — APPOINTMENT (OUTPATIENT)
Age: 63
End: 2018-03-27

## 2018-03-27 ENCOUNTER — LABORATORY RESULT (OUTPATIENT)
Age: 63
End: 2018-03-27

## 2018-03-27 ENCOUNTER — APPOINTMENT (OUTPATIENT)
Dept: INFUSION THERAPY | Facility: HOSPITAL | Age: 63
End: 2018-03-27

## 2018-03-27 LAB
BASOPHILS # BLD AUTO: 0.1 K/UL — SIGNIFICANT CHANGE UP (ref 0–0.2)
BASOPHILS NFR BLD AUTO: 0.7 % — SIGNIFICANT CHANGE UP (ref 0–2)
EOSINOPHIL # BLD AUTO: 0.3 K/UL — SIGNIFICANT CHANGE UP (ref 0–0.5)
EOSINOPHIL NFR BLD AUTO: 2.5 % — SIGNIFICANT CHANGE UP (ref 0–6)
HCT VFR BLD CALC: 38.3 % — LOW (ref 39–50)
HGB BLD-MCNC: 12.6 G/DL — LOW (ref 13–17)
LYMPHOCYTES # BLD AUTO: 16.7 % — SIGNIFICANT CHANGE UP (ref 13–44)
LYMPHOCYTES # BLD AUTO: 2 K/UL — SIGNIFICANT CHANGE UP (ref 1–3.3)
MCHC RBC-ENTMCNC: 30.6 PG — SIGNIFICANT CHANGE UP (ref 27–34)
MCHC RBC-ENTMCNC: 32.9 G/DL — SIGNIFICANT CHANGE UP (ref 32–36)
MCV RBC AUTO: 92.9 FL — SIGNIFICANT CHANGE UP (ref 80–100)
MONOCYTES # BLD AUTO: 0.9 K/UL — SIGNIFICANT CHANGE UP (ref 0–0.9)
MONOCYTES NFR BLD AUTO: 7.6 % — SIGNIFICANT CHANGE UP (ref 2–14)
NEUTROPHILS # BLD AUTO: 8.7 K/UL — HIGH (ref 1.8–7.4)
NEUTROPHILS NFR BLD AUTO: 72.4 % — SIGNIFICANT CHANGE UP (ref 43–77)
PLATELET # BLD AUTO: 209 K/UL — SIGNIFICANT CHANGE UP (ref 150–400)
RBC # BLD: 4.12 M/UL — LOW (ref 4.2–5.8)
RBC # FLD: 15.6 % — HIGH (ref 10.3–14.5)
WBC # BLD: 12 K/UL — HIGH (ref 3.8–10.5)
WBC # FLD AUTO: 12 K/UL — HIGH (ref 3.8–10.5)

## 2018-03-30 ENCOUNTER — APPOINTMENT (OUTPATIENT)
Dept: INFUSION THERAPY | Facility: HOSPITAL | Age: 63
End: 2018-03-30

## 2018-03-30 ENCOUNTER — APPOINTMENT (OUTPATIENT)
Dept: GERIATRICS | Facility: CLINIC | Age: 63
End: 2018-03-30

## 2018-04-05 ENCOUNTER — OUTPATIENT (OUTPATIENT)
Dept: OUTPATIENT SERVICES | Facility: HOSPITAL | Age: 63
LOS: 1 days | Discharge: HOME | End: 2018-04-05

## 2018-04-05 DIAGNOSIS — Z98.890 OTHER SPECIFIED POSTPROCEDURAL STATES: Chronic | ICD-10-CM

## 2018-04-05 DIAGNOSIS — Z90.49 ACQUIRED ABSENCE OF OTHER SPECIFIED PARTS OF DIGESTIVE TRACT: Chronic | ICD-10-CM

## 2018-04-05 DIAGNOSIS — C25.9 MALIGNANT NEOPLASM OF PANCREAS, UNSPECIFIED: ICD-10-CM

## 2018-04-05 DIAGNOSIS — K25.5 CHRONIC OR UNSPECIFIED GASTRIC ULCER WITH PERFORATION: Chronic | ICD-10-CM

## 2018-04-09 ENCOUNTER — APPOINTMENT (OUTPATIENT)
Dept: HEMATOLOGY ONCOLOGY | Facility: CLINIC | Age: 63
End: 2018-04-09
Payer: MEDICARE

## 2018-04-09 VITALS
WEIGHT: 176.99 LBS | HEART RATE: 78 BPM | SYSTOLIC BLOOD PRESSURE: 115 MMHG | RESPIRATION RATE: 16 BRPM | BODY MASS INDEX: 26.91 KG/M2 | DIASTOLIC BLOOD PRESSURE: 71 MMHG | OXYGEN SATURATION: 100 % | TEMPERATURE: 98 F

## 2018-04-09 PROCEDURE — 99213 OFFICE O/P EST LOW 20 MIN: CPT

## 2018-04-17 ENCOUNTER — APPOINTMENT (OUTPATIENT)
Age: 63
End: 2018-04-17

## 2018-04-17 ENCOUNTER — LABORATORY RESULT (OUTPATIENT)
Age: 63
End: 2018-04-17

## 2018-04-17 ENCOUNTER — RESULT REVIEW (OUTPATIENT)
Age: 63
End: 2018-04-17

## 2018-04-17 LAB
BASOPHILS # BLD AUTO: 0.1 K/UL — SIGNIFICANT CHANGE UP (ref 0–0.2)
BASOPHILS NFR BLD AUTO: 1.1 % — SIGNIFICANT CHANGE UP (ref 0–2)
EOSINOPHIL # BLD AUTO: 0.6 K/UL — HIGH (ref 0–0.5)
EOSINOPHIL NFR BLD AUTO: 9.4 % — HIGH (ref 0–6)
HCT VFR BLD CALC: 30.9 % — LOW (ref 39–50)
HGB BLD-MCNC: 10.7 G/DL — LOW (ref 13–17)
LYMPHOCYTES # BLD AUTO: 2 K/UL — SIGNIFICANT CHANGE UP (ref 1–3.3)
LYMPHOCYTES # BLD AUTO: 30.9 % — SIGNIFICANT CHANGE UP (ref 13–44)
MCHC RBC-ENTMCNC: 32.3 PG — SIGNIFICANT CHANGE UP (ref 27–34)
MCHC RBC-ENTMCNC: 34.6 G/DL — SIGNIFICANT CHANGE UP (ref 32–36)
MCV RBC AUTO: 93.2 FL — SIGNIFICANT CHANGE UP (ref 80–100)
MONOCYTES # BLD AUTO: 0.7 K/UL — SIGNIFICANT CHANGE UP (ref 0–0.9)
MONOCYTES NFR BLD AUTO: 10.8 % — SIGNIFICANT CHANGE UP (ref 2–14)
NEUTROPHILS # BLD AUTO: 3.1 K/UL — SIGNIFICANT CHANGE UP (ref 1.8–7.4)
NEUTROPHILS NFR BLD AUTO: 47.8 % — SIGNIFICANT CHANGE UP (ref 43–77)
PLATELET # BLD AUTO: 248 K/UL — SIGNIFICANT CHANGE UP (ref 150–400)
RBC # BLD: 3.32 M/UL — LOW (ref 4.2–5.8)
RBC # FLD: 14.5 % — SIGNIFICANT CHANGE UP (ref 10.3–14.5)
WBC # BLD: 6.5 K/UL — SIGNIFICANT CHANGE UP (ref 3.8–10.5)
WBC # FLD AUTO: 6.5 K/UL — SIGNIFICANT CHANGE UP (ref 3.8–10.5)

## 2018-04-18 DIAGNOSIS — Z51.11 ENCOUNTER FOR ANTINEOPLASTIC CHEMOTHERAPY: ICD-10-CM

## 2018-04-23 ENCOUNTER — MEDICATION RENEWAL (OUTPATIENT)
Age: 63
End: 2018-04-23

## 2018-05-01 ENCOUNTER — APPOINTMENT (OUTPATIENT)
Age: 63
End: 2018-05-01
Payer: MEDICARE

## 2018-05-01 VITALS
SYSTOLIC BLOOD PRESSURE: 147 MMHG | RESPIRATION RATE: 16 BRPM | HEART RATE: 70 BPM | TEMPERATURE: 97.5 F | DIASTOLIC BLOOD PRESSURE: 81 MMHG | BODY MASS INDEX: 27.98 KG/M2 | WEIGHT: 184 LBS | OXYGEN SATURATION: 100 %

## 2018-05-01 PROCEDURE — 99213 OFFICE O/P EST LOW 20 MIN: CPT

## 2018-05-01 NOTE — ASU PREOP CHECKLIST - DNR CLARIFICATION FORM COMPLETED
Agree with adding prednisone for inflammation in the setting of persistent cough and did escribe to juan josé   n/a

## 2018-05-07 ENCOUNTER — OUTPATIENT (OUTPATIENT)
Dept: OUTPATIENT SERVICES | Facility: HOSPITAL | Age: 63
LOS: 1 days | Discharge: HOME | End: 2018-05-07

## 2018-05-07 DIAGNOSIS — K25.5 CHRONIC OR UNSPECIFIED GASTRIC ULCER WITH PERFORATION: Chronic | ICD-10-CM

## 2018-05-07 DIAGNOSIS — Z98.890 OTHER SPECIFIED POSTPROCEDURAL STATES: Chronic | ICD-10-CM

## 2018-05-07 DIAGNOSIS — Z90.49 ACQUIRED ABSENCE OF OTHER SPECIFIED PARTS OF DIGESTIVE TRACT: Chronic | ICD-10-CM

## 2018-05-07 DIAGNOSIS — C25.9 MALIGNANT NEOPLASM OF PANCREAS, UNSPECIFIED: ICD-10-CM

## 2018-05-08 ENCOUNTER — LABORATORY RESULT (OUTPATIENT)
Age: 63
End: 2018-05-08

## 2018-05-08 ENCOUNTER — RESULT REVIEW (OUTPATIENT)
Age: 63
End: 2018-05-08

## 2018-05-08 ENCOUNTER — TRANSCRIPTION ENCOUNTER (OUTPATIENT)
Age: 63
End: 2018-05-08

## 2018-05-08 ENCOUNTER — APPOINTMENT (OUTPATIENT)
Age: 63
End: 2018-05-08

## 2018-05-08 LAB
BASOPHILS # BLD AUTO: 0 K/UL — SIGNIFICANT CHANGE UP (ref 0–0.2)
BASOPHILS NFR BLD AUTO: 0.6 % — SIGNIFICANT CHANGE UP (ref 0–2)
EOSINOPHIL # BLD AUTO: 0.5 K/UL — SIGNIFICANT CHANGE UP (ref 0–0.5)
EOSINOPHIL NFR BLD AUTO: 6.9 % — HIGH (ref 0–6)
HCT VFR BLD CALC: 32.2 % — LOW (ref 39–50)
HGB BLD-MCNC: 11.3 G/DL — LOW (ref 13–17)
LYMPHOCYTES # BLD AUTO: 1.4 K/UL — SIGNIFICANT CHANGE UP (ref 1–3.3)
LYMPHOCYTES # BLD AUTO: 21 % — SIGNIFICANT CHANGE UP (ref 13–44)
MCHC RBC-ENTMCNC: 32.8 PG — SIGNIFICANT CHANGE UP (ref 27–34)
MCHC RBC-ENTMCNC: 35.2 G/DL — SIGNIFICANT CHANGE UP (ref 32–36)
MCV RBC AUTO: 93.2 FL — SIGNIFICANT CHANGE UP (ref 80–100)
MONOCYTES # BLD AUTO: 0.6 K/UL — SIGNIFICANT CHANGE UP (ref 0–0.9)
MONOCYTES NFR BLD AUTO: 9.6 % — SIGNIFICANT CHANGE UP (ref 2–14)
NEUTROPHILS # BLD AUTO: 4.1 K/UL — SIGNIFICANT CHANGE UP (ref 1.8–7.4)
NEUTROPHILS NFR BLD AUTO: 62 % — SIGNIFICANT CHANGE UP (ref 43–77)
PLATELET # BLD AUTO: 197 K/UL — SIGNIFICANT CHANGE UP (ref 150–400)
RBC # BLD: 3.45 M/UL — LOW (ref 4.2–5.8)
RBC # FLD: 13.1 % — SIGNIFICANT CHANGE UP (ref 10.3–14.5)
WBC # BLD: 6.6 K/UL — SIGNIFICANT CHANGE UP (ref 3.8–10.5)
WBC # FLD AUTO: 6.6 K/UL — SIGNIFICANT CHANGE UP (ref 3.8–10.5)

## 2018-05-09 DIAGNOSIS — Z51.11 ENCOUNTER FOR ANTINEOPLASTIC CHEMOTHERAPY: ICD-10-CM

## 2018-05-22 ENCOUNTER — APPOINTMENT (OUTPATIENT)
Dept: HEMATOLOGY ONCOLOGY | Facility: CLINIC | Age: 63
End: 2018-05-22
Payer: MEDICARE

## 2018-05-22 VITALS
BODY MASS INDEX: 28.66 KG/M2 | OXYGEN SATURATION: 100 % | DIASTOLIC BLOOD PRESSURE: 72 MMHG | SYSTOLIC BLOOD PRESSURE: 130 MMHG | HEART RATE: 82 BPM | TEMPERATURE: 98 F | RESPIRATION RATE: 16 BRPM | WEIGHT: 188.5 LBS

## 2018-05-22 PROCEDURE — 99214 OFFICE O/P EST MOD 30 MIN: CPT

## 2018-05-29 ENCOUNTER — APPOINTMENT (OUTPATIENT)
Age: 63
End: 2018-05-29

## 2018-05-29 ENCOUNTER — RESULT REVIEW (OUTPATIENT)
Age: 63
End: 2018-05-29

## 2018-05-29 ENCOUNTER — LABORATORY RESULT (OUTPATIENT)
Age: 63
End: 2018-05-29

## 2018-05-29 LAB
BASOPHILS # BLD AUTO: 0.1 K/UL — SIGNIFICANT CHANGE UP (ref 0–0.2)
BASOPHILS NFR BLD AUTO: 1.2 % — SIGNIFICANT CHANGE UP (ref 0–2)
EOSINOPHIL # BLD AUTO: 0.5 K/UL — SIGNIFICANT CHANGE UP (ref 0–0.5)
EOSINOPHIL NFR BLD AUTO: 8 % — HIGH (ref 0–6)
HCT VFR BLD CALC: 31.9 % — LOW (ref 39–50)
HGB BLD-MCNC: 11 G/DL — LOW (ref 13–17)
LYMPHOCYTES # BLD AUTO: 1.6 K/UL — SIGNIFICANT CHANGE UP (ref 1–3.3)
LYMPHOCYTES # BLD AUTO: 26.8 % — SIGNIFICANT CHANGE UP (ref 13–44)
MCHC RBC-ENTMCNC: 31.5 PG — SIGNIFICANT CHANGE UP (ref 27–34)
MCHC RBC-ENTMCNC: 34.6 G/DL — SIGNIFICANT CHANGE UP (ref 32–36)
MCV RBC AUTO: 91.3 FL — SIGNIFICANT CHANGE UP (ref 80–100)
MONOCYTES # BLD AUTO: 0.8 K/UL — SIGNIFICANT CHANGE UP (ref 0–0.9)
MONOCYTES NFR BLD AUTO: 12.7 % — SIGNIFICANT CHANGE UP (ref 2–14)
NEUTROPHILS # BLD AUTO: 3.2 K/UL — SIGNIFICANT CHANGE UP (ref 1.8–7.4)
NEUTROPHILS NFR BLD AUTO: 51.3 % — SIGNIFICANT CHANGE UP (ref 43–77)
PLATELET # BLD AUTO: 193 K/UL — SIGNIFICANT CHANGE UP (ref 150–400)
RBC # BLD: 3.5 M/UL — LOW (ref 4.2–5.8)
RBC # FLD: 12.3 % — SIGNIFICANT CHANGE UP (ref 10.3–14.5)
WBC # BLD: 6.2 K/UL — SIGNIFICANT CHANGE UP (ref 3.8–10.5)
WBC # FLD AUTO: 6.2 K/UL — SIGNIFICANT CHANGE UP (ref 3.8–10.5)

## 2018-05-30 ENCOUNTER — FORM ENCOUNTER (OUTPATIENT)
Age: 63
End: 2018-05-30

## 2018-05-31 ENCOUNTER — APPOINTMENT (OUTPATIENT)
Dept: CT IMAGING | Facility: IMAGING CENTER | Age: 63
End: 2018-05-31
Payer: MEDICARE

## 2018-05-31 ENCOUNTER — OUTPATIENT (OUTPATIENT)
Dept: OUTPATIENT SERVICES | Facility: HOSPITAL | Age: 63
LOS: 1 days | End: 2018-05-31
Payer: MEDICARE

## 2018-05-31 DIAGNOSIS — Z90.49 ACQUIRED ABSENCE OF OTHER SPECIFIED PARTS OF DIGESTIVE TRACT: Chronic | ICD-10-CM

## 2018-05-31 DIAGNOSIS — Z98.890 OTHER SPECIFIED POSTPROCEDURAL STATES: Chronic | ICD-10-CM

## 2018-05-31 DIAGNOSIS — K25.5 CHRONIC OR UNSPECIFIED GASTRIC ULCER WITH PERFORATION: Chronic | ICD-10-CM

## 2018-05-31 DIAGNOSIS — C25.9 MALIGNANT NEOPLASM OF PANCREAS, UNSPECIFIED: ICD-10-CM

## 2018-05-31 PROCEDURE — 74177 CT ABD & PELVIS W/CONTRAST: CPT | Mod: 26

## 2018-05-31 PROCEDURE — 71260 CT THORAX DX C+: CPT

## 2018-05-31 PROCEDURE — 71260 CT THORAX DX C+: CPT | Mod: 26

## 2018-05-31 PROCEDURE — 74177 CT ABD & PELVIS W/CONTRAST: CPT

## 2018-06-04 ENCOUNTER — OUTPATIENT (OUTPATIENT)
Dept: OUTPATIENT SERVICES | Facility: HOSPITAL | Age: 63
LOS: 1 days | Discharge: HOME | End: 2018-06-04

## 2018-06-04 DIAGNOSIS — Z98.890 OTHER SPECIFIED POSTPROCEDURAL STATES: Chronic | ICD-10-CM

## 2018-06-04 DIAGNOSIS — K25.5 CHRONIC OR UNSPECIFIED GASTRIC ULCER WITH PERFORATION: Chronic | ICD-10-CM

## 2018-06-04 DIAGNOSIS — C25.9 MALIGNANT NEOPLASM OF PANCREAS, UNSPECIFIED: ICD-10-CM

## 2018-06-04 DIAGNOSIS — Z90.49 ACQUIRED ABSENCE OF OTHER SPECIFIED PARTS OF DIGESTIVE TRACT: Chronic | ICD-10-CM

## 2018-06-05 ENCOUNTER — APPOINTMENT (OUTPATIENT)
Dept: HEMATOLOGY ONCOLOGY | Facility: CLINIC | Age: 63
End: 2018-06-05
Payer: MEDICARE

## 2018-06-05 ENCOUNTER — RESULT REVIEW (OUTPATIENT)
Age: 63
End: 2018-06-05

## 2018-06-05 ENCOUNTER — OTHER (OUTPATIENT)
Age: 63
End: 2018-06-05

## 2018-06-05 VITALS
RESPIRATION RATE: 16 BRPM | BODY MASS INDEX: 28.66 KG/M2 | HEART RATE: 74 BPM | OXYGEN SATURATION: 98 % | TEMPERATURE: 98 F | WEIGHT: 188.5 LBS | DIASTOLIC BLOOD PRESSURE: 78 MMHG | SYSTOLIC BLOOD PRESSURE: 120 MMHG

## 2018-06-05 LAB
BASOPHILS # BLD AUTO: 0 K/UL — SIGNIFICANT CHANGE UP (ref 0–0.2)
BASOPHILS NFR BLD AUTO: 0.8 % — SIGNIFICANT CHANGE UP (ref 0–2)
EOSINOPHIL # BLD AUTO: 0.5 K/UL — SIGNIFICANT CHANGE UP (ref 0–0.5)
EOSINOPHIL NFR BLD AUTO: 7.9 % — HIGH (ref 0–6)
HCT VFR BLD CALC: 35 % — LOW (ref 39–50)
HGB BLD-MCNC: 11.9 G/DL — LOW (ref 13–17)
LYMPHOCYTES # BLD AUTO: 1.5 K/UL — SIGNIFICANT CHANGE UP (ref 1–3.3)
LYMPHOCYTES # BLD AUTO: 24.6 % — SIGNIFICANT CHANGE UP (ref 13–44)
MCHC RBC-ENTMCNC: 31.1 PG — SIGNIFICANT CHANGE UP (ref 27–34)
MCHC RBC-ENTMCNC: 33.9 G/DL — SIGNIFICANT CHANGE UP (ref 32–36)
MCV RBC AUTO: 91.8 FL — SIGNIFICANT CHANGE UP (ref 80–100)
MONOCYTES # BLD AUTO: 0.6 K/UL — SIGNIFICANT CHANGE UP (ref 0–0.9)
MONOCYTES NFR BLD AUTO: 10.2 % — SIGNIFICANT CHANGE UP (ref 2–14)
NEUTROPHILS # BLD AUTO: 3.5 K/UL — SIGNIFICANT CHANGE UP (ref 1.8–7.4)
NEUTROPHILS NFR BLD AUTO: 56.4 % — SIGNIFICANT CHANGE UP (ref 43–77)
PLATELET # BLD AUTO: 185 K/UL — SIGNIFICANT CHANGE UP (ref 150–400)
RBC # BLD: 3.82 M/UL — LOW (ref 4.2–5.8)
RBC # FLD: 12.5 % — SIGNIFICANT CHANGE UP (ref 10.3–14.5)
WBC # BLD: 6.2 K/UL — SIGNIFICANT CHANGE UP (ref 3.8–10.5)
WBC # FLD AUTO: 6.2 K/UL — SIGNIFICANT CHANGE UP (ref 3.8–10.5)

## 2018-06-05 PROCEDURE — 99214 OFFICE O/P EST MOD 30 MIN: CPT

## 2018-06-11 ENCOUNTER — OUTPATIENT (OUTPATIENT)
Dept: OUTPATIENT SERVICES | Facility: HOSPITAL | Age: 63
LOS: 1 days | End: 2018-06-11
Payer: MEDICARE

## 2018-06-11 VITALS
SYSTOLIC BLOOD PRESSURE: 140 MMHG | WEIGHT: 186.07 LBS | HEART RATE: 68 BPM | DIASTOLIC BLOOD PRESSURE: 70 MMHG | RESPIRATION RATE: 14 BRPM | TEMPERATURE: 98 F | HEIGHT: 67.5 IN

## 2018-06-11 DIAGNOSIS — R59.9 ENLARGED LYMPH NODES, UNSPECIFIED: ICD-10-CM

## 2018-06-11 DIAGNOSIS — Z90.49 ACQUIRED ABSENCE OF OTHER SPECIFIED PARTS OF DIGESTIVE TRACT: Chronic | ICD-10-CM

## 2018-06-11 DIAGNOSIS — Z98.890 OTHER SPECIFIED POSTPROCEDURAL STATES: Chronic | ICD-10-CM

## 2018-06-11 DIAGNOSIS — R59.0 LOCALIZED ENLARGED LYMPH NODES: ICD-10-CM

## 2018-06-11 DIAGNOSIS — K25.5 CHRONIC OR UNSPECIFIED GASTRIC ULCER WITH PERFORATION: Chronic | ICD-10-CM

## 2018-06-11 DIAGNOSIS — R09.89 OTHER SPECIFIED SYMPTOMS AND SIGNS INVOLVING THE CIRCULATORY AND RESPIRATORY SYSTEMS: ICD-10-CM

## 2018-06-11 LAB
ALBUMIN SERPL ELPH-MCNC: 4.3 G/DL — SIGNIFICANT CHANGE UP (ref 3.3–5)
ALP SERPL-CCNC: 95 U/L — SIGNIFICANT CHANGE UP (ref 40–120)
ALT FLD-CCNC: 23 U/L — SIGNIFICANT CHANGE UP (ref 4–41)
AST SERPL-CCNC: 21 U/L — SIGNIFICANT CHANGE UP (ref 4–40)
BASOPHILS # BLD AUTO: 0.07 K/UL — SIGNIFICANT CHANGE UP (ref 0–0.2)
BASOPHILS NFR BLD AUTO: 1.1 % — SIGNIFICANT CHANGE UP (ref 0–2)
BILIRUB SERPL-MCNC: 0.3 MG/DL — SIGNIFICANT CHANGE UP (ref 0.2–1.2)
BUN SERPL-MCNC: 15 MG/DL — SIGNIFICANT CHANGE UP (ref 7–23)
CALCIUM SERPL-MCNC: 10.1 MG/DL — SIGNIFICANT CHANGE UP (ref 8.4–10.5)
CHLORIDE SERPL-SCNC: 99 MMOL/L — SIGNIFICANT CHANGE UP (ref 98–107)
CO2 SERPL-SCNC: 28 MMOL/L — SIGNIFICANT CHANGE UP (ref 22–31)
CREAT SERPL-MCNC: 0.64 MG/DL — SIGNIFICANT CHANGE UP (ref 0.5–1.3)
EOSINOPHIL # BLD AUTO: 0.54 K/UL — HIGH (ref 0–0.5)
EOSINOPHIL NFR BLD AUTO: 8.1 % — HIGH (ref 0–6)
GLUCOSE SERPL-MCNC: 101 MG/DL — HIGH (ref 70–99)
HCT VFR BLD CALC: 36.6 % — LOW (ref 39–50)
HGB BLD-MCNC: 11.8 G/DL — LOW (ref 13–17)
IMM GRANULOCYTES # BLD AUTO: 0.01 # — SIGNIFICANT CHANGE UP
IMM GRANULOCYTES NFR BLD AUTO: 0.2 % — SIGNIFICANT CHANGE UP (ref 0–1.5)
LYMPHOCYTES # BLD AUTO: 1.83 K/UL — SIGNIFICANT CHANGE UP (ref 1–3.3)
LYMPHOCYTES # BLD AUTO: 27.6 % — SIGNIFICANT CHANGE UP (ref 13–44)
MCHC RBC-ENTMCNC: 29.2 PG — SIGNIFICANT CHANGE UP (ref 27–34)
MCHC RBC-ENTMCNC: 32.2 % — SIGNIFICANT CHANGE UP (ref 32–36)
MCV RBC AUTO: 90.6 FL — SIGNIFICANT CHANGE UP (ref 80–100)
MONOCYTES # BLD AUTO: 0.8 K/UL — SIGNIFICANT CHANGE UP (ref 0–0.9)
MONOCYTES NFR BLD AUTO: 12.1 % — SIGNIFICANT CHANGE UP (ref 2–14)
NEUTROPHILS # BLD AUTO: 3.38 K/UL — SIGNIFICANT CHANGE UP (ref 1.8–7.4)
NEUTROPHILS NFR BLD AUTO: 50.9 % — SIGNIFICANT CHANGE UP (ref 43–77)
NRBC # FLD: 0 — SIGNIFICANT CHANGE UP
PLATELET # BLD AUTO: 204 K/UL — SIGNIFICANT CHANGE UP (ref 150–400)
PMV BLD: 9.6 FL — SIGNIFICANT CHANGE UP (ref 7–13)
POTASSIUM SERPL-MCNC: 4.8 MMOL/L — SIGNIFICANT CHANGE UP (ref 3.5–5.3)
POTASSIUM SERPL-SCNC: 4.8 MMOL/L — SIGNIFICANT CHANGE UP (ref 3.5–5.3)
PROT SERPL-MCNC: 7.7 G/DL — SIGNIFICANT CHANGE UP (ref 6–8.3)
RBC # BLD: 4.04 M/UL — LOW (ref 4.2–5.8)
RBC # FLD: 12.3 % — SIGNIFICANT CHANGE UP (ref 10.3–14.5)
SODIUM SERPL-SCNC: 138 MMOL/L — SIGNIFICANT CHANGE UP (ref 135–145)
WBC # BLD: 6.63 K/UL — SIGNIFICANT CHANGE UP (ref 3.8–10.5)
WBC # FLD AUTO: 6.63 K/UL — SIGNIFICANT CHANGE UP (ref 3.8–10.5)

## 2018-06-11 PROCEDURE — 93010 ELECTROCARDIOGRAM REPORT: CPT

## 2018-06-11 RX ORDER — SODIUM CHLORIDE 9 MG/ML
1000 INJECTION, SOLUTION INTRAVENOUS
Qty: 0 | Refills: 0 | Status: DISCONTINUED | OUTPATIENT
Start: 2018-06-12 | End: 2018-06-27

## 2018-06-11 NOTE — H&P PST ADULT - PMH
Cancer of pancreas    Cervical disc disease    Colon cancer    Colon cancer    Enlarged lymph node    Head ache    Hypertension, unspecified type  diet control  Injury  forehead & had sutures ( 2013 )  Obesity    Pancreatic cancer    Smoking    Vertigo Cancer of pancreas    Cervical disc disease    Chronic pain  neck, lower back  Colon cancer    Colon cancer    Enlarged lymph node    Head ache    Hypertension, unspecified type  diet control  Injury  forehead & had sutures ( 2013 )  Obesity    Pancreatic cancer    Smoking    Vertigo

## 2018-06-11 NOTE — H&P PST ADULT - GASTROINTESTINAL COMMENTS
s/p colon resection 6/2017.  Pt dx with pancreatic cancer 10/2017, s/p course of  chemo which completed 3/2018.

## 2018-06-11 NOTE — H&P PST ADULT - HISTORY OF PRESENT ILLNESS
This is a 62 y.o. male s/p colon resection 6/2017.  Pt dx with pancreatic cancer 10/2017, s/p course of  chemo which completed 3/2018.  Enlarged lymph node discovered on follow-up imaging.  Now scheduled for Endobronchial ultrasound bronchoscopy 6/12/18.

## 2018-06-11 NOTE — ASU PATIENT PROFILE, ADULT - PMH
Cancer of pancreas    Cervical disc disease    Chronic pain  neck, lower back  Colon cancer    Colon cancer    Enlarged lymph node    Head ache    Hypertension, unspecified type  diet control  Injury  forehead & had sutures ( 2013 )  Obesity    Pancreatic cancer    Smoking    Vertigo

## 2018-06-11 NOTE — H&P PST ADULT - PROBLEM SELECTOR PLAN 2
Pt reports intermittent runny nose x3 weeks (asymptomatic at PST) , and scab inside left nostril.  I called Dr. Flanagan's office, spoke with Tanya and informed of above.

## 2018-06-11 NOTE — H&P PST ADULT - NEGATIVE GASTROINTESTINAL SYMPTOMS
no diarrhea/no melena/no vomiting/no nausea no vomiting/no melena/no nausea/no constipation/no diarrhea

## 2018-06-11 NOTE — H&P PST ADULT - ASSESSMENT
62 y.o. male s/p colon resection 6/2017.  Pt dx with pancreatic cancer 10/2017, s/p course of  chemo which completed 3/2018.  Enlarged lymph node discovered on follow-up imaging.  Now scheduled for Endobronchial ultrasound bronchoscopy 6/12/18.

## 2018-06-11 NOTE — H&P PST ADULT - ENMT COMMENTS
Pt reports he has had intermittent runny nose x3 weeks. Denies sore throat, no cough, no fever. Pt reports he noticed small scab inside left nostril x 1 month. Pt reports he has had intermittent runny nose x3 weeks. Denies sore throat, no cough, no fever. Pt reports he noticed small scab inside left nostril x 1 month., bleeds when disturbed Pt reports he feels scab left nostril, not visualized on exam

## 2018-06-11 NOTE — H&P PST ADULT - PROBLEM SELECTOR PLAN 1
Endobronchial ultrasound bronchoscopy 6/12/18.    Pre-op instructions given and explained.   ABRAHAM precautions, Morena, OR booking informed

## 2018-06-12 ENCOUNTER — APPOINTMENT (OUTPATIENT)
Dept: PULMONOLOGY | Facility: HOSPITAL | Age: 63
End: 2018-06-12

## 2018-06-12 ENCOUNTER — APPOINTMENT (OUTPATIENT)
Dept: HEMATOLOGY ONCOLOGY | Facility: CLINIC | Age: 63
End: 2018-06-12

## 2018-06-12 ENCOUNTER — RESULT REVIEW (OUTPATIENT)
Age: 63
End: 2018-06-12

## 2018-06-12 ENCOUNTER — OUTPATIENT (OUTPATIENT)
Dept: OUTPATIENT SERVICES | Facility: HOSPITAL | Age: 63
LOS: 1 days | Discharge: HOME | End: 2018-06-12
Payer: MEDICARE

## 2018-06-12 VITALS
DIASTOLIC BLOOD PRESSURE: 49 MMHG | WEIGHT: 186.07 LBS | OXYGEN SATURATION: 98 % | RESPIRATION RATE: 16 BRPM | HEART RATE: 63 BPM | TEMPERATURE: 98 F | SYSTOLIC BLOOD PRESSURE: 133 MMHG | HEIGHT: 67 IN

## 2018-06-12 VITALS
DIASTOLIC BLOOD PRESSURE: 53 MMHG | OXYGEN SATURATION: 99 % | HEART RATE: 79 BPM | SYSTOLIC BLOOD PRESSURE: 131 MMHG | RESPIRATION RATE: 18 BRPM

## 2018-06-12 DIAGNOSIS — K25.5 CHRONIC OR UNSPECIFIED GASTRIC ULCER WITH PERFORATION: Chronic | ICD-10-CM

## 2018-06-12 DIAGNOSIS — Z98.890 OTHER SPECIFIED POSTPROCEDURAL STATES: Chronic | ICD-10-CM

## 2018-06-12 DIAGNOSIS — R59.0 LOCALIZED ENLARGED LYMPH NODES: ICD-10-CM

## 2018-06-12 DIAGNOSIS — Z90.49 ACQUIRED ABSENCE OF OTHER SPECIFIED PARTS OF DIGESTIVE TRACT: Chronic | ICD-10-CM

## 2018-06-12 PROCEDURE — 88173 CYTOPATH EVAL FNA REPORT: CPT | Mod: 26

## 2018-06-12 PROCEDURE — 31653 BRONCH EBUS SAMPLNG 3/> NODE: CPT | Mod: GC

## 2018-06-12 PROCEDURE — 31624 DX BRONCHOSCOPE/LAVAGE: CPT | Mod: GC

## 2018-06-12 RX ORDER — ONDANSETRON 8 MG/1
4 TABLET, FILM COATED ORAL ONCE
Qty: 0 | Refills: 0 | Status: DISCONTINUED | OUTPATIENT
Start: 2018-06-12 | End: 2018-06-12

## 2018-06-12 RX ORDER — FENTANYL CITRATE 50 UG/ML
25 INJECTION INTRAVENOUS
Qty: 0 | Refills: 0 | Status: DISCONTINUED | OUTPATIENT
Start: 2018-06-12 | End: 2018-06-12

## 2018-06-12 RX ORDER — FENTANYL CITRATE 50 UG/ML
50 INJECTION INTRAVENOUS
Qty: 0 | Refills: 0 | Status: DISCONTINUED | OUTPATIENT
Start: 2018-06-12 | End: 2018-06-12

## 2018-06-12 RX ADMIN — SODIUM CHLORIDE 30 MILLILITER(S): 9 INJECTION, SOLUTION INTRAVENOUS at 14:30

## 2018-06-12 NOTE — ASU DISCHARGE PLAN (ADULT/PEDIATRIC). - NOTIFY
Excessive Diarrhea/Increased Irritability or Sluggishness/increasing cough or blood tinged sputum/Bleeding that does not stop/Swelling that continues/Persistent Nausea and Vomiting/Unable to Urinate/Pain not relieved by Medications/Fever greater than 101/Inability to Tolerate Liquids or Foods/Numbness, tingling/Numbness, color, or temperature change to extremity

## 2018-06-12 NOTE — ASU DISCHARGE PLAN (ADULT/PEDIATRIC). - NURSING INSTRUCTIONS
call md for follow up appointment. Call md for any increase in pain fever or unable to tolerate food or fluid.

## 2018-06-12 NOTE — ASU DISCHARGE PLAN (ADULT/PEDIATRIC). - MEDICATION SUMMARY - MEDICATIONS TO TAKE
I will START or STAY ON the medications listed below when I get home from the hospital:    methadone 10 mg oral tablet  -- 1 tab(s) by mouth every 8 hours 6 am, 2 pm and 10 pm  -- Indication: For pain    HYDROmorphone 2 mg oral tablet  -- 5 tab(s) by mouth every 4 hours, As Needed for severe pain  -- Indication: For pain    tamsulosin 0.4 mg oral capsule  -- 1 cap(s) by mouth once a day in pm  -- Indication: For prostate    ALPRAZolam 0.5 mg oral tablet  -- 1 tab(s) by mouth every 8 hours, As Needed  -- Indication: For anxiety    Metoprolol Tartrate 25 mg oral tablet  -- 2 tab(s) by mouth 2 times a day  -- Indication: For hypertension    docusate sodium 100 mg oral tablet  -- 1 tab(s) by mouth 3 times a day  -- Indication: For constipation    lactulose 10 g/15 mL oral syrup  -- 22.5 milliliter(s) by mouth every other day, As Needed  -- Indication: For constipation

## 2018-06-14 ENCOUNTER — APPOINTMENT (OUTPATIENT)
Dept: ENDOCRINOLOGY | Facility: CLINIC | Age: 63
End: 2018-06-14
Payer: MEDICARE

## 2018-06-14 VITALS
WEIGHT: 184 LBS | HEIGHT: 68 IN | BODY MASS INDEX: 27.89 KG/M2 | DIASTOLIC BLOOD PRESSURE: 70 MMHG | SYSTOLIC BLOOD PRESSURE: 110 MMHG | OXYGEN SATURATION: 95 % | HEART RATE: 71 BPM

## 2018-06-14 LAB
T3 SERPL-MCNC: 233 NG/DL
T3FREE SERPL-MCNC: 7.94 PG/ML
T4 FREE SERPL-MCNC: 3.2 NG/DL
TSH SERPL-ACNC: 0.01 UIU/ML

## 2018-06-14 PROCEDURE — 99204 OFFICE O/P NEW MOD 45 MIN: CPT

## 2018-06-15 ENCOUNTER — APPOINTMENT (OUTPATIENT)
Dept: HEMATOLOGY ONCOLOGY | Facility: CLINIC | Age: 63
End: 2018-06-15
Payer: MEDICARE

## 2018-06-15 VITALS
RESPIRATION RATE: 16 BRPM | WEIGHT: 184 LBS | SYSTOLIC BLOOD PRESSURE: 117 MMHG | BODY MASS INDEX: 27.98 KG/M2 | TEMPERATURE: 97.8 F | HEART RATE: 79 BPM | OXYGEN SATURATION: 97 % | DIASTOLIC BLOOD PRESSURE: 70 MMHG

## 2018-06-15 PROCEDURE — 99214 OFFICE O/P EST MOD 30 MIN: CPT

## 2018-06-19 LAB
ACTH SER-ACNC: 19 PG/ML
CORTIS SERPL-MCNC: 9.9 UG/DL
HBA1C MFR BLD HPLC: 5.6 %
THYROGLOB AB SERPL-ACNC: <20 IU/ML
THYROPEROXIDASE AB SERPL IA-ACNC: 14.6 IU/ML

## 2018-06-21 LAB — TSH RECEPTOR AB: <0.5 IU/L

## 2018-06-26 ENCOUNTER — RESULT REVIEW (OUTPATIENT)
Age: 63
End: 2018-06-26

## 2018-06-26 ENCOUNTER — APPOINTMENT (OUTPATIENT)
Age: 63
End: 2018-06-26

## 2018-06-26 ENCOUNTER — LABORATORY RESULT (OUTPATIENT)
Age: 63
End: 2018-06-26

## 2018-06-26 LAB
BASOPHILS # BLD AUTO: 0 K/UL — SIGNIFICANT CHANGE UP (ref 0–0.2)
BASOPHILS NFR BLD AUTO: 0.6 % — SIGNIFICANT CHANGE UP (ref 0–2)
EOSINOPHIL # BLD AUTO: 0.6 K/UL — HIGH (ref 0–0.5)
EOSINOPHIL NFR BLD AUTO: 9.4 % — HIGH (ref 0–6)
HCT VFR BLD CALC: 33 % — LOW (ref 39–50)
HGB BLD-MCNC: 11.2 G/DL — LOW (ref 13–17)
LYMPHOCYTES # BLD AUTO: 1.6 K/UL — SIGNIFICANT CHANGE UP (ref 1–3.3)
LYMPHOCYTES # BLD AUTO: 26.2 % — SIGNIFICANT CHANGE UP (ref 13–44)
MCHC RBC-ENTMCNC: 30 PG — SIGNIFICANT CHANGE UP (ref 27–34)
MCHC RBC-ENTMCNC: 33.9 G/DL — SIGNIFICANT CHANGE UP (ref 32–36)
MCV RBC AUTO: 88.7 FL — SIGNIFICANT CHANGE UP (ref 80–100)
MONOCYTES # BLD AUTO: 0.7 K/UL — SIGNIFICANT CHANGE UP (ref 0–0.9)
MONOCYTES NFR BLD AUTO: 11.2 % — SIGNIFICANT CHANGE UP (ref 2–14)
NEUTROPHILS # BLD AUTO: 3.3 K/UL — SIGNIFICANT CHANGE UP (ref 1.8–7.4)
NEUTROPHILS NFR BLD AUTO: 52.6 % — SIGNIFICANT CHANGE UP (ref 43–77)
PLATELET # BLD AUTO: 185 K/UL — SIGNIFICANT CHANGE UP (ref 150–400)
RBC # BLD: 3.72 M/UL — LOW (ref 4.2–5.8)
RBC # FLD: 12.1 % — SIGNIFICANT CHANGE UP (ref 10.3–14.5)
WBC # BLD: 6.3 K/UL — SIGNIFICANT CHANGE UP (ref 3.8–10.5)
WBC # FLD AUTO: 6.3 K/UL — SIGNIFICANT CHANGE UP (ref 3.8–10.5)

## 2018-06-27 DIAGNOSIS — Z51.11 ENCOUNTER FOR ANTINEOPLASTIC CHEMOTHERAPY: ICD-10-CM

## 2018-06-28 ENCOUNTER — MEDICATION RENEWAL (OUTPATIENT)
Age: 63
End: 2018-06-28

## 2018-07-03 ENCOUNTER — OUTPATIENT (OUTPATIENT)
Dept: OUTPATIENT SERVICES | Facility: HOSPITAL | Age: 63
LOS: 1 days | Discharge: HOME | End: 2018-07-03

## 2018-07-03 DIAGNOSIS — Z98.890 OTHER SPECIFIED POSTPROCEDURAL STATES: Chronic | ICD-10-CM

## 2018-07-03 DIAGNOSIS — Z90.49 ACQUIRED ABSENCE OF OTHER SPECIFIED PARTS OF DIGESTIVE TRACT: Chronic | ICD-10-CM

## 2018-07-03 DIAGNOSIS — K25.5 CHRONIC OR UNSPECIFIED GASTRIC ULCER WITH PERFORATION: Chronic | ICD-10-CM

## 2018-07-03 DIAGNOSIS — C25.9 MALIGNANT NEOPLASM OF PANCREAS, UNSPECIFIED: ICD-10-CM

## 2018-07-10 ENCOUNTER — RESULT REVIEW (OUTPATIENT)
Age: 63
End: 2018-07-10

## 2018-07-10 ENCOUNTER — APPOINTMENT (OUTPATIENT)
Age: 63
End: 2018-07-10

## 2018-07-10 ENCOUNTER — LABORATORY RESULT (OUTPATIENT)
Age: 63
End: 2018-07-10

## 2018-07-10 ENCOUNTER — APPOINTMENT (OUTPATIENT)
Dept: HEMATOLOGY ONCOLOGY | Facility: CLINIC | Age: 63
End: 2018-07-10
Payer: MEDICARE

## 2018-07-10 VITALS
OXYGEN SATURATION: 99 % | BODY MASS INDEX: 31.51 KG/M2 | DIASTOLIC BLOOD PRESSURE: 70 MMHG | TEMPERATURE: 98 F | WEIGHT: 207.23 LBS | RESPIRATION RATE: 16 BRPM | SYSTOLIC BLOOD PRESSURE: 120 MMHG | HEART RATE: 66 BPM

## 2018-07-10 LAB
BASOPHILS # BLD AUTO: 0.1 K/UL — SIGNIFICANT CHANGE UP (ref 0–0.2)
BASOPHILS NFR BLD AUTO: 0.8 % — SIGNIFICANT CHANGE UP (ref 0–2)
EOSINOPHIL # BLD AUTO: 0.6 K/UL — HIGH (ref 0–0.5)
EOSINOPHIL NFR BLD AUTO: 8.2 % — HIGH (ref 0–6)
HCT VFR BLD CALC: 33.4 % — LOW (ref 39–50)
HGB BLD-MCNC: 11.9 G/DL — LOW (ref 13–17)
LYMPHOCYTES # BLD AUTO: 1.4 K/UL — SIGNIFICANT CHANGE UP (ref 1–3.3)
LYMPHOCYTES # BLD AUTO: 18.6 % — SIGNIFICANT CHANGE UP (ref 13–44)
MCHC RBC-ENTMCNC: 31.6 PG — SIGNIFICANT CHANGE UP (ref 27–34)
MCHC RBC-ENTMCNC: 35.6 G/DL — SIGNIFICANT CHANGE UP (ref 32–36)
MCV RBC AUTO: 88.7 FL — SIGNIFICANT CHANGE UP (ref 80–100)
MONOCYTES # BLD AUTO: 0.8 K/UL — SIGNIFICANT CHANGE UP (ref 0–0.9)
MONOCYTES NFR BLD AUTO: 10.9 % — SIGNIFICANT CHANGE UP (ref 2–14)
NEUTROPHILS # BLD AUTO: 4.7 K/UL — SIGNIFICANT CHANGE UP (ref 1.8–7.4)
NEUTROPHILS NFR BLD AUTO: 61.5 % — SIGNIFICANT CHANGE UP (ref 43–77)
PLATELET # BLD AUTO: 161 K/UL — SIGNIFICANT CHANGE UP (ref 150–400)
RBC # BLD: 3.76 M/UL — LOW (ref 4.2–5.8)
RBC # FLD: 12.7 % — SIGNIFICANT CHANGE UP (ref 10.3–14.5)
WBC # BLD: 7.7 K/UL — SIGNIFICANT CHANGE UP (ref 3.8–10.5)
WBC # FLD AUTO: 7.7 K/UL — SIGNIFICANT CHANGE UP (ref 3.8–10.5)

## 2018-07-10 PROCEDURE — 99214 OFFICE O/P EST MOD 30 MIN: CPT

## 2018-07-11 DIAGNOSIS — Z51.11 ENCOUNTER FOR ANTINEOPLASTIC CHEMOTHERAPY: ICD-10-CM

## 2018-07-13 ENCOUNTER — APPOINTMENT (OUTPATIENT)
Dept: GERIATRICS | Facility: CLINIC | Age: 63
End: 2018-07-13
Payer: MEDICARE

## 2018-07-13 VITALS
SYSTOLIC BLOOD PRESSURE: 132 MMHG | RESPIRATION RATE: 16 BRPM | DIASTOLIC BLOOD PRESSURE: 72 MMHG | OXYGEN SATURATION: 98 % | TEMPERATURE: 98.9 F | WEIGHT: 209.44 LBS | HEART RATE: 57 BPM | BODY MASS INDEX: 31.85 KG/M2

## 2018-07-13 PROCEDURE — 99214 OFFICE O/P EST MOD 30 MIN: CPT

## 2018-07-13 RX ORDER — MIRTAZAPINE 15 MG/1
15 TABLET, FILM COATED ORAL
Qty: 30 | Refills: 2 | Status: DISCONTINUED | COMMUNITY
Start: 2018-02-05 | End: 2018-07-13

## 2018-07-24 ENCOUNTER — APPOINTMENT (OUTPATIENT)
Age: 63
End: 2018-07-24

## 2018-07-24 ENCOUNTER — APPOINTMENT (OUTPATIENT)
Dept: ENDOCRINOLOGY | Facility: CLINIC | Age: 63
End: 2018-07-24
Payer: MEDICARE

## 2018-07-24 ENCOUNTER — LABORATORY RESULT (OUTPATIENT)
Age: 63
End: 2018-07-24

## 2018-07-24 ENCOUNTER — RESULT REVIEW (OUTPATIENT)
Age: 63
End: 2018-07-24

## 2018-07-24 VITALS
BODY MASS INDEX: 31.37 KG/M2 | OXYGEN SATURATION: 97 % | DIASTOLIC BLOOD PRESSURE: 60 MMHG | SYSTOLIC BLOOD PRESSURE: 130 MMHG | WEIGHT: 207 LBS | HEART RATE: 69 BPM | HEIGHT: 68 IN

## 2018-07-24 PROBLEM — R59.9 ENLARGED LYMPH NODES, UNSPECIFIED: Chronic | Status: ACTIVE | Noted: 2018-06-11

## 2018-07-24 PROBLEM — R42 DIZZINESS AND GIDDINESS: Chronic | Status: ACTIVE | Noted: 2017-06-02

## 2018-07-24 PROBLEM — M50.90 CERVICAL DISC DISORDER, UNSPECIFIED, UNSPECIFIED CERVICAL REGION: Chronic | Status: ACTIVE | Noted: 2017-06-02

## 2018-07-24 LAB
BASOPHILS # BLD AUTO: 0 K/UL — SIGNIFICANT CHANGE UP (ref 0–0.2)
BASOPHILS NFR BLD AUTO: 0.6 % — SIGNIFICANT CHANGE UP (ref 0–2)
EOSINOPHIL # BLD AUTO: 0.5 K/UL — SIGNIFICANT CHANGE UP (ref 0–0.5)
EOSINOPHIL NFR BLD AUTO: 7.5 % — HIGH (ref 0–6)
HCT VFR BLD CALC: 35.5 % — LOW (ref 39–50)
HGB BLD-MCNC: 12.6 G/DL — LOW (ref 13–17)
LYMPHOCYTES # BLD AUTO: 1.7 K/UL — SIGNIFICANT CHANGE UP (ref 1–3.3)
LYMPHOCYTES # BLD AUTO: 23.1 % — SIGNIFICANT CHANGE UP (ref 13–44)
MCHC RBC-ENTMCNC: 31.3 PG — SIGNIFICANT CHANGE UP (ref 27–34)
MCHC RBC-ENTMCNC: 35.4 G/DL — SIGNIFICANT CHANGE UP (ref 32–36)
MCV RBC AUTO: 88.5 FL — SIGNIFICANT CHANGE UP (ref 80–100)
MONOCYTES # BLD AUTO: 0.7 K/UL — SIGNIFICANT CHANGE UP (ref 0–0.9)
MONOCYTES NFR BLD AUTO: 10.2 % — SIGNIFICANT CHANGE UP (ref 2–14)
NEUTROPHILS # BLD AUTO: 4.2 K/UL — SIGNIFICANT CHANGE UP (ref 1.8–7.4)
NEUTROPHILS NFR BLD AUTO: 58.7 % — SIGNIFICANT CHANGE UP (ref 43–77)
PLATELET # BLD AUTO: 217 K/UL — SIGNIFICANT CHANGE UP (ref 150–400)
RBC # BLD: 4.01 M/UL — LOW (ref 4.2–5.8)
RBC # FLD: 12.8 % — SIGNIFICANT CHANGE UP (ref 10.3–14.5)
WBC # BLD: 7.2 K/UL — SIGNIFICANT CHANGE UP (ref 3.8–10.5)
WBC # FLD AUTO: 7.2 K/UL — SIGNIFICANT CHANGE UP (ref 3.8–10.5)

## 2018-07-24 PROCEDURE — 99213 OFFICE O/P EST LOW 20 MIN: CPT

## 2018-07-27 ENCOUNTER — OUTPATIENT (OUTPATIENT)
Dept: OUTPATIENT SERVICES | Facility: HOSPITAL | Age: 63
LOS: 1 days | Discharge: HOME | End: 2018-07-27

## 2018-07-27 DIAGNOSIS — Z98.890 OTHER SPECIFIED POSTPROCEDURAL STATES: Chronic | ICD-10-CM

## 2018-07-27 DIAGNOSIS — Z90.49 ACQUIRED ABSENCE OF OTHER SPECIFIED PARTS OF DIGESTIVE TRACT: Chronic | ICD-10-CM

## 2018-07-27 DIAGNOSIS — K25.5 CHRONIC OR UNSPECIFIED GASTRIC ULCER WITH PERFORATION: Chronic | ICD-10-CM

## 2018-07-27 DIAGNOSIS — C25.9 MALIGNANT NEOPLASM OF PANCREAS, UNSPECIFIED: ICD-10-CM

## 2018-07-31 ENCOUNTER — APPOINTMENT (OUTPATIENT)
Age: 63
End: 2018-07-31
Payer: MEDICARE

## 2018-07-31 VITALS
SYSTOLIC BLOOD PRESSURE: 130 MMHG | RESPIRATION RATE: 16 BRPM | WEIGHT: 216.05 LBS | HEART RATE: 61 BPM | DIASTOLIC BLOOD PRESSURE: 70 MMHG | BODY MASS INDEX: 32.85 KG/M2 | TEMPERATURE: 98 F | OXYGEN SATURATION: 99 %

## 2018-07-31 PROCEDURE — 99213 OFFICE O/P EST LOW 20 MIN: CPT

## 2018-08-07 ENCOUNTER — RESULT REVIEW (OUTPATIENT)
Age: 63
End: 2018-08-07

## 2018-08-07 ENCOUNTER — LABORATORY RESULT (OUTPATIENT)
Age: 63
End: 2018-08-07

## 2018-08-07 ENCOUNTER — APPOINTMENT (OUTPATIENT)
Age: 63
End: 2018-08-07

## 2018-08-07 LAB
BASOPHILS # BLD AUTO: 0.1 K/UL — SIGNIFICANT CHANGE UP (ref 0–0.2)
BASOPHILS NFR BLD AUTO: 1 % — SIGNIFICANT CHANGE UP (ref 0–2)
EOSINOPHIL # BLD AUTO: 0.5 K/UL — SIGNIFICANT CHANGE UP (ref 0–0.5)
EOSINOPHIL NFR BLD AUTO: 6.9 % — HIGH (ref 0–6)
HCT VFR BLD CALC: 38 % — LOW (ref 39–50)
HGB BLD-MCNC: 12.9 G/DL — LOW (ref 13–17)
LYMPHOCYTES # BLD AUTO: 1.8 K/UL — SIGNIFICANT CHANGE UP (ref 1–3.3)
LYMPHOCYTES # BLD AUTO: 26.9 % — SIGNIFICANT CHANGE UP (ref 13–44)
MCHC RBC-ENTMCNC: 30 PG — SIGNIFICANT CHANGE UP (ref 27–34)
MCHC RBC-ENTMCNC: 34 G/DL — SIGNIFICANT CHANGE UP (ref 32–36)
MCV RBC AUTO: 88.4 FL — SIGNIFICANT CHANGE UP (ref 80–100)
MONOCYTES # BLD AUTO: 0.6 K/UL — SIGNIFICANT CHANGE UP (ref 0–0.9)
MONOCYTES NFR BLD AUTO: 8.9 % — SIGNIFICANT CHANGE UP (ref 2–14)
NEUTROPHILS # BLD AUTO: 3.8 K/UL — SIGNIFICANT CHANGE UP (ref 1.8–7.4)
NEUTROPHILS NFR BLD AUTO: 56.3 % — SIGNIFICANT CHANGE UP (ref 43–77)
PLATELET # BLD AUTO: 200 K/UL — SIGNIFICANT CHANGE UP (ref 150–400)
RBC # BLD: 4.3 M/UL — SIGNIFICANT CHANGE UP (ref 4.2–5.8)
RBC # FLD: 13.2 % — SIGNIFICANT CHANGE UP (ref 10.3–14.5)
WBC # BLD: 6.8 K/UL — SIGNIFICANT CHANGE UP (ref 3.8–10.5)
WBC # FLD AUTO: 6.8 K/UL — SIGNIFICANT CHANGE UP (ref 3.8–10.5)

## 2018-08-08 DIAGNOSIS — Z51.11 ENCOUNTER FOR ANTINEOPLASTIC CHEMOTHERAPY: ICD-10-CM

## 2018-08-09 ENCOUNTER — FORM ENCOUNTER (OUTPATIENT)
Age: 63
End: 2018-08-09

## 2018-08-10 ENCOUNTER — OUTPATIENT (OUTPATIENT)
Dept: OUTPATIENT SERVICES | Facility: HOSPITAL | Age: 63
LOS: 1 days | End: 2018-08-10
Payer: MEDICARE

## 2018-08-10 ENCOUNTER — APPOINTMENT (OUTPATIENT)
Dept: CT IMAGING | Facility: IMAGING CENTER | Age: 63
End: 2018-08-10
Payer: MEDICARE

## 2018-08-10 DIAGNOSIS — Z98.890 OTHER SPECIFIED POSTPROCEDURAL STATES: Chronic | ICD-10-CM

## 2018-08-10 DIAGNOSIS — K25.5 CHRONIC OR UNSPECIFIED GASTRIC ULCER WITH PERFORATION: Chronic | ICD-10-CM

## 2018-08-10 DIAGNOSIS — C25.9 MALIGNANT NEOPLASM OF PANCREAS, UNSPECIFIED: ICD-10-CM

## 2018-08-10 DIAGNOSIS — Z90.49 ACQUIRED ABSENCE OF OTHER SPECIFIED PARTS OF DIGESTIVE TRACT: Chronic | ICD-10-CM

## 2018-08-10 PROCEDURE — 74177 CT ABD & PELVIS W/CONTRAST: CPT

## 2018-08-10 PROCEDURE — 71260 CT THORAX DX C+: CPT | Mod: 26

## 2018-08-10 PROCEDURE — 82565 ASSAY OF CREATININE: CPT

## 2018-08-10 PROCEDURE — 71260 CT THORAX DX C+: CPT

## 2018-08-10 PROCEDURE — 74177 CT ABD & PELVIS W/CONTRAST: CPT | Mod: 26

## 2018-08-21 ENCOUNTER — LABORATORY RESULT (OUTPATIENT)
Age: 63
End: 2018-08-21

## 2018-08-21 ENCOUNTER — APPOINTMENT (OUTPATIENT)
Dept: HEMATOLOGY ONCOLOGY | Facility: CLINIC | Age: 63
End: 2018-08-21
Payer: MEDICARE

## 2018-08-21 ENCOUNTER — RESULT REVIEW (OUTPATIENT)
Age: 63
End: 2018-08-21

## 2018-08-21 ENCOUNTER — APPOINTMENT (OUTPATIENT)
Age: 63
End: 2018-08-21

## 2018-08-21 VITALS
RESPIRATION RATE: 16 BRPM | TEMPERATURE: 98.3 F | SYSTOLIC BLOOD PRESSURE: 156 MMHG | BODY MASS INDEX: 33.86 KG/M2 | OXYGEN SATURATION: 100 % | DIASTOLIC BLOOD PRESSURE: 84 MMHG | HEART RATE: 62 BPM | WEIGHT: 222.66 LBS

## 2018-08-21 DIAGNOSIS — Z86.39 PERSONAL HISTORY OF OTHER ENDOCRINE, NUTRITIONAL AND METABOLIC DISEASE: ICD-10-CM

## 2018-08-21 DIAGNOSIS — Z87.19 PERSONAL HISTORY OF OTHER DISEASES OF THE DIGESTIVE SYSTEM: ICD-10-CM

## 2018-08-21 LAB
BASOPHILS # BLD AUTO: 0 K/UL — SIGNIFICANT CHANGE UP (ref 0–0.2)
BASOPHILS NFR BLD AUTO: 0.8 % — SIGNIFICANT CHANGE UP (ref 0–2)
EOSINOPHIL # BLD AUTO: 0.5 K/UL — SIGNIFICANT CHANGE UP (ref 0–0.5)
EOSINOPHIL NFR BLD AUTO: 7.4 % — HIGH (ref 0–6)
HCT VFR BLD CALC: 37.7 % — LOW (ref 39–50)
HGB BLD-MCNC: 13.3 G/DL — SIGNIFICANT CHANGE UP (ref 13–17)
LYMPHOCYTES # BLD AUTO: 1.5 K/UL — SIGNIFICANT CHANGE UP (ref 1–3.3)
LYMPHOCYTES # BLD AUTO: 23.8 % — SIGNIFICANT CHANGE UP (ref 13–44)
MCHC RBC-ENTMCNC: 31.1 PG — SIGNIFICANT CHANGE UP (ref 27–34)
MCHC RBC-ENTMCNC: 35.2 G/DL — SIGNIFICANT CHANGE UP (ref 32–36)
MCV RBC AUTO: 88.3 FL — SIGNIFICANT CHANGE UP (ref 80–100)
MONOCYTES # BLD AUTO: 0.7 K/UL — SIGNIFICANT CHANGE UP (ref 0–0.9)
MONOCYTES NFR BLD AUTO: 10.1 % — SIGNIFICANT CHANGE UP (ref 2–14)
NEUTROPHILS # BLD AUTO: 3.8 K/UL — SIGNIFICANT CHANGE UP (ref 1.8–7.4)
NEUTROPHILS NFR BLD AUTO: 57.9 % — SIGNIFICANT CHANGE UP (ref 43–77)
PLATELET # BLD AUTO: 182 K/UL — SIGNIFICANT CHANGE UP (ref 150–400)
RBC # BLD: 4.26 M/UL — SIGNIFICANT CHANGE UP (ref 4.2–5.8)
RBC # FLD: 13.6 % — SIGNIFICANT CHANGE UP (ref 10.3–14.5)
WBC # BLD: 6.5 K/UL — SIGNIFICANT CHANGE UP (ref 3.8–10.5)
WBC # FLD AUTO: 6.5 K/UL — SIGNIFICANT CHANGE UP (ref 3.8–10.5)

## 2018-08-21 PROCEDURE — 99213 OFFICE O/P EST LOW 20 MIN: CPT

## 2018-08-21 RX ORDER — GABAPENTIN 300 MG/1
300 CAPSULE ORAL AT BEDTIME
Qty: 30 | Refills: 0 | Status: DISCONTINUED | COMMUNITY
Start: 2018-07-13 | End: 2018-08-21

## 2018-08-23 ENCOUNTER — CLINICAL ADVICE (OUTPATIENT)
Age: 63
End: 2018-08-23

## 2018-08-23 PROBLEM — Z86.39 HISTORY OF DEHYDRATION: Status: RESOLVED | Noted: 2018-01-29 | Resolved: 2018-08-23

## 2018-08-23 PROBLEM — Z87.19 HISTORY OF CONSTIPATION: Status: RESOLVED | Noted: 2017-12-14 | Resolved: 2018-08-23

## 2018-08-27 ENCOUNTER — APPOINTMENT (OUTPATIENT)
Dept: GERIATRICS | Facility: CLINIC | Age: 63
End: 2018-08-27
Payer: MEDICARE

## 2018-08-27 VITALS
BODY MASS INDEX: 33.69 KG/M2 | TEMPERATURE: 98.1 F | SYSTOLIC BLOOD PRESSURE: 137 MMHG | OXYGEN SATURATION: 97 % | RESPIRATION RATE: 16 BRPM | HEART RATE: 75 BPM | WEIGHT: 221.56 LBS | DIASTOLIC BLOOD PRESSURE: 81 MMHG

## 2018-08-27 PROCEDURE — 99215 OFFICE O/P EST HI 40 MIN: CPT

## 2018-08-27 RX ORDER — SENNOSIDES 8.6 MG
8.6 CAPSULE ORAL
Qty: 60 | Refills: 0 | Status: DISCONTINUED | COMMUNITY
Start: 2017-12-15 | End: 2018-08-27

## 2018-08-27 RX ORDER — POLYETHYLENE GLYCOL 3350 17 G/17G
17 POWDER, FOR SOLUTION ORAL DAILY
Qty: 20 | Refills: 3 | Status: DISCONTINUED | COMMUNITY
Start: 2017-11-24 | End: 2018-08-27

## 2018-08-27 RX ORDER — DOCUSATE SODIUM 100 MG/1
100 CAPSULE ORAL TWICE DAILY
Qty: 60 | Refills: 6 | Status: DISCONTINUED | COMMUNITY
Start: 2018-01-29 | End: 2018-08-27

## 2018-08-27 RX ORDER — CYCLOBENZAPRINE HYDROCHLORIDE 10 MG/1
10 TABLET, FILM COATED ORAL 3 TIMES DAILY
Qty: 60 | Refills: 0 | Status: DISCONTINUED | COMMUNITY
Start: 2018-07-13 | End: 2018-08-27

## 2018-09-11 ENCOUNTER — OUTPATIENT (OUTPATIENT)
Dept: OUTPATIENT SERVICES | Facility: HOSPITAL | Age: 63
LOS: 1 days | Discharge: HOME | End: 2018-09-11

## 2018-09-11 DIAGNOSIS — Z90.49 ACQUIRED ABSENCE OF OTHER SPECIFIED PARTS OF DIGESTIVE TRACT: Chronic | ICD-10-CM

## 2018-09-11 DIAGNOSIS — Z98.890 OTHER SPECIFIED POSTPROCEDURAL STATES: Chronic | ICD-10-CM

## 2018-09-11 DIAGNOSIS — C25.9 MALIGNANT NEOPLASM OF PANCREAS, UNSPECIFIED: ICD-10-CM

## 2018-09-11 DIAGNOSIS — K25.5 CHRONIC OR UNSPECIFIED GASTRIC ULCER WITH PERFORATION: Chronic | ICD-10-CM

## 2018-09-18 ENCOUNTER — APPOINTMENT (OUTPATIENT)
Age: 63
End: 2018-09-18
Payer: MEDICARE

## 2018-09-18 VITALS
TEMPERATURE: 98.2 F | HEART RATE: 58 BPM | BODY MASS INDEX: 33.35 KG/M2 | OXYGEN SATURATION: 98 % | DIASTOLIC BLOOD PRESSURE: 79 MMHG | SYSTOLIC BLOOD PRESSURE: 160 MMHG | WEIGHT: 219.36 LBS | RESPIRATION RATE: 16 BRPM

## 2018-09-18 PROCEDURE — 99214 OFFICE O/P EST MOD 30 MIN: CPT

## 2018-09-18 RX ORDER — HYDROMORPHONE HYDROCHLORIDE 8 MG/1
8 TABLET ORAL
Qty: 180 | Refills: 0 | Status: DISCONTINUED | COMMUNITY
Start: 2017-12-29 | End: 2018-09-18

## 2018-09-18 RX ORDER — PANTOPRAZOLE 40 MG/1
40 TABLET, DELAYED RELEASE ORAL DAILY
Qty: 30 | Refills: 2 | Status: DISCONTINUED | COMMUNITY
Start: 2017-12-14 | End: 2018-09-18

## 2018-09-21 ENCOUNTER — RESULT REVIEW (OUTPATIENT)
Age: 63
End: 2018-09-21

## 2018-09-21 ENCOUNTER — LABORATORY RESULT (OUTPATIENT)
Age: 63
End: 2018-09-21

## 2018-09-21 ENCOUNTER — APPOINTMENT (OUTPATIENT)
Age: 63
End: 2018-09-21

## 2018-09-21 LAB
BASOPHILS # BLD AUTO: 0 K/UL — SIGNIFICANT CHANGE UP (ref 0–0.2)
BASOPHILS NFR BLD AUTO: 0.5 % — SIGNIFICANT CHANGE UP (ref 0–2)
EOSINOPHIL # BLD AUTO: 0.6 K/UL — HIGH (ref 0–0.5)
EOSINOPHIL NFR BLD AUTO: 9.1 % — HIGH (ref 0–6)
HCT VFR BLD CALC: 40.6 % — SIGNIFICANT CHANGE UP (ref 39–50)
HGB BLD-MCNC: 13.6 G/DL — SIGNIFICANT CHANGE UP (ref 13–17)
LYMPHOCYTES # BLD AUTO: 1.9 K/UL — SIGNIFICANT CHANGE UP (ref 1–3.3)
LYMPHOCYTES # BLD AUTO: 26.3 % — SIGNIFICANT CHANGE UP (ref 13–44)
MCHC RBC-ENTMCNC: 29.4 PG — SIGNIFICANT CHANGE UP (ref 27–34)
MCHC RBC-ENTMCNC: 33.6 G/DL — SIGNIFICANT CHANGE UP (ref 32–36)
MCV RBC AUTO: 87.6 FL — SIGNIFICANT CHANGE UP (ref 80–100)
MONOCYTES # BLD AUTO: 0.6 K/UL — SIGNIFICANT CHANGE UP (ref 0–0.9)
MONOCYTES NFR BLD AUTO: 8.6 % — SIGNIFICANT CHANGE UP (ref 2–14)
NEUTROPHILS # BLD AUTO: 4 K/UL — SIGNIFICANT CHANGE UP (ref 1.8–7.4)
NEUTROPHILS NFR BLD AUTO: 55.4 % — SIGNIFICANT CHANGE UP (ref 43–77)
PLATELET # BLD AUTO: 182 K/UL — SIGNIFICANT CHANGE UP (ref 150–400)
RBC # BLD: 4.64 M/UL — SIGNIFICANT CHANGE UP (ref 4.2–5.8)
RBC # FLD: 13.1 % — SIGNIFICANT CHANGE UP (ref 10.3–14.5)
WBC # BLD: 7.1 K/UL — SIGNIFICANT CHANGE UP (ref 3.8–10.5)
WBC # FLD AUTO: 7.1 K/UL — SIGNIFICANT CHANGE UP (ref 3.8–10.5)

## 2018-09-24 DIAGNOSIS — Z51.11 ENCOUNTER FOR ANTINEOPLASTIC CHEMOTHERAPY: ICD-10-CM

## 2018-10-05 ENCOUNTER — LABORATORY RESULT (OUTPATIENT)
Age: 63
End: 2018-10-05

## 2018-10-05 ENCOUNTER — RESULT REVIEW (OUTPATIENT)
Age: 63
End: 2018-10-05

## 2018-10-05 ENCOUNTER — APPOINTMENT (OUTPATIENT)
Age: 63
End: 2018-10-05

## 2018-10-05 LAB
BASOPHILS # BLD AUTO: 0 K/UL — SIGNIFICANT CHANGE UP (ref 0–0.2)
BASOPHILS NFR BLD AUTO: 0.6 % — SIGNIFICANT CHANGE UP (ref 0–2)
EOSINOPHIL # BLD AUTO: 1.2 K/UL — HIGH (ref 0–0.5)
EOSINOPHIL NFR BLD AUTO: 19.3 % — HIGH (ref 0–6)
HCT VFR BLD CALC: 37 % — LOW (ref 39–50)
HGB BLD-MCNC: 12.5 G/DL — LOW (ref 13–17)
LYMPHOCYTES # BLD AUTO: 1.8 K/UL — SIGNIFICANT CHANGE UP (ref 1–3.3)
LYMPHOCYTES # BLD AUTO: 29.5 % — SIGNIFICANT CHANGE UP (ref 13–44)
MCHC RBC-ENTMCNC: 29.3 PG — SIGNIFICANT CHANGE UP (ref 27–34)
MCHC RBC-ENTMCNC: 33.8 G/DL — SIGNIFICANT CHANGE UP (ref 32–36)
MCV RBC AUTO: 86.7 FL — SIGNIFICANT CHANGE UP (ref 80–100)
MONOCYTES # BLD AUTO: 0.7 K/UL — SIGNIFICANT CHANGE UP (ref 0–0.9)
MONOCYTES NFR BLD AUTO: 11.4 % — SIGNIFICANT CHANGE UP (ref 2–14)
NEUTROPHILS # BLD AUTO: 2.4 K/UL — SIGNIFICANT CHANGE UP (ref 1.8–7.4)
NEUTROPHILS NFR BLD AUTO: 39.2 % — LOW (ref 43–77)
PLATELET # BLD AUTO: 172 K/UL — SIGNIFICANT CHANGE UP (ref 150–400)
RBC # BLD: 4.27 M/UL — SIGNIFICANT CHANGE UP (ref 4.2–5.8)
RBC # FLD: 12.5 % — SIGNIFICANT CHANGE UP (ref 10.3–14.5)
WBC # BLD: 6.2 K/UL — SIGNIFICANT CHANGE UP (ref 3.8–10.5)
WBC # FLD AUTO: 6.2 K/UL — SIGNIFICANT CHANGE UP (ref 3.8–10.5)

## 2018-10-10 ENCOUNTER — OUTPATIENT (OUTPATIENT)
Dept: OUTPATIENT SERVICES | Facility: HOSPITAL | Age: 63
LOS: 1 days | Discharge: HOME | End: 2018-10-10

## 2018-10-10 DIAGNOSIS — Z98.890 OTHER SPECIFIED POSTPROCEDURAL STATES: Chronic | ICD-10-CM

## 2018-10-10 DIAGNOSIS — Z90.49 ACQUIRED ABSENCE OF OTHER SPECIFIED PARTS OF DIGESTIVE TRACT: Chronic | ICD-10-CM

## 2018-10-10 DIAGNOSIS — C25.9 MALIGNANT NEOPLASM OF PANCREAS, UNSPECIFIED: ICD-10-CM

## 2018-10-10 DIAGNOSIS — K25.5 CHRONIC OR UNSPECIFIED GASTRIC ULCER WITH PERFORATION: Chronic | ICD-10-CM

## 2018-10-17 NOTE — PATIENT PROFILE ADULT. - NSTOBACCO TYPE_GEN_A_CORE_RD
[FreeTextEntry1] : Mr. Auguste is a 76 year old male with a history of Afib, atrial flutter, heart disease, SERAFIN, PAH, RAD, SOB, who now comes in for a follow up visit. 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  Cigarettes

## 2018-10-19 ENCOUNTER — APPOINTMENT (OUTPATIENT)
Dept: GERIATRICS | Facility: CLINIC | Age: 63
End: 2018-10-19
Payer: MEDICARE

## 2018-10-19 ENCOUNTER — LABORATORY RESULT (OUTPATIENT)
Age: 63
End: 2018-10-19

## 2018-10-19 ENCOUNTER — RESULT REVIEW (OUTPATIENT)
Age: 63
End: 2018-10-19

## 2018-10-19 ENCOUNTER — APPOINTMENT (OUTPATIENT)
Age: 63
End: 2018-10-19
Payer: MEDICARE

## 2018-10-19 ENCOUNTER — APPOINTMENT (OUTPATIENT)
Age: 63
End: 2018-10-19

## 2018-10-19 VITALS
SYSTOLIC BLOOD PRESSURE: 106 MMHG | TEMPERATURE: 97.4 F | WEIGHT: 218.46 LBS | BODY MASS INDEX: 33.22 KG/M2 | HEART RATE: 73 BPM | OXYGEN SATURATION: 97 % | RESPIRATION RATE: 16 BRPM | DIASTOLIC BLOOD PRESSURE: 71 MMHG

## 2018-10-19 LAB
EOSINOPHIL # BLD AUTO: 1.6 K/UL — HIGH (ref 0–0.5)
EOSINOPHIL NFR BLD AUTO: 19 % — HIGH (ref 0–6)
HCT VFR BLD CALC: 36.4 % — LOW (ref 39–50)
HGB BLD-MCNC: 12.3 G/DL — LOW (ref 13–17)
LYMPHOCYTES # BLD AUTO: 1.9 K/UL — SIGNIFICANT CHANGE UP (ref 1–3.3)
LYMPHOCYTES # BLD AUTO: 42 % — SIGNIFICANT CHANGE UP (ref 13–44)
MCHC RBC-ENTMCNC: 29.6 PG — SIGNIFICANT CHANGE UP (ref 27–34)
MCHC RBC-ENTMCNC: 33.9 G/DL — SIGNIFICANT CHANGE UP (ref 32–36)
MCV RBC AUTO: 87.4 FL — SIGNIFICANT CHANGE UP (ref 80–100)
MONOCYTES # BLD AUTO: 0.7 K/UL — SIGNIFICANT CHANGE UP (ref 0–0.9)
MONOCYTES NFR BLD AUTO: 11 % — SIGNIFICANT CHANGE UP (ref 2–14)
NEUTROPHILS # BLD AUTO: 2.4 K/UL — SIGNIFICANT CHANGE UP (ref 1.8–7.4)
NEUTROPHILS NFR BLD AUTO: 28 % — LOW (ref 43–77)
PLAT MORPH BLD: NORMAL — SIGNIFICANT CHANGE UP
PLATELET # BLD AUTO: 217 K/UL — SIGNIFICANT CHANGE UP (ref 150–400)
RBC # BLD: 4.16 M/UL — LOW (ref 4.2–5.8)
RBC # FLD: 12.2 % — SIGNIFICANT CHANGE UP (ref 10.3–14.5)
RBC BLD AUTO: SIGNIFICANT CHANGE UP
WBC # BLD: 6.6 K/UL — SIGNIFICANT CHANGE UP (ref 3.8–10.5)
WBC # FLD AUTO: 6.6 K/UL — SIGNIFICANT CHANGE UP (ref 3.8–10.5)

## 2018-10-19 PROCEDURE — 99214 OFFICE O/P EST MOD 30 MIN: CPT

## 2018-10-19 RX ORDER — ALPRAZOLAM 0.5 MG/1
0.5 TABLET ORAL
Qty: 30 | Refills: 0 | Status: DISCONTINUED | COMMUNITY
Start: 2017-12-29 | End: 2018-10-19

## 2018-10-22 DIAGNOSIS — Z51.11 ENCOUNTER FOR ANTINEOPLASTIC CHEMOTHERAPY: ICD-10-CM

## 2018-10-25 ENCOUNTER — LABORATORY RESULT (OUTPATIENT)
Age: 63
End: 2018-10-25

## 2018-10-25 ENCOUNTER — APPOINTMENT (OUTPATIENT)
Dept: ENDOCRINOLOGY | Facility: CLINIC | Age: 63
End: 2018-10-25
Payer: MEDICARE

## 2018-10-25 VITALS
WEIGHT: 214 LBS | OXYGEN SATURATION: 97 % | SYSTOLIC BLOOD PRESSURE: 106 MMHG | HEIGHT: 68 IN | HEART RATE: 76 BPM | BODY MASS INDEX: 32.43 KG/M2 | DIASTOLIC BLOOD PRESSURE: 68 MMHG

## 2018-10-25 PROCEDURE — 99213 OFFICE O/P EST LOW 20 MIN: CPT

## 2018-10-26 LAB
T3 SERPL-MCNC: 194 NG/DL
T3RU NFR SERPL: 1.13 INDEX
T4 FREE SERPL-MCNC: 1.5 NG/DL
T4 SERPL-MCNC: 10.8 UG/DL
TSH SERPL-ACNC: 1.91 UIU/ML

## 2018-11-02 ENCOUNTER — LABORATORY RESULT (OUTPATIENT)
Age: 63
End: 2018-11-02

## 2018-11-02 ENCOUNTER — RESULT REVIEW (OUTPATIENT)
Age: 63
End: 2018-11-02

## 2018-11-02 ENCOUNTER — APPOINTMENT (OUTPATIENT)
Age: 63
End: 2018-11-02

## 2018-11-02 LAB
BASOPHILS # BLD AUTO: 0 K/UL — SIGNIFICANT CHANGE UP (ref 0–0.2)
BASOPHILS NFR BLD AUTO: 0.6 % — SIGNIFICANT CHANGE UP (ref 0–2)
EOSINOPHIL # BLD AUTO: 0.6 K/UL — HIGH (ref 0–0.5)
EOSINOPHIL NFR BLD AUTO: 7.4 % — HIGH (ref 0–6)
HCT VFR BLD CALC: 39.2 % — SIGNIFICANT CHANGE UP (ref 39–50)
HGB BLD-MCNC: 13.2 G/DL — SIGNIFICANT CHANGE UP (ref 13–17)
LYMPHOCYTES # BLD AUTO: 2.9 K/UL — SIGNIFICANT CHANGE UP (ref 1–3.3)
LYMPHOCYTES # BLD AUTO: 38 % — SIGNIFICANT CHANGE UP (ref 13–44)
MCHC RBC-ENTMCNC: 30 PG — SIGNIFICANT CHANGE UP (ref 27–34)
MCHC RBC-ENTMCNC: 33.8 G/DL — SIGNIFICANT CHANGE UP (ref 32–36)
MCV RBC AUTO: 88.7 FL — SIGNIFICANT CHANGE UP (ref 80–100)
MONOCYTES # BLD AUTO: 0.7 K/UL — SIGNIFICANT CHANGE UP (ref 0–0.9)
MONOCYTES NFR BLD AUTO: 9.2 % — SIGNIFICANT CHANGE UP (ref 2–14)
NEUTROPHILS # BLD AUTO: 3.4 K/UL — SIGNIFICANT CHANGE UP (ref 1.8–7.4)
NEUTROPHILS NFR BLD AUTO: 44.7 % — SIGNIFICANT CHANGE UP (ref 43–77)
PLATELET # BLD AUTO: 230 K/UL — SIGNIFICANT CHANGE UP (ref 150–400)
RBC # BLD: 4.42 M/UL — SIGNIFICANT CHANGE UP (ref 4.2–5.8)
RBC # FLD: 12.5 % — SIGNIFICANT CHANGE UP (ref 10.3–14.5)
WBC # BLD: 7.5 K/UL — SIGNIFICANT CHANGE UP (ref 3.8–10.5)
WBC # FLD AUTO: 7.5 K/UL — SIGNIFICANT CHANGE UP (ref 3.8–10.5)

## 2018-11-05 ENCOUNTER — APPOINTMENT (OUTPATIENT)
Dept: CT IMAGING | Facility: IMAGING CENTER | Age: 63
End: 2018-11-05

## 2018-11-05 ENCOUNTER — INPATIENT (INPATIENT)
Facility: HOSPITAL | Age: 63
LOS: 13 days | Discharge: HOME | End: 2018-11-19
Attending: HOSPITALIST | Admitting: HOSPITALIST
Payer: MEDICARE

## 2018-11-05 VITALS
HEART RATE: 112 BPM | SYSTOLIC BLOOD PRESSURE: 102 MMHG | RESPIRATION RATE: 16 BRPM | OXYGEN SATURATION: 99 % | TEMPERATURE: 100 F | DIASTOLIC BLOOD PRESSURE: 64 MMHG

## 2018-11-05 DIAGNOSIS — Z29.9 ENCOUNTER FOR PROPHYLACTIC MEASURES, UNSPECIFIED: ICD-10-CM

## 2018-11-05 DIAGNOSIS — C25.9 MALIGNANT NEOPLASM OF PANCREAS, UNSPECIFIED: ICD-10-CM

## 2018-11-05 DIAGNOSIS — R65.10 SYSTEMIC INFLAMMATORY RESPONSE SYNDROME (SIRS) OF NON-INFECTIOUS ORIGIN WITHOUT ACUTE ORGAN DYSFUNCTION: ICD-10-CM

## 2018-11-05 DIAGNOSIS — B02.9 ZOSTER WITHOUT COMPLICATIONS: ICD-10-CM

## 2018-11-05 DIAGNOSIS — K25.5 CHRONIC OR UNSPECIFIED GASTRIC ULCER WITH PERFORATION: Chronic | ICD-10-CM

## 2018-11-05 DIAGNOSIS — I10 ESSENTIAL (PRIMARY) HYPERTENSION: ICD-10-CM

## 2018-11-05 DIAGNOSIS — Z98.890 OTHER SPECIFIED POSTPROCEDURAL STATES: Chronic | ICD-10-CM

## 2018-11-05 DIAGNOSIS — C18.9 MALIGNANT NEOPLASM OF COLON, UNSPECIFIED: ICD-10-CM

## 2018-11-05 DIAGNOSIS — Z90.49 ACQUIRED ABSENCE OF OTHER SPECIFIED PARTS OF DIGESTIVE TRACT: Chronic | ICD-10-CM

## 2018-11-05 DIAGNOSIS — R11.0 NAUSEA: ICD-10-CM

## 2018-11-05 DIAGNOSIS — G89.29 OTHER CHRONIC PAIN: ICD-10-CM

## 2018-11-05 DIAGNOSIS — R50.9 FEVER, UNSPECIFIED: ICD-10-CM

## 2018-11-05 LAB
ALBUMIN SERPL ELPH-MCNC: 4.3 G/DL — SIGNIFICANT CHANGE UP (ref 3.3–5)
ALP SERPL-CCNC: 70 U/L — SIGNIFICANT CHANGE UP (ref 40–120)
ALT FLD-CCNC: 20 U/L — SIGNIFICANT CHANGE UP (ref 4–41)
AMYLASE P1 CFR SERPL: 45 U/L — SIGNIFICANT CHANGE UP (ref 25–125)
AST SERPL-CCNC: 30 U/L — SIGNIFICANT CHANGE UP (ref 4–40)
B PERT DNA SPEC QL NAA+PROBE: NOT DETECTED — SIGNIFICANT CHANGE UP
BASE EXCESS BLDV CALC-SCNC: 2.2 MMOL/L — SIGNIFICANT CHANGE UP
BASOPHILS # BLD AUTO: 0.05 K/UL — SIGNIFICANT CHANGE UP (ref 0–0.2)
BASOPHILS NFR BLD AUTO: 1 % — SIGNIFICANT CHANGE UP (ref 0–2)
BILIRUB SERPL-MCNC: 1 MG/DL — SIGNIFICANT CHANGE UP (ref 0.2–1.2)
BLOOD GAS VENOUS - CREATININE: 0.87 MG/DL — SIGNIFICANT CHANGE UP (ref 0.5–1.3)
BUN SERPL-MCNC: 16 MG/DL — SIGNIFICANT CHANGE UP (ref 7–23)
C PNEUM DNA SPEC QL NAA+PROBE: NOT DETECTED — SIGNIFICANT CHANGE UP
CALCIUM SERPL-MCNC: 9.8 MG/DL — SIGNIFICANT CHANGE UP (ref 8.4–10.5)
CHLORIDE BLDV-SCNC: 97 MMOL/L — SIGNIFICANT CHANGE UP (ref 96–108)
CHLORIDE SERPL-SCNC: 91 MMOL/L — LOW (ref 98–107)
CO2 SERPL-SCNC: 22 MMOL/L — SIGNIFICANT CHANGE UP (ref 22–31)
CREAT SERPL-MCNC: 0.91 MG/DL — SIGNIFICANT CHANGE UP (ref 0.5–1.3)
EOSINOPHIL # BLD AUTO: 0.24 K/UL — SIGNIFICANT CHANGE UP (ref 0–0.5)
EOSINOPHIL NFR BLD AUTO: 5 % — SIGNIFICANT CHANGE UP (ref 0–6)
FLUAV H1 2009 PAND RNA SPEC QL NAA+PROBE: NOT DETECTED — SIGNIFICANT CHANGE UP
FLUAV H1 RNA SPEC QL NAA+PROBE: NOT DETECTED — SIGNIFICANT CHANGE UP
FLUAV H3 RNA SPEC QL NAA+PROBE: NOT DETECTED — SIGNIFICANT CHANGE UP
FLUAV SUBTYP SPEC NAA+PROBE: SIGNIFICANT CHANGE UP
FLUBV RNA SPEC QL NAA+PROBE: NOT DETECTED — SIGNIFICANT CHANGE UP
GAS PNL BLDV: 129 MMOL/L — LOW (ref 136–146)
GLUCOSE BLDV-MCNC: 109 — HIGH (ref 70–99)
GLUCOSE SERPL-MCNC: 109 MG/DL — HIGH (ref 70–99)
HADV DNA SPEC QL NAA+PROBE: NOT DETECTED — SIGNIFICANT CHANGE UP
HCO3 BLDV-SCNC: 24 MMOL/L — SIGNIFICANT CHANGE UP (ref 20–27)
HCOV PNL SPEC NAA+PROBE: SIGNIFICANT CHANGE UP
HCT VFR BLD CALC: 43.4 % — SIGNIFICANT CHANGE UP (ref 39–50)
HCT VFR BLDV CALC: 45.6 % — SIGNIFICANT CHANGE UP (ref 39–51)
HGB BLD-MCNC: 15 G/DL — SIGNIFICANT CHANGE UP (ref 13–17)
HGB BLDV-MCNC: 14.9 G/DL — SIGNIFICANT CHANGE UP (ref 13–17)
HMPV RNA SPEC QL NAA+PROBE: NOT DETECTED — SIGNIFICANT CHANGE UP
HPIV1 RNA SPEC QL NAA+PROBE: NOT DETECTED — SIGNIFICANT CHANGE UP
HPIV2 RNA SPEC QL NAA+PROBE: NOT DETECTED — SIGNIFICANT CHANGE UP
HPIV3 RNA SPEC QL NAA+PROBE: NOT DETECTED — SIGNIFICANT CHANGE UP
HPIV4 RNA SPEC QL NAA+PROBE: NOT DETECTED — SIGNIFICANT CHANGE UP
IMM GRANULOCYTES # BLD AUTO: 0.02 # — SIGNIFICANT CHANGE UP
IMM GRANULOCYTES NFR BLD AUTO: 0.4 % — SIGNIFICANT CHANGE UP (ref 0–1.5)
LACTATE BLDV-MCNC: 1.8 MMOL/L — SIGNIFICANT CHANGE UP (ref 0.5–2)
LIDOCAIN IGE QN: 21.4 U/L — SIGNIFICANT CHANGE UP (ref 7–60)
LYMPHOCYTES # BLD AUTO: 1.08 K/UL — SIGNIFICANT CHANGE UP (ref 1–3.3)
LYMPHOCYTES # BLD AUTO: 22.5 % — SIGNIFICANT CHANGE UP (ref 13–44)
MCHC RBC-ENTMCNC: 30.1 PG — SIGNIFICANT CHANGE UP (ref 27–34)
MCHC RBC-ENTMCNC: 34.6 % — SIGNIFICANT CHANGE UP (ref 32–36)
MCV RBC AUTO: 87.1 FL — SIGNIFICANT CHANGE UP (ref 80–100)
MONOCYTES # BLD AUTO: 0.44 K/UL — SIGNIFICANT CHANGE UP (ref 0–0.9)
MONOCYTES NFR BLD AUTO: 9.2 % — SIGNIFICANT CHANGE UP (ref 2–14)
NEUTROPHILS # BLD AUTO: 2.96 K/UL — SIGNIFICANT CHANGE UP (ref 1.8–7.4)
NEUTROPHILS NFR BLD AUTO: 61.9 % — SIGNIFICANT CHANGE UP (ref 43–77)
NRBC # FLD: 0 — SIGNIFICANT CHANGE UP
PCO2 BLDV: 40 MMHG — LOW (ref 41–51)
PH BLDV: 7.43 PH — SIGNIFICANT CHANGE UP (ref 7.32–7.43)
PLATELET # BLD AUTO: 148 K/UL — LOW (ref 150–400)
PMV BLD: 9.5 FL — SIGNIFICANT CHANGE UP (ref 7–13)
PO2 BLDV: < 24 MMHG — LOW (ref 35–40)
POTASSIUM BLDV-SCNC: 4 MMOL/L — SIGNIFICANT CHANGE UP (ref 3.4–4.5)
POTASSIUM SERPL-MCNC: 4.1 MMOL/L — SIGNIFICANT CHANGE UP (ref 3.5–5.3)
POTASSIUM SERPL-SCNC: 4.1 MMOL/L — SIGNIFICANT CHANGE UP (ref 3.5–5.3)
PROT SERPL-MCNC: 7.9 G/DL — SIGNIFICANT CHANGE UP (ref 6–8.3)
RBC # BLD: 4.98 M/UL — SIGNIFICANT CHANGE UP (ref 4.2–5.8)
RBC # FLD: 13 % — SIGNIFICANT CHANGE UP (ref 10.3–14.5)
RSV RNA SPEC QL NAA+PROBE: NOT DETECTED — SIGNIFICANT CHANGE UP
RV+EV RNA SPEC QL NAA+PROBE: NOT DETECTED — SIGNIFICANT CHANGE UP
SAO2 % BLDV: 16 % — LOW (ref 60–85)
SODIUM SERPL-SCNC: 131 MMOL/L — LOW (ref 135–145)
WBC # BLD: 4.79 K/UL — SIGNIFICANT CHANGE UP (ref 3.8–10.5)
WBC # FLD AUTO: 4.79 K/UL — SIGNIFICANT CHANGE UP (ref 3.8–10.5)

## 2018-11-05 PROCEDURE — 71046 X-RAY EXAM CHEST 2 VIEWS: CPT | Mod: 26

## 2018-11-05 PROCEDURE — 74177 CT ABD & PELVIS W/CONTRAST: CPT | Mod: 26

## 2018-11-05 PROCEDURE — 71260 CT THORAX DX C+: CPT | Mod: 26

## 2018-11-05 RX ORDER — ACYCLOVIR SODIUM 500 MG
850 VIAL (EA) INTRAVENOUS EVERY 8 HOURS
Qty: 0 | Refills: 0 | Status: DISCONTINUED | OUTPATIENT
Start: 2018-11-06 | End: 2018-11-07

## 2018-11-05 RX ORDER — ACYCLOVIR SODIUM 500 MG
VIAL (EA) INTRAVENOUS
Qty: 0 | Refills: 0 | Status: DISCONTINUED | OUTPATIENT
Start: 2018-11-05 | End: 2018-11-07

## 2018-11-05 RX ORDER — LOSARTAN POTASSIUM 100 MG/1
25 TABLET, FILM COATED ORAL DAILY
Qty: 0 | Refills: 0 | Status: DISCONTINUED | OUTPATIENT
Start: 2018-11-05 | End: 2018-11-05

## 2018-11-05 RX ORDER — HYDROMORPHONE HYDROCHLORIDE 2 MG/ML
2 INJECTION INTRAMUSCULAR; INTRAVENOUS; SUBCUTANEOUS EVERY 4 HOURS
Qty: 0 | Refills: 0 | Status: DISCONTINUED | OUTPATIENT
Start: 2018-11-05 | End: 2018-11-12

## 2018-11-05 RX ORDER — LOSARTAN POTASSIUM 100 MG/1
25 TABLET, FILM COATED ORAL DAILY
Qty: 0 | Refills: 0 | Status: DISCONTINUED | OUTPATIENT
Start: 2018-11-05 | End: 2018-11-10

## 2018-11-05 RX ORDER — ONDANSETRON 8 MG/1
4 TABLET, FILM COATED ORAL ONCE
Qty: 0 | Refills: 0 | Status: COMPLETED | OUTPATIENT
Start: 2018-11-05 | End: 2018-11-05

## 2018-11-05 RX ORDER — HYDROMORPHONE HYDROCHLORIDE 2 MG/ML
2 INJECTION INTRAMUSCULAR; INTRAVENOUS; SUBCUTANEOUS EVERY 4 HOURS
Qty: 0 | Refills: 0 | Status: DISCONTINUED | OUTPATIENT
Start: 2018-11-05 | End: 2018-11-05

## 2018-11-05 RX ORDER — METOCLOPRAMIDE HCL 10 MG
10 TABLET ORAL EVERY 6 HOURS
Qty: 0 | Refills: 0 | Status: DISCONTINUED | OUTPATIENT
Start: 2018-11-05 | End: 2018-11-05

## 2018-11-05 RX ORDER — ACETAMINOPHEN 500 MG
975 TABLET ORAL ONCE
Qty: 0 | Refills: 0 | Status: COMPLETED | OUTPATIENT
Start: 2018-11-05 | End: 2018-11-05

## 2018-11-05 RX ORDER — PIPERACILLIN AND TAZOBACTAM 4; .5 G/20ML; G/20ML
3.38 INJECTION, POWDER, LYOPHILIZED, FOR SOLUTION INTRAVENOUS ONCE
Qty: 0 | Refills: 0 | Status: COMPLETED | OUTPATIENT
Start: 2018-11-05 | End: 2018-11-05

## 2018-11-05 RX ORDER — PIPERACILLIN AND TAZOBACTAM 4; .5 G/20ML; G/20ML
3.38 INJECTION, POWDER, LYOPHILIZED, FOR SOLUTION INTRAVENOUS EVERY 8 HOURS
Qty: 0 | Refills: 0 | Status: DISCONTINUED | OUTPATIENT
Start: 2018-11-05 | End: 2018-11-05

## 2018-11-05 RX ORDER — IBUPROFEN 200 MG
400 TABLET ORAL ONCE
Qty: 0 | Refills: 0 | Status: COMPLETED | OUTPATIENT
Start: 2018-11-05 | End: 2018-11-05

## 2018-11-05 RX ORDER — METHADONE HYDROCHLORIDE 40 MG/1
1 TABLET ORAL
Qty: 0 | Refills: 0 | COMMUNITY

## 2018-11-05 RX ORDER — FINASTERIDE 5 MG/1
5 TABLET, FILM COATED ORAL DAILY
Qty: 0 | Refills: 0 | Status: DISCONTINUED | OUTPATIENT
Start: 2018-11-05 | End: 2018-11-19

## 2018-11-05 RX ORDER — ACYCLOVIR SODIUM 500 MG
850 VIAL (EA) INTRAVENOUS ONCE
Qty: 0 | Refills: 0 | Status: COMPLETED | OUTPATIENT
Start: 2018-11-05 | End: 2018-11-05

## 2018-11-05 RX ORDER — VANCOMYCIN HCL 1 G
1000 VIAL (EA) INTRAVENOUS ONCE
Qty: 0 | Refills: 0 | Status: COMPLETED | OUTPATIENT
Start: 2018-11-05 | End: 2018-11-05

## 2018-11-05 RX ORDER — SODIUM CHLORIDE 9 MG/ML
1000 INJECTION INTRAMUSCULAR; INTRAVENOUS; SUBCUTANEOUS ONCE
Qty: 0 | Refills: 0 | Status: COMPLETED | OUTPATIENT
Start: 2018-11-05 | End: 2018-11-05

## 2018-11-05 RX ORDER — GABAPENTIN 400 MG/1
100 CAPSULE ORAL THREE TIMES A DAY
Qty: 0 | Refills: 0 | Status: DISCONTINUED | OUTPATIENT
Start: 2018-11-05 | End: 2018-11-05

## 2018-11-05 RX ORDER — GABAPENTIN 400 MG/1
300 CAPSULE ORAL THREE TIMES A DAY
Qty: 0 | Refills: 0 | Status: DISCONTINUED | OUTPATIENT
Start: 2018-11-05 | End: 2018-11-10

## 2018-11-05 RX ORDER — ACYCLOVIR SODIUM 500 MG
800 VIAL (EA) INTRAVENOUS THREE TIMES A DAY
Qty: 0 | Refills: 0 | Status: DISCONTINUED | OUTPATIENT
Start: 2018-11-05 | End: 2018-11-05

## 2018-11-05 RX ORDER — ALPRAZOLAM 0.25 MG
0.5 TABLET ORAL EVERY 8 HOURS
Qty: 0 | Refills: 0 | Status: DISCONTINUED | OUTPATIENT
Start: 2018-11-05 | End: 2018-11-06

## 2018-11-05 RX ORDER — METOCLOPRAMIDE HCL 10 MG
10 TABLET ORAL EVERY 6 HOURS
Qty: 0 | Refills: 0 | Status: DISCONTINUED | OUTPATIENT
Start: 2018-11-05 | End: 2018-11-15

## 2018-11-05 RX ORDER — METOPROLOL TARTRATE 50 MG
50 TABLET ORAL
Qty: 0 | Refills: 0 | Status: DISCONTINUED | OUTPATIENT
Start: 2018-11-05 | End: 2018-11-08

## 2018-11-05 RX ORDER — HYDROMORPHONE HYDROCHLORIDE 2 MG/ML
5 INJECTION INTRAMUSCULAR; INTRAVENOUS; SUBCUTANEOUS
Qty: 0 | Refills: 0 | COMMUNITY

## 2018-11-05 RX ORDER — ACETAMINOPHEN 500 MG
650 TABLET ORAL EVERY 6 HOURS
Qty: 0 | Refills: 0 | Status: DISCONTINUED | OUTPATIENT
Start: 2018-11-05 | End: 2018-11-19

## 2018-11-05 RX ORDER — METHADONE HYDROCHLORIDE 40 MG/1
5 TABLET ORAL EVERY 8 HOURS
Qty: 0 | Refills: 0 | Status: DISCONTINUED | OUTPATIENT
Start: 2018-11-05 | End: 2018-11-10

## 2018-11-05 RX ORDER — SODIUM CHLORIDE 9 MG/ML
1000 INJECTION INTRAMUSCULAR; INTRAVENOUS; SUBCUTANEOUS
Qty: 0 | Refills: 0 | Status: DISCONTINUED | OUTPATIENT
Start: 2018-11-05 | End: 2018-11-06

## 2018-11-05 RX ORDER — ACYCLOVIR SODIUM 500 MG
800 VIAL (EA) INTRAVENOUS
Qty: 0 | Refills: 0 | Status: DISCONTINUED | OUTPATIENT
Start: 2018-11-05 | End: 2018-11-05

## 2018-11-05 RX ORDER — ONDANSETRON 8 MG/1
4 TABLET, FILM COATED ORAL ONCE
Qty: 0 | Refills: 0 | Status: DISCONTINUED | OUTPATIENT
Start: 2018-11-05 | End: 2018-11-05

## 2018-11-05 RX ORDER — METOPROLOL TARTRATE 50 MG
50 TABLET ORAL
Qty: 0 | Refills: 0 | Status: DISCONTINUED | OUTPATIENT
Start: 2018-11-05 | End: 2018-11-05

## 2018-11-05 RX ORDER — TAMSULOSIN HYDROCHLORIDE 0.4 MG/1
0.4 CAPSULE ORAL AT BEDTIME
Qty: 0 | Refills: 0 | Status: DISCONTINUED | OUTPATIENT
Start: 2018-11-05 | End: 2018-11-13

## 2018-11-05 RX ORDER — SERTRALINE 25 MG/1
50 TABLET, FILM COATED ORAL DAILY
Qty: 0 | Refills: 0 | Status: DISCONTINUED | OUTPATIENT
Start: 2018-11-05 | End: 2018-11-19

## 2018-11-05 RX ADMIN — SODIUM CHLORIDE 1000 MILLILITER(S): 9 INJECTION INTRAMUSCULAR; INTRAVENOUS; SUBCUTANEOUS at 13:37

## 2018-11-05 RX ADMIN — Medication 975 MILLIGRAM(S): at 13:37

## 2018-11-05 RX ADMIN — PIPERACILLIN AND TAZOBACTAM 200 GRAM(S): 4; .5 INJECTION, POWDER, LYOPHILIZED, FOR SOLUTION INTRAVENOUS at 13:37

## 2018-11-05 RX ADMIN — SODIUM CHLORIDE 1000 MILLILITER(S): 9 INJECTION INTRAMUSCULAR; INTRAVENOUS; SUBCUTANEOUS at 16:00

## 2018-11-05 RX ADMIN — TAMSULOSIN HYDROCHLORIDE 0.4 MILLIGRAM(S): 0.4 CAPSULE ORAL at 23:39

## 2018-11-05 RX ADMIN — HYDROMORPHONE HYDROCHLORIDE 2 MILLIGRAM(S): 2 INJECTION INTRAMUSCULAR; INTRAVENOUS; SUBCUTANEOUS at 20:46

## 2018-11-05 RX ADMIN — HYDROMORPHONE HYDROCHLORIDE 2 MILLIGRAM(S): 2 INJECTION INTRAMUSCULAR; INTRAVENOUS; SUBCUTANEOUS at 20:20

## 2018-11-05 RX ADMIN — GABAPENTIN 300 MILLIGRAM(S): 400 CAPSULE ORAL at 23:39

## 2018-11-05 RX ADMIN — SODIUM CHLORIDE 1000 MILLILITER(S): 9 INJECTION INTRAMUSCULAR; INTRAVENOUS; SUBCUTANEOUS at 17:17

## 2018-11-05 RX ADMIN — SODIUM CHLORIDE 1000 MILLILITER(S): 9 INJECTION INTRAMUSCULAR; INTRAVENOUS; SUBCUTANEOUS at 20:06

## 2018-11-05 RX ADMIN — METHADONE HYDROCHLORIDE 5 MILLIGRAM(S): 40 TABLET ORAL at 23:39

## 2018-11-05 RX ADMIN — PIPERACILLIN AND TAZOBACTAM 3.38 GRAM(S): 4; .5 INJECTION, POWDER, LYOPHILIZED, FOR SOLUTION INTRAVENOUS at 14:26

## 2018-11-05 RX ADMIN — Medication 250 MILLIGRAM(S): at 14:45

## 2018-11-05 RX ADMIN — Medication 400 MILLIGRAM(S): at 17:17

## 2018-11-05 RX ADMIN — Medication 267 MILLIGRAM(S): at 21:02

## 2018-11-05 RX ADMIN — Medication 1000 MILLIGRAM(S): at 16:00

## 2018-11-05 RX ADMIN — Medication 400 MILLIGRAM(S): at 16:12

## 2018-11-05 RX ADMIN — SODIUM CHLORIDE 75 MILLILITER(S): 9 INJECTION INTRAMUSCULAR; INTRAVENOUS; SUBCUTANEOUS at 20:45

## 2018-11-05 RX ADMIN — ONDANSETRON 4 MILLIGRAM(S): 8 TABLET, FILM COATED ORAL at 13:44

## 2018-11-05 RX ADMIN — SODIUM CHLORIDE 75 MILLILITER(S): 9 INJECTION INTRAMUSCULAR; INTRAVENOUS; SUBCUTANEOUS at 21:02

## 2018-11-05 RX ADMIN — Medication 975 MILLIGRAM(S): at 16:10

## 2018-11-05 NOTE — ED ADULT NURSE NOTE - ED STAT RN HANDOFF DETAILS 2
Break Coverage RN - No acute distress. Respirations are even and unlabored on room air. Call bell within reach. Report given to primary RN Morena Davison.

## 2018-11-05 NOTE — ED ADULT NURSE NOTE - ED STAT RN HANDOFF DETAILS
Break Coverage RN - Report received from primary RN Morena Davison. No acute distress. Pt. is resting comfortably. Will continue to monitor.

## 2018-11-05 NOTE — ED PROVIDER NOTE - PHYSICAL EXAMINATION
Gen: No acute distress, alert, cooperative  Head: Normocephalic, rash on right forehead and right scalp. red, maculopapular with groupings of papules, doesn't cross midline  HEENT: PERRL, oral mucosa moist, normal conjunctiva  Lung: CTAB, no respiratory distress, no crackles or wheezes  CV: rrr, course breath sounds MAURICE bases  Abd: soft, NTND, no rebound or guarding, scar on abdomen, no sign of infection  MSK: No LE edema  Neuro: No focal neurologic deficits  Skin: Warm and dry, no evidence of rash   Psych: normal affect, follows commands

## 2018-11-05 NOTE — H&P ADULT - ASSESSMENT
62 y/o M with PMH colon ca (s/p resection June 2017), pancreatic ca (s/p chemo, ended April 2018), currently on nivoluma immunotherapy (once every 2 weeks since the last 6 months; last dose on 11/2/18), and HTN presented with nausea, headache, and right forehead/scalp rash since Saturday. Rash mostly secondary to herpes zoster shingles.

## 2018-11-05 NOTE — H&P ADULT - NSHPSOCIALHISTORY_GEN_ALL_CORE
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  Currently retired.  No significant PMH of alcohol, tobacco, or recreational drug use.

## 2018-11-05 NOTE — ED ADULT NURSE NOTE - NSIMPLEMENTINTERV_GEN_ALL_ED
Implemented All Universal Safety Interventions:  [place] to call system. Call bell, personal items and telephone within reach. Instruct patient to call for assistance. Room bathroom lighting operational. Non-slip footwear when patient is off stretcher. Physically safe environment: no spills, clutter or unnecessary equipment. Stretcher in lowest position, wheels locked, appropriate side rails in place.

## 2018-11-05 NOTE — ED ADULT TRIAGE NOTE - CHIEF COMPLAINT QUOTE
Pt states he is currently being treated for pancreatic ca, had chemo on 11/2 and since then has been complaining of abdominal pain, N/V fever and chills. Pt denies chest pain, SOB.

## 2018-11-05 NOTE — ED PROVIDER NOTE - MEDICAL DECISION MAKING DETAILS
62yo M hx colon ca s/p resection 2017, pancreatic ca s/p chemo, currently on immunotherapy, and htn presenting with abdominal pain, nausea, fever, and new rash since 11/2 after latest immunotherapy. Febrile, tachy, /64. Sepsis, immunosuppressed. Rash possibly shingles. Fluids, abx, labs, urine, cxr. Will consult Dr. Maloney/Heme-Onc and admit to hospitalist.

## 2018-11-05 NOTE — ED ADULT NURSE NOTE - OBJECTIVE STATEMENT
RN facilitator. pt is in bed A and Ox3 in NAD. pt reports  " after my last immune therapy I am not feeling well, I have a rash, my stomach hurts, I have fever and I feel nauseas every time I eats something " abd soft non distended, BS present 4 quadrants. noted right facial rash, red and raised. IV initiated labs sent.

## 2018-11-05 NOTE — H&P ADULT - ATTENDING COMMENTS
I have personally seen and examined patient today.   I discussed the case with Dr. Blackwell and I agree with findings and plan as detailed per note above, which I have amended where appropriate.      Pt w/ PMH as above a/w forehead pruritic burning rash since 3 days duration, likely herpes zoster.  Has some lesions on right eye lid and reports pruritus around right eye, but no blurred vision or eye pain.  Agree with IV acyclovir as patient is immunocompromised.  C/w IVF hydration and monitor creatinine.  Ophtho consult to freddie for HZO.

## 2018-11-05 NOTE — ED PROVIDER NOTE - ATTENDING CONTRIBUTION TO CARE
agree with resident note  "62yo M hx colon ca s/p resection 2017, pancreatic ca s/p chemo, currently on immunotherapy, and htn presenting with abdominal pain, nausea, fever. Patient had immunotherapy (nivoluma) on 11/2. "  That night developed rash to right forehead that is described as painful.  Denies cough, urinary symptoms.  Fever up to 102.    PE: febrile, tachycardic, normotensive; not toxic appearing; right forehead rash; no clear vesicles appreciated but dermatomal; CTAB/L; s1 s2 no m/r/g abd soft/NT/ND ext: no edema

## 2018-11-05 NOTE — H&P ADULT - NSHPLABSRESULTS_GEN_ALL_CORE
Complete Blood Count + Automated Diff (11.05.18 @ 12:59)    Nucleated RBC #: 0    WBC Count: 4.79 K/uL    RBC Count: 4.98 M/uL    Hemoglobin: 15.0: Delta: 11.8 on 06/11/  Delta: 11.8 on 06/11/ g/dL    Hematocrit: 43.4 %    Mean Cell Volume: 87.1 fL    Mean Cell Hemoglobin: 30.1 pg    Mean Cell Hemoglobin Conc: 34.6 %    Red Cell Distrib Width: 13.0 %    Platelet Count - Automated: 148 K/uL    MPV: 9.5 fl    Auto Neutrophil #: 2.96 K/uL    Auto Lymphocyte #: 1.08 K/uL    Auto Monocyte #: 0.44 K/uL    Auto Eosinophil #: 0.24 K/uL    Auto Basophil #: 0.05 K/uL    Auto Immature Granulocyte #: 0.02: (Includes meta, myelo and promyelocytes) #    Auto Neutrophil %: 61.9 %    Auto Lymphocyte %: 22.5 %    Auto Monocyte %: 9.2 %    Auto Eosinophil %: 5.0 %    Auto Basophil %: 1.0 %    Auto Immature Granulocyte %: 0.4: (Includes meta, myelo and promyelocytes) %    Comprehensive Metabolic Panel (11.05.18 @ 12:50)    Sodium, Serum: 131 mmol/L    Potassium, Serum: 4.1 mmol/L    Chloride, Serum: 91: Delta: 99 on 06/11/  Delta: 99 on 06/11/ mmol/L    Carbon Dioxide, Serum: 22 mmol/L    Blood Urea Nitrogen, Serum: 16 mg/dL    Creatinine, Serum: 0.91 mg/dL    Glucose, Serum: 109 mg/dL    Calcium, Total Serum: 9.8 mg/dL    Protein Total, Serum: 7.9 g/dL    Albumin, Serum: 4.3 g/dL    Bilirubin Total, Serum: 1.0 mg/dL    Alkaline Phosphatase, Serum: 70 u/L    Aspartate Aminotransferase (AST/SGOT): 30 u/L    Alanine Aminotransferase (ALT/SGPT): 20 u/L    eGFR if Non : 91    Blood Gas Venous Comprehensive (11.05.18 @ 12:50)    Blood Gas Venous - Lactate: 1.8: Please note updated reference range. mmol/L    Blood Gas Venous - Chloride: 97 mmol/L    Blood Gas Venous - Creatinine: 0.87: Delta: 0.54 on 03/17/  Delta: 0.54 on 03/17/ mg/dL    pH, Venous: 7.43 pH    pCO2, Venous: 40 mmHg    pO2, Venous: < 24 mmHg    HCO3, Venous: 24 mmol/L    Base Excess, Venous: 2.2: REFERENCE RANGE = -3 + 2 mmol/L mmol/L    Oxygen Saturation, Venous: 16.0 %    Blood Gas Venous - Sodium: 129 mmol/L    Blood Gas Venous - Potassium: 4.0 mmol/L    Blood Gas Venous - Glucose: 109    Blood Gas Venous - Hemoglobin: 14.9 g/dL    Blood Gas Venous - Hematocrit: 45.6 %    Amylase, Serum Total (11.05.18 @ 12:50)    Amylase, Serum Total: 45 u/L    Lipase, Serum (11.05.18 @ 12:50)    Lipase, Serum: 21.4 U/L    CXR (11/5): Right chest wall CT compatible power infusion port present with tip in SVC region. Clear lungs. No pleural effusions or pneumothorax. Scant aortic arch calcifications otherwise cardiac and mediastinal silhouettes within normal limits. Trachea midline. Intact and uncomplicated appearing also correlate with findings on already pending chest CT. mid to lower cervical multilevel anterior fusion hardware. Generalized osteopenia and spinal degenerative change again noted.    CT C/A/P (11/5): No source of sepsis is identified in the chest, abdomen or pelvis. Slight interval decrease in size of the pancreatic mass with similar encasement of the superior mesenteric vein. Stable the mediastinal/hilar lymphadenopathy and right lower lobe pulmonary nodule.

## 2018-11-05 NOTE — H&P ADULT - PROBLEM SELECTOR PLAN 2
- In the ED, pt. met the SIRS criteria with T 102.4, , /73, RR 18, SpO2 100%. Received empiric Vanc 1g x 1 and Zosyn 3.375g x 1.  - CXR negative for PNA.  - F/u blood clx and UA/urine clx.  - C/w empiric Zosyn. - In the ED, pt. met the SIRS criteria with T 102.4, , /73, RR 18, SpO2 100%. Received empiric Vanc 1g x 1 and Zosyn 3.375g x 1.  - CXR negative for PNA.  - F/u blood clx and UA/urine clx.  - Hold off on abx right now given no localizing source.

## 2018-11-05 NOTE — H&P ADULT - PROBLEM SELECTOR PLAN 4
- s/p chemo (ended April 2018)  - Currently on nivoluma immunotherapy (once every 2 weeks since the last 6 months; last dose on 11/2/18)  - Follows up with Dr. Maloney (Oncologist)

## 2018-11-05 NOTE — H&P ADULT - NSHPPHYSICALEXAM_GEN_ALL_CORE
Vitals: T 99.9, HR 81, /98, RR 18, SpO2 100% RA  Gen: No acute distress, alert, cooperative.  Head: Red, maculopapular rash with groupings of papules on right forehead and right scalp. Doesn't cross the midline. Normocephalic.  HEENT: PERRL, oral mucosa moist, normal conjunctiva.  Lung: CTAB, no respiratory distress, no crackles or wheezes.  CV: Normal S1/S1, No m/r/g.  Abd: Soft, NTND, no rebound or guarding, scar on abdomen, no sign of infection.  MSK: No LE edema.  Neuro: No focal neurologic deficits. Cn 2-12 intact.  Skin: Warm and dry.  Psych: Normal affect, follows commands.

## 2018-11-05 NOTE — H&P ADULT - PROBLEM SELECTOR PLAN 1
- Red, maculopapular rash with groupings of papules on right forehead and right scalp. Doesn't cross the midline.  - Pt. currently immunosuppressed.  - C/w Acyclovir 800mg five times a day with NS at 75 cc/hr. - Red, maculopapular rash with groupings of papules on right forehead and right scalp. Doesn't cross the midline.  - Pt. currently immunosuppressed.  - C/w Acyclovir 800mg five times a day with NS at 75 cc/hr.  - Pain control with gabapentin 300mg TID, methadone, and dilaudid PRN. - Red, maculopapular rash with groupings of papules on right forehead and right scalp. Doesn't cross the midline.  - Pt. currently immunosuppressed.  - C/w IV Acyclovir 850mg TID with NS at 75 cc/hr.  - Pain control with gabapentin 300mg TID, methadone, acetaminophen 650mg PRN and dilaudid PRN.

## 2018-11-05 NOTE — ED PROVIDER NOTE - PROGRESS NOTE DETAILS
AK: Admit. Discussed with hospitalist. Paged MAR. Earlier, talked with Dr. Maloney from heme/onc who will continue to follow patient.

## 2018-11-06 LAB
ALBUMIN SERPL ELPH-MCNC: 3.5 G/DL — SIGNIFICANT CHANGE UP (ref 3.3–5)
ALP SERPL-CCNC: 61 U/L — SIGNIFICANT CHANGE UP (ref 40–120)
ALT FLD-CCNC: 20 U/L — SIGNIFICANT CHANGE UP (ref 4–41)
APPEARANCE UR: CLEAR — SIGNIFICANT CHANGE UP
AST SERPL-CCNC: 33 U/L — SIGNIFICANT CHANGE UP (ref 4–40)
BACTERIA # UR AUTO: SIGNIFICANT CHANGE UP
BASOPHILS # BLD AUTO: 0.04 K/UL — SIGNIFICANT CHANGE UP (ref 0–0.2)
BASOPHILS NFR BLD AUTO: 0.9 % — SIGNIFICANT CHANGE UP (ref 0–2)
BILIRUB SERPL-MCNC: 0.8 MG/DL — SIGNIFICANT CHANGE UP (ref 0.2–1.2)
BILIRUB UR-MCNC: NEGATIVE — SIGNIFICANT CHANGE UP
BLOOD UR QL VISUAL: SIGNIFICANT CHANGE UP
BUN SERPL-MCNC: 11 MG/DL — SIGNIFICANT CHANGE UP (ref 7–23)
CALCIUM SERPL-MCNC: 8.7 MG/DL — SIGNIFICANT CHANGE UP (ref 8.4–10.5)
CHLORIDE SERPL-SCNC: 100 MMOL/L — SIGNIFICANT CHANGE UP (ref 98–107)
CO2 SERPL-SCNC: 20 MMOL/L — LOW (ref 22–31)
COLOR SPEC: YELLOW — SIGNIFICANT CHANGE UP
CREAT SERPL-MCNC: 0.8 MG/DL — SIGNIFICANT CHANGE UP (ref 0.5–1.3)
EOSINOPHIL # BLD AUTO: 0.39 K/UL — SIGNIFICANT CHANGE UP (ref 0–0.5)
EOSINOPHIL NFR BLD AUTO: 9.2 % — HIGH (ref 0–6)
GLUCOSE SERPL-MCNC: 83 MG/DL — SIGNIFICANT CHANGE UP (ref 70–99)
GLUCOSE UR-MCNC: NEGATIVE — SIGNIFICANT CHANGE UP
HCT VFR BLD CALC: 37.4 % — LOW (ref 39–50)
HGB BLD-MCNC: 12.7 G/DL — LOW (ref 13–17)
IMM GRANULOCYTES # BLD AUTO: 0.02 # — SIGNIFICANT CHANGE UP
IMM GRANULOCYTES NFR BLD AUTO: 0.5 % — SIGNIFICANT CHANGE UP (ref 0–1.5)
KETONES UR-MCNC: SIGNIFICANT CHANGE UP
LEUKOCYTE ESTERASE UR-ACNC: NEGATIVE — SIGNIFICANT CHANGE UP
LYMPHOCYTES # BLD AUTO: 0.71 K/UL — LOW (ref 1–3.3)
LYMPHOCYTES # BLD AUTO: 16.7 % — SIGNIFICANT CHANGE UP (ref 13–44)
MAGNESIUM SERPL-MCNC: 1.7 MG/DL — SIGNIFICANT CHANGE UP (ref 1.6–2.6)
MCHC RBC-ENTMCNC: 29.8 PG — SIGNIFICANT CHANGE UP (ref 27–34)
MCHC RBC-ENTMCNC: 34 % — SIGNIFICANT CHANGE UP (ref 32–36)
MCV RBC AUTO: 87.8 FL — SIGNIFICANT CHANGE UP (ref 80–100)
MONOCYTES # BLD AUTO: 0.54 K/UL — SIGNIFICANT CHANGE UP (ref 0–0.9)
MONOCYTES NFR BLD AUTO: 12.7 % — SIGNIFICANT CHANGE UP (ref 2–14)
NEUTROPHILS # BLD AUTO: 2.56 K/UL — SIGNIFICANT CHANGE UP (ref 1.8–7.4)
NEUTROPHILS NFR BLD AUTO: 60 % — SIGNIFICANT CHANGE UP (ref 43–77)
NITRITE UR-MCNC: NEGATIVE — SIGNIFICANT CHANGE UP
NRBC # FLD: 0 — SIGNIFICANT CHANGE UP
PH UR: 6.5 — SIGNIFICANT CHANGE UP (ref 5–8)
PHOSPHATE SERPL-MCNC: 3.3 MG/DL — SIGNIFICANT CHANGE UP (ref 2.5–4.5)
PLATELET # BLD AUTO: 117 K/UL — LOW (ref 150–400)
PMV BLD: 9.9 FL — SIGNIFICANT CHANGE UP (ref 7–13)
POTASSIUM SERPL-MCNC: 4.2 MMOL/L — SIGNIFICANT CHANGE UP (ref 3.5–5.3)
POTASSIUM SERPL-SCNC: 4.2 MMOL/L — SIGNIFICANT CHANGE UP (ref 3.5–5.3)
PROT SERPL-MCNC: 6.4 G/DL — SIGNIFICANT CHANGE UP (ref 6–8.3)
PROT UR-MCNC: 50 — SIGNIFICANT CHANGE UP
RBC # BLD: 4.26 M/UL — SIGNIFICANT CHANGE UP (ref 4.2–5.8)
RBC # FLD: 13 % — SIGNIFICANT CHANGE UP (ref 10.3–14.5)
RBC CASTS # UR COMP ASSIST: SIGNIFICANT CHANGE UP (ref 0–?)
SODIUM SERPL-SCNC: 133 MMOL/L — LOW (ref 135–145)
SP GR SPEC: > 1.05 — HIGH (ref 1–1.04)
SPECIMEN SOURCE: SIGNIFICANT CHANGE UP
SPECIMEN SOURCE: SIGNIFICANT CHANGE UP
UROBILINOGEN FLD QL: HIGH
WBC # BLD: 4.26 K/UL — SIGNIFICANT CHANGE UP (ref 3.8–10.5)
WBC # FLD AUTO: 4.26 K/UL — SIGNIFICANT CHANGE UP (ref 3.8–10.5)
WBC UR QL: SIGNIFICANT CHANGE UP (ref 0–?)

## 2018-11-06 PROCEDURE — 99223 1ST HOSP IP/OBS HIGH 75: CPT | Mod: GC

## 2018-11-06 RX ORDER — SODIUM CHLORIDE 9 MG/ML
1000 INJECTION INTRAMUSCULAR; INTRAVENOUS; SUBCUTANEOUS
Qty: 0 | Refills: 0 | Status: DISCONTINUED | OUTPATIENT
Start: 2018-11-06 | End: 2018-11-08

## 2018-11-06 RX ADMIN — Medication 650 MILLIGRAM(S): at 09:30

## 2018-11-06 RX ADMIN — Medication 650 MILLIGRAM(S): at 01:06

## 2018-11-06 RX ADMIN — SERTRALINE 50 MILLIGRAM(S): 25 TABLET, FILM COATED ORAL at 12:11

## 2018-11-06 RX ADMIN — LOSARTAN POTASSIUM 25 MILLIGRAM(S): 100 TABLET, FILM COATED ORAL at 06:37

## 2018-11-06 RX ADMIN — GABAPENTIN 300 MILLIGRAM(S): 400 CAPSULE ORAL at 14:13

## 2018-11-06 RX ADMIN — METHADONE HYDROCHLORIDE 5 MILLIGRAM(S): 40 TABLET ORAL at 14:13

## 2018-11-06 RX ADMIN — HYDROMORPHONE HYDROCHLORIDE 2 MILLIGRAM(S): 2 INJECTION INTRAMUSCULAR; INTRAVENOUS; SUBCUTANEOUS at 14:02

## 2018-11-06 RX ADMIN — FINASTERIDE 5 MILLIGRAM(S): 5 TABLET, FILM COATED ORAL at 12:11

## 2018-11-06 RX ADMIN — Medication 0.5 MILLIGRAM(S): at 12:46

## 2018-11-06 RX ADMIN — SODIUM CHLORIDE 75 MILLILITER(S): 9 INJECTION INTRAMUSCULAR; INTRAVENOUS; SUBCUTANEOUS at 18:17

## 2018-11-06 RX ADMIN — SODIUM CHLORIDE 75 MILLILITER(S): 9 INJECTION INTRAMUSCULAR; INTRAVENOUS; SUBCUTANEOUS at 15:25

## 2018-11-06 RX ADMIN — Medication 650 MILLIGRAM(S): at 02:03

## 2018-11-06 RX ADMIN — Medication 50 MILLIGRAM(S): at 06:37

## 2018-11-06 RX ADMIN — Medication 267 MILLIGRAM(S): at 14:26

## 2018-11-06 RX ADMIN — METHADONE HYDROCHLORIDE 5 MILLIGRAM(S): 40 TABLET ORAL at 22:54

## 2018-11-06 RX ADMIN — HYDROMORPHONE HYDROCHLORIDE 2 MILLIGRAM(S): 2 INJECTION INTRAMUSCULAR; INTRAVENOUS; SUBCUTANEOUS at 12:46

## 2018-11-06 RX ADMIN — Medication 650 MILLIGRAM(S): at 06:40

## 2018-11-06 RX ADMIN — TAMSULOSIN HYDROCHLORIDE 0.4 MILLIGRAM(S): 0.4 CAPSULE ORAL at 22:54

## 2018-11-06 RX ADMIN — Medication 267 MILLIGRAM(S): at 06:57

## 2018-11-06 RX ADMIN — GABAPENTIN 300 MILLIGRAM(S): 400 CAPSULE ORAL at 06:37

## 2018-11-06 RX ADMIN — Medication 1 DROP(S): at 18:24

## 2018-11-06 RX ADMIN — Medication 1 DROP(S): at 14:26

## 2018-11-06 RX ADMIN — Medication 1 DROP(S): at 18:21

## 2018-11-06 RX ADMIN — Medication 50 MILLIGRAM(S): at 18:18

## 2018-11-06 RX ADMIN — Medication 10 MILLIGRAM(S): at 12:54

## 2018-11-06 RX ADMIN — METHADONE HYDROCHLORIDE 5 MILLIGRAM(S): 40 TABLET ORAL at 06:37

## 2018-11-06 RX ADMIN — GABAPENTIN 300 MILLIGRAM(S): 400 CAPSULE ORAL at 22:53

## 2018-11-06 NOTE — ED ADULT NURSE REASSESSMENT NOTE - NS ED NURSE REASSESS COMMENT FT1
patient remains in room 19. pt appears comfortable and denies pain. Will continue to monitor and asses.

## 2018-11-06 NOTE — ED ADULT NURSE REASSESSMENT NOTE - NS ED NURSE REASSESS COMMENT FT1
pt states he is feeling much better. Patient appears calm and states he is less nauseous and is in less pain. Waiting on pharmacy to send up the rest of 14:00 medications

## 2018-11-06 NOTE — CONSULT NOTE ADULT - SUBJECTIVE AND OBJECTIVE BOX
64 y/o M with PMH colon ca s/p resection , pancreatic ca s/p chemo currently on nivoluma immunotherapy, and HTN p/w R forehead rash and itchiness OD. Pt currently receiving IV acyclovir for shingles. Pt pt has had itchiness around R eye for past several days; denies eye pain, redness, discharge, change in vision, flashes, floaters.    PMH: as above  Meds: see med rec  POcHx (including surgeries/lasers/trauma):  None  Drops: None  FamHx: None  Social Hx: None  Allergies: NKDA    ROS:  General (neg), Vision (per HPI), Head and Neck (neg), Pulm (neg), CV (neg), GI (neg),  (neg), Musculoskeletal (neg), Skin/Integ (neg), Neuro (neg), Endocrine (neg), Heme (neg), All/Immuno (neg)    Mood and Affect Appropriate ( x ),  Oriented to Time, Place, and Person x 3 ( x )    Ophthalmology Exam    Visual acuity (cc): 20/30 +2 OU  Pupils: PERRL OU, no APD  Ttono: 12, 13  Extraocular movements (EOMs): Full OU, no pain, no diplopia   Confrontational Visual Field (CVF):  Full OU  Color Plates: 12/12 OU    Pen Light Exam (PLE)  External:  R upper forehead vesicular rash, OS wnl  Lids/Lashes/Lacrimal Ducts: RUL papule (not open), OS wnl. No vesicles on lid margin OU  Sclera/Conjunctiva:  W+Q OU  Cornea: dec TBUT OU, neg dendrites OU  Anterior Chamber: D+F OU  Iris:  Flat OU  Lens:  Cl OU    Fundus Exam: Pt deferred dilation at this time as was having severe GI pain, team updated    >>63 yom w/ R eye itchiness likely 2/2 associated zoster rash in V1 distribution OD. Neg for herpes zoster ophthalmicus  -Start preservative free artificial tears q2h OU  -Lacrilube qhs OU  -Cool compress for comfort  -Continue management per primary team    Follow-Up:  Patient should follow up his/her ophthalmologist or in the [facility] within 1 week of discharge.  [address] [place] [address]  [phone]

## 2018-11-06 NOTE — PROGRESS NOTE ADULT - PROBLEM SELECTOR PLAN 2
- In the ED, pt. met the SIRS criteria with T 102.4, , /73, RR 18, SpO2 100%. Received empiric Vanc 1g x 1 and Zosyn 3.375g x 1.  - CXR negative for PNA.  - F/u blood clx and UA/urine clx.  - Hold off on abx right now given no localizing source. - In the ED, pt. met the SIRS criteria with T 102.4, , /73, RR 18, SpO2 100%. Received empiric Vanc 1g x 1 and Zosyn 3.375g x 1.  - CXR negative for PNA.  - RVP and UA negative.  - F/u blood clx and urine clx.  - Hold off on abx right now given no localizing source.

## 2018-11-06 NOTE — PROGRESS NOTE ADULT - PROBLEM SELECTOR PLAN 1
- Red, maculopapular rash with groupings of papules on right forehead and right scalp. Doesn't cross the midline.  - Pt. currently immunosuppressed.  - C/w IV Acyclovir 850mg TID with NS at 75 cc/hr.  - Pain control with gabapentin 300mg TID, methadone, acetaminophen 650mg PRN and dilaudid PRN. - Red, maculopapular rash with groupings of papules on right forehead and right scalp. Doesn't cross the midline.  - Pt. currently immunosuppressed.  - C/w IV Acyclovir 850mg TID with NS at 75 cc/hr.  - Optho consulted to r/o herpes ophthalmicus of the R eye (pt. complains of itchy right eye, but no blurriness/photophobia/vision loss).       - C/w artifical tears  - Pain control with gabapentin 300mg TID, methadone, acetaminophen 650mg PRN and dilaudid PRN.

## 2018-11-06 NOTE — PATIENT PROFILE ADULT - NSPROEDALEARNPREF_GEN_A_NUR
group instruction/pictorial/verbal instruction/written material/video/computer/internet/individual instruction/skill demonstration

## 2018-11-07 ENCOUNTER — OUTPATIENT (OUTPATIENT)
Dept: OUTPATIENT SERVICES | Facility: HOSPITAL | Age: 63
LOS: 1 days | Discharge: HOME | End: 2018-11-07

## 2018-11-07 ENCOUNTER — TRANSCRIPTION ENCOUNTER (OUTPATIENT)
Age: 63
End: 2018-11-07

## 2018-11-07 DIAGNOSIS — C25.9 MALIGNANT NEOPLASM OF PANCREAS, UNSPECIFIED: ICD-10-CM

## 2018-11-07 DIAGNOSIS — Z90.49 ACQUIRED ABSENCE OF OTHER SPECIFIED PARTS OF DIGESTIVE TRACT: Chronic | ICD-10-CM

## 2018-11-07 DIAGNOSIS — R19.7 DIARRHEA, UNSPECIFIED: ICD-10-CM

## 2018-11-07 DIAGNOSIS — Z98.890 OTHER SPECIFIED POSTPROCEDURAL STATES: Chronic | ICD-10-CM

## 2018-11-07 DIAGNOSIS — K25.5 CHRONIC OR UNSPECIFIED GASTRIC ULCER WITH PERFORATION: Chronic | ICD-10-CM

## 2018-11-07 LAB
ALBUMIN SERPL ELPH-MCNC: 3.3 G/DL — SIGNIFICANT CHANGE UP (ref 3.3–5)
ALP SERPL-CCNC: 62 U/L — SIGNIFICANT CHANGE UP (ref 40–120)
ALT FLD-CCNC: 18 U/L — SIGNIFICANT CHANGE UP (ref 4–41)
AST SERPL-CCNC: 24 U/L — SIGNIFICANT CHANGE UP (ref 4–40)
BACTERIA UR CULT: SIGNIFICANT CHANGE UP
BASOPHILS # BLD AUTO: 0.04 K/UL — SIGNIFICANT CHANGE UP (ref 0–0.2)
BASOPHILS NFR BLD AUTO: 0.9 % — SIGNIFICANT CHANGE UP (ref 0–2)
BILIRUB SERPL-MCNC: 0.6 MG/DL — SIGNIFICANT CHANGE UP (ref 0.2–1.2)
BUN SERPL-MCNC: 10 MG/DL — SIGNIFICANT CHANGE UP (ref 7–23)
C DIFF TOX GENS STL QL NAA+PROBE: SIGNIFICANT CHANGE UP
CALCIUM SERPL-MCNC: 8.7 MG/DL — SIGNIFICANT CHANGE UP (ref 8.4–10.5)
CHLORIDE SERPL-SCNC: 99 MMOL/L — SIGNIFICANT CHANGE UP (ref 98–107)
CO2 SERPL-SCNC: 19 MMOL/L — LOW (ref 22–31)
CREAT SERPL-MCNC: 0.99 MG/DL — SIGNIFICANT CHANGE UP (ref 0.5–1.3)
EOSINOPHIL # BLD AUTO: 0.32 K/UL — SIGNIFICANT CHANGE UP (ref 0–0.5)
EOSINOPHIL NFR BLD AUTO: 7.1 % — HIGH (ref 0–6)
GLUCOSE SERPL-MCNC: 76 MG/DL — SIGNIFICANT CHANGE UP (ref 70–99)
HCT VFR BLD CALC: 36.1 % — LOW (ref 39–50)
HGB BLD-MCNC: 12.5 G/DL — LOW (ref 13–17)
IMM GRANULOCYTES # BLD AUTO: 0.01 # — SIGNIFICANT CHANGE UP
IMM GRANULOCYTES NFR BLD AUTO: 0.2 % — SIGNIFICANT CHANGE UP (ref 0–1.5)
LYMPHOCYTES # BLD AUTO: 0.99 K/UL — LOW (ref 1–3.3)
LYMPHOCYTES # BLD AUTO: 22 % — SIGNIFICANT CHANGE UP (ref 13–44)
MAGNESIUM SERPL-MCNC: 1.6 MG/DL — SIGNIFICANT CHANGE UP (ref 1.6–2.6)
MCHC RBC-ENTMCNC: 30.3 PG — SIGNIFICANT CHANGE UP (ref 27–34)
MCHC RBC-ENTMCNC: 34.6 % — SIGNIFICANT CHANGE UP (ref 32–36)
MCV RBC AUTO: 87.4 FL — SIGNIFICANT CHANGE UP (ref 80–100)
MONOCYTES # BLD AUTO: 0.78 K/UL — SIGNIFICANT CHANGE UP (ref 0–0.9)
MONOCYTES NFR BLD AUTO: 17.3 % — HIGH (ref 2–14)
NEUTROPHILS # BLD AUTO: 2.36 K/UL — SIGNIFICANT CHANGE UP (ref 1.8–7.4)
NEUTROPHILS NFR BLD AUTO: 52.5 % — SIGNIFICANT CHANGE UP (ref 43–77)
NRBC # FLD: 0 — SIGNIFICANT CHANGE UP
PHOSPHATE SERPL-MCNC: 3.1 MG/DL — SIGNIFICANT CHANGE UP (ref 2.5–4.5)
PLATELET # BLD AUTO: 116 K/UL — LOW (ref 150–400)
PMV BLD: 10.1 FL — SIGNIFICANT CHANGE UP (ref 7–13)
POTASSIUM SERPL-MCNC: 4 MMOL/L — SIGNIFICANT CHANGE UP (ref 3.5–5.3)
POTASSIUM SERPL-SCNC: 4 MMOL/L — SIGNIFICANT CHANGE UP (ref 3.5–5.3)
PROT SERPL-MCNC: 6.1 G/DL — SIGNIFICANT CHANGE UP (ref 6–8.3)
RBC # BLD: 4.13 M/UL — LOW (ref 4.2–5.8)
RBC # FLD: 13.1 % — SIGNIFICANT CHANGE UP (ref 10.3–14.5)
SODIUM SERPL-SCNC: 129 MMOL/L — LOW (ref 135–145)
SPECIMEN SOURCE: SIGNIFICANT CHANGE UP
WBC # BLD: 4.5 K/UL — SIGNIFICANT CHANGE UP (ref 3.8–10.5)
WBC # FLD AUTO: 4.5 K/UL — SIGNIFICANT CHANGE UP (ref 3.8–10.5)

## 2018-11-07 PROCEDURE — 99233 SBSQ HOSP IP/OBS HIGH 50: CPT | Mod: GC

## 2018-11-07 RX ORDER — GABAPENTIN 400 MG/1
1 CAPSULE ORAL
Qty: 0 | Refills: 0 | COMMUNITY
Start: 2018-11-07

## 2018-11-07 RX ORDER — SODIUM CHLORIDE 9 MG/ML
500 INJECTION, SOLUTION INTRAVENOUS ONCE
Qty: 0 | Refills: 0 | Status: COMPLETED | OUTPATIENT
Start: 2018-11-07 | End: 2018-11-07

## 2018-11-07 RX ORDER — VALACYCLOVIR 500 MG/1
1 TABLET, FILM COATED ORAL
Qty: 21 | Refills: 0
Start: 2018-11-07 | End: 2018-11-13

## 2018-11-07 RX ORDER — GABAPENTIN 400 MG/1
1 CAPSULE ORAL
Qty: 0 | Refills: 0 | COMMUNITY

## 2018-11-07 RX ORDER — VALACYCLOVIR 500 MG/1
1000 TABLET, FILM COATED ORAL EVERY 8 HOURS
Qty: 0 | Refills: 0 | Status: DISCONTINUED | OUTPATIENT
Start: 2018-11-07 | End: 2018-11-08

## 2018-11-07 RX ORDER — GABAPENTIN 400 MG/1
1 CAPSULE ORAL
Qty: 90 | Refills: 0
Start: 2018-11-07 | End: 2018-12-06

## 2018-11-07 RX ORDER — HEPARIN SODIUM 5000 [USP'U]/ML
5000 INJECTION INTRAVENOUS; SUBCUTANEOUS EVERY 8 HOURS
Qty: 0 | Refills: 0 | Status: DISCONTINUED | OUTPATIENT
Start: 2018-11-07 | End: 2018-11-19

## 2018-11-07 RX ADMIN — Medication 1 DROP(S): at 04:00

## 2018-11-07 RX ADMIN — METHADONE HYDROCHLORIDE 5 MILLIGRAM(S): 40 TABLET ORAL at 13:33

## 2018-11-07 RX ADMIN — GABAPENTIN 300 MILLIGRAM(S): 400 CAPSULE ORAL at 05:40

## 2018-11-07 RX ADMIN — Medication 1 DROP(S): at 09:55

## 2018-11-07 RX ADMIN — SODIUM CHLORIDE 1000 MILLILITER(S): 9 INJECTION, SOLUTION INTRAVENOUS at 18:05

## 2018-11-07 RX ADMIN — Medication 1 DROP(S): at 21:42

## 2018-11-07 RX ADMIN — SERTRALINE 50 MILLIGRAM(S): 25 TABLET, FILM COATED ORAL at 13:33

## 2018-11-07 RX ADMIN — METHADONE HYDROCHLORIDE 5 MILLIGRAM(S): 40 TABLET ORAL at 21:49

## 2018-11-07 RX ADMIN — FINASTERIDE 5 MILLIGRAM(S): 5 TABLET, FILM COATED ORAL at 13:33

## 2018-11-07 RX ADMIN — LOSARTAN POTASSIUM 25 MILLIGRAM(S): 100 TABLET, FILM COATED ORAL at 05:40

## 2018-11-07 RX ADMIN — VALACYCLOVIR 1000 MILLIGRAM(S): 500 TABLET, FILM COATED ORAL at 13:53

## 2018-11-07 RX ADMIN — Medication 1 DROP(S): at 00:00

## 2018-11-07 RX ADMIN — Medication 50 MILLIGRAM(S): at 05:40

## 2018-11-07 RX ADMIN — TAMSULOSIN HYDROCHLORIDE 0.4 MILLIGRAM(S): 0.4 CAPSULE ORAL at 21:45

## 2018-11-07 RX ADMIN — Medication 1 DROP(S): at 13:34

## 2018-11-07 RX ADMIN — HEPARIN SODIUM 5000 UNIT(S): 5000 INJECTION INTRAVENOUS; SUBCUTANEOUS at 17:22

## 2018-11-07 RX ADMIN — Medication 267 MILLIGRAM(S): at 09:42

## 2018-11-07 RX ADMIN — Medication 1 DROP(S): at 17:22

## 2018-11-07 RX ADMIN — Medication 1 DROP(S): at 01:54

## 2018-11-07 RX ADMIN — Medication 1 DROP(S): at 21:43

## 2018-11-07 RX ADMIN — Medication 267 MILLIGRAM(S): at 01:52

## 2018-11-07 RX ADMIN — HEPARIN SODIUM 5000 UNIT(S): 5000 INJECTION INTRAVENOUS; SUBCUTANEOUS at 09:41

## 2018-11-07 RX ADMIN — SODIUM CHLORIDE 75 MILLILITER(S): 9 INJECTION INTRAMUSCULAR; INTRAVENOUS; SUBCUTANEOUS at 08:53

## 2018-11-07 RX ADMIN — VALACYCLOVIR 1000 MILLIGRAM(S): 500 TABLET, FILM COATED ORAL at 21:45

## 2018-11-07 RX ADMIN — GABAPENTIN 300 MILLIGRAM(S): 400 CAPSULE ORAL at 13:33

## 2018-11-07 RX ADMIN — GABAPENTIN 300 MILLIGRAM(S): 400 CAPSULE ORAL at 21:45

## 2018-11-07 RX ADMIN — Medication 1 DROP(S): at 08:53

## 2018-11-07 RX ADMIN — Medication 1 DROP(S): at 05:41

## 2018-11-07 RX ADMIN — METHADONE HYDROCHLORIDE 5 MILLIGRAM(S): 40 TABLET ORAL at 05:40

## 2018-11-07 NOTE — PROGRESS NOTE ADULT - PROBLEM SELECTOR PROBLEM 5
Malignant neoplasm of colon, unspecified part of colon Malignant neoplasm of pancreas, unspecified location of malignancy

## 2018-11-07 NOTE — DISCHARGE NOTE ADULT - MEDICATION SUMMARY - MEDICATIONS TO CHANGE
I will SWITCH the dose or number of times a day I take the medications listed below when I get home from the hospital:  None I will SWITCH the dose or number of times a day I take the medications listed below when I get home from the hospital:    Metoprolol Tartrate 25 mg oral tablet  -- 2 tab(s) by mouth 2 times a day

## 2018-11-07 NOTE — DISCHARGE NOTE ADULT - PATIENT PORTAL LINK FT
You can access the [facility], offered by [facility], by registering with the following website: [email or web address]

## 2018-11-07 NOTE — DISCHARGE NOTE ADULT - OTHER SIGNIFICANT FINDINGS
< from: Xray Chest 2 Views PA/Lat (11.05.18 @ 13:38) >  IMPRESSION:  Right chestwall CT compatible power infusion port present with tip in   SVC region.    Clear lungs. No pleural effusions or pneumothorax.    Scant aortic arch calcifications otherwise cardiac and mediastinal   silhouettes within normal limits.    Trachea midline.    Intact and uncomplicated appearing also correlate with findings on   already pending chest CT. mid to lower cervical multilevel anterior   fusion hardware. Generalized osteopenia and spinal degenerative change   again noted.    < end of copied text >        < from: CT Chest/Abdomen/Pelvis w/ IV Cont (11.05.18 @ 17:06) >  IMPRESSION: No source of sepsis is identified in the chest, abdomen or   pelvis.    Slight interval decrease in size of the pancreatic mass with similar   encasement of the superior mesenteric vein.    Stable the mediastinal/hilar lymphadenopathy and right lower lobe   pulmonary nodule.    < end of copied text >

## 2018-11-07 NOTE — PROGRESS NOTE ADULT - PROBLEM SELECTOR PLAN 1
- Red, maculopapular rash with groupings of papules on right forehead and right scalp. Doesn't cross the midline.  - Pt. currently immunosuppressed.  - C/w IV Acyclovir 850mg TID with NS at 75 cc/hr.  - Optho consulted to r/o herpes ophthalmicus of the R eye (pt. complains of itchy right eye, but no blurriness/photophobia/vision loss).       - C/w artifical tears  - Pain control with gabapentin 300mg TID, methadone, acetaminophen 650mg PRN and dilaudid PRN. - Red, maculopapular rash with groupings of papules on right forehead and right scalp. Doesn't cross the midline.  - Pt. currently immunosuppressed.  - C/w IV Acyclovir 850mg TID with NS at 75 cc/hr.  - As per Optho, negative for herpes zoster ophthalmicus (pt. complains of itchy right right eye, but no blurriness/photophobia/vision loss).       - C/w artifical tears and cold compresses  - Pain control with gabapentin 300mg TID, methadone, acetaminophen 650mg PRN and dilaudid PRN. - Red, maculopapular rash with groupings of papules on right forehead and right scalp. Doesn't cross the midline.  - Pt. currently immunosuppressed.  - IV Acyclovir 850mg TID with NS at 75 cc/hr --> Switched to PO Valacyclovir 1g q8hrs. Pt. to be discharged with 7-day course of Valacyclovir and to f/u with outpt PCP for further management.  - As per Optho, negative for herpes zoster ophthalmicus (pt. complains of itchy right right eye, but no blurriness/photophobia/vision loss).       - C/w artifical tears and cold compresses  - Pain control with gabapentin 300mg TID, methadone, acetaminophen 650mg PRN and dilaudid PRN.

## 2018-11-07 NOTE — DISCHARGE NOTE ADULT - MEDICATION SUMMARY - MEDICATIONS TO TAKE
I will START or STAY ON the medications listed below when I get home from the hospital:    finasteride 5 mg oral tablet  -- 1 tab(s) by mouth once a day  -- Indication: For Benign Prostatic Hyperplasia/Urinary Retention    methadone 5 mg oral tablet  -- 1 tab(s) by mouth every 8 hours  -- Indication: For Chronic pain    HYDROmorphone 2 mg oral tablet  -- 1 tab(s) by mouth every 4 hours  -- Indication: For Chronic pain    losartan 25 mg oral tablet  -- 1 tab(s) by mouth once a day  -- Indication: For High blood pressure    tamsulosin 0.4 mg oral capsule  -- 1 cap(s) by mouth once a day in pm  -- Indication: For Benign Prostatic Hyperplasia/Urinary Retention    gabapentin 100 mg oral capsule  -- 1 cap(s) by mouth 3 times a day  -- Indication: For Chronic pain    sertraline 50 mg oral tablet  -- 1 tab(s) by mouth once a day  -- Indication: For Chronic pain    valACYclovir 1 g oral tablet  -- 1 tab(s) by mouth every 8 hours  -- Indication: For Shingles (Facial rash)    ALPRAZolam 0.5 mg oral tablet  -- 1 tab(s) by mouth every 8 hours, As Needed  -- Indication: For Anxiety    Metoprolol Tartrate 25 mg oral tablet  -- 2 tab(s) by mouth 2 times a day  -- Indication: For High blood pressure    docusate sodium 100 mg oral tablet  -- 1 tab(s) by mouth 3 times a day  -- Indication: For Constipation    lactulose 10 g/15 mL oral syrup  -- 22.5 milliliter(s) by mouth every other day, As Needed  -- Indication: For Constipation I will START or STAY ON the medications listed below when I get home from the hospital:    finasteride 5 mg oral tablet  -- 1 tab(s) by mouth once a day  -- Indication: For Benign Prostatic Hyperplasia/Urinary Retention    methadone 5 mg oral tablet  -- 1 tab(s) by mouth every 8 hours  -- Indication: For Chronic pain    HYDROmorphone 2 mg oral tablet  -- 1 tab(s) by mouth every 4 hours  -- Indication: For Chronic pain    losartan 25 mg oral tablet  -- 1 tab(s) by mouth once a day  -- Indication: For High blood pressure    tamsulosin 0.4 mg oral capsule  -- 1 cap(s) by mouth once a day in pm  -- Indication: For Benign Prostatic Hyperplasia/Urinary Retention    gabapentin 100 mg oral capsule  -- 1 cap(s) by mouth 3 times a day  -- Indication: For Chronic pain    sertraline 50 mg oral tablet  -- 1 tab(s) by mouth once a day  -- Indication: For Chronic pain    valACYclovir 1 g oral tablet  -- 1 tab(s) by mouth every 8 hours  -- Indication: For Shingles (Facial rash)    ALPRAZolam 0.5 mg oral tablet  -- 1 tab(s) by mouth every 8 hours, As Needed  -- Indication: For Anxiety    Metoprolol Tartrate 25 mg oral tablet  -- 1 tab(s) by mouth 2 times a day  -- Indication: For High blood pressure    docusate sodium 100 mg oral tablet  -- 1 tab(s) by mouth 3 times a day  -- Indication: For Constipation    lactulose 10 g/15 mL oral syrup  -- 22.5 milliliter(s) by mouth every other day, As Needed  -- Indication: For Constipation I will START or STAY ON the medications listed below when I get home from the hospital:    finasteride 5 mg oral tablet  -- 1 tab(s) by mouth once a day  -- Indication: For Benign Prostatic Hyperplasia/Urinary Retention    methadone 5 mg oral tablet  -- 1 tab(s) by mouth every 8 hours  -- Indication: For Chronic pain    HYDROmorphone 2 mg oral tablet  -- 1 tab(s) by mouth every 4 hours  -- Indication: For Chronic pain    losartan 25 mg oral tablet  -- 1 tab(s) by mouth once a day  -- Indication: For High blood pressure    tamsulosin 0.4 mg oral capsule  -- 1 cap(s) by mouth once a day in pm  -- Indication: For Benign Prostatic Hyperplasia/Urinary Retention    gabapentin 100 mg oral capsule  -- 1 cap(s) by mouth 3 times a day  -- Indication: For Chronic pain    sertraline 50 mg oral tablet  -- 1 tab(s) by mouth once a day  -- Indication: For Chronic pain    bismuth subsalicylate 262 mg/15 mL oral suspension  -- 30 milliliter(s) by mouth 4 times a day, As needed, Diarrhea  -- Indication: For Diarrhea, unspecified type    loperamide 2 mg oral capsule  -- 1 cap(s) by mouth every 4 hours, As needed, Diarrhea  -- Indication: For Diarrhea, unspecified type    Zofran 4 mg oral tablet  -- 1 tab(s) by mouth every 6 hours   -- Indication: For Nausea & vomiting    dronabinol 2.5 mg oral capsule  -- 1 cap(s) by mouth 2 times a day MDD:5mg/day  -- Indication: For Appetite stimulant    ALPRAZolam 0.5 mg oral tablet  -- 1 tab(s) by mouth every 8 hours, As Needed  -- Indication: For Anxiety    Metoprolol Tartrate 25 mg oral tablet  -- 1 tab(s) by mouth 2 times a day  -- Indication: For High blood pressure    lactulose 10 g/15 mL oral syrup  -- 22.5 milliliter(s) by mouth every other day, As Needed  -- Indication: For Constipation    Multiple Vitamins oral tablet  -- 1 tab(s) by mouth once a day  -- Indication: For Prophylactic measure

## 2018-11-07 NOTE — PROGRESS NOTE ADULT - PROBLEM SELECTOR PLAN 4
- s/p chemo (ended April 2018)  - Currently on nivoluma immunotherapy (once every 2 weeks since the last 6 months; last dose on 11/2/18)  - Follows up with Dr. Maloney (Oncologist) - QTc at 500  - C/w reglan 10mg q6hrs PRN

## 2018-11-07 NOTE — DISCHARGE NOTE ADULT - CARE PROVIDER_API CALL
Louann Maloney (DO), HematologyOncology; Internal Medicine  [address] [place] [address]  Phone: [phone]  Fax: [phone]

## 2018-11-07 NOTE — PROGRESS NOTE ADULT - SUBJECTIVE AND OBJECTIVE BOX
CHIEF COMPLAINT:    Interval Events: Reports 3-4 episodes of non-bloody loose stools since last night; endorses mild belly pain from it. Still reports headache and pain in the right cheek area. Reports improved right itchy eye. No blurry vision, photophobia, nausea/vomiting, fevers/chills, cough, CP/SOB, dysuria,    REVIEW OF SYSTEMS:  Constitutional: No fever, or chills. No recent weight loss or weight gain.   HEENT: Reports mildly itchy right eye. No postnasal drip or nasal congestion.  CV: No chest pain, or palpitations. No orthopnea.   Resp: No cough, or sputum production. No shortness of breath or dyspnea on exertion.   GI: Reports 3-4 loose stools and mild abdominal pain. No nausea or vomiting. No diarrhea or constipation.  : No dysuria, no nocturia or increased urinary frequency.  Musculoskeletal: No back pain, no myalgias  Skin: Rash on right forehead and scalp  Neurological: No headache or dizziness. No syncope, no weakness, numbness.  Psychiatric: Denies depressed mood.   Endocrine: No cold or heat intolerance. No dry skin.  Hematologic/Lymphatic: No anemia or bleeding problem.     OBJECTIVE:  Vital Signs Last 24 Hrs  T(C): 36.9 (07 Nov 2018 05:32), Max: 37.2 (06 Nov 2018 22:45)  T(F): 98.4 (07 Nov 2018 05:32), Max: 98.9 (06 Nov 2018 22:45)  HR: 79 (07 Nov 2018 05:32) (61 - 79)  BP: 127/55 (07 Nov 2018 05:32) (116/59 - 127/55)  BP(mean): --  RR: 18 (07 Nov 2018 05:32) (18 - 20)  SpO2: 97% (07 Nov 2018 05:32) (97% - 100%)    CAPILLARY BLOOD GLUCOSE        PHYSICAL EXAM:  Gen: No acute distress, alert, cooperative.  Head: Red, maculopapular rash with groupings of papules on right forehead and right scalp. Doesn't cross the midline. Normocephalic.  HEENT: PERRL, oral mucosa moist, normal conjunctiva.  Lung: CTAB, no respiratory distress, no crackles or wheezes.  CV: Normal S1/S1, No m/r/g.  Abd: Mild generalized abdominal tenderness. Soft, ND, no rebound or guarding, scar on abdomen, no sign of infection.  MSK: No LE edema.  Neuro: No focal neurologic deficits. Cn 2-12 intact.  Skin: Warm and dry.  Psych: Normal affect, follows commands.    LINES:    HOSPITAL MEDICATIONS:    acyclovir IVPB      acyclovir IVPB 850 milliGRAM(s) IV Intermittent every 8 hours    losartan 25 milliGRAM(s) Oral daily  metoprolol tartrate 50 milliGRAM(s) Oral two times a day  tamsulosin 0.4 milliGRAM(s) Oral at bedtime    finasteride 5 milliGRAM(s) Oral daily      acetaminophen   Tablet .. 650 milliGRAM(s) Oral every 6 hours PRN  ALPRAZolam 0.5 milliGRAM(s) Oral every 8 hours PRN  gabapentin 300 milliGRAM(s) Oral three times a day  HYDROmorphone   Tablet 2 milliGRAM(s) Oral every 4 hours PRN  methadone    Tablet 5 milliGRAM(s) Oral every 8 hours  metoclopramide Injectable 10 milliGRAM(s) IV Push every 6 hours PRN  sertraline 50 milliGRAM(s) Oral daily          sodium chloride 0.9%. 1000 milliLiter(s) IV Continuous <Continuous>      artificial tears (preservative free) Ophthalmic Solution 1 Drop(s) Right EYE every 2 hours        LABS:                        12.7   4.26  )-----------( 117      ( 06 Nov 2018 06:00 )             37.4     Hgb Trend: 12.7<--, 15.0<--, 13.2<--  11-06    133<L>  |  100  |  11  ----------------------------<  83  4.2   |  20<L>  |  0.80    Ca    8.7      06 Nov 2018 06:00  Phos  3.3     11-06  Mg     1.7     11-06    TPro  6.4  /  Alb  3.5  /  TBili  0.8  /  DBili  x   /  AST  33  /  ALT  20  /  AlkPhos  61  11-06    Creatinine Trend: 0.80<--, 0.91<--    Urinalysis Basic - ( 06 Nov 2018 02:45 )    Color: YELLOW / Appearance: CLEAR / SG: > 1.050 / pH: 6.5  Gluc: NEGATIVE / Ketone: SMALL  / Bili: NEGATIVE / Urobili: SMALL   Blood: SMALL / Protein: 50 / Nitrite: NEGATIVE   Leuk Esterase: NEGATIVE / RBC: 3-5 / WBC 3-5   Sq Epi: x / Non Sq Epi: x / Bacteria: FEW        Venous Blood Gas:  11-05 @ 12:50  7.43/40/< 24/24/16.0  VBG Lactate: 1.8      MICROBIOLOGY:     RADIOLOGY:  [ ] Reviewed and interpreted by me    EKG: CHIEF COMPLAINT:  rash    Interval Events: Reports 3-4 episodes of non-bloody loose stools since last night; endorses mild belly pain from it. Still reports headache and pain in the right cheek area. Reports improved right itchy eye. No blurry vision, photophobia, nausea/vomiting, fevers/chills, cough, CP/SOB, dysuria,    REVIEW OF SYSTEMS:  Constitutional: No fever, or chills. No recent weight loss or weight gain.   HEENT: Reports mildly itchy right eye. No postnasal drip or nasal congestion.  CV: No chest pain, or palpitations. No orthopnea.   Resp: No cough, or sputum production. No shortness of breath or dyspnea on exertion.   GI: Reports 3-4 loose stools and mild abdominal pain. No nausea or vomiting. No diarrhea or constipation.  : No dysuria, no nocturia or increased urinary frequency.  Musculoskeletal: No back pain, no myalgias  Skin: Rash on right forehead and scalp  Neurological: No headache or dizziness. No syncope, no weakness, numbness.  Psychiatric: Denies depressed mood.   Endocrine: No cold or heat intolerance. No dry skin.  Hematologic/Lymphatic: No anemia or bleeding problem.     OBJECTIVE:  Vital Signs Last 24 Hrs  T(C): 36.9 (07 Nov 2018 05:32), Max: 37.2 (06 Nov 2018 22:45)  T(F): 98.4 (07 Nov 2018 05:32), Max: 98.9 (06 Nov 2018 22:45)  HR: 79 (07 Nov 2018 05:32) (61 - 79)  BP: 127/55 (07 Nov 2018 05:32) (116/59 - 127/55)  BP(mean): --  RR: 18 (07 Nov 2018 05:32) (18 - 20)  SpO2: 97% (07 Nov 2018 05:32) (97% - 100%)    CAPILLARY BLOOD GLUCOSE        PHYSICAL EXAM:  Gen: No acute distress, alert, cooperative.  Head: Red, maculopapular rash with groupings of papules on right forehead and right scalp. Doesn't cross the midline. Normocephalic.  HEENT: PERRL, oral mucosa moist, normal conjunctiva.  Lung: CTAB, no respiratory distress, no crackles or wheezes.  CV: Normal S1/S1, No m/r/g.  Abd: Mild generalized abdominal tenderness. Soft, ND, no rebound or guarding, scar on abdomen, no sign of infection.  MSK: No LE edema.  Neuro: No focal neurologic deficits. Cn 2-12 intact.  Skin: Warm and dry.  Psych: Normal affect, follows commands.    LINES:    HOSPITAL MEDICATIONS:    acyclovir IVPB      acyclovir IVPB 850 milliGRAM(s) IV Intermittent every 8 hours    losartan 25 milliGRAM(s) Oral daily  metoprolol tartrate 50 milliGRAM(s) Oral two times a day  tamsulosin 0.4 milliGRAM(s) Oral at bedtime    finasteride 5 milliGRAM(s) Oral daily      acetaminophen   Tablet .. 650 milliGRAM(s) Oral every 6 hours PRN  ALPRAZolam 0.5 milliGRAM(s) Oral every 8 hours PRN  gabapentin 300 milliGRAM(s) Oral three times a day  HYDROmorphone   Tablet 2 milliGRAM(s) Oral every 4 hours PRN  methadone    Tablet 5 milliGRAM(s) Oral every 8 hours  metoclopramide Injectable 10 milliGRAM(s) IV Push every 6 hours PRN  sertraline 50 milliGRAM(s) Oral daily          sodium chloride 0.9%. 1000 milliLiter(s) IV Continuous <Continuous>      artificial tears (preservative free) Ophthalmic Solution 1 Drop(s) Right EYE every 2 hours        LABS:                        12.7   4.26  )-----------( 117      ( 06 Nov 2018 06:00 )             37.4     Hgb Trend: 12.7<--, 15.0<--, 13.2<--  11-06    133<L>  |  100  |  11  ----------------------------<  83  4.2   |  20<L>  |  0.80    Ca    8.7      06 Nov 2018 06:00  Phos  3.3     11-06  Mg     1.7     11-06    TPro  6.4  /  Alb  3.5  /  TBili  0.8  /  DBili  x   /  AST  33  /  ALT  20  /  AlkPhos  61  11-06    Creatinine Trend: 0.80<--, 0.91<--    Urinalysis Basic - ( 06 Nov 2018 02:45 )    Color: YELLOW / Appearance: CLEAR / SG: > 1.050 / pH: 6.5  Gluc: NEGATIVE / Ketone: SMALL  / Bili: NEGATIVE / Urobili: SMALL   Blood: SMALL / Protein: 50 / Nitrite: NEGATIVE   Leuk Esterase: NEGATIVE / RBC: 3-5 / WBC 3-5   Sq Epi: x / Non Sq Epi: x / Bacteria: FEW        Venous Blood Gas:  11-05 @ 12:50  7.43/40/< 24/24/16.0  VBG Lactate: 1.8      MICROBIOLOGY:     RADIOLOGY:  [ ] Reviewed and interpreted by me    EKG:

## 2018-11-07 NOTE — PROGRESS NOTE ADULT - PROBLEM SELECTOR PLAN 2
- In the ED, pt. met the SIRS criteria with T 102.4, , /73, RR 18, SpO2 100%. Received empiric Vanc 1g x 1 and Zosyn 3.375g x 1.  - CXR negative for PNA.  - RVP and UA negative.  - F/u blood clx and urine clx.  - Hold off on abx right now given no localizing source. - In the ED, pt. met the SIRS criteria with T 102.4, , /73, RR 18, SpO2 100%. Received empiric Vanc 1g x 1 and Zosyn 3.375g x 1.  - CXR negative for PNA.  - RVP and UA negative.  - Blood clx and Urine clx both NGTD.  - Hold off on abx right now given no localizing source. - Pt. reports 3-4 episodes of non-bloody loose stools since last night; endorses mild belly pain from it  - F/u C. Diff PCR - Pt. reports 3-4 episodes of non-bloody loose stools since last night; endorses mild belly pain from it  - WBC remains at 4.5; unlikely C. Diff.  - F/u C. Diff PCR, GI PCR, Stool Clx

## 2018-11-07 NOTE — PHARMACOTHERAPY INTERVENTION NOTE - COMMENTS
Pt on anti-PD-1 therapy, now with shingles receiving 10 mg/kg q8h of IV acyclovir. Team is seeking help with IV to PO conversion of antivirals.    Recommendation(s):  1) Recommended Valtrex 1 gram PO q8h as a PO option (CrCl > 50), as long as there are no new lesions and current lesions are becoming more crusted.    Gloria Monk, PharmD  Infectious Diseases Clinical Pharmacist  Spectra extension [phone]  .

## 2018-11-07 NOTE — DISCHARGE NOTE ADULT - SECONDARY DIAGNOSIS.
Malignant neoplasm of pancreas, unspecified location of malignancy Decreased appetite Diarrhea, unspecified type

## 2018-11-07 NOTE — DISCHARGE NOTE ADULT - MEDICATION SUMMARY - MEDICATIONS TO STOP TAKING
I will STOP taking the medications listed below when I get home from the hospital:  None I will STOP taking the medications listed below when I get home from the hospital:    docusate sodium 100 mg oral tablet  -- 1 tab(s) by mouth 3 times a day

## 2018-11-07 NOTE — PROVIDER CONTACT NOTE (OTHER) - ASSESSMENT
Patient is alert and oriented, in no apparent distress. Patient c/o slight dizziness, v/s otherwise stable

## 2018-11-07 NOTE — DISCHARGE NOTE ADULT - HOSPITAL COURSE
64 y/o M with PMH colon ca (s/p resection June 2017), pancreatic ca (s/p chemo, ended April 2018), currently on nivoluma immunotherapy (once every 2 weeks since the last 6 months; last dose on 11/2/18), and HTN presented with nausea, headache, and right forehead/scalp rash since Saturday. Reported that the rash causes burning/itching pain, 6/10 in severity, no radiation. Also reported itchy right eye. Reported headache and mild neck pain, fevers/chills, decreased appetite and fatigue since Saturday. No blurry vision, photophobia, rhinorrhea, sneezing, cough, mucus, chest pain/SOB, abdominal pain, dysuria, sick contacts (including herpes), tick bites, recent travel. In the ED, pt. met the SIRS criteria with T 102.4, , /73, RR 18, SpO2 100%. Received Zofran 4mg IV x 1 for nausea, 2L NS bolus, and empiric Vanc 1g x 1 and Zosyn 3.375g x 1. CXR and CT Chest/Abdomen/Pelvis performed and pt. admitted to medicine for further care.    In the hospital, the pt. was diagnosed with shingles rash and started on IV Acyclovir. Given the complain of itchy eye, the pt. was also seen by Optho, who determined that the pt. did not have herpes opthalmicus. The pt.'s infectious work-up (CXR, RVP, UA, Blood Clx, and Urine Clx) remained negative and thus the pt. was not started on any antibiotics. Additionally, the pt's vitals also remained stable and did not spike any fevers throughout the rest of the hospital course. 62 y/o M with PMH colon ca (s/p resection June 2017), pancreatic ca (s/p chemo, ended April 2018), currently on nivoluma immunotherapy (once every 2 weeks since the last 6 months; last dose on 11/2/18), and HTN presented with nausea, headache, and right forehead/scalp rash since Saturday. Reported that the rash causes burning/itching pain, 6/10 in severity, no radiation. Also reported itchy right eye. Reported headache and mild neck pain, fevers/chills, decreased appetite and fatigue since Saturday. No blurry vision, photophobia, rhinorrhea, sneezing, cough, mucus, chest pain/SOB, abdominal pain, dysuria, sick contacts (including herpes), tick bites, recent travel. In the ED, pt. met the SIRS criteria with T 102.4, , /73, RR 18, SpO2 100%. Received Zofran 4mg IV x 1 for nausea, 2L NS bolus, and empiric Vanc 1g x 1 and Zosyn 3.375g x 1. CXR and CT Chest/Abdomen/Pelvis performed and pt. admitted to medicine for further care.    In the hospital, the pt. was diagnosed with shingles rash and started on IV Acyclovir. Given the complain of itchy eye, the pt. was also seen by Optho, who determined that the pt. did not have herpes opthalmicus. The pt.'s infectious work-up (CXR, RVP, UA, Blood Clx, and Urine Clx) remained negative and thus the pt. was not started on any antibiotics. Additionally, the pt's vitals also remained stable and did not spike any fevers throughout the rest of the hospital course. Given the pt's stable status, the pt. was switched from IV Acyclovir to PO Valacyclovir (Valtrex) on 11/7. The pt. was discharged with 7-day course of Valtrex and asked to f/u with his outpatient provider (Dr. Maloney) to determine if additional course of Valtrex may be required. Pt. also asked to f/u with Dr. Maloney to continue managing his pancreatic cancer. 64 y/o M with PMH colon ca (s/p resection June 2017), pancreatic ca (s/p chemo, ended April 2018), currently on nivoluma immunotherapy (once every 2 weeks since the last 6 months; last dose on 11/2/18), and HTN presented with nausea, headache, and right forehead/scalp rash since Saturday. Reported that the rash causes burning/itching pain, 6/10 in severity, no radiation. Also reported itchy right eye. Reported headache and mild neck pain, fevers/chills, decreased appetite and fatigue since Saturday. No blurry vision, photophobia, rhinorrhea, sneezing, cough, mucus, chest pain/SOB, abdominal pain, dysuria, sick contacts (including herpes), tick bites, recent travel. In the ED, pt. met the SIRS criteria with T 102.4, , /73, RR 18, SpO2 100%. Received Zofran 4mg IV x 1 for nausea, 2L NS bolus, and empiric Vanc 1g x 1 and Zosyn 3.375g x 1. CXR and CT Chest/Abdomen/Pelvis performed and pt. admitted to medicine for further care.    In the hospital, the pt. was diagnosed with shingles rash and started on IV Acyclovir. Given the complain of itchy eye, the pt. was also seen by Optho, who determined that the pt. did not have herpes opthalmicus. The pt.'s infectious work-up (CXR, RVP, UA, Blood Clx, and Urine Clx) remained negative and thus the pt. was not started on any antibiotics. Additionally, the pt's vitals also remained stable and did not spike any fevers throughout the rest of the hospital course. Given the pt's stable status, the pt. was switched from IV Acyclovir to PO Valacyclovir (Valtrex) on 11/7. The pt. was discharged with 7-day course of Valtrex and asked to f/u with his outpatient provider (Dr. Maloney) next week to determine if additional course of Valtrex may be required. Pt. also asked to f/u with Dr. Maloney to continue managing his pancreatic cancer. 64 y/o M with PMH colon ca (s/p resection June 2017), pancreatic ca (s/p chemo, ended April 2018), currently on nivoluma immunotherapy (once every 2 weeks since the last 6 months; last dose on 11/2/18), and HTN presented with nausea, headache, and right forehead/scalp rash since Saturday. Reported that the rash causes burning/itching pain, 6/10 in severity, no radiation. Also reported itchy right eye. Reported headache and mild neck pain, fevers/chills, decreased appetite and fatigue since Saturday. No blurry vision, photophobia, rhinorrhea, sneezing, cough, mucus, chest pain/SOB, abdominal pain, dysuria, sick contacts (including herpes), tick bites, recent travel. In the ED, pt. met the SIRS criteria with T 102.4, , /73, RR 18, SpO2 100%. Received Zofran 4mg IV x 1 for nausea, 2L NS bolus, and empiric Vanc 1g x 1 and Zosyn 3.375g x 1. CXR and CT Chest/Abdomen/Pelvis showed no acute pathologies or infections. Pt. admitted to medicine for further care.    In the hospital, the pt. was diagnosed with shingles rash and started on IV Acyclovir. Given the complain of itchy eye, the pt. was also seen by Optho, who determined that the pt. did not have herpes opthalmicus. The pt.'s infectious work-up (CXR, RVP, UA, Blood Clx, and Urine Clx) remained negative and thus the pt. was not started on any antibiotics. Additionally, the pt's vitals also remained stable and did not spike any fevers throughout the rest of the hospital course. Given the pt's stable status, the pt. was switched from IV Acyclovir to PO Valacyclovir (Valtrex) on 11/7 in anticipation of discharge. However, the day after the switch, pt. developed NICOLE, mostly 2/2 acylcovir and valacyclovir treatment. Accordingly, acyclovir/valacyclovir were immediately discontinued and the pt.'s NICOLE resolved in a few days. Pt.'s hospitalization was further complicated by diarrhea (multiple episodes of loose/watery BMs), nausea, and decreased appetite. We were unable to determine a cause of the pt's nausea and diarrhea given the unremarkable labs, negative infectious work-up (C. Diff, stool culture, GI PCR, and O&P negative), and no organic causes/pathologies on imaging. It was suspected that pt's sxs could be related to the underlying pancreatic cancer/insufficiency vs. current Nivolumab therapy. Pt's sxs were treated with zofran and imodium. Additionally, pt was also started on marinol to stimulate the appetite. Pt's diet was advanced, as tolerated. By the day of discharge, the pt. reported resolution of nausea and diarrhea as well as improved appetite. Pt. was thus discharged on 11/19 with prescriptions for zofran, imodium, peptobismol, and marinol. Pt. was also asked to follow-up with his outpatient provider (Dr. Maloney) upon discharge to continue managing his pancreatic cancer.

## 2018-11-07 NOTE — DISCHARGE NOTE ADULT - CARE PLAN
Principal Discharge DX:	Herpes zoster without complication  Goal:	To treat the facial rash  Assessment and plan of treatment:	You came in with concerns about a rash on the right side of your forehead and scalp. You also reported some headache and pain in the right cheek. You were found to have the shingles rash on your face. This rash typically occurs due to reactivation of the herpes zoster virus in your body during states of weakened immune system (since you are currently on immunotherapy, your immune system is presently suppressed). Additionally, since you complained of some itchiness in your right eye, you were also evaluated by the eye doctor to rule out infection of the eye (as this virus can also affect the eye and can cause damage to your retina); however, as per the eye doctor, your eye was not found to be infected. While at the hospital, you were treated with IV Acyclovir. In anticipation for your discharge, we switched your medication to oral Valtrex (Valacyclovir). Please take this medication every 8 hours (three times a day) for the next 7 days after discharge. Since you are immunosupressed, you may need to be treated for more than 7 days; however, since this medication can lead to renal problems, it is unsafe to take it for more than 7 days without following up on your labs. Accordingly, please follow up with your outpatient provider (Dr. Louann Maloney) next week to obtain labwork and see if you need to be treated with additional days of this medication.  Secondary Diagnosis:	Malignant neoplasm of pancreas, unspecified location of malignancy  Goal:	To manage your pancreatic cancer  Assessment and plan of treatment:	Please follow up with your outpatient Oncologist, Dr. Maloney, for further management of your pancreatic cancer. Principal Discharge DX:	Herpes zoster without complication  Goal:	To treat the facial rash  Assessment and plan of treatment:	You came in with concerns about a rash on the right side of your forehead and scalp. You also reported some headache and pain in the right cheek, mostly due to the overlying rash. You were diagnosed with shingles rash on your face. This rash typically occurs due to reactivation of the herpes zoster virus in your body during states of weakened immune system (since you are currently on immunotherapy, your immune system is presently suppressed). Additionally, since you complained of some itchiness in your right eye, you were also evaluated by the eye doctor to rule out infection of the eye (as this virus can also affect the eye and can cause damage to your retina); however, as per the eye doctor, your eye was not found to be infected. While at the hospital, you were treated with IV Acyclovir. In anticipation for your discharge, we switched your medication to oral Valtrex (Valacyclovir). Please take this medication every 8 hours (three times a day) for the next 7 days after discharge. Since you are immunosuppressed, you may need to be treated for more than 7 days; however, since this medication can lead to renal problems, it is unsafe to take it for more than 7 days without following up on your labs. Accordingly, please follow up with your outpatient provider (Dr. Louann Maloney) next week to obtain lab work and see if you need to be treated with additional days of this medication.  Secondary Diagnosis:	Malignant neoplasm of pancreas, unspecified location of malignancy  Goal:	To manage your pancreatic cancer  Assessment and plan of treatment:	Please follow up with your outpatient Oncologist, Dr. Maloney, for further management of your pancreatic cancer. Principal Discharge DX:	Herpes zoster without complication  Goal:	To treat the facial rash  Assessment and plan of treatment:	You came in with concerns about a rash on the right side of your forehead and scalp. You also reported some headache and pain in the right cheek, mostly due to the overlying rash. You were diagnosed with shingles rash on your face. This rash typically occurs due to reactivation of the herpes zoster virus in your body during states of weakened immune system. Since you complained of some itchiness in your right eye, you were also evaluated by the eye doctor to rule out infection of the eye (as this virus can also affect the eye and can cause damage to your retina); however, as per the eye doctor, your eye was not found to be infected. While at the hospital, you were treated with IV Acyclovir, which was later switched to oral valacylcovir. We stopped this medication after a few days as you developed an acute kidney injury, which is a common side effect of this medication. Your kidney function improved once we stopped this medication within a few days. By the day of your discharge, your shingles rash had started to crust and heal. You should anticipate complete resolution of your rash in a couple of weeks. If your rash does not resolve or worsens, please contact your outpatient provider (Dr. Maloney) immediately.  Secondary Diagnosis:	Decreased appetite  Goal:	To improve your appetite  Assessment and plan of treatment:	You endorses decreased appetite and nausea while at the hospital. You reported that these symptoms persisted started a couple of days before you came into the hospital. While at the hospital, we managed your nausea with antiemetics and tried to advance your diet from clears to soft to regular, as tolerated. In addition, we also gave you a medication (Marinol) to stimulate your appetite. By the day of discharge, you reported improved appetite and resolving nausea/vomiting. Kindly advance your diet, as tolerated, upon discharge. If your appetite does not improve or continues to worsen, or if you develop worsening nausea/vomiting, kindly contact your outpatient provider (Dr. Maloney) immediately.  Goal:	To manage your pancreatic cancer  Assessment and plan of treatment:	Please follow up with your outpatient Oncologist, Dr. Maloney, for further management of your pancreatic cancer. Principal Discharge DX:	Herpes zoster without complication  Goal:	To treat the facial rash  Assessment and plan of treatment:	You came in with concerns about a rash on the right side of your forehead and scalp. You also reported some headache and pain in the right cheek, mostly due to the overlying rash. You were diagnosed with shingles rash on your face. This rash typically occurs due to reactivation of the herpes zoster virus in your body during states of weakened immune system. Since you complained of some itchiness in your right eye, you were also evaluated by the eye doctor to rule out infection of the eye (as this virus can also affect the eye and can cause damage to your retina); however, as per the eye doctor, your eye was not found to be infected. While at the hospital, you were treated with IV Acyclovir, which was later switched to oral Valacylcovir. However, we had to stop this medication after a few days as you developed an acute kidney injury, which is a common side effect of this medication. Your kidney function improved once we stopped this medication (within a few days). By the day of your discharge, your shingles rash had started to crust and heal. You should anticipate complete resolution of your rash in a couple of weeks. If your rash does not resolve or worsens, please contact your outpatient provider (Dr. Maloney) immediately.  Secondary Diagnosis:	Decreased appetite  Goal:	To improve your appetite  Assessment and plan of treatment:	You endorsed decreased appetite and nausea while at the hospital. You reported that these symptoms started a couple of days before you came into the hospital. We were unable to determine the cause of your nausea and decreased appetite, given that labs and imaging of your abdomen did not show any cause. We were suspicious that your nausea was mostly secondary to your underlying pancreatic cancer/insufficiency or the current Nivolumab immunotherapy. While at the hospital, we managed your nausea with antiemetics (Zofran) and tried to advance your diet from clears to soft to regular, as tolerated. In addition, we also gave you a medication (Marinol) to stimulate your appetite. By the day of discharge, you reported improved appetite and resolving nausea/vomiting. Kindly advance your diet, as tolerated, upon discharge. If your appetite does not improve or continues to worsen, or if you develop worsening nausea/vomiting, kindly contact your outpatient provider (Dr. Maloney) immediately. We have sent prescriptions for both Zofran (antiemetic for your nausea) and Marinol (to stimulate your appetite) to your preferred pharmacy for the meanwhile.  Secondary Diagnosis:	Diarrhea, unspecified type  Goal:	To treat/manage the diarrhea  Assessment and plan of treatment:	While at the hospital, you also reported multiple episodes of watery, loose stools. We were unable to determine the cause of your nausea and decreased appetite, given that labs, infectious work-up and imaging of your abdomen did not show any cause. We were suspicious that your nausea was mostly secondary to your underlying pancreatic cancer/insufficiency or the current Nivolumab immunotherapy. While at the hospital, we managed your diarrhea with imodium and peptobismol. By the day of your discharge, you reported resolution of your diarrhea. If your watery, loose stool persist or worsen, kindly contact your outpatient provider (Dr. Maloney) immediately.  Secondary Diagnosis:	Malignant neoplasm of pancreas, unspecified location of malignancy  Goal:	To manage your pancreatic cancer  Assessment and plan of treatment:	Please follow up with your outpatient Oncologist, Dr. Maloney, for further management of your pancreatic cancer. Principal Discharge DX:	Herpes zoster without complication  Goal:	To treat the facial rash  Assessment and plan of treatment:	You came in with concerns about a rash on the right side of your forehead and scalp. You also reported some headache and pain in the right cheek, mostly due to the overlying rash. You were diagnosed with shingles rash on your face. This rash typically occurs due to reactivation of the herpes zoster virus in your body during states of weakened immune system. Since you complained of some itchiness in your right eye, you were also evaluated by the eye doctor to rule out infection of the eye (as this virus can also affect the eye and can cause damage to your retina); however, as per the eye doctor, your eye was not found to be infected. While at the hospital, you were treated with IV Acyclovir, which was later switched to oral Valacylcovir. However, we had to stop this medication after a few days as you developed an acute kidney injury, which is a common side effect of this medication. Your kidney function improved once we stopped this medication (within a few days). By the day of your discharge, your shingles rash had started to crust and heal. You should anticipate complete resolution of your rash in a couple of weeks. If your rash does not resolve or worsens, please contact your outpatient provider (Dr. Maloney) immediately.  Secondary Diagnosis:	Decreased appetite  Goal:	To improve your appetite  Assessment and plan of treatment:	You endorsed decreased appetite and nausea while at the hospital. You reported that these symptoms started a couple of days before you came into the hospital. We were unable to determine the cause of your nausea and decreased appetite, given that labs, infectious workup, and imaging of your abdomen did not show any cause. We were suspicious that your nausea was mostly secondary to your underlying pancreatic cancer/insufficiency or the current Nivolumab immunotherapy. While at the hospital, we managed your nausea with antiemetics (Zofran) and tried to advance your diet from clears to soft to regular, as tolerated. In addition, we also gave you a medication (Marinol) to stimulate your appetite. By the day of discharge, you reported improved appetite and resolving nausea/vomiting. Kindly advance your diet, as tolerated, upon discharge. If your appetite does not improve or continues to worsen, or if you develop worsening nausea/vomiting, kindly contact your outpatient provider (Dr. Maloney) immediately. We have sent prescriptions for both Zofran (antiemetic for your nausea) and Marinol (to stimulate your appetite) to your preferred pharmacy for the meanwhile.  Secondary Diagnosis:	Diarrhea, unspecified type  Goal:	To treat/manage the diarrhea  Assessment and plan of treatment:	While at the hospital, you also reported multiple episodes of watery, loose stools. We were unable to determine the cause of your nausea and decreased appetite, given that labs, infectious work-up and imaging of your abdomen did not show any cause. We were suspicious that your diarrhea was mostly secondary to your underlying pancreatic cancer/insufficiency or the current Nivolumab immunotherapy. While at the hospital, we managed your diarrhea with imodium and peptobismol. By the day of your discharge, you reported resolution of your diarrhea. If your watery, loose stool persist or worsen, kindly contact your outpatient provider (Dr. Maloney) immediately. We have sent prescriptions for both peptobismol and imdoium (to help with your diarrhea) to your preferred pharmacy for the meanwhile.  Secondary Diagnosis:	Malignant neoplasm of pancreas, unspecified location of malignancy  Goal:	To manage your pancreatic cancer  Assessment and plan of treatment:	Please follow up with your outpatient Oncologist, Dr. Maloney, for further management of your pancreatic cancer.

## 2018-11-07 NOTE — DISCHARGE NOTE ADULT - PLAN OF CARE
To treat the facial rash You came in with concerns about a rash on the right side of your forehead and scalp. You also reported some headache and pain in the right cheek. You were found to have the shingles rash on your face. This rash typically occurs due to reactivation of the herpes zoster virus in your body during states of weakened immune system (since you are currently on immunotherapy, your immune system is presently suppressed). Additionally, since you complained of some itchiness in your right eye, you were also evaluated by the eye doctor to rule out infection of the eye (as this virus can also affect the eye and can cause damage to your retina); however, as per the eye doctor, your eye was not found to be infected. While at the hospital, you were treated with IV Acyclovir. In anticipation for your discharge, we switched your medication to oral Valtrex (Valacyclovir). Please take this medication every 8 hours (three times a day) for the next 7 days after discharge. Since you are immunosupressed, you may need to be treated for more than 7 days; however, since this medication can lead to renal problems, it is unsafe to take it for more than 7 days without following up on your labs. Accordingly, please follow up with your outpatient provider (Dr. Louann Maloney) next week to obtain labwork and see if you need to be treated with additional days of this medication. To manage your pancreatic cancer Please follow up with your outpatient Oncologist, Dr. Maloney, for further management of your pancreatic cancer. You came in with concerns about a rash on the right side of your forehead and scalp. You also reported some headache and pain in the right cheek, mostly due to the overlying rash. You were diagnosed with shingles rash on your face. This rash typically occurs due to reactivation of the herpes zoster virus in your body during states of weakened immune system (since you are currently on immunotherapy, your immune system is presently suppressed). Additionally, since you complained of some itchiness in your right eye, you were also evaluated by the eye doctor to rule out infection of the eye (as this virus can also affect the eye and can cause damage to your retina); however, as per the eye doctor, your eye was not found to be infected. While at the hospital, you were treated with IV Acyclovir. In anticipation for your discharge, we switched your medication to oral Valtrex (Valacyclovir). Please take this medication every 8 hours (three times a day) for the next 7 days after discharge. Since you are immunosuppressed, you may need to be treated for more than 7 days; however, since this medication can lead to renal problems, it is unsafe to take it for more than 7 days without following up on your labs. Accordingly, please follow up with your outpatient provider (Dr. Louann Maloney) next week to obtain lab work and see if you need to be treated with additional days of this medication. You came in with concerns about a rash on the right side of your forehead and scalp. You also reported some headache and pain in the right cheek, mostly due to the overlying rash. You were diagnosed with shingles rash on your face. This rash typically occurs due to reactivation of the herpes zoster virus in your body during states of weakened immune system. Since you complained of some itchiness in your right eye, you were also evaluated by the eye doctor to rule out infection of the eye (as this virus can also affect the eye and can cause damage to your retina); however, as per the eye doctor, your eye was not found to be infected. While at the hospital, you were treated with IV Acyclovir, which was later switched to oral valacylcovir. We stopped this medication after a few days as you developed an acute kidney injury, which is a common side effect of this medication. Your kidney function improved once we stopped this medication within a few days. By the day of your discharge, your shingles rash had started to crust and heal. You should anticipate complete resolution of your rash in a couple of weeks. If your rash does not resolve or worsens, please contact your outpatient provider (Dr. Maloney) immediately. To improve your appetite You endorses decreased appetite and nausea while at the hospital. You reported that these symptoms persisted started a couple of days before you came into the hospital. While at the hospital, we managed your nausea with antiemetics and tried to advance your diet from clears to soft to regular, as tolerated. In addition, we also gave you a medication (Marinol) to stimulate your appetite. By the day of discharge, you reported improved appetite and resolving nausea/vomiting. Kindly advance your diet, as tolerated, upon discharge. If your appetite does not improve or continues to worsen, or if you develop worsening nausea/vomiting, kindly contact your outpatient provider (Dr. Maloney) immediately. You came in with concerns about a rash on the right side of your forehead and scalp. You also reported some headache and pain in the right cheek, mostly due to the overlying rash. You were diagnosed with shingles rash on your face. This rash typically occurs due to reactivation of the herpes zoster virus in your body during states of weakened immune system. Since you complained of some itchiness in your right eye, you were also evaluated by the eye doctor to rule out infection of the eye (as this virus can also affect the eye and can cause damage to your retina); however, as per the eye doctor, your eye was not found to be infected. While at the hospital, you were treated with IV Acyclovir, which was later switched to oral Valacylcovir. However, we had to stop this medication after a few days as you developed an acute kidney injury, which is a common side effect of this medication. Your kidney function improved once we stopped this medication (within a few days). By the day of your discharge, your shingles rash had started to crust and heal. You should anticipate complete resolution of your rash in a couple of weeks. If your rash does not resolve or worsens, please contact your outpatient provider (Dr. Maloney) immediately. You endorsed decreased appetite and nausea while at the hospital. You reported that these symptoms started a couple of days before you came into the hospital. We were unable to determine the cause of your nausea and decreased appetite, given that labs and imaging of your abdomen did not show any cause. We were suspicious that your nausea was mostly secondary to your underlying pancreatic cancer/insufficiency or the current Nivolumab immunotherapy. While at the hospital, we managed your nausea with antiemetics (Zofran) and tried to advance your diet from clears to soft to regular, as tolerated. In addition, we also gave you a medication (Marinol) to stimulate your appetite. By the day of discharge, you reported improved appetite and resolving nausea/vomiting. Kindly advance your diet, as tolerated, upon discharge. If your appetite does not improve or continues to worsen, or if you develop worsening nausea/vomiting, kindly contact your outpatient provider (Dr. Maloney) immediately. We have sent prescriptions for both Zofran (antiemetic for your nausea) and Marinol (to stimulate your appetite) to your preferred pharmacy for the meanwhile. To treat/manage the diarrhea While at the hospital, you also reported multiple episodes of watery, loose stools. We were unable to determine the cause of your nausea and decreased appetite, given that labs, infectious work-up and imaging of your abdomen did not show any cause. We were suspicious that your nausea was mostly secondary to your underlying pancreatic cancer/insufficiency or the current Nivolumab immunotherapy. While at the hospital, we managed your diarrhea with imodium and peptobismol. By the day of your discharge, you reported resolution of your diarrhea. If your watery, loose stool persist or worsen, kindly contact your outpatient provider (Dr. Maloney) immediately. You endorsed decreased appetite and nausea while at the hospital. You reported that these symptoms started a couple of days before you came into the hospital. We were unable to determine the cause of your nausea and decreased appetite, given that labs, infectious workup, and imaging of your abdomen did not show any cause. We were suspicious that your nausea was mostly secondary to your underlying pancreatic cancer/insufficiency or the current Nivolumab immunotherapy. While at the hospital, we managed your nausea with antiemetics (Zofran) and tried to advance your diet from clears to soft to regular, as tolerated. In addition, we also gave you a medication (Marinol) to stimulate your appetite. By the day of discharge, you reported improved appetite and resolving nausea/vomiting. Kindly advance your diet, as tolerated, upon discharge. If your appetite does not improve or continues to worsen, or if you develop worsening nausea/vomiting, kindly contact your outpatient provider (Dr. Maloney) immediately. We have sent prescriptions for both Zofran (antiemetic for your nausea) and Marinol (to stimulate your appetite) to your preferred pharmacy for the meanwhile. While at the hospital, you also reported multiple episodes of watery, loose stools. We were unable to determine the cause of your nausea and decreased appetite, given that labs, infectious work-up and imaging of your abdomen did not show any cause. We were suspicious that your diarrhea was mostly secondary to your underlying pancreatic cancer/insufficiency or the current Nivolumab immunotherapy. While at the hospital, we managed your diarrhea with imodium and peptobismol. By the day of your discharge, you reported resolution of your diarrhea. If your watery, loose stool persist or worsen, kindly contact your outpatient provider (Dr. Maloney) immediately. We have sent prescriptions for both peptobismol and imdoium (to help with your diarrhea) to your preferred pharmacy for the meanwhile.

## 2018-11-07 NOTE — PROGRESS NOTE ADULT - PROBLEM SELECTOR PLAN 5
- S/p resection (2017) - s/p chemo (ended April 2018)  - Currently on nivoluma immunotherapy (once every 2 weeks since the last 6 months; last dose on 11/2/18)  - Follows up with Dr. Maloney (Oncologist)

## 2018-11-07 NOTE — PROGRESS NOTE ADULT - PROBLEM SELECTOR PLAN 7
- Chronic neck pain  - C/w gabapentin, sertraline, methadone, and dilaudid PRN - C/w losartan and metoprolol

## 2018-11-08 DIAGNOSIS — N17.9 ACUTE KIDNEY FAILURE, UNSPECIFIED: ICD-10-CM

## 2018-11-08 LAB
ALBUMIN SERPL ELPH-MCNC: 3.2 G/DL — LOW (ref 3.3–5)
ALP SERPL-CCNC: 67 U/L — SIGNIFICANT CHANGE UP (ref 40–120)
ALT FLD-CCNC: 17 U/L — SIGNIFICANT CHANGE UP (ref 4–41)
AST SERPL-CCNC: 25 U/L — SIGNIFICANT CHANGE UP (ref 4–40)
BASOPHILS # BLD AUTO: 0.03 K/UL — SIGNIFICANT CHANGE UP (ref 0–0.2)
BASOPHILS NFR BLD AUTO: 0.6 % — SIGNIFICANT CHANGE UP (ref 0–2)
BILIRUB SERPL-MCNC: 0.4 MG/DL — SIGNIFICANT CHANGE UP (ref 0.2–1.2)
BUN SERPL-MCNC: 20 MG/DL — SIGNIFICANT CHANGE UP (ref 7–23)
BUN SERPL-MCNC: 22 MG/DL — SIGNIFICANT CHANGE UP (ref 7–23)
CALCIUM SERPL-MCNC: 9 MG/DL — SIGNIFICANT CHANGE UP (ref 8.4–10.5)
CALCIUM SERPL-MCNC: 9.1 MG/DL — SIGNIFICANT CHANGE UP (ref 8.4–10.5)
CHLORIDE SERPL-SCNC: 99 MMOL/L — SIGNIFICANT CHANGE UP (ref 98–107)
CHLORIDE SERPL-SCNC: 99 MMOL/L — SIGNIFICANT CHANGE UP (ref 98–107)
CO2 SERPL-SCNC: 14 MMOL/L — LOW (ref 22–31)
CO2 SERPL-SCNC: 15 MMOL/L — LOW (ref 22–31)
CREAT SERPL-MCNC: 3.22 MG/DL — HIGH (ref 0.5–1.3)
CREAT SERPL-MCNC: 3.83 MG/DL — HIGH (ref 0.5–1.3)
EOSINOPHIL # BLD AUTO: 0.3 K/UL — SIGNIFICANT CHANGE UP (ref 0–0.5)
EOSINOPHIL NFR BLD AUTO: 6.2 % — HIGH (ref 0–6)
GLUCOSE BLDC GLUCOMTR-MCNC: 71 MG/DL — SIGNIFICANT CHANGE UP (ref 70–99)
GLUCOSE BLDC GLUCOMTR-MCNC: 90 MG/DL — SIGNIFICANT CHANGE UP (ref 70–99)
GLUCOSE SERPL-MCNC: 63 MG/DL — LOW (ref 70–99)
GLUCOSE SERPL-MCNC: 65 MG/DL — LOW (ref 70–99)
HCT VFR BLD CALC: 34.7 % — LOW (ref 39–50)
HGB BLD-MCNC: 11.5 G/DL — LOW (ref 13–17)
IMM GRANULOCYTES # BLD AUTO: 0.01 # — SIGNIFICANT CHANGE UP
IMM GRANULOCYTES NFR BLD AUTO: 0.2 % — SIGNIFICANT CHANGE UP (ref 0–1.5)
LYMPHOCYTES # BLD AUTO: 1.26 K/UL — SIGNIFICANT CHANGE UP (ref 1–3.3)
LYMPHOCYTES # BLD AUTO: 26 % — SIGNIFICANT CHANGE UP (ref 13–44)
MAGNESIUM SERPL-MCNC: 1.7 MG/DL — SIGNIFICANT CHANGE UP (ref 1.6–2.6)
MCHC RBC-ENTMCNC: 29.8 PG — SIGNIFICANT CHANGE UP (ref 27–34)
MCHC RBC-ENTMCNC: 33.1 % — SIGNIFICANT CHANGE UP (ref 32–36)
MCV RBC AUTO: 89.9 FL — SIGNIFICANT CHANGE UP (ref 80–100)
MONOCYTES # BLD AUTO: 0.73 K/UL — SIGNIFICANT CHANGE UP (ref 0–0.9)
MONOCYTES NFR BLD AUTO: 15.1 % — HIGH (ref 2–14)
NEUTROPHILS # BLD AUTO: 2.51 K/UL — SIGNIFICANT CHANGE UP (ref 1.8–7.4)
NEUTROPHILS NFR BLD AUTO: 51.9 % — SIGNIFICANT CHANGE UP (ref 43–77)
NRBC # FLD: 0 — SIGNIFICANT CHANGE UP
PHOSPHATE SERPL-MCNC: 4.4 MG/DL — SIGNIFICANT CHANGE UP (ref 2.5–4.5)
PLATELET # BLD AUTO: 117 K/UL — LOW (ref 150–400)
PMV BLD: 10.2 FL — SIGNIFICANT CHANGE UP (ref 7–13)
POTASSIUM SERPL-MCNC: 4.2 MMOL/L — SIGNIFICANT CHANGE UP (ref 3.5–5.3)
POTASSIUM SERPL-MCNC: 4.5 MMOL/L — SIGNIFICANT CHANGE UP (ref 3.5–5.3)
POTASSIUM SERPL-SCNC: 4.2 MMOL/L — SIGNIFICANT CHANGE UP (ref 3.5–5.3)
POTASSIUM SERPL-SCNC: 4.5 MMOL/L — SIGNIFICANT CHANGE UP (ref 3.5–5.3)
PROT SERPL-MCNC: 5.9 G/DL — LOW (ref 6–8.3)
RBC # BLD: 3.86 M/UL — LOW (ref 4.2–5.8)
RBC # FLD: 12.9 % — SIGNIFICANT CHANGE UP (ref 10.3–14.5)
SODIUM SERPL-SCNC: 128 MMOL/L — LOW (ref 135–145)
SODIUM SERPL-SCNC: 129 MMOL/L — LOW (ref 135–145)
WBC # BLD: 4.84 K/UL — SIGNIFICANT CHANGE UP (ref 3.8–10.5)
WBC # FLD AUTO: 4.84 K/UL — SIGNIFICANT CHANGE UP (ref 3.8–10.5)

## 2018-11-08 PROCEDURE — 99233 SBSQ HOSP IP/OBS HIGH 50: CPT | Mod: GC

## 2018-11-08 RX ORDER — METOPROLOL TARTRATE 50 MG
25 TABLET ORAL
Qty: 0 | Refills: 0 | Status: DISCONTINUED | OUTPATIENT
Start: 2018-11-08 | End: 2018-11-19

## 2018-11-08 RX ORDER — METOPROLOL TARTRATE 50 MG
1 TABLET ORAL
Qty: 60 | Refills: 0
Start: 2018-11-08 | End: 2018-12-07

## 2018-11-08 RX ORDER — SODIUM CHLORIDE 9 MG/ML
1000 INJECTION INTRAMUSCULAR; INTRAVENOUS; SUBCUTANEOUS
Qty: 0 | Refills: 0 | Status: DISCONTINUED | OUTPATIENT
Start: 2018-11-08 | End: 2018-11-08

## 2018-11-08 RX ORDER — METOPROLOL TARTRATE 50 MG
25 TABLET ORAL
Qty: 0 | Refills: 0 | Status: DISCONTINUED | OUTPATIENT
Start: 2018-11-08 | End: 2018-11-08

## 2018-11-08 RX ORDER — SODIUM CHLORIDE 9 MG/ML
1000 INJECTION, SOLUTION INTRAVENOUS
Qty: 0 | Refills: 0 | Status: DISCONTINUED | OUTPATIENT
Start: 2018-11-08 | End: 2018-11-09

## 2018-11-08 RX ADMIN — GABAPENTIN 300 MILLIGRAM(S): 400 CAPSULE ORAL at 14:50

## 2018-11-08 RX ADMIN — GABAPENTIN 300 MILLIGRAM(S): 400 CAPSULE ORAL at 06:07

## 2018-11-08 RX ADMIN — Medication 1 DROP(S): at 12:52

## 2018-11-08 RX ADMIN — Medication 30 MILLILITER(S): at 23:24

## 2018-11-08 RX ADMIN — SODIUM CHLORIDE 100 MILLILITER(S): 9 INJECTION INTRAMUSCULAR; INTRAVENOUS; SUBCUTANEOUS at 10:33

## 2018-11-08 RX ADMIN — HEPARIN SODIUM 5000 UNIT(S): 5000 INJECTION INTRAVENOUS; SUBCUTANEOUS at 00:18

## 2018-11-08 RX ADMIN — Medication 10 MILLIGRAM(S): at 06:10

## 2018-11-08 RX ADMIN — METHADONE HYDROCHLORIDE 5 MILLIGRAM(S): 40 TABLET ORAL at 23:23

## 2018-11-08 RX ADMIN — FINASTERIDE 5 MILLIGRAM(S): 5 TABLET, FILM COATED ORAL at 12:52

## 2018-11-08 RX ADMIN — METHADONE HYDROCHLORIDE 5 MILLIGRAM(S): 40 TABLET ORAL at 06:07

## 2018-11-08 RX ADMIN — HEPARIN SODIUM 5000 UNIT(S): 5000 INJECTION INTRAVENOUS; SUBCUTANEOUS at 18:31

## 2018-11-08 RX ADMIN — METHADONE HYDROCHLORIDE 5 MILLIGRAM(S): 40 TABLET ORAL at 14:52

## 2018-11-08 RX ADMIN — Medication 10 MILLIGRAM(S): at 14:58

## 2018-11-08 RX ADMIN — Medication 1 DROP(S): at 00:18

## 2018-11-08 RX ADMIN — TAMSULOSIN HYDROCHLORIDE 0.4 MILLIGRAM(S): 0.4 CAPSULE ORAL at 23:23

## 2018-11-08 RX ADMIN — GABAPENTIN 300 MILLIGRAM(S): 400 CAPSULE ORAL at 23:23

## 2018-11-08 RX ADMIN — VALACYCLOVIR 1000 MILLIGRAM(S): 500 TABLET, FILM COATED ORAL at 06:10

## 2018-11-08 RX ADMIN — LOSARTAN POTASSIUM 25 MILLIGRAM(S): 100 TABLET, FILM COATED ORAL at 06:07

## 2018-11-08 RX ADMIN — Medication 1 DROP(S): at 06:07

## 2018-11-08 RX ADMIN — SODIUM CHLORIDE 100 MILLILITER(S): 9 INJECTION, SOLUTION INTRAVENOUS at 14:53

## 2018-11-08 RX ADMIN — SERTRALINE 50 MILLIGRAM(S): 25 TABLET, FILM COATED ORAL at 12:52

## 2018-11-08 RX ADMIN — Medication 30 MILLILITER(S): at 18:30

## 2018-11-08 RX ADMIN — Medication 1 DROP(S): at 23:23

## 2018-11-08 RX ADMIN — HEPARIN SODIUM 5000 UNIT(S): 5000 INJECTION INTRAVENOUS; SUBCUTANEOUS at 08:47

## 2018-11-08 RX ADMIN — Medication 1 DROP(S): at 08:49

## 2018-11-08 RX ADMIN — Medication 30 MILLILITER(S): at 12:51

## 2018-11-08 RX ADMIN — Medication 1 DROP(S): at 14:49

## 2018-11-08 RX ADMIN — Medication 1 DROP(S): at 18:29

## 2018-11-08 RX ADMIN — Medication 1 DROP(S): at 10:33

## 2018-11-08 NOTE — PROGRESS NOTE ADULT - PROBLEM SELECTOR PLAN 3
- In the ED, pt. met the SIRS criteria with T 102.4, , /73, RR 18, SpO2 100%. Received empiric Vanc 1g x 1 and Zosyn 3.375g x 1.  - CXR negative for PNA.  - RVP and UA negative.  - Blood clx and Urine clx both NGTD.  - Hold off on abx right now given no localizing source. - Creatinine uptrending today from 0.8 to 3.22 today.  - 2/2 to fluid losses from diarrhea vs. acyclovir/valacyclovir vs. immunotherapy (nivolumab)  - C/w NS @ 100 cc/hr  - Monitor BMP - Creatinine uptrending today from 0.8 to 3.22 today.  - 2/2 to fluid losses from diarrhea vs. acyclovir/valacyclovir vs. immunotherapy (nivolumab)  - May need to be bladder scanned  - C/w NS @ 100 cc/hr  - Monitor BMP - Creatinine uptrending today from 0.8 to 3.22 today.  - 2/2 to fluid losses from diarrhea vs. acyclovir/valacyclovir vs. immunotherapy (nivolumab)  - May need to be bladder scanned  - C/w NS @ 100 cc/hr  - Valacyclovir discontinued on 11/8 2/2 worsening creatinine  - Monitor BMP

## 2018-11-08 NOTE — PROGRESS NOTE ADULT - ASSESSMENT
64 y/o M with PMH colon ca (s/p resection June 2017), pancreatic ca (s/p chemo, ended April 2018), currently on nivoluma immunotherapy (once every 2 weeks since the last 6 months; last dose on 11/2/18), and HTN presented with nausea, headache, and right forehead/scalp rash since Saturday. Rash mostly secondary to herpes zoster shingles.

## 2018-11-08 NOTE — PROGRESS NOTE ADULT - SUBJECTIVE AND OBJECTIVE BOX
CHIEF COMPLAINT:    Interval Events: No acute event overnight. Patient is seen and examined at the bedside this morning.     REVIEW OF SYSTEMS:  Constitutional: No fever, or chills. No recent weight loss or weight gain.   HEENT: No dry eyes or eye irritation. No postnasal drip or nasal congestion.  CV: No chest pain, or palpitations. No orthopnea.   Resp: No cough, or sputum production. No shortness of breath or dyspnea on exertion.   GI: No nausea or vomiting. No diarrhea or constipation. No abdominal pain.   : No dysuria, no nocturia or increased urinary frequency.  Musculoskeletal: No back pain, no myalgias  Skin: No rash or itchiness.   Neurological: No headache or dizziness. No syncope, no weakness, numbness.  Psychiatric: Denies depressed mood.   Endocrine: No cold or heat intolerance. No dry skin.  Hematologic/Lymphatic: No anemia or bleeding problem.     OBJECTIVE:  Vital Signs Last 24 Hrs  T(C): 36.8 (08 Nov 2018 05:28), Max: 37.6 (07 Nov 2018 21:15)  T(F): 98.3 (08 Nov 2018 05:28), Max: 99.6 (07 Nov 2018 21:15)  HR: 81 (08 Nov 2018 05:28) (68 - 92)  BP: 105/64 (08 Nov 2018 05:28) (88/42 - 120/57)  BP(mean): --  RR: 18 (08 Nov 2018 05:28) (18 - 20)  SpO2: 96% (08 Nov 2018 05:28) (95% - 100%)    CAPILLARY BLOOD GLUCOSE          PHYSICAL EXAM:  General: Alert and cooperative. Not in acute stress. Well developed, well nourished.   Head: Normocephalic, no mass and lesions.  Eyes: Intact visual fields. PERRLA, clear conjunctiva. EOMI, no ptosis.   Throat: Oral cavity and pharynx normal. No inflammation, swelling, exudate, or lesions. Teeth and gingiva in good general condition.  Neck: No lymphadenopathy, no masses, no thyromegaly. Carotid pulses 2+. No JVD.   Respiratory: Bilateral lung clear to auscultation, no crackles, no wheezes, no rhonchi.   Cardiovascular: S1/S2 auscultated, no murmur, or gallop. Rhythm is regular. There is no peripheral edema, cyanosis or pallor. Extremities are warm and well perfused. Capillary refill is less than 2 seconds. No carotid bruits.  Abdomen: Soft, non-tender, nondistended, no guarding or rebound tenderness. Active bowel sounds in all 4 quadrants. No hepatosplenomegaly.   Musculoskeletal: Adequately aligned spine. ROM intact spine and extremities. No joint erythema or tenderness. Normal muscular development. Normal gait.   Extremities: No significant deformity or joint abnormality. Peripheral pulses intact. No varicosities. No peripheral edema, atrophy.   Skin: Intact, no rash. Normal color, texture and turgor with no lesions or eruptions.  Neurological: AOAx4. CN2-12 grosslly intact. Strength and sensation symmetric and intact throughout. Reflexes 2+ throughout. Cerebellar testing normal.  Psychiatry: The mental examination revealed the patient was oriented to person, place, and time. The patient was able to demonstrate good judgement and reason, without hallucinations, abnormal affect or abnormal behaviors during the examination. Patient is not suicidal.     LINES:    HOSPITAL MEDICATIONS:  heparin  Injectable 5000 Unit(s) SubCutaneous every 8 hours    valACYclovir 1000 milliGRAM(s) Oral every 8 hours    losartan 25 milliGRAM(s) Oral daily  metoprolol tartrate 50 milliGRAM(s) Oral two times a day  tamsulosin 0.4 milliGRAM(s) Oral at bedtime    finasteride 5 milliGRAM(s) Oral daily      acetaminophen   Tablet .. 650 milliGRAM(s) Oral every 6 hours PRN  ALPRAZolam 0.5 milliGRAM(s) Oral every 8 hours PRN  gabapentin 300 milliGRAM(s) Oral three times a day  HYDROmorphone   Tablet 2 milliGRAM(s) Oral every 4 hours PRN  methadone    Tablet 5 milliGRAM(s) Oral every 8 hours  metoclopramide Injectable 10 milliGRAM(s) IV Push every 6 hours PRN  sertraline 50 milliGRAM(s) Oral daily          sodium chloride 0.9%. 1000 milliLiter(s) IV Continuous <Continuous>      artificial tears (preservative free) Ophthalmic Solution 1 Drop(s) Right EYE every 2 hours        LABS:                        11.5   4.84  )-----------( 117      ( 08 Nov 2018 05:51 )             34.7     Hgb Trend: 11.5<--, 12.5<--, 12.7<--, 15.0<--, 13.2<--  11-08    128<L>  |  99  |  20  ----------------------------<  63<L>  4.2   |  14<L>  |  3.22<H>    Ca    9.0      08 Nov 2018 05:51  Phos  4.4     11-08  Mg     1.7     11-08    TPro  5.9<L>  /  Alb  3.2<L>  /  TBili  0.4  /  DBili  x   /  AST  25  /  ALT  17  /  AlkPhos  67  11-08    Creatinine Trend: 3.22<--, 0.99<--, 0.80<--, 0.91<--            MICROBIOLOGY:     RADIOLOGY:  [ ] Reviewed and interpreted by me    EKG: CHIEF COMPLAINT:    Interval Events: Pt. given 500 cc LR bolus yesterday for hypotension to 88/42. Reports having water stools throughout last night ("8-9 episodes of brownish liquid") and wetting the bed. However, as per the nurse, pt. had no bowel movement and was instead just incontinent. Still reports headache and pain in the right cheek area. Also reports mild generalized belly pain, chills and nausea. States that he's been unable to eat anything over the last couple of days due to the nausea. Reports improved right itchy eye. No blurry vision, photophobia, vomiting, fevers, cough, CP/SOB, dysuria.    REVIEW OF SYSTEMS:  Constitutional: + Chills. + Nausea. No fever. No recent weight loss or weight gain.   HEENT: Reports mildly itchy right eye. No postnasal drip or nasal congestion.  CV: No chest pain, or palpitations. No orthopnea.   Resp: No cough, or sputum production. No shortness of breath or dyspnea on exertion.   GI: Reports loose stools and mild abdominal pain. No nausea or vomiting. No diarrhea or constipation.  : No dysuria, no nocturia or increased urinary frequency.  Musculoskeletal: No back pain, no myalgias  Skin: Rash on right forehead and scalp  Neurological: No headache or dizziness. No syncope, no weakness, numbness.  Psychiatric: Denies depressed mood.   Endocrine: No cold or heat intolerance. No dry skin.  Hematologic/Lymphatic: No anemia or bleeding problem.     REVIEW OF SYSTEMS:  Constitutional: No fever, or chills. No recent weight loss or weight gain.   HEENT: No dry eyes or eye irritation. No postnasal drip or nasal congestion.  CV: No chest pain, or palpitations. No orthopnea.   Resp: No cough, or sputum production. No shortness of breath or dyspnea on exertion.   GI: No nausea or vomiting. No diarrhea or constipation. No abdominal pain.   : No dysuria, no nocturia or increased urinary frequency.  Musculoskeletal: No back pain, no myalgias  Skin: No rash or itchiness.   Neurological: No headache or dizziness. No syncope, no weakness, numbness.  Psychiatric: Denies depressed mood.   Endocrine: No cold or heat intolerance. No dry skin.  Hematologic/Lymphatic: No anemia or bleeding problem.     OBJECTIVE:  Vital Signs Last 24 Hrs  T(C): 36.8 (08 Nov 2018 05:28), Max: 37.6 (07 Nov 2018 21:15)  T(F): 98.3 (08 Nov 2018 05:28), Max: 99.6 (07 Nov 2018 21:15)  HR: 81 (08 Nov 2018 05:28) (68 - 92)  BP: 105/64 (08 Nov 2018 05:28) (88/42 - 120/57)  BP(mean): --  RR: 18 (08 Nov 2018 05:28) (18 - 20)  SpO2: 96% (08 Nov 2018 05:28) (95% - 100%)    CAPILLARY BLOOD GLUCOSE        PHYSICAL EXAM:  Gen: No acute distress, alert, cooperative.  Head: Red, maculopapular rash with groupings of papules on right forehead and right scalp. Doesn't cross the midline. Vesicles starting to crust.  HEENT: PERRL, oral mucosa moist, normal conjunctiva.  Lung: CTAB, no respiratory distress, no crackles or wheezes.  CV: Normal S1/S1, No m/r/g.  Abd: Mild generalized abdominal tenderness. Soft, ND, no rebound or guarding, scar on abdomen, no sign of infection.  MSK: No LE edema.  Neuro: No focal neurologic deficits. Cn 2-12 intact.  Skin: Warm and dry.  Psych: Normal affect, follows commands.    LINES:    HOSPITAL MEDICATIONS:  heparin  Injectable 5000 Unit(s) SubCutaneous every 8 hours    valACYclovir 1000 milliGRAM(s) Oral every 8 hours    losartan 25 milliGRAM(s) Oral daily  metoprolol tartrate 50 milliGRAM(s) Oral two times a day  tamsulosin 0.4 milliGRAM(s) Oral at bedtime    finasteride 5 milliGRAM(s) Oral daily      acetaminophen   Tablet .. 650 milliGRAM(s) Oral every 6 hours PRN  ALPRAZolam 0.5 milliGRAM(s) Oral every 8 hours PRN  gabapentin 300 milliGRAM(s) Oral three times a day  HYDROmorphone   Tablet 2 milliGRAM(s) Oral every 4 hours PRN  methadone    Tablet 5 milliGRAM(s) Oral every 8 hours  metoclopramide Injectable 10 milliGRAM(s) IV Push every 6 hours PRN  sertraline 50 milliGRAM(s) Oral daily          sodium chloride 0.9%. 1000 milliLiter(s) IV Continuous <Continuous>      artificial tears (preservative free) Ophthalmic Solution 1 Drop(s) Right EYE every 2 hours        LABS:                        11.5   4.84  )-----------( 117      ( 08 Nov 2018 05:51 )             34.7     Hgb Trend: 11.5<--, 12.5<--, 12.7<--, 15.0<--, 13.2<--  11-08    128<L>  |  99  |  20  ----------------------------<  63<L>  4.2   |  14<L>  |  3.22<H>    Ca    9.0      08 Nov 2018 05:51  Phos  4.4     11-08  Mg     1.7     11-08    TPro  5.9<L>  /  Alb  3.2<L>  /  TBili  0.4  /  DBili  x   /  AST  25  /  ALT  17  /  AlkPhos  67  11-08    Creatinine Trend: 3.22<--, 0.99<--, 0.80<--, 0.91<--            MICROBIOLOGY:     RADIOLOGY:  [ ] Reviewed and interpreted by me    EKG: CHIEF COMPLAINT: rash    Interval Events: Pt. given 500 cc LR bolus yesterday for hypotension to 88/42. Reports having water stools throughout last night ("8-9 episodes of brownish liquid") and wetting the bed. However, as per the nurse, pt. had no bowel movement and was instead just incontinent. Still reports headache and pain in the right cheek area. Also reports mild generalized belly pain, chills and nausea. States that he's been unable to eat anything over the last couple of days due to the nausea. Reports improved right itchy eye. No blurry vision, photophobia, vomiting, fevers, cough, CP/SOB, dysuria.    REVIEW OF SYSTEMS:  Constitutional: + Chills. + Nausea. No fever. No recent weight loss or weight gain.   HEENT: Reports mildly itchy right eye. No postnasal drip or nasal congestion.  CV: No chest pain, or palpitations. No orthopnea.   Resp: No cough, or sputum production. No shortness of breath or dyspnea on exertion.   GI: Reports loose stools and mild abdominal pain. No nausea or vomiting. No diarrhea or constipation.  : No dysuria, no nocturia or increased urinary frequency.  Musculoskeletal: No back pain, no myalgias  Skin: Rash on right forehead and scalp  Neurological: No headache or dizziness. No syncope, no weakness, numbness.  Psychiatric: Denies depressed mood.   Endocrine: No cold or heat intolerance. No dry skin.  Hematologic/Lymphatic: No anemia or bleeding problem.     REVIEW OF SYSTEMS:  Constitutional: No fever, or chills. No recent weight loss or weight gain.   HEENT: No dry eyes or eye irritation. No postnasal drip or nasal congestion.  CV: No chest pain, or palpitations. No orthopnea.   Resp: No cough, or sputum production. No shortness of breath or dyspnea on exertion.   GI: No nausea or vomiting. No diarrhea or constipation. No abdominal pain.   : No dysuria, no nocturia or increased urinary frequency.  Musculoskeletal: No back pain, no myalgias  Skin: No rash or itchiness.   Neurological: No headache or dizziness. No syncope, no weakness, numbness.  Psychiatric: Denies depressed mood.   Endocrine: No cold or heat intolerance. No dry skin.  Hematologic/Lymphatic: No anemia or bleeding problem.     OBJECTIVE:  Vital Signs Last 24 Hrs  T(C): 36.8 (08 Nov 2018 05:28), Max: 37.6 (07 Nov 2018 21:15)  T(F): 98.3 (08 Nov 2018 05:28), Max: 99.6 (07 Nov 2018 21:15)  HR: 81 (08 Nov 2018 05:28) (68 - 92)  BP: 105/64 (08 Nov 2018 05:28) (88/42 - 120/57)  BP(mean): --  RR: 18 (08 Nov 2018 05:28) (18 - 20)  SpO2: 96% (08 Nov 2018 05:28) (95% - 100%)    CAPILLARY BLOOD GLUCOSE        PHYSICAL EXAM:  Gen: No acute distress, alert, cooperative.  Head: Red, maculopapular rash with groupings of papules on right forehead and right scalp. Doesn't cross the midline. Vesicles starting to crust.  HEENT: PERRL, oral mucosa moist, normal conjunctiva.  Lung: CTAB, no respiratory distress, no crackles or wheezes.  CV: Normal S1/S1, No m/r/g.  Abd: Mild generalized abdominal tenderness. Soft, ND, no rebound or guarding, scar on abdomen, no sign of infection.  MSK: No LE edema.  Neuro: No focal neurologic deficits. Cn 2-12 intact.  Skin: Warm and dry.  Psych: Normal affect, follows commands.    LINES:    HOSPITAL MEDICATIONS:  heparin  Injectable 5000 Unit(s) SubCutaneous every 8 hours    valACYclovir 1000 milliGRAM(s) Oral every 8 hours    losartan 25 milliGRAM(s) Oral daily  metoprolol tartrate 50 milliGRAM(s) Oral two times a day  tamsulosin 0.4 milliGRAM(s) Oral at bedtime    finasteride 5 milliGRAM(s) Oral daily      acetaminophen   Tablet .. 650 milliGRAM(s) Oral every 6 hours PRN  ALPRAZolam 0.5 milliGRAM(s) Oral every 8 hours PRN  gabapentin 300 milliGRAM(s) Oral three times a day  HYDROmorphone   Tablet 2 milliGRAM(s) Oral every 4 hours PRN  methadone    Tablet 5 milliGRAM(s) Oral every 8 hours  metoclopramide Injectable 10 milliGRAM(s) IV Push every 6 hours PRN  sertraline 50 milliGRAM(s) Oral daily          sodium chloride 0.9%. 1000 milliLiter(s) IV Continuous <Continuous>      artificial tears (preservative free) Ophthalmic Solution 1 Drop(s) Right EYE every 2 hours        LABS:                        11.5   4.84  )-----------( 117      ( 08 Nov 2018 05:51 )             34.7     Hgb Trend: 11.5<--, 12.5<--, 12.7<--, 15.0<--, 13.2<--  11-08    128<L>  |  99  |  20  ----------------------------<  63<L>  4.2   |  14<L>  |  3.22<H>    Ca    9.0      08 Nov 2018 05:51  Phos  4.4     11-08  Mg     1.7     11-08    TPro  5.9<L>  /  Alb  3.2<L>  /  TBili  0.4  /  DBili  x   /  AST  25  /  ALT  17  /  AlkPhos  67  11-08    Creatinine Trend: 3.22<--, 0.99<--, 0.80<--, 0.91<--            MICROBIOLOGY:     RADIOLOGY:  [ ] Reviewed and interpreted by me    EKG:

## 2018-11-08 NOTE — PROGRESS NOTE ADULT - PROBLEM SELECTOR PLAN 4
- QTc at 500  - C/w reglan 10mg q6hrs PRN - QTc at 500  - C/w reglan 10mg q6hrs PRN  - C/w pepto-bismol to treat sxs. - QTc at 500  - C/w reglan 10mg q6hrs PRN  - C/w pepto-bismol to treat sxs.  - Pt. has decreased appetite 2/2 to the nausea. FSG in the 60-70       - C/w D5, in addition to the NS       - Monitor FSG q6 hrs - QTc at 500  - Had one episode of NBNB vomiting (11/8)  - C/w reglan 10mg q6hrs PRN  - C/w pepto-bismol to treat sxs.  - Pt. has decreased appetite 2/2 to the nausea. FSG in the 60-70       - C/w D5, in addition to the NS       - Monitor FSG q6 hrs

## 2018-11-08 NOTE — PROGRESS NOTE ADULT - PROBLEM SELECTOR PLAN 8
- Chronic neck pain  - C/w gabapentin, sertraline, methadone, and dilaudid PRN - C/w losartan and metoprolol  - Given the hypotensive episodes, metoprolol decreased from 50mg BID to 25mg BID

## 2018-11-08 NOTE — PROGRESS NOTE ADULT - PROBLEM SELECTOR PLAN 7
- C/w losartan and metoprolol - C/w losartan and metoprolol  - Given the hypotensive episodes, metoprolol decreased from 50mg BID to 25mg BID - S/p resection (2017)

## 2018-11-08 NOTE — PROGRESS NOTE ADULT - PROBLEM SELECTOR PROBLEM 5
Malignant neoplasm of pancreas, unspecified location of malignancy SIRS (systemic inflammatory response syndrome)

## 2018-11-08 NOTE — PROGRESS NOTE ADULT - PROBLEM SELECTOR PLAN 5
- s/p chemo (ended April 2018)  - Currently on nivoluma immunotherapy (once every 2 weeks since the last 6 months; last dose on 11/2/18)  - Follows up with Dr. Maloney (Oncologist) - In the ED, pt. met the SIRS criteria with T 102.4, , /73, RR 18, SpO2 100%. Received empiric Vanc 1g x 1 and Zosyn 3.375g x 1.  - CXR negative for PNA.  - RVP and UA negative.  - Blood clx and Urine clx both NGTD.  - Hold off on abx right now given no localizing source.

## 2018-11-08 NOTE — PROGRESS NOTE ADULT - PROBLEM SELECTOR PLAN 1
- Red, maculopapular rash with groupings of papules on right forehead and right scalp. Doesn't cross the midline.  - Pt. currently immunosuppressed.  - IV Acyclovir 850mg TID with NS at 75 cc/hr --> Switched to PO Valacyclovir 1g q8hrs. Pt. to be discharged with 7-day course of Valacyclovir and to f/u with outpt PCP for further management.  - As per Optho, negative for herpes zoster ophthalmicus (pt. complains of itchy right right eye, but no blurriness/photophobia/vision loss).       - C/w artifical tears and cold compresses  - Pain control with gabapentin 300mg TID, methadone, acetaminophen 650mg PRN and dilaudid PRN. - Red, maculopapular rash with groupings of papules on right forehead and right scalp. Doesn't cross the midline. Now starting to crust.  - Pt. currently immunosuppressed.  - IV Acyclovir 850mg TID with NS at 75 cc/hr --> Switched to PO Valacyclovir 1g q8hrs. Pt. to be discharged with 7-day course of Valacyclovir and to f/u with outpt PCP for further management.  - As per Optho, negative for herpes zoster ophthalmicus (pt. complains of itchy right right eye, but no blurriness/photophobia/vision loss).       - C/w artifical tears and cold compresses  - Pain control with gabapentin 300mg TID, methadone, acetaminophen 650mg PRN and dilaudid PRN. - Red, maculopapular rash with groupings of papules on right forehead and right scalp. Doesn't cross the midline. Now starting to crust.  - Pt. currently immunosuppressed.  - IV Acyclovir 850mg TID with NS at 75 cc/hr --> Switched to PO Valacyclovir 1g q8hrs on 11/7. Pt. to be discharged with 7-day course of Valacyclovir and to f/u with outpt PCP for further management.       - Valacyclovir discontinued on 11/8 2/2 worsening creatinine. Monitor BMP.  - As per Optho, negative for herpes zoster ophthalmicus (pt. complains of itchy right right eye, but no blurriness/photophobia/vision loss).       - C/w artifical tears and cold compresses  - Pain control with gabapentin 300mg TID, methadone, acetaminophen 650mg PRN and dilaudid PRN. - Red, maculopapular rash with groupings of papules on right forehead and right scalp. Doesn't cross the midline. Now starting to crust.  - Pt. currently immunosuppressed.  - IV Acyclovir 850mg TID with NS at 75 cc/hr --> Switched to PO Valacyclovir 1g q8hrs on 11/7.        - Valacyclovir discontinued on 11/8 2/2 worsening creatinine. Monitor BMP.  - As per Optho, negative for herpes zoster ophthalmicus (pt. complains of itchy right right eye, but no blurriness/photophobia/vision loss).       - C/w artifical tears and cold compresses  - Pain control with gabapentin 300mg TID, methadone, acetaminophen 650mg PRN and dilaudid PRN.

## 2018-11-09 LAB
ALBUMIN SERPL ELPH-MCNC: 2.9 G/DL — LOW (ref 3.3–5)
ALP SERPL-CCNC: 78 U/L — SIGNIFICANT CHANGE UP (ref 40–120)
ALT FLD-CCNC: 18 U/L — SIGNIFICANT CHANGE UP (ref 4–41)
APPEARANCE UR: CLEAR — SIGNIFICANT CHANGE UP
AST SERPL-CCNC: 24 U/L — SIGNIFICANT CHANGE UP (ref 4–40)
BACTERIA # UR AUTO: NEGATIVE — SIGNIFICANT CHANGE UP
BASOPHILS # BLD AUTO: 0.03 K/UL — SIGNIFICANT CHANGE UP (ref 0–0.2)
BASOPHILS NFR BLD AUTO: 0.8 % — SIGNIFICANT CHANGE UP (ref 0–2)
BILIRUB SERPL-MCNC: 0.3 MG/DL — SIGNIFICANT CHANGE UP (ref 0.2–1.2)
BILIRUB UR-MCNC: NEGATIVE — SIGNIFICANT CHANGE UP
BLOOD UR QL VISUAL: NEGATIVE — SIGNIFICANT CHANGE UP
BUN SERPL-MCNC: 25 MG/DL — HIGH (ref 7–23)
CALCIUM SERPL-MCNC: 8.4 MG/DL — SIGNIFICANT CHANGE UP (ref 8.4–10.5)
CHLORIDE SERPL-SCNC: 98 MMOL/L — SIGNIFICANT CHANGE UP (ref 98–107)
CHLORIDE UR-SCNC: 57 MMOL/L — SIGNIFICANT CHANGE UP
CO2 SERPL-SCNC: 16 MMOL/L — LOW (ref 22–31)
COLOR SPEC: YELLOW — SIGNIFICANT CHANGE UP
CREAT ?TM UR-MCNC: 92.6 MG/DL — SIGNIFICANT CHANGE UP
CREAT SERPL-MCNC: 4.85 MG/DL — HIGH (ref 0.5–1.3)
EOSINOPHIL # BLD AUTO: 0.31 K/UL — SIGNIFICANT CHANGE UP (ref 0–0.5)
EOSINOPHIL NFR BLD AUTO: 8.2 % — HIGH (ref 0–6)
GLUCOSE BLDC GLUCOMTR-MCNC: 94 MG/DL — SIGNIFICANT CHANGE UP (ref 70–99)
GLUCOSE SERPL-MCNC: 87 MG/DL — SIGNIFICANT CHANGE UP (ref 70–99)
GLUCOSE UR-MCNC: NEGATIVE — SIGNIFICANT CHANGE UP
HCT VFR BLD CALC: 31.7 % — LOW (ref 39–50)
HGB BLD-MCNC: 10.7 G/DL — LOW (ref 13–17)
HYALINE CASTS # UR AUTO: SIGNIFICANT CHANGE UP
IMM GRANULOCYTES # BLD AUTO: 0.02 # — SIGNIFICANT CHANGE UP
IMM GRANULOCYTES NFR BLD AUTO: 0.5 % — SIGNIFICANT CHANGE UP (ref 0–1.5)
KETONES UR-MCNC: SIGNIFICANT CHANGE UP
LEUKOCYTE ESTERASE UR-ACNC: NEGATIVE — SIGNIFICANT CHANGE UP
LYMPHOCYTES # BLD AUTO: 1.25 K/UL — SIGNIFICANT CHANGE UP (ref 1–3.3)
LYMPHOCYTES # BLD AUTO: 33.1 % — SIGNIFICANT CHANGE UP (ref 13–44)
MAGNESIUM SERPL-MCNC: 1.9 MG/DL — SIGNIFICANT CHANGE UP (ref 1.6–2.6)
MCHC RBC-ENTMCNC: 29.6 PG — SIGNIFICANT CHANGE UP (ref 27–34)
MCHC RBC-ENTMCNC: 33.8 % — SIGNIFICANT CHANGE UP (ref 32–36)
MCV RBC AUTO: 87.6 FL — SIGNIFICANT CHANGE UP (ref 80–100)
MONOCYTES # BLD AUTO: 0.53 K/UL — SIGNIFICANT CHANGE UP (ref 0–0.9)
MONOCYTES NFR BLD AUTO: 14 % — SIGNIFICANT CHANGE UP (ref 2–14)
NEUTROPHILS # BLD AUTO: 1.64 K/UL — LOW (ref 1.8–7.4)
NEUTROPHILS NFR BLD AUTO: 43.4 % — SIGNIFICANT CHANGE UP (ref 43–77)
NITRITE UR-MCNC: NEGATIVE — SIGNIFICANT CHANGE UP
NRBC # FLD: 0 — SIGNIFICANT CHANGE UP
OSMOLALITY UR: 287 MOSMO/KG — SIGNIFICANT CHANGE UP (ref 50–1200)
PH UR: 6 — SIGNIFICANT CHANGE UP (ref 5–8)
PHOSPHATE SERPL-MCNC: 5 MG/DL — HIGH (ref 2.5–4.5)
PLATELET # BLD AUTO: 129 K/UL — LOW (ref 150–400)
PMV BLD: 10 FL — SIGNIFICANT CHANGE UP (ref 7–13)
POTASSIUM SERPL-MCNC: 4.3 MMOL/L — SIGNIFICANT CHANGE UP (ref 3.5–5.3)
POTASSIUM SERPL-SCNC: 4.3 MMOL/L — SIGNIFICANT CHANGE UP (ref 3.5–5.3)
PROT SERPL-MCNC: 5.5 G/DL — LOW (ref 6–8.3)
PROT UR-MCNC: 200 — HIGH
RBC # BLD: 3.62 M/UL — LOW (ref 4.2–5.8)
RBC # FLD: 13.1 % — SIGNIFICANT CHANGE UP (ref 10.3–14.5)
RBC CASTS # UR COMP ASSIST: SIGNIFICANT CHANGE UP (ref 0–?)
SODIUM SERPL-SCNC: 130 MMOL/L — LOW (ref 135–145)
SODIUM UR-SCNC: 64 MMOL/L — SIGNIFICANT CHANGE UP
SP GR SPEC: 1.01 — SIGNIFICANT CHANGE UP (ref 1–1.04)
SPECIMEN SOURCE: SIGNIFICANT CHANGE UP
SQUAMOUS # UR AUTO: SIGNIFICANT CHANGE UP
UROBILINOGEN FLD QL: NORMAL — SIGNIFICANT CHANGE UP
UUN UR-MCNC: 241.6 MG/DL — SIGNIFICANT CHANGE UP
WBC # BLD: 3.78 K/UL — LOW (ref 3.8–10.5)
WBC # FLD AUTO: 3.78 K/UL — LOW (ref 3.8–10.5)
WBC UR QL: SIGNIFICANT CHANGE UP (ref 0–?)

## 2018-11-09 PROCEDURE — 99233 SBSQ HOSP IP/OBS HIGH 50: CPT | Mod: GC

## 2018-11-09 RX ORDER — SODIUM CHLORIDE 9 MG/ML
1000 INJECTION INTRAMUSCULAR; INTRAVENOUS; SUBCUTANEOUS
Qty: 0 | Refills: 0 | Status: DISCONTINUED | OUTPATIENT
Start: 2018-11-09 | End: 2018-11-10

## 2018-11-09 RX ADMIN — METHADONE HYDROCHLORIDE 5 MILLIGRAM(S): 40 TABLET ORAL at 13:17

## 2018-11-09 RX ADMIN — TAMSULOSIN HYDROCHLORIDE 0.4 MILLIGRAM(S): 0.4 CAPSULE ORAL at 21:10

## 2018-11-09 RX ADMIN — LOSARTAN POTASSIUM 25 MILLIGRAM(S): 100 TABLET, FILM COATED ORAL at 06:49

## 2018-11-09 RX ADMIN — Medication 1 DROP(S): at 06:49

## 2018-11-09 RX ADMIN — Medication 25 MILLIGRAM(S): at 18:07

## 2018-11-09 RX ADMIN — Medication 1 DROP(S): at 11:09

## 2018-11-09 RX ADMIN — GABAPENTIN 300 MILLIGRAM(S): 400 CAPSULE ORAL at 06:49

## 2018-11-09 RX ADMIN — Medication 1 DROP(S): at 18:07

## 2018-11-09 RX ADMIN — Medication 30 MILLILITER(S): at 11:10

## 2018-11-09 RX ADMIN — Medication 30 MILLILITER(S): at 18:08

## 2018-11-09 RX ADMIN — GABAPENTIN 300 MILLIGRAM(S): 400 CAPSULE ORAL at 13:17

## 2018-11-09 RX ADMIN — METHADONE HYDROCHLORIDE 5 MILLIGRAM(S): 40 TABLET ORAL at 06:49

## 2018-11-09 RX ADMIN — SODIUM CHLORIDE 100 MILLILITER(S): 9 INJECTION INTRAMUSCULAR; INTRAVENOUS; SUBCUTANEOUS at 11:08

## 2018-11-09 RX ADMIN — HEPARIN SODIUM 5000 UNIT(S): 5000 INJECTION INTRAVENOUS; SUBCUTANEOUS at 11:08

## 2018-11-09 RX ADMIN — SERTRALINE 50 MILLIGRAM(S): 25 TABLET, FILM COATED ORAL at 11:10

## 2018-11-09 RX ADMIN — Medication 30 MILLILITER(S): at 06:49

## 2018-11-09 RX ADMIN — GABAPENTIN 300 MILLIGRAM(S): 400 CAPSULE ORAL at 21:10

## 2018-11-09 RX ADMIN — FINASTERIDE 5 MILLIGRAM(S): 5 TABLET, FILM COATED ORAL at 11:10

## 2018-11-09 RX ADMIN — METHADONE HYDROCHLORIDE 5 MILLIGRAM(S): 40 TABLET ORAL at 21:10

## 2018-11-09 RX ADMIN — HEPARIN SODIUM 5000 UNIT(S): 5000 INJECTION INTRAVENOUS; SUBCUTANEOUS at 18:07

## 2018-11-09 NOTE — PROGRESS NOTE ADULT - SUBJECTIVE AND OBJECTIVE BOX
CHIEF COMPLAINT:    Interval Events: No episodes of nausea/vomiting or diarrhea overnight. Reports increased appetite this AM. Also reports a tingling sound in the R ear since the last 5 days, which he never reported before. Reports improved right itchy eye. No blurry vision, photophobia, nausea/vomiting, fevers/chills, cough, CP/SOB, abdominal pain, dysuria.    REVIEW OF SYSTEMS:  Constitutional: No fever/chills. No recent weight loss or weight gain.   HEENT: Reports mildly itchy right eye. No postnasal drip or nasal congestion.  CV: No chest pain, or palpitations. No orthopnea.   Resp: No cough, or sputum production. No shortness of breath or dyspnea on exertion.   GI: No nausea or vomiting. No diarrhea or constipation.  : No dysuria, no nocturia or increased urinary frequency.  Musculoskeletal: No back pain, no myalgias  Skin: Rash on right forehead and scalp  Neurological: No headache or dizziness. No syncope, no weakness, numbness.  Psychiatric: Denies depressed mood.   Endocrine: No cold or heat intolerance. No dry skin.  Hematologic/Lymphatic: No anemia or bleeding problem.     OBJECTIVE:  Vital Signs Last 24 Hrs  T(C): 37 (09 Nov 2018 05:15), Max: 37 (08 Nov 2018 22:52)  T(F): 98.6 (09 Nov 2018 05:15), Max: 98.6 (08 Nov 2018 22:52)  HR: 80 (09 Nov 2018 05:15) (79 - 87)  BP: 103/60 (09 Nov 2018 05:15) (103/60 - 124/65)  BP(mean): --  RR: 18 (09 Nov 2018 05:15) (18 - 19)  SpO2: 96% (09 Nov 2018 05:15) (96% - 97%)    CAPILLARY BLOOD GLUCOSE      POCT Blood Glucose.: 94 mg/dL (09 Nov 2018 07:28)      PHYSICAL EXAM:  Gen: No acute distress, alert, cooperative.  Head: Red, maculopapular rash with groupings of papules on right forehead and right scalp. Doesn't cross the midline. Vesicles starting to crust.  HEENT: PERRL, oral mucosa moist, normal conjunctiva.  Lung: CTAB, no respiratory distress, no crackles or wheezes.  CV: Normal S1/S1, No m/r/g.  Abd: Mild generalized abdominal tenderness. Soft, ND, no rebound or guarding, scar on abdomen, no sign of infection.  MSK: No LE edema.  Neuro: No focal neurologic deficits. Cn 2-12 intact.  Skin: Warm and dry.  Psych: Normal affect, follows commands.    LINES:    HOSPITAL MEDICATIONS:  heparin  Injectable 5000 Unit(s) SubCutaneous every 8 hours      losartan 25 milliGRAM(s) Oral daily  metoprolol tartrate 25 milliGRAM(s) Oral two times a day  tamsulosin 0.4 milliGRAM(s) Oral at bedtime    finasteride 5 milliGRAM(s) Oral daily      acetaminophen   Tablet .. 650 milliGRAM(s) Oral every 6 hours PRN  ALPRAZolam 0.5 milliGRAM(s) Oral every 8 hours PRN  gabapentin 300 milliGRAM(s) Oral three times a day  HYDROmorphone   Tablet 2 milliGRAM(s) Oral every 4 hours PRN  methadone    Tablet 5 milliGRAM(s) Oral every 8 hours  metoclopramide Injectable 10 milliGRAM(s) IV Push every 6 hours PRN  sertraline 50 milliGRAM(s) Oral daily    bismuth subsalicylate Liquid 30 milliLiter(s) Oral four times a day        dextrose 5% + sodium chloride 0.9%. 1000 milliLiter(s) IV Continuous <Continuous>      artificial tears (preservative free) Ophthalmic Solution 1 Drop(s) Right EYE every 6 hours        LABS:                        10.7   3.78  )-----------( 129      ( 09 Nov 2018 06:00 )             31.7     Hgb Trend: 10.7<--, 11.5<--, 12.5<--, 12.7<--, 15.0<--  11-09    130<L>  |  98  |  25<H>  ----------------------------<  87  4.3   |  16<L>  |  4.85<H>    Ca    8.4      09 Nov 2018 06:00  Phos  5.0     11-09  Mg     1.9     11-09    TPro  5.5<L>  /  Alb  2.9<L>  /  TBili  0.3  /  DBili  x   /  AST  24  /  ALT  18  /  AlkPhos  78  11-09    Creatinine Trend: 4.85<--, 3.83<--, 3.22<--, 0.99<--, 0.80<--, 0.91<--            MICROBIOLOGY:     RADIOLOGY:  [ ] Reviewed and interpreted by me    EKG:

## 2018-11-09 NOTE — PROGRESS NOTE ADULT - PROBLEM SELECTOR PLAN 1
- Red, maculopapular rash with groupings of papules on right forehead and right scalp. Doesn't cross the midline. Now starting to crust.  - Pt. currently immunosuppressed.  - IV Acyclovir 850mg TID with NS at 75 cc/hr --> Switched to PO Valacyclovir 1g q8hrs on 11/7.        - Valacyclovir discontinued on 11/8 2/2 worsening creatinine. Monitor BMP.  - As per Optho, negative for herpes zoster ophthalmicus (pt. complains of itchy right right eye, but no blurriness/photophobia/vision loss).       - C/w artifical tears and cold compresses  - Pain control with gabapentin 300mg TID, methadone, acetaminophen 650mg PRN and dilaudid PRN.

## 2018-11-09 NOTE — PROGRESS NOTE ADULT - PROBLEM SELECTOR PLAN 5
- In the ED, pt. met the SIRS criteria with T 102.4, , /73, RR 18, SpO2 100%. Received empiric Vanc 1g x 1 and Zosyn 3.375g x 1.  - CXR negative for PNA.  - RVP and UA negative.  - Blood clx and Urine clx both NGTD.  - Hold off on abx right now given no localizing source.

## 2018-11-09 NOTE — PROGRESS NOTE ADULT - PROBLEM SELECTOR PLAN 4
- QTc at 500  - Had one episode of NBNB vomiting (11/8)  - C/w reglan 10mg q6hrs PRN  - C/w pepto-bismol to treat sxs.  - Pt. has decreased appetite 2/2 to the nausea. FSG in the 60-70       - C/w D5, in addition to the NS       - Monitor FSG q6 hrs

## 2018-11-09 NOTE — PROGRESS NOTE ADULT - PROBLEM SELECTOR PLAN 3
- Creatinine uptrending today from 0.8 to 3.22 today.  - 2/2 to fluid losses from diarrhea vs. acyclovir/valacyclovir vs. immunotherapy (nivolumab)  - May need to be bladder scanned  - C/w NS @ 100 cc/hr  - Valacyclovir discontinued on 11/8 2/2 worsening creatinine  - Monitor BMP

## 2018-11-09 NOTE — PROGRESS NOTE ADULT - PROBLEM SELECTOR PLAN 8
- C/w losartan and metoprolol  - Given the hypotensive episodes, metoprolol decreased from 50mg BID to 25mg BID

## 2018-11-09 NOTE — PROGRESS NOTE ADULT - PROBLEM SELECTOR PLAN 2
- Pt. reports multiple episodes of non-bloody loose stools since last night; endorses mild belly pain from it  - WBC remains at 4.8   - C. Diff negative.  - F/u GI PCR, Stool Clx, and O&P.  - C/w pepto-bismol to treat sxs.

## 2018-11-10 LAB
ALBUMIN SERPL ELPH-MCNC: 3.3 G/DL — SIGNIFICANT CHANGE UP (ref 3.3–5)
ALP SERPL-CCNC: 90 U/L — SIGNIFICANT CHANGE UP (ref 40–120)
ALT FLD-CCNC: 21 U/L — SIGNIFICANT CHANGE UP (ref 4–41)
AST SERPL-CCNC: 28 U/L — SIGNIFICANT CHANGE UP (ref 4–40)
BACTERIA BLD CULT: SIGNIFICANT CHANGE UP
BACTERIA BLD CULT: SIGNIFICANT CHANGE UP
BACTERIA STL CULT: SIGNIFICANT CHANGE UP
BASOPHILS # BLD AUTO: 0.04 K/UL — SIGNIFICANT CHANGE UP (ref 0–0.2)
BASOPHILS NFR BLD AUTO: 1 % — SIGNIFICANT CHANGE UP (ref 0–2)
BILIRUB SERPL-MCNC: 0.3 MG/DL — SIGNIFICANT CHANGE UP (ref 0.2–1.2)
BUN SERPL-MCNC: 27 MG/DL — HIGH (ref 7–23)
CALCIUM SERPL-MCNC: 8.9 MG/DL — SIGNIFICANT CHANGE UP (ref 8.4–10.5)
CHLORIDE SERPL-SCNC: 102 MMOL/L — SIGNIFICANT CHANGE UP (ref 98–107)
CO2 SERPL-SCNC: 14 MMOL/L — LOW (ref 22–31)
CREAT SERPL-MCNC: 5.56 MG/DL — HIGH (ref 0.5–1.3)
EOSINOPHIL # BLD AUTO: 0.31 K/UL — SIGNIFICANT CHANGE UP (ref 0–0.5)
EOSINOPHIL NFR BLD AUTO: 7.9 % — HIGH (ref 0–6)
GLUCOSE SERPL-MCNC: 84 MG/DL — SIGNIFICANT CHANGE UP (ref 70–99)
HCT VFR BLD CALC: 34.4 % — LOW (ref 39–50)
HGB BLD-MCNC: 11.8 G/DL — LOW (ref 13–17)
IMM GRANULOCYTES # BLD AUTO: 0.02 # — SIGNIFICANT CHANGE UP
IMM GRANULOCYTES NFR BLD AUTO: 0.5 % — SIGNIFICANT CHANGE UP (ref 0–1.5)
LYMPHOCYTES # BLD AUTO: 1.49 K/UL — SIGNIFICANT CHANGE UP (ref 1–3.3)
LYMPHOCYTES # BLD AUTO: 37.8 % — SIGNIFICANT CHANGE UP (ref 13–44)
MAGNESIUM SERPL-MCNC: 1.9 MG/DL — SIGNIFICANT CHANGE UP (ref 1.6–2.6)
MCHC RBC-ENTMCNC: 29.9 PG — SIGNIFICANT CHANGE UP (ref 27–34)
MCHC RBC-ENTMCNC: 34.3 % — SIGNIFICANT CHANGE UP (ref 32–36)
MCV RBC AUTO: 87.1 FL — SIGNIFICANT CHANGE UP (ref 80–100)
MONOCYTES # BLD AUTO: 0.4 K/UL — SIGNIFICANT CHANGE UP (ref 0–0.9)
MONOCYTES NFR BLD AUTO: 10.2 % — SIGNIFICANT CHANGE UP (ref 2–14)
NEUTROPHILS # BLD AUTO: 1.68 K/UL — LOW (ref 1.8–7.4)
NEUTROPHILS NFR BLD AUTO: 42.6 % — LOW (ref 43–77)
NRBC # FLD: 0 — SIGNIFICANT CHANGE UP
PHOSPHATE SERPL-MCNC: 5.2 MG/DL — HIGH (ref 2.5–4.5)
PLATELET # BLD AUTO: 144 K/UL — LOW (ref 150–400)
PMV BLD: 9.6 FL — SIGNIFICANT CHANGE UP (ref 7–13)
POTASSIUM SERPL-MCNC: 4.9 MMOL/L — SIGNIFICANT CHANGE UP (ref 3.5–5.3)
POTASSIUM SERPL-SCNC: 4.9 MMOL/L — SIGNIFICANT CHANGE UP (ref 3.5–5.3)
PROT SERPL-MCNC: 6.1 G/DL — SIGNIFICANT CHANGE UP (ref 6–8.3)
RBC # BLD: 3.95 M/UL — LOW (ref 4.2–5.8)
RBC # FLD: 13.1 % — SIGNIFICANT CHANGE UP (ref 10.3–14.5)
SODIUM SERPL-SCNC: 133 MMOL/L — LOW (ref 135–145)
WBC # BLD: 3.94 K/UL — SIGNIFICANT CHANGE UP (ref 3.8–10.5)
WBC # FLD AUTO: 3.94 K/UL — SIGNIFICANT CHANGE UP (ref 3.8–10.5)

## 2018-11-10 PROCEDURE — 99221 1ST HOSP IP/OBS SF/LOW 40: CPT | Mod: GC

## 2018-11-10 PROCEDURE — 99233 SBSQ HOSP IP/OBS HIGH 50: CPT | Mod: GC

## 2018-11-10 PROCEDURE — 99222 1ST HOSP IP/OBS MODERATE 55: CPT | Mod: GC

## 2018-11-10 RX ORDER — GABAPENTIN 400 MG/1
300 CAPSULE ORAL DAILY
Qty: 0 | Refills: 0 | Status: DISCONTINUED | OUTPATIENT
Start: 2018-11-10 | End: 2018-11-15

## 2018-11-10 RX ORDER — SODIUM CHLORIDE 9 MG/ML
1000 INJECTION INTRAMUSCULAR; INTRAVENOUS; SUBCUTANEOUS
Qty: 0 | Refills: 0 | Status: DISCONTINUED | OUTPATIENT
Start: 2018-11-10 | End: 2018-11-12

## 2018-11-10 RX ORDER — LIDOCAINE HCL 20 MG/ML
20 VIAL (ML) INJECTION ONCE
Qty: 0 | Refills: 0 | Status: COMPLETED | OUTPATIENT
Start: 2018-11-10 | End: 2018-11-10

## 2018-11-10 RX ORDER — GABAPENTIN 400 MG/1
100 CAPSULE ORAL THREE TIMES A DAY
Qty: 0 | Refills: 0 | Status: DISCONTINUED | OUTPATIENT
Start: 2018-11-10 | End: 2018-11-10

## 2018-11-10 RX ADMIN — FINASTERIDE 5 MILLIGRAM(S): 5 TABLET, FILM COATED ORAL at 13:10

## 2018-11-10 RX ADMIN — LOSARTAN POTASSIUM 25 MILLIGRAM(S): 100 TABLET, FILM COATED ORAL at 05:56

## 2018-11-10 RX ADMIN — TAMSULOSIN HYDROCHLORIDE 0.4 MILLIGRAM(S): 0.4 CAPSULE ORAL at 22:47

## 2018-11-10 RX ADMIN — Medication 30 MILLILITER(S): at 18:08

## 2018-11-10 RX ADMIN — Medication 20 MILLILITER(S): at 11:31

## 2018-11-10 RX ADMIN — GABAPENTIN 300 MILLIGRAM(S): 400 CAPSULE ORAL at 13:11

## 2018-11-10 RX ADMIN — Medication 30 MILLILITER(S): at 05:56

## 2018-11-10 RX ADMIN — Medication 1 DROP(S): at 13:11

## 2018-11-10 RX ADMIN — Medication 30 MILLILITER(S): at 13:12

## 2018-11-10 RX ADMIN — HEPARIN SODIUM 5000 UNIT(S): 5000 INJECTION INTRAVENOUS; SUBCUTANEOUS at 05:56

## 2018-11-10 RX ADMIN — METHADONE HYDROCHLORIDE 5 MILLIGRAM(S): 40 TABLET ORAL at 05:56

## 2018-11-10 RX ADMIN — SERTRALINE 50 MILLIGRAM(S): 25 TABLET, FILM COATED ORAL at 13:11

## 2018-11-10 RX ADMIN — SODIUM CHLORIDE 100 MILLILITER(S): 9 INJECTION INTRAMUSCULAR; INTRAVENOUS; SUBCUTANEOUS at 13:12

## 2018-11-10 RX ADMIN — Medication 1 DROP(S): at 18:08

## 2018-11-10 RX ADMIN — HEPARIN SODIUM 5000 UNIT(S): 5000 INJECTION INTRAVENOUS; SUBCUTANEOUS at 13:12

## 2018-11-10 RX ADMIN — Medication 1 DROP(S): at 05:57

## 2018-11-10 RX ADMIN — Medication 1 DROP(S): at 23:16

## 2018-11-10 RX ADMIN — Medication 30 MILLILITER(S): at 23:16

## 2018-11-10 RX ADMIN — GABAPENTIN 300 MILLIGRAM(S): 400 CAPSULE ORAL at 05:56

## 2018-11-10 RX ADMIN — Medication 25 MILLIGRAM(S): at 18:08

## 2018-11-10 RX ADMIN — Medication 25 MILLIGRAM(S): at 05:56

## 2018-11-10 RX ADMIN — SODIUM CHLORIDE 100 MILLILITER(S): 9 INJECTION INTRAMUSCULAR; INTRAVENOUS; SUBCUTANEOUS at 05:56

## 2018-11-10 NOTE — CONSULT NOTE ADULT - SUBJECTIVE AND OBJECTIVE BOX
HPI:    63 M with PMH of HTN, colon ca (s/p resection 2017), locally advanced pancreatic ca (MSI high, on immunotherapy nivolumab/ipilimumab, currently on Nivolumab only last dose ) with stable/improving disease initially presented on  with nausea, headache, and right forehead/scalp rash.  This was associated with headache and mild neck pain, fevers/chills. He was admitted and treated for localized herpes zoster rash to the scalp. He was started on Acyclovir then switched to PO Valacyclovir on  which was shortly thereafter discontinued () due to NICOLE. His creatinine has been worsening from 3.8->4.8-> 5.6 (11/10). Patient seen and examined at bedside today. He currently reports scalp burning pain, but is not as bad as it previously was. He feels he is somewhat confused. This morning he was found to have urinary retention and is now s/p straight catheterization that drained > 700 mL urine. Nephrology was consulted to help in the evaluation of worsening creatinine.    PAST MEDICAL & SURGICAL HISTORY:  Chronic pain: neck, lower back  Enlarged lymph node  Pancreatic cancer  Colon cancer  Cancer of pancreas  Hypertension, unspecified type: diet control  Smoking  Cervical disc disease  Vertigo  Colon cancer  Injury: forehead &amp; had sutures (  )  Obesity  Head ache  Gastric perforation  H/O exploratory laparotomy  H/O colectomy  S/P colon resection: 2017  H/O cervical spine surgery: fusion -  with plates and screws      Review of Systems:  Constitutional: [ ] Fevers [ ] Weight changes  HEENT: [ ] Visual Disturbances [ ] Nasal congestion  CV: [ ] Chest pain [ ] Palpitations  Resp:  [ ] Cough [ ] Shortness of breath  GI:  [ ] Nausea [ ] Vomiting [ ] Diarrhea [ ] Constipation [ ] Abd pain  : [ ] Dysuria [ ] Hematuria  Musculoskeletal: [ ] Myalgias [ ] Weakness  Skin: [x] Rash [x] Itch [x] burning pain  Neurological:  [ ] Headache [ ] Dizziness [ ] Numbness  Psychiatric: [ ] Anxiety [ ] Depression  Hematologic/Lymphatic: [ ] Bleeding [ ] Enlarged Lymph Nodes  Allergic/Immunologic: [ ] Nasal discharge [ ] Hives  * Note all systems were reviewed as above; those not marked with an "x" are negative    MEDICATIONS  (STANDING):  artificial tears (preservative free) Ophthalmic Solution 1 Drop(s) Right EYE every 6 hours  bismuth subsalicylate Liquid 30 milliLiter(s) Oral four times a day  finasteride 5 milliGRAM(s) Oral daily  gabapentin 300 milliGRAM(s) Oral daily  heparin  Injectable 5000 Unit(s) SubCutaneous every 8 hours  metoprolol tartrate 25 milliGRAM(s) Oral two times a day  sertraline 50 milliGRAM(s) Oral daily  sodium chloride 0.9%. 1000 milliLiter(s) (100 mL/Hr) IV Continuous <Continuous>  tamsulosin 0.4 milliGRAM(s) Oral at bedtime    MEDICATIONS  (PRN):  acetaminophen   Tablet .. 650 milliGRAM(s) Oral every 6 hours PRN Moderate Pain (4 - 6)  ALPRAZolam 0.5 milliGRAM(s) Oral every 8 hours PRN Anxiety  HYDROmorphone   Tablet 2 milliGRAM(s) Oral every 4 hours PRN Severe Pain (7 - 10)  metoclopramide Injectable 10 milliGRAM(s) IV Push every 6 hours PRN Nausea      Allergies  No Known Allergies    Intolerances        SOCIAL HISTORY:  FAMILY HISTORY:  No pertinent family history in first degree relatives      Vital Signs Last 24 Hrs  T(C): 36.3 (10 Nov 2018 12:17), Max: 36.9 (2018 21:15)  T(F): 97.4 (10 Nov 2018 12:17), Max: 98.5 (2018 21:15)  HR: 73 (10 Nov 2018 18:06) (68 - 83)  BP: 133/76 (10 Nov 2018 18:06) (108/60 - 133/76)  BP(mean): --  RR: 18 (10 Nov 2018 12:17) (18 - 18)  SpO2: 97% (10 Nov 2018 12:17) (95% - 97%)    PHYSICAL EXAM:  Constitutional: well developed, in no acute distress, but mildly confused  Eyes: Anicteric  HENT: Right scalp scabbed over lesions  Neck: No anterior neck masses appreciated.   Pulmonary: Clear to auscultation bilaterally  Cardiac: RRR, no murmurs  Abdomen: vertical surgical scar present, soft and nontender, non-distended  Lymphatic: No cervical, supraclavicular lymphadenopathy appreciated  Neurology: Grossly intact, AAOx2    LABS:                        11.8   3.94  )-----------( 144      ( 10 Nov 2018 06:13 )             34.4     11-10    133<L>  |  102  |  27<H>  ----------------------------<  84  4.9   |  14<L>  |  5.56<H>    Ca    8.9      10 Nov 2018 06:13  Phos  5.2     11-10  Mg     1.9     11-10    TPro  6.1  /  Alb  3.3  /  TBili  0.3  /  DBili  x   /  AST  28  /  ALT  21  /  AlkPhos  90  11-10      Urinalysis Basic - ( 2018 10:58 )    Color: YELLOW / Appearance: CLEAR / S.012 / pH: 6.0  Gluc: NEGATIVE / Ketone: TRACE  / Bili: NEGATIVE / Urobili: NORMAL   Blood: NEGATIVE / Protein: 200 / Nitrite: NEGATIVE   Leuk Esterase: NEGATIVE / RBC: 3-5 / WBC 3-5   Sq Epi: FEW / Non Sq Epi: x / Bacteria: NEGATIVE

## 2018-11-10 NOTE — CHART NOTE - NSCHARTNOTEFT_GEN_A_CORE
Pt. found to have urinary retention on bladder scan to 790 mL this AM, mostly 2/2 to post-obstructive NICOLE (Creatinine elevated from 0.8 on 11/6 to 5.6 this AM). Nurse attempted bedside straight cath with a 16' Ug[place]; however, was unable to drain urine due to resistance met around the prostate. MD also attempted straight cath with 16' Ug[place]; met with resistance. Urology contacted to attempt a straight cath.    Eli Blackwell, PGY1  Internal Medicine

## 2018-11-10 NOTE — PROGRESS NOTE ADULT - PROBLEM SELECTOR PLAN 8
- C/w losartan and metoprolol  - Given the hypotensive episodes, metoprolol decreased from 50mg BID to 25mg BID - C/w metoprolol; losartan dc'd on 11/10 as per renal  - Given the hypotensive episodes, metoprolol decreased from 50mg BID to 25mg BID

## 2018-11-10 NOTE — PROCEDURE NOTE - NSURITECHNIQUE_GU_A_CORE
The collection bag is below the level of the patient and urinary bladder/Proper hand hygiene was performed/A sterile drape was used to cover all adjacent areas/The site was cleaned with soap/water and sterile solution (betadine)/The catheter was secured with a securement device (e.g. StatLock)/The urinary drainage system is closed at the end of the procedure/All applicable medical record documentation is completed/Sterile gloves were worn for the duration of the procedure/The catheter was appropriately lubricated

## 2018-11-10 NOTE — PROGRESS NOTE ADULT - ASSESSMENT
64 y/o M with PMH colon ca (s/p resection June 2017), pancreatic ca (s/p chemo, ended April 2018), currently on nivoluma immunotherapy (once every 2 weeks since the last 6 months; last dose on 11/2/18), and HTN presented with nausea, headache, and right forehead/scalp rash since Saturday. Rash mostly secondary to herpes zoster shingles. 64 y/o M with PMH colon ca (s/p resection June 2017), pancreatic ca (s/p chemo, ended April 2018), currently on nivoluma immunotherapy (once every 2 weeks since the last 6 months; last dose on 11/2/18), and HTN presented with nausea, headache, and right forehead/scalp rash since Saturday. Rash mostly secondary to herpes zoster shingles. Now with NICOLE likely post-obstructive and acyclovir induced

## 2018-11-10 NOTE — CONSULT NOTE ADULT - SUBJECTIVE AND OBJECTIVE BOX
[facility] DIVISION OF KIDNEY DISEASES AND HYPERTENSION -- INITIAL CONSULT NOTE  --------------------------------------------------------------------------------  HPI:    64 y/o M with PMH colon ca (s/p resection June 2017), pancreatic ca (s/p chemo, ended April 2018), currently on nivoluma immunotherapy (once every 2 weeks since the last 6 months; last dose on 11/2/18), and HTN presented with nausea, headache, and right forehead/scalp rash. Renal called for evaluation of NICOLE.    Pt was assessed to have shingles and started on Acyclovir. Switched to PO Valacyclovir  on 11/7. Valacyclovir discontinued on 11/8 2/2 worsening creatinine. Scr went up from 0.8 on presentation to 3.2 on 11/08. SCr has progressively worsened to 3.8->4.8-> 5.6 (11/10). Upon chart review, pt had two CT scans with IV contrast on 11/05 and 11/06.  Pt received ZOfran, vanco and Zosyn in the ED. Also noted drop in BP to 88/42 on 11/07. Ct abdomen showed b/l renal cysts.     --------------------------------------------------------------------------------  PAST MEDICAL & SURGICAL HISTORY:  Chronic pain: neck, lower back  Enlarged lymph node  Pancreatic cancer  Colon cancer  Cancer of pancreas  Hypertension, unspecified type: diet control  Smoking  Cervical disc disease  Vertigo  Colon cancer  Injury: forehead &amp; had sutures ( 2013 )  Obesity  Head ache  Gastric perforation  H/O exploratory laparotomy  H/O colectomy  S/P colon resection: 6/2017  H/O cervical spine surgery: fusion - 2014 with plates and screws    FAMILY HISTORY:  No pertinent family history in first degree relatives    PAST SOCIAL HISTORY:    ALLERGIES & MEDICATIONS  --------------------------------------------------------------------------------  Allergies    No Known Allergies    Intolerances      Standing Inpatient Medications  artificial tears (preservative free) Ophthalmic Solution 1 Drop(s) Right EYE every 6 hours  bismuth subsalicylate Liquid 30 milliLiter(s) Oral four times a day  finasteride 5 milliGRAM(s) Oral daily  gabapentin 300 milliGRAM(s) Oral daily  heparin  Injectable 5000 Unit(s) SubCutaneous every 8 hours  lidocaine 2% Jelly 20 milliLiter(s) IntraUrethral once  losartan 25 milliGRAM(s) Oral daily  methadone    Tablet 5 milliGRAM(s) Oral every 8 hours  metoprolol tartrate 25 milliGRAM(s) Oral two times a day  sertraline 50 milliGRAM(s) Oral daily  sodium chloride 0.9%. 1000 milliLiter(s) IV Continuous <Continuous>  tamsulosin 0.4 milliGRAM(s) Oral at bedtime    PRN Inpatient Medications  acetaminophen   Tablet .. 650 milliGRAM(s) Oral every 6 hours PRN  ALPRAZolam 0.5 milliGRAM(s) Oral every 8 hours PRN  HYDROmorphone   Tablet 2 milliGRAM(s) Oral every 4 hours PRN  metoclopramide Injectable 10 milliGRAM(s) IV Push every 6 hours PRN      REVIEW OF SYSTEMS  --------------------------------------------------------------------------------  Gen: No weight changes, fatigue, fevers/chills, weakness  Skin: No rashes  Head/Eyes/Ears/Mouth: No headache; Normal hearing; Normal vision w/o blurriness; No sinus pain/discomfort, sore throat  Respiratory: No dyspnea, cough, wheezing, hemoptysis  CV: No chest pain, PND, orthopnea  GI: No abdominal pain, diarrhea, constipation, nausea, vomiting, melena, hematochezia  : No increased frequency, dysuria, hematuria, nocturia  MSK: No joint pain/swelling; no back pain; no edema  Neuro: No dizziness/lightheadedness, weakness, seizures, numbness, tingling  Heme: No easy bruising or bleeding  Endo: No heat/cold intolerance  Psych: No significant nervousness, anxiety, stress, depression    All other systems were reviewed and are negative, except as noted.    VITALS/PHYSICAL EXAM  --------------------------------------------------------------------------------  T(C): 36.7 (11-10-18 @ 05:17), Max: 37.1 (11-09-18 @ 13:52)  HR: 70 (11-10-18 @ 05:17) (70 - 93)  BP: 128/73 (11-10-18 @ 05:17) (116/55 - 128/73)  RR: 18 (11-10-18 @ 05:17) (18 - 18)  SpO2: 95% (11-10-18 @ 05:17) (95% - 96%)  Wt(kg): --        11-09-18 @ 07:01  -  11-10-18 @ 07:00  --------------------------------------------------------  IN: 1797 mL / OUT: 830 mL / NET: 967 mL    11-10-18 @ 07:01  -  11-10-18 @ 10:48  --------------------------------------------------------  IN: 0 mL / OUT: 100 mL / NET: -100 mL      Physical Exam:  	Gen: NAD, well-appearing  	HEENT: PERRL, supple neck, clear oropharynx  	Pulm: CTA B/L  	CV: RRR, S1S2; no rub  	Back: No spinal or CVA tenderness; no sacral edema  	Abd: +BS, soft, nontender/nondistended  	: No suprapubic tenderness  	UE: Warm, FROM, no clubbing, intact strength; no edema; no asterixis  	LE: Warm, FROM, no clubbing, intact strength; no edema  	Neuro: No focal deficits, intact gait  	Psych: Normal affect and mood  	Skin: Warm, without rashes  	Vascular access:    LABS/STUDIES  --------------------------------------------------------------------------------              11.8   3.94  >-----------<  144      [11-10-18 @ 06:13]              34.4     133  |  102  |  27  ----------------------------<  84      [11-10-18 @ 06:13]  4.9   |  14  |  5.56        Ca     8.9     [11-10-18 @ 06:13]      Mg     1.9     [11-10-18 @ 06:13]      Phos  5.2     [11-10-18 @ 06:13]    TPro  6.1  /  Alb  3.3  /  TBili  0.3  /  DBili  x   /  AST  28  /  ALT  21  /  AlkPhos  90  [11-10-18 @ 06:13]          Creatinine Trend:  SCr 5.56 [11-10 @ 06:13]  SCr 4.85 [11-09 @ 06:00]  SCr 3.83 [11-08 @ 11:49]  SCr 3.22 [11-08 @ 05:51]  SCr 0.99 [11-07 @ 06:15]    Urinalysis - [11-09-18 @ 10:58]      Color YELLOW / Appearance CLEAR / SG 1.012 / pH 6.0      Gluc NEGATIVE / Ketone TRACE  / Bili NEGATIVE / Urobili NORMAL       Blood NEGATIVE / Protein 200 / Leuk Est NEGATIVE / Nitrite NEGATIVE      RBC 3-5 / WBC 3-5 / Hyaline 1+ / Gran  / Sq Epi FEW / Non Sq Epi  / Bacteria NEGATIVE    Urine Creatinine 92.60      [11-09-18 @ 10:58]  Urine Sodium 64      [11-09-18 @ 10:58]  Urine Urea Nitrogen 241.6      [11-09-18 @ 10:58]  Urine Chloride 57      [11-09-18 @ 10:58]  Urine Osmolality 287      [11-09-18 @ 10:58]    HbA1c 5.6      [03-04-14 @ 12:52]

## 2018-11-10 NOTE — PROGRESS NOTE ADULT - PROBLEM SELECTOR PLAN 2
- Pt. reports multiple episodes of non-bloody loose stools since last night; endorses mild belly pain from it  - WBC remains at 4.8   - C. Diff negative.  - F/u GI PCR, Stool Clx, and O&P.  - C/w pepto-bismol to treat sxs. - Pt. reports multiple episodes of non-bloody loose stools; endorses mild belly pain from it. Currently improving.  - WBC remains ~4  - C. Diff and stool culture negative.  - F/u GI PCR and O&P.  - C/w pepto-bismol to treat sxs. Resolved. Previously reported multiple episodes of non-bloody loose stools; endorses mild belly pain from it.   - WBC remains ~4  - C. Diff and stool culture negative.  - F/u GI PCR and O&P.  - C/w pepto-bismol to treat sxs.

## 2018-11-10 NOTE — PROVIDER CONTACT NOTE (OTHER) - ASSESSMENT
Pt. with difficulty urinating, voided 100ml, bladder scans shows 793ml post void residual, attempted straight cath, unable to advance catheter, resistance met

## 2018-11-10 NOTE — CONSULT NOTE ADULT - PROBLEM SELECTOR RECOMMENDATION 9
Pt with normal baseline kidney function. Scr 0.8 on presentation. Scr now elevated at 5.56. Pt s/p IV contrast administration x2. Also received IV acyclovir. Bladder scan this AM with ~790 cc. NICOLE likely multifactorial; in setting of contrast administration, hemodynamic with hypotension, nephrotoxicity from acyclovir? Recommend elizabeth. Obtain renal US with doppler. Avoid NSAIDS, RCA and nephrotoxins. Pt with normal baseline kidney function. Scr 0.8 on presentation. Scr now elevated at 5.56. Pt s/p IV contrast administration x2. Also received IV acyclovir. Bladder scan this AM with ~790 cc. NICOLE likely multifactorial; in setting of contrast administration, hemodynamic with hypotension and ARB use, nephrotoxicity from acyclovir? Recommend elizabeth. Obtain renal US with doppler. D/c Losartan. Avoid NSAIDS, RCA and nephrotoxins.

## 2018-11-10 NOTE — CONSULT NOTE ADULT - ASSESSMENT
64 y/o M with PMH colon ca (s/p resection June 2017), pancreatic ca (s/p chemo, ended April 2018), currently on nivoluma immunotherapy (once every 2 weeks since the last 6 months; last dose on 11/2/18), and HTN presented with nausea, headache, and right forehead/scalp rash since Saturday( secondary to herpes zoster shingles). Renal called for evaluation of NICOLE.

## 2018-11-10 NOTE — PROGRESS NOTE ADULT - PROBLEM SELECTOR PLAN 4
- QTc at 500  - Had one episode of NBNB vomiting (11/8)  - C/w reglan 10mg q6hrs PRN  - C/w pepto-bismol to treat sxs.  - Pt. has decreased appetite 2/2 to the nausea. FSG in the 60-70       - C/w D5, in addition to the NS       - Monitor FSG q6 hrs - QTc at 500  - Had one episode of NBNB vomiting (11/8); currently resolved  - C/w reglan 10mg q6hrs PRN  - C/w pepto-bismol to treat sxs.

## 2018-11-10 NOTE — PROGRESS NOTE ADULT - PROBLEM SELECTOR PLAN 3
- Creatinine uptrending today from 0.8 to 3.22 today.  - 2/2 to fluid losses from diarrhea vs. acyclovir/valacyclovir vs. immunotherapy (nivolumab)  - May need to be bladder scanned  - C/w NS @ 100 cc/hr  - Valacyclovir discontinued on 11/8 2/2 worsening creatinine  - Monitor BMP - Creatinine continues to uptrend, from 0.8 on 11/6 to 5.6 today.  - 2/2 to fluid losses from diarrhea vs. acyclovir/valacyclovir vs. immunotherapy (nivolumab)  - Currently on elizabeth, given obstructive NICOLE (Pt. found to be retaining urine; unable to straight cath due to obstruction).  - C/w NS @ 100 cc/hr  - Valacyclovir discontinued on 11/8 2/2 worsening creatinine. Losartan discontinued on 11/10. Gabapentin reduced to 300mg QD.  - Monitor BMP  - F/u renal reccs

## 2018-11-10 NOTE — PROGRESS NOTE ADULT - PROBLEM SELECTOR PLAN 6
- s/p chemo (ended April 2018)  - Currently on nivoluma immunotherapy (once every 2 weeks since the last 6 months; last dose on 11/2/18)  - Follows up with Dr. Maloney (Oncologist) - S/p chemo (ended April 2018)  - Currently on nivoluma immunotherapy (once every 2 weeks since the last 6 months; last dose on 11/2/18)  - Follows up with Dr. Maloney (Oncologist)

## 2018-11-10 NOTE — CONSULT NOTE ADULT - ATTENDING COMMENTS
The patient was seen and examined today with the fellow, Dr. Delong, and I agree with the History, Physical Exam and Plan in the record. Labs and imaging reviewed by me. Patient appears well this morning, he notes facial burning is improved. Lopez placed with relief of urinary retention. Appreciate renal recs, low suspicion for immune related toxicity at this time.
Patient examined and ROS reviewed. A case of NICOLE in the setting of IV contrast, IV acyclovir for shingles and immunotherapy for pancreatic cancer. Advised w/u. Fluid balance.

## 2018-11-10 NOTE — CONSULT NOTE ADULT - ASSESSMENT
62 y/o M with PMH colon ca (s/p resection June 2017), pancreatic ca (s/p chemo, ended April 2018), currently on nivolumab immunotherapy (once every 2 weeks since the last 6 months; last dose on 11/2/18) presented with right scalp rash, admitted for herpes zoster infection. He received contrast on admission and started on acyclovir / valacyclovir with worsening creatinine.    Problem 1: Pancreatic Ca  - Currently well controlled disease on Nivolumab  - Last dose 11/2  - Low suspicion for immunotherapy toxicity    Problem 2: NICOLE  - renally adjust meds  - IVF  - Agree with Renal consult    Fernandez Delong  Heme/Onc Fellow PGY4  [phone]

## 2018-11-10 NOTE — PROGRESS NOTE ADULT - PROBLEM SELECTOR PLAN 1
- Red, maculopapular rash with groupings of papules on right forehead and right scalp. Doesn't cross the midline. Now starting to crust.  - Pt. currently immunosuppressed.  - IV Acyclovir 850mg TID with NS at 75 cc/hr --> Switched to PO Valacyclovir 1g q8hrs on 11/7.        - Valacyclovir discontinued on 11/8 2/2 worsening creatinine. Monitor BMP.  - As per Optho, negative for herpes zoster ophthalmicus (pt. complains of itchy right right eye, but no blurriness/photophobia/vision loss).       - C/w artifical tears and cold compresses  - Pain control with gabapentin 300mg TID, methadone, acetaminophen 650mg PRN and dilaudid PRN. - Red, maculopapular rash with groupings of papules on right forehead and right scalp. Doesn't cross the midline. Now starting to crust.  - IV Acyclovir 850mg TID with NS at 75 cc/hr --> Switched to PO Valacyclovir 1g q8hrs on 11/7.        - Valacyclovir discontinued on 11/8 2/2 worsening creatinine. Monitor BMP.  - As per Optho, negative for herpes zoster ophthalmicus (pt. complains of itchy right right eye, but no blurriness/photophobia/vision loss).       - C/w artifical tears and cold compresses  - Pain control with gabapentin 300mg qd, methadone, acetaminophen 650mg PRN and dilaudid PRN. Papules now crusting, s/p acyclovir and valacyclovir, d/c'd due to NICOLE. Will continue to monitor lesions.  - As per Optho, negative for herpes zoster ophthalmicus (pt. complains of itchy right right eye, but no blurriness/photophobia/vision loss).  - C/w artifical tears and cold compresses  - Pain control with gabapentin 300mg qd, methadone, acetaminophen 650mg PRN and dilaudid PRN.

## 2018-11-11 LAB
BUN SERPL-MCNC: 26 MG/DL — HIGH (ref 7–23)
CALCIUM SERPL-MCNC: 8.9 MG/DL — SIGNIFICANT CHANGE UP (ref 8.4–10.5)
CHLORIDE SERPL-SCNC: 106 MMOL/L — SIGNIFICANT CHANGE UP (ref 98–107)
CO2 SERPL-SCNC: 16 MMOL/L — LOW (ref 22–31)
CREAT SERPL-MCNC: 4.28 MG/DL — HIGH (ref 0.5–1.3)
GLUCOSE SERPL-MCNC: 74 MG/DL — SIGNIFICANT CHANGE UP (ref 70–99)
HCT VFR BLD CALC: 32.6 % — LOW (ref 39–50)
HGB BLD-MCNC: 11.1 G/DL — LOW (ref 13–17)
MAGNESIUM SERPL-MCNC: 1.7 MG/DL — SIGNIFICANT CHANGE UP (ref 1.6–2.6)
MCHC RBC-ENTMCNC: 29.4 PG — SIGNIFICANT CHANGE UP (ref 27–34)
MCHC RBC-ENTMCNC: 34 % — SIGNIFICANT CHANGE UP (ref 32–36)
MCV RBC AUTO: 86.2 FL — SIGNIFICANT CHANGE UP (ref 80–100)
NRBC # FLD: 0 — SIGNIFICANT CHANGE UP
PHOSPHATE SERPL-MCNC: 5.2 MG/DL — HIGH (ref 2.5–4.5)
PLATELET # BLD AUTO: 176 K/UL — SIGNIFICANT CHANGE UP (ref 150–400)
PMV BLD: 9.5 FL — SIGNIFICANT CHANGE UP (ref 7–13)
POTASSIUM SERPL-MCNC: 4.5 MMOL/L — SIGNIFICANT CHANGE UP (ref 3.5–5.3)
POTASSIUM SERPL-SCNC: 4.5 MMOL/L — SIGNIFICANT CHANGE UP (ref 3.5–5.3)
RBC # BLD: 3.78 M/UL — LOW (ref 4.2–5.8)
RBC # FLD: 12.9 % — SIGNIFICANT CHANGE UP (ref 10.3–14.5)
SODIUM SERPL-SCNC: 135 MMOL/L — SIGNIFICANT CHANGE UP (ref 135–145)
WBC # BLD: 4.55 K/UL — SIGNIFICANT CHANGE UP (ref 3.8–10.5)
WBC # FLD AUTO: 4.55 K/UL — SIGNIFICANT CHANGE UP (ref 3.8–10.5)

## 2018-11-11 PROCEDURE — 99233 SBSQ HOSP IP/OBS HIGH 50: CPT | Mod: GC

## 2018-11-11 PROCEDURE — 99232 SBSQ HOSP IP/OBS MODERATE 35: CPT | Mod: GC

## 2018-11-11 RX ADMIN — HEPARIN SODIUM 5000 UNIT(S): 5000 INJECTION INTRAVENOUS; SUBCUTANEOUS at 23:31

## 2018-11-11 RX ADMIN — Medication 1 DROP(S): at 12:10

## 2018-11-11 RX ADMIN — Medication 1 DROP(S): at 18:04

## 2018-11-11 RX ADMIN — Medication 30 MILLILITER(S): at 18:03

## 2018-11-11 RX ADMIN — SERTRALINE 50 MILLIGRAM(S): 25 TABLET, FILM COATED ORAL at 12:11

## 2018-11-11 RX ADMIN — Medication 30 MILLILITER(S): at 12:11

## 2018-11-11 RX ADMIN — Medication 1 DROP(S): at 23:31

## 2018-11-11 RX ADMIN — HEPARIN SODIUM 5000 UNIT(S): 5000 INJECTION INTRAVENOUS; SUBCUTANEOUS at 05:34

## 2018-11-11 RX ADMIN — HEPARIN SODIUM 5000 UNIT(S): 5000 INJECTION INTRAVENOUS; SUBCUTANEOUS at 15:04

## 2018-11-11 RX ADMIN — Medication 25 MILLIGRAM(S): at 05:39

## 2018-11-11 RX ADMIN — TAMSULOSIN HYDROCHLORIDE 0.4 MILLIGRAM(S): 0.4 CAPSULE ORAL at 23:30

## 2018-11-11 RX ADMIN — Medication 30 MILLILITER(S): at 05:34

## 2018-11-11 RX ADMIN — FINASTERIDE 5 MILLIGRAM(S): 5 TABLET, FILM COATED ORAL at 12:10

## 2018-11-11 RX ADMIN — Medication 30 MILLILITER(S): at 23:31

## 2018-11-11 RX ADMIN — GABAPENTIN 300 MILLIGRAM(S): 400 CAPSULE ORAL at 12:10

## 2018-11-11 RX ADMIN — Medication 25 MILLIGRAM(S): at 18:03

## 2018-11-11 RX ADMIN — Medication 1 DROP(S): at 05:34

## 2018-11-11 NOTE — PROGRESS NOTE ADULT - PROBLEM SELECTOR PLAN 10
- C/w Hep SubQ - C/w Hep SubQ    Dispo: will attempt TOV today. Creatinine seems to be improving. Continue to hydrate and hold acyclovir. No new lesions seen on exam.     Iván Chen  PGY2  [phone]

## 2018-11-11 NOTE — PROGRESS NOTE ADULT - SUBJECTIVE AND OBJECTIVE BOX
Patient is a 63y old  Male who presents with a chief complaint of Painful facial/scalp rash and nausea (11 Nov 2018 09:23)    SUBJECTIVE / OVERNIGHT EVENTS:  No acute events overnight. Went to see patient at bedside - - laughing uncontrollably. Has intermittent periods of cognitive fluctuation - - lucid and appropriate but will then ask "what are we doing here", " have I met your before"     MEDICATIONS  (STANDING):  artificial tears (preservative free) Ophthalmic Solution 1 Drop(s) Right EYE every 6 hours  bismuth subsalicylate Liquid 30 milliLiter(s) Oral four times a day  finasteride 5 milliGRAM(s) Oral daily  gabapentin 300 milliGRAM(s) Oral daily  heparin  Injectable 5000 Unit(s) SubCutaneous every 8 hours  metoprolol tartrate 25 milliGRAM(s) Oral two times a day  sertraline 50 milliGRAM(s) Oral daily  sodium chloride 0.9%. 1000 milliLiter(s) (100 mL/Hr) IV Continuous <Continuous>  tamsulosin 0.4 milliGRAM(s) Oral at bedtime    MEDICATIONS  (PRN):  acetaminophen   Tablet .. 650 milliGRAM(s) Oral every 6 hours PRN Moderate Pain (4 - 6)  ALPRAZolam 0.5 milliGRAM(s) Oral every 8 hours PRN Anxiety  HYDROmorphone   Tablet 2 milliGRAM(s) Oral every 4 hours PRN Severe Pain (7 - 10)  metoclopramide Injectable 10 milliGRAM(s) IV Push every 6 hours PRN Nausea    CAPILLARY BLOOD GLUCOSE  I&O's Summary    10 Nov 2018 07:01 - 11 Nov 2018 07:00  --------------------------------------------------------  IN: 1560 mL / OUT: 2700 mL / NET: -1140 mL    11 Nov 2018 07:01  -  11 Nov 2018 12:39  --------------------------------------------------------  IN: 0 mL / OUT: 2000 mL / NET: -2000 mL    PHYSICAL EXAM  GENERAL: NAD, well-developed. Mentating appropriately intermittently   HEAD:  Atraumatic, Normocephalic  EYES: EOMI, PERRLA, conjunctiva and sclera clear  NECK: Supple, No JVD  CHEST/LUNG: Clear to auscultation bilaterally; No wheeze  HEART: Regular rate and rhythm; No murmur  ABDOMEN: Soft, Nontender, Nondistended; Bowel sounds present  EXTREMITIES:  2+ Peripheral Pulses, No clubbing, cyanosis, or edema  PSYCH: AAOx3  SKIN: right sided forehead lesions, appears to be scabbing over     LABS:                        11.1   4.55  )-----------( 176      ( 11 Nov 2018 06:20 )             32.6     11-11    135  |  106  |  26<H>  ----------------------------<  74  4.5   |  16<L>  |  4.28<H>    Ca    8.9      11 Nov 2018 06:20  Phos  5.2     11-11  Mg     1.7     11-11    TPro  6.1  /  Alb  3.3  /  TBili  0.3  /  DBili  x   /  AST  28  /  ALT  21  /  AlkPhos  90  11-10    RADIOLOGY & ADDITIONAL TESTS:  No new imaging     Imaging Personally Reviewed:  Consultant(s) Notes Reviewed:    Care Discussed with Consultants/Other Providers: Patient is a 63y old  Male who presents with a chief complaint of Painful facial/scalp rash and nausea (11 Nov 2018 09:23)    SUBJECTIVE / OVERNIGHT EVENTS:  No acute events overnight. Went to see patient at bedside - - laughing uncontrollably. Has intermittent periods of cognitive fluctuation - - lucid and appropriate but will then ask "what are we doing here", " have I met your before"     MEDICATIONS  (STANDING):  artificial tears (preservative free) Ophthalmic Solution 1 Drop(s) Right EYE every 6 hours  bismuth subsalicylate Liquid 30 milliLiter(s) Oral four times a day  finasteride 5 milliGRAM(s) Oral daily  gabapentin 300 milliGRAM(s) Oral daily  heparin  Injectable 5000 Unit(s) SubCutaneous every 8 hours  metoprolol tartrate 25 milliGRAM(s) Oral two times a day  sertraline 50 milliGRAM(s) Oral daily  sodium chloride 0.9%. 1000 milliLiter(s) (100 mL/Hr) IV Continuous <Continuous>  tamsulosin 0.4 milliGRAM(s) Oral at bedtime    MEDICATIONS  (PRN):  acetaminophen   Tablet .. 650 milliGRAM(s) Oral every 6 hours PRN Moderate Pain (4 - 6)  ALPRAZolam 0.5 milliGRAM(s) Oral every 8 hours PRN Anxiety  HYDROmorphone   Tablet 2 milliGRAM(s) Oral every 4 hours PRN Severe Pain (7 - 10)  metoclopramide Injectable 10 milliGRAM(s) IV Push every 6 hours PRN Nausea    CAPILLARY BLOOD GLUCOSE  I&O's Summary    10 Nov 2018 07:01 - 11 Nov 2018 07:00  --------------------------------------------------------  IN: 1560 mL / OUT: 2700 mL / NET: -1140 mL    11 Nov 2018 07:01  -  11 Nov 2018 12:39  --------------------------------------------------------  IN: 0 mL / OUT: 2000 mL / NET: -2000 mL    Vital Signs Last 24 Hrs  T(C): 36.3 (11 Nov 2018 14:51), Max: 36.8 (10 Nov 2018 22:10)  T(F): 97.4 (11 Nov 2018 14:51), Max: 98.3 (10 Nov 2018 22:10)  HR: 67 (11 Nov 2018 14:51) (67 - 85)  BP: 141/65 (11 Nov 2018 14:51) (133/76 - 154/85)  BP(mean): --  RR: 18 (11 Nov 2018 14:51) (18 - 18)  SpO2: 95% (11 Nov 2018 14:51) (95% - 98%)    PHYSICAL EXAM  GENERAL: NAD, well-developed. Mentating appropriately intermittently   HEAD:  Atraumatic, Normocephalic  EYES: EOMI, PERRLA, conjunctiva and sclera clear  NECK: Supple, No JVD  CHEST/LUNG: Clear to auscultation bilaterally; No wheeze  HEART: Regular rate and rhythm; No murmur  ABDOMEN: Soft, Nontender, Nondistended; Bowel sounds present  EXTREMITIES:  2+ Peripheral Pulses, No clubbing, cyanosis, or edema  PSYCH: AAOx3  SKIN: right sided forehead lesions, appears to be scabbing over     LABS:                        11.1   4.55  )-----------( 176      ( 11 Nov 2018 06:20 )             32.6     11-11    135  |  106  |  26<H>  ----------------------------<  74  4.5   |  16<L>  |  4.28<H>    Ca    8.9      11 Nov 2018 06:20  Phos  5.2     11-11  Mg     1.7     11-11    TPro  6.1  /  Alb  3.3  /  TBili  0.3  /  DBili  x   /  AST  28  /  ALT  21  /  AlkPhos  90  11-10    RADIOLOGY & ADDITIONAL TESTS:  No new imaging     Imaging Personally Reviewed:  Consultant(s) Notes Reviewed:    Care Discussed with Consultants/Other Providers:

## 2018-11-11 NOTE — PROGRESS NOTE ADULT - PROBLEM SELECTOR PLAN 1
- Papules now crusting, s/p acyclovir and valacyclovir, d/c'd due to NICOLE. Will continue to monitor lesions.  - As per Optho, negative for herpes zoster ophthalmicus (pt. complains of itchy right right eye, but no blurriness/photophobia/vision loss).  - C/w artifical tears and cold compresses  - Pain control with gabapentin 300mg qd, methadone, acetaminophen 650mg PRN and dilaudid PRN.

## 2018-11-11 NOTE — PROGRESS NOTE ADULT - PROBLEM SELECTOR PLAN 1
Pt with normal baseline kidney function. Scr 0.8 on presentation. Scr now elevated at 5.56. Pt s/p IV contrast administration x2. Also received IV acyclovir. Bladder scan this AM with ~790 cc. NICOLE likely multifactorial; in setting of contrast administration, hemodynamic with hypotension and ARB use, nephrotoxicity from acyclovir? Patient non-oliguric with improvement of Scr to 4.28 today (11/11/18). Obtain renal US with doppler. Losartan held. Avoid NSAIDS, ACE-I/ARBs, RCAs and other nephrotoxins

## 2018-11-11 NOTE — PROGRESS NOTE ADULT - PROBLEM SELECTOR PLAN 3
- Creatinine continues to uptrend, from 0.8 on 11/6 to 5.6 today.  - 2/2 to fluid losses from diarrhea vs. acyclovir/valacyclovir vs. immunotherapy (nivolumab)  - Currently on elizabeth, given obstructive NICOLE (Pt. found to be retaining urine; unable to straight cath due to obstruction).  - C/w NS @ 100 cc/hr  - Valacyclovir discontinued on 11/8 2/2 worsening creatinine. Losartan discontinued on 11/10. Gabapentin reduced to 300mg QD.  - Monitor BMP  - F/u renal reccs - Creatinine trending down today for the first time Cr 5.6 -- >4.3   - 2/2 to fluid losses from diarrhea vs. acyclovir/valacyclovir vs. immunotherapy (nivolumab)  - Will attempt TOV at midnight today and d/c elizabeth   - C/w NS @ 100 cc/hr  - Valacyclovir discontinued on 11/8 2/2 worsening creatinine. Losartan discontinued on 11/10. Gabapentin reduced to 300mg QD.  - Monitor BMP  - F/u renal reccs

## 2018-11-11 NOTE — PROGRESS NOTE ADULT - ASSESSMENT
64 y/o M with PMH colon ca (s/p resection June 2017), pancreatic ca (s/p chemo, ended April 2018), currently on nivoluma immunotherapy (once every 2 weeks since the last 6 months; last dose on 11/2/18), and HTN presented with nausea, headache, and right forehead/scalp rash since Saturday. Rash mostly secondary to herpes zoster shingles. Now with NICOLE likely post-obstructive and acyclovir induced

## 2018-11-11 NOTE — PROGRESS NOTE ADULT - PROBLEM SELECTOR PLAN 8
- C/w metoprolol; losartan dc'd on 11/10 as per renal  - Given the hypotensive episodes, metoprolol decreased from 50mg BID to 25mg BID

## 2018-11-11 NOTE — PROGRESS NOTE ADULT - PROBLEM SELECTOR PLAN 4
- QTc at 500  - Had one episode of NBNB vomiting (11/8); currently resolved  - C/w reglan 10mg q6hrs PRN  - C/w pepto-bismol to treat sxs.

## 2018-11-11 NOTE — PROGRESS NOTE ADULT - ASSESSMENT
62 y/o M with PMH colon ca (s/p resection June 2017), pancreatic ca (s/p chemo, ended April 2018), currently on nivoluma immunotherapy (once every 2 weeks since the last 6 months; last dose on 11/2/18), and HTN presented with nausea, headache, and right forehead/scalp rash since Saturday (secondary to herpes zoster shingles). Renal called for evaluation of NICOLE.

## 2018-11-11 NOTE — PROGRESS NOTE ADULT - SUBJECTIVE AND OBJECTIVE BOX
[facility] DIVISION OF KIDNEY DISEASES AND HYPERTENSION -- FOLLOW UP NOTE  --------------------------------------------------------------------------------  HPI: 64 y/o M with PMH colon ca (s/p resection June 2017), pancreatic ca (s/p chemo, ended April 2018), currently on nivoluma immunotherapy (once every 2 weeks since the last 6 months; last dose on 11/2/18), and HTN presented with nausea, headache, and right forehead/scalp rash. Renal called for evaluation of NICOLE.    Pt was assessed to have shingles and started on Acyclovir. Switched to PO Valacyclovir  on 11/7. Valacyclovir discontinued on 11/8 2/2 worsening creatinine. Scr went up from 0.8 on presentation to 3.2 on 11/08. SCr has progressively worsened to 3.8->4.8-> 5.6 (11/10). Upon chart review, pt had two CT scans with IV contrast on 11/05 and 11/06.  Pt received ZOfran, vanco and Zosyn in the ED. Also noted drop in BP to 88/42 on 11/07. Ct abdomen showed b/l renal cysts.     Patient seen and examined at bedside this AM. Patient states that he feels well, and improved since being admitted to the hospital. Noted to have significant urine output. Patient diego MORALES, SOB, N/V/F/C.    PAST HISTORY  --------------------------------------------------------------------------------  No significant changes to PMH, PSH, FHx, SHx, unless otherwise noted    ALLERGIES & MEDICATIONS  --------------------------------------------------------------------------------  Allergies    No Known Allergies    Intolerances    Standing Inpatient Medications  artificial tears (preservative free) Ophthalmic Solution 1 Drop(s) Right EYE every 6 hours  bismuth subsalicylate Liquid 30 milliLiter(s) Oral four times a day  finasteride 5 milliGRAM(s) Oral daily  gabapentin 300 milliGRAM(s) Oral daily  heparin  Injectable 5000 Unit(s) SubCutaneous every 8 hours  metoprolol tartrate 25 milliGRAM(s) Oral two times a day  sertraline 50 milliGRAM(s) Oral daily  sodium chloride 0.9%. 1000 milliLiter(s) IV Continuous <Continuous>  tamsulosin 0.4 milliGRAM(s) Oral at bedtime    REVIEW OF SYSTEMS  --------------------------------------------------------------------------------  Gen: No lethargy  Respiratory: No dyspnea  CV: No chest pain  GI: No abdominal pain  MSK: No LE edema  Neuro: No dizziness  Heme: No bleeding    All other systems were reviewed and are negative, except as noted.    VITALS/PHYSICAL EXAM  --------------------------------------------------------------------------------  T(C): 36.4 (11-11-18 @ 05:27), Max: 36.8 (11-10-18 @ 22:10)  HR: 85 (11-11-18 @ 05:27) (68 - 85)  BP: 154/85 (11-11-18 @ 05:27) (108/60 - 154/85)  RR: 18 (11-11-18 @ 05:27) (18 - 18)  SpO2: 98% (11-11-18 @ 05:27) (97% - 98%)  Wt(kg): --    11-10-18 @ 07:01  -  11-11-18 @ 07:00  --------------------------------------------------------  IN: 1560 mL / OUT: 2700 mL / NET: -1140 mL    11-11-18 @ 07:01  -  11-11-18 @ 09:24  --------------------------------------------------------  IN: 0 mL / OUT: 2000 mL / NET: -2000 mL    Physical Exam:  	Gen: NAD, well-appearing  	HEENT: Dry MM, supple neck  	Pulm: CTA B/L  	CV: RRR, S1S2; no rub  	Abd: +BS, soft, nontender/nondistended  	: No suprapubic tenderness  	LE: No edema  	Neuro: No focal deficits, intact gait  	Psych: Normal affect and mood  	Skin: Warm, without rashes    LABS/STUDIES  --------------------------------------------------------------------------------              11.1   4.55  >-----------<  176      [11-11-18 @ 06:20]              32.6     135  |  106  |  26  ----------------------------<  74      [11-11-18 @ 06:20]  4.5   |  16  |  4.28        Ca     8.9     [11-11-18 @ 06:20]      Mg     1.7     [11-11-18 @ 06:20]      Phos  5.2     [11-11-18 @ 06:20]    TPro  6.1  /  Alb  3.3  /  TBili  0.3  /  DBili  x   /  AST  28  /  ALT  21  /  AlkPhos  90  [11-10-18 @ 06:13]    Creatinine Trend:  SCr 4.28 [11-11 @ 06:20]  SCr 5.56 [11-10 @ 06:13]  SCr 4.85 [11-09 @ 06:00]  SCr 3.83 [11-08 @ 11:49]  SCr 3.22 [11-08 @ 05:51]    Urinalysis - [11-09-18 @ 10:58]      Color YELLOW / Appearance CLEAR / SG 1.012 / pH 6.0      Gluc NEGATIVE / Ketone TRACE  / Bili NEGATIVE / Urobili NORMAL       Blood NEGATIVE / Protein 200 / Leuk Est NEGATIVE / Nitrite NEGATIVE      RBC 3-5 / WBC 3-5 / Hyaline 1+ / Gran  / Sq Epi FEW / Non Sq Epi  / Bacteria NEGATIVE    Urine Creatinine 92.60      [11-09-18 @ 10:58]  Urine Sodium 64      [11-09-18 @ 10:58]  Urine Urea Nitrogen 241.6      [11-09-18 @ 10:58]  Urine Chloride 57      [11-09-18 @ 10:58]  Urine Osmolality 287      [11-09-18 @ 10:58]    HbA1c 5.6      [03-04-14 @ 12:52]

## 2018-11-11 NOTE — PROGRESS NOTE ADULT - PROBLEM SELECTOR PLAN 2
- Resolved. Previously reported multiple episodes of non-bloody loose stools; endorses mild belly pain from it.   - C. Diff and stool culture negative.  - F/u GI PCR and O&P.  - C/w pepto-bismol to treat sxs.

## 2018-11-12 LAB
BUN SERPL-MCNC: 21 MG/DL — SIGNIFICANT CHANGE UP (ref 7–23)
CALCIUM SERPL-MCNC: 9.4 MG/DL — SIGNIFICANT CHANGE UP (ref 8.4–10.5)
CHLORIDE SERPL-SCNC: 100 MMOL/L — SIGNIFICANT CHANGE UP (ref 98–107)
CO2 SERPL-SCNC: 15 MMOL/L — LOW (ref 22–31)
CREAT SERPL-MCNC: 2.35 MG/DL — HIGH (ref 0.5–1.3)
GI PCR PANEL, STOOL: SIGNIFICANT CHANGE UP
GLUCOSE SERPL-MCNC: 73 MG/DL — SIGNIFICANT CHANGE UP (ref 70–99)
HCT VFR BLD CALC: 34.5 % — LOW (ref 39–50)
HGB BLD-MCNC: 11.7 G/DL — LOW (ref 13–17)
MAGNESIUM SERPL-MCNC: 1.5 MG/DL — LOW (ref 1.6–2.6)
MCHC RBC-ENTMCNC: 29.4 PG — SIGNIFICANT CHANGE UP (ref 27–34)
MCHC RBC-ENTMCNC: 33.9 % — SIGNIFICANT CHANGE UP (ref 32–36)
MCV RBC AUTO: 86.7 FL — SIGNIFICANT CHANGE UP (ref 80–100)
NRBC # FLD: 0 — SIGNIFICANT CHANGE UP
PHOSPHATE SERPL-MCNC: 5 MG/DL — HIGH (ref 2.5–4.5)
PLATELET # BLD AUTO: 206 K/UL — SIGNIFICANT CHANGE UP (ref 150–400)
PMV BLD: 9.3 FL — SIGNIFICANT CHANGE UP (ref 7–13)
POTASSIUM SERPL-MCNC: 4.1 MMOL/L — SIGNIFICANT CHANGE UP (ref 3.5–5.3)
POTASSIUM SERPL-SCNC: 4.1 MMOL/L — SIGNIFICANT CHANGE UP (ref 3.5–5.3)
RBC # BLD: 3.98 M/UL — LOW (ref 4.2–5.8)
RBC # FLD: 12.9 % — SIGNIFICANT CHANGE UP (ref 10.3–14.5)
SODIUM SERPL-SCNC: 135 MMOL/L — SIGNIFICANT CHANGE UP (ref 135–145)
SPECIMEN SOURCE: SIGNIFICANT CHANGE UP
WBC # BLD: 4.42 K/UL — SIGNIFICANT CHANGE UP (ref 3.8–10.5)
WBC # FLD AUTO: 4.42 K/UL — SIGNIFICANT CHANGE UP (ref 3.8–10.5)

## 2018-11-12 PROCEDURE — 99232 SBSQ HOSP IP/OBS MODERATE 35: CPT | Mod: GC

## 2018-11-12 RX ORDER — SODIUM CHLORIDE 9 MG/ML
1000 INJECTION INTRAMUSCULAR; INTRAVENOUS; SUBCUTANEOUS
Qty: 0 | Refills: 0 | Status: DISCONTINUED | OUTPATIENT
Start: 2018-11-12 | End: 2018-11-13

## 2018-11-12 RX ORDER — SODIUM CHLORIDE 9 MG/ML
1000 INJECTION INTRAMUSCULAR; INTRAVENOUS; SUBCUTANEOUS
Qty: 0 | Refills: 0 | Status: DISCONTINUED | OUTPATIENT
Start: 2018-11-12 | End: 2018-11-12

## 2018-11-12 RX ORDER — MAGNESIUM SULFATE 500 MG/ML
1 VIAL (ML) INJECTION ONCE
Qty: 0 | Refills: 0 | Status: COMPLETED | OUTPATIENT
Start: 2018-11-12 | End: 2018-11-12

## 2018-11-12 RX ADMIN — Medication 25 MILLIGRAM(S): at 06:52

## 2018-11-12 RX ADMIN — Medication 100 GRAM(S): at 15:47

## 2018-11-12 RX ADMIN — HYDROMORPHONE HYDROCHLORIDE 2 MILLIGRAM(S): 2 INJECTION INTRAMUSCULAR; INTRAVENOUS; SUBCUTANEOUS at 18:45

## 2018-11-12 RX ADMIN — HEPARIN SODIUM 5000 UNIT(S): 5000 INJECTION INTRAVENOUS; SUBCUTANEOUS at 23:52

## 2018-11-12 RX ADMIN — Medication 30 MILLILITER(S): at 12:07

## 2018-11-12 RX ADMIN — Medication 10 MILLIGRAM(S): at 15:47

## 2018-11-12 RX ADMIN — Medication 650 MILLIGRAM(S): at 09:37

## 2018-11-12 RX ADMIN — HYDROMORPHONE HYDROCHLORIDE 2 MILLIGRAM(S): 2 INJECTION INTRAMUSCULAR; INTRAVENOUS; SUBCUTANEOUS at 01:56

## 2018-11-12 RX ADMIN — Medication 30 MILLILITER(S): at 18:02

## 2018-11-12 RX ADMIN — HEPARIN SODIUM 5000 UNIT(S): 5000 INJECTION INTRAVENOUS; SUBCUTANEOUS at 06:52

## 2018-11-12 RX ADMIN — Medication 1 DROP(S): at 06:52

## 2018-11-12 RX ADMIN — HYDROMORPHONE HYDROCHLORIDE 2 MILLIGRAM(S): 2 INJECTION INTRAMUSCULAR; INTRAVENOUS; SUBCUTANEOUS at 23:44

## 2018-11-12 RX ADMIN — SODIUM CHLORIDE 100 MILLILITER(S): 9 INJECTION INTRAMUSCULAR; INTRAVENOUS; SUBCUTANEOUS at 09:38

## 2018-11-12 RX ADMIN — Medication 1 DROP(S): at 12:08

## 2018-11-12 RX ADMIN — SERTRALINE 50 MILLIGRAM(S): 25 TABLET, FILM COATED ORAL at 12:06

## 2018-11-12 RX ADMIN — Medication 25 MILLIGRAM(S): at 18:01

## 2018-11-12 RX ADMIN — Medication 30 MILLILITER(S): at 06:53

## 2018-11-12 RX ADMIN — HYDROMORPHONE HYDROCHLORIDE 2 MILLIGRAM(S): 2 INJECTION INTRAMUSCULAR; INTRAVENOUS; SUBCUTANEOUS at 02:56

## 2018-11-12 RX ADMIN — Medication 1 DROP(S): at 18:01

## 2018-11-12 RX ADMIN — TAMSULOSIN HYDROCHLORIDE 0.4 MILLIGRAM(S): 0.4 CAPSULE ORAL at 23:47

## 2018-11-12 RX ADMIN — SODIUM CHLORIDE 100 MILLILITER(S): 9 INJECTION INTRAMUSCULAR; INTRAVENOUS; SUBCUTANEOUS at 19:18

## 2018-11-12 RX ADMIN — FINASTERIDE 5 MILLIGRAM(S): 5 TABLET, FILM COATED ORAL at 12:06

## 2018-11-12 RX ADMIN — Medication 650 MILLIGRAM(S): at 10:25

## 2018-11-12 RX ADMIN — HEPARIN SODIUM 5000 UNIT(S): 5000 INJECTION INTRAVENOUS; SUBCUTANEOUS at 15:24

## 2018-11-12 RX ADMIN — HYDROMORPHONE HYDROCHLORIDE 2 MILLIGRAM(S): 2 INJECTION INTRAMUSCULAR; INTRAVENOUS; SUBCUTANEOUS at 19:20

## 2018-11-12 RX ADMIN — GABAPENTIN 300 MILLIGRAM(S): 400 CAPSULE ORAL at 12:06

## 2018-11-12 RX ADMIN — Medication 1 DROP(S): at 23:47

## 2018-11-12 NOTE — PROGRESS NOTE ADULT - PROBLEM SELECTOR PLAN 1
- Papules now crusting, s/p acyclovir and valacyclovir, d/c'd due to NICOLE. Will continue to monitor lesions.  - As per Optho, negative for herpes zoster ophthalmicus (pt. complains of itchy right right eye, but no blurriness/photophobia/vision loss).  - C/w artifical tears and cold compresses  - Pain control with gabapentin 300mg qd, acetaminophen 650mg PRN and dilaudid PRN.       - Methadone discontinued due to confusion and NICOLE.

## 2018-11-12 NOTE — PROGRESS NOTE ADULT - PROBLEM SELECTOR PLAN 1
Pt with normal baseline kidney function. Scr 0.8 on presentation. Scr now elevated at 5.56. Pt s/p IV contrast administration x2. Also received IV acyclovir. Bladder scan this AM with ~790 cc. NICOLE likely multifactorial; in setting of contrast administration, hemodynamic with hypotension and ARB use, nephrotoxicity from acyclovir? Patient non-oliguric with improvement of Scr to 2.35 today. Obtain renal US with doppler. Continue to hold Losartan . Avoid NSAIDS, ACE-I/ARBs, RCAs and other nephrotoxins Pt with normal baseline kidney function. Scr 0.8 on presentation. Scr now elevated at 5.56. Pt s/p IV contrast administration x2. Also received IV acyclovir. Bladder scan  with ~790 cc. NICOLE likely multifactorial; in setting of contrast administration, hemodynamic with hypotension and ARB use, nephrotoxicity from acyclovir? with component on obstruction. Patient non-oliguric with elizabeth with improvement of Scr to 2.35 today. Obtain renal US with doppler. Continue to hold Losartan . Avoid NSAIDS, ACE-I/ARBs, RCAs and other nephrotoxins Pt with normal baseline kidney function. Scr 0.8 on presentation. Scr elevated at 5.56. Pt s/p IV contrast administration x2. Also received IV acyclovir. Bladder scan  with ~790 cc. NICOLE likely multifactorial; in setting of contrast administration, hemodynamic with hypotension and ARB use, nephrotoxicity from acyclovir? with component on obstruction. Patient non-oliguric with elizabeth with improvement of Scr to 2.35 today. Obtain renal US with doppler. Continue to hold Losartan . Avoid NSAIDS, ACE-I/ARBs, RCAs and other nephrotoxins

## 2018-11-12 NOTE — PROGRESS NOTE ADULT - PROBLEM SELECTOR PLAN 3
- Creatinine trending down today for the first time Cr 5.6 -- >4.3   - 2/2 to fluid losses from diarrhea vs. acyclovir/valacyclovir vs. immunotherapy (nivolumab)  - Will attempt TOV at midnight today and d/c elizabeth   - C/w NS @ 100 cc/hr  - Valacyclovir discontinued on 11/8 2/2 worsening creatinine. Losartan discontinued on 11/10. Gabapentin reduced to 300mg QD.  - Monitor BMP  - F/u renal reccs - Creatinine trending down 5.6 -- > 4.3 --> 2.4  - 2/2 to fluid losses from diarrhea vs. acyclovir/valacyclovir vs. immunotherapy (nivolumab)  - Will attempt TOV at midnight today and d/c elizabeth  - C/w NS @ 100 cc/hr  - Valacyclovir discontinued on 11/8 2/2 worsening creatinine. Losartan discontinued on 11/10. Gabapentin reduced to 300mg QD.  - Monitor BMP  - F/u renal reccs - Creatinine trending down 5.6 -- > 4.3 --> 2.4  - 2/2 to fluid losses from diarrhea vs. acyclovir/valacyclovir vs. immunotherapy (nivolumab)  - Will attempt TOV today and d/c elizabeth  - C/w NS @ 100 cc/hr  - Valacyclovir discontinued on 11/8 2/2 worsening creatinine. Losartan discontinued on 11/10. Gabapentin reduced to 300mg QD.  - Monitor BMP  - F/u renal reccs

## 2018-11-12 NOTE — PROGRESS NOTE ADULT - PROBLEM SELECTOR PLAN 5
- In the ED, pt. met the SIRS criteria with T 102.4, , /73, RR 18, SpO2 100%. Received empiric Vanc 1g x 1 and Zosyn 3.375g x 1.  - CXR negative for PNA.  - RVP and UA negative.  - Blood clx and Urine clx both NGTD.  - Hold off on abx right now given no localizing source. - In the ED, pt. met the SIRS criteria with T 102.4, , /73, RR 18, SpO2 100%. Received empiric Vanc 1g x 1 and Zosyn 3.375g x 1.  - CXR negative for PNA.  - RVP and UA negative.  - Blood clx and Urine clx both negative.  - Hold off on abx right now given no localizing source.

## 2018-11-12 NOTE — PROGRESS NOTE ADULT - ASSESSMENT
64 y/o M with PMH colon ca (s/p resection June 2017), pancreatic ca (s/p chemo, ended April 2018), currently on nivoluma immunotherapy (once every 2 weeks since the last 6 months; last dose on 11/2/18), and HTN presented with nausea, headache, and right forehead/scalp rash since Saturday (secondary to herpes zoster shingles). Renal called for evaluation of NICOLE.

## 2018-11-12 NOTE — PROGRESS NOTE ADULT - PROBLEM SELECTOR PLAN 2
- Resolved. Previously reported multiple episodes of non-bloody loose stools; endorses mild belly pain from it.   - C. Diff and stool culture negative.  - F/u GI PCR and O&P.  - C/w pepto-bismol to treat sxs. - Resolved. Previously reported multiple episodes of non-bloody loose stools; endorses mild belly pain from it.  - C. Diff and stool culture negative.  - F/u GI PCR and O&P.  - C/w pepto-bismol to treat sxs. - Continues to have watery/loose, non-bloody stools; endorses mild belly pain from it.  - C. Diff and stool culture negative.  - F/u GI PCR and O&P.  - C/w pepto-bismol to treat sxs.  - No nausea; but decreased appetite       - C/w NS @ 100 cc/hr - Continues to have watery/loose, non-bloody stools; endorses mild belly pain from it. Possible 2/2 pancreatic insufficiency 2/2 pancreatic cancer?  - C. Diff and stool culture negative.  - F/u GI PCR and O&P.  - C/w pepto-bismol to treat sxs.  - No nausea; but decreased appetite       - C/w NS @ 100 cc/hr - Continues to have watery/loose, non-bloody stools; endorses mild belly pain from it. Possibly 2/2 pancreatic insufficiency 2/2 pancreatic cancer?  - C. Diff and stool culture negative.  - F/u GI PCR and O&P.  - C/w pepto-bismol to treat sxs.  - No nausea; but decreased appetite       - C/w NS @ 100 cc/hr

## 2018-11-12 NOTE — PROGRESS NOTE ADULT - PROBLEM SELECTOR PLAN 9
- Chronic neck pain  - C/w gabapentin, sertraline, and dilaudid PRN       - Methadone discontinued due to confusion and NICOLE.

## 2018-11-12 NOTE — PROGRESS NOTE ADULT - SUBJECTIVE AND OBJECTIVE BOX
[facility] DIVISION OF KIDNEY DISEASES AND HYPERTENSION -- FOLLOW UP NOTE  --------------------------------------------------------------------------------  HPI    62 y/o M with PMH colon ca (s/p resection June 2017), pancreatic ca (s/p chemo, ended April 2018), currently on nivoluma immunotherapy (once every 2 weeks since the last 6 months; last dose on 11/2/18), and HTN presented with nausea, headache, and right forehead/scalp rash since Saturday (secondary to herpes zoster shingles). Renal called for evaluation of NICOLE.      PAST HISTORY  --------------------------------------------------------------------------------  No significant changes to PMH, PSH, FHx, SHx, unless otherwise noted    ALLERGIES & MEDICATIONS  --------------------------------------------------------------------------------  Allergies    No Known Allergies    Intolerances      Standing Inpatient Medications  artificial tears (preservative free) Ophthalmic Solution 1 Drop(s) Right EYE every 6 hours  bismuth subsalicylate Liquid 30 milliLiter(s) Oral four times a day  finasteride 5 milliGRAM(s) Oral daily  gabapentin 300 milliGRAM(s) Oral daily  heparin  Injectable 5000 Unit(s) SubCutaneous every 8 hours  metoprolol tartrate 25 milliGRAM(s) Oral two times a day  sertraline 50 milliGRAM(s) Oral daily  sodium chloride 0.9%. 1000 milliLiter(s) IV Continuous <Continuous>  tamsulosin 0.4 milliGRAM(s) Oral at bedtime    PRN Inpatient Medications  acetaminophen   Tablet .. 650 milliGRAM(s) Oral every 6 hours PRN  ALPRAZolam 0.5 milliGRAM(s) Oral every 8 hours PRN  HYDROmorphone   Tablet 2 milliGRAM(s) Oral every 4 hours PRN  metoclopramide Injectable 10 milliGRAM(s) IV Push every 6 hours PRN      REVIEW OF SYSTEMS  --------------------------------------------------------------------------------  Gen: No weight changes, fatigue, fevers/chills, weakness  Skin: No rashes  Head/Eyes/Ears/Mouth: No headache; Normal hearing; Normal vision w/o blurriness; No sinus pain/discomfort, sore throat  Respiratory: No dyspnea, cough, wheezing, hemoptysis  CV: No chest pain, PND, orthopnea  GI: No abdominal pain, diarrhea, constipation, nausea, vomiting, melena, hematochezia  : No increased frequency, dysuria, hematuria, nocturia  MSK: No joint pain/swelling; no back pain; no edema  Neuro: No dizziness/lightheadedness, weakness, seizures, numbness, tingling  Heme: No easy bruising or bleeding  Endo: No heat/cold intolerance  Psych: No significant nervousness, anxiety, stress, depression    All other systems were reviewed and are negative, except as noted.    VITALS/PHYSICAL EXAM  --------------------------------------------------------------------------------  T(C): 36.7 (11-12-18 @ 06:10), Max: 36.7 (11-12-18 @ 06:10)  HR: 68 (11-12-18 @ 06:10) (67 - 88)  BP: 149/97 (11-12-18 @ 06:10) (121/67 - 149/97)  RR: 18 (11-12-18 @ 06:10) (18 - 18)  SpO2: 96% (11-12-18 @ 06:10) (95% - 96%)  Wt(kg): --        11-11-18 @ 07:01  -  11-12-18 @ 07:00  --------------------------------------------------------  IN: 0 mL / OUT: 4200 mL / NET: -4200 mL      Physical Exam:  	Gen: NAD, well-appearing  	HEENT: PERRL, supple neck, clear oropharynx  	Pulm: CTA B/L  	CV: RRR, S1S2; no rub  	Back: No spinal or CVA tenderness; no sacral edema  	Abd: +BS, soft, nontender/nondistended  	: No suprapubic tenderness  	UE: Warm, FROM, no clubbing, intact strength; no edema; no asterixis  	LE: Warm, FROM, no clubbing, intact strength; no edema  	Neuro: No focal deficits, intact gait  	Psych: Normal affect and mood  	Skin: Warm, without rashes  	Vascular access:    LABS/STUDIES  --------------------------------------------------------------------------------              11.7   4.42  >-----------<  206      [11-12-18 @ 06:37]              34.5     135  |  100  |  21  ----------------------------<  73      [11-12-18 @ 06:37]  4.1   |  15  |  2.35        Ca     9.4     [11-12-18 @ 06:37]      Mg     1.5     [11-12-18 @ 06:37]      Phos  5.0     [11-12-18 @ 06:37]            Creatinine Trend:  SCr 2.35 [11-12 @ 06:37]  SCr 4.28 [11-11 @ 06:20]  SCr 5.56 [11-10 @ 06:13]  SCr 4.85 [11-09 @ 06:00]  SCr 3.83 [11-08 @ 11:49]    Urinalysis - [11-09-18 @ 10:58]      Color YELLOW / Appearance CLEAR / SG 1.012 / pH 6.0      Gluc NEGATIVE / Ketone TRACE  / Bili NEGATIVE / Urobili NORMAL       Blood NEGATIVE / Protein 200 / Leuk Est NEGATIVE / Nitrite NEGATIVE      RBC 3-5 / WBC 3-5 / Hyaline 1+ / Gran  / Sq Epi FEW / Non Sq Epi  / Bacteria NEGATIVE    Urine Creatinine 92.60      [11-09-18 @ 10:58]  Urine Sodium 64      [11-09-18 @ 10:58]  Urine Urea Nitrogen 241.6      [11-09-18 @ 10:58]  Urine Chloride 57      [11-09-18 @ 10:58]  Urine Osmolality 287      [11-09-18 @ 10:58]    HbA1c 5.6      [03-04-14 @ 12:52] [facility] DIVISION OF KIDNEY DISEASES AND HYPERTENSION -- FOLLOW UP NOTE  --------------------------------------------------------------------------------  HPI    62 y/o M with PMH colon ca (s/p resection June 2017), pancreatic ca (s/p chemo, ended April 2018), currently on nivoluma immunotherapy (once every 2 weeks since the last 6 months; last dose on 11/2/18), and HTN presented with nausea, headache, and right forehead/scalp rash since Saturday (secondary to herpes zoster shingles). Renal called for evaluation of NICOLE.    Today,  pt seen and examined. Denies any acute complaint.      PAST HISTORY  --------------------------------------------------------------------------------  No significant changes to PMH, PSH, FHx, SHx, unless otherwise noted    ALLERGIES & MEDICATIONS  --------------------------------------------------------------------------------  Allergies    No Known Allergies    Intolerances      Standing Inpatient Medications  artificial tears (preservative free) Ophthalmic Solution 1 Drop(s) Right EYE every 6 hours  bismuth subsalicylate Liquid 30 milliLiter(s) Oral four times a day  finasteride 5 milliGRAM(s) Oral daily  gabapentin 300 milliGRAM(s) Oral daily  heparin  Injectable 5000 Unit(s) SubCutaneous every 8 hours  metoprolol tartrate 25 milliGRAM(s) Oral two times a day  sertraline 50 milliGRAM(s) Oral daily  sodium chloride 0.9%. 1000 milliLiter(s) IV Continuous <Continuous>  tamsulosin 0.4 milliGRAM(s) Oral at bedtime    PRN Inpatient Medications  acetaminophen   Tablet .. 650 milliGRAM(s) Oral every 6 hours PRN  ALPRAZolam 0.5 milliGRAM(s) Oral every 8 hours PRN  HYDROmorphone   Tablet 2 milliGRAM(s) Oral every 4 hours PRN  metoclopramide Injectable 10 milliGRAM(s) IV Push every 6 hours PRN      REVIEW OF SYSTEMS  --------------------------------------------------------------------------------  Gen: No lethargy  Respiratory: No dyspnea  CV: No chest pain  GI: No abdominal pain  MSK: No LE edema  Neuro: No dizziness  Heme: No bleeding    All other systems were reviewed and are negative, except as noted.    VITALS/PHYSICAL EXAM  --------------------------------------------------------------------------------  T(C): 36.7 (11-12-18 @ 06:10), Max: 36.7 (11-12-18 @ 06:10)  HR: 68 (11-12-18 @ 06:10) (67 - 88)  BP: 149/97 (11-12-18 @ 06:10) (121/67 - 149/97)  RR: 18 (11-12-18 @ 06:10) (18 - 18)  SpO2: 96% (11-12-18 @ 06:10) (95% - 96%)  Wt(kg): --        11-11-18 @ 07:01  -  11-12-18 @ 07:00  --------------------------------------------------------  IN: 0 mL / OUT: 4200 mL / NET: -4200 mL      Physical Exam:  	Gen: NAD, well-appearing  	Pulm: CTA B/L  	CV: RRR, S1S2; no rub  	Abd: +BS, soft, nontender/nondistended  	: No suprapubic tenderness, elizabeth draining clear urine+  	LE: No edema  	Neuro: No focal deficits, intact gait  	Psych: Normal affect and mood  	Skin: Warm, without rashes    LABS/STUDIES  --------------------------------------------------------------------------------              11.7   4.42  >-----------<  206      [11-12-18 @ 06:37]              34.5     135  |  100  |  21  ----------------------------<  73      [11-12-18 @ 06:37]  4.1   |  15  |  2.35        Ca     9.4     [11-12-18 @ 06:37]      Mg     1.5     [11-12-18 @ 06:37]      Phos  5.0     [11-12-18 @ 06:37]            Creatinine Trend:  SCr 2.35 [11-12 @ 06:37]  SCr 4.28 [11-11 @ 06:20]  SCr 5.56 [11-10 @ 06:13]  SCr 4.85 [11-09 @ 06:00]  SCr 3.83 [11-08 @ 11:49]    Urinalysis - [11-09-18 @ 10:58]      Color YELLOW / Appearance CLEAR / SG 1.012 / pH 6.0      Gluc NEGATIVE / Ketone TRACE  / Bili NEGATIVE / Urobili NORMAL       Blood NEGATIVE / Protein 200 / Leuk Est NEGATIVE / Nitrite NEGATIVE      RBC 3-5 / WBC 3-5 / Hyaline 1+ / Gran  / Sq Epi FEW / Non Sq Epi  / Bacteria NEGATIVE    Urine Creatinine 92.60      [11-09-18 @ 10:58]  Urine Sodium 64      [11-09-18 @ 10:58]  Urine Urea Nitrogen 241.6      [11-09-18 @ 10:58]  Urine Chloride 57      [11-09-18 @ 10:58]  Urine Osmolality 287      [11-09-18 @ 10:58]    HbA1c 5.6      [03-04-14 @ 12:52]

## 2018-11-12 NOTE — PROGRESS NOTE ADULT - SUBJECTIVE AND OBJECTIVE BOX
CHIEF COMPLAINT:    Interval Events: Pt's mental function seems to be improving this AM. + Right ear tinnitus. Also reports continued watery stools (although only 1 episode over the last 24 hours). No headaches, blurry vision, photophobia, eye itchiness, nausea/vomiting, fevers/chills, cough, CP/SOB, abdominal pain, dysuria.    REVIEW OF SYSTEMS:  Constitutional: No fever/chills. No recent weight loss or weight gain.   HEENT: Normal vision. No postnasal drip or nasal congestion.  CV: No chest pain, or palpitations. No orthopnea.   Resp: No cough, or sputum production. No shortness of breath or dyspnea on exertion.   GI: No nausea or vomiting. No diarrhea or constipation.  : No dysuria, no nocturia or increased urinary frequency.  Musculoskeletal: No back pain, no myalgias  Skin: Rash on right forehead and scalp  Neurological: No headache or dizziness. No syncope, no weakness, numbness.  Psychiatric: Denies depressed mood.   Endocrine: No cold or heat intolerance. No dry skin.  Hematologic/Lymphatic: No anemia or bleeding problem.    OBJECTIVE:  Vital Signs Last 24 Hrs  T(C): 36.7 (12 Nov 2018 06:10), Max: 36.7 (12 Nov 2018 06:10)  T(F): 98.1 (12 Nov 2018 06:10), Max: 98.1 (12 Nov 2018 06:10)  HR: 68 (12 Nov 2018 06:10) (67 - 88)  BP: 149/97 (12 Nov 2018 06:10) (121/67 - 149/97)  BP(mean): --  RR: 18 (12 Nov 2018 06:10) (18 - 18)  SpO2: 96% (12 Nov 2018 06:10) (95% - 96%)    11-11 @ 07:01  -  11-12 @ 07:00  --------------------------------------------------------  IN: 0 mL / OUT: 4200 mL / NET: -4200 mL      CAPILLARY BLOOD GLUCOSE      POCT Blood Glucose.: 98 mg/dL (10 Nov 2018 08:26)      PHYSICAL EXAM:  Gen: No acute distress, alert, cooperative.  Head: Red, maculopapular rash with groupings of papules on right forehead and right scalp. Doesn't cross the midline. Vesicles crusted.  HEENT: Oral mucosa moist, normal conjunctiva.  Lung: CTAB, no respiratory distress, no crackles or wheezes.  CV: Normal S1/S1, No m/r/g.  Abd: Soft, NT/ND, no rebound or guarding, scar on abdomen, no sign of infection.  MSK: No LE edema.  Neuro: AAO x 2. No focal neurologic deficits. Cn 2-12 intact.  Skin: Warm and dry.  Psych: Normal affect, follows commands.    LINES:    HOSPITAL MEDICATIONS:  heparin  Injectable 5000 Unit(s) SubCutaneous every 8 hours      metoprolol tartrate 25 milliGRAM(s) Oral two times a day  tamsulosin 0.4 milliGRAM(s) Oral at bedtime    finasteride 5 milliGRAM(s) Oral daily      acetaminophen   Tablet .. 650 milliGRAM(s) Oral every 6 hours PRN  ALPRAZolam 0.5 milliGRAM(s) Oral every 8 hours PRN  gabapentin 300 milliGRAM(s) Oral daily  HYDROmorphone   Tablet 2 milliGRAM(s) Oral every 4 hours PRN  metoclopramide Injectable 10 milliGRAM(s) IV Push every 6 hours PRN  sertraline 50 milliGRAM(s) Oral daily    bismuth subsalicylate Liquid 30 milliLiter(s) Oral four times a day        sodium chloride 0.9%. 1000 milliLiter(s) IV Continuous <Continuous>      artificial tears (preservative free) Ophthalmic Solution 1 Drop(s) Right EYE every 6 hours        LABS:                        11.7   4.42  )-----------( 206      ( 12 Nov 2018 06:37 )             34.5     Hgb Trend: 11.7<--, 11.1<--, 11.8<--, 10.7<--, 11.5<--  11-11    135  |  106  |  26<H>  ----------------------------<  74  4.5   |  16<L>  |  4.28<H>    Ca    8.9      11 Nov 2018 06:20  Phos  5.2     11-11  Mg     1.7     11-11      Creatinine Trend: 4.28<--, 5.56<--, 4.85<--, 3.83<--, 3.22<--, 0.99<--            MICROBIOLOGY:     RADIOLOGY:  [ ] Reviewed and interpreted by me    EKG: CHIEF COMPLAINT:    Interval Events: Pt. continues to have confusion/cognitive fluctuations. Also reports continued watery stools (2 episodes over the last 24 hours). As per the nurse, he has had decreased appetite. + Right ear tinnitus. No headaches, blurry vision, photophobia, eye itchiness, nausea/vomiting, fevers/chills, cough, CP/SOB, abdominal pain, dysuria.    REVIEW OF SYSTEMS:  Constitutional: No fever/chills. No recent weight loss or weight gain.   HEENT: Normal vision. No postnasal drip or nasal congestion.  CV: No chest pain, or palpitations. No orthopnea.   Resp: No cough, or sputum production. No shortness of breath or dyspnea on exertion.   GI: No nausea or vomiting. No diarrhea or constipation.  : No dysuria, no nocturia or increased urinary frequency.  Musculoskeletal: No back pain, no myalgias  Skin: Rash on right forehead and scalp  Neurological: No headache or dizziness. No syncope, no weakness, numbness.  Psychiatric: Denies depressed mood.   Endocrine: No cold or heat intolerance. No dry skin.  Hematologic/Lymphatic: No anemia or bleeding problem.    OBJECTIVE:  Vital Signs Last 24 Hrs  T(C): 36.7 (12 Nov 2018 06:10), Max: 36.7 (12 Nov 2018 06:10)  T(F): 98.1 (12 Nov 2018 06:10), Max: 98.1 (12 Nov 2018 06:10)  HR: 68 (12 Nov 2018 06:10) (67 - 88)  BP: 149/97 (12 Nov 2018 06:10) (121/67 - 149/97)  BP(mean): --  RR: 18 (12 Nov 2018 06:10) (18 - 18)  SpO2: 96% (12 Nov 2018 06:10) (95% - 96%)    11-11 @ 07:01  -  11-12 @ 07:00  --------------------------------------------------------  IN: 0 mL / OUT: 4200 mL / NET: -4200 mL      CAPILLARY BLOOD GLUCOSE      POCT Blood Glucose.: 98 mg/dL (10 Nov 2018 08:26)      PHYSICAL EXAM:  Gen: No acute distress, alert, cooperative.  Head: Red, maculopapular rash with groupings of papules on right forehead and right scalp. Doesn't cross the midline. Vesicles crusted.  HEENT: Oral mucosa moist, normal conjunctiva.  Lung: CTAB, no respiratory distress, no crackles or wheezes.  CV: Normal S1/S1, No m/r/g.  Abd: Soft, NT/ND, no rebound or guarding, scar on abdomen, no sign of infection.  MSK: No LE edema.  Neuro: AAO x 2. No focal neurologic deficits. Cn 2-12 intact.  Skin: Warm and dry.  Psych: Normal affect, follows commands.    LINES:    HOSPITAL MEDICATIONS:  heparin  Injectable 5000 Unit(s) SubCutaneous every 8 hours      metoprolol tartrate 25 milliGRAM(s) Oral two times a day  tamsulosin 0.4 milliGRAM(s) Oral at bedtime    finasteride 5 milliGRAM(s) Oral daily      acetaminophen   Tablet .. 650 milliGRAM(s) Oral every 6 hours PRN  ALPRAZolam 0.5 milliGRAM(s) Oral every 8 hours PRN  gabapentin 300 milliGRAM(s) Oral daily  HYDROmorphone   Tablet 2 milliGRAM(s) Oral every 4 hours PRN  metoclopramide Injectable 10 milliGRAM(s) IV Push every 6 hours PRN  sertraline 50 milliGRAM(s) Oral daily    bismuth subsalicylate Liquid 30 milliLiter(s) Oral four times a day        sodium chloride 0.9%. 1000 milliLiter(s) IV Continuous <Continuous>      artificial tears (preservative free) Ophthalmic Solution 1 Drop(s) Right EYE every 6 hours        LABS:                        11.7   4.42  )-----------( 206      ( 12 Nov 2018 06:37 )             34.5     Hgb Trend: 11.7<--, 11.1<--, 11.8<--, 10.7<--, 11.5<--  11-11    135  |  106  |  26<H>  ----------------------------<  74  4.5   |  16<L>  |  4.28<H>    Ca    8.9      11 Nov 2018 06:20  Phos  5.2     11-11  Mg     1.7     11-11      Creatinine Trend: 4.28<--, 5.56<--, 4.85<--, 3.83<--, 3.22<--, 0.99<--            MICROBIOLOGY:     RADIOLOGY:  [ ] Reviewed and interpreted by me    EKG: CHIEF COMPLAINT:    Interval Events: Pt. continues to have confusion/cognitive fluctuations. Also reports continued watery stools (2 episodes over the last 24 hours). As per the nurse, he has had decreased appetite. + Right ear tinnitus. No headaches, blurry vision, photophobia, eye itchiness, nausea/vomiting, fevers/chills, cough, CP/SOB, abdominal pain, dysuria.    REVIEW OF SYSTEMS:  Constitutional: No fever/chills. No recent weight loss or weight gain.   HEENT: Normal vision. No postnasal drip or nasal congestion.  CV: No chest pain, or palpitations. No orthopnea.   Resp: No cough, or sputum production. No shortness of breath or dyspnea on exertion.   GI: No nausea or vomiting. No diarrhea or constipation.  : No dysuria, no nocturia or increased urinary frequency.  Musculoskeletal: No back pain, no myalgias  Skin: Rash on right forehead and scalp  Neurological: No headache or dizziness. No syncope, no weakness, numbness.  Psychiatric: Denies depressed mood.   Endocrine: No cold or heat intolerance. No dry skin.  Hematologic/Lymphatic: No anemia or bleeding problem.    OBJECTIVE:  Vital Signs Last 24 Hrs  T(C): 36.7 (12 Nov 2018 06:10), Max: 36.7 (12 Nov 2018 06:10)  T(F): 98.1 (12 Nov 2018 06:10), Max: 98.1 (12 Nov 2018 06:10)  HR: 68 (12 Nov 2018 06:10) (67 - 88)  BP: 149/97 (12 Nov 2018 06:10) (121/67 - 149/97)  BP(mean): --  RR: 18 (12 Nov 2018 06:10) (18 - 18)  SpO2: 96% (12 Nov 2018 06:10) (95% - 96%)    11-11 @ 07:01  -  11-12 @ 07:00  --------------------------------------------------------  IN: 0 mL / OUT: 4200 mL / NET: -4200 mL      CAPILLARY BLOOD GLUCOSE      POCT Blood Glucose.: 98 mg/dL (10 Nov 2018 08:26)      PHYSICAL EXAM:  Gen: No acute distress, alert, cooperative.  Head: Red, maculopapular rash with groupings of papules on right forehead and right scalp. Doesn't cross the midline. Vesicles crusted.  HEENT: Oral mucosa moist, normal conjunctiva.  Lung: CTAB, no respiratory distress, no crackles or wheezes.  CV: Normal S1/S1, No m/r/g.  Abd: Soft, NT/ND, no rebound or guarding, scar on abdomen, no sign of infection.  MSK: No LE edema.  Neuro: AAO x 2. No focal neurologic deficits. Cn 2-12 intact.  Skin: Warm and dry.  Psych: Normal affect, follows commands.    LINES:    HOSPITAL MEDICATIONS:  heparin  Injectable 5000 Unit(s) SubCutaneous every 8 hours      metoprolol tartrate 25 milliGRAM(s) Oral two times a day  tamsulosin 0.4 milliGRAM(s) Oral at bedtime    finasteride 5 milliGRAM(s) Oral daily      acetaminophen   Tablet .. 650 milliGRAM(s) Oral every 6 hours PRN  ALPRAZolam 0.5 milliGRAM(s) Oral every 8 hours PRN  gabapentin 300 milliGRAM(s) Oral daily  HYDROmorphone   Tablet 2 milliGRAM(s) Oral every 4 hours PRN  metoclopramide Injectable 10 milliGRAM(s) IV Push every 6 hours PRN  sertraline 50 milliGRAM(s) Oral daily    bismuth subsalicylate Liquid 30 milliLiter(s) Oral four times a day        sodium chloride 0.9%. 1000 milliLiter(s) IV Continuous <Continuous>      artificial tears (preservative free) Ophthalmic Solution 1 Drop(s) Right EYE every 6 hours        LABS:                        11.7   4.42  )-----------( 206      ( 12 Nov 2018 06:37 )             34.5     Hgb Trend: 11.7<--, 11.1<--, 11.8<--, 10.7<--, 11.5<--  11-11    135  |  106  |  26<H>  ----------------------------<  74  4.5   |  16<L>  |  4.28<H>    Ca    8.9      11 Nov 2018 06:20  Phos  5.2     11-11  Mg     1.7     11-11      Creatinine Trend: 4.28<--, 5.56<--, 4.85<--, 3.83<--, 3.22<--, 0.99<--

## 2018-11-12 NOTE — PROGRESS NOTE ADULT - ASSESSMENT
64 y/o M with PMH colon ca (s/p resection June 2017), pancreatic ca (s/p chemo, ended April 2018), currently on nivoluma immunotherapy (once every 2 weeks since the last 6 months; last dose on 11/2/18), and HTN presented with nausea, headache, and right forehead/scalp rash since Saturday. Rash mostly secondary to herpes zoster shingles. Now with NICOLE likely post-obstructive and acyclovir induced 62 y/o M with PMH colon ca (s/p resection June 2017), pancreatic ca (s/p chemo, ended April 2018), currently on nivoluma immunotherapy (once every 2 weeks since the last 6 months; last dose on 11/2/18), and HTN presented with nausea, headache, and shingles rash on right forehead/scalp, s/p acyclovir/valacyclovir, c/c/b NICOLE (now resolving), post-obstructive retention (currently TOV), and unresolving diarrhea.

## 2018-11-12 NOTE — PROGRESS NOTE ADULT - PROBLEM SELECTOR PLAN 6
- S/p chemo (ended April 2018)  - Currently on nivoluma immunotherapy (once every 2 weeks since the last 6 months; last dose on 11/2/18)  - Follows up with Dr. Maloney (Oncologist) - S/p chemo (ended April 2018)  - Currently on nivolumab immunotherapy (once every 2 weeks since the last 6 months; last dose on 11/2/18)  - Follows up with Dr. Maloney (Oncologist)

## 2018-11-13 LAB
ALBUMIN SERPL ELPH-MCNC: 3.4 G/DL — SIGNIFICANT CHANGE UP (ref 3.3–5)
ALP SERPL-CCNC: 83 U/L — SIGNIFICANT CHANGE UP (ref 40–120)
ALT FLD-CCNC: 13 U/L — SIGNIFICANT CHANGE UP (ref 4–41)
AST SERPL-CCNC: 21 U/L — SIGNIFICANT CHANGE UP (ref 4–40)
BASOPHILS # BLD AUTO: 0.03 K/UL — SIGNIFICANT CHANGE UP (ref 0–0.2)
BASOPHILS NFR BLD AUTO: 0.6 % — SIGNIFICANT CHANGE UP (ref 0–2)
BILIRUB SERPL-MCNC: 0.3 MG/DL — SIGNIFICANT CHANGE UP (ref 0.2–1.2)
BUN SERPL-MCNC: 16 MG/DL — SIGNIFICANT CHANGE UP (ref 7–23)
CALCIUM SERPL-MCNC: 9.2 MG/DL — SIGNIFICANT CHANGE UP (ref 8.4–10.5)
CHLORIDE SERPL-SCNC: 105 MMOL/L — SIGNIFICANT CHANGE UP (ref 98–107)
CO2 SERPL-SCNC: 13 MMOL/L — LOW (ref 22–31)
CREAT SERPL-MCNC: 1.5 MG/DL — HIGH (ref 0.5–1.3)
EOSINOPHIL # BLD AUTO: 0.43 K/UL — SIGNIFICANT CHANGE UP (ref 0–0.5)
EOSINOPHIL NFR BLD AUTO: 8.9 % — HIGH (ref 0–6)
GLUCOSE SERPL-MCNC: 69 MG/DL — LOW (ref 70–99)
HCT VFR BLD CALC: 34.4 % — LOW (ref 39–50)
HGB BLD-MCNC: 11.7 G/DL — LOW (ref 13–17)
IMM GRANULOCYTES # BLD AUTO: 0 # — SIGNIFICANT CHANGE UP
IMM GRANULOCYTES NFR BLD AUTO: 0 % — SIGNIFICANT CHANGE UP (ref 0–1.5)
LYMPHOCYTES # BLD AUTO: 1.45 K/UL — SIGNIFICANT CHANGE UP (ref 1–3.3)
LYMPHOCYTES # BLD AUTO: 30 % — SIGNIFICANT CHANGE UP (ref 13–44)
MAGNESIUM SERPL-MCNC: 1.6 MG/DL — SIGNIFICANT CHANGE UP (ref 1.6–2.6)
MCHC RBC-ENTMCNC: 29.2 PG — SIGNIFICANT CHANGE UP (ref 27–34)
MCHC RBC-ENTMCNC: 34 % — SIGNIFICANT CHANGE UP (ref 32–36)
MCV RBC AUTO: 85.8 FL — SIGNIFICANT CHANGE UP (ref 80–100)
MONOCYTES # BLD AUTO: 0.51 K/UL — SIGNIFICANT CHANGE UP (ref 0–0.9)
MONOCYTES NFR BLD AUTO: 10.5 % — SIGNIFICANT CHANGE UP (ref 2–14)
NEUTROPHILS # BLD AUTO: 2.42 K/UL — SIGNIFICANT CHANGE UP (ref 1.8–7.4)
NEUTROPHILS NFR BLD AUTO: 50 % — SIGNIFICANT CHANGE UP (ref 43–77)
NRBC # FLD: 0 — SIGNIFICANT CHANGE UP
O+P SPEC CONC: SIGNIFICANT CHANGE UP
PHOSPHATE SERPL-MCNC: 5.2 MG/DL — HIGH (ref 2.5–4.5)
PLATELET # BLD AUTO: 239 K/UL — SIGNIFICANT CHANGE UP (ref 150–400)
PMV BLD: 9 FL — SIGNIFICANT CHANGE UP (ref 7–13)
POTASSIUM SERPL-MCNC: 3.9 MMOL/L — SIGNIFICANT CHANGE UP (ref 3.5–5.3)
POTASSIUM SERPL-SCNC: 3.9 MMOL/L — SIGNIFICANT CHANGE UP (ref 3.5–5.3)
PROT SERPL-MCNC: 6.4 G/DL — SIGNIFICANT CHANGE UP (ref 6–8.3)
RBC # BLD: 4.01 M/UL — LOW (ref 4.2–5.8)
RBC # FLD: 13 % — SIGNIFICANT CHANGE UP (ref 10.3–14.5)
SODIUM SERPL-SCNC: 135 MMOL/L — SIGNIFICANT CHANGE UP (ref 135–145)
SPECIMEN SOURCE: SIGNIFICANT CHANGE UP
TRI STN SPEC: SIGNIFICANT CHANGE UP
WBC # BLD: 4.84 K/UL — SIGNIFICANT CHANGE UP (ref 3.8–10.5)
WBC # FLD AUTO: 4.84 K/UL — SIGNIFICANT CHANGE UP (ref 3.8–10.5)

## 2018-11-13 PROCEDURE — 99232 SBSQ HOSP IP/OBS MODERATE 35: CPT | Mod: GC

## 2018-11-13 PROCEDURE — 99233 SBSQ HOSP IP/OBS HIGH 50: CPT | Mod: GC

## 2018-11-13 RX ORDER — ACETAMINOPHEN 500 MG
650 TABLET ORAL ONCE
Qty: 0 | Refills: 0 | Status: COMPLETED | OUTPATIENT
Start: 2018-11-13 | End: 2018-11-13

## 2018-11-13 RX ORDER — SODIUM CHLORIDE 9 MG/ML
1000 INJECTION INTRAMUSCULAR; INTRAVENOUS; SUBCUTANEOUS
Qty: 0 | Refills: 0 | Status: DISCONTINUED | OUTPATIENT
Start: 2018-11-13 | End: 2018-11-14

## 2018-11-13 RX ORDER — TAMSULOSIN HYDROCHLORIDE 0.4 MG/1
0.8 CAPSULE ORAL AT BEDTIME
Qty: 0 | Refills: 0 | Status: DISCONTINUED | OUTPATIENT
Start: 2018-11-13 | End: 2018-11-19

## 2018-11-13 RX ORDER — LOPERAMIDE HCL 2 MG
2 TABLET ORAL EVERY 4 HOURS
Qty: 0 | Refills: 0 | Status: DISCONTINUED | OUTPATIENT
Start: 2018-11-13 | End: 2018-11-19

## 2018-11-13 RX ORDER — HYDROMORPHONE HYDROCHLORIDE 2 MG/ML
2 INJECTION INTRAMUSCULAR; INTRAVENOUS; SUBCUTANEOUS ONCE
Qty: 0 | Refills: 0 | Status: DISCONTINUED | OUTPATIENT
Start: 2018-11-13 | End: 2018-11-13

## 2018-11-13 RX ORDER — METOCLOPRAMIDE HCL 10 MG
10 TABLET ORAL ONCE
Qty: 0 | Refills: 0 | Status: COMPLETED | OUTPATIENT
Start: 2018-11-13 | End: 2018-11-13

## 2018-11-13 RX ORDER — SODIUM BICARBONATE 1 MEQ/ML
650 SYRINGE (ML) INTRAVENOUS THREE TIMES A DAY
Qty: 0 | Refills: 0 | Status: DISCONTINUED | OUTPATIENT
Start: 2018-11-13 | End: 2018-11-19

## 2018-11-13 RX ADMIN — Medication 1 DROP(S): at 05:44

## 2018-11-13 RX ADMIN — Medication 10 MILLIGRAM(S): at 09:37

## 2018-11-13 RX ADMIN — HEPARIN SODIUM 5000 UNIT(S): 5000 INJECTION INTRAVENOUS; SUBCUTANEOUS at 21:59

## 2018-11-13 RX ADMIN — Medication 1 DROP(S): at 23:59

## 2018-11-13 RX ADMIN — Medication 650 MILLIGRAM(S): at 18:30

## 2018-11-13 RX ADMIN — SERTRALINE 50 MILLIGRAM(S): 25 TABLET, FILM COATED ORAL at 13:14

## 2018-11-13 RX ADMIN — Medication 650 MILLIGRAM(S): at 22:40

## 2018-11-13 RX ADMIN — Medication 2 MILLIGRAM(S): at 17:55

## 2018-11-13 RX ADMIN — Medication 1 DROP(S): at 17:56

## 2018-11-13 RX ADMIN — Medication 650 MILLIGRAM(S): at 21:59

## 2018-11-13 RX ADMIN — SODIUM CHLORIDE 100 MILLILITER(S): 9 INJECTION INTRAMUSCULAR; INTRAVENOUS; SUBCUTANEOUS at 13:13

## 2018-11-13 RX ADMIN — SODIUM CHLORIDE 100 MILLILITER(S): 9 INJECTION INTRAMUSCULAR; INTRAVENOUS; SUBCUTANEOUS at 19:49

## 2018-11-13 RX ADMIN — HYDROMORPHONE HYDROCHLORIDE 2 MILLIGRAM(S): 2 INJECTION INTRAMUSCULAR; INTRAVENOUS; SUBCUTANEOUS at 02:47

## 2018-11-13 RX ADMIN — Medication 10 MILLIGRAM(S): at 13:13

## 2018-11-13 RX ADMIN — Medication 30 MILLILITER(S): at 13:15

## 2018-11-13 RX ADMIN — Medication 10 MILLIGRAM(S): at 19:49

## 2018-11-13 RX ADMIN — GABAPENTIN 300 MILLIGRAM(S): 400 CAPSULE ORAL at 13:14

## 2018-11-13 RX ADMIN — HYDROMORPHONE HYDROCHLORIDE 2 MILLIGRAM(S): 2 INJECTION INTRAMUSCULAR; INTRAVENOUS; SUBCUTANEOUS at 00:44

## 2018-11-13 RX ADMIN — TAMSULOSIN HYDROCHLORIDE 0.8 MILLIGRAM(S): 0.4 CAPSULE ORAL at 21:59

## 2018-11-13 RX ADMIN — Medication 650 MILLIGRAM(S): at 17:54

## 2018-11-13 RX ADMIN — Medication 25 MILLIGRAM(S): at 17:55

## 2018-11-13 RX ADMIN — Medication 1 DROP(S): at 13:14

## 2018-11-13 RX ADMIN — HEPARIN SODIUM 5000 UNIT(S): 5000 INJECTION INTRAVENOUS; SUBCUTANEOUS at 13:15

## 2018-11-13 RX ADMIN — Medication 650 MILLIGRAM(S): at 09:36

## 2018-11-13 RX ADMIN — HYDROMORPHONE HYDROCHLORIDE 2 MILLIGRAM(S): 2 INJECTION INTRAMUSCULAR; INTRAVENOUS; SUBCUTANEOUS at 01:47

## 2018-11-13 RX ADMIN — HEPARIN SODIUM 5000 UNIT(S): 5000 INJECTION INTRAVENOUS; SUBCUTANEOUS at 05:44

## 2018-11-13 RX ADMIN — Medication 650 MILLIGRAM(S): at 10:10

## 2018-11-13 RX ADMIN — FINASTERIDE 5 MILLIGRAM(S): 5 TABLET, FILM COATED ORAL at 13:15

## 2018-11-13 RX ADMIN — Medication 25 MILLIGRAM(S): at 05:44

## 2018-11-13 NOTE — PROVIDER CONTACT NOTE (OTHER) - ACTION/TREATMENT ORDERED:
md notified, no further interventions at this time, as per md no need for precautions, will continue to monitor.

## 2018-11-13 NOTE — PROGRESS NOTE ADULT - PROBLEM SELECTOR PLAN 10
- C/w Hep SubQ    Dispo: Creatinine improving. Continue to hydrate and hold acyclovir. No new lesions seen on exam. Cognition still fluctuating. Still continues to have nausea, abdominal pain and watery stools.

## 2018-11-13 NOTE — PROGRESS NOTE ADULT - PROBLEM SELECTOR PLAN 3
- Creatinine trending down 5.6 -- > 4.3 --> 2.4 --> 1.5  - 2/2 to fluid losses from diarrhea vs. acyclovir/valacyclovir vs. immunotherapy (nivolumab)  - Lopez removed; passed TOV yesterday  - C/w NS @ 100 cc/hr  - Valacyclovir discontinued on 11/8 2/2 worsening creatinine. Losartan discontinued on 11/10. Gabapentin reduced to 300mg QD.  - Monitor BMP  - F/u renal reccs

## 2018-11-13 NOTE — PROGRESS NOTE ADULT - ASSESSMENT
64 y/o M with PMH colon ca (s/p resection June 2017), pancreatic ca (s/p chemo, ended April 2018), currently on nivoluma immunotherapy (once every 2 weeks since the last 6 months; last dose on 11/2/18), and HTN presented with nausea, headache, and shingles rash on right forehead/scalp, s/p acyclovir/valacyclovir, c/c/b NICOLE (now resolving), post-obstructive retention (currently TOV), and unresolving diarrhea.

## 2018-11-13 NOTE — PROGRESS NOTE ADULT - PROBLEM SELECTOR PLAN 6
- S/p chemo (ended April 2018)  - Currently on nivolumab immunotherapy (once every 2 weeks since the last 6 months; last dose on 11/2/18)  - Follows up with Dr. Maloney (Oncologist)

## 2018-11-13 NOTE — PROGRESS NOTE ADULT - SUBJECTIVE AND OBJECTIVE BOX
Health system DIVISION OF KIDNEY DISEASES AND HYPERTENSION -- FOLLOW UP NOTE  --------------------------------------------------------------------------------        24 hour events/subjective: feels much better         PAST HISTORY  --------------------------------------------------------------------------------  No significant changes to PMH, PSH, FHx, SHx, unless otherwise noted    ALLERGIES & MEDICATIONS  --------------------------------------------------------------------------------  Allergies    No Known Allergies    Intolerances      Standing Inpatient Medications  artificial tears (preservative free) Ophthalmic Solution 1 Drop(s) Right EYE every 6 hours  bismuth subsalicylate Liquid 30 milliLiter(s) Oral four times a day  finasteride 5 milliGRAM(s) Oral daily  gabapentin 300 milliGRAM(s) Oral daily  heparin  Injectable 5000 Unit(s) SubCutaneous every 8 hours  metoclopramide Injectable 10 milliGRAM(s) IV Push once  metoprolol tartrate 25 milliGRAM(s) Oral two times a day  sertraline 50 milliGRAM(s) Oral daily  sodium chloride 0.9%. 1000 milliLiter(s) IV Continuous <Continuous>  tamsulosin 0.4 milliGRAM(s) Oral at bedtime    PRN Inpatient Medications  acetaminophen   Tablet .. 650 milliGRAM(s) Oral every 6 hours PRN  metoclopramide Injectable 10 milliGRAM(s) IV Push every 6 hours PRN      REVIEW OF SYSTEMS  --------------------------------------------------------------------------------  Gen: No weakness  Head/Eyes/Ears/Mouth: No headache  GI: No abdominal pain,  : No hematuria,  Psych: No anxiety, stress, depression  All other systems were reviewed and are negative, except as noted.    VITALS/PHYSICAL EXAM  --------------------------------------------------------------------------------  T(C): 36.2 (11-13-18 @ 12:19), Max: 36.6 (11-13-18 @ 05:37)  HR: 76 (11-13-18 @ 12:19) (67 - 84)  BP: 159/80 (11-13-18 @ 12:19) (147/61 - 159/80)  RR: 18 (11-13-18 @ 12:19) (17 - 19)  SpO2: 97% (11-13-18 @ 12:19) (97% - 99%)  Wt(kg): --        11-12-18 @ 07:01  -  11-13-18 @ 07:00  --------------------------------------------------------  IN: 0 mL / OUT: 950 mL / NET: -950 mL    11-13-18 @ 07:01  -  11-13-18 @ 13:04  --------------------------------------------------------  IN: 100 mL / OUT: 700 mL / NET: -600 mL      Physical Exam:  Gen: NAD,    	HEENT: supple neck, NO JVD   	Pulm: CTA B/L  	CV: RRR, S1S2; no rub  	Abd: +BS, soft, nontender/nondistended  	: No suprapubic tenderness  	UE:  no edema;   	LE:  no edema  	Neuro: No focal deficits,   	Psych: Normal affect and mood      	>>> <<<    LABS/STUDIES  --------------------------------------------------------------------------------              11.7   4.84  >-----------<  239      [11-13-18 @ 05:30]              34.4     135  |  105  |  16  ----------------------------<  69      [11-13-18 @ 05:30]  3.9   |  13  |  1.50        Ca     9.2     [11-13-18 @ 05:30]      Mg     1.6     [11-13-18 @ 05:30]      Phos  5.2     [11-13-18 @ 05:30]    TPro  6.4  /  Alb  3.4  /  TBili  0.3  /  DBili  x   /  AST  21  /  ALT  13  /  AlkPhos  83  [11-13-18 @ 05:30]          Creatinine Trend:  SCr 1.50 [11-13 @ 05:30]  SCr 2.35 [11-12 @ 06:37]  SCr 4.28 [11-11 @ 06:20]  SCr 5.56 [11-10 @ 06:13]  SCr 4.85 [11-09 @ 06:00]    Sodium trend:    Urinalysis - [11-09-18 @ 10:58]      Color YELLOW / Appearance CLEAR / SG 1.012 / pH 6.0      Gluc NEGATIVE / Ketone TRACE  / Bili NEGATIVE / Urobili NORMAL       Blood NEGATIVE / Protein 200 / Leuk Est NEGATIVE / Nitrite NEGATIVE      RBC 3-5 / WBC 3-5 / Hyaline 1+ / Gran  / Sq Epi FEW / Non Sq Epi  / Bacteria NEGATIVE    Urine Creatinine 92.60      [11-09-18 @ 10:58]  Urine Sodium 64      [11-09-18 @ 10:58]  Urine Urea Nitrogen 241.6      [11-09-18 @ 10:58]  Urine Chloride 57      [11-09-18 @ 10:58]  Urine Osmolality 287      [11-09-18 @ 10:58]    HbA1c 5.6      [03-04-14 @ 12:52]

## 2018-11-13 NOTE — PROVIDER CONTACT NOTE (OTHER) - ASSESSMENT
patient complaining of pelvic/abdominal pain  bladder scan showed 345 mL, patient refusing straight catheterization, md aware, then patient voided 300 mL

## 2018-11-13 NOTE — PROGRESS NOTE ADULT - PROBLEM SELECTOR PLAN 2
- Continues to have watery/loose, non-bloody stools; endorses mild belly pain from it. Possibly 2/2 pancreatic insufficiency 2/2 pancreatic cancer?  - C. Diff and stool culture negative.  - F/u GI PCR and O&P.  - C/w pepto-bismol to treat sxs.  - No nausea; but decreased appetite       - C/w NS @ 100 cc/hr

## 2018-11-13 NOTE — PROGRESS NOTE ADULT - PROBLEM SELECTOR PLAN 1
Pt with normal baseline kidney function. Scr 0.8 on presentation. Scr elevated at 5.56. Pt s/p IV contrast administration x2. Also received IV acyclovir. Bladder scan  with ~790 cc. NICOLE likely multifactorial; in setting of contrast administration, hemodynamic with hypotension and ARB use, nephrotoxicity from acyclovir? with component on obstruction. Patient non-oliguric with elizabeth with improvement of Scr to 1.5 today.  Continue to hold Losartan . Avoid NSAIDS, ACE-I/ARBs, RCAs and other nephrotoxins

## 2018-11-13 NOTE — PROGRESS NOTE ADULT - PROBLEM SELECTOR PLAN 5
- In the ED, pt. met the SIRS criteria with T 102.4, , /73, RR 18, SpO2 100%. Received empiric Vanc 1g x 1 and Zosyn 3.375g x 1.  - CXR negative for PNA.  - RVP and UA negative.  - Blood clx and Urine clx both negative.  - Hold off on abx right now given no localizing source.

## 2018-11-13 NOTE — PROGRESS NOTE ADULT - SUBJECTIVE AND OBJECTIVE BOX
CHIEF COMPLAINT:    Interval Events: Pt. continues to have confusion/cognitive fluctuations. Also reports continued watery stools (3 episodes overnight). + Nausea, generalized abdominal pain, decreased appetite. No headaches, blurry vision, photophobia, eye itchiness, vomiting, fevers/chills, cough, CP/SOB, abdominal pain, dysuria.    REVIEW OF SYSTEMS:  Constitutional: No fever/chills. No recent weight loss or weight gain.   HEENT: Normal vision. No postnasal drip or nasal congestion.  CV: No chest pain, or palpitations. No orthopnea.   Resp: No cough, or sputum production. No shortness of breath or dyspnea on exertion.   GI: + Nausea; + Diarrhea; + Generalized abdominal pain. No vomiting. No constipation.  : No dysuria, no nocturia or increased urinary frequency.  Musculoskeletal: No back pain, no myalgias  Skin: Rash on right forehead and scalp  Neurological: No headache or dizziness. No syncope, no weakness, numbness.  Psychiatric: Denies depressed mood.   Endocrine: No cold or heat intolerance. No dry skin.  Hematologic/Lymphatic: No anemia or bleeding problem.    OBJECTIVE:  Vital Signs Last 24 Hrs  T(C): 36.6 (13 Nov 2018 05:37), Max: 36.6 (13 Nov 2018 05:37)  T(F): 97.8 (13 Nov 2018 05:37), Max: 97.8 (13 Nov 2018 05:37)  HR: 74 (13 Nov 2018 05:37) (67 - 84)  BP: 156/86 (13 Nov 2018 05:37) (147/61 - 156/86)  BP(mean): --  RR: 17 (13 Nov 2018 05:37) (17 - 19)  SpO2: 99% (13 Nov 2018 05:37) (97% - 99%)    11-12 @ 07:01  -  11-13 @ 07:00  --------------------------------------------------------  IN: 0 mL / OUT: 950 mL / NET: -950 mL      CAPILLARY BLOOD GLUCOSE        PHYSICAL EXAM:  Gen: No acute distress, alert, cooperative.  Head: Red, maculopapular rash with groupings of papules on right forehead and right scalp. Doesn't cross the midline. Vesicles crusted.  HEENT: Oral mucosa moist, normal conjunctiva.  Lung: CTAB, no respiratory distress, no crackles or wheezes.  CV: Normal S1/S1, No m/r/g.  Abd: Soft, NT/ND, no rebound or guarding, scar on abdomen, no sign of infection.  MSK: No LE edema.  Neuro: AAO x 2. No focal neurologic deficits. Cn 2-12 intact.  Skin: Warm and dry.  Psych: Normal affect, follows commands.      LINES:    HOSPITAL MEDICATIONS:  heparin  Injectable 5000 Unit(s) SubCutaneous every 8 hours      metoprolol tartrate 25 milliGRAM(s) Oral two times a day  tamsulosin 0.4 milliGRAM(s) Oral at bedtime    finasteride 5 milliGRAM(s) Oral daily      acetaminophen   Tablet .. 650 milliGRAM(s) Oral every 6 hours PRN  gabapentin 300 milliGRAM(s) Oral daily  metoclopramide Injectable 10 milliGRAM(s) IV Push every 6 hours PRN  sertraline 50 milliGRAM(s) Oral daily    bismuth subsalicylate Liquid 30 milliLiter(s) Oral four times a day        sodium chloride 0.9%. 1000 milliLiter(s) IV Continuous <Continuous>      artificial tears (preservative free) Ophthalmic Solution 1 Drop(s) Right EYE every 6 hours        LABS:                        11.7   4.84  )-----------( 239      ( 13 Nov 2018 05:30 )             34.4     Hgb Trend: 11.7<--, 11.7<--, 11.1<--, 11.8<--, 10.7<--  11-13    135  |  105  |  16  ----------------------------<  69<L>  3.9   |  13<L>  |  1.50<H>    Ca    9.2      13 Nov 2018 05:30  Phos  5.2     11-13  Mg     1.6     11-13    TPro  6.4  /  Alb  3.4  /  TBili  0.3  /  DBili  x   /  AST  21  /  ALT  13  /  AlkPhos  83  11-13    Creatinine Trend: 1.50<--, 2.35<--, 4.28<--, 5.56<--, 4.85<--, 3.83<--            MICROBIOLOGY:     RADIOLOGY:  [ ] Reviewed and interpreted by me    EKG: CHIEF COMPLAINT:    Interval Events: Pt. continues to have confusion/cognitive fluctuations. Also reports continued watery stools (3 episodes overnight). Had severe abdominal pain last night; received dilaudid 2mg PO x 1. + Nausea, generalized abdominal pain, decreased appetite. No headaches, blurry vision, photophobia, eye itchiness, vomiting, fevers/chills, cough, CP/SOB, abdominal pain, dysuria.    REVIEW OF SYSTEMS:  Constitutional: No fever/chills. No recent weight loss or weight gain.   HEENT: Normal vision. No postnasal drip or nasal congestion.  CV: No chest pain, or palpitations. No orthopnea.   Resp: No cough, or sputum production. No shortness of breath or dyspnea on exertion.   GI: + Nausea; + Diarrhea; + Generalized abdominal pain. No vomiting. No constipation.  : No dysuria, no nocturia or increased urinary frequency.  Musculoskeletal: No back pain, no myalgias  Skin: Rash on right forehead and scalp  Neurological: No headache or dizziness. No syncope, no weakness, numbness.  Psychiatric: Denies depressed mood.   Endocrine: No cold or heat intolerance. No dry skin.  Hematologic/Lymphatic: No anemia or bleeding problem.    OBJECTIVE:  Vital Signs Last 24 Hrs  T(C): 36.6 (13 Nov 2018 05:37), Max: 36.6 (13 Nov 2018 05:37)  T(F): 97.8 (13 Nov 2018 05:37), Max: 97.8 (13 Nov 2018 05:37)  HR: 74 (13 Nov 2018 05:37) (67 - 84)  BP: 156/86 (13 Nov 2018 05:37) (147/61 - 156/86)  BP(mean): --  RR: 17 (13 Nov 2018 05:37) (17 - 19)  SpO2: 99% (13 Nov 2018 05:37) (97% - 99%)    11-12 @ 07:01  -  11-13 @ 07:00  --------------------------------------------------------  IN: 0 mL / OUT: 950 mL / NET: -950 mL      CAPILLARY BLOOD GLUCOSE        PHYSICAL EXAM:  Gen: No acute distress, alert, cooperative.  Head: Red, maculopapular rash with groupings of papules on right forehead and right scalp. Doesn't cross the midline. Vesicles crusted.  HEENT: Oral mucosa moist, normal conjunctiva.  Lung: CTAB, no respiratory distress, no crackles or wheezes.  CV: Normal S1/S1, No m/r/g.  Abd: Soft, NT/ND, no rebound or guarding, scar on abdomen, no sign of infection.  MSK: No LE edema.  Neuro: AAO x 2. No focal neurologic deficits. Cn 2-12 intact.  Skin: Warm and dry.  Psych: Normal affect, follows commands.      LINES:    HOSPITAL MEDICATIONS:  heparin  Injectable 5000 Unit(s) SubCutaneous every 8 hours      metoprolol tartrate 25 milliGRAM(s) Oral two times a day  tamsulosin 0.4 milliGRAM(s) Oral at bedtime    finasteride 5 milliGRAM(s) Oral daily      acetaminophen   Tablet .. 650 milliGRAM(s) Oral every 6 hours PRN  gabapentin 300 milliGRAM(s) Oral daily  metoclopramide Injectable 10 milliGRAM(s) IV Push every 6 hours PRN  sertraline 50 milliGRAM(s) Oral daily    bismuth subsalicylate Liquid 30 milliLiter(s) Oral four times a day        sodium chloride 0.9%. 1000 milliLiter(s) IV Continuous <Continuous>      artificial tears (preservative free) Ophthalmic Solution 1 Drop(s) Right EYE every 6 hours        LABS:                        11.7   4.84  )-----------( 239      ( 13 Nov 2018 05:30 )             34.4     Hgb Trend: 11.7<--, 11.7<--, 11.1<--, 11.8<--, 10.7<--  11-13    135  |  105  |  16  ----------------------------<  69<L>  3.9   |  13<L>  |  1.50<H>    Ca    9.2      13 Nov 2018 05:30  Phos  5.2     11-13  Mg     1.6     11-13    TPro  6.4  /  Alb  3.4  /  TBili  0.3  /  DBili  x   /  AST  21  /  ALT  13  /  AlkPhos  83  11-13    Creatinine Trend: 1.50<--, 2.35<--, 4.28<--, 5.56<--, 4.85<--, 3.83<--            MICROBIOLOGY:     RADIOLOGY:  [ ] Reviewed and interpreted by me    EKG: CHIEF COMPLAINT:    Interval Events: Pt. continues to have confusion/cognitive fluctuations. Also reports continued watery stools (3 episodes overnight). Had severe abdominal pain last night; received dilaudid 2mg PO x 1. + Nausea, generalized abdominal pain, decreased appetite. No headaches, blurry vision, photophobia, eye itchiness, vomiting, fevers/chills, cough, CP/SOB, abdominal pain, dysuria.    REVIEW OF SYSTEMS:  Constitutional: No fever/chills. No recent weight loss or weight gain.   HEENT: Normal vision. No postnasal drip or nasal congestion.  CV: No chest pain, or palpitations. No orthopnea.   Resp: No cough, or sputum production. No shortness of breath or dyspnea on exertion.   GI: + Nausea; + Diarrhea; + Generalized abdominal pain. No vomiting. No constipation.  : No dysuria, no nocturia or increased urinary frequency.  Musculoskeletal: No back pain, no myalgias  Skin: Rash on right forehead and scalp,  Neurological: No headache or dizziness. No syncope, no weakness, numbness.  Psychiatric: Denies depressed mood.   Endocrine: No cold or heat intolerance. No dry skin.  Hematologic/Lymphatic: No anemia or bleeding problem.    OBJECTIVE:  Vital Signs Last 24 Hrs  T(C): 36.6 (13 Nov 2018 05:37), Max: 36.6 (13 Nov 2018 05:37)  T(F): 97.8 (13 Nov 2018 05:37), Max: 97.8 (13 Nov 2018 05:37)  HR: 74 (13 Nov 2018 05:37) (67 - 84)  BP: 156/86 (13 Nov 2018 05:37) (147/61 - 156/86)  BP(mean): --  RR: 17 (13 Nov 2018 05:37) (17 - 19)  SpO2: 99% (13 Nov 2018 05:37) (97% - 99%)    11-12 @ 07:01  -  11-13 @ 07:00  --------------------------------------------------------  IN: 0 mL / OUT: 950 mL / NET: -950 mL      CAPILLARY BLOOD GLUCOSE        PHYSICAL EXAM:  Gen: No acute distress, alert, cooperative.  Head: Red, maculopapular rash with groupings of papules on right forehead and right scalp. Doesn't cross the midline. Vesicles crusted.  HEENT: Oral mucosa moist, normal conjunctiva.  Lung: CTAB, no respiratory distress, no crackles or wheezes.  CV: Normal S1/S1, No m/r/g.  Abd: Soft, NT/ND, no rebound or guarding, scar on abdomen, no sign of infection.  MSK: No LE edema.  Neuro: AAO x 2. No focal neurologic deficits. Cn 2-12 intact.  Skin:  rash on forehead around eye healing.  Left arm with erythema.  b/l LE with petechial-like rash on shins.  Back with erythematous rash.        LINES:    HOSPITAL MEDICATIONS:  heparin  Injectable 5000 Unit(s) SubCutaneous every 8 hours      metoprolol tartrate 25 milliGRAM(s) Oral two times a day  tamsulosin 0.4 milliGRAM(s) Oral at bedtime    finasteride 5 milliGRAM(s) Oral daily      acetaminophen   Tablet .. 650 milliGRAM(s) Oral every 6 hours PRN  gabapentin 300 milliGRAM(s) Oral daily  metoclopramide Injectable 10 milliGRAM(s) IV Push every 6 hours PRN  sertraline 50 milliGRAM(s) Oral daily    bismuth subsalicylate Liquid 30 milliLiter(s) Oral four times a day        sodium chloride 0.9%. 1000 milliLiter(s) IV Continuous <Continuous>      artificial tears (preservative free) Ophthalmic Solution 1 Drop(s) Right EYE every 6 hours        LABS:                        11.7   4.84  )-----------( 239      ( 13 Nov 2018 05:30 )             34.4     Hgb Trend: 11.7<--, 11.7<--, 11.1<--, 11.8<--, 10.7<--  11-13    135  |  105  |  16  ----------------------------<  69<L>  3.9   |  13<L>  |  1.50<H>    Ca    9.2      13 Nov 2018 05:30  Phos  5.2     11-13  Mg     1.6     11-13    TPro  6.4  /  Alb  3.4  /  TBili  0.3  /  DBili  x   /  AST  21  /  ALT  13  /  AlkPhos  83  11-13    Creatinine Trend: 1.50<--, 2.35<--, 4.28<--, 5.56<--, 4.85<--, 3.83<--            MICROBIOLOGY:     RADIOLOGY:  [ ] Reviewed and interpreted by me    EKG:

## 2018-11-14 LAB
ALBUMIN SERPL ELPH-MCNC: 3.6 G/DL — SIGNIFICANT CHANGE UP (ref 3.3–5)
ALP SERPL-CCNC: 84 U/L — SIGNIFICANT CHANGE UP (ref 40–120)
ALT FLD-CCNC: 12 U/L — SIGNIFICANT CHANGE UP (ref 4–41)
AST SERPL-CCNC: 21 U/L — SIGNIFICANT CHANGE UP (ref 4–40)
BASOPHILS # BLD AUTO: 0.04 K/UL — SIGNIFICANT CHANGE UP (ref 0–0.2)
BASOPHILS NFR BLD AUTO: 0.9 % — SIGNIFICANT CHANGE UP (ref 0–2)
BILIRUB SERPL-MCNC: 0.4 MG/DL — SIGNIFICANT CHANGE UP (ref 0.2–1.2)
BUN SERPL-MCNC: 10 MG/DL — SIGNIFICANT CHANGE UP (ref 7–23)
CALCIUM SERPL-MCNC: 9.4 MG/DL — SIGNIFICANT CHANGE UP (ref 8.4–10.5)
CHLORIDE SERPL-SCNC: 102 MMOL/L — SIGNIFICANT CHANGE UP (ref 98–107)
CO2 SERPL-SCNC: 13 MMOL/L — LOW (ref 22–31)
CREAT SERPL-MCNC: 1.06 MG/DL — SIGNIFICANT CHANGE UP (ref 0.5–1.3)
EOSINOPHIL # BLD AUTO: 0.48 K/UL — SIGNIFICANT CHANGE UP (ref 0–0.5)
EOSINOPHIL NFR BLD AUTO: 10.3 % — HIGH (ref 0–6)
GLUCOSE SERPL-MCNC: 68 MG/DL — LOW (ref 70–99)
HCT VFR BLD CALC: 36.3 % — LOW (ref 39–50)
HGB BLD-MCNC: 12.4 G/DL — LOW (ref 13–17)
IMM GRANULOCYTES # BLD AUTO: 0.01 # — SIGNIFICANT CHANGE UP
IMM GRANULOCYTES NFR BLD AUTO: 0.2 % — SIGNIFICANT CHANGE UP (ref 0–1.5)
LYMPHOCYTES # BLD AUTO: 1.57 K/UL — SIGNIFICANT CHANGE UP (ref 1–3.3)
LYMPHOCYTES # BLD AUTO: 33.6 % — SIGNIFICANT CHANGE UP (ref 13–44)
MAGNESIUM SERPL-MCNC: 1.1 MG/DL — LOW (ref 1.6–2.6)
MCHC RBC-ENTMCNC: 29.4 PG — SIGNIFICANT CHANGE UP (ref 27–34)
MCHC RBC-ENTMCNC: 34.2 % — SIGNIFICANT CHANGE UP (ref 32–36)
MCV RBC AUTO: 86 FL — SIGNIFICANT CHANGE UP (ref 80–100)
MONOCYTES # BLD AUTO: 0.48 K/UL — SIGNIFICANT CHANGE UP (ref 0–0.9)
MONOCYTES NFR BLD AUTO: 10.3 % — SIGNIFICANT CHANGE UP (ref 2–14)
NEUTROPHILS # BLD AUTO: 2.09 K/UL — SIGNIFICANT CHANGE UP (ref 1.8–7.4)
NEUTROPHILS NFR BLD AUTO: 44.7 % — SIGNIFICANT CHANGE UP (ref 43–77)
NRBC # FLD: 0 — SIGNIFICANT CHANGE UP
PHOSPHATE SERPL-MCNC: 4.8 MG/DL — HIGH (ref 2.5–4.5)
PLATELET # BLD AUTO: 275 K/UL — SIGNIFICANT CHANGE UP (ref 150–400)
PMV BLD: 8.8 FL — SIGNIFICANT CHANGE UP (ref 7–13)
POTASSIUM SERPL-MCNC: 3.7 MMOL/L — SIGNIFICANT CHANGE UP (ref 3.5–5.3)
POTASSIUM SERPL-SCNC: 3.7 MMOL/L — SIGNIFICANT CHANGE UP (ref 3.5–5.3)
PROT SERPL-MCNC: 6.6 G/DL — SIGNIFICANT CHANGE UP (ref 6–8.3)
RBC # BLD: 4.22 M/UL — SIGNIFICANT CHANGE UP (ref 4.2–5.8)
RBC # FLD: 12.9 % — SIGNIFICANT CHANGE UP (ref 10.3–14.5)
SODIUM SERPL-SCNC: 134 MMOL/L — LOW (ref 135–145)
WBC # BLD: 4.67 K/UL — SIGNIFICANT CHANGE UP (ref 3.8–10.5)
WBC # FLD AUTO: 4.67 K/UL — SIGNIFICANT CHANGE UP (ref 3.8–10.5)

## 2018-11-14 PROCEDURE — 99232 SBSQ HOSP IP/OBS MODERATE 35: CPT | Mod: GC

## 2018-11-14 PROCEDURE — 99223 1ST HOSP IP/OBS HIGH 75: CPT | Mod: GC

## 2018-11-14 RX ORDER — HYDROMORPHONE HYDROCHLORIDE 2 MG/ML
2 INJECTION INTRAMUSCULAR; INTRAVENOUS; SUBCUTANEOUS ONCE
Qty: 0 | Refills: 0 | Status: DISCONTINUED | OUTPATIENT
Start: 2018-11-14 | End: 2018-11-14

## 2018-11-14 RX ORDER — SODIUM CHLORIDE 9 MG/ML
1000 INJECTION, SOLUTION INTRAVENOUS
Qty: 0 | Refills: 0 | Status: DISCONTINUED | OUTPATIENT
Start: 2018-11-14 | End: 2018-11-16

## 2018-11-14 RX ORDER — METHADONE HYDROCHLORIDE 40 MG/1
5 TABLET ORAL EVERY 8 HOURS
Qty: 0 | Refills: 0 | Status: DISCONTINUED | OUTPATIENT
Start: 2018-11-14 | End: 2018-11-19

## 2018-11-14 RX ORDER — MAGNESIUM SULFATE 500 MG/ML
2 VIAL (ML) INJECTION ONCE
Qty: 0 | Refills: 0 | Status: COMPLETED | OUTPATIENT
Start: 2018-11-14 | End: 2018-11-14

## 2018-11-14 RX ADMIN — HEPARIN SODIUM 5000 UNIT(S): 5000 INJECTION INTRAVENOUS; SUBCUTANEOUS at 13:08

## 2018-11-14 RX ADMIN — Medication 1 DROP(S): at 23:27

## 2018-11-14 RX ADMIN — SERTRALINE 50 MILLIGRAM(S): 25 TABLET, FILM COATED ORAL at 11:14

## 2018-11-14 RX ADMIN — HYDROMORPHONE HYDROCHLORIDE 2 MILLIGRAM(S): 2 INJECTION INTRAMUSCULAR; INTRAVENOUS; SUBCUTANEOUS at 06:38

## 2018-11-14 RX ADMIN — Medication 1 DROP(S): at 05:53

## 2018-11-14 RX ADMIN — METHADONE HYDROCHLORIDE 5 MILLIGRAM(S): 40 TABLET ORAL at 21:38

## 2018-11-14 RX ADMIN — Medication 10 MILLIGRAM(S): at 13:07

## 2018-11-14 RX ADMIN — Medication 1 DROP(S): at 11:14

## 2018-11-14 RX ADMIN — Medication 25 MILLIGRAM(S): at 17:38

## 2018-11-14 RX ADMIN — METHADONE HYDROCHLORIDE 5 MILLIGRAM(S): 40 TABLET ORAL at 13:07

## 2018-11-14 RX ADMIN — Medication 30 MILLILITER(S): at 12:04

## 2018-11-14 RX ADMIN — HYDROMORPHONE HYDROCHLORIDE 2 MILLIGRAM(S): 2 INJECTION INTRAMUSCULAR; INTRAVENOUS; SUBCUTANEOUS at 05:49

## 2018-11-14 RX ADMIN — HEPARIN SODIUM 5000 UNIT(S): 5000 INJECTION INTRAVENOUS; SUBCUTANEOUS at 05:53

## 2018-11-14 RX ADMIN — Medication 10 MILLIGRAM(S): at 05:10

## 2018-11-14 RX ADMIN — Medication 650 MILLIGRAM(S): at 05:53

## 2018-11-14 RX ADMIN — SODIUM CHLORIDE 75 MILLILITER(S): 9 INJECTION, SOLUTION INTRAVENOUS at 10:00

## 2018-11-14 RX ADMIN — Medication 1 DROP(S): at 17:38

## 2018-11-14 RX ADMIN — Medication 30 MILLILITER(S): at 23:26

## 2018-11-14 RX ADMIN — TAMSULOSIN HYDROCHLORIDE 0.8 MILLIGRAM(S): 0.4 CAPSULE ORAL at 21:38

## 2018-11-14 RX ADMIN — HEPARIN SODIUM 5000 UNIT(S): 5000 INJECTION INTRAVENOUS; SUBCUTANEOUS at 21:37

## 2018-11-14 RX ADMIN — GABAPENTIN 300 MILLIGRAM(S): 400 CAPSULE ORAL at 11:14

## 2018-11-14 RX ADMIN — Medication 25 MILLIGRAM(S): at 05:53

## 2018-11-14 RX ADMIN — Medication 50 GRAM(S): at 08:58

## 2018-11-14 RX ADMIN — HYDROMORPHONE HYDROCHLORIDE 2 MILLIGRAM(S): 2 INJECTION INTRAMUSCULAR; INTRAVENOUS; SUBCUTANEOUS at 02:52

## 2018-11-14 RX ADMIN — Medication 30 MILLILITER(S): at 17:38

## 2018-11-14 RX ADMIN — Medication 650 MILLIGRAM(S): at 16:34

## 2018-11-14 RX ADMIN — Medication 650 MILLIGRAM(S): at 17:04

## 2018-11-14 RX ADMIN — Medication 650 MILLIGRAM(S): at 13:07

## 2018-11-14 RX ADMIN — FINASTERIDE 5 MILLIGRAM(S): 5 TABLET, FILM COATED ORAL at 11:14

## 2018-11-14 RX ADMIN — Medication 650 MILLIGRAM(S): at 21:38

## 2018-11-14 RX ADMIN — HYDROMORPHONE HYDROCHLORIDE 2 MILLIGRAM(S): 2 INJECTION INTRAMUSCULAR; INTRAVENOUS; SUBCUTANEOUS at 02:00

## 2018-11-14 NOTE — PROGRESS NOTE ADULT - PROBLEM SELECTOR PLAN 10
- C/w Hep SubQ    Dispo: Creatinine improved to baseline. Continue to hydrate and hold acyclovir. No new lesions seen on exam. Cognition still fluctuating. Still continues to have nausea, abdominal pain and watery stools. Infectious work-up all negative.

## 2018-11-14 NOTE — PROGRESS NOTE ADULT - SUBJECTIVE AND OBJECTIVE BOX
[facility] DIVISION OF KIDNEY DISEASES AND HYPERTENSION -- FOLLOW UP NOTE  --------------------------------------------------------------------------------    HPI    64 y/o M with PMH colon ca (s/p resection June 2017), pancreatic ca (s/p chemo, ended April 2018), currently on nivoluma immunotherapy (once every 2 weeks since the last 6 months; last dose on 11/2/18), and HTN presented with nausea, headache, and right forehead/scalp rash since Saturday (secondary to herpes zoster shingles). Renal called for evaluation of NICOLE.    Today,  pt seen and examined. Denies any acute complaint. s/p elizabeth removal, urinating without issues.        PAST HISTORY  --------------------------------------------------------------------------------  No significant changes to PMH, PSH, FHx, SHx, unless otherwise noted    ALLERGIES & MEDICATIONS  --------------------------------------------------------------------------------  Allergies    No Known Allergies    Intolerances      Standing Inpatient Medications  artificial tears (preservative free) Ophthalmic Solution 1 Drop(s) Right EYE every 6 hours  bismuth subsalicylate Liquid 30 milliLiter(s) Oral four times a day  dextrose 5% + lactated ringers. 1000 milliLiter(s) IV Continuous <Continuous>  finasteride 5 milliGRAM(s) Oral daily  gabapentin 300 milliGRAM(s) Oral daily  heparin  Injectable 5000 Unit(s) SubCutaneous every 8 hours  methadone    Tablet 5 milliGRAM(s) Oral every 8 hours  metoprolol tartrate 25 milliGRAM(s) Oral two times a day  sertraline 50 milliGRAM(s) Oral daily  sodium bicarbonate 650 milliGRAM(s) Oral three times a day  tamsulosin 0.8 milliGRAM(s) Oral at bedtime    PRN Inpatient Medications  acetaminophen   Tablet .. 650 milliGRAM(s) Oral every 6 hours PRN  loperamide 2 milliGRAM(s) Oral every 4 hours PRN  metoclopramide Injectable 10 milliGRAM(s) IV Push every 6 hours PRN      REVIEW OF SYSTEMS  --------------------------------------------------------------------------------  Gen: No lethargy  Respiratory: No dyspnea  CV: No chest pain  GI: No abdominal pain  MSK: No LE edema  Neuro: No dizziness  Heme: No bleeding    All other systems were reviewed and are negative, except as noted.  VITALS/PHYSICAL EXAM  --------------------------------------------------------------------------------  T(C): 37 (11-14-18 @ 05:34), Max: 37 (11-14-18 @ 05:34)  HR: 88 (11-14-18 @ 05:34) (73 - 88)  BP: 165/90 (11-14-18 @ 05:34) (148/76 - 165/90)  RR: 18 (11-14-18 @ 05:34) (17 - 18)  SpO2: 98% (11-14-18 @ 05:34) (97% - 98%)  Wt(kg): --  Height (cm): 175.26 (11-13-18 @ 20:37)  Weight (kg): 99.9 (11-13-18 @ 20:37)  BMI (kg/m2): 32.5 (11-13-18 @ 20:37)  BSA (m2): 2.15 (11-13-18 @ 20:37)      11-13-18 @ 07:01  -  11-14-18 @ 07:00  --------------------------------------------------------  IN: 1410 mL / OUT: 2300 mL / NET: -890 mL    11-14-18 @ 07:01  -  11-14-18 @ 12:17  --------------------------------------------------------  IN: 0 mL / OUT: 300 mL / NET: -300 mL      Physical Exam:             Gen: NAD, well-appearing  	Pulm: CTA B/L  	CV: RRR, S1S2; no rub  	Abd: +BS, soft, nontender/nondistended  	: No suprapubic tenderness  	LE: No edema  	Neuro: No focal deficits, intact gait  	Psych: Normal affect and mood  	Skin: Warm, without rashes        LABS/STUDIES  --------------------------------------------------------------------------------              12.4   4.67  >-----------<  275      [11-14-18 @ 05:50]              36.3     134  |  102  |  10  ----------------------------<  68      [11-14-18 @ 05:50]  3.7   |  13  |  1.06        Ca     9.4     [11-14-18 @ 05:50]      Mg     1.1     [11-14-18 @ 05:50]      Phos  4.8     [11-14-18 @ 05:50]    TPro  6.6  /  Alb  3.6  /  TBili  0.4  /  DBili  x   /  AST  21  /  ALT  12  /  AlkPhos  84  [11-14-18 @ 05:50]          Creatinine Trend:  SCr 1.06 [11-14 @ 05:50]  SCr 1.50 [11-13 @ 05:30]  SCr 2.35 [11-12 @ 06:37]  SCr 4.28 [11-11 @ 06:20]  SCr 5.56 [11-10 @ 06:13]    Urinalysis - [11-09-18 @ 10:58]      Color YELLOW / Appearance CLEAR / SG 1.012 / pH 6.0      Gluc NEGATIVE / Ketone TRACE  / Bili NEGATIVE / Urobili NORMAL       Blood NEGATIVE / Protein 200 / Leuk Est NEGATIVE / Nitrite NEGATIVE      RBC 3-5 / WBC 3-5 / Hyaline 1+ / Gran  / Sq Epi FEW / Non Sq Epi  / Bacteria NEGATIVE    Urine Creatinine 92.60      [11-09-18 @ 10:58]  Urine Sodium 64      [11-09-18 @ 10:58]  Urine Urea Nitrogen 241.6      [11-09-18 @ 10:58]  Urine Chloride 57      [11-09-18 @ 10:58]  Urine Osmolality 287      [11-09-18 @ 10:58]    HbA1c 5.6      [03-04-14 @ 12:52]

## 2018-11-14 NOTE — PROGRESS NOTE ADULT - PROBLEM SELECTOR PLAN 2
- Continues to have watery/loose, non-bloody stools; endorses mild belly pain from it. Possibly 2/2 pancreatic insufficiency 2/2 pancreatic cancer?  - As per pt's oncologist, Dr. Maloney, low suspicion for Nivolumab toxicity leading to the diarrhea.  - C. Diff, stool culture, GI PCR, and O&P negative.  - C/w pepto-bismol and Imodium to treat sxs.  - Also reports nause and decreased appetite  - C/w D5 + LR @ 75 cc/hr - Continues to have watery/loose, non-bloody stools; endorses mild belly pain from it. Possibly 2/2 pancreatic insufficiency 2/2 pancreatic cancer?  - As per pt's oncologist, Dr. Maloney, low suspicion for Nivolumab toxicity leading to the diarrhea.  - C. Diff, stool culture, GI PCR, and O&P negative.\  - C/w pain control with gabapentin 300mg qd, methadone 5mg TID, acetaminophen 650mg PRN and dilaudid PRN.  - C/w pepto-bismol and Imodium to treat sxs.  - Also reports nausea and decreased appetite  - C/w D5 + LR @ 75 cc/hr

## 2018-11-14 NOTE — CONSULT NOTE ADULT - ASSESSMENT
63 y.o M with a history of colon cancer and pancreatic cancer on Nivolumab presented with herpes zoster and now has a scattered follicular eruption.    1. Folliculitis 63 y.o M with a history of colon cancer and pancreatic cancer on Nivolumab presented with herpes zoster and now has a scattered follicular eruption.    1. Contact dermatitis  - Forearms. Likely due to bandage.  - Lesions do not require treatment unless they become symptomatic  - If itchy, start Triamcinolone .1% ointment BID x 2 weeks    2. Varicella zoster  - Right V1, now crusted and asymptomatic  - Vaseline to the affected area twice daily    3. Folliculitis  - Groin, chronic, asymptomatic  - No treatment at this time    Patient seen and discussed with Dr. Christine. Will sign off, please call with any questions. 63 y.o M with a history of colon cancer and pancreatic cancer on Nivolumab presented with herpes zoster and now has a scattered follicular eruption.    1. Contact dermatitis  - Forearms. Likely due to bandage.  - Lesions do not require treatment unless they become symptomatic  - If itchy, start Triamcinolone 0.1% ointment BID x 2 weeks    2. Varicella zoster  - Right V1, now crusted and asymptomatic  - Vaseline to the affected area twice daily    3. Folliculitis  - Groin, chronic, asymptomatic  - No treatment at this time    Patient seen and discussed with Dr. Christine. Will sign off, please call with any questions.

## 2018-11-14 NOTE — PROGRESS NOTE ADULT - PROBLEM SELECTOR PLAN 3
- Creatinine trending down from 5.6 to baseline ~1  - 2/2 to fluid losses from diarrhea vs. acyclovir/valacyclovir vs. immunotherapy (nivolumab)  - Lopez removed; passed TOV yesterday  - C/w D5 + LR @ 75 cc/hr  - Valacyclovir discontinued on 11/8 2/2 worsening creatinine. Losartan discontinued on 11/10. Gabapentin reduced to 300mg QD.  - Monitor BMP  - F/u renal reccs - Creatinine trending down from 5.6 to baseline ~1  - 2/2 to fluid losses from diarrhea vs. acyclovir/valacyclovir vs. immunotherapy (nivolumab)  - Lopez removed; passed TOV yesterday  - Bicarbonate down to 13; as per renal, c/w NaHCO3 650mg TID  - C/w D5 + LR @ 75 cc/hr  - Valacyclovir discontinued on 11/8 2/2 worsening creatinine. Losartan discontinued on 11/10. Gabapentin reduced to 300mg QD.  - Monitor BMP  - F/u renal reccs

## 2018-11-14 NOTE — PROGRESS NOTE ADULT - PROBLEM SELECTOR PLAN 1
Pt with normal baseline kidney function. Scr 0.8 on presentation. Scr elevated at 5.56. Pt s/p IV contrast administration x2. Also received IV acyclovir. Bladder scan  with ~790 cc. NICOLE likely multifactorial; in setting of contrast administration, hemodynamic with hypotension and ARB use, nephrotoxicity from acyclovir? with component of obstruction. Patient non-oliguric with elizabeth with improvement of kidney function. Scr ~1.05 today. May restart Losartan tomorrow if Scr continues to trend down.

## 2018-11-14 NOTE — CONSULT NOTE ADULT - REASON FOR ADMISSION
Painful facial/scalp rash and nausea

## 2018-11-14 NOTE — PROGRESS NOTE ADULT - PROBLEM SELECTOR PLAN 1
- Papules now crusting, s/p acyclovir and valacyclovir, d/c'd due to NICOLE. Will continue to monitor lesions.  - As per Optho, negative for herpes zoster ophthalmicus (pt. complains of itchy right right eye, but no blurriness/photophobia/vision loss).  - C/w artifical tears and cold compresses  - Pain control with gabapentin 300mg qd, acetaminophen 650mg PRN and dilaudid PRN.       - Methadone discontinued due to confusion and NICOLE. - Papules now crusting, s/p acyclovir and valacyclovir, d/c'd due to NICOLE. Will continue to monitor lesions.  - As per Optho, negative for herpes zoster ophthalmicus (pt. complains of itchy right right eye, but no blurriness/photophobia/vision loss).  - C/w artifical tears and cold compresses  - Pain control with gabapentin 300mg qd, acetaminophen 650mg PRN and dilaudid PRN.       - Methadone discontinued due to confusion and NICOLE.  - New rash (11/13): Left arm with erythema. B/l LE with petechial-like rash on shins.  Back with erythematous rash.       - F/u Derm reccs - Papules now crusting, s/p acyclovir and valacyclovir, d/c'd due to NICOLE. Will continue to monitor lesions.  - As per Optho, negative for herpes zoster ophthalmicus (pt. complains of itchy right right eye, but no blurriness/photophobia/vision loss).  - C/w artifical tears and cold compresses  - Pain control with gabapentin 300mg qd, methadone 5mg TID, acetaminophen 650mg PRN and dilaudid PRN.       - Home methadone discontinued due to confusion and NICOLE from 11/10 - 11/14; resumed on 11/14 upon resolution of the NICOLE as the pt. continued to endorse abdominal pain  - New rash (11/13): Left arm with erythema. B/l LE with petechial-like rash on shins.  Back with erythematous rash.       - F/u Derm reccs

## 2018-11-14 NOTE — PROGRESS NOTE ADULT - SUBJECTIVE AND OBJECTIVE BOX
CHIEF COMPLAINT:    Interval Events: Pt. continues to have confusion/cognitive fluctuations. Also reports continued watery stools (4-5 episodes overnight). Had severe abdominal pain last night; received dilaudid 2mg PO x 2. + Nausea, generalized abdominal pain, decreased appetite. No headaches, blurry vision, photophobia, eye itchiness, vomiting, fevers/chills, cough, CP/SOB, abdominal pain, dysuria.    REVIEW OF SYSTEMS:  Constitutional: No fever/chills. No recent weight loss or weight gain.   HEENT: Normal vision. No postnasal drip or nasal congestion.  CV: No chest pain, or palpitations. No orthopnea.   Resp: No cough, or sputum production. No shortness of breath or dyspnea on exertion.   GI: + Nausea; + Diarrhea; + Generalized abdominal pain. No vomiting. No constipation.  : No dysuria, no nocturia or increased urinary frequency.  Musculoskeletal: No back pain, no myalgias  Skin: Rash on right forehead and scalp.  Neurological: No headache or dizziness. No syncope, no weakness, numbness.  Psychiatric: Denies depressed mood.   Endocrine: No cold or heat intolerance. No dry skin.  Hematologic/Lymphatic: No anemia or bleeding problem.    OBJECTIVE:  Vital Signs Last 24 Hrs  T(C): 37 (14 Nov 2018 05:34), Max: 37 (14 Nov 2018 05:34)  T(F): 98.6 (14 Nov 2018 05:34), Max: 98.6 (14 Nov 2018 05:34)  HR: 88 (14 Nov 2018 05:34) (73 - 88)  BP: 165/90 (14 Nov 2018 05:34) (148/76 - 165/90)  BP(mean): --  RR: 18 (14 Nov 2018 05:34) (17 - 18)  SpO2: 98% (14 Nov 2018 05:34) (97% - 98%)    11-13 @ 07:01 - 11-14 @ 07:00  --------------------------------------------------------  IN: 1410 mL / OUT: 2300 mL / NET: -890 mL    11-14 @ 07:01 - 11-14 @ 09:05  --------------------------------------------------------  IN: 0 mL / OUT: 300 mL / NET: -300 mL      CAPILLARY BLOOD GLUCOSE          PHYSICAL EXAM:  Gen: Appears lethargic. No acute distress, alert, cooperative.  Head: Red, maculopapular rash with groupings of papules on right forehead and right scalp. Doesn't cross the midline. Vesicles crusted.  HEENT: Oral mucosa moist, normal conjunctiva.  Lung: CTAB, no respiratory distress, no crackles or wheezes.  CV: Normal S1/S1, No m/r/g.  Abd: Mildly tender diffusely. Soft, non-distended, no rebound or guarding, scar on abdomen, no sign of infection.  MSK: No LE edema.  Neuro: AAO x 2. No focal neurologic deficits. Cn 2-12 intact.  Skin:  Rash on forehead around eye healing.  Left arm with erythema. B/l LE with petechial-like rash on shins.  Back with erythematous rash.     LINES:    HOSPITAL MEDICATIONS:  heparin  Injectable 5000 Unit(s) SubCutaneous every 8 hours      metoprolol tartrate 25 milliGRAM(s) Oral two times a day  tamsulosin 0.8 milliGRAM(s) Oral at bedtime    finasteride 5 milliGRAM(s) Oral daily      acetaminophen   Tablet .. 650 milliGRAM(s) Oral every 6 hours PRN  gabapentin 300 milliGRAM(s) Oral daily  metoclopramide Injectable 10 milliGRAM(s) IV Push every 6 hours PRN  sertraline 50 milliGRAM(s) Oral daily    bismuth subsalicylate Liquid 30 milliLiter(s) Oral four times a day  loperamide 2 milliGRAM(s) Oral every 4 hours PRN        dextrose 5% + lactated ringers. 1000 milliLiter(s) IV Continuous <Continuous>  sodium bicarbonate 650 milliGRAM(s) Oral three times a day      artificial tears (preservative free) Ophthalmic Solution 1 Drop(s) Right EYE every 6 hours        LABS:                        12.4   4.67  )-----------( 275      ( 14 Nov 2018 05:50 )             36.3     Hgb Trend: 12.4<--, 11.7<--, 11.7<--, 11.1<--, 11.8<--  11-14    134<L>  |  102  |  10  ----------------------------<  68<L>  3.7   |  13<L>  |  1.06    Ca    9.4      14 Nov 2018 05:50  Phos  4.8     11-14  Mg     1.1     11-14    TPro  6.6  /  Alb  3.6  /  TBili  0.4  /  DBili  x   /  AST  21  /  ALT  12  /  AlkPhos  84  11-14    Creatinine Trend: 1.06<--, 1.50<--, 2.35<--, 4.28<--, 5.56<--, 4.85<--            MICROBIOLOGY:     RADIOLOGY:  [ ] Reviewed and interpreted by me    EKG: CHIEF COMPLAINT:    Interval Events: Pt. continues to have confusion/cognitive fluctuations. Also reports continued watery stools (4-5 episodes overnight). Had severe abdominal pain last night; received dilaudid 2mg PO x 2. + Nausea, generalized abdominal pain, decreased appetite. No headaches, blurry vision, photophobia, eye itchiness, vomiting, fevers/chills, cough, CP/SOB, abdominal pain, dysuria.    REVIEW OF SYSTEMS:  Constitutional: No fever/chills. No recent weight loss or weight gain.   HEENT: Normal vision. No postnasal drip or nasal congestion.  CV: No chest pain, or palpitations. No orthopnea.   Resp: No cough, or sputum production. No shortness of breath or dyspnea on exertion.   GI: + Nausea; + Diarrhea; + Generalized abdominal pain. No vomiting. No constipation.  : No dysuria, no nocturia or increased urinary frequency.  Musculoskeletal: No back pain, no myalgias  Skin: Rash on right forehead and scalp.  Neurological: No headache or dizziness. No syncope, no weakness, numbness.  Psychiatric: Denies depressed mood.   Endocrine: No cold or heat intolerance. No dry skin.  Hematologic/Lymphatic: No anemia or bleeding problem.    OBJECTIVE:  Vital Signs Last 24 Hrs  T(C): 37 (14 Nov 2018 05:34), Max: 37 (14 Nov 2018 05:34)  T(F): 98.6 (14 Nov 2018 05:34), Max: 98.6 (14 Nov 2018 05:34)  HR: 88 (14 Nov 2018 05:34) (73 - 88)  BP: 165/90 (14 Nov 2018 05:34) (148/76 - 165/90)  BP(mean): --  RR: 18 (14 Nov 2018 05:34) (17 - 18)  SpO2: 98% (14 Nov 2018 05:34) (97% - 98%)    11-13 @ 07:01 - 11-14 @ 07:00  --------------------------------------------------------  IN: 1410 mL / OUT: 2300 mL / NET: -890 mL    11-14 @ 07:01 - 11-14 @ 09:05  --------------------------------------------------------  IN: 0 mL / OUT: 300 mL / NET: -300 mL      CAPILLARY BLOOD GLUCOSE          PHYSICAL EXAM:  Gen: Appears lethargic. No acute distress, alert, cooperative.  Head: Red, maculopapular rash with groupings of papules on right forehead and right scalp. Doesn't cross the midline. Vesicles crusted.  HEENT: Oral mucosa moist, normal conjunctiva.  Lung: CTAB, no respiratory distress, no crackles or wheezes.  CV: Normal S1/S1, No m/r/g.  Abd: Mildly tender diffusely. Soft, non-distended, no rebound or guarding, scar on abdomen, no sign of infection.  MSK: No LE edema.  Neuro: AAO x 2. No focal neurologic deficits. Cn 2-12 intact.  Skin:  Rash on forehead around eye healing.  Left arm with erythema. B/l LE with petechial-like rash on shins.  Back with erythematous rash.     LINES:    HOSPITAL MEDICATIONS:  heparin  Injectable 5000 Unit(s) SubCutaneous every 8 hours      metoprolol tartrate 25 milliGRAM(s) Oral two times a day  tamsulosin 0.8 milliGRAM(s) Oral at bedtime    finasteride 5 milliGRAM(s) Oral daily      acetaminophen   Tablet .. 650 milliGRAM(s) Oral every 6 hours PRN  gabapentin 300 milliGRAM(s) Oral daily  metoclopramide Injectable 10 milliGRAM(s) IV Push every 6 hours PRN  sertraline 50 milliGRAM(s) Oral daily    bismuth subsalicylate Liquid 30 milliLiter(s) Oral four times a day  loperamide 2 milliGRAM(s) Oral every 4 hours PRN        dextrose 5% + lactated ringers. 1000 milliLiter(s) IV Continuous <Continuous>  sodium bicarbonate 650 milliGRAM(s) Oral three times a day      artificial tears (preservative free) Ophthalmic Solution 1 Drop(s) Right EYE every 6 hours        LABS:                        12.4   4.67  )-----------( 275      ( 14 Nov 2018 05:50 )             36.3     Hgb Trend: 12.4<--, 11.7<--, 11.7<--, 11.1<--, 11.8<--  11-14    134<L>  |  102  |  10  ----------------------------<  68<L>  3.7   |  13<L>  |  1.06    Ca    9.4      14 Nov 2018 05:50  Phos  4.8     11-14  Mg     1.1     11-14    TPro  6.6  /  Alb  3.6  /  TBili  0.4  /  DBili  x   /  AST  21  /  ALT  12  /  AlkPhos  84  11-14    Creatinine Trend: 1.06<--, 1.50<--, 2.35<--, 4.28<--, 5.56<--, 4.85<--      Renal note reviewed.      Derm note reviewed

## 2018-11-14 NOTE — PROVIDER CONTACT NOTE (MEDICATION) - BACKGROUND
Pt dx with fever hx of enlarged lymph node, pancreatic cancer, colon cancer, hypertension, smoking, cervical disc  disease, vertigo, obesity

## 2018-11-14 NOTE — PROVIDER CONTACT NOTE (MEDICATION) - ASSESSMENT
pt complaining of severe pain on right side of head, also patient voided but unable to measure as it was mixed with bowel movement.

## 2018-11-14 NOTE — CONSULT NOTE ADULT - SUBJECTIVE AND OBJECTIVE BOX
HPI:  62 y/o M with PMH colon ca (s/p resection June 2017), pancreatic ca (s/p chemo, ended April 2018), currently on Nivolumab immunotherapy (Q2w x 6 mo; last dose on 11/2/18), and HTN presented on 11/5 with herpes zoster of right V1 distribution. He is s/p treatment with Acyclovir and Valacyclovir with course complicated by NICOLE. Dermatology is consulted for a new rash. Began a few days ago. Denies itching/burning/pain. Has not had this rash before. No treatment tried.     PAST MEDICAL & SURGICAL HISTORY:  Chronic pain: neck, lower back  Enlarged lymph node  Pancreatic cancer  Colon cancer  Cancer of pancreas  Hypertension, unspecified type: diet control  Smoking  Cervical disc disease  Vertigo  Colon cancer  Injury: forehead &amp; had sutures ( 2013 )  Obesity  Head ache  Gastric perforation  H/O exploratory laparotomy  H/O colectomy  S/P colon resection: 6/2017  H/O cervical spine surgery: fusion - 2014 with plates and screws      REVIEW OF SYSTEMS    General: no fevers/chills, no lethargy	    Skin/Breast: see HPI  	  Ophthalmologic: no eye pain or change in vision  	  ENMT: no dysphagia or change in hearing    Respiratory and Thorax: no SOB or cough  	  Cardiovascular: no palpitations or chest pain    Gastrointestinal: no abdominal pain or blood in stool     Genitourinary: no dysuria or frequency    Musculoskeletal: no joint pains    Neurological: no weakness, numbness , or tingling    MEDICATIONS  (STANDING):  artificial tears (preservative free) Ophthalmic Solution 1 Drop(s) Right EYE every 6 hours  bismuth subsalicylate Liquid 30 milliLiter(s) Oral four times a day  dextrose 5% + lactated ringers. 1000 milliLiter(s) (75 mL/Hr) IV Continuous <Continuous>  finasteride 5 milliGRAM(s) Oral daily  gabapentin 300 milliGRAM(s) Oral daily  heparin  Injectable 5000 Unit(s) SubCutaneous every 8 hours  methadone    Tablet 5 milliGRAM(s) Oral every 8 hours  metoprolol tartrate 25 milliGRAM(s) Oral two times a day  sertraline 50 milliGRAM(s) Oral daily  sodium bicarbonate 650 milliGRAM(s) Oral three times a day  tamsulosin 0.8 milliGRAM(s) Oral at bedtime    MEDICATIONS  (PRN):  acetaminophen   Tablet .. 650 milliGRAM(s) Oral every 6 hours PRN Moderate Pain (4 - 6)  loperamide 2 milliGRAM(s) Oral every 4 hours PRN Diarrhea  metoclopramide Injectable 10 milliGRAM(s) IV Push every 6 hours PRN Nausea      Allergies    No Known Allergies    Intolerances        SOCIAL HISTORY: No tobacco use    FAMILY HISTORY:  No pertinent family history in first degree relatives      Vital Signs Last 24 Hrs  T(C): 37 (14 Nov 2018 05:34), Max: 37 (14 Nov 2018 05:34)  T(F): 98.6 (14 Nov 2018 05:34), Max: 98.6 (14 Nov 2018 05:34)  HR: 88 (14 Nov 2018 05:34) (73 - 88)  BP: 165/90 (14 Nov 2018 05:34) (148/76 - 165/90)  BP(mean): --  RR: 18 (14 Nov 2018 05:34) (17 - 18)  SpO2: 98% (14 Nov 2018 05:34) (97% - 98%)    PHYSICAL EXAM:     The patient was alert and oriented X 3, well nourished, and in no  apparent distress.  OP showed no ulcerations  There was no visible lymphadenopathy.  Conjunctiva were non injected  There was no clubbing or edema of extremities.  The scalp, hair, face, eyebrows, lips, OP, neck, chest, back,   extremities X 4, nails were examined.  There was no hyperhidrosis or bromhidrosis.    Of note on skin exam:   Forearms, upper back < inner thighs: scattered, erythematous, follicular papules and non-blanching macules    LABS:                        12.4   4.67  )-----------( 275      ( 14 Nov 2018 05:50 )             36.3     11-14    134<L>  |  102  |  10  ----------------------------<  68<L>  3.7   |  13<L>  |  1.06    Ca    9.4      14 Nov 2018 05:50  Phos  4.8     11-14  Mg     1.1     11-14    TPro  6.6  /  Alb  3.6  /  TBili  0.4  /  DBili  x   /  AST  21  /  ALT  12  /  AlkPhos  84  11-14          RADIOLOGY & ADDITIONAL STUDIES: HPI:  62 y/o M with PMH colon ca (s/p resection June 2017), pancreatic ca (s/p chemo, ended April 2018), currently on Nivolumab immunotherapy (Q2w x 6 mo; last dose on 11/2/18), and HTN presented on 11/5 with herpes zoster of right V1 distribution. He is s/p treatment with Acyclovir and Valacyclovir with course complicated by NICOLE. Dermatology is consulted for a new rash. Began a few days ago. On the L arm. Denies itching/burning/pain. Has not had this rash before. No treatment tried.     Patient's wife reports that the rash in the groin is chronic.     PAST MEDICAL & SURGICAL HISTORY:  Chronic pain: neck, lower back  Enlarged lymph node  Pancreatic cancer  Colon cancer  Cancer of pancreas  Hypertension, unspecified type: diet control  Smoking  Cervical disc disease  Vertigo  Colon cancer  Injury: forehead &amp; had sutures ( 2013 )  Obesity  Head ache  Gastric perforation  H/O exploratory laparotomy  H/O colectomy  S/P colon resection: 6/2017  H/O cervical spine surgery: fusion - 2014 with plates and screws      REVIEW OF SYSTEMS    General: no fevers/chills, no lethargy	    Skin/Breast: see HPI  	  Ophthalmologic: no eye pain or change in vision  	  ENMT: no dysphagia or change in hearing    Respiratory and Thorax: no SOB or cough  	  Cardiovascular: no palpitations or chest pain    Gastrointestinal: no abdominal pain or blood in stool     Genitourinary: no dysuria or frequency    Musculoskeletal: no joint pains    Neurological: no weakness, numbness , or tingling    MEDICATIONS  (STANDING):  artificial tears (preservative free) Ophthalmic Solution 1 Drop(s) Right EYE every 6 hours  bismuth subsalicylate Liquid 30 milliLiter(s) Oral four times a day  dextrose 5% + lactated ringers. 1000 milliLiter(s) (75 mL/Hr) IV Continuous <Continuous>  finasteride 5 milliGRAM(s) Oral daily  gabapentin 300 milliGRAM(s) Oral daily  heparin  Injectable 5000 Unit(s) SubCutaneous every 8 hours  methadone    Tablet 5 milliGRAM(s) Oral every 8 hours  metoprolol tartrate 25 milliGRAM(s) Oral two times a day  sertraline 50 milliGRAM(s) Oral daily  sodium bicarbonate 650 milliGRAM(s) Oral three times a day  tamsulosin 0.8 milliGRAM(s) Oral at bedtime    MEDICATIONS  (PRN):  acetaminophen   Tablet .. 650 milliGRAM(s) Oral every 6 hours PRN Moderate Pain (4 - 6)  loperamide 2 milliGRAM(s) Oral every 4 hours PRN Diarrhea  metoclopramide Injectable 10 milliGRAM(s) IV Push every 6 hours PRN Nausea      Allergies    No Known Allergies    Intolerances        SOCIAL HISTORY: No tobacco use    FAMILY HISTORY:  No pertinent family history in first degree relatives      Vital Signs Last 24 Hrs  T(C): 37 (14 Nov 2018 05:34), Max: 37 (14 Nov 2018 05:34)  T(F): 98.6 (14 Nov 2018 05:34), Max: 98.6 (14 Nov 2018 05:34)  HR: 88 (14 Nov 2018 05:34) (73 - 88)  BP: 165/90 (14 Nov 2018 05:34) (148/76 - 165/90)  BP(mean): --  RR: 18 (14 Nov 2018 05:34) (17 - 18)  SpO2: 98% (14 Nov 2018 05:34) (97% - 98%)    PHYSICAL EXAM:     The patient was alert and oriented X 3, well nourished, and in no  apparent distress.  OP showed no ulcerations  There was no visible lymphadenopathy.  Conjunctiva were non injected  There was no clubbing or edema of extremities.  The scalp, hair, face, eyebrows, lips, OP, neck, chest, back,   extremities X 4, nails were examined.  There was no hyperhidrosis or bromhidrosis.    Of note on skin exam:   L > forearm: 2 mm, erythematous, papules. Some appear geometric in configuration  R forehead: grouped eschars  B/l inner groin: scattered, follicular papules    LABS:                        12.4   4.67  )-----------( 275      ( 14 Nov 2018 05:50 )             36.3     11-14    134<L>  |  102  |  10  ----------------------------<  68<L>  3.7   |  13<L>  |  1.06    Ca    9.4      14 Nov 2018 05:50  Phos  4.8     11-14  Mg     1.1     11-14    TPro  6.6  /  Alb  3.6  /  TBili  0.4  /  DBili  x   /  AST  21  /  ALT  12  /  AlkPhos  84  11-14          RADIOLOGY & ADDITIONAL STUDIES:

## 2018-11-14 NOTE — PROVIDER CONTACT NOTE (MEDICATION) - ACTION/TREATMENT ORDERED:
MD Hammonds aware and at bedside, to order Dilaudid PO. As per MD probably from shingles but shingles are crusted, no need for isolation at this time.  Nursing care to continue

## 2018-11-15 DIAGNOSIS — R11.2 NAUSEA WITH VOMITING, UNSPECIFIED: ICD-10-CM

## 2018-11-15 DIAGNOSIS — R21 RASH AND OTHER NONSPECIFIC SKIN ERUPTION: ICD-10-CM

## 2018-11-15 LAB
ALBUMIN SERPL ELPH-MCNC: 3.6 G/DL — SIGNIFICANT CHANGE UP (ref 3.3–5)
ALP SERPL-CCNC: 82 U/L — SIGNIFICANT CHANGE UP (ref 40–120)
ALT FLD-CCNC: 12 U/L — SIGNIFICANT CHANGE UP (ref 4–41)
AST SERPL-CCNC: 24 U/L — SIGNIFICANT CHANGE UP (ref 4–40)
BASOPHILS # BLD AUTO: 0.06 K/UL — SIGNIFICANT CHANGE UP (ref 0–0.2)
BASOPHILS NFR BLD AUTO: 1.2 % — SIGNIFICANT CHANGE UP (ref 0–2)
BILIRUB SERPL-MCNC: 0.4 MG/DL — SIGNIFICANT CHANGE UP (ref 0.2–1.2)
BUN SERPL-MCNC: 8 MG/DL — SIGNIFICANT CHANGE UP (ref 7–23)
CALCIUM SERPL-MCNC: 9.6 MG/DL — SIGNIFICANT CHANGE UP (ref 8.4–10.5)
CHLORIDE SERPL-SCNC: 100 MMOL/L — SIGNIFICANT CHANGE UP (ref 98–107)
CO2 SERPL-SCNC: 14 MMOL/L — LOW (ref 22–31)
CREAT SERPL-MCNC: 0.89 MG/DL — SIGNIFICANT CHANGE UP (ref 0.5–1.3)
EOSINOPHIL # BLD AUTO: 0.7 K/UL — HIGH (ref 0–0.5)
EOSINOPHIL NFR BLD AUTO: 13.7 % — HIGH (ref 0–6)
GLUCOSE SERPL-MCNC: 68 MG/DL — LOW (ref 70–99)
HCT VFR BLD CALC: 35.8 % — LOW (ref 39–50)
HGB BLD-MCNC: 12.6 G/DL — LOW (ref 13–17)
IMM GRANULOCYTES # BLD AUTO: 0.02 # — SIGNIFICANT CHANGE UP
IMM GRANULOCYTES NFR BLD AUTO: 0.4 % — SIGNIFICANT CHANGE UP (ref 0–1.5)
LYMPHOCYTES # BLD AUTO: 1.73 K/UL — SIGNIFICANT CHANGE UP (ref 1–3.3)
LYMPHOCYTES # BLD AUTO: 33.9 % — SIGNIFICANT CHANGE UP (ref 13–44)
MAGNESIUM SERPL-MCNC: 1.3 MG/DL — LOW (ref 1.6–2.6)
MCHC RBC-ENTMCNC: 29.6 PG — SIGNIFICANT CHANGE UP (ref 27–34)
MCHC RBC-ENTMCNC: 35.2 % — SIGNIFICANT CHANGE UP (ref 32–36)
MCV RBC AUTO: 84.2 FL — SIGNIFICANT CHANGE UP (ref 80–100)
MONOCYTES # BLD AUTO: 0.53 K/UL — SIGNIFICANT CHANGE UP (ref 0–0.9)
MONOCYTES NFR BLD AUTO: 10.4 % — SIGNIFICANT CHANGE UP (ref 2–14)
NEUTROPHILS # BLD AUTO: 2.06 K/UL — SIGNIFICANT CHANGE UP (ref 1.8–7.4)
NEUTROPHILS NFR BLD AUTO: 40.4 % — LOW (ref 43–77)
NRBC # FLD: 0 — SIGNIFICANT CHANGE UP
PHOSPHATE SERPL-MCNC: 4.8 MG/DL — HIGH (ref 2.5–4.5)
PLATELET # BLD AUTO: 315 K/UL — SIGNIFICANT CHANGE UP (ref 150–400)
PMV BLD: 8.8 FL — SIGNIFICANT CHANGE UP (ref 7–13)
POTASSIUM SERPL-MCNC: 3.7 MMOL/L — SIGNIFICANT CHANGE UP (ref 3.5–5.3)
POTASSIUM SERPL-SCNC: 3.7 MMOL/L — SIGNIFICANT CHANGE UP (ref 3.5–5.3)
PROT SERPL-MCNC: 6.9 G/DL — SIGNIFICANT CHANGE UP (ref 6–8.3)
RBC # BLD: 4.25 M/UL — SIGNIFICANT CHANGE UP (ref 4.2–5.8)
RBC # FLD: 13.2 % — SIGNIFICANT CHANGE UP (ref 10.3–14.5)
SODIUM SERPL-SCNC: 134 MMOL/L — LOW (ref 135–145)
WBC # BLD: 5.1 K/UL — SIGNIFICANT CHANGE UP (ref 3.8–10.5)
WBC # FLD AUTO: 5.1 K/UL — SIGNIFICANT CHANGE UP (ref 3.8–10.5)

## 2018-11-15 PROCEDURE — 93010 ELECTROCARDIOGRAM REPORT: CPT

## 2018-11-15 PROCEDURE — 99232 SBSQ HOSP IP/OBS MODERATE 35: CPT | Mod: GC

## 2018-11-15 RX ORDER — MAGNESIUM SULFATE 500 MG/ML
1 VIAL (ML) INJECTION ONCE
Qty: 0 | Refills: 0 | Status: COMPLETED | OUTPATIENT
Start: 2018-11-15 | End: 2018-11-15

## 2018-11-15 RX ORDER — ONDANSETRON 8 MG/1
4 TABLET, FILM COATED ORAL EVERY 6 HOURS
Qty: 0 | Refills: 0 | Status: DISCONTINUED | OUTPATIENT
Start: 2018-11-15 | End: 2018-11-19

## 2018-11-15 RX ORDER — SODIUM CHLORIDE 9 MG/ML
500 INJECTION, SOLUTION INTRAVENOUS
Qty: 0 | Refills: 0 | Status: DISCONTINUED | OUTPATIENT
Start: 2018-11-15 | End: 2018-11-16

## 2018-11-15 RX ORDER — MAGNESIUM SULFATE 500 MG/ML
2 VIAL (ML) INJECTION ONCE
Qty: 0 | Refills: 0 | Status: COMPLETED | OUTPATIENT
Start: 2018-11-15 | End: 2018-11-15

## 2018-11-15 RX ORDER — ONDANSETRON 8 MG/1
4 TABLET, FILM COATED ORAL ONCE
Qty: 0 | Refills: 0 | Status: COMPLETED | OUTPATIENT
Start: 2018-11-15 | End: 2018-11-15

## 2018-11-15 RX ORDER — LOPERAMIDE HCL 2 MG
2 TABLET ORAL ONCE
Qty: 0 | Refills: 0 | Status: COMPLETED | OUTPATIENT
Start: 2018-11-15 | End: 2018-11-15

## 2018-11-15 RX ORDER — GABAPENTIN 400 MG/1
100 CAPSULE ORAL THREE TIMES A DAY
Qty: 0 | Refills: 0 | Status: DISCONTINUED | OUTPATIENT
Start: 2018-11-15 | End: 2018-11-19

## 2018-11-15 RX ORDER — DRONABINOL 2.5 MG
2.5 CAPSULE ORAL
Qty: 0 | Refills: 0 | Status: DISCONTINUED | OUTPATIENT
Start: 2018-11-15 | End: 2018-11-19

## 2018-11-15 RX ORDER — PETROLATUM,WHITE
1 JELLY (GRAM) TOPICAL
Qty: 0 | Refills: 0 | Status: DISCONTINUED | OUTPATIENT
Start: 2018-11-15 | End: 2018-11-19

## 2018-11-15 RX ADMIN — SERTRALINE 50 MILLIGRAM(S): 25 TABLET, FILM COATED ORAL at 11:26

## 2018-11-15 RX ADMIN — Medication 1 DROP(S): at 11:26

## 2018-11-15 RX ADMIN — Medication 1 APPLICATION(S): at 17:52

## 2018-11-15 RX ADMIN — Medication 25 MILLIGRAM(S): at 05:45

## 2018-11-15 RX ADMIN — METHADONE HYDROCHLORIDE 5 MILLIGRAM(S): 40 TABLET ORAL at 22:05

## 2018-11-15 RX ADMIN — Medication 1 DROP(S): at 17:55

## 2018-11-15 RX ADMIN — Medication 650 MILLIGRAM(S): at 12:00

## 2018-11-15 RX ADMIN — Medication 650 MILLIGRAM(S): at 22:05

## 2018-11-15 RX ADMIN — Medication 100 GRAM(S): at 18:58

## 2018-11-15 RX ADMIN — GABAPENTIN 100 MILLIGRAM(S): 400 CAPSULE ORAL at 22:05

## 2018-11-15 RX ADMIN — Medication 2.5 MILLIGRAM(S): at 17:52

## 2018-11-15 RX ADMIN — GABAPENTIN 100 MILLIGRAM(S): 400 CAPSULE ORAL at 13:40

## 2018-11-15 RX ADMIN — Medication 650 MILLIGRAM(S): at 11:25

## 2018-11-15 RX ADMIN — ONDANSETRON 4 MILLIGRAM(S): 8 TABLET, FILM COATED ORAL at 17:52

## 2018-11-15 RX ADMIN — METHADONE HYDROCHLORIDE 5 MILLIGRAM(S): 40 TABLET ORAL at 05:45

## 2018-11-15 RX ADMIN — FINASTERIDE 5 MILLIGRAM(S): 5 TABLET, FILM COATED ORAL at 11:26

## 2018-11-15 RX ADMIN — Medication 1 DROP(S): at 05:44

## 2018-11-15 RX ADMIN — Medication 650 MILLIGRAM(S): at 02:22

## 2018-11-15 RX ADMIN — HEPARIN SODIUM 5000 UNIT(S): 5000 INJECTION INTRAVENOUS; SUBCUTANEOUS at 05:44

## 2018-11-15 RX ADMIN — Medication 650 MILLIGRAM(S): at 01:33

## 2018-11-15 RX ADMIN — Medication 2 MILLIGRAM(S): at 09:26

## 2018-11-15 RX ADMIN — METHADONE HYDROCHLORIDE 5 MILLIGRAM(S): 40 TABLET ORAL at 13:38

## 2018-11-15 RX ADMIN — TAMSULOSIN HYDROCHLORIDE 0.8 MILLIGRAM(S): 0.4 CAPSULE ORAL at 22:05

## 2018-11-15 RX ADMIN — HEPARIN SODIUM 5000 UNIT(S): 5000 INJECTION INTRAVENOUS; SUBCUTANEOUS at 13:40

## 2018-11-15 RX ADMIN — Medication 25 MILLIGRAM(S): at 17:52

## 2018-11-15 RX ADMIN — SODIUM CHLORIDE 75 MILLILITER(S): 9 INJECTION, SOLUTION INTRAVENOUS at 19:01

## 2018-11-15 RX ADMIN — Medication 650 MILLIGRAM(S): at 13:38

## 2018-11-15 RX ADMIN — Medication 2 MILLIGRAM(S): at 15:09

## 2018-11-15 RX ADMIN — Medication 650 MILLIGRAM(S): at 05:45

## 2018-11-15 RX ADMIN — HEPARIN SODIUM 5000 UNIT(S): 5000 INJECTION INTRAVENOUS; SUBCUTANEOUS at 22:06

## 2018-11-15 NOTE — PROGRESS NOTE ADULT - PROBLEM SELECTOR PLAN 4
derm consulted for new patchy rash on forearm. also maculopapular rash on back.  Think forearm is contact dermatitis, recommend local steroid cream if itchy.  Think otherwise has folliculitis. Will monitor.

## 2018-11-15 NOTE — PROGRESS NOTE ADULT - SUBJECTIVE AND OBJECTIVE BOX
Morgan Lewis MD  PGY 3  Pager [phone]      Patient is a 63y old  Male who presents with a chief complaint of Painful facial/scalp rash and nausea (14 Nov 2018 13:36)      INTERVAL HPI/OVERNIGHT EVENTS:    Overnight patient had some diarrhea. Pain on L scalp requiring dilaudid. Pt continues to have no appetite and had one episode of vomit this am.     REVIEW OF SYSTEMS:  CONSTITUTIONAL: No fever, chills  EYES: No eye pain, visual disturbances, or discharge  ENMT:  No difficulty hearing, tinnitus, vertigo; No sinus or throat pain  NECK: No pain or stiffness  RESPIRATORY: No cough, wheezing, chills or hemoptysis; No shortness of breath  CARDIOVASCULAR: No chest pain, palpitations  GASTROINTESTINAL: see HPI, no abd pain  GENITOURINARY: No dysuria  NEUROLOGICAL: No HA  SKIN: No itching, burning, rashes, or lesions   LYMPH NODES: No enlarged glands  ENDOCRINE: No heat or cold intolerance; No hair loss  MUSCULOSKELETAL: No joint pain or swelling; No muscle, back, or extremity pain    T(C): 36.9 (11-15-18 @ 05:20), Max: 36.9 (11-15-18 @ 05:20)  HR: 89 (11-15-18 @ 05:20) (66 - 92)  BP: 149/72 (11-15-18 @ 05:20) (141/80 - 158/68)  RR: 18 (11-15-18 @ 05:20) (18 - 18)  SpO2: 97% (11-15-18 @ 05:20) (97% - 97%)  Wt(kg): --Vital Signs Last 24 Hrs  T(C): 36.9 (15 Nov 2018 05:20), Max: 36.9 (15 Nov 2018 05:20)  T(F): 98.4 (15 Nov 2018 05:20), Max: 98.4 (15 Nov 2018 05:20)  HR: 89 (15 Nov 2018 05:20) (66 - 92)  BP: 149/72 (15 Nov 2018 05:20) (141/80 - 158/68)  BP(mean): --  RR: 18 (15 Nov 2018 05:20) (18 - 18)  SpO2: 97% (15 Nov 2018 05:20) (97% - 97%)    PHYSICAL EXAM:  GENERAL: NAD  HEAD:  Atraumatic, Normocephalic  EYES: EOMI, PERRLA, conjunctiva and sclera clear  ENMT: No tonsillar erythema, exudates, or enlargement; Moist mucous membranes  NECK: Supple, No JVD, Normal thyroid  CHEST/LUNG: Clear to auscultation bilaterally; No rales, rhonchi, wheezing, or rubs  HEART: Regular rate and rhythm; No murmurs, rubs, or gallops  ABDOMEN: Soft, Nontender, Nondistended; Bowel sounds present  NEURO: Alert & Oriented X2-3  EXTREMITIES: No LE edema, no calf tenderness  LYMPH: No lymphadenopathy noted  SKIN: prior shingles rash crusting over.     Consultant(s) Notes Reviewed:  [x ] YES  [ ] NO  Care Discussed with Consultants/Other Providers [ x] YES  [ ] NO    LABS:                        12.6   5.10  )-----------( 315      ( 15 Nov 2018 07:05 )             35.8     11-15    134<L>  |  100  |  8   ----------------------------<  68<L>  3.7   |  14<L>  |  0.89    Ca    9.6      15 Nov 2018 07:05  Phos  4.8     11-15  Mg     1.3     11-15    TPro  6.9  /  Alb  3.6  /  TBili  0.4  /  DBili  x   /  AST  24  /  ALT  12  /  AlkPhos  82  11-15        CAPILLARY BLOOD GLUCOSE                RADIOLOGY & ADDITIONAL TESTS:    Imaging Personally Reviewed:  [ ] YES  [ ] NO    derm recs appreciated Morgan Lewis MD  PGY 3  Pager [phone]      Patient is a 63y old  Male who presents with a chief complaint of Painful facial/scalp rash and nausea (14 Nov 2018 13:36)      INTERVAL HPI/OVERNIGHT EVENTS:    Overnight patient had some diarrhea. Pain on L scalp requiring dilaudid. Pt continues to have no appetite and had one episode of vomit this am.     REVIEW OF SYSTEMS:  CONSTITUTIONAL: No fever, chills  EYES: No eye pain, visual disturbances, or discharge  ENMT:  No difficulty hearing, tinnitus, vertigo; No sinus or throat pain  NECK: No pain or stiffness  RESPIRATORY: No cough, wheezing, chills or hemoptysis; No shortness of breath  CARDIOVASCULAR: No chest pain, palpitations  GASTROINTESTINAL: see HPI, no abd pain  GENITOURINARY: No dysuria  NEUROLOGICAL: No HA  SKIN: No itching, burning, rashes, or lesions   LYMPH NODES: No enlarged glands  ENDOCRINE: No heat or cold intolerance; No hair loss  MUSCULOSKELETAL: No joint pain or swelling; No muscle, back, or extremity pain    T(C): 36.9 (11-15-18 @ 05:20), Max: 36.9 (11-15-18 @ 05:20)  HR: 89 (11-15-18 @ 05:20) (66 - 92)  BP: 149/72 (11-15-18 @ 05:20) (141/80 - 158/68)  RR: 18 (11-15-18 @ 05:20) (18 - 18)  SpO2: 97% (11-15-18 @ 05:20) (97% - 97%)  Wt(kg): --Vital Signs Last 24 Hrs  T(C): 36.9 (15 Nov 2018 05:20), Max: 36.9 (15 Nov 2018 05:20)  T(F): 98.4 (15 Nov 2018 05:20), Max: 98.4 (15 Nov 2018 05:20)  HR: 89 (15 Nov 2018 05:20) (66 - 92)  BP: 149/72 (15 Nov 2018 05:20) (141/80 - 158/68)  BP(mean): --  RR: 18 (15 Nov 2018 05:20) (18 - 18)  SpO2: 97% (15 Nov 2018 05:20) (97% - 97%)    PHYSICAL EXAM:  GENERAL: NAD  HEAD:  Atraumatic, Normocephalic  EYES: EOMI, PERRLA, conjunctiva and sclera clear  ENMT: No tonsillar erythema, exudates, or enlargement; Moist mucous membranes  NECK: Supple, No JVD, Normal thyroid  CHEST/LUNG: Clear to auscultation bilaterally; No rales, rhonchi, wheezing, or rubs  HEART: Regular rate and rhythm; No murmurs, rubs, or gallops  ABDOMEN: Soft, Nontender, Nondistended; Bowel sounds present  NEURO: Alert & Oriented X2-3  EXTREMITIES: No LE edema, no calf tenderness  LYMPH: No lymphadenopathy noted  SKIN: prior shingles rash crusting over.         LABS:                        12.6   5.10  )-----------( 315      ( 15 Nov 2018 07:05 )             35.8     11-15    134<L>  |  100  |  8   ----------------------------<  68<L>  3.7   |  14<L>  |  0.89    Ca    9.6      15 Nov 2018 07:05  Phos  4.8     11-15  Mg     1.3     11-15    TPro  6.9  /  Alb  3.6  /  TBili  0.4  /  DBili  x   /  AST  24  /  ALT  12  /  AlkPhos  82  11-15        CAPILLARY BLOOD GLUCOSE                RADIOLOGY & ADDITIONAL TESTS:    Imaging Personally Reviewed:  [ ] YES  [ ] NO    derm recs appreciated

## 2018-11-15 NOTE — PROGRESS NOTE ADULT - ASSESSMENT
62 y/o M with PMH colon ca (s/p resection June 2017), pancreatic ca (s/p chemo, ended April 2018), currently on nivoluma immunotherapy (once every 2 weeks since the last 6 months; last dose on 11/2/18), and HTN presented with nausea, headache, and shingles rash on right forehead/scalp, s/p acyclovir/valacyclovir, c/c/b NICOLE (now resolving), post-obstructive retention (currently TOV), and unresolving diarrhea.

## 2018-11-15 NOTE — PROVIDER CONTACT NOTE (MEDICATION) - SITUATION
pt refused 2g of Mg sulfate. complained of burning at iv site. Dose changed to 1g to decrease burning but pt still refusing despite education.

## 2018-11-15 NOTE — PROVIDER CONTACT NOTE (OTHER) - ASSESSMENT
pt has diarrhea and intermittent nausea. pt states he has no appetite when offered food. no other s/s of distress. Marinol administered to increase appetite but pt states he still does not want to eat

## 2018-11-15 NOTE — PROGRESS NOTE ADULT - PROBLEM SELECTOR PLAN 3
- Papules now crusting, s/p acyclovir and valacyclovir, d/c'd due to NICOLE. Will continue to monitor lesions.  -pt now w/ post herpetic neuralgia, will adjust gabapentin to 100 tid  - As per Optho, negative for herpes zoster ophthalmicus (pt. complains of itchy right right eye, but no blurriness/photophobia/vision loss).  - C/w artifical tears and cold compresses  - Pain control with gabapentin 100mg TID, methadone 5mg TID, acetaminophen 650mg PRN and dilaudid PRN.   -

## 2018-11-15 NOTE — PROGRESS NOTE ADULT - PROBLEM SELECTOR PLAN 6
- In the ED, pt. met the SIRS criteria with T 102.4, , /73, RR 18, SpO2 100%. Received empiric Vanc 1g x 1 and Zosyn 3.375g x 1.  - CXR negative for PNA.  - RVP and UA negative.  - Blood clx and Urine clx both negative.  - Hold off on abx right now given no localizing source.  now resolved

## 2018-11-15 NOTE — PROGRESS NOTE ADULT - PROBLEM SELECTOR PLAN 1
unclear if from pancreatic ca vs immunotherapy. Has been waxing and waning since admission. CT a/p on admission without acute pathology.  Will recheck qtc to see if can start zofran today.   Will start marinnol to see if stimulates appetitie  Calorie count  FS q6 as has been low on bmp. unclear if from pancreatic ca vs immunotherapy. Has been waxing and waning since admission. CT a/p on admission without acute pathology.  Will recheck qtc to see if can start zofran today.   Will start marinnol to see if stimulates appetitie  Calorie count  FS q6 as has been low on bmp.  restarted methadone 11/14, could be contributing

## 2018-11-15 NOTE — PROGRESS NOTE ADULT - PROBLEM SELECTOR PLAN 5
- now resolved.   - 2/2 to fluid losses from diarrhea vs. acyclovir/valacyclovir vs. immunotherapy (nivolumab)  - Lopez removed; passed TOV yesterday  - Bicarbonate down to 13; as per renal, c/w NaHCO3 650mg TID  - C/w D5 + LR @ 75 cc/hr  - Valacyclovir discontinued on 11/8 2/2 worsening creatinine. Losartan discontinued on 11/10. Gabapentin reduced to 300mg QD.  - Monitor BMP  - F/u renal reccs - now resolved.   - 2/2 to fluid losses from diarrhea vs. acyclovir/valacyclovir vs. immunotherapy (nivolumab)  - Lopez removed; passed TOV yesterday  - Bicarbonate down to 13; as per renal, c/w NaHCO3 650mg TID  - C/w D5 + LR @ 75 cc/hr  - Valacyclovir discontinued on 11/8 2/2 worsening creatinine. Losartan discontinued on 11/10. Gabapentin reduced to 300mg QD.  - Monitor BMP  - F/u renal recs

## 2018-11-15 NOTE — PROGRESS NOTE ADULT - PROBLEM SELECTOR PLAN 2
- Continues to have watery/loose, non-bloody stools; endorses mild belly pain from it. Possibly 2/2 pancreatic insufficiency 2/2 pancreatic cancer?  - As per pt's oncologist, Dr. Maloney, low suspicion for Nivolumab toxicity leading to the diarrhea.  - C. Diff, stool culture, GI PCR, and O&P negative.  possibly side effect from reglan?  - will start imodium - Continues to have watery/loose, non-bloody stools; endorses mild belly pain from it. Possibly 2/2 pancreatic insufficiency 2/2 pancreatic cancer?  - As per pt's oncologist, Dr. Maloney, low suspicion for Nivolumab toxicity leading to the diarrhea.  - C. Diff, stool culture, GI PCR, and O&P negative.  possibly side effect from reglan?  - will start imodium as infection ruled out.  ? methadone withdrawal? restarted

## 2018-11-16 ENCOUNTER — APPOINTMENT (OUTPATIENT)
Age: 63
End: 2018-11-16

## 2018-11-16 LAB
ALBUMIN SERPL ELPH-MCNC: 3.6 G/DL — SIGNIFICANT CHANGE UP (ref 3.3–5)
ALP SERPL-CCNC: 74 U/L — SIGNIFICANT CHANGE UP (ref 40–120)
ALT FLD-CCNC: 11 U/L — SIGNIFICANT CHANGE UP (ref 4–41)
AST SERPL-CCNC: 22 U/L — SIGNIFICANT CHANGE UP (ref 4–40)
BILIRUB SERPL-MCNC: 0.4 MG/DL — SIGNIFICANT CHANGE UP (ref 0.2–1.2)
BUN SERPL-MCNC: 8 MG/DL — SIGNIFICANT CHANGE UP (ref 7–23)
CALCIUM SERPL-MCNC: 9.5 MG/DL — SIGNIFICANT CHANGE UP (ref 8.4–10.5)
CHLORIDE SERPL-SCNC: 100 MMOL/L — SIGNIFICANT CHANGE UP (ref 98–107)
CO2 SERPL-SCNC: 16 MMOL/L — LOW (ref 22–31)
CREAT SERPL-MCNC: 0.89 MG/DL — SIGNIFICANT CHANGE UP (ref 0.5–1.3)
GLUCOSE SERPL-MCNC: 80 MG/DL — SIGNIFICANT CHANGE UP (ref 70–99)
HCT VFR BLD CALC: 35.8 % — LOW (ref 39–50)
HGB BLD-MCNC: 12.4 G/DL — LOW (ref 13–17)
MAGNESIUM SERPL-MCNC: 1.5 MG/DL — LOW (ref 1.6–2.6)
MCHC RBC-ENTMCNC: 29.5 PG — SIGNIFICANT CHANGE UP (ref 27–34)
MCHC RBC-ENTMCNC: 34.6 % — SIGNIFICANT CHANGE UP (ref 32–36)
MCV RBC AUTO: 85 FL — SIGNIFICANT CHANGE UP (ref 80–100)
NRBC # FLD: 0 — SIGNIFICANT CHANGE UP
PHOSPHATE SERPL-MCNC: 4.7 MG/DL — HIGH (ref 2.5–4.5)
PLATELET # BLD AUTO: 310 K/UL — SIGNIFICANT CHANGE UP (ref 150–400)
PMV BLD: 8.9 FL — SIGNIFICANT CHANGE UP (ref 7–13)
POTASSIUM SERPL-MCNC: 3.5 MMOL/L — SIGNIFICANT CHANGE UP (ref 3.5–5.3)
POTASSIUM SERPL-SCNC: 3.5 MMOL/L — SIGNIFICANT CHANGE UP (ref 3.5–5.3)
PROT SERPL-MCNC: 6.7 G/DL — SIGNIFICANT CHANGE UP (ref 6–8.3)
RBC # BLD: 4.21 M/UL — SIGNIFICANT CHANGE UP (ref 4.2–5.8)
RBC # FLD: 13.4 % — SIGNIFICANT CHANGE UP (ref 10.3–14.5)
SODIUM SERPL-SCNC: 134 MMOL/L — LOW (ref 135–145)
WBC # BLD: 5.69 K/UL — SIGNIFICANT CHANGE UP (ref 3.8–10.5)
WBC # FLD AUTO: 5.69 K/UL — SIGNIFICANT CHANGE UP (ref 3.8–10.5)

## 2018-11-16 PROCEDURE — 99232 SBSQ HOSP IP/OBS MODERATE 35: CPT | Mod: GC

## 2018-11-16 RX ORDER — MAGNESIUM SULFATE 500 MG/ML
1 VIAL (ML) INJECTION ONCE
Qty: 0 | Refills: 0 | Status: COMPLETED | OUTPATIENT
Start: 2018-11-16 | End: 2018-11-16

## 2018-11-16 RX ORDER — SODIUM CHLORIDE 9 MG/ML
1000 INJECTION, SOLUTION INTRAVENOUS
Qty: 0 | Refills: 0 | Status: DISCONTINUED | OUTPATIENT
Start: 2018-11-16 | End: 2018-11-18

## 2018-11-16 RX ADMIN — Medication 1 APPLICATION(S): at 17:50

## 2018-11-16 RX ADMIN — HEPARIN SODIUM 5000 UNIT(S): 5000 INJECTION INTRAVENOUS; SUBCUTANEOUS at 13:04

## 2018-11-16 RX ADMIN — Medication 650 MILLIGRAM(S): at 01:20

## 2018-11-16 RX ADMIN — HEPARIN SODIUM 5000 UNIT(S): 5000 INJECTION INTRAVENOUS; SUBCUTANEOUS at 06:46

## 2018-11-16 RX ADMIN — SODIUM CHLORIDE 75 MILLILITER(S): 9 INJECTION, SOLUTION INTRAVENOUS at 08:47

## 2018-11-16 RX ADMIN — Medication 25 MILLIGRAM(S): at 06:44

## 2018-11-16 RX ADMIN — SERTRALINE 50 MILLIGRAM(S): 25 TABLET, FILM COATED ORAL at 13:03

## 2018-11-16 RX ADMIN — Medication 2.5 MILLIGRAM(S): at 17:45

## 2018-11-16 RX ADMIN — Medication 650 MILLIGRAM(S): at 00:29

## 2018-11-16 RX ADMIN — TAMSULOSIN HYDROCHLORIDE 0.8 MILLIGRAM(S): 0.4 CAPSULE ORAL at 22:43

## 2018-11-16 RX ADMIN — Medication 650 MILLIGRAM(S): at 06:44

## 2018-11-16 RX ADMIN — Medication 1 DROP(S): at 17:45

## 2018-11-16 RX ADMIN — Medication 1 DROP(S): at 06:45

## 2018-11-16 RX ADMIN — Medication 1 DROP(S): at 13:04

## 2018-11-16 RX ADMIN — HEPARIN SODIUM 5000 UNIT(S): 5000 INJECTION INTRAVENOUS; SUBCUTANEOUS at 22:43

## 2018-11-16 RX ADMIN — Medication 2.5 MILLIGRAM(S): at 06:44

## 2018-11-16 RX ADMIN — Medication 100 GRAM(S): at 10:00

## 2018-11-16 RX ADMIN — Medication 650 MILLIGRAM(S): at 22:43

## 2018-11-16 RX ADMIN — GABAPENTIN 100 MILLIGRAM(S): 400 CAPSULE ORAL at 13:03

## 2018-11-16 RX ADMIN — METHADONE HYDROCHLORIDE 5 MILLIGRAM(S): 40 TABLET ORAL at 22:41

## 2018-11-16 RX ADMIN — Medication 25 MILLIGRAM(S): at 17:45

## 2018-11-16 RX ADMIN — Medication 1 DROP(S): at 00:32

## 2018-11-16 RX ADMIN — METHADONE HYDROCHLORIDE 5 MILLIGRAM(S): 40 TABLET ORAL at 13:03

## 2018-11-16 RX ADMIN — Medication 650 MILLIGRAM(S): at 13:04

## 2018-11-16 RX ADMIN — Medication 1 APPLICATION(S): at 06:46

## 2018-11-16 RX ADMIN — GABAPENTIN 100 MILLIGRAM(S): 400 CAPSULE ORAL at 22:46

## 2018-11-16 RX ADMIN — METHADONE HYDROCHLORIDE 5 MILLIGRAM(S): 40 TABLET ORAL at 06:44

## 2018-11-16 RX ADMIN — FINASTERIDE 5 MILLIGRAM(S): 5 TABLET, FILM COATED ORAL at 13:04

## 2018-11-16 RX ADMIN — GABAPENTIN 100 MILLIGRAM(S): 400 CAPSULE ORAL at 06:44

## 2018-11-16 NOTE — PROGRESS NOTE ADULT - PROBLEM SELECTOR PLAN 4
Derm consulted for new patchy rash on forearm. also maculopapular rash on back.  Think forearm is contact dermatitis, recommend local steroid cream if itchy.  Think otherwise has folliculitis. Will monitor. - Derm consulted for new patchy rash on forearm. also maculopapular rash on back.       - Think forearm is contact dermatitis, recommend local steroid cream if itchy.       - Think otherwise has folliculitis. Will monitor.

## 2018-11-16 NOTE — PROGRESS NOTE ADULT - PROBLEM SELECTOR PLAN 6
- In the ED, pt. met the SIRS criteria with T 102.4, , /73, RR 18, SpO2 100%. Received empiric Vanc 1g x 1 and Zosyn 3.375g x 1.  - CXR negative for PNA.  - RVP and UA negative.  - Blood clx and Urine clx both negative.  - Hold off on abx right now given no localizing source.  now resolved - In the ED, pt. met the SIRS criteria with T 102.4, , /73, RR 18, SpO2 100%. Received empiric Vanc 1g x 1 and Zosyn 3.375g x 1.  - Now resolved  - CXR negative for PNA.  - RVP and UA negative.  - Blood clx and Urine clx both negative.  - Hold off on abx right now given no localizing source.

## 2018-11-16 NOTE — PROGRESS NOTE ADULT - SUBJECTIVE AND OBJECTIVE BOX
CHIEF COMPLAINT:    Interval Events: Appears more alert/awake. No BMs yesterday; one BM this AM that was watery. Reports one episode of NBNB vomiting yesterday. Currently, reports mild right-sided headache and decreased appetite (hasn't eaten in over a week). No blurry vision, photophobia, eye itchiness, nausea/vomiting, fevers/chills, cough, CP/SOB, abdominal pain, dysuria.    REVIEW OF SYSTEMS:  Constitutional: + Decreased appetite. No fever/chills. No recent weight loss or weight gain.   HEENT: Normal vision. No postnasal drip or nasal congestion.  CV: No chest pain, or palpitations. No orthopnea.   Resp: No cough, or sputum production. No shortness of breath or dyspnea on exertion.   GI: + Diarrhea; No nausea/vomiting. No constipation. No abdominal pain.  : No dysuria, no nocturia or increased urinary frequency.  Musculoskeletal: No back pain, no myalgias  Skin: Rash on right forehead and scalp.  Neurological: No headache or dizziness. No syncope, no weakness, numbness.  Psychiatric: Denies depressed mood.   Endocrine: No cold or heat intolerance. No dry skin.  Hematologic/Lymphatic: No anemia or bleeding problem.    OBJECTIVE:  Vital Signs Last 24 Hrs  T(C): 36.3 (16 Nov 2018 06:03), Max: 36.6 (15 Nov 2018 13:00)  T(F): 97.4 (16 Nov 2018 06:03), Max: 97.9 (15 Nov 2018 13:00)  HR: 97 (16 Nov 2018 06:03) (77 - 107)  BP: 126/65 (16 Nov 2018 06:03) (126/65 - 156/81)  BP(mean): --  RR: 17 (16 Nov 2018 06:03) (17 - 18)  SpO2: 97% (16 Nov 2018 06:03) (96% - 97%)    11-15 @ 07:01  -  11-16 @ 07:00  --------------------------------------------------------  IN: 0 mL / OUT: 800 mL / NET: -800 mL      CAPILLARY BLOOD GLUCOSE      POCT Blood Glucose.: 77 mg/dL (16 Nov 2018 05:29)        PHYSICAL EXAM:  Gen: Appears lethargic. No acute distress, alert, cooperative.  Head: Red, maculopapular rash with groupings of papules on right forehead and right scalp. Doesn't cross the midline. Vesicles crusted.  HEENT: Oral mucosa moist, normal conjunctiva.  Lung: CTAB, no respiratory distress, no crackles or wheezes.  CV: Normal S1/S1, No m/r/g.  Abd: Mildly tender diffusely. Soft, non-distended, no rebound or guarding, scar on abdomen, no sign of infection.  MSK: No LE edema.  Neuro: AAO x 2. No focal neurologic deficits. Cn 2-12 intact.  Skin:  Rash on forehead around eye healing.  Left arm with erythema. B/l LE with petechial-like rash on shins.  Back with erythematous rash.    LINES:    HOSPITAL MEDICATIONS:  heparin  Injectable 5000 Unit(s) SubCutaneous every 8 hours      metoprolol tartrate 25 milliGRAM(s) Oral two times a day  tamsulosin 0.8 milliGRAM(s) Oral at bedtime    finasteride 5 milliGRAM(s) Oral daily      acetaminophen   Tablet .. 650 milliGRAM(s) Oral every 6 hours PRN  dronabinol 2.5 milliGRAM(s) Oral two times a day  gabapentin 100 milliGRAM(s) Oral three times a day  methadone    Tablet 5 milliGRAM(s) Oral every 8 hours  ondansetron   Disintegrating Tablet 4 milliGRAM(s) Oral every 6 hours PRN  sertraline 50 milliGRAM(s) Oral daily    bismuth subsalicylate Liquid 30 milliLiter(s) Oral four times a day  loperamide 2 milliGRAM(s) Oral every 4 hours PRN        dextrose 5% + lactated ringers. 1000 milliLiter(s) IV Continuous <Continuous>  lactated ringers. 500 milliLiter(s) IV Continuous <Continuous>  sodium bicarbonate 650 milliGRAM(s) Oral three times a day      artificial tears (preservative free) Ophthalmic Solution 1 Drop(s) Right EYE every 6 hours  petrolatum white Ointment 1 Application(s) Topical two times a day        LABS:                        12.4   5.69  )-----------( 310      ( 16 Nov 2018 06:20 )             35.8     Hgb Trend: 12.4<--, 12.6<--, 12.4<--, 11.7<--, 11.7<--  11-15    134<L>  |  100  |  8   ----------------------------<  68<L>  3.7   |  14<L>  |  0.89    Ca    9.6      15 Nov 2018 07:05  Phos  4.8     11-15  Mg     1.3     11-15    TPro  6.9  /  Alb  3.6  /  TBili  0.4  /  DBili  x   /  AST  24  /  ALT  12  /  AlkPhos  82  11-15    Creatinine Trend: 0.89<--, 1.06<--, 1.50<--, 2.35<--, 4.28<--, 5.56<--            MICROBIOLOGY:     RADIOLOGY:  [ ] Reviewed and interpreted by me    EKG:

## 2018-11-16 NOTE — PROGRESS NOTE ADULT - PROBLEM SELECTOR PLAN 2
- Continues to have watery/loose, non-bloody stools; endorses mild belly pain from it. Possibly 2/2 pancreatic insufficiency 2/2 pancreatic cancer?  - As per pt's oncologist, Dr. Maloney, low suspicion for Nivolumab toxicity leading to the diarrhea.  - C. Diff, stool culture, GI PCR, and O&P negative.  possibly side effect from reglan? Reglan discontinued  - C/w imodium and peptobismol - Continues to have watery/loose, non-bloody stools; endorses mild belly pain from it. Possibly 2/2 pancreatic insufficiency 2/2 pancreatic cancer?  - As per pt's oncologist, Dr. Maloney, low suspicion for Nivolumab toxicity leading to the diarrhea.  - C. Diff, stool culture, GI PCR, and O&P negative.  - Possibly side effect from reglan? Reglan discontinued  - C/w imodium and peptobismol

## 2018-11-16 NOTE — PROVIDER CONTACT NOTE (MEDICATION) - ASSESSMENT
repeat fs 82 after 2 apple juice administration repeat fs 82 after 2 apple juice administration pt asymptomatic

## 2018-11-16 NOTE — PROGRESS NOTE ADULT - PROBLEM SELECTOR PLAN 1
Unclear if from pancreatic ca vs immunotherapy. Has been waxing and waning since admission. CT a/p on admission without acute pathology.  QTc normal; started on Zofran. Reglan discontinued as it may be contributing to the diarrhea.   C/w marinnol to see if stimulates appetitie  Calorie count  FS q6 as has been low on bmp.  Restarted methadone 11/14, could be contributing - Unclear if from pancreatic ca vs immunotherapy. Has been waxing and waning since admission.  - CT a/p on admission without acute pathology.  - QTc normal; started on Zofran. Reglan discontinued as it may be contributing to the diarrhea.  - C/w marinnol to see if stimulates appetite  - Calorie count  - FS q6 as has been low on bmp.  - Restarted methadone 11/14 - Unclear if from pancreatic ca vs immunotherapy. Has been waxing and waning since admission.  - CT a/p on admission without acute pathology.  - QTc normal; started on Zofran. Reglan discontinued as it may be contributing to the diarrhea.  - C/w marinnol, appetite improving, starting to tolerate some solid foods but has not eaten a full meal as of yet  - Calorie count  - FS q6 as has been low on bmp.  - Restarted methadone 11/14

## 2018-11-16 NOTE — PROGRESS NOTE ADULT - PROBLEM SELECTOR PLAN 10
- C/w Hep SubQ    Dispo: shingles and ananda resolving. if can tolerate po diet can d/c - C/w Hep SubQ    Dispo: NICOLE resolved. Shingles resolving. No further episodes of diarrhea or nausea/vomiting today. If can tolerate po diet can d/c. - C/w Hep SubQ    Dispo: NICOLE resolved. Shingles resolving. No further episodes of diarrhea or nausea/vomiting today. Continuing to advance diet prior to discharge.

## 2018-11-16 NOTE — PROGRESS NOTE ADULT - PROBLEM SELECTOR PLAN 5
- now resolved.   - 2/2 to fluid losses from diarrhea vs. acyclovir/valacyclovir vs. immunotherapy (nivolumab)  - Lopez removed; passed TOV yesterday  - Bicarbonate down to 14; as per renal, c/w NaHCO3 650mg TID  - C/w D5 + LR @ 75 cc/hr  - Valacyclovir discontinued on 11/8 2/2 worsening creatinine. Losartan discontinued on 11/10. Gabapentin reduced to 300mg QD.  - Monitor BMP  - F/u renal recs - Now resolved.   - 2/2 to fluid losses from diarrhea vs. acyclovir/valacyclovir vs. immunotherapy (nivolumab)  - Lopez removed; passed TOV  - Bicarbonate down to 14; as per renal, c/w NaHCO3 650mg TID  - C/w D5 + LR @ 75 cc/hr  - Valacyclovir discontinued on 11/8 2/2 worsening creatinine. Losartan discontinued on 11/10. Gabapentin reduced to 300mg QD.  - Monitor BMP  - F/u renal recs

## 2018-11-16 NOTE — PROVIDER CONTACT NOTE (MEDICATION) - ACTION/TREATMENT ORDERED:
MD Hammonds aware,   . Nursing care to continue MD Hammonds aware, ok to administer apple juice to increase blood glucose and recheck FS  . Nursing care to continue

## 2018-11-16 NOTE — PROGRESS NOTE ADULT - PROBLEM SELECTOR PLAN 3
- Papules now crusting, s/p acyclovir and valacyclovir, d/c'd due to NICOLE. Will continue to monitor lesions.  - pt now w/ post herpetic neuralgia, c/w gabapentin 100 tid  - As per Optho, negative for herpes zoster ophthalmicus (pt. complains of itchy right right eye, but no blurriness/photophobia/vision loss).  - C/w artifical tears and cold compresses  - Pain control with gabapentin 100mg TID, methadone 5mg TID, acetaminophen 650mg PRN and dilaudid PRN. - Papules now crusting, s/p acyclovir and valacyclovir, d/c'd due to NICOLE. Will continue to monitor lesions.  - Pt now w/ post herpetic neuralgia, c/w gabapentin 100 tid  - As per Optho, negative for herpes zoster ophthalmicus (pt. complains of itchy right right eye, but no blurriness/photophobia/vision loss).  - C/w artifical tears and cold compresses  - Pain control with gabapentin 100mg TID, methadone 5mg TID, acetaminophen 650mg PRN and dilaudid PRN.

## 2018-11-16 NOTE — PROVIDER CONTACT NOTE (MEDICATION) - ASSESSMENT
FS 69 around midnight then 70 after apple juice x1 FS 69 around midnight then 70 after apple juice x1, pt asymptomatic

## 2018-11-16 NOTE — PROGRESS NOTE ADULT - PROBLEM SELECTOR PLAN 9
- Chronic neck pain  - C/w gabapentin, sertraline, and dilaudid PRN       - Methadone discontinued due to confusion and NICOLE. - Chronic neck pain  - C/w gabapentin, sertraline, methadone and dilaudid PRN

## 2018-11-17 LAB
ALBUMIN SERPL ELPH-MCNC: 3.4 G/DL — SIGNIFICANT CHANGE UP (ref 3.3–5)
ALP SERPL-CCNC: 72 U/L — SIGNIFICANT CHANGE UP (ref 40–120)
ALT FLD-CCNC: 8 U/L — SIGNIFICANT CHANGE UP (ref 4–41)
AST SERPL-CCNC: 17 U/L — SIGNIFICANT CHANGE UP (ref 4–40)
BASOPHILS # BLD AUTO: 0.04 K/UL — SIGNIFICANT CHANGE UP (ref 0–0.2)
BASOPHILS NFR BLD AUTO: 0.8 % — SIGNIFICANT CHANGE UP (ref 0–2)
BILIRUB SERPL-MCNC: 0.3 MG/DL — SIGNIFICANT CHANGE UP (ref 0.2–1.2)
BUN SERPL-MCNC: 8 MG/DL — SIGNIFICANT CHANGE UP (ref 7–23)
CALCIUM SERPL-MCNC: 9.7 MG/DL — SIGNIFICANT CHANGE UP (ref 8.4–10.5)
CHLORIDE SERPL-SCNC: 100 MMOL/L — SIGNIFICANT CHANGE UP (ref 98–107)
CO2 SERPL-SCNC: 20 MMOL/L — LOW (ref 22–31)
CREAT SERPL-MCNC: 0.84 MG/DL — SIGNIFICANT CHANGE UP (ref 0.5–1.3)
EOSINOPHIL # BLD AUTO: 0.65 K/UL — HIGH (ref 0–0.5)
EOSINOPHIL NFR BLD AUTO: 12.9 % — HIGH (ref 0–6)
GLUCOSE SERPL-MCNC: 80 MG/DL — SIGNIFICANT CHANGE UP (ref 70–99)
HCT VFR BLD CALC: 35.4 % — LOW (ref 39–50)
HGB BLD-MCNC: 12.2 G/DL — LOW (ref 13–17)
IMM GRANULOCYTES # BLD AUTO: 0.01 # — SIGNIFICANT CHANGE UP
IMM GRANULOCYTES NFR BLD AUTO: 0.2 % — SIGNIFICANT CHANGE UP (ref 0–1.5)
LYMPHOCYTES # BLD AUTO: 1.53 K/UL — SIGNIFICANT CHANGE UP (ref 1–3.3)
LYMPHOCYTES # BLD AUTO: 30.4 % — SIGNIFICANT CHANGE UP (ref 13–44)
MAGNESIUM SERPL-MCNC: 1.4 MG/DL — LOW (ref 1.6–2.6)
MCHC RBC-ENTMCNC: 30 PG — SIGNIFICANT CHANGE UP (ref 27–34)
MCHC RBC-ENTMCNC: 34.5 % — SIGNIFICANT CHANGE UP (ref 32–36)
MCV RBC AUTO: 87.2 FL — SIGNIFICANT CHANGE UP (ref 80–100)
MONOCYTES # BLD AUTO: 0.67 K/UL — SIGNIFICANT CHANGE UP (ref 0–0.9)
MONOCYTES NFR BLD AUTO: 13.3 % — SIGNIFICANT CHANGE UP (ref 2–14)
NEUTROPHILS # BLD AUTO: 2.14 K/UL — SIGNIFICANT CHANGE UP (ref 1.8–7.4)
NEUTROPHILS NFR BLD AUTO: 42.4 % — LOW (ref 43–77)
NRBC # FLD: 0 — SIGNIFICANT CHANGE UP
PHOSPHATE SERPL-MCNC: 3.6 MG/DL — SIGNIFICANT CHANGE UP (ref 2.5–4.5)
PLATELET # BLD AUTO: 271 K/UL — SIGNIFICANT CHANGE UP (ref 150–400)
PMV BLD: 9.1 FL — SIGNIFICANT CHANGE UP (ref 7–13)
POTASSIUM SERPL-MCNC: 3.5 MMOL/L — SIGNIFICANT CHANGE UP (ref 3.5–5.3)
POTASSIUM SERPL-SCNC: 3.5 MMOL/L — SIGNIFICANT CHANGE UP (ref 3.5–5.3)
PROT SERPL-MCNC: 6.6 G/DL — SIGNIFICANT CHANGE UP (ref 6–8.3)
RBC # BLD: 4.06 M/UL — LOW (ref 4.2–5.8)
RBC # FLD: 13.5 % — SIGNIFICANT CHANGE UP (ref 10.3–14.5)
SODIUM SERPL-SCNC: 136 MMOL/L — SIGNIFICANT CHANGE UP (ref 135–145)
WBC # BLD: 5.04 K/UL — SIGNIFICANT CHANGE UP (ref 3.8–10.5)
WBC # FLD AUTO: 5.04 K/UL — SIGNIFICANT CHANGE UP (ref 3.8–10.5)

## 2018-11-17 PROCEDURE — 99232 SBSQ HOSP IP/OBS MODERATE 35: CPT | Mod: GC

## 2018-11-17 RX ORDER — MAGNESIUM SULFATE 500 MG/ML
1 VIAL (ML) INJECTION ONCE
Qty: 0 | Refills: 0 | Status: COMPLETED | OUTPATIENT
Start: 2018-11-17 | End: 2018-11-17

## 2018-11-17 RX ADMIN — TAMSULOSIN HYDROCHLORIDE 0.8 MILLIGRAM(S): 0.4 CAPSULE ORAL at 23:01

## 2018-11-17 RX ADMIN — Medication 650 MILLIGRAM(S): at 23:01

## 2018-11-17 RX ADMIN — Medication 1 DROP(S): at 23:02

## 2018-11-17 RX ADMIN — Medication 2 MILLIGRAM(S): at 12:52

## 2018-11-17 RX ADMIN — Medication 1 DROP(S): at 05:45

## 2018-11-17 RX ADMIN — HEPARIN SODIUM 5000 UNIT(S): 5000 INJECTION INTRAVENOUS; SUBCUTANEOUS at 23:02

## 2018-11-17 RX ADMIN — Medication 2.5 MILLIGRAM(S): at 17:52

## 2018-11-17 RX ADMIN — Medication 25 MILLIGRAM(S): at 05:46

## 2018-11-17 RX ADMIN — HEPARIN SODIUM 5000 UNIT(S): 5000 INJECTION INTRAVENOUS; SUBCUTANEOUS at 05:45

## 2018-11-17 RX ADMIN — Medication 100 GRAM(S): at 17:50

## 2018-11-17 RX ADMIN — Medication 1 APPLICATION(S): at 05:44

## 2018-11-17 RX ADMIN — Medication 650 MILLIGRAM(S): at 05:44

## 2018-11-17 RX ADMIN — Medication 1 APPLICATION(S): at 18:03

## 2018-11-17 RX ADMIN — GABAPENTIN 100 MILLIGRAM(S): 400 CAPSULE ORAL at 14:43

## 2018-11-17 RX ADMIN — Medication 1 DROP(S): at 17:53

## 2018-11-17 RX ADMIN — HEPARIN SODIUM 5000 UNIT(S): 5000 INJECTION INTRAVENOUS; SUBCUTANEOUS at 14:43

## 2018-11-17 RX ADMIN — Medication 650 MILLIGRAM(S): at 14:43

## 2018-11-17 RX ADMIN — Medication 2.5 MILLIGRAM(S): at 05:45

## 2018-11-17 RX ADMIN — GABAPENTIN 100 MILLIGRAM(S): 400 CAPSULE ORAL at 23:01

## 2018-11-17 RX ADMIN — Medication 25 MILLIGRAM(S): at 17:52

## 2018-11-17 RX ADMIN — GABAPENTIN 100 MILLIGRAM(S): 400 CAPSULE ORAL at 05:45

## 2018-11-17 RX ADMIN — METHADONE HYDROCHLORIDE 5 MILLIGRAM(S): 40 TABLET ORAL at 14:45

## 2018-11-17 RX ADMIN — Medication 1 DROP(S): at 00:17

## 2018-11-17 RX ADMIN — FINASTERIDE 5 MILLIGRAM(S): 5 TABLET, FILM COATED ORAL at 12:51

## 2018-11-17 RX ADMIN — Medication 1 DROP(S): at 12:50

## 2018-11-17 RX ADMIN — METHADONE HYDROCHLORIDE 5 MILLIGRAM(S): 40 TABLET ORAL at 23:01

## 2018-11-17 RX ADMIN — METHADONE HYDROCHLORIDE 5 MILLIGRAM(S): 40 TABLET ORAL at 05:44

## 2018-11-17 RX ADMIN — SERTRALINE 50 MILLIGRAM(S): 25 TABLET, FILM COATED ORAL at 12:51

## 2018-11-17 NOTE — PROGRESS NOTE ADULT - PROBLEM SELECTOR PLAN 2
- Continues to have watery/loose, non-bloody stools; endorses mild belly pain from it. Possibly 2/2 pancreatic insufficiency 2/2 pancreatic cancer?  - As per pt's oncologist, Dr. Maloney, low suspicion for Nivolumab toxicity leading to the diarrhea.  - C. Diff, stool culture, GI PCR, and O&P negative.  - Possibly side effect from reglan? Reglan discontinued  - C/w imodium and peptobismol

## 2018-11-17 NOTE — PROGRESS NOTE ADULT - PROBLEM SELECTOR PLAN 5
- Now resolved.   - 2/2 to fluid losses from diarrhea vs. acyclovir/valacyclovir vs. immunotherapy (nivolumab)  - Lopez removed; passed TOV  - Bicarbonate down to 14; as per renal, c/w NaHCO3 650mg TID  - C/w D5 + LR @ 75 cc/hr  - Valacyclovir discontinued on 11/8 2/2 worsening creatinine. Losartan discontinued on 11/10. Gabapentin reduced to 300mg QD.  - Monitor BMP  - F/u renal recs

## 2018-11-17 NOTE — PROGRESS NOTE ADULT - PROBLEM SELECTOR PLAN 10
- C/w Hep SubQ    Dispo: NICOLE resolved. Shingles resolving. No further episodes of diarrhea or nausea/vomiting today. Continuing to advance diet prior to discharge. - C/w Hep SubQ    Dispo: NICOLE resolved. Shingles resolving. No further episodes of diarrhea or nausea/vomiting today. Continuing to advance diet. Once tolerates solids will be ready for dc.

## 2018-11-17 NOTE — PROGRESS NOTE ADULT - PROBLEM SELECTOR PLAN 3
- Papules now crusting, s/p acyclovir and valacyclovir, d/c'd due to NICOLE. Will continue to monitor lesions.  - Pt now w/ post herpetic neuralgia, c/w gabapentin 100 tid  - As per Optho, negative for herpes zoster ophthalmicus (pt. complains of itchy right right eye, but no blurriness/photophobia/vision loss).  - C/w artifical tears and cold compresses  - Pain control with gabapentin 100mg TID, methadone 5mg TID, acetaminophen 650mg PRN and dilaudid PRN.

## 2018-11-17 NOTE — PROGRESS NOTE ADULT - PROBLEM SELECTOR PLAN 6
- In the ED, pt. met the SIRS criteria with T 102.4, , /73, RR 18, SpO2 100%. Received empiric Vanc 1g x 1 and Zosyn 3.375g x 1.  - Now resolved  - CXR negative for PNA.  - RVP and UA negative.  - Blood clx and Urine clx both negative.  - Hold off on abx right now given no localizing source.

## 2018-11-17 NOTE — PROGRESS NOTE ADULT - PROBLEM SELECTOR PLAN 4
- Derm consulted for new patchy rash on forearm. also maculopapular rash on back.       - Think forearm is contact dermatitis, recommend local steroid cream if itchy.       - Think otherwise has folliculitis. Will monitor.

## 2018-11-17 NOTE — PROGRESS NOTE ADULT - PROBLEM SELECTOR PLAN 1
- Unclear if from pancreatic ca vs immunotherapy. Has been waxing and waning since admission.  - CT a/p on admission without acute pathology.  - QTc normal; started on Zofran. Reglan discontinued as it may be contributing to the diarrhea.  - C/w marinnol, appetite improving, starting to tolerate some solid foods but has not eaten a full meal as of yet  - Calorie count  - FS q6 as has been low on bmp.  - Restarted methadone 11/14 - Unclear if from pancreatic ca vs immunotherapy. Has been waxing and waning since admission.  - CT a/p on admission without acute pathology.  - QTc normal; started on Zofran. Reglan discontinued as it may be contributing to the diarrhea.  - C/w marinnol, appetite improving, starting to tolerate some solid foods but has not eaten a full meal as of yet  - Calorie count  - FS q6 as has been low on bmp.  - Restarted methadone 11/14  - F/u Nutrition consult

## 2018-11-17 NOTE — PROGRESS NOTE ADULT - SUBJECTIVE AND OBJECTIVE BOX
CHIEF COMPLAINT:    Interval Events: Appears more alert/awake. Two watery BMs yesterday; no episodes of vomiting. Reports mildly improved appetite; but continues to be nauseous with PO intake. Currently, reports mild right-sided headache. Currently. no blurry vision, photophobia, eye itchiness, nausea/vomiting, fevers/chills, cough, CP/SOB, abdominal pain, dysuria.    REVIEW OF SYSTEMS:  Constitutional: + Decreased appetite. No fever/chills. No recent weight loss or weight gain.   HEENT: Normal vision. No postnasal drip or nasal congestion.  CV: No chest pain, or palpitations. No orthopnea.   Resp: No cough, or sputum production. No shortness of breath or dyspnea on exertion.   GI: + Diarrhea; No nausea/vomiting. No constipation. No abdominal pain.  : No dysuria, no nocturia or increased urinary frequency.  Musculoskeletal: No back pain, no myalgias  Skin: Rash on right forehead and scalp.  Neurological: No headache or dizziness. No syncope, no weakness, numbness.  Psychiatric: Denies depressed mood.   Endocrine: No cold or heat intolerance. No dry skin.  Hematologic/Lymphatic: No anemia or bleeding problem.    OBJECTIVE:  Vital Signs Last 24 Hrs  T(C): 37.3 (17 Nov 2018 05:11), Max: 37.3 (17 Nov 2018 05:11)  T(F): 99.1 (17 Nov 2018 05:11), Max: 99.1 (17 Nov 2018 05:11)  HR: 92 (17 Nov 2018 05:11) (77 - 92)  BP: 128/76 (17 Nov 2018 05:11) (126/67 - 147/71)  BP(mean): --  RR: 18 (17 Nov 2018 05:11) (18 - 18)  SpO2: 96% (17 Nov 2018 05:11) (96% - 100%)    11-16 @ 07:01  -  11-17 @ 07:00  --------------------------------------------------------  IN: 0 mL / OUT: 700 mL / NET: -700 mL      CAPILLARY BLOOD GLUCOSE      POCT Blood Glucose.: 91 mg/dL (17 Nov 2018 06:34)      PHYSICAL EXAM:  Gen: Appears lethargic. No acute distress, alert, cooperative.  Head: Red, maculopapular rash with groupings of papules on right forehead and right scalp. Doesn't cross the midline. Vesicles crusted.  HEENT: Oral mucosa moist, normal conjunctiva.  Lung: CTAB, no respiratory distress, no crackles or wheezes.  CV: Normal S1/S1, No m/r/g.  Abd: Mildly tender diffusely. Soft, non-distended, no rebound or guarding, scar on abdomen, no sign of infection.  MSK: No LE edema.  Neuro: AAO x 2. No focal neurologic deficits. Cn 2-12 intact.  Skin:  Rash on forehead around eye healing.  Left arm with erythema. B/l LE with petechial-like rash on shins.  Back with erythematous rash.    LINES:    HOSPITAL MEDICATIONS:  heparin  Injectable 5000 Unit(s) SubCutaneous every 8 hours      metoprolol tartrate 25 milliGRAM(s) Oral two times a day  tamsulosin 0.8 milliGRAM(s) Oral at bedtime    finasteride 5 milliGRAM(s) Oral daily      acetaminophen   Tablet .. 650 milliGRAM(s) Oral every 6 hours PRN  dronabinol 2.5 milliGRAM(s) Oral two times a day  gabapentin 100 milliGRAM(s) Oral three times a day  methadone    Tablet 5 milliGRAM(s) Oral every 8 hours  ondansetron   Disintegrating Tablet 4 milliGRAM(s) Oral every 6 hours PRN  sertraline 50 milliGRAM(s) Oral daily    bismuth subsalicylate Liquid 30 milliLiter(s) Oral four times a day  loperamide 2 milliGRAM(s) Oral every 4 hours PRN        dextrose 5% + lactated ringers. 1000 milliLiter(s) IV Continuous <Continuous>  sodium bicarbonate 650 milliGRAM(s) Oral three times a day      artificial tears (preservative free) Ophthalmic Solution 1 Drop(s) Right EYE every 6 hours  petrolatum white Ointment 1 Application(s) Topical two times a day        LABS:                        12.4   5.69  )-----------( 310      ( 16 Nov 2018 06:20 )             35.8     Hgb Trend: 12.4<--, 12.6<--, 12.4<--, 11.7<--, 11.7<--  11-16    134<L>  |  100  |  8   ----------------------------<  80  3.5   |  16<L>  |  0.89    Ca    9.5      16 Nov 2018 06:20  Phos  4.7     11-16  Mg     1.5     11-16    TPro  6.7  /  Alb  3.6  /  TBili  0.4  /  DBili  x   /  AST  22  /  ALT  11  /  AlkPhos  74  11-16    Creatinine Trend: 0.89<--, 0.89<--, 1.06<--, 1.50<--, 2.35<--, 4.28<--            MICROBIOLOGY:     RADIOLOGY:  [ ] Reviewed and interpreted by me    EKG: CHIEF COMPLAINT:    Interval Events: Appears more alert/awake. Two watery BMs yesterday; no episodes of vomiting. Reports mildly improved appetite; but continues to be nauseous with PO intake. Currently, reports mild right-sided headache. Currently. no blurry vision, photophobia, eye itchiness, nausea/vomiting, fevers/chills, cough, CP/SOB, abdominal pain, dysuria.    REVIEW OF SYSTEMS:  Constitutional: + Decreased appetite. No fever/chills. No recent weight loss or weight gain.   HEENT: Normal vision. No postnasal drip or nasal congestion.  CV: No chest pain, or palpitations. No orthopnea.   Resp: No cough, or sputum production. No shortness of breath or dyspnea on exertion.   GI: + Diarrhea; No nausea/vomiting. No constipation. No abdominal pain.  : No dysuria, no nocturia or increased urinary frequency.  Musculoskeletal: No back pain, no myalgias  Skin: Rash on right forehead and scalp.  Neurological: No headache or dizziness. No syncope, no weakness, numbness.  Psychiatric: Denies depressed mood.   Endocrine: No cold or heat intolerance. No dry skin.  Hematologic/Lymphatic: No anemia or bleeding problem.    OBJECTIVE:  Vital Signs Last 24 Hrs  T(C): 37.3 (17 Nov 2018 05:11), Max: 37.3 (17 Nov 2018 05:11)  T(F): 99.1 (17 Nov 2018 05:11), Max: 99.1 (17 Nov 2018 05:11)  HR: 92 (17 Nov 2018 05:11) (77 - 92)  BP: 128/76 (17 Nov 2018 05:11) (126/67 - 147/71)  BP(mean): --  RR: 18 (17 Nov 2018 05:11) (18 - 18)  SpO2: 96% (17 Nov 2018 05:11) (96% - 100%)    11-16 @ 07:01  -  11-17 @ 07:00  --------------------------------------------------------  IN: 0 mL / OUT: 700 mL / NET: -700 mL      CAPILLARY BLOOD GLUCOSE      POCT Blood Glucose.: 91 mg/dL (17 Nov 2018 06:34)      PHYSICAL EXAM:  Gen: Appears lethargic. No acute distress, alert, cooperative.  Head: Red, maculopapular rash with groupings of papules on right forehead and right scalp. Doesn't cross the midline. Vesicles crusted.  HEENT: Oral mucosa moist, normal conjunctiva.  Lung: CTAB, no respiratory distress, no crackles or wheezes.  CV: Normal S1/S1, No m/r/g.  Abd: Mildly tender diffusely. Soft, non-distended, no rebound or guarding, scar on abdomen, no sign of infection.  MSK: No LE edema.  Neuro: AAO x 2. No focal neurologic deficits. Cn 2-12 intact.  Skin:  Rash on forehead around eye healing.  Left arm with erythema. B/l LE with petechial-like rash on shins.  Back with erythematous rash.    LINES:    HOSPITAL MEDICATIONS:  heparin  Injectable 5000 Unit(s) SubCutaneous every 8 hours      metoprolol tartrate 25 milliGRAM(s) Oral two times a day  tamsulosin 0.8 milliGRAM(s) Oral at bedtime    finasteride 5 milliGRAM(s) Oral daily      acetaminophen   Tablet .. 650 milliGRAM(s) Oral every 6 hours PRN  dronabinol 2.5 milliGRAM(s) Oral two times a day  gabapentin 100 milliGRAM(s) Oral three times a day  methadone    Tablet 5 milliGRAM(s) Oral every 8 hours  ondansetron   Disintegrating Tablet 4 milliGRAM(s) Oral every 6 hours PRN  sertraline 50 milliGRAM(s) Oral daily    bismuth subsalicylate Liquid 30 milliLiter(s) Oral four times a day  loperamide 2 milliGRAM(s) Oral every 4 hours PRN        dextrose 5% + lactated ringers. 1000 milliLiter(s) IV Continuous <Continuous>  sodium bicarbonate 650 milliGRAM(s) Oral three times a day      artificial tears (preservative free) Ophthalmic Solution 1 Drop(s) Right EYE every 6 hours  petrolatum white Ointment 1 Application(s) Topical two times a day        LABS:                                   12.2   5.04  )-----------( 271      ( 17 Nov 2018 06:02 )             35.4       Hgb Trend: 12.4<--, 12.6<--, 12.4<--, 11.7<--, 11.7<--  11-16    11-17    136  |  100  |  8   ----------------------------<  80  3.5   |  20<L>  |  0.84    Ca    9.7      17 Nov 2018 06:02  Phos  3.6     11-17  Mg     1.4     11-17    TPro  6.6  /  Alb  3.4  /  TBili  0.3  /  DBili  x   /  AST  17  /  ALT  8   /  AlkPhos  72  11-17      Creatinine Trend: 0.89<--, 0.89<--, 1.06<--, 1.50<--, 2.35<--, 4.28<--            MICROBIOLOGY:     RADIOLOGY:  [ ] Reviewed and interpreted by me    EKG:

## 2018-11-18 LAB
ALBUMIN SERPL ELPH-MCNC: 3.5 G/DL — SIGNIFICANT CHANGE UP (ref 3.3–5)
ALP SERPL-CCNC: 68 U/L — SIGNIFICANT CHANGE UP (ref 40–120)
ALT FLD-CCNC: 8 U/L — SIGNIFICANT CHANGE UP (ref 4–41)
AST SERPL-CCNC: 22 U/L — SIGNIFICANT CHANGE UP (ref 4–40)
BASOPHILS # BLD AUTO: 0.03 K/UL — SIGNIFICANT CHANGE UP (ref 0–0.2)
BASOPHILS NFR BLD AUTO: 0.6 % — SIGNIFICANT CHANGE UP (ref 0–2)
BILIRUB SERPL-MCNC: 0.3 MG/DL — SIGNIFICANT CHANGE UP (ref 0.2–1.2)
BUN SERPL-MCNC: 7 MG/DL — SIGNIFICANT CHANGE UP (ref 7–23)
CALCIUM SERPL-MCNC: 9.6 MG/DL — SIGNIFICANT CHANGE UP (ref 8.4–10.5)
CHLORIDE SERPL-SCNC: 98 MMOL/L — SIGNIFICANT CHANGE UP (ref 98–107)
CO2 SERPL-SCNC: 23 MMOL/L — SIGNIFICANT CHANGE UP (ref 22–31)
CREAT SERPL-MCNC: 0.8 MG/DL — SIGNIFICANT CHANGE UP (ref 0.5–1.3)
EOSINOPHIL # BLD AUTO: 0.6 K/UL — HIGH (ref 0–0.5)
EOSINOPHIL NFR BLD AUTO: 11.8 % — HIGH (ref 0–6)
GLUCOSE SERPL-MCNC: 76 MG/DL — SIGNIFICANT CHANGE UP (ref 70–99)
HCT VFR BLD CALC: 36.3 % — LOW (ref 39–50)
HGB BLD-MCNC: 12.2 G/DL — LOW (ref 13–17)
IMM GRANULOCYTES # BLD AUTO: 0.02 # — SIGNIFICANT CHANGE UP
IMM GRANULOCYTES NFR BLD AUTO: 0.4 % — SIGNIFICANT CHANGE UP (ref 0–1.5)
LYMPHOCYTES # BLD AUTO: 1.55 K/UL — SIGNIFICANT CHANGE UP (ref 1–3.3)
LYMPHOCYTES # BLD AUTO: 30.5 % — SIGNIFICANT CHANGE UP (ref 13–44)
MAGNESIUM SERPL-MCNC: 1.6 MG/DL — SIGNIFICANT CHANGE UP (ref 1.6–2.6)
MCHC RBC-ENTMCNC: 29.5 PG — SIGNIFICANT CHANGE UP (ref 27–34)
MCHC RBC-ENTMCNC: 33.6 % — SIGNIFICANT CHANGE UP (ref 32–36)
MCV RBC AUTO: 87.7 FL — SIGNIFICANT CHANGE UP (ref 80–100)
MONOCYTES # BLD AUTO: 0.65 K/UL — SIGNIFICANT CHANGE UP (ref 0–0.9)
MONOCYTES NFR BLD AUTO: 12.8 % — SIGNIFICANT CHANGE UP (ref 2–14)
NEUTROPHILS # BLD AUTO: 2.24 K/UL — SIGNIFICANT CHANGE UP (ref 1.8–7.4)
NEUTROPHILS NFR BLD AUTO: 43.9 % — SIGNIFICANT CHANGE UP (ref 43–77)
NRBC # FLD: 0 — SIGNIFICANT CHANGE UP
PHOSPHATE SERPL-MCNC: 3.7 MG/DL — SIGNIFICANT CHANGE UP (ref 2.5–4.5)
PLATELET # BLD AUTO: 266 K/UL — SIGNIFICANT CHANGE UP (ref 150–400)
PMV BLD: 9 FL — SIGNIFICANT CHANGE UP (ref 7–13)
POTASSIUM SERPL-MCNC: 3.7 MMOL/L — SIGNIFICANT CHANGE UP (ref 3.5–5.3)
POTASSIUM SERPL-SCNC: 3.7 MMOL/L — SIGNIFICANT CHANGE UP (ref 3.5–5.3)
PROT SERPL-MCNC: 6.5 G/DL — SIGNIFICANT CHANGE UP (ref 6–8.3)
RBC # BLD: 4.14 M/UL — LOW (ref 4.2–5.8)
RBC # FLD: 13.5 % — SIGNIFICANT CHANGE UP (ref 10.3–14.5)
SODIUM SERPL-SCNC: 134 MMOL/L — LOW (ref 135–145)
WBC # BLD: 5.09 K/UL — SIGNIFICANT CHANGE UP (ref 3.8–10.5)
WBC # FLD AUTO: 5.09 K/UL — SIGNIFICANT CHANGE UP (ref 3.8–10.5)

## 2018-11-18 PROCEDURE — 99232 SBSQ HOSP IP/OBS MODERATE 35: CPT | Mod: GC

## 2018-11-18 RX ADMIN — SERTRALINE 50 MILLIGRAM(S): 25 TABLET, FILM COATED ORAL at 12:12

## 2018-11-18 RX ADMIN — GABAPENTIN 100 MILLIGRAM(S): 400 CAPSULE ORAL at 05:44

## 2018-11-18 RX ADMIN — Medication 25 MILLIGRAM(S): at 18:25

## 2018-11-18 RX ADMIN — Medication 2.5 MILLIGRAM(S): at 18:25

## 2018-11-18 RX ADMIN — Medication 1 DROP(S): at 05:46

## 2018-11-18 RX ADMIN — HEPARIN SODIUM 5000 UNIT(S): 5000 INJECTION INTRAVENOUS; SUBCUTANEOUS at 05:45

## 2018-11-18 RX ADMIN — Medication 1 APPLICATION(S): at 05:46

## 2018-11-18 RX ADMIN — METHADONE HYDROCHLORIDE 5 MILLIGRAM(S): 40 TABLET ORAL at 05:45

## 2018-11-18 RX ADMIN — TAMSULOSIN HYDROCHLORIDE 0.8 MILLIGRAM(S): 0.4 CAPSULE ORAL at 21:14

## 2018-11-18 RX ADMIN — Medication 650 MILLIGRAM(S): at 18:59

## 2018-11-18 RX ADMIN — Medication 2.5 MILLIGRAM(S): at 05:44

## 2018-11-18 RX ADMIN — Medication 25 MILLIGRAM(S): at 05:44

## 2018-11-18 RX ADMIN — Medication 650 MILLIGRAM(S): at 14:01

## 2018-11-18 RX ADMIN — METHADONE HYDROCHLORIDE 5 MILLIGRAM(S): 40 TABLET ORAL at 21:14

## 2018-11-18 RX ADMIN — Medication 1 APPLICATION(S): at 18:25

## 2018-11-18 RX ADMIN — Medication 650 MILLIGRAM(S): at 06:49

## 2018-11-18 RX ADMIN — FINASTERIDE 5 MILLIGRAM(S): 5 TABLET, FILM COATED ORAL at 12:12

## 2018-11-18 RX ADMIN — GABAPENTIN 100 MILLIGRAM(S): 400 CAPSULE ORAL at 21:14

## 2018-11-18 RX ADMIN — Medication 650 MILLIGRAM(S): at 05:45

## 2018-11-18 RX ADMIN — METHADONE HYDROCHLORIDE 5 MILLIGRAM(S): 40 TABLET ORAL at 14:01

## 2018-11-18 RX ADMIN — HEPARIN SODIUM 5000 UNIT(S): 5000 INJECTION INTRAVENOUS; SUBCUTANEOUS at 14:01

## 2018-11-18 RX ADMIN — Medication 650 MILLIGRAM(S): at 12:48

## 2018-11-18 RX ADMIN — Medication 650 MILLIGRAM(S): at 12:18

## 2018-11-18 RX ADMIN — HEPARIN SODIUM 5000 UNIT(S): 5000 INJECTION INTRAVENOUS; SUBCUTANEOUS at 21:14

## 2018-11-18 RX ADMIN — Medication 650 MILLIGRAM(S): at 18:29

## 2018-11-18 RX ADMIN — Medication 650 MILLIGRAM(S): at 05:44

## 2018-11-18 RX ADMIN — Medication 650 MILLIGRAM(S): at 21:14

## 2018-11-18 RX ADMIN — GABAPENTIN 100 MILLIGRAM(S): 400 CAPSULE ORAL at 14:01

## 2018-11-18 RX ADMIN — Medication 1 DROP(S): at 12:11

## 2018-11-18 RX ADMIN — Medication 1 DROP(S): at 18:25

## 2018-11-18 NOTE — PROGRESS NOTE ADULT - SUBJECTIVE AND OBJECTIVE BOX
CHIEF COMPLAINT:    Interval Events: Two watery BMs in the last 24 hours. No vomiting yesterday. Currently, reports mild right-sided headache. Appetite improved; was able to tolerate some fruits last night. No blurry vision, photophobia, eye itchiness, nausea/vomiting, fevers/chills, cough, CP/SOB, abdominal pain, dysuria.    REVIEW OF SYSTEMS:  Constitutional: + Improving appetite. No fever/chills. No recent weight loss or weight gain.   HEENT: Normal vision. No postnasal drip or nasal congestion.  CV: No chest pain, or palpitations. No orthopnea.   Resp: No cough, or sputum production. No shortness of breath or dyspnea on exertion.   GI: + Diarrhea; No nausea/vomiting. No constipation. No abdominal pain.  : No dysuria, no nocturia or increased urinary frequency.  Musculoskeletal: No back pain, no myalgias  Skin: Rash on right forehead and scalp.  Neurological: No headache or dizziness. No syncope, no weakness, numbness.  Psychiatric: Denies depressed mood.   Endocrine: No cold or heat intolerance. No dry skin.  Hematologic/Lymphatic: No anemia or bleeding problem.    OBJECTIVE:  Vital Signs Last 24 Hrs  T(C): 36.6 (18 Nov 2018 05:17), Max: 36.9 (17 Nov 2018 14:38)  T(F): 97.9 (18 Nov 2018 05:17), Max: 98.5 (17 Nov 2018 14:38)  HR: 84 (18 Nov 2018 05:17) (76 - 85)  BP: 142/72 (18 Nov 2018 05:17) (112/67 - 144/69)  BP(mean): --  RR: 18 (18 Nov 2018 05:17) (16 - 18)  SpO2: 95% (18 Nov 2018 05:17) (95% - 95%)    11-17 @ 07:01  -  11-18 @ 07:00  --------------------------------------------------------  IN: 120 mL / OUT: 550 mL / NET: -430 mL      CAPILLARY BLOOD GLUCOSE      POCT Blood Glucose.: 78 mg/dL (18 Nov 2018 05:54)      PHYSICAL EXAM:  Gen: Appears lethargic. No acute distress, alert, cooperative.  Head: Red, maculopapular rash with groupings of papules on right forehead and right scalp. Doesn't cross the midline. Vesicles crusted.  HEENT: Oral mucosa moist, normal conjunctiva.  Lung: CTAB, no respiratory distress, no crackles or wheezes.  CV: Normal S1/S1, No m/r/g.  Abd: Mildly tender diffusely. Soft, non-distended, no rebound or guarding, scar on abdomen, no sign of infection.  MSK: No LE edema.  Neuro: AAO x 2. No focal neurologic deficits. Cn 2-12 intact.  Skin:  Rash on forehead around eye healing.  Left arm with erythema. B/l LE with petechial-like rash on shins.  Back with erythematous rash.     LINES:    HOSPITAL MEDICATIONS:  heparin  Injectable 5000 Unit(s) SubCutaneous every 8 hours      metoprolol tartrate 25 milliGRAM(s) Oral two times a day  tamsulosin 0.8 milliGRAM(s) Oral at bedtime    finasteride 5 milliGRAM(s) Oral daily      acetaminophen   Tablet .. 650 milliGRAM(s) Oral every 6 hours PRN  dronabinol 2.5 milliGRAM(s) Oral two times a day  gabapentin 100 milliGRAM(s) Oral three times a day  methadone    Tablet 5 milliGRAM(s) Oral every 8 hours  ondansetron   Disintegrating Tablet 4 milliGRAM(s) Oral every 6 hours PRN  sertraline 50 milliGRAM(s) Oral daily    bismuth subsalicylate Liquid 30 milliLiter(s) Oral four times a day  loperamide 2 milliGRAM(s) Oral every 4 hours PRN        dextrose 5% + lactated ringers. 1000 milliLiter(s) IV Continuous <Continuous>  sodium bicarbonate 650 milliGRAM(s) Oral three times a day      artificial tears (preservative free) Ophthalmic Solution 1 Drop(s) Right EYE every 6 hours  petrolatum white Ointment 1 Application(s) Topical two times a day        LABS:                        12.2   5.09  )-----------( 266      ( 18 Nov 2018 06:00 )             36.3     Hgb Trend: 12.2<--, 12.2<--, 12.4<--, 12.6<--, 12.4<--  11-18    134<L>  |  98  |  7   ----------------------------<  76  3.7   |  23  |  0.80    Ca    9.6      18 Nov 2018 06:00  Phos  3.7     11-18  Mg     1.6     11-18    TPro  6.5  /  Alb  3.5  /  TBili  0.3  /  DBili  x   /  AST  22  /  ALT  8   /  AlkPhos  68  11-18    Creatinine Trend: 0.80<--, 0.84<--, 0.89<--, 0.89<--, 1.06<--, 1.50<--            MICROBIOLOGY:     RADIOLOGY:  [ ] Reviewed and interpreted by me    EKG:

## 2018-11-18 NOTE — PROGRESS NOTE ADULT - PROBLEM SELECTOR PLAN 5
- Now resolved.   - 2/2 to fluid losses from diarrhea vs. acyclovir/valacyclovir vs. immunotherapy (nivolumab)  - Lopez removed; passed TOV  - Bicarbonate improved on NaHCO3 650mg TID  - Valacyclovir discontinued on 11/8 2/2 worsening creatinine. Losartan discontinued on 11/10. Gabapentin reduced to 300mg QD.  - Monitor BMP

## 2018-11-18 NOTE — PROGRESS NOTE ADULT - PROBLEM SELECTOR PLAN 10
- C/w Hep SubQ    Dispo: NICOLE resolved. Shingles resolving. Diarrhea and nausea improving. Continuing to advance diet. Once tolerates solids will be ready for dc.

## 2018-11-18 NOTE — PROGRESS NOTE ADULT - ASSESSMENT
62 y/o M with PMH colon ca (s/p resection June 2017), pancreatic ca (s/p chemo, ended April 2018), currently on nivoluma immunotherapy (once every 2 weeks since the last 6 months; last dose on 11/2/18), and HTN presented with nausea, headache, and shingles rash on right forehead/scalp, s/p acyclovir/valacyclovir, c/c/b NICOLE (now resolved), unresolving diarrhea, and decreased appetite. Currently, with improving appetite.

## 2018-11-18 NOTE — PROGRESS NOTE ADULT - PROBLEM SELECTOR PLAN 1
- Unclear if from pancreatic ca vs immunotherapy. Has been waxing and waning since admission.  - CT a/p on admission without acute pathology.  - QTc normal; started on Zofran. Reglan discontinued as it may be contributing to the diarrhea.  - C/w marinnol, appetite improving, starting to tolerate some solid foods but has not eaten a full meal as of yet  - Calorie count  - FS q6 as has been low on bmp.  - Restarted methadone 11/14  - F/u Nutrition consult

## 2018-11-18 NOTE — PROGRESS NOTE ADULT - PROBLEM SELECTOR PLAN 3
- Papules now crusted, s/p acyclovir and valacyclovir, d/c'd due to NICOLE. Will continue to monitor lesions.  - Pt now w/ post herpetic neuralgia, c/w gabapentin 100 tid  - As per Optho, negative for herpes zoster ophthalmicus (pt. complains of itchy right right eye, but no blurriness/photophobia/vision loss).  - C/w artifical tears and cold compresses  - Pain control with gabapentin 100mg TID, methadone 5mg TID, acetaminophen 650mg PRN and dilaudid PRN. - Papules now crusted, s/p acyclovir and valacyclovir, d/c'd due to NICOLE. Will continue to monitor lesions.  - Pt now w/ post herpetic neuralgia, c/w gabapentin 100 tid  - As per Optho, negative for herpes zoster ophthalmicus (pt. complains of itchy right eye, but no blurriness/photophobia/vision loss).  - C/w artifical tears and cold compresses  - Pain control with gabapentin 100mg TID, methadone 5mg TID, acetaminophen 650mg PRN and dilaudid PRN.

## 2018-11-19 VITALS
TEMPERATURE: 98 F | RESPIRATION RATE: 18 BRPM | DIASTOLIC BLOOD PRESSURE: 74 MMHG | HEART RATE: 86 BPM | SYSTOLIC BLOOD PRESSURE: 134 MMHG | OXYGEN SATURATION: 96 %

## 2018-11-19 LAB
ALBUMIN SERPL ELPH-MCNC: 3.4 G/DL — SIGNIFICANT CHANGE UP (ref 3.3–5)
ALP SERPL-CCNC: 70 U/L — SIGNIFICANT CHANGE UP (ref 40–120)
ALT FLD-CCNC: 9 U/L — SIGNIFICANT CHANGE UP (ref 4–41)
AST SERPL-CCNC: 22 U/L — SIGNIFICANT CHANGE UP (ref 4–40)
BASOPHILS # BLD AUTO: 0.04 K/UL — SIGNIFICANT CHANGE UP (ref 0–0.2)
BASOPHILS NFR BLD AUTO: 0.9 % — SIGNIFICANT CHANGE UP (ref 0–2)
BILIRUB SERPL-MCNC: 0.4 MG/DL — SIGNIFICANT CHANGE UP (ref 0.2–1.2)
BUN SERPL-MCNC: 8 MG/DL — SIGNIFICANT CHANGE UP (ref 7–23)
CALCIUM SERPL-MCNC: 9.4 MG/DL — SIGNIFICANT CHANGE UP (ref 8.4–10.5)
CHLORIDE SERPL-SCNC: 100 MMOL/L — SIGNIFICANT CHANGE UP (ref 98–107)
CO2 SERPL-SCNC: 20 MMOL/L — LOW (ref 22–31)
CREAT SERPL-MCNC: 0.79 MG/DL — SIGNIFICANT CHANGE UP (ref 0.5–1.3)
EOSINOPHIL # BLD AUTO: 0.49 K/UL — SIGNIFICANT CHANGE UP (ref 0–0.5)
EOSINOPHIL NFR BLD AUTO: 11 % — HIGH (ref 0–6)
GLUCOSE SERPL-MCNC: 72 MG/DL — SIGNIFICANT CHANGE UP (ref 70–99)
HCT VFR BLD CALC: 35.9 % — LOW (ref 39–50)
HGB BLD-MCNC: 12.2 G/DL — LOW (ref 13–17)
IMM GRANULOCYTES # BLD AUTO: 0.01 # — SIGNIFICANT CHANGE UP
IMM GRANULOCYTES NFR BLD AUTO: 0.2 % — SIGNIFICANT CHANGE UP (ref 0–1.5)
LYMPHOCYTES # BLD AUTO: 1.55 K/UL — SIGNIFICANT CHANGE UP (ref 1–3.3)
LYMPHOCYTES # BLD AUTO: 34.9 % — SIGNIFICANT CHANGE UP (ref 13–44)
MAGNESIUM SERPL-MCNC: 1.4 MG/DL — LOW (ref 1.6–2.6)
MCHC RBC-ENTMCNC: 29.7 PG — SIGNIFICANT CHANGE UP (ref 27–34)
MCHC RBC-ENTMCNC: 34 % — SIGNIFICANT CHANGE UP (ref 32–36)
MCV RBC AUTO: 87.3 FL — SIGNIFICANT CHANGE UP (ref 80–100)
MONOCYTES # BLD AUTO: 0.58 K/UL — SIGNIFICANT CHANGE UP (ref 0–0.9)
MONOCYTES NFR BLD AUTO: 13.1 % — SIGNIFICANT CHANGE UP (ref 2–14)
NEUTROPHILS # BLD AUTO: 1.77 K/UL — LOW (ref 1.8–7.4)
NEUTROPHILS NFR BLD AUTO: 39.9 % — LOW (ref 43–77)
NRBC # FLD: 0 — SIGNIFICANT CHANGE UP
PHOSPHATE SERPL-MCNC: 3.9 MG/DL — SIGNIFICANT CHANGE UP (ref 2.5–4.5)
PLATELET # BLD AUTO: 239 K/UL — SIGNIFICANT CHANGE UP (ref 150–400)
PMV BLD: 9.2 FL — SIGNIFICANT CHANGE UP (ref 7–13)
POTASSIUM SERPL-MCNC: 3.7 MMOL/L — SIGNIFICANT CHANGE UP (ref 3.5–5.3)
POTASSIUM SERPL-SCNC: 3.7 MMOL/L — SIGNIFICANT CHANGE UP (ref 3.5–5.3)
PROT SERPL-MCNC: 6.4 G/DL — SIGNIFICANT CHANGE UP (ref 6–8.3)
RBC # BLD: 4.11 M/UL — LOW (ref 4.2–5.8)
RBC # FLD: 13.5 % — SIGNIFICANT CHANGE UP (ref 10.3–14.5)
SODIUM SERPL-SCNC: 138 MMOL/L — SIGNIFICANT CHANGE UP (ref 135–145)
WBC # BLD: 4.44 K/UL — SIGNIFICANT CHANGE UP (ref 3.8–10.5)
WBC # FLD AUTO: 4.44 K/UL — SIGNIFICANT CHANGE UP (ref 3.8–10.5)

## 2018-11-19 PROCEDURE — 99239 HOSP IP/OBS DSCHRG MGMT >30: CPT

## 2018-11-19 RX ORDER — LOPERAMIDE HCL 2 MG
1 TABLET ORAL
Qty: 180 | Refills: 0
Start: 2018-11-19 | End: 2018-12-18

## 2018-11-19 RX ORDER — PETROLATUM,WHITE
1 JELLY (GRAM) TOPICAL
Qty: 0 | Refills: 0 | DISCHARGE
Start: 2018-11-19

## 2018-11-19 RX ORDER — DRONABINOL 2.5 MG
1 CAPSULE ORAL
Qty: 60 | Refills: 0
Start: 2018-11-19 | End: 2018-12-18

## 2018-11-19 RX ORDER — ONDANSETRON 8 MG/1
1 TABLET, FILM COATED ORAL
Qty: 28 | Refills: 0
Start: 2018-11-19 | End: 2018-11-25

## 2018-11-19 RX ORDER — LOPERAMIDE HCL 2 MG
1 TABLET ORAL
Qty: 0 | Refills: 0 | DISCHARGE
Start: 2018-11-19

## 2018-11-19 RX ADMIN — Medication 1 TABLET(S): at 13:49

## 2018-11-19 RX ADMIN — Medication 1 DROP(S): at 13:40

## 2018-11-19 RX ADMIN — Medication 25 MILLIGRAM(S): at 05:55

## 2018-11-19 RX ADMIN — METHADONE HYDROCHLORIDE 5 MILLIGRAM(S): 40 TABLET ORAL at 13:50

## 2018-11-19 RX ADMIN — GABAPENTIN 100 MILLIGRAM(S): 400 CAPSULE ORAL at 05:22

## 2018-11-19 RX ADMIN — HEPARIN SODIUM 5000 UNIT(S): 5000 INJECTION INTRAVENOUS; SUBCUTANEOUS at 13:44

## 2018-11-19 RX ADMIN — Medication 1 DROP(S): at 00:23

## 2018-11-19 RX ADMIN — Medication 650 MILLIGRAM(S): at 00:30

## 2018-11-19 RX ADMIN — FINASTERIDE 5 MILLIGRAM(S): 5 TABLET, FILM COATED ORAL at 13:42

## 2018-11-19 RX ADMIN — Medication 650 MILLIGRAM(S): at 01:00

## 2018-11-19 RX ADMIN — Medication 2.5 MILLIGRAM(S): at 05:22

## 2018-11-19 RX ADMIN — METHADONE HYDROCHLORIDE 5 MILLIGRAM(S): 40 TABLET ORAL at 05:22

## 2018-11-19 RX ADMIN — GABAPENTIN 100 MILLIGRAM(S): 400 CAPSULE ORAL at 13:39

## 2018-11-19 RX ADMIN — SERTRALINE 50 MILLIGRAM(S): 25 TABLET, FILM COATED ORAL at 13:41

## 2018-11-19 RX ADMIN — Medication 650 MILLIGRAM(S): at 05:22

## 2018-11-19 RX ADMIN — Medication 1 APPLICATION(S): at 05:23

## 2018-11-19 RX ADMIN — Medication 1 DROP(S): at 05:23

## 2018-11-19 RX ADMIN — HEPARIN SODIUM 5000 UNIT(S): 5000 INJECTION INTRAVENOUS; SUBCUTANEOUS at 05:22

## 2018-11-19 RX ADMIN — Medication 650 MILLIGRAM(S): at 13:41

## 2018-11-19 NOTE — DIETITIAN INITIAL EVALUATION ADULT. - PERTINENT LABORATORY DATA
11-19 Na138 mmol/L Glu 72 mg/dL K+ 3.7 mmol/L Cr  0.79 mg/dL BUN 8 mg/dL 11-19 Phos 3.9 mg/dL 11-19 Alb 3.4 g/dL

## 2018-11-19 NOTE — CHART NOTE - NSCHARTNOTEFT_GEN_A_CORE
NUTRITION SERVICES     Upon Nutritional Assessment by the Registered Dietitian your patient was determined to meet criteria/ has evidence of the following diagnosis/diagnoses:  [ ] Mild Protein Calorie Malnutrition   [ ] Moderate Protein Calorie Malnutrition   [  X ] Severe Protein Calorie Malnutrition   [ ] Unspecified Protein Calorie Malnutrition   [ ] Underweight / BMI <19  [ ] Morbid Obesity / BMI >40    Findings as based on:  •  Comprehensive nutritional assessment and consultation    Please refer to Initial Dietitian Evaluation via documents section of [facility] EMR for further recommendations.    Jaki Bell RDN, CDN   pager: [phone]

## 2018-11-19 NOTE — PROGRESS NOTE ADULT - PROBLEM SELECTOR PLAN 1
- Unclear if from pancreatic ca vs immunotherapy. Has been waxing and waning since admission.  - CT a/p on admission without acute pathology.  - QTc normal; started on Zofran. Reglan discontinued as it may be contributing to the diarrhea.  - C/w marinnol, appetite improving, starting to tolerate some solid foods but has not eaten a full meal as of yet  - Calorie count  - FS q6 as has been low on bmp.  - Restarted methadone 11/14  - F/u Nutrition consult - Unclear if from pancreatic ca/insufficiency vs immunotherapy. Has been waxing and waning since admission.       - F/u stool elastase for pancreatic insufficiency.  - CT a/p on admission without acute pathology.  - Endorses decreased appetite 2/2 nausea.       - C/w marinnol, appetite improving       - As per nutrition, c/w ensure x 2/day and multivitamin       - Starting to tolerate some solid foods but has not eaten a full meal as of yet  - Calorie count  - FS q6 as has been low on bmp.  - Restarted methadone 11/14  - QTc normal; started on Zofran. Reglan discontinued as it may be contributing to the diarrhea.

## 2018-11-19 NOTE — PROGRESS NOTE ADULT - PROBLEM SELECTOR PLAN 2
- Experienced watery/loose, non-bloody stools; endorsed mild belly pain from it. Possibly 2/2 pancreatic insufficiency 2/2 pancreatic cancer?       - Currently seems to be resolved.  - As per pt's oncologist, Dr. Maloney, low suspicion for Nivolumab toxicity leading to the diarrhea.  - C. Diff, stool culture, GI PCR, and O&P negative.  - Possibly side effect from reglan? Reglan discontinued  - C/w imodium and peptobismol

## 2018-11-19 NOTE — PROGRESS NOTE ADULT - PROVIDER SPECIALTY LIST ADULT
Internal Medicine
Nephrology
Internal Medicine

## 2018-11-19 NOTE — PROGRESS NOTE ADULT - SUBJECTIVE AND OBJECTIVE BOX
CHIEF COMPLAINT:    Interval Events: No watery BMs overnights. No vomiting yesterday or overnight. Currently, reports mild right-sided headache. Appetite improved; was able to tolerate some fruits and juice last night. Still reports nausea with solid foods. No blurry vision, photophobia, eye itchiness, vomiting, fevers/chills, cough, CP/SOB, abdominal pain, dysuria.    REVIEW OF SYSTEMS:  Constitutional: + Improving appetite. No fever/chills. No recent weight loss or weight gain.   HEENT: Normal vision. No postnasal drip or nasal congestion.  CV: No chest pain, or palpitations. No orthopnea.   Resp: No cough, or sputum production. No shortness of breath or dyspnea on exertion.   GI: + Nausea. Diarrhea resolved. No vomiting. No constipation. No abdominal pain.  : No dysuria, no nocturia or increased urinary frequency.  Musculoskeletal: No back pain, no myalgias  Skin: Rash on right forehead and scalp.  Neurological: No headache or dizziness. No syncope, no weakness, numbness.  Psychiatric: Denies depressed mood.   Endocrine: No cold or heat intolerance. No dry skin.  Hematologic/Lymphatic: No anemia or bleeding problem.    OBJECTIVE:  Vital Signs Last 24 Hrs  T(C): 36.6 (19 Nov 2018 05:32), Max: 36.6 (18 Nov 2018 15:33)  T(F): 97.8 (19 Nov 2018 05:32), Max: 97.8 (18 Nov 2018 15:33)  HR: 73 (19 Nov 2018 05:32) (69 - 88)  BP: 149/77 (19 Nov 2018 05:32) (122/70 - 149/77)  BP(mean): --  RR: 17 (19 Nov 2018 05:32) (16 - 17)  SpO2: 97% (19 Nov 2018 05:32) (95% - 97%)    11-18 @ 07:01  -  11-19 @ 07:00  --------------------------------------------------------  IN: 470 mL / OUT: 1650 mL / NET: -1180 mL      CAPILLARY BLOOD GLUCOSE      POCT Blood Glucose.: 89 mg/dL (19 Nov 2018 05:54)      PHYSICAL EXAM:  Gen: Appears lethargic. No acute distress, alert, cooperative.  Head: Red, maculopapular rash with groupings of papules on right forehead and right scalp. Doesn't cross the midline. Vesicles crusted.  HEENT: Oral mucosa moist, normal conjunctiva.  Lung: CTAB, no respiratory distress, no crackles or wheezes.  CV: Normal S1/S1, No m/r/g.  Abd: Mildly tender diffusely. Soft, non-distended, no rebound or guarding, scar on abdomen, no sign of infection.  MSK: No LE edema.  Neuro: AAO x 2. No focal neurologic deficits. Cn 2-12 intact.  Skin:  Rash on forehead around eye healing.    LINES:    HOSPITAL MEDICATIONS:  heparin  Injectable 5000 Unit(s) SubCutaneous every 8 hours      metoprolol tartrate 25 milliGRAM(s) Oral two times a day  tamsulosin 0.8 milliGRAM(s) Oral at bedtime    finasteride 5 milliGRAM(s) Oral daily      acetaminophen   Tablet .. 650 milliGRAM(s) Oral every 6 hours PRN  dronabinol 2.5 milliGRAM(s) Oral two times a day  gabapentin 100 milliGRAM(s) Oral three times a day  methadone    Tablet 5 milliGRAM(s) Oral every 8 hours  ondansetron   Disintegrating Tablet 4 milliGRAM(s) Oral every 6 hours PRN  sertraline 50 milliGRAM(s) Oral daily    bismuth subsalicylate Liquid 30 milliLiter(s) Oral four times a day  loperamide 2 milliGRAM(s) Oral every 4 hours PRN        sodium bicarbonate 650 milliGRAM(s) Oral three times a day      artificial tears (preservative free) Ophthalmic Solution 1 Drop(s) Right EYE every 6 hours  petrolatum white Ointment 1 Application(s) Topical two times a day        LABS:                        12.2   4.44  )-----------( 239      ( 19 Nov 2018 06:05 )             35.9     Hgb Trend: 12.2<--, 12.2<--, 12.2<--, 12.4<--, 12.6<--  11-18    134<L>  |  98  |  7   ----------------------------<  76  3.7   |  23  |  0.80    Ca    9.6      18 Nov 2018 06:00  Phos  3.7     11-18  Mg     1.6     11-18    TPro  6.5  /  Alb  3.5  /  TBili  0.3  /  DBili  x   /  AST  22  /  ALT  8   /  AlkPhos  68  11-18    Creatinine Trend: 0.80<--, 0.84<--, 0.89<--, 0.89<--, 1.06<--, 1.50<--            MICROBIOLOGY:     RADIOLOGY:  [ ] Reviewed and interpreted by me    EKG:

## 2018-11-19 NOTE — PROGRESS NOTE ADULT - PROBLEM SELECTOR PLAN 3
- Papules now crusted, s/p acyclovir and valacyclovir, d/c'd due to NICOLE. Will continue to monitor lesions.  - Pt now w/ post herpetic neuralgia, c/w gabapentin 100 tid  - As per Optho, negative for herpes zoster ophthalmicus (pt. complains of itchy right eye, but no blurriness/photophobia/vision loss).  - C/w artifical tears and cold compresses  - Pain control with gabapentin 100mg TID, methadone 5mg TID, acetaminophen 650mg PRN and dilaudid PRN.

## 2018-11-19 NOTE — DIETITIAN INITIAL EVALUATION ADULT. - NS AS NUTRI INTERV COLLABORAT
1)Add Ensure Enlive 2x daily to Soft diet              2)add Multivitamin for micronutrient coverage                      3)Consider obtaining pancreatic elastase (given physician notes questionable pancreatic insufficiency)             4)Obtain daily weights                    5)RDN remains available.  Jaki Bell RDN, CDN  pager [phone]

## 2018-11-19 NOTE — PROGRESS NOTE ADULT - PROBLEM SELECTOR PROBLEM 1
NICOLE (acute kidney injury)
Herpes zoster without complication
NICOLE (acute kidney injury)
Nausea & vomiting
Herpes zoster without complication
Herpes zoster without complication

## 2018-11-19 NOTE — DIETITIAN INITIAL EVALUATION ADULT. - NUTRITION INTERVENTION
Vitamin/Nutrition - Related Medication Management/Collaboration and Referral of Nutrition Care/Medical Food Supplements/Meals and Snack

## 2018-11-19 NOTE — PROGRESS NOTE ADULT - ATTENDING COMMENTS
Patient examined and ROS reviewed. A case of NICOLE mediated with hemodynamic factors. NICOLE is resolving.
add Na bicarbonate 650mg TID
will sign off. call us with questions please
I have personally seen and examined patient today.   I discussed the case with Dr. Blackwell and I agree with findings and plan as detailed per note above, which I have amended where appropriate.      Pt w/ PMH as above a/w forehead pruritic burning rash since 3 days duration, likely herpes zoster.  Has some lesions on right eye lid and reports pruritus around right eye, but no blurred vision or eye pain.  Continue with IV acyclovir as patient is immunocompromised.  C/w IVF hydration and monitor creatinine.  Ophtho consult appreciated - unlikely HZO.
Agree with Housestaff Note Above, edits made where appropriate, case discussed with housestaff    Patient seen and examined. Patient with resolving rash, however now with NICOLE, possibly due to IV Acyclovir. Today patient complained of some dyspepsia after eating that resolved on its own. No other complaints  - NICOLE - c/w IVF, hold abx for now, inquire with ID about antibiotic recommendations, trend SCr daily  - Herpes Zoster - resolving rash, appreciate Ophtho and ID recommendations, pain control PRN  - HTN - c/w Losartan and Metoprolol   - Pancreatic CA - f/u Oncology as outpatient for further immunotherapy
Pt seen examined at bedside.  diarrhea improved, tolerating po.  Pt with severe protein calorie malnutrition, will take ensure supplements.  will follow-up with o/p onc.  plan discussed with patient/family. DC time 42 minutes.
Pt seen and examined with HS on above date.  Agree with above HS note.  Plan discussed with House staff.    63 male on immunotherapy, recent course of steroids for joint pains, a/w Zoster, NICOLE, now improving.  pt with continued diarrhea and new rash (does not appear to be zoster.)  ? Drug rash, will consult derm.  discussed with outpatient Onc Dr. Maloney, unlikely immunotherapy reaction.
Agree with Housestaff Note Above, edits made where appropriate, case discussed with housestaff    Patient seen and examined. Clinically has gone in and out of delirium. Discontinued extraneous opioids yesterday, and s/p elizabeth draining.   - Post-Obstructive and Acyclovir-mediated Acute Kidney Injury - Acyclovir held, trial to void today after elizabeth augmented drainage of urine. Trend SCr. Appreciate further renal recommendations  - Herpes Zoster rash - lesions crusting over, observe off antibiotics, pain control PRN  - Pancreatic CA - on immunotherapy, to follow up outpatient after hospitalization   - Chronic pain- c/w Methadone, gabapentin, and sertraline
Pt seen and examined with HS on above date.  Agree with above HS note.  Plan discussed with House staff.    63 m pancreatic ca on methadone, a/w NICOLE, shingles.  NICOLE resolving.  NICOLE likely multifactorial.  Volume depletion/hypotension, acyclovir, and obstructive.  Also with metabolic encephalopathy. Also multifactorial: NICOLE, urinary retention, acyclovir.  questionable Sepsis on admission but likely SIRS.  Doubt shingles would cause sepsis.  Still with diarrhea, nausea/vomiting.  ? 2/2 methadone withdrawal.  restarted methadone 11/14.  will check QTC and add zofran if ok.  infectious diarrhea ruled out. will try immodium.
Pt seen and examined with HS on above date.  Agree with above HS note.  Plan discussed with House staff.    63 m pancreatic ca on methadone, a/w NICOLE, shingles.  NICOLE resolving.  still with diarrhea, ? 2/2 methadone withdrawal.  will restart.  derm seeing for rash.
Agree with Housestaff Note Above, edits made where appropriate, case discussed with housestaff    Patient seen and examined. Patient with worsening creatinine along with new AMS. Likely metabolic due to worsened NICOLE, probably due to post-obstructive NICOLE due to urinary retention and Acyclovir. Holding Acyclovir for now as lesions are crusting, and retention addressed with elizabeth placed by Urology. Holding Losartan and extraneous opioids for now. C/w IVF
Pt seen and examined with HS on above date.  Agree with above HS note.  Plan discussed with House staff.    Trial of void for urinary retention.  NICOLE improving.  lesions crusting over.  Monitor diarrhea, infectious w/up negative.
I have personally seen and examined patient today.   I discussed the case with Dr. Blackwell and I agree with findings and plan as detailed per note above, which I have amended where appropriate.      Pt w/ PMH as above a/w forehead pruritic burning rash likely herpes zoster.  Has some lesions on right eye lid and reports pruritus around right eye, but no blurred vision or eye pain.  Ophtho consult appreciated - unlikely HZO.  Pt developed diarrhea today and had an episode of hypotension, which responded to IVF.    1.  Diarrhea - C/w IVF hydration.  C.diff PCR negative.  Possibly related to antiviral related GI upset vs. immunotherapy?  Monitor stool counts and rehydrate as needed.  2.  Herpes Zoster - D/c IV acyclovir and changed to oral valtrex to continue for 7 more days.   3.  D/c planning for tomorrow after rehydration
I have personally seen and examined patient today.   I discussed the case with Dr. Blackwell and I agree with findings and plan as detailed per note above, which I have amended where appropriate.      Pt w/ PMH as above a/w forehead pruritic burning rash likely herpes zoster.  Pt developed diarrhea and hypotension, which responded to IVF.  Found to have NICOLE today with creat 3.8  1.  Diarrhea - C/w IVF hydration.  C.diff PCR negative.  F/u stool culture, O&P; Possibly related to antiviral related GI upset vs. immunotherapy?  Monitor stool counts and rehydrate as needed.  2.  NICOLE - likely hemodynamically mediated in setting of GI volume loss and hypotension, but was on IV acyclovir (though was also on IVF) and could be drug; trending creatinine with IVF  3.  Herpes Zoster - lesions now crusting over and no spread of rash;  valtrex held today due to concern for NICOLE.

## 2018-11-19 NOTE — PROGRESS NOTE ADULT - REASON FOR ADMISSION
Painful facial/scalp rash and nausea

## 2018-11-19 NOTE — DIETITIAN INITIAL EVALUATION ADULT. - PERTINENT MEDS FT
MEDICATIONS  (STANDING):  artificial tears (preservative free) Ophthalmic Solution 1 Drop(s) Right EYE every 6 hours  bismuth subsalicylate Liquid 30 milliLiter(s) Oral four times a day  dronabinol 2.5 milliGRAM(s) Oral two times a day  finasteride 5 milliGRAM(s) Oral daily  gabapentin 100 milliGRAM(s) Oral three times a day  heparin  Injectable 5000 Unit(s) SubCutaneous every 8 hours  methadone    Tablet 5 milliGRAM(s) Oral every 8 hours  metoprolol tartrate 25 milliGRAM(s) Oral two times a day  multivitamin 1 Tablet(s) Oral daily  petrolatum white Ointment 1 Application(s) Topical two times a day  sertraline 50 milliGRAM(s) Oral daily  sodium bicarbonate 650 milliGRAM(s) Oral three times a day  tamsulosin 0.8 milliGRAM(s) Oral at bedtime    MEDICATIONS  (PRN):  acetaminophen   Tablet .. 650 milliGRAM(s) Oral every 6 hours PRN Moderate Pain (4 - 6)  loperamide 2 milliGRAM(s) Oral every 4 hours PRN Diarrhea  ondansetron   Disintegrating Tablet 4 milliGRAM(s) Oral every 6 hours PRN Nausea and/or Vomiting

## 2018-11-19 NOTE — DIETITIAN INITIAL EVALUATION ADULT. - OTHER INFO
Pt. endorses improvement in appetite, however states he is mostly consuming juice & fruit.  RDN observed <25% consumption of breakfast.  Upon review of 72hr KCal count (11/15/18-11/17/18), Pt. with inadequate energy intake, often not eating at some meals.  Based on documentation, Pt. estimated to be eating no more than ~500Kcals/day and ~1g protein/day.  He is amenable to drink Ensure Enlive (drinks Ensure supplement at home 2x daily).  Also makes smoothies at home with fresh fruit and ice or yogurt or Ensure.  He c/o nausea, but without emesis (noted on Zoloft, which has a common side effect of nausea (perhaps contributory??.... informed MD).  No c/o diarrhea today.  Per chart, concern for possible/questionable pancreatic insufficiency, given Hx of pancreatic Ca---> discussed w MD to consider obtaining fecal elastase to r/o.   NKFA, no reported issues chewing/swallowing.  Obtained food preferences and encouraged intake of Ensure between meals, as well as nutrient dense foods, as tolerated.  Nutrition recommendations verbalized to physicians.

## 2018-11-19 NOTE — DIETITIAN INITIAL EVALUATION ADULT. - PROBLEM SELECTOR PLAN 1
- Red, maculopapular rash with groupings of papules on right forehead and right scalp. Doesn't cross the midline.  - Pt. currently immunosuppressed.  - C/w IV Acyclovir 850mg TID with NS at 75 cc/hr.  - Pain control with gabapentin 300mg TID, methadone, acetaminophen 650mg PRN and dilaudid PRN.

## 2018-11-19 NOTE — PROGRESS NOTE ADULT - ASSESSMENT
62 y/o M with PMH colon ca (s/p resection June 2017), pancreatic ca (s/p chemo, ended April 2018), currently on nivoluma immunotherapy (once every 2 weeks since the last 6 months; last dose on 11/2/18), and HTN presented with nausea, headache, and shingles rash on right forehead/scalp, s/p acyclovir/valacyclovir, c/c/b NICOLE (now resolved), diarrhea (now resolved), and decreased appetite (currently improving).

## 2018-11-19 NOTE — DIETITIAN INITIAL EVALUATION ADULT. - PROBLEM SELECTOR PLAN 2
- In the ED, pt. met the SIRS criteria with T 102.4, , /73, RR 18, SpO2 100%. Received empiric Vanc 1g x 1 and Zosyn 3.375g x 1.  - CXR negative for PNA.  - F/u blood clx and UA/urine clx.  - Hold off on abx right now given no localizing source.

## 2018-11-23 ENCOUNTER — APPOINTMENT (OUTPATIENT)
Dept: GERIATRICS | Facility: CLINIC | Age: 63
End: 2018-11-23
Payer: MEDICARE

## 2018-11-23 ENCOUNTER — APPOINTMENT (OUTPATIENT)
Dept: HEMATOLOGY ONCOLOGY | Facility: CLINIC | Age: 63
End: 2018-11-23
Payer: MEDICARE

## 2018-11-23 VITALS
RESPIRATION RATE: 17 BRPM | TEMPERATURE: 97.7 F | DIASTOLIC BLOOD PRESSURE: 68 MMHG | OXYGEN SATURATION: 95 % | BODY MASS INDEX: 30.5 KG/M2 | HEART RATE: 101 BPM | SYSTOLIC BLOOD PRESSURE: 98 MMHG | WEIGHT: 200.62 LBS

## 2018-11-23 PROCEDURE — 99214 OFFICE O/P EST MOD 30 MIN: CPT

## 2018-11-23 PROCEDURE — 99215 OFFICE O/P EST HI 40 MIN: CPT

## 2018-11-23 RX ORDER — LOSARTAN POTASSIUM 25 MG/1
25 TABLET, FILM COATED ORAL DAILY
Qty: 30 | Refills: 3 | Status: DISCONTINUED | COMMUNITY
Start: 2018-01-02 | End: 2018-11-23

## 2018-11-23 RX ORDER — PREDNISONE 20 MG/1
20 TABLET ORAL
Qty: 10 | Refills: 0 | Status: DISCONTINUED | COMMUNITY
Start: 2018-10-19 | End: 2018-11-23

## 2018-11-23 RX ORDER — HYDROMORPHONE HYDROCHLORIDE 2 MG/1
2 TABLET ORAL EVERY 4 HOURS
Qty: 120 | Refills: 0 | Status: DISCONTINUED | COMMUNITY
Start: 2018-08-27 | End: 2018-11-23

## 2018-11-23 NOTE — REVIEW OF SYSTEMS
[Fatigue] : fatigue [Recent Change In Weight] : ~T recent weight change [Confused] : confusion [Insomnia] : insomnia [Negative] : Allergic/Immunologic

## 2018-11-30 ENCOUNTER — APPOINTMENT (OUTPATIENT)
Age: 63
End: 2018-11-30

## 2018-11-30 ENCOUNTER — LABORATORY RESULT (OUTPATIENT)
Age: 63
End: 2018-11-30

## 2018-12-03 ENCOUNTER — APPOINTMENT (OUTPATIENT)
Dept: OPHTHALMOLOGY | Facility: CLINIC | Age: 63
End: 2018-12-03
Payer: MEDICARE

## 2018-12-03 DIAGNOSIS — R11.2 NAUSEA WITH VOMITING, UNSPECIFIED: ICD-10-CM

## 2018-12-03 DIAGNOSIS — Z51.11 ENCOUNTER FOR ANTINEOPLASTIC CHEMOTHERAPY: ICD-10-CM

## 2018-12-03 PROCEDURE — 92004 COMPRE OPH EXAM NEW PT 1/>: CPT

## 2018-12-07 ENCOUNTER — APPOINTMENT (OUTPATIENT)
Dept: GERIATRICS | Facility: CLINIC | Age: 63
End: 2018-12-07
Payer: MEDICARE

## 2018-12-07 ENCOUNTER — APPOINTMENT (OUTPATIENT)
Dept: HEMATOLOGY ONCOLOGY | Facility: CLINIC | Age: 63
End: 2018-12-07
Payer: MEDICARE

## 2018-12-07 ENCOUNTER — INBOUND DOCUMENT (OUTPATIENT)
Age: 63
End: 2018-12-07

## 2018-12-07 VITALS
WEIGHT: 195.11 LBS | RESPIRATION RATE: 16 BRPM | OXYGEN SATURATION: 98 % | DIASTOLIC BLOOD PRESSURE: 70 MMHG | TEMPERATURE: 98 F | HEART RATE: 102 BPM | SYSTOLIC BLOOD PRESSURE: 112 MMHG | BODY MASS INDEX: 29.67 KG/M2

## 2018-12-07 DIAGNOSIS — B02.9 ZOSTER W/OUT COMPLICATIONS: ICD-10-CM

## 2018-12-07 PROCEDURE — 99214 OFFICE O/P EST MOD 30 MIN: CPT

## 2018-12-07 NOTE — REASON FOR VISIT
[Follow-Up Visit] : a follow-up [Spouse] : spouse [FreeTextEntry2] : Locally advanced pancreatic cancer, [facility]

## 2018-12-07 NOTE — HISTORY OF PRESENT ILLNESS
[Disease: _____________________] : Disease: [unfilled] [T: ___] : T[unfilled] [N: ___] : N[unfilled] [AJCC Stage: ____] : AJCC Stage: [unfilled] [Therapy: ___] : Therapy: [unfilled] [de-identified] : 62 M with no significant past medical history, developed progressive abdominal pain in March 2017. In May 2017 he went to an urgent care center and was referred for a CT A/P w/co. This was performed on 5/8/17 and demonstrated a circumferential 6.7 cm mass at the splenic flexure of the colon causing "apple core" luminal narrowing of the colon at that level c/w malignancy. A 2.7 x 2.2 x 2.5 cm ill defined LN below the pancreatic body at the level of the splenic vein is most consistent with a metastasis. On May 9, 2017, Haja saw Dr. Morgan Armenta, colorectal surgery who performed a colonoscopy on 5/15/17. Biopsy c/w invasive moderately differentiated colonic adenocarcinoma a/w necrosis. A PET/CT was performed on 5/30/17 which showed splenic flexure colonic hypermetabolic mass c/w primary carcinoma (SUV 35) and 2.5 cm mesenteric LN hypermetabolic focus c/w regional metastatic disease (SUV 5). \par \par On 6/13/17 patient underwent a open left colectomy, omentectomy and mobilization of the splenic flexure. Final pathology T3NO moderately differentiated carcinoma, loss of nuclear expression of MLH1 and PMS2. No adjuvant therapy was given. Post op developed SOB, hypoxia transferred to the SICU, placed on bipap. A CTA chest/abd/pelvis 6/15/17 c/w Nonspecific patchy opacity in RLL was noted, 3.4 cm collection or cystic lesion in the pancreatic body for which follow up imaging was suggested. \par \par Post op, Haja continued to complain of abdominal pain and an MRI A/P was performed on 7/25/17 which showed a 4.6 cm hyperintense structure inseparable from the distal pancreatic body. The lesion is located in the peripancreatic retroperitoneal space with imaging characteristics suggestive of pancreatic pseudocyst. He was evaluated by surg onc, Dr. Hager and referred for a CT pancreatic protocol which was performed on 9/5 and showed a peripherally enhancing mass in the body of the pancreas measures 4.6 x 3.5 cm. Subsequently had an EUS with FNA on 10/6/17 (GI- Dr. Luz) which confirmed moderately differentiated adenocarcinoma.\par \par On 11/9/17 underwent open ex lap for possible resection of pancreatic adenoca, however, SMA was encased by tumor and therefore could not be resected. Liver bx was performed and negative for malignancy, however, noted to have steatosis with mild steatohepatitis, port inflammation, focal periportal and pericellular fibrosis,2 + iron deposition. \par \par Patient was subsequently referred to rad onc and med onc for further evaluation. \par \par 8 cycles of mFOLFIRINOX 12/5/17 (25% dose reduction due to PS) - 3/13/18 with stable/mild improvement in disease \par \par 3/27/18-5/29/18 : C1-C4 Nivo 3mg/kg (240 mg flat dose)/Ipi 1mg/kg \par 5/31/18: CTCAP: decr in size of pancreatic mass, previously 3.6 to 2.5, new mediastinal LN\par 6/12/18: EBUS with bx of LN: reactive \par 6/26/18-: Nivo single agent Q 2 weeks \par 11/5 - 11/19: hospitalized at [facility] with herpes zoster involving the R periorbital area, no ocular involvement. opiods stopped abruptly s/p withdrawal characterized by diarrhea/n/v. Developed NICOLE (2/2 acyclovir?) with resolution. \par \par  [de-identified] : moderately differentiated adenocarcinoma [de-identified] : 11/24/17: CA 19.9 - 81.3  CEA 29 [de-identified] : June 2017 - colon resection of tumor T3N0 (0/15 LN) moderately differentiated, loss of nuclear expression of MLH1, PMS2. \par    - IHC:  CK19, and variably positive for CDX2, CK7 and CK20, \par   + BRAF\par \par Nov 2017- attempted pancreatic resection:  CK7, CK20 and CDX2 are positive; CK20 is negative\par     - loss of nuclear expression of MLH1, PMS2.\par \par [facility] - failed report  [FreeTextEntry1] : last dose 11/2 [de-identified] : Haja presents for fu, accompanied by wife. Pt seen together with Dr. Huffman. He was d/c from [facility] 4 days ago after being admitted for herpes zoster. complicated hospitalization by opiod withdrawal, NICOLE, weight loss. Loss 15 lbs since last visit. Poor appetite. Not taking Dilaudid anymore. \par Diarrhea and n/v now resolved. \par BP running low since hospital d/c. Feels weak. \par Mild discomfort around R eye. Has not taken any Valcyte since coming home. Pills too big too swallow. He was instructed to schedule a f/u with optho in 2 weeks. \par

## 2018-12-07 NOTE — PHYSICAL EXAM
[Restricted in physically strenuous activity but ambulatory and able to carry out work of a light or sedentary nature] : Status 1- Restricted in physically strenuous activity but ambulatory and able to carry out work of a light or sedentary nature, e.g., light house work, office work [Normal] : affect appropriate [de-identified] : chronically ill appearing  [de-identified] : R periorbital mild erythema, healing zoster lesion

## 2018-12-10 ENCOUNTER — OUTPATIENT (OUTPATIENT)
Dept: OUTPATIENT SERVICES | Facility: HOSPITAL | Age: 63
LOS: 1 days | Discharge: HOME | End: 2018-12-10

## 2018-12-10 DIAGNOSIS — K25.5 CHRONIC OR UNSPECIFIED GASTRIC ULCER WITH PERFORATION: Chronic | ICD-10-CM

## 2018-12-10 DIAGNOSIS — C25.9 MALIGNANT NEOPLASM OF PANCREAS, UNSPECIFIED: ICD-10-CM

## 2018-12-10 DIAGNOSIS — Z98.890 OTHER SPECIFIED POSTPROCEDURAL STATES: Chronic | ICD-10-CM

## 2018-12-10 DIAGNOSIS — Z90.49 ACQUIRED ABSENCE OF OTHER SPECIFIED PARTS OF DIGESTIVE TRACT: Chronic | ICD-10-CM

## 2018-12-13 ENCOUNTER — RESULT REVIEW (OUTPATIENT)
Age: 63
End: 2018-12-13

## 2018-12-13 ENCOUNTER — LABORATORY RESULT (OUTPATIENT)
Age: 63
End: 2018-12-13

## 2018-12-13 ENCOUNTER — APPOINTMENT (OUTPATIENT)
Age: 63
End: 2018-12-13

## 2018-12-13 LAB
BASOPHILS # BLD AUTO: 0.1 K/UL — SIGNIFICANT CHANGE UP (ref 0–0.2)
BASOPHILS NFR BLD AUTO: 2 % — SIGNIFICANT CHANGE UP (ref 0–2)
EOSINOPHIL # BLD AUTO: 2.1 K/UL — HIGH (ref 0–0.5)
EOSINOPHIL NFR BLD AUTO: 22 % — HIGH (ref 0–6)
HCT VFR BLD CALC: 35.4 % — LOW (ref 39–50)
HGB BLD-MCNC: 12.1 G/DL — LOW (ref 13–17)
LYMPHOCYTES # BLD AUTO: 2.6 K/UL — SIGNIFICANT CHANGE UP (ref 1–3.3)
LYMPHOCYTES # BLD AUTO: 34 % — SIGNIFICANT CHANGE UP (ref 13–44)
MCHC RBC-ENTMCNC: 30.5 PG — SIGNIFICANT CHANGE UP (ref 27–34)
MCHC RBC-ENTMCNC: 34.2 G/DL — SIGNIFICANT CHANGE UP (ref 32–36)
MCV RBC AUTO: 89.1 FL — SIGNIFICANT CHANGE UP (ref 80–100)
MONOCYTES # BLD AUTO: 0.7 K/UL — SIGNIFICANT CHANGE UP (ref 0–0.9)
MONOCYTES NFR BLD AUTO: 8 % — SIGNIFICANT CHANGE UP (ref 2–14)
NEUTROPHILS # BLD AUTO: 3.1 K/UL — SIGNIFICANT CHANGE UP (ref 1.8–7.4)
NEUTROPHILS NFR BLD AUTO: 34 % — LOW (ref 43–77)
PLAT MORPH BLD: NORMAL — SIGNIFICANT CHANGE UP
PLATELET # BLD AUTO: 220 K/UL — SIGNIFICANT CHANGE UP (ref 150–400)
RBC # BLD: 3.97 M/UL — LOW (ref 4.2–5.8)
RBC # FLD: 12.8 % — SIGNIFICANT CHANGE UP (ref 10.3–14.5)
RBC BLD AUTO: NORMAL — SIGNIFICANT CHANGE UP
WBC # BLD: 8.6 K/UL — SIGNIFICANT CHANGE UP (ref 3.8–10.5)
WBC # FLD AUTO: 8.6 K/UL — SIGNIFICANT CHANGE UP (ref 3.8–10.5)

## 2018-12-14 DIAGNOSIS — Z51.11 ENCOUNTER FOR ANTINEOPLASTIC CHEMOTHERAPY: ICD-10-CM

## 2018-12-16 NOTE — PROGRESS NOTE ADULT - PROBLEM SELECTOR PLAN 5
Patient : Aravind Hearn Age: 42year old Sex: female   MRN: [id number] Encounter Date: 12/16/2018      History     Chief Complaint   Patient presents with   â¢ Seizures     HPI     21-year-old female presents to the emergency room by ambulance complaining of loss of consciousness. She reports that she was participating in her child's [employer] when she lost consciousness and fell to the ground was noted to be shaking. She didn't strike her head on the ground and has contusion to the right frontal forehead. She does report previous history of stress-induced seizures, does not take any seizure medication. She denies eating breakfast this morning but did drink a glass of wine to take the edge off it she has felt very nervous and has generalized anxiety. She does take fluoxetine for this. She denies any use of blood thinning medications. She does not recall the event, does not have any current confusion. Allergies   Allergen Reactions   â¢ Amoxicillin HIVES   â¢ Bactrim HIVES   â¢ Penicillins HIVES       Current Discharge Medication List      Prior to Admission Medications    Details   Prenatal Vit-Fe Fumarate-FA (PNV PRENATAL PLUS MULTIVITAMIN) 27-1 MG Tab Take 1 tablet by mouth daily. Qty: 90 tablet, Refills: 3      fluoxetine (PROZAC) 40 MG capsule Take 2 capsules by mouth every morning. Qty: 180 capsule, Refills: 0      ibuprofen (MOTRIN) 600 MG tablet Take 1 tablet by mouth every 6 hours as needed for Pain.   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   â¢ Depressive disorder    â¢ H/O miscarriage, currently pregnant 2001    miscarriage x 2    â¢ In vitro fertilization 11/6/13   â¢ Seizures (CMS/[facility])     [place], 2001-stress   â¢ Syncope and collapse 09/21/2016   â¢ Twin pregnancy     Embryo transfer 11/6/13 ( went to Dr Fareed Almonte due to dx of balanced translocation       Past Surgical History:   Procedure Laterality Date   â¢  SECTION, LOW TRANSVERSE     â¢  SECTION, LOW TRANSVERSE  10/17/2018    Repeat  Section    â¢ SALPINGECTOMY Bilateral 10/17/2018    Bilateral Salpingectomy        Family History   Problem Relation Age of Onset   â¢ Hypertension Maternal Grandmother    â¢ Heart disease Maternal Grandmother    â¢ Cancer Maternal Grandfather         LYMPH   â¢ Alcohol Abuse Paternal Grandfather         alcohol abuse   â¢ High blood pressure Father    â¢ Gastrointestinal Father         acid reflex    â¢ Genetic Disorder Son         Down syndrome       Social History     Tobacco Use   â¢ Smoking status: Never Smoker   â¢ Smokeless tobacco: Never Used   Substance Use Topics   â¢ Alcohol use: Yes     Alcohol/week: 0.0 oz     Frequency: Monthly or less     Drinks per session: 1 or 2     Binge frequency: Never   â¢ Drug use: No       Review of Systems   Constitutional: Negative for chills, fatigue and fever. HENT: Negative for congestion, rhinorrhea, sore throat and trouble swallowing. Forehead contusion   Respiratory: Negative for cough and shortness of breath. Gastrointestinal: Negative for abdominal pain, constipation, diarrhea, nausea and vomiting. Genitourinary: Negative for difficulty urinating, dysuria, flank pain, hematuria, menstrual problem, pelvic pain, urgency, vaginal bleeding and vaginal discharge. Musculoskeletal: Negative for arthralgias, back pain, joint swelling, neck pain and neck stiffness. Skin: Negative for color change and rash. Neurological: Negative for dizziness, weakness and headaches. Hematological: Negative for adenopathy. Does not bruise/bleed easily. Psychiatric/Behavioral: Negative for confusion.        Physical Exam     ED Triage Vitals [18 1207]   ED Triage Vitals Group      Temp       Pulse 105      Resp 16      /80      SpO2 100 %      EtCO2 mmHg Height       Weight       Weight Scale Used        Physical Exam   Constitutional: She is oriented to person, place, and time. She appears well-developed and well-nourished. HENT:   Head: Normocephalic and atraumatic. Right Ear: External ear normal.   Left Ear: External ear normal.   Nose: Nose normal.   Mouth/Throat: Oropharynx is clear and moist.   Ecchymosis and edema noted to the right frontal forehead without bony tenderness   Eyes: Conjunctivae and EOM are normal. Pupils are equal, round, and reactive to light. Neck: Normal range of motion. Neck supple. No tracheal deviation present. No thyromegaly present. Cervical spine collar in place, no midline cervical spine tenderness   Cardiovascular: Normal rate, regular rhythm, normal heart sounds and intact distal pulses. Pulmonary/Chest: Effort normal and breath sounds normal. No respiratory distress. She has no wheezes. She has no rales. She exhibits no tenderness. Abdominal: Soft. Bowel sounds are normal. She exhibits no distension and no mass. There is no tenderness. There is no rebound and no guarding. Musculoskeletal: Normal range of motion. Neurological: She is alert and oriented to person, place, and time. Skin: Skin is warm and dry. No rash noted. Psychiatric: She has a normal mood and affect. Her behavior is normal. Thought content normal.   Nursing note and vitals reviewed.       ED Course     Procedures    Lab Results     Results for orders placed or performed during the hospital encounter of 12/16/18   CBC & Auto Differential   Result Value Ref Range    WBC 5.9 4.2 - 11.0 K/mcL    RBC 4.70 4.00 - 5.20 mil/mcL    HGB 13.9 12.0 - 15.5 g/dL    HCT 41.6 36.0 - 46.5 %    MCV 88.5 78.0 - 100.0 fl    MCH 29.6 26.0 - 34.0 pg    MCHC 33.4 32.0 - 36.5 g/dL    RDW-CV 12.7 11.0 - 15.0 %     140 - 450 K/mcL    DIFF TYPE AUTOMATED DIFFERENTIAL     Neutrophil 79 %    LYMPH 12 %    MONO 6 %    EOSIN 2 %    BASO 1 %    Absolute Neutrophil 4.6 1.8 - 7.7 K/mcL    Absolute Lymph 0.7 (L) 1.0 - 4.8 K/mcL    Absolute Mono 0.4 0.3 - 0.9 K/mcL    Absolute Eos 0.1 0.1 - 0.5 K/mcL    Absolute Baso 0.0 0.0 - 0.3 K/mcL   Basic Metabolic Panel   Result Value Ref Range    Sodium 141 135 - 145 mmol/L    Potassium 4.0 3.4 - 5.1 mmol/L    Chloride 106 98 - 107 mmol/L    Carbon Dioxide 24 21 - 32 mmol/L    Anion Gap 15 10 - 20 mmol/L    Glucose 80 65 - 99 mg/dL    BUN 13 6 - 20 mg/dL    Creatinine 0.89 0.51 - 0.95 mg/dL    GFR Estimate,  >90     GFR Estimate, Non African American 83     BUN/Creatinine Ratio 15 7 - 25    CALCIUM 8.4 8.4 - 10.2 mg/dL   Troponin I Ultra Sensitive   Result Value Ref Range    TROPONIN I <0.02 <0.05 ng/mL   Urinalysis & Reflex Micro with Culture if Indicated   Result Value Ref Range    COLOR YELLOW YELLOW    APPEARANCE HAZY     GLUCOSE(URINE) NEGATIVE NEGATIVE mg/dL    BILIRUBIN NEGATIVE NEGATIVE    KETONES 20 (A) NEGATIVE mg/dL    SPECIFIC GRAVITY 1.017 1.005 - 1.030    BLOOD NEGATIVE NEGATIVE    pH 6.0 5.0 - 7.0 Units    PROTEIN(URINE) NEGATIVE NEGATIVE mg/dL    UROBILINOGEN 0.2 0.0 - 1.0 mg/dL    NITRITE NEGATIVE NEGATIVE    LEUKOCYTE ESTERASE NEGATIVE NEGATIVE    SPECIMEN TYPE URINE, CLEAN CATCH/MIDSTREAM    Alcohol Level Serum   Result Value Ref Range    Alcohol Ethyl None detected. None detected. mg/dL       EKG Results     EKG Interpretation    EKG shows normal sinus rhythm at rate of 100 bpm with normal intervals, normal axis, no acute ST or T-wave abnormalities consistent with ischemia or infarction. No significant change when compared to September 25, 2016. EKG interpreted by ED physician    Radiology Results     Imaging Results          CT Cervical Spine (Final result)  Result time 12/16/18 13:11:22    Final result                 Impression:    IMPRESSION:  No acute abnormality of the cervical spine.     The findings were discussed with Dr. Cathy Thomas at 12/16/2018 1:09 PM               Narrative:    Alfonso Zuñiga:  CT CERVICAL SPINE WO CONTRAST    INDICATION:  C-spine trauma    COMPARISON:  None. TECHNIQUE:  CT of the cervical spine performed with sagittal and coronal  reconstructions. FINDINGS:  No fracture or malalignment. Posterior disc osteophyte complex  at C5-C6 producing at least mild spinal canal stenosis. Paraspinal soft  tissues within normal limits. CT Head Brain (Final result)  Result time 12/16/18 13:08:45    Final result                 Impression:    IMPRESSION:      No evidence of acute intracranial abnormality                Narrative:    CT HEAD WO CONTRAST    History: HEAD TRAUMA, CLOSED, MOD-SEVERE, SYNCOPE/FAINTING    Comparison: No comparisons are available. Technique:  Multiple axial images of the brain were obtained. Sagittal and  coronal reformatted images were performed. Findings: There is no evidence for intracranial hemorrhage or extra-axial fluid  collection. No definite evidence of acute ischemia. There is no mass or  mass effect. The ventricular system is midline without evidence for  hydrocephalus or intraventricular hemorrhage. The visualized paranasal sinuses and mastoid air cells are clear. ED Medication Orders (From admission, onward)    Start Ordered     Status Ordering Provider    12/16/18 1335 12/16/18 1334  acetaminophen (TYLENOL) tablet 1,000 mg  ONCE      Last MAR action:  Given SABRINA ROMANO    12/16/18 1211 12/16/18 1210  sodium chloride (PF) 0.9 % injection 2 mL  (Capped IV)  ONCE      Acknowledged SABRINA ROMANO    12/16/18 1209 12/16/18 1210  sodium chloride (PF) 0.9 % injection 2 mL  (Capped IV)  PRN      Acknowledged SABRINA ROMANO               MDM  Number of Diagnoses or Management Options  Diagnosis management comments: 53-year-old female with psychogenic versus vagovagal syncope, psychogenic seizure also consider. Also consider transient hypoglycemia or alcohol intoxication although seems less likely.  We will obtain head CT due to the injury to her head, low suspicion for intracranial tumor or bleeding but with the trauma and history of seizures we'll evaluate for any intracranial lesions. We'll also screen basic labs, urinalysis, provide IV fluids and observation. Patient reevaluated, feels much better, tolerating p.o. No evidence of acute infection, symptoms likely related to vasovagal episode. Discussed forehead contusion, closed head injury, concussion risk factors, doubt seizure. Clinical Impression     ED Diagnosis   1. Vasovagal syncope     2. Contusion of face, initial encounter     3. Closed head injury, initial encounter         Disposition        Discharge 12/16/2018  2:19 PM  Haig Ruffing discharge to home/self care. Medications   sodium chloride (PF) 0.9 % injection 2 mL (not administered)   sodium chloride (PF) 0.9 % injection 2 mL (not administered)   acetaminophen (TYLENOL) tablet 1,000 mg (1,000 mg Oral Given 12/16/18 2335)           Follow up:  Toni Deluca MD  [address] [place] [address],[address]  [phone]            Patient was instructed to return to the ED immediately if symptoms worsen or any new unusual symptoms arise. New Prescriptions:     Summary of your Discharge Medications      You have not been prescribed any medications. The patient understands that this is a provisional diagnosis. Provisional diagnosis can and do change. The diagnosis that you are discharged with today is based on the symptoms with which you presented today. If any new symptoms occur or worsen, you should seek immediate attention for re-evaluation.        Karl Short DO  12/16/18 7755 - In the ED, pt. met the SIRS criteria with T 102.4, , /73, RR 18, SpO2 100%. Received empiric Vanc 1g x 1 and Zosyn 3.375g x 1.  - CXR negative for PNA.  - RVP and UA negative.  - Blood clx and Urine clx both negative.  - Hold off on abx right now given no localizing source.

## 2018-12-26 ENCOUNTER — APPOINTMENT (OUTPATIENT)
Dept: OPHTHALMOLOGY | Facility: CLINIC | Age: 63
End: 2018-12-26
Payer: MEDICARE

## 2018-12-26 PROCEDURE — 92012 INTRM OPH EXAM EST PATIENT: CPT

## 2018-12-28 ENCOUNTER — RESULT REVIEW (OUTPATIENT)
Age: 63
End: 2018-12-28

## 2018-12-28 ENCOUNTER — LABORATORY RESULT (OUTPATIENT)
Age: 63
End: 2018-12-28

## 2018-12-28 ENCOUNTER — APPOINTMENT (OUTPATIENT)
Dept: HEMATOLOGY ONCOLOGY | Facility: CLINIC | Age: 63
End: 2018-12-28
Payer: MEDICARE

## 2018-12-28 ENCOUNTER — APPOINTMENT (OUTPATIENT)
Age: 63
End: 2018-12-28

## 2018-12-28 VITALS
DIASTOLIC BLOOD PRESSURE: 71 MMHG | OXYGEN SATURATION: 96 % | RESPIRATION RATE: 16 BRPM | BODY MASS INDEX: 28.02 KG/M2 | TEMPERATURE: 97.6 F | WEIGHT: 184.28 LBS | SYSTOLIC BLOOD PRESSURE: 102 MMHG | HEART RATE: 104 BPM

## 2018-12-28 DIAGNOSIS — Z86.39 PERSONAL HISTORY OF OTHER ENDOCRINE, NUTRITIONAL AND METABOLIC DISEASE: ICD-10-CM

## 2018-12-28 DIAGNOSIS — G89.3 NEOPLASM RELATED PAIN (ACUTE) (CHRONIC): ICD-10-CM

## 2018-12-28 DIAGNOSIS — Z87.898 PERSONAL HISTORY OF OTHER SPECIFIED CONDITIONS: ICD-10-CM

## 2018-12-28 PROBLEM — B02.9 HERPES ZOSTER: Status: ACTIVE | Noted: 2018-11-23

## 2018-12-28 LAB
ACTH SER-ACNC: <5 PG/ML
ALBUMIN SERPL ELPH-MCNC: 4.1 G/DL
ALP BLD-CCNC: 55 U/L
ALT SERPL-CCNC: 10 U/L
ANION GAP SERPL CALC-SCNC: 12 MMOL/L
AST SERPL-CCNC: 22 U/L
BASOPHILS # BLD AUTO: 0.1 K/UL — SIGNIFICANT CHANGE UP (ref 0–0.2)
BASOPHILS NFR BLD AUTO: 1.1 % — SIGNIFICANT CHANGE UP (ref 0–2)
BILIRUB SERPL-MCNC: 0.6 MG/DL
BUN SERPL-MCNC: 14 MG/DL
CALCIUM SERPL-MCNC: 10.2 MG/DL
CHLORIDE SERPL-SCNC: 104 MMOL/L
CO2 SERPL-SCNC: 21 MMOL/L
CREAT SERPL-MCNC: 0.96 MG/DL
CRP SERPL-MCNC: 0.56 MG/DL
EOSINOPHIL # BLD AUTO: 0.9 K/UL — HIGH (ref 0–0.5)
EOSINOPHIL NFR BLD AUTO: 15.3 % — HIGH (ref 0–6)
ERYTHROCYTE [SEDIMENTATION RATE] IN BLOOD: 12 MM/HR — SIGNIFICANT CHANGE UP (ref 0–20)
GLUCOSE SERPL-MCNC: 103 MG/DL
HCT VFR BLD CALC: 39 % — SIGNIFICANT CHANGE UP (ref 39–50)
HGB BLD-MCNC: 13.5 G/DL — SIGNIFICANT CHANGE UP (ref 13–17)
LYMPHOCYTES # BLD AUTO: 2.3 K/UL — SIGNIFICANT CHANGE UP (ref 1–3.3)
LYMPHOCYTES # BLD AUTO: 36.7 % — SIGNIFICANT CHANGE UP (ref 13–44)
MCHC RBC-ENTMCNC: 30.6 PG — SIGNIFICANT CHANGE UP (ref 27–34)
MCHC RBC-ENTMCNC: 34.5 G/DL — SIGNIFICANT CHANGE UP (ref 32–36)
MCV RBC AUTO: 88.6 FL — SIGNIFICANT CHANGE UP (ref 80–100)
MONOCYTES # BLD AUTO: 0.6 K/UL — SIGNIFICANT CHANGE UP (ref 0–0.9)
MONOCYTES NFR BLD AUTO: 9.6 % — SIGNIFICANT CHANGE UP (ref 2–14)
NEUTROPHILS # BLD AUTO: 2.3 K/UL — SIGNIFICANT CHANGE UP (ref 1.8–7.4)
NEUTROPHILS NFR BLD AUTO: 37.2 % — LOW (ref 43–77)
PLATELET # BLD AUTO: 194 K/UL — SIGNIFICANT CHANGE UP (ref 150–400)
POTASSIUM SERPL-SCNC: 4.4 MMOL/L
PROT SERPL-MCNC: 6.6 G/DL
RBC # BLD: 4.4 M/UL — SIGNIFICANT CHANGE UP (ref 4.2–5.8)
RBC # FLD: 12.5 % — SIGNIFICANT CHANGE UP (ref 10.3–14.5)
SODIUM SERPL-SCNC: 137 MMOL/L
TSH SERPL-ACNC: 3.84 UIU/ML
WBC # BLD: 6.1 K/UL — SIGNIFICANT CHANGE UP (ref 3.8–10.5)
WBC # FLD AUTO: 6.1 K/UL — SIGNIFICANT CHANGE UP (ref 3.8–10.5)

## 2018-12-28 PROCEDURE — 99215 OFFICE O/P EST HI 40 MIN: CPT

## 2018-12-28 RX ORDER — METHADONE HYDROCHLORIDE 5 MG/1
5 TABLET ORAL TWICE DAILY
Qty: 60 | Refills: 0 | Status: DISCONTINUED | COMMUNITY
Start: 2017-12-29 | End: 2018-12-28

## 2018-12-28 NOTE — REASON FOR VISIT
[Follow-Up Visit] : a follow-up [Spouse] : spouse [FreeTextEntry2] : Locally advanced pancreatic cancer

## 2018-12-28 NOTE — REVIEW OF SYSTEMS
[Fatigue] : fatigue [Recent Change In Weight] : ~T recent weight change [SOB on Exertion] : shortness of breath during exertion [Diarrhea] : diarrhea [Joint Pain] : joint pain [Joint Stiffness] : joint stiffness [Negative] : Allergic/Immunologic [Wheezing] : no wheezing [Cough] : no cough [FreeTextEntry4] : dry mouth

## 2018-12-28 NOTE — HISTORY OF PRESENT ILLNESS
[Disease: _____________________] : Disease: [unfilled] [T: ___] : T[unfilled] [N: ___] : N[unfilled] [AJCC Stage: ____] : AJCC Stage: [unfilled] [Therapy: ___] : Therapy: [unfilled] [de-identified] : 62 M with no significant past medical history, developed progressive abdominal pain in March 2017. In May 2017 he went to an urgent care center and was referred for a CT A/P w/co. This was performed on 5/8/17 and demonstrated a circumferential 6.7 cm mass at the splenic flexure of the colon causing "apple core" luminal narrowing of the colon at that level c/w malignancy. A 2.7 x 2.2 x 2.5 cm ill defined LN below the pancreatic body at the level of the splenic vein is most consistent with a metastasis. On May 9, 2017, Haja saw Dr. Morgan Armenta, colorectal surgery who performed a colonoscopy on 5/15/17. Biopsy c/w invasive moderately differentiated colonic adenocarcinoma a/w necrosis. A PET/CT was performed on 5/30/17 which showed splenic flexure colonic hypermetabolic mass c/w primary carcinoma (SUV 35) and 2.5 cm mesenteric LN hypermetabolic focus c/w regional metastatic disease (SUV 5). \par \par On 6/13/17 patient underwent a open left colectomy, omentectomy and mobilization of the splenic flexure. Final pathology T3NO moderately differentiated carcinoma, loss of nuclear expression of MLH1 and PMS2. No adjuvant therapy was given. Post op developed SOB, hypoxia transferred to the SICU, placed on bipap. A CTA chest/abd/pelvis 6/15/17 c/w Nonspecific patchy opacity in RLL was noted, 3.4 cm collection or cystic lesion in the pancreatic body for which follow up imaging was suggested. \par \par Post op, Haja continued to complain of abdominal pain and an MRI A/P was performed on 7/25/17 which showed a 4.6 cm hyperintense structure inseparable from the distal pancreatic body. The lesion is located in the peripancreatic retroperitoneal space with imaging characteristics suggestive of pancreatic pseudocyst. He was evaluated by surg onc, Dr. Hager and referred for a CT pancreatic protocol which was performed on 9/5 and showed a peripherally enhancing mass in the body of the pancreas measures 4.6 x 3.5 cm. Subsequently had an EUS with FNA on 10/6/17 (GI- Dr. Luz) which confirmed moderately differentiated adenocarcinoma.\par \par On 11/9/17 underwent open ex lap for possible resection of pancreatic adenoca, however, SMA was encased by tumor and therefore could not be resected. Liver bx was performed and negative for malignancy, however, noted to have steatosis with mild steatohepatitis, port inflammation, focal periportal and pericellular fibrosis,2 + iron deposition. \par \par Patient was subsequently referred to rad onc and med onc for further evaluation. \par \par 8 cycles of mFOLFIRINOX 12/5/17 (25% dose reduction due to PS) - 3/13/18 with stable/mild improvement in disease \par \par 3/27/18-5/29/18 : C1-C4 Nivo 3mg/kg (240 mg flat dose)/Ipi 1mg/kg \par 5/31/18: CTCAP: decr in size of pancreatic mass, previously 3.6 to 2.5, new mediastinal LN\par 6/12/18: EBUS with bx of LN: reactive \par 6/26/18-: Nivo single agent Q 2 weeks \par 11/5 - 11/19: hospitalized at [facility] with herpes zoster involving the R periorbital area, no ocular involvement. opiods stopped abruptly s/p withdrawal characterized by diarrhea/n/v. Developed NICOLE (2/2 acyclovir?) with resolution. \par 11/30/18: restarted Nivolumab Q 2 weeks\par \par \par  [de-identified] : moderately differentiated adenocarcinoma [de-identified] : 11/24/17: CA 19.9 - 81.3  CEA 29 [de-identified] : June 2017 - colon resection of tumor T3N0 (0/15 LN) moderately differentiated, loss of nuclear expression of MLH1, PMS2. \par    - IHC:  CK19, and variably positive for CDX2, CK7 and CK20, \par   + BRAF\par \par Nov 2017- attempted pancreatic resection:  CK7, CK20 and CDX2 are positive; CK20 is negative\par     - loss of nuclear expression of MLH1, PMS2.\par \par [facility] - failed report  [FreeTextEntry1] : last dose 12/14 [de-identified] : Haja presents for f/u accompanied by wife. He has a myriad of complaints today. \par  \par - c/o diffuse arthralgias, muscle weakness, cannot flex fingers into fist b/l , cannot lift arms over head. Symptoms have worsened over the past week\par - poor appetite with n/v - lost 10 lbs since last visit. Eating <500 calories per day Vomiting intermittently throughout the day. Moving bowels, diarrhea x 1 daily x 4 days. Does not have nausea  meds at home.\par - watery BMs once per day. Denies any abdominal pain. \par - wife noticed slurred speech at times, pt reports its related to dry mouth. Speech seems to improve after pt drinks water.  \par - Cont to taper Methadone as per Dr. Huffman. Is now on Methadone 2.5 mg BID for the past month. \par - generalized weakness: is getting PT which he says is helping \par - Confusion: wife says this has improved \par - HAILE: has been experiencing this since hospital d/c. No cough, fevers. No SOB at rest. Denies any CP. \par

## 2018-12-28 NOTE — PHYSICAL EXAM
[Restricted in physically strenuous activity but ambulatory and able to carry out work of a light or sedentary nature] : Status 1- Restricted in physically strenuous activity but ambulatory and able to carry out work of a light or sedentary nature, e.g., light house work, office work [Normal] : affect appropriate [de-identified] : no zoster lesions noted over R eye

## 2018-12-28 NOTE — HISTORY OF PRESENT ILLNESS
[Disease: _____________________] : Disease: [unfilled] [T: ___] : T[unfilled] [N: ___] : N[unfilled] [AJCC Stage: ____] : AJCC Stage: [unfilled] [Therapy: ___] : Therapy: [unfilled] [de-identified] : 62 M with no significant past medical history, developed progressive abdominal pain in March 2017. In May 2017 he went to an urgent care center and was referred for a CT A/P w/co. This was performed on 5/8/17 and demonstrated a circumferential 6.7 cm mass at the splenic flexure of the colon causing "apple core" luminal narrowing of the colon at that level c/w malignancy. A 2.7 x 2.2 x 2.5 cm ill defined LN below the pancreatic body at the level of the splenic vein is most consistent with a metastasis. On May 9, 2017, Haja saw Dr. Morgan Armenta, colorectal surgery who performed a colonoscopy on 5/15/17. Biopsy c/w invasive moderately differentiated colonic adenocarcinoma a/w necrosis. A PET/CT was performed on 5/30/17 which showed splenic flexure colonic hypermetabolic mass c/w primary carcinoma (SUV 35) and 2.5 cm mesenteric LN hypermetabolic focus c/w regional metastatic disease (SUV 5). \par \par On 6/13/17 patient underwent a open left colectomy, omentectomy and mobilization of the splenic flexure. Final pathology T3NO moderately differentiated carcinoma, loss of nuclear expression of MLH1 and PMS2. No adjuvant therapy was given. Post op developed SOB, hypoxia transferred to the SICU, placed on bipap. A CTA chest/abd/pelvis 6/15/17 c/w Nonspecific patchy opacity in RLL was noted, 3.4 cm collection or cystic lesion in the pancreatic body for which follow up imaging was suggested. \par \par Post op, Haja continued to complain of abdominal pain and an MRI A/P was performed on 7/25/17 which showed a 4.6 cm hyperintense structure inseparable from the distal pancreatic body. The lesion is located in the peripancreatic retroperitoneal space with imaging characteristics suggestive of pancreatic pseudocyst. He was evaluated by surg onc, Dr. Hager and referred for a CT pancreatic protocol which was performed on 9/5 and showed a peripherally enhancing mass in the body of the pancreas measures 4.6 x 3.5 cm. Subsequently had an EUS with FNA on 10/6/17 (GI- Dr. Luz) which confirmed moderately differentiated adenocarcinoma.\par \par On 11/9/17 underwent open ex lap for possible resection of pancreatic adenoca, however, SMA was encased by tumor and therefore could not be resected. Liver bx was performed and negative for malignancy, however, noted to have steatosis with mild steatohepatitis, port inflammation, focal periportal and pericellular fibrosis,2 + iron deposition. \par \par Patient was subsequently referred to rad onc and med onc for further evaluation. \par \par 8 cycles of mFOLFIRINOX 12/5/17 (25% dose reduction due to PS) - 3/13/18 with stable/mild improvement in disease \par \par 3/27/18-5/29/18 : C1-C4 Nivo 3mg/kg (240 mg flat dose)/Ipi 1mg/kg \par 5/31/18: CTCAP: decr in size of pancreatic mass, previously 3.6 to 2.5, new mediastinal LN\par 6/12/18: EBUS with bx of LN: reactive \par 6/26/18-: Nivo single agent Q 2 weeks \par 11/5 - 11/19: hospitalized at [facility] with herpes zoster involving the R periorbital area, no ocular involvement. opiods stopped abruptly s/p withdrawal characterized by diarrhea/n/v. Developed NICOLE (2/2 acyclovir?) with resolution. \par 11/30/18: restarted Nivolumab \par \par \par  [de-identified] : moderately differentiated adenocarcinoma [de-identified] : 11/24/17: CA 19.9 - 81.3  CEA 29 [de-identified] : June 2017 - colon resection of tumor T3N0 (0/15 LN) moderately differentiated, loss of nuclear expression of MLH1, PMS2. \par    - IHC:  CK19, and variably positive for CDX2, CK7 and CK20, \par   + BRAF\par \par Nov 2017- attempted pancreatic resection:  CK7, CK20 and CDX2 are positive; CK20 is negative\par     - loss of nuclear expression of MLH1, PMS2.\par \par [facility] - failed report  [de-identified] : Haja presents for f/u accompanied by wife. Lost 5 lbs since last visit due to poor appetite. Reports mild arthralgias involving elbow and hands. Cont to be able to perform ADLs. \par c/o generalized weakness. Does not have PT set up. \par Wife notes still some intermittent confusion. \par Still some discomfort by the R eye in the area of recent zoster infection \par Pt denies any abdominal pain. Normal BMs. His methadone is being titrated as per Dr. Huffman. \par

## 2018-12-28 NOTE — REVIEW OF SYSTEMS
[Fatigue] : fatigue [Recent Change In Weight] : ~T recent weight change [Joint Pain] : joint pain [Joint Stiffness] : joint stiffness [Confused] : confusion [Negative] : Allergic/Immunologic

## 2018-12-28 NOTE — PHYSICAL EXAM
[Restricted in physically strenuous activity but ambulatory and able to carry out work of a light or sedentary nature] : Status 1- Restricted in physically strenuous activity but ambulatory and able to carry out work of a light or sedentary nature, e.g., light house work, office work [Normal] : affect appropriate [de-identified] : restricted ROM of b/l shoulders, elbows, knees and fingers b/l. Mild MP and DP joint swelling noted

## 2019-01-02 ENCOUNTER — FORM ENCOUNTER (OUTPATIENT)
Age: 64
End: 2019-01-02

## 2019-01-03 ENCOUNTER — APPOINTMENT (OUTPATIENT)
Dept: CT IMAGING | Facility: IMAGING CENTER | Age: 64
End: 2019-01-03
Payer: MEDICARE

## 2019-01-03 ENCOUNTER — OUTPATIENT (OUTPATIENT)
Dept: OUTPATIENT SERVICES | Facility: HOSPITAL | Age: 64
LOS: 1 days | End: 2019-01-03
Payer: MEDICARE

## 2019-01-03 DIAGNOSIS — K25.5 CHRONIC OR UNSPECIFIED GASTRIC ULCER WITH PERFORATION: Chronic | ICD-10-CM

## 2019-01-03 DIAGNOSIS — Z90.49 ACQUIRED ABSENCE OF OTHER SPECIFIED PARTS OF DIGESTIVE TRACT: Chronic | ICD-10-CM

## 2019-01-03 DIAGNOSIS — Z98.890 OTHER SPECIFIED POSTPROCEDURAL STATES: Chronic | ICD-10-CM

## 2019-01-03 DIAGNOSIS — C25.9 MALIGNANT NEOPLASM OF PANCREAS, UNSPECIFIED: ICD-10-CM

## 2019-01-03 PROCEDURE — 74177 CT ABD & PELVIS W/CONTRAST: CPT | Mod: 26

## 2019-01-03 PROCEDURE — 71260 CT THORAX DX C+: CPT

## 2019-01-03 PROCEDURE — 74177 CT ABD & PELVIS W/CONTRAST: CPT

## 2019-01-03 PROCEDURE — 71260 CT THORAX DX C+: CPT | Mod: 26

## 2019-01-03 PROCEDURE — 70460 CT HEAD/BRAIN W/DYE: CPT

## 2019-01-03 PROCEDURE — 70460 CT HEAD/BRAIN W/DYE: CPT | Mod: 26

## 2019-01-05 LAB
CORTICOSTEROID BIND GLOBULIN: 2.5 MG/DL
CORTIS SERPL-MCNC: <1 UG/DL
CORTISOL, FREE: <0.02 UG/DL
PFCX: <2.2 %

## 2019-01-08 ENCOUNTER — OUTPATIENT (OUTPATIENT)
Dept: OUTPATIENT SERVICES | Facility: HOSPITAL | Age: 64
LOS: 1 days | Discharge: HOME | End: 2019-01-08

## 2019-01-08 DIAGNOSIS — Z90.49 ACQUIRED ABSENCE OF OTHER SPECIFIED PARTS OF DIGESTIVE TRACT: Chronic | ICD-10-CM

## 2019-01-08 DIAGNOSIS — Z98.890 OTHER SPECIFIED POSTPROCEDURAL STATES: Chronic | ICD-10-CM

## 2019-01-08 DIAGNOSIS — K25.5 CHRONIC OR UNSPECIFIED GASTRIC ULCER WITH PERFORATION: Chronic | ICD-10-CM

## 2019-01-08 DIAGNOSIS — C25.9 MALIGNANT NEOPLASM OF PANCREAS, UNSPECIFIED: ICD-10-CM

## 2019-01-11 ENCOUNTER — APPOINTMENT (OUTPATIENT)
Dept: HEMATOLOGY ONCOLOGY | Facility: CLINIC | Age: 64
End: 2019-01-11
Payer: MEDICARE

## 2019-01-11 VITALS
DIASTOLIC BLOOD PRESSURE: 70 MMHG | BODY MASS INDEX: 29 KG/M2 | SYSTOLIC BLOOD PRESSURE: 110 MMHG | RESPIRATION RATE: 16 BRPM | HEART RATE: 90 BPM | WEIGHT: 190.7 LBS | OXYGEN SATURATION: 99 % | TEMPERATURE: 98 F

## 2019-01-11 DIAGNOSIS — Z87.898 PERSONAL HISTORY OF OTHER SPECIFIED CONDITIONS: ICD-10-CM

## 2019-01-11 DIAGNOSIS — R63.0 ANOREXIA: ICD-10-CM

## 2019-01-11 DIAGNOSIS — F80.89 OTHER DEVELOPMENTAL DISORDERS OF SPEECH AND LANGUAGE: ICD-10-CM

## 2019-01-11 DIAGNOSIS — B02.29 OTHER POSTHERPETIC NERVOUS SYSTEM INVOLVEMENT: ICD-10-CM

## 2019-01-11 PROCEDURE — 99214 OFFICE O/P EST MOD 30 MIN: CPT

## 2019-01-27 PROBLEM — Z87.898 HISTORY OF WEAKNESS: Status: RESOLVED | Noted: 2018-12-07 | Resolved: 2019-01-27

## 2019-01-27 PROBLEM — F80.89 DEFICIT IN COMMUNICATION DUE TO SLURRED SPEECH: Status: RESOLVED | Noted: 2018-12-28 | Resolved: 2019-01-27

## 2019-01-27 PROBLEM — Z87.898 HISTORY OF NAUSEA AND VOMITING: Status: RESOLVED | Noted: 2018-12-28 | Resolved: 2019-01-27

## 2019-01-27 PROBLEM — R63.0 POOR APPETITE: Status: RESOLVED | Noted: 2018-02-02 | Resolved: 2019-01-27

## 2019-01-27 PROBLEM — B02.29 POST HERPETIC NEURALGIA: Status: ACTIVE | Noted: 2018-11-23

## 2019-01-27 RX ORDER — GABAPENTIN 100 MG/1
100 CAPSULE ORAL
Qty: 60 | Refills: 0 | Status: DISCONTINUED | COMMUNITY
Start: 2018-08-21 | End: 2019-01-27

## 2019-01-27 NOTE — HISTORY OF PRESENT ILLNESS
[Disease: _____________________] : Disease: [unfilled] [T: ___] : T[unfilled] [N: ___] : N[unfilled] [AJCC Stage: ____] : AJCC Stage: [unfilled] [Therapy: ___] : Therapy: [unfilled] [de-identified] : 62 M with no significant past medical history, developed progressive abdominal pain in March 2017. In May 2017 he went to an urgent care center and was referred for a CT A/P w/co. This was performed on 5/8/17 and demonstrated a circumferential 6.7 cm mass at the splenic flexure of the colon causing "apple core" luminal narrowing of the colon at that level c/w malignancy. A 2.7 x 2.2 x 2.5 cm ill defined LN below the pancreatic body at the level of the splenic vein is most consistent with a metastasis. On May 9, 2017, Haja saw Dr. Morgan Armenta, colorectal surgery who performed a colonoscopy on 5/15/17. Biopsy c/w invasive moderately differentiated colonic adenocarcinoma a/w necrosis. A PET/CT was performed on 5/30/17 which showed splenic flexure colonic hypermetabolic mass c/w primary carcinoma (SUV 35) and 2.5 cm mesenteric LN hypermetabolic focus c/w regional metastatic disease (SUV 5). \par \par On 6/13/17 patient underwent a open left colectomy, omentectomy and mobilization of the splenic flexure. Final pathology T3NO moderately differentiated carcinoma, loss of nuclear expression of MLH1 and PMS2. No adjuvant therapy was given. Post op developed SOB, hypoxia transferred to the SICU, placed on bipap. A CTA chest/abd/pelvis 6/15/17 c/w Nonspecific patchy opacity in RLL was noted, 3.4 cm collection or cystic lesion in the pancreatic body for which follow up imaging was suggested. \par \par Post op, Haja continued to complain of abdominal pain and an MRI A/P was performed on 7/25/17 which showed a 4.6 cm hyperintense structure inseparable from the distal pancreatic body. The lesion is located in the peripancreatic retroperitoneal space with imaging characteristics suggestive of pancreatic pseudocyst. He was evaluated by surg onc, Dr. Hager and referred for a CT pancreatic protocol which was performed on 9/5 and showed a peripherally enhancing mass in the body of the pancreas measures 4.6 x 3.5 cm. Subsequently had an EUS with FNA on 10/6/17 (GI- Dr. Luz) which confirmed moderately differentiated adenocarcinoma.\par \par On 11/9/17 underwent open ex lap for possible resection of pancreatic adenoca, however, SMA was encased by tumor and therefore could not be resected. Liver bx was performed and negative for malignancy, however, noted to have steatosis with mild steatohepatitis, port inflammation, focal periportal and pericellular fibrosis,2 + iron deposition. \par \par Patient was subsequently referred to rad onc and med onc for further evaluation. \par \par 8 cycles of mFOLFIRINOX 12/5/17 (25% dose reduction due to PS) - 3/13/18 with stable/mild improvement in disease \par \par 3/27/18-5/29/18 : C1-C4 Nivo 3mg/kg (240 mg flat dose)/Ipi 1mg/kg \par 5/31/18: CTCAP: decr in size of pancreatic mass, previously 3.6 to 2.5, new mediastinal LN\par 6/12/18: EBUS with bx of LN: reactive \par 6/26/18-: Nivo single agent Q 2 weeks \par 11/5 - 11/19: hospitalized at [facility] with herpes zoster involving the R periorbital area, no ocular involvement. opiods stopped abruptly s/p withdrawal characterized by diarrhea/n/v. Developed NICOLE (2/2 acyclovir?) with resolution. \par 11/30/18: restarted Nivolumab Q 2 weeks\par 12/13/18: last dose of Nivolumab. \par 12/28/18: Nivolumab held due to Grade 2-3 arthralgias/myalgias. Started on Prednisone 40 mg BID taper over 4 weeks.  \par \par \par  [de-identified] : moderately differentiated adenocarcinoma [de-identified] : 11/24/17: CA 19.9 - 81.3  CEA 29 [de-identified] : June 2017 - colon resection of tumor T3N0 (0/15 LN) moderately differentiated, loss of nuclear expression of MLH1, PMS2. \par    - IHC:  CK19, and variably positive for CDX2, CK7 and CK20, \par   + BRAF\par \par Nov 2017- attempted pancreatic resection:  CK7, CK20 and CDX2 are positive; CK20 is negative\par     - loss of nuclear expression of MLH1, PMS2.\par \par [facility] - failed report  [FreeTextEntry1] : last dose 12/14 on hold due to arthralgias  [de-identified] : Haja presents for f/u. He is now on Prednisone 40 mg in am, 20 mg in pm. His arthralgias have significantly improved but he still has some stiffness in his R hand, opening and closing , stiffness in the MP and DP joints. More mobility. He has gained 8 lbs since last visit due to improved appetite. Denies any abdominal pain. No change in BMs. \par Having difficulty sleeping at night. In review of his meds, he is not taking Gabapentin at night like prescribed. \par He is still having some discomfort by the R eye at the site of resolved zoster.

## 2019-01-27 NOTE — END OF VISIT
[>50% of Time Spent on Counseling and Coordination of Care for  ___] : Greater than 50% of the encounter time was spent on counseling and coordination of care for [unfilled]
standard cane

## 2019-01-27 NOTE — PHYSICAL EXAM
[Restricted in physically strenuous activity but ambulatory and able to carry out work of a light or sedentary nature] : Status 1- Restricted in physically strenuous activity but ambulatory and able to carry out work of a light or sedentary nature, e.g., light house work, office work [Normal] : affect appropriate [de-identified] : stiffness and swelling of joints of R hand

## 2019-01-27 NOTE — REVIEW OF SYSTEMS
[Joint Pain] : joint pain [Joint Stiffness] : joint stiffness [Insomnia] : insomnia [Negative] : Allergic/Immunologic

## 2019-01-27 NOTE — REASON FOR VISIT
[Follow-Up Visit] : a follow-up [Spouse] : spouse [FreeTextEntry2] : Locally advanced MSI[facility] pancreatic ca

## 2019-01-28 ENCOUNTER — OTHER (OUTPATIENT)
Age: 64
End: 2019-01-28

## 2019-01-28 ENCOUNTER — APPOINTMENT (OUTPATIENT)
Dept: ENDOCRINOLOGY | Facility: CLINIC | Age: 64
End: 2019-01-28
Payer: MEDICARE

## 2019-01-28 VITALS
HEART RATE: 88 BPM | WEIGHT: 195 LBS | OXYGEN SATURATION: 99 % | SYSTOLIC BLOOD PRESSURE: 128 MMHG | DIASTOLIC BLOOD PRESSURE: 80 MMHG | HEIGHT: 68 IN | BODY MASS INDEX: 29.55 KG/M2

## 2019-01-28 VITALS
HEIGHT: 68 IN | BODY MASS INDEX: 29.55 KG/M2 | SYSTOLIC BLOOD PRESSURE: 128 MMHG | DIASTOLIC BLOOD PRESSURE: 80 MMHG | HEART RATE: 88 BPM | WEIGHT: 195 LBS

## 2019-01-28 PROCEDURE — 99213 OFFICE O/P EST LOW 20 MIN: CPT

## 2019-01-28 NOTE — ASSESSMENT
[FreeTextEntry1] : 63 year old man with pancreatic cancer  who developed thyroiditis after treatment with immunotherapy, \par   - He is clinically and biochemically euthyroid, continue to monitor\par \par Low cortisol and acth on last testing likely due to the fact that he was on steroids at that time.  Repeat AM cortisol and acth after prednisone taper is complete\par \par f/u 3 months\par

## 2019-01-28 NOTE — HISTORY OF PRESENT ILLNESS
[FreeTextEntry1] : cc: abnormal thyroid tests\par \par 63 year old man with Pancreatic cancer, currently treated with immunotherapy ( previously treated with chemotherapy).  After  4 cycles of ipilimumab/nivolumab, blood tests showed hyperthyroidism. He had no symptoms and continued immunotherapy. Subsequent TSH levels were elevated and most recently his TFTs returned to normal.  He has been on Nivo every two weeks, currently on hold due to arthralgia \par Currently taking prednisone with improvement in arthralgia\par \par  He reports no cold intolerance, constipation, depression, has gained some weight. \par \par

## 2019-01-28 NOTE — PHYSICAL EXAM
[Alert] : alert [No Acute Distress] : no acute distress [Well Nourished] : well nourished [Well Developed] : well developed [Normal Sclera/Conjunctiva] : normal sclera/conjunctiva [No Proptosis] : no proptosis [Thyroid Not Enlarged] : the thyroid was not enlarged [No Respiratory Distress] : no respiratory distress [Clear to Auscultation] : lungs were clear to auscultation bilaterally [Normal S1, S2] : normal S1 and S2 [Regular Rhythm] : with a regular rhythm [No Edema] : there was no peripheral edema [Normal Bowel Sounds] : normal bowel sounds [Not Tender] : non-tender [Soft] : abdomen soft [No Spinal Tenderness] : no spinal tenderness [Normal Reflexes] : deep tendon reflexes were 2+ and symmetric [No Tremors] : no tremors [Normal Affect] : the affect was normal [Normal Mood] : the mood was normal

## 2019-01-29 ENCOUNTER — APPOINTMENT (OUTPATIENT)
Dept: HEMATOLOGY ONCOLOGY | Facility: CLINIC | Age: 64
End: 2019-01-29
Payer: MEDICARE

## 2019-01-29 VITALS
BODY MASS INDEX: 30.4 KG/M2 | HEART RATE: 94 BPM | OXYGEN SATURATION: 100 % | RESPIRATION RATE: 16 BRPM | DIASTOLIC BLOOD PRESSURE: 87 MMHG | TEMPERATURE: 98.9 F | WEIGHT: 199.96 LBS | SYSTOLIC BLOOD PRESSURE: 112 MMHG

## 2019-01-29 PROCEDURE — 99214 OFFICE O/P EST MOD 30 MIN: CPT

## 2019-01-29 RX ORDER — METHADONE HYDROCHLORIDE 5 MG/1
5 TABLET ORAL
Refills: 0 | Status: DISCONTINUED | COMMUNITY
Start: 2018-12-28 | End: 2019-01-29

## 2019-01-31 ENCOUNTER — APPOINTMENT (OUTPATIENT)
Dept: GERIATRICS | Facility: CLINIC | Age: 64
End: 2019-01-31
Payer: MEDICARE

## 2019-01-31 ENCOUNTER — OUTPATIENT (OUTPATIENT)
Dept: OUTPATIENT SERVICES | Facility: HOSPITAL | Age: 64
LOS: 1 days | Discharge: HOME | End: 2019-01-31

## 2019-01-31 VITALS
SYSTOLIC BLOOD PRESSURE: 154 MMHG | TEMPERATURE: 97.8 F | RESPIRATION RATE: 16 BRPM | OXYGEN SATURATION: 99 % | WEIGHT: 205.91 LBS | BODY MASS INDEX: 31.31 KG/M2 | DIASTOLIC BLOOD PRESSURE: 85 MMHG | HEART RATE: 67 BPM

## 2019-01-31 DIAGNOSIS — C25.9 MALIGNANT NEOPLASM OF PANCREAS, UNSPECIFIED: ICD-10-CM

## 2019-01-31 DIAGNOSIS — Z90.49 ACQUIRED ABSENCE OF OTHER SPECIFIED PARTS OF DIGESTIVE TRACT: Chronic | ICD-10-CM

## 2019-01-31 DIAGNOSIS — Z98.890 OTHER SPECIFIED POSTPROCEDURAL STATES: Chronic | ICD-10-CM

## 2019-01-31 DIAGNOSIS — K25.5 CHRONIC OR UNSPECIFIED GASTRIC ULCER WITH PERFORATION: Chronic | ICD-10-CM

## 2019-01-31 PROCEDURE — 99214 OFFICE O/P EST MOD 30 MIN: CPT

## 2019-01-31 NOTE — ASSESSMENT
[FreeTextEntry1] : 63yoM with:\par \par 1. Pancreatic Cancer, metastatic - s/p 8 cycles of mFOLFIRINOX (with Neulasta),  s/p 4 cycles of Nivolumab/Ipilimumab, on break from Q2 weekly Nivolumab 2/2 immunotherapy-induced arthralgia. \par Recent scan showed good response to treatment. \par \par 2. Anxiety - C/w Sertraline 50mg. \par \par 3. Insomnia - May be 2/2 steroid use +/- underlying anxiety. Will do a trial of 0.5mg Lorazepam QHS. \par \par 4. Chronic Neck pain - C/w voltaren gel, Gabapentin. \par \par 5. Encounter for Palliative Care - HCP is daughter, Muriel Amezquita.  Emotional support provided. \par \par RTO 2 months

## 2019-01-31 NOTE — PHYSICAL EXAM
[General Appearance - Alert] : alert [General Appearance - In No Acute Distress] : in no acute distress [Sclera] : the sclera and conjunctiva were normal [Normal Oral Mucosa] : normal oral mucosa [Neck Appearance] : the appearance of the neck was normal [Auscultation Breath Sounds / Voice Sounds] : lungs were clear to auscultation bilaterally [Heart Rate And Rhythm] : heart rate was normal and rhythm regular [Heart Sounds] : normal S1 and S2 [Edema] : there was no peripheral edema [Bowel Sounds] : normal bowel sounds [Abdomen Soft] : soft [Abdomen Tenderness] : non-tender [Abnormal Walk] : normal gait [Skin Color & Pigmentation] : normal skin color and pigmentation [No Focal Deficits] : no focal deficits [Oriented To Time, Place, And Person] : oriented to person, place, and time [Affect] : the affect was normal [FreeTextEntry1] : mild chest congestion

## 2019-01-31 NOTE — DATA REVIEWED
[FreeTextEntry1] : CT Chest (1/2019) - Interval regression in size of pancreatic mass and mediastinal \par lymph nodes suggesting response to treatment.

## 2019-01-31 NOTE — HISTORY OF PRESENT ILLNESS
[FreeTextEntry1] : 63yoM with locally advanced pancreatic cancer on FOLFIRINOX presents for follow up visit, patient seen in conjunction with Dr. Maloney. PMH also significant for BPH, occupational neck injury with chronic neck pain. Of note patient was found to have colonic mass at the splenic flexure in 5/2017 and in 6/2017 underwent an open L colectomy, omentectomy and mobilization of the splenic flexure. (Final pathology T3NO moderately differentiated carcinoma). Postoperative imaging elucidated the pancreatic mass.  s/p FOLFIRINOX, now on Nivo/Ipi.  \par Was hospitalized in November with shingles of the face. \par \par Patient had a reaction to immunotherapy, was experiencing arthralgias. He was taken off of Opdivo and put on a Prednisone taper. He remains on 10mg daily at present.   Has gained some weight. \par \par Patient has weaned off of opioids completely since our last visit. \par \par He continues to take Gabapentin 300mg QHS for his chronic neck pain. \par \par His main issues today are fatigue and insomnia. He has been having trouble sleeping for about the past month. Feels anxious. \par \par ROS:\par +constipation for which colace has been helping. Had very hard stools two days ago, now is going to bathroom regularly\par Denies n/v, trouble sleeping\par \par I-Stop Ref#: [id number]\par Visit conducted in both [place], a shared common language

## 2019-02-05 NOTE — PHYSICAL EXAM
[Fully active, able to carry on all pre-disease performance without restriction] : Status 0 - Fully active, able to carry on all pre-disease performance without restriction [Normal] : affect appropriate [de-identified] : + moon face

## 2019-02-05 NOTE — HISTORY OF PRESENT ILLNESS
[Disease: _____________________] : Disease: [unfilled] [T: ___] : T[unfilled] [N: ___] : N[unfilled] [AJCC Stage: ____] : AJCC Stage: [unfilled] [Therapy: ___] : Therapy: [unfilled] [de-identified] : 62 M with no significant past medical history, developed progressive abdominal pain in March 2017. In May 2017 he went to an urgent care center and was referred for a CT A/P w/co. This was performed on 5/8/17 and demonstrated a circumferential 6.7 cm mass at the splenic flexure of the colon causing "apple core" luminal narrowing of the colon at that level c/w malignancy. A 2.7 x 2.2 x 2.5 cm ill defined LN below the pancreatic body at the level of the splenic vein is most consistent with a metastasis. On May 9, 2017, Haja saw Dr. Morgan Armenta, colorectal surgery who performed a colonoscopy on 5/15/17. Biopsy c/w invasive moderately differentiated colonic adenocarcinoma a/w necrosis. A PET/CT was performed on 5/30/17 which showed splenic flexure colonic hypermetabolic mass c/w primary carcinoma (SUV 35) and 2.5 cm mesenteric LN hypermetabolic focus c/w regional metastatic disease (SUV 5). \par \par On 6/13/17 patient underwent a open left colectomy, omentectomy and mobilization of the splenic flexure. Final pathology T3NO moderately differentiated carcinoma, loss of nuclear expression of MLH1 and PMS2. No adjuvant therapy was given. Post op developed SOB, hypoxia transferred to the SICU, placed on bipap. A CTA chest/abd/pelvis 6/15/17 c/w Nonspecific patchy opacity in RLL was noted, 3.4 cm collection or cystic lesion in the pancreatic body for which follow up imaging was suggested. \par \par Post op, Haja continued to complain of abdominal pain and an MRI A/P was performed on 7/25/17 which showed a 4.6 cm hyperintense structure inseparable from the distal pancreatic body. The lesion is located in the peripancreatic retroperitoneal space with imaging characteristics suggestive of pancreatic pseudocyst. He was evaluated by surg onc, Dr. Hager and referred for a CT pancreatic protocol which was performed on 9/5 and showed a peripherally enhancing mass in the body of the pancreas measures 4.6 x 3.5 cm. Subsequently had an EUS with FNA on 10/6/17 (GI- Dr. Luz) which confirmed moderately differentiated adenocarcinoma.\par \par On 11/9/17 underwent open ex lap for possible resection of pancreatic adenoca, however, SMA was encased by tumor and therefore could not be resected. Liver bx was performed and negative for malignancy, however, noted to have steatosis with mild steatohepatitis, port inflammation, focal periportal and pericellular fibrosis,2 + iron deposition. \par \par Patient was subsequently referred to rad onc and med onc for further evaluation. \par \par 8 cycles of mFOLFIRINOX 12/5/17 (25% dose reduction due to PS) - 3/13/18 with stable/mild improvement in disease \par \par 3/27/18-5/29/18 : C1-C4 Nivo 3mg/kg (240 mg flat dose)/Ipi 1mg/kg \par 5/31/18: CTCAP: decr in size of pancreatic mass, previously 3.6 to 2.5, new mediastinal LN\par 6/12/18: EBUS with bx of LN: reactive \par 6/26/18-: Nivo single agent Q 2 weeks \par 11/5 - 11/19: hospitalized at [facility] with herpes zoster involving the R periorbital area, no ocular involvement. opiods stopped abruptly s/p withdrawal characterized by diarrhea/n/v. Developed NICOLE (2/2 acyclovir?) with resolution. \par 11/30/18: restarted Nivolumab Q 2 weeks\par 12/13/18: last dose of Nivolumab. \par 12/28/18: Nivolumab held due to Grade 2-3 arthralgias/myalgias. Started on Prednisone 40 mg BID taper over 4 weeks.  \par 2/8/19: Nivolumab restarted \par \par \par  [de-identified] : moderately differentiated adenocarcinoma [de-identified] : 11/24/17: CA 19.9 - 81.3  CEA 29 [de-identified] : June 2017 - colon resection of tumor T3N0 (0/15 LN) moderately differentiated, loss of nuclear expression of MLH1, PMS2. \par    - IHC:  CK19, and variably positive for CDX2, CK7 and CK20, \par   + BRAF\par \par Nov 2017- attempted pancreatic resection:  CK7, CK20 and CDX2 are positive; CK20 is negative\par     - loss of nuclear expression of MLH1, PMS2.\par \par [facility] - failed report  [FreeTextEntry1] : last dose 12/14 on hold due to arthralgias  [de-identified] : Haja presents for f/u. Overall feels much improved. Has gained another 4 lbs. Is taking Prednisone 20 mg daily now. Arthralgias have resolved and he now has almost full range of motion of his joints. Good appetite and energy level. Still having trouble sleeping at night. Not taking any more dilaudid nor methadone.

## 2019-02-08 ENCOUNTER — LABORATORY RESULT (OUTPATIENT)
Age: 64
End: 2019-02-08

## 2019-02-08 ENCOUNTER — RESULT REVIEW (OUTPATIENT)
Age: 64
End: 2019-02-08

## 2019-02-08 ENCOUNTER — APPOINTMENT (OUTPATIENT)
Age: 64
End: 2019-02-08

## 2019-02-08 LAB
BASOPHILS # BLD AUTO: 0 K/UL — SIGNIFICANT CHANGE UP (ref 0–0.2)
BASOPHILS NFR BLD AUTO: 0.1 % — SIGNIFICANT CHANGE UP (ref 0–2)
EOSINOPHIL # BLD AUTO: 0 K/UL — SIGNIFICANT CHANGE UP (ref 0–0.5)
EOSINOPHIL NFR BLD AUTO: 0.3 % — SIGNIFICANT CHANGE UP (ref 0–6)
HCT VFR BLD CALC: 37.1 % — LOW (ref 39–50)
HGB BLD-MCNC: 12.7 G/DL — LOW (ref 13–17)
LYMPHOCYTES # BLD AUTO: 0.8 K/UL — LOW (ref 1–3.3)
LYMPHOCYTES # BLD AUTO: 7.6 % — LOW (ref 13–44)
MCHC RBC-ENTMCNC: 30.6 PG — SIGNIFICANT CHANGE UP (ref 27–34)
MCHC RBC-ENTMCNC: 34.2 G/DL — SIGNIFICANT CHANGE UP (ref 32–36)
MCV RBC AUTO: 89.3 FL — SIGNIFICANT CHANGE UP (ref 80–100)
MONOCYTES # BLD AUTO: 0.4 K/UL — SIGNIFICANT CHANGE UP (ref 0–0.9)
MONOCYTES NFR BLD AUTO: 4.4 % — SIGNIFICANT CHANGE UP (ref 2–14)
NEUTROPHILS # BLD AUTO: 8.8 K/UL — HIGH (ref 1.8–7.4)
NEUTROPHILS NFR BLD AUTO: 87.6 % — HIGH (ref 43–77)
PLATELET # BLD AUTO: 343 K/UL — SIGNIFICANT CHANGE UP (ref 150–400)
RBC # BLD: 4.16 M/UL — LOW (ref 4.2–5.8)
RBC # FLD: 14.5 % — SIGNIFICANT CHANGE UP (ref 10.3–14.5)
WBC # BLD: 10 K/UL — SIGNIFICANT CHANGE UP (ref 3.8–10.5)
WBC # FLD AUTO: 10 K/UL — SIGNIFICANT CHANGE UP (ref 3.8–10.5)

## 2019-02-08 RX ORDER — HYDROMORPHONE HYDROCHLORIDE 2 MG/ML
1 INJECTION INTRAMUSCULAR; INTRAVENOUS; SUBCUTANEOUS
Qty: 0 | Refills: 0 | COMMUNITY

## 2019-02-08 RX ORDER — METHADONE HYDROCHLORIDE 40 MG/1
1 TABLET ORAL
Qty: 0 | Refills: 0 | COMMUNITY

## 2019-02-11 DIAGNOSIS — Z51.11 ENCOUNTER FOR ANTINEOPLASTIC CHEMOTHERAPY: ICD-10-CM

## 2019-02-15 ENCOUNTER — APPOINTMENT (OUTPATIENT)
Dept: HEMATOLOGY ONCOLOGY | Facility: CLINIC | Age: 64
End: 2019-02-15
Payer: MEDICARE

## 2019-02-15 VITALS
OXYGEN SATURATION: 98 % | TEMPERATURE: 98 F | SYSTOLIC BLOOD PRESSURE: 131 MMHG | BODY MASS INDEX: 30.84 KG/M2 | RESPIRATION RATE: 16 BRPM | WEIGHT: 202.8 LBS | DIASTOLIC BLOOD PRESSURE: 81 MMHG | HEART RATE: 72 BPM

## 2019-02-15 PROCEDURE — 99213 OFFICE O/P EST LOW 20 MIN: CPT

## 2019-02-15 NOTE — HISTORY OF PRESENT ILLNESS
[Disease: _____________________] : Disease: [unfilled] [T: ___] : T[unfilled] [N: ___] : N[unfilled] [AJCC Stage: ____] : AJCC Stage: [unfilled] [Therapy: ___] : Therapy: [unfilled] [de-identified] : 62 M with no significant past medical history, developed progressive abdominal pain in March 2017. In May 2017 he went to an urgent care center and was referred for a CT A/P w/co. This was performed on 5/8/17 and demonstrated a circumferential 6.7 cm mass at the splenic flexure of the colon causing "apple core" luminal narrowing of the colon at that level c/w malignancy. A 2.7 x 2.2 x 2.5 cm ill defined LN below the pancreatic body at the level of the splenic vein is most consistent with a metastasis. On May 9, 2017, Haja saw Dr. Morgan Armenta, colorectal surgery who performed a colonoscopy on 5/15/17. Biopsy c/w invasive moderately differentiated colonic adenocarcinoma a/w necrosis. A PET/CT was performed on 5/30/17 which showed splenic flexure colonic hypermetabolic mass c/w primary carcinoma (SUV 35) and 2.5 cm mesenteric LN hypermetabolic focus c/w regional metastatic disease (SUV 5). \par \par On 6/13/17 patient underwent a open left colectomy, omentectomy and mobilization of the splenic flexure. Final pathology T3NO moderately differentiated carcinoma, loss of nuclear expression of MLH1 and PMS2. No adjuvant therapy was given. Post op developed SOB, hypoxia transferred to the SICU, placed on bipap. A CTA chest/abd/pelvis 6/15/17 c/w Nonspecific patchy opacity in RLL was noted, 3.4 cm collection or cystic lesion in the pancreatic body for which follow up imaging was suggested. \par \par Post op, Haja continued to complain of abdominal pain and an MRI A/P was performed on 7/25/17 which showed a 4.6 cm hyperintense structure inseparable from the distal pancreatic body. The lesion is located in the peripancreatic retroperitoneal space with imaging characteristics suggestive of pancreatic pseudocyst. He was evaluated by surg onc, Dr. Hager and referred for a CT pancreatic protocol which was performed on 9/5 and showed a peripherally enhancing mass in the body of the pancreas measures 4.6 x 3.5 cm. Subsequently had an EUS with FNA on 10/6/17 (GI- Dr. Luz) which confirmed moderately differentiated adenocarcinoma.\par \par On 11/9/17 underwent open ex lap for possible resection of pancreatic adenoca, however, SMA was encased by tumor and therefore could not be resected. Liver bx was performed and negative for malignancy, however, noted to have steatosis with mild steatohepatitis, port inflammation, focal periportal and pericellular fibrosis,2 + iron deposition. \par \par Patient was subsequently referred to rad onc and med onc for further evaluation. \par \par 8 cycles of mFOLFIRINOX 12/5/17 (25% dose reduction due to PS) - 3/13/18 with stable/mild improvement in disease \par \par 3/27/18-5/29/18 : C1-C4 Nivo 3mg/kg (240 mg flat dose)/Ipi 1mg/kg \par 5/31/18: CTCAP: decr in size of pancreatic mass, previously 3.6 to 2.5, new mediastinal LN\par 6/12/18: EBUS with bx of LN: reactive \par 6/26/18-: Nivo single agent Q 2 weeks \par 11/5 - 11/19: hospitalized at [facility] with herpes zoster involving the R periorbital area, no ocular involvement. opiods stopped abruptly s/p withdrawal characterized by diarrhea/n/v. Developed NICOLE (2/2 acyclovir?) with resolution. \par 11/30/18: restarted Nivolumab Q 2 weeks\par 12/13/18: last dose of Nivolumab. \par 12/28/18: Nivolumab held due to Grade 2-3 arthralgias/myalgias. Started on Prednisone 40 mg BID taper over 4 weeks.  \par 2/8/19: Nivolumab restarted \par \par \par  [de-identified] : moderately differentiated adenocarcinoma [de-identified] : 11/24/17: CA 19.9 - 81.3  CEA 29 [de-identified] : June 2017 - colon resection of tumor T3N0 (0/15 LN) moderately differentiated, loss of nuclear expression of MLH1, PMS2. \par    - IHC:  CK19, and variably positive for CDX2, CK7 and CK20, \par   + BRAF\par \par Nov 2017- attempted pancreatic resection:  CK7, CK20 and CDX2 are positive; CK20 is negative\par     - loss of nuclear expression of MLH1, PMS2.\par \par [facility] - failed report  [de-identified] : Feels great. Restarted Nivo last week. Denies any arthralgias. No c/o. Eating well. Good energy level. No diarrhea. Remains on Prednisone 10 mg daily. \par Still having issues with insomnia. Is taking Benadryl to fall asleep.

## 2019-02-22 ENCOUNTER — LABORATORY RESULT (OUTPATIENT)
Age: 64
End: 2019-02-22

## 2019-02-22 ENCOUNTER — APPOINTMENT (OUTPATIENT)
Dept: INFUSION THERAPY | Facility: HOSPITAL | Age: 64
End: 2019-02-22

## 2019-02-22 ENCOUNTER — RESULT REVIEW (OUTPATIENT)
Age: 64
End: 2019-02-22

## 2019-02-22 LAB
BASOPHILS # BLD AUTO: 0.1 K/UL — SIGNIFICANT CHANGE UP (ref 0–0.2)
BASOPHILS NFR BLD AUTO: 0.9 % — SIGNIFICANT CHANGE UP (ref 0–2)
EOSINOPHIL # BLD AUTO: 0.1 K/UL — SIGNIFICANT CHANGE UP (ref 0–0.5)
EOSINOPHIL NFR BLD AUTO: 0.6 % — SIGNIFICANT CHANGE UP (ref 0–6)
HCT VFR BLD CALC: 36.4 % — LOW (ref 39–50)
HGB BLD-MCNC: 12.3 G/DL — LOW (ref 13–17)
LYMPHOCYTES # BLD AUTO: 1.2 K/UL — SIGNIFICANT CHANGE UP (ref 1–3.3)
LYMPHOCYTES # BLD AUTO: 14.4 % — SIGNIFICANT CHANGE UP (ref 13–44)
MCHC RBC-ENTMCNC: 30.5 PG — SIGNIFICANT CHANGE UP (ref 27–34)
MCHC RBC-ENTMCNC: 33.9 G/DL — SIGNIFICANT CHANGE UP (ref 32–36)
MCV RBC AUTO: 90 FL — SIGNIFICANT CHANGE UP (ref 80–100)
MONOCYTES # BLD AUTO: 0.4 K/UL — SIGNIFICANT CHANGE UP (ref 0–0.9)
MONOCYTES NFR BLD AUTO: 5.2 % — SIGNIFICANT CHANGE UP (ref 2–14)
NEUTROPHILS # BLD AUTO: 6.8 K/UL — SIGNIFICANT CHANGE UP (ref 1.8–7.4)
NEUTROPHILS NFR BLD AUTO: 78.9 % — HIGH (ref 43–77)
PLATELET # BLD AUTO: 258 K/UL — SIGNIFICANT CHANGE UP (ref 150–400)
RBC # BLD: 4.04 M/UL — LOW (ref 4.2–5.8)
RBC # FLD: 15 % — HIGH (ref 10.3–14.5)
WBC # BLD: 8.6 K/UL — SIGNIFICANT CHANGE UP (ref 3.8–10.5)
WBC # FLD AUTO: 8.6 K/UL — SIGNIFICANT CHANGE UP (ref 3.8–10.5)

## 2019-02-26 ENCOUNTER — OUTPATIENT (OUTPATIENT)
Dept: OUTPATIENT SERVICES | Facility: HOSPITAL | Age: 64
LOS: 1 days | Discharge: HOME | End: 2019-02-26

## 2019-02-26 DIAGNOSIS — Z98.890 OTHER SPECIFIED POSTPROCEDURAL STATES: Chronic | ICD-10-CM

## 2019-02-26 DIAGNOSIS — Z90.49 ACQUIRED ABSENCE OF OTHER SPECIFIED PARTS OF DIGESTIVE TRACT: Chronic | ICD-10-CM

## 2019-02-26 DIAGNOSIS — K25.5 CHRONIC OR UNSPECIFIED GASTRIC ULCER WITH PERFORATION: Chronic | ICD-10-CM

## 2019-02-26 DIAGNOSIS — C25.9 MALIGNANT NEOPLASM OF PANCREAS, UNSPECIFIED: ICD-10-CM

## 2019-02-27 ENCOUNTER — APPOINTMENT (OUTPATIENT)
Dept: OPHTHALMOLOGY | Facility: CLINIC | Age: 64
End: 2019-02-27
Payer: MEDICARE

## 2019-02-27 PROCEDURE — 92012 INTRM OPH EXAM EST PATIENT: CPT

## 2019-03-07 ENCOUNTER — LABORATORY RESULT (OUTPATIENT)
Age: 64
End: 2019-03-07

## 2019-03-07 ENCOUNTER — RESULT REVIEW (OUTPATIENT)
Age: 64
End: 2019-03-07

## 2019-03-07 ENCOUNTER — APPOINTMENT (OUTPATIENT)
Dept: HEMATOLOGY ONCOLOGY | Facility: CLINIC | Age: 64
End: 2019-03-07
Payer: MEDICARE

## 2019-03-07 ENCOUNTER — APPOINTMENT (OUTPATIENT)
Dept: INFUSION THERAPY | Facility: HOSPITAL | Age: 64
End: 2019-03-07

## 2019-03-07 VITALS
HEART RATE: 71 BPM | SYSTOLIC BLOOD PRESSURE: 130 MMHG | TEMPERATURE: 98 F | BODY MASS INDEX: 32.45 KG/M2 | DIASTOLIC BLOOD PRESSURE: 70 MMHG | RESPIRATION RATE: 16 BRPM | OXYGEN SATURATION: 99 % | WEIGHT: 213.41 LBS

## 2019-03-07 LAB
BASOPHILS # BLD AUTO: 0 K/UL — SIGNIFICANT CHANGE UP (ref 0–0.2)
BASOPHILS NFR BLD AUTO: 0.6 % — SIGNIFICANT CHANGE UP (ref 0–2)
EOSINOPHIL # BLD AUTO: 0.1 K/UL — SIGNIFICANT CHANGE UP (ref 0–0.5)
EOSINOPHIL NFR BLD AUTO: 0.7 % — SIGNIFICANT CHANGE UP (ref 0–6)
HCT VFR BLD CALC: 37.1 % — LOW (ref 39–50)
HGB BLD-MCNC: 12.8 G/DL — LOW (ref 13–17)
LYMPHOCYTES # BLD AUTO: 1.2 K/UL — SIGNIFICANT CHANGE UP (ref 1–3.3)
LYMPHOCYTES # BLD AUTO: 15.3 % — SIGNIFICANT CHANGE UP (ref 13–44)
MCHC RBC-ENTMCNC: 30.9 PG — SIGNIFICANT CHANGE UP (ref 27–34)
MCHC RBC-ENTMCNC: 34.4 G/DL — SIGNIFICANT CHANGE UP (ref 32–36)
MCV RBC AUTO: 89.9 FL — SIGNIFICANT CHANGE UP (ref 80–100)
MONOCYTES # BLD AUTO: 0.5 K/UL — SIGNIFICANT CHANGE UP (ref 0–0.9)
MONOCYTES NFR BLD AUTO: 6.1 % — SIGNIFICANT CHANGE UP (ref 2–14)
NEUTROPHILS # BLD AUTO: 6.1 K/UL — SIGNIFICANT CHANGE UP (ref 1.8–7.4)
NEUTROPHILS NFR BLD AUTO: 77.3 % — HIGH (ref 43–77)
PLATELET # BLD AUTO: 170 K/UL — SIGNIFICANT CHANGE UP (ref 150–400)
RBC # BLD: 4.12 M/UL — LOW (ref 4.2–5.8)
RBC # FLD: 15.1 % — HIGH (ref 10.3–14.5)
WBC # BLD: 7.9 K/UL — SIGNIFICANT CHANGE UP (ref 3.8–10.5)
WBC # FLD AUTO: 7.9 K/UL — SIGNIFICANT CHANGE UP (ref 3.8–10.5)

## 2019-03-07 PROCEDURE — 99214 OFFICE O/P EST MOD 30 MIN: CPT

## 2019-03-08 DIAGNOSIS — Z51.11 ENCOUNTER FOR ANTINEOPLASTIC CHEMOTHERAPY: ICD-10-CM

## 2019-03-10 NOTE — HISTORY OF PRESENT ILLNESS
[Disease: _____________________] : Disease: [unfilled] [T: ___] : T[unfilled] [N: ___] : N[unfilled] [AJCC Stage: ____] : AJCC Stage: [unfilled] [Therapy: ___] : Therapy: [unfilled] [de-identified] : 62 M with no significant past medical history, developed progressive abdominal pain in March 2017. In May 2017 he went to an urgent care center and was referred for a CT A/P w/co. This was performed on 5/8/17 and demonstrated a circumferential 6.7 cm mass at the splenic flexure of the colon causing "apple core" luminal narrowing of the colon at that level c/w malignancy. A 2.7 x 2.2 x 2.5 cm ill defined LN below the pancreatic body at the level of the splenic vein is most consistent with a metastasis. On May 9, 2017, Haja saw Dr. Morgan Armenta, colorectal surgery who performed a colonoscopy on 5/15/17. Biopsy c/w invasive moderately differentiated colonic adenocarcinoma a/w necrosis. A PET/CT was performed on 5/30/17 which showed splenic flexure colonic hypermetabolic mass c/w primary carcinoma (SUV 35) and 2.5 cm mesenteric LN hypermetabolic focus c/w regional metastatic disease (SUV 5). \par \par On 6/13/17 patient underwent a open left colectomy, omentectomy and mobilization of the splenic flexure. Final pathology T3NO moderately differentiated carcinoma, loss of nuclear expression of MLH1 and PMS2. No adjuvant therapy was given. Post op developed SOB, hypoxia transferred to the SICU, placed on bipap. A CTA chest/abd/pelvis 6/15/17 c/w Nonspecific patchy opacity in RLL was noted, 3.4 cm collection or cystic lesion in the pancreatic body for which follow up imaging was suggested. \par \par Post op, Haja continued to complain of abdominal pain and an MRI A/P was performed on 7/25/17 which showed a 4.6 cm hyperintense structure inseparable from the distal pancreatic body. The lesion is located in the peripancreatic retroperitoneal space with imaging characteristics suggestive of pancreatic pseudocyst. He was evaluated by surg onc, Dr. Hager and referred for a CT pancreatic protocol which was performed on 9/5 and showed a peripherally enhancing mass in the body of the pancreas measures 4.6 x 3.5 cm. Subsequently had an EUS with FNA on 10/6/17 (GI- Dr. Luz) which confirmed moderately differentiated adenocarcinoma.\par \par On 11/9/17 underwent open ex lap for possible resection of pancreatic adenoca, however, SMA was encased by tumor and therefore could not be resected. Liver bx was performed and negative for malignancy, however, noted to have steatosis with mild steatohepatitis, port inflammation, focal periportal and pericellular fibrosis,2 + iron deposition. \par \par Patient was subsequently referred to rad onc and med onc for further evaluation. \par \par 8 cycles of mFOLFIRINOX 12/5/17 (25% dose reduction due to PS) - 3/13/18 with stable/mild improvement in disease \par \par 3/27/18-5/29/18 : C1-C4 Nivo 3mg/kg (240 mg flat dose)/Ipi 1mg/kg \par 5/31/18: CTCAP: decr in size of pancreatic mass, previously 3.6 to 2.5, new mediastinal LN\par 6/12/18: EBUS with bx of LN: reactive \par 6/26/18-: Nivo single agent Q 2 weeks \par 11/5 - 11/19: hospitalized at [facility] with herpes zoster involving the R periorbital area, no ocular involvement. opiods stopped abruptly s/p withdrawal characterized by diarrhea/n/v. Developed NICOLE (2/2 acyclovir?) with resolution. \par 11/30/18: restarted Nivolumab Q 2 weeks\par 12/13/18: last dose of Nivolumab. \par 12/28/18: Nivolumab held due to Grade 2-3 arthralgias/myalgias. Started on Prednisone 40 mg BID taper over 8 weeks.  \par 1/3/19: CTCAP: interval regression of pancreatic mass and mediastinal LN\par 2/8/19: Nivolumab restarted \par \par \par  [de-identified] : moderately differentiated adenocarcinoma [de-identified] : 11/24/17: CA 19.9 - 81.3  CEA 29 [de-identified] : June 2017 - colon resection of tumor T3N0 (0/15 LN) moderately differentiated, loss of nuclear expression of MLH1, PMS2. \par    - IHC:  CK19, and variably positive for CDX2, CK7 and CK20, \par   + BRAF\par \par Nov 2017- attempted pancreatic resection:  CK7, CK20 and CDX2 are positive; CK20 is negative\par     - loss of nuclear expression of MLH1, PMS2.\par \par [facility] - failed report  [de-identified] : Haja presents for f/u accompanied by wife. He is feeling very well. He gained 11 lbs since last visit. Still taking Prednisone 5 mg. Denies any arthralgias or myalgias. \par Energy level is good. Still having trouble sleeping at night. No n/v/d/c

## 2019-03-22 ENCOUNTER — LABORATORY RESULT (OUTPATIENT)
Age: 64
End: 2019-03-22

## 2019-03-22 ENCOUNTER — APPOINTMENT (OUTPATIENT)
Age: 64
End: 2019-03-22

## 2019-03-25 ENCOUNTER — OUTPATIENT (OUTPATIENT)
Dept: OUTPATIENT SERVICES | Facility: HOSPITAL | Age: 64
LOS: 1 days | Discharge: HOME | End: 2019-03-25

## 2019-03-25 DIAGNOSIS — Z98.890 OTHER SPECIFIED POSTPROCEDURAL STATES: Chronic | ICD-10-CM

## 2019-03-25 DIAGNOSIS — K25.5 CHRONIC OR UNSPECIFIED GASTRIC ULCER WITH PERFORATION: Chronic | ICD-10-CM

## 2019-03-25 DIAGNOSIS — Z90.49 ACQUIRED ABSENCE OF OTHER SPECIFIED PARTS OF DIGESTIVE TRACT: Chronic | ICD-10-CM

## 2019-03-25 DIAGNOSIS — C25.9 MALIGNANT NEOPLASM OF PANCREAS, UNSPECIFIED: ICD-10-CM

## 2019-04-01 ENCOUNTER — APPOINTMENT (OUTPATIENT)
Dept: HEMATOLOGY ONCOLOGY | Facility: CLINIC | Age: 64
End: 2019-04-01
Payer: MEDICARE

## 2019-04-01 ENCOUNTER — APPOINTMENT (OUTPATIENT)
Dept: GERIATRICS | Facility: CLINIC | Age: 64
End: 2019-04-01

## 2019-04-01 VITALS
RESPIRATION RATE: 22 BRPM | TEMPERATURE: 97.5 F | DIASTOLIC BLOOD PRESSURE: 83 MMHG | OXYGEN SATURATION: 96 % | WEIGHT: 218.26 LBS | HEART RATE: 107 BPM | BODY MASS INDEX: 33.19 KG/M2 | SYSTOLIC BLOOD PRESSURE: 136 MMHG

## 2019-04-01 DIAGNOSIS — M54.2 CERVICALGIA: ICD-10-CM

## 2019-04-01 DIAGNOSIS — G89.29 CERVICALGIA: ICD-10-CM

## 2019-04-01 DIAGNOSIS — Z51.5 ENCOUNTER FOR PALLIATIVE CARE: ICD-10-CM

## 2019-04-01 PROCEDURE — 99214 OFFICE O/P EST MOD 30 MIN: CPT

## 2019-04-02 ENCOUNTER — FORM ENCOUNTER (OUTPATIENT)
Age: 64
End: 2019-04-02

## 2019-04-02 PROBLEM — Z51.5 ENCOUNTER FOR PALLIATIVE CARE: Status: ACTIVE | Noted: 2017-12-18

## 2019-04-02 NOTE — HISTORY OF PRESENT ILLNESS
[FreeTextEntry1] : 63yoM with locally advanced pancreatic cancer on FOLFIRINOX presents for follow up visit, patient seen in conjunction with Dr. Maloney. PMH also significant for BPH, occupational neck injury with chronic neck pain. Of note patient was found to have colonic mass at the splenic flexure in 5/2017 and in 6/2017 underwent an open L colectomy, omentectomy and mobilization of the splenic flexure. (Final pathology T3NO moderately differentiated carcinoma). Postoperative imaging elucidated the pancreatic mass.  s/p FOLFIRINOX, now on Nivo/Ipi.  \par Was hospitalized in 11/2018 with shingles of the face. \par \par Patient had a reaction to immunotherapy, was experiencing arthralgias. He was taken off of Opdivo and put on a Prednisone taper. Was tapered off of Prednisone 15 days ago and is back on Opdivo. He gained weight while on the steroids, is up to 118lbs today. \par \par The day he came off of the prednisone he notes that his muscles have been achy. He has not been taking any medications for this, has been tolerating it.  \par \par He continues to take Gabapentin 300mg QHS for his chronic neck pain. \par \par ROS:\par +constipation for which colace has been helping. Had very hard stools two days ago, now is going to bathroom regularly\par Denies n/v, trouble sleeping\par \par [facility]-Stop Ref#: [id number]\par Visit conducted in both Lithuanian, a shared common language

## 2019-04-02 NOTE — ASSESSMENT
[FreeTextEntry1] : 63yoM with:\par \par 1. Pancreatic Cancer, metastatic - s/p 8 cycles of mFOLFIRINOX (with Neulasta),  s/p 4 cycles of Nivolumab/Ipilimumab, now on Nivolumab Q2 weeks with improvement of arthralgias after a prednisone taper. CT C/A/P pending.\par \par 2. Anxiety - C/w Sertraline 50mg. \par \par 3. Myalgias - likely from immunotherapy given recurrence when prednisone discontinued; patient is tolerating it. Will monitor. Patient instructed to call if worsening. \par \par 4. Chronic Neck pain - C/w voltaren gel, Gabapentin. \par \par 5. Encounter for Palliative Care - HCP is daughter, Muriel Amezquita.  Emotional support provided. \par \par RTO 2 months

## 2019-04-03 ENCOUNTER — APPOINTMENT (OUTPATIENT)
Dept: CT IMAGING | Facility: IMAGING CENTER | Age: 64
End: 2019-04-03
Payer: MEDICARE

## 2019-04-03 ENCOUNTER — OUTPATIENT (OUTPATIENT)
Dept: OUTPATIENT SERVICES | Facility: HOSPITAL | Age: 64
LOS: 1 days | End: 2019-04-03
Payer: MEDICARE

## 2019-04-03 DIAGNOSIS — K25.5 CHRONIC OR UNSPECIFIED GASTRIC ULCER WITH PERFORATION: Chronic | ICD-10-CM

## 2019-04-03 DIAGNOSIS — Z98.890 OTHER SPECIFIED POSTPROCEDURAL STATES: Chronic | ICD-10-CM

## 2019-04-03 DIAGNOSIS — Z90.49 ACQUIRED ABSENCE OF OTHER SPECIFIED PARTS OF DIGESTIVE TRACT: Chronic | ICD-10-CM

## 2019-04-03 DIAGNOSIS — C25.9 MALIGNANT NEOPLASM OF PANCREAS, UNSPECIFIED: ICD-10-CM

## 2019-04-03 PROCEDURE — 71260 CT THORAX DX C+: CPT | Mod: 26

## 2019-04-03 PROCEDURE — 74177 CT ABD & PELVIS W/CONTRAST: CPT | Mod: 26

## 2019-04-03 PROCEDURE — 71260 CT THORAX DX C+: CPT

## 2019-04-03 PROCEDURE — 74177 CT ABD & PELVIS W/CONTRAST: CPT

## 2019-04-05 ENCOUNTER — LABORATORY RESULT (OUTPATIENT)
Age: 64
End: 2019-04-05

## 2019-04-05 ENCOUNTER — APPOINTMENT (OUTPATIENT)
Age: 64
End: 2019-04-05
Payer: MEDICARE

## 2019-04-05 ENCOUNTER — APPOINTMENT (OUTPATIENT)
Age: 64
End: 2019-04-05

## 2019-04-05 VITALS
RESPIRATION RATE: 18 BRPM | HEART RATE: 77 BPM | DIASTOLIC BLOOD PRESSURE: 79 MMHG | BODY MASS INDEX: 32.95 KG/M2 | TEMPERATURE: 98 F | SYSTOLIC BLOOD PRESSURE: 123 MMHG | OXYGEN SATURATION: 98 % | WEIGHT: 216.71 LBS

## 2019-04-05 DIAGNOSIS — R59.0 LOCALIZED ENLARGED LYMPH NODES: ICD-10-CM

## 2019-04-05 PROCEDURE — 99214 OFFICE O/P EST MOD 30 MIN: CPT

## 2019-04-13 PROBLEM — R59.0 MEDIASTINAL ADENOPATHY: Status: ACTIVE | Noted: 2019-04-13

## 2019-04-13 NOTE — HISTORY OF PRESENT ILLNESS
[Disease: _____________________] : Disease: [unfilled] [T: ___] : T[unfilled] [N: ___] : N[unfilled] [AJCC Stage: ____] : AJCC Stage: [unfilled] [Therapy: ___] : Therapy: [unfilled] [de-identified] : moderately differentiated adenocarcinoma [de-identified] : June 2017 - colon resection of tumor T3N0 (0/15 LN) moderately differentiated, loss of nuclear expression of MLH1, PMS2. \par    - IHC:  CK19, and variably positive for CDX2, CK7 and CK20, \par   + BRAF\par \par Nov 2017- attempted pancreatic resection:  CK7, CK20 and CDX2 are positive; CK20 is negative\par     - loss of nuclear expression of MLH1, PMS2.\par \par [facility] - failed report  [de-identified] : 11/24/17: CA 19.9 - 81.3  CEA 29 [de-identified] : 62 M with no significant past medical history, developed progressive abdominal pain in March 2017. In May 2017 he went to an urgent care center and was referred for a CT A/P w/co. This was performed on 5/8/17 and demonstrated a circumferential 6.7 cm mass at the splenic flexure of the colon causing "apple core" luminal narrowing of the colon at that level c/w malignancy. A 2.7 x 2.2 x 2.5 cm ill defined LN below the pancreatic body at the level of the splenic vein is most consistent with a metastasis. On May 9, 2017, Haja saw Dr. Morgan Armenta, colorectal surgery who performed a colonoscopy on 5/15/17. Biopsy c/w invasive moderately differentiated colonic adenocarcinoma a/w necrosis. A PET/CT was performed on 5/30/17 which showed splenic flexure colonic hypermetabolic mass c/w primary carcinoma (SUV 35) and 2.5 cm mesenteric LN hypermetabolic focus c/w regional metastatic disease (SUV 5). \par \par On 6/13/17 patient underwent a open left colectomy, omentectomy and mobilization of the splenic flexure. Final pathology T3NO moderately differentiated carcinoma, loss of nuclear expression of MLH1 and PMS2. No adjuvant therapy was given. Post op developed SOB, hypoxia transferred to the SICU, placed on bipap. A CTA chest/abd/pelvis 6/15/17 c/w Nonspecific patchy opacity in RLL was noted, 3.4 cm collection or cystic lesion in the pancreatic body for which follow up imaging was suggested. \par \par Post op, Haja continued to complain of abdominal pain and an MRI A/P was performed on 7/25/17 which showed a 4.6 cm hyperintense structure inseparable from the distal pancreatic body. The lesion is located in the peripancreatic retroperitoneal space with imaging characteristics suggestive of pancreatic pseudocyst. He was evaluated by surg onc, Dr. Hager and referred for a CT pancreatic protocol which was performed on 9/5 and showed a peripherally enhancing mass in the body of the pancreas measures 4.6 x 3.5 cm. Subsequently had an EUS with FNA on 10/6/17 (GI- Dr. Luz) which confirmed moderately differentiated adenocarcinoma.\par \par On 11/9/17 underwent open ex lap for possible resection of pancreatic adenoca, however, SMA was encased by tumor and therefore could not be resected. Liver bx was performed and negative for malignancy, however, noted to have steatosis with mild steatohepatitis, port inflammation, focal periportal and pericellular fibrosis,2 + iron deposition. \par \par Patient was subsequently referred to rad onc and med onc for further evaluation. \par \par 8 cycles of mFOLFIRINOX 12/5/17 (25% dose reduction due to PS) - 3/13/18 with stable/mild improvement in disease \par \par 3/27/18-5/29/18 : C1-C4 Nivo 3mg/kg (240 mg flat dose)/Ipi 1mg/kg \par 5/31/18: CTCAP: decr in size of pancreatic mass, previously 3.6 to 2.5, new mediastinal LN\par 6/12/18: EBUS with bx of LN: reactive \par 6/26/18-: Nivo single agent Q 2 weeks \par 11/5 - 11/19: hospitalized at [facility] with herpes zoster involving the R periorbital area, no ocular involvement. opiods stopped abruptly s/p withdrawal characterized by diarrhea/n/v. Developed NICOLE (2/2 acyclovir?) with resolution. \par 11/30/18: restarted Nivolumab Q 2 weeks\par 12/13/18: last dose of Nivolumab. \par 12/28/18: Nivolumab held due to Grade 2-3 arthralgias/myalgias. Started on Prednisone 40 mg BID taper over 8 weeks.  \par 1/3/19: CTCAP: interval regression of pancreatic mass and mediastinal LN\par 2/8/19: Nivolumab restarted \par 4/3/19: CT CAP: enlargement of mediastinal LN, ground glass opacities b/l \par 4/6/19: Nivolumab d/c due to myalgias and ?pneumonitis \par \par \par  [de-identified] : Haja presents for f/u. Reports new mild SOB with exertion. Also myalgias since stopping Prednisone. \par We reviewed results of CT CAP which showed new b/l groundglass opacities and enlargement of mediastinal LN. \par Denies any cough, abdominal pain. Has rare diarrhea. \par Good energy level. Appetite is very good. Gained another 5 lbs since last visit. He is overall up about 20 lbs in 2 mos.

## 2019-04-13 NOTE — REVIEW OF SYSTEMS
[Recent Change In Weight] : ~T recent weight change [Joint Stiffness] : joint stiffness [SOB on Exertion] : shortness of breath during exertion [Muscle Pain] : muscle pain [Insomnia] : insomnia [Negative] : Allergic/Immunologic

## 2019-04-13 NOTE — REASON FOR VISIT
[Follow-Up Visit] : a follow-up [Spouse] : spouse [FreeTextEntry2] : Locally advanced pancreatic ca, [facility]SI[facility]

## 2019-04-15 NOTE — DIETITIAN INITIAL EVALUATION ADULT. - ETIOLOGY
Medication was filled on 4/12/2019.  
related to disordered eating / excessive energy intake/ food and nutrition related knowledge deficit

## 2019-04-19 ENCOUNTER — APPOINTMENT (OUTPATIENT)
Age: 64
End: 2019-04-19

## 2019-04-19 ENCOUNTER — APPOINTMENT (OUTPATIENT)
Dept: HEMATOLOGY ONCOLOGY | Facility: CLINIC | Age: 64
End: 2019-04-19
Payer: MEDICARE

## 2019-04-19 VITALS
TEMPERATURE: 97.8 F | WEIGHT: 214.95 LBS | RESPIRATION RATE: 16 BRPM | OXYGEN SATURATION: 98 % | DIASTOLIC BLOOD PRESSURE: 82 MMHG | BODY MASS INDEX: 32.68 KG/M2 | HEART RATE: 58 BPM | SYSTOLIC BLOOD PRESSURE: 127 MMHG

## 2019-04-19 PROCEDURE — 99214 OFFICE O/P EST MOD 30 MIN: CPT

## 2019-04-19 NOTE — H&P PST ADULT - NSANTHBPHIGHRD_ENT_A_CORE
pt received to room 1 a/o x 3 c/o left flank pain that started yesterday around 10pm. Pt is creole speaking with sister translating. Pt states he took tylenol at home with relief. Pt has hx of kidney stones that needed to be lasered 2 months ago. pt denies any urinary symptoms. Pt has hx of CVA with right sided residual in 2018 and uses a walker to walk. Pt sister states he fell 2 weeks ago and denies any loc. Respirations even and unlabored. Lung sounds clear with equal chest rise bilaterally. ABD is soft, non tender, non distended with normal active bowel sounds No complaints of chest pain, headache, nausea, dizziness, vomiting  SOB, fever, chills verbalized.
Yes

## 2019-05-13 ENCOUNTER — APPOINTMENT (OUTPATIENT)
Dept: ENDOCRINOLOGY | Facility: CLINIC | Age: 64
End: 2019-05-13
Payer: MEDICARE

## 2019-05-13 VITALS
OXYGEN SATURATION: 98 % | HEIGHT: 68 IN | HEART RATE: 71 BPM | SYSTOLIC BLOOD PRESSURE: 130 MMHG | WEIGHT: 225 LBS | BODY MASS INDEX: 34.1 KG/M2 | DIASTOLIC BLOOD PRESSURE: 74 MMHG

## 2019-05-13 PROCEDURE — 99214 OFFICE O/P EST MOD 30 MIN: CPT

## 2019-05-15 ENCOUNTER — OUTPATIENT (OUTPATIENT)
Dept: OUTPATIENT SERVICES | Facility: HOSPITAL | Age: 64
LOS: 1 days | Discharge: HOME | End: 2019-05-15

## 2019-05-15 DIAGNOSIS — Z98.890 OTHER SPECIFIED POSTPROCEDURAL STATES: Chronic | ICD-10-CM

## 2019-05-15 DIAGNOSIS — C25.9 MALIGNANT NEOPLASM OF PANCREAS, UNSPECIFIED: ICD-10-CM

## 2019-05-15 DIAGNOSIS — Z90.49 ACQUIRED ABSENCE OF OTHER SPECIFIED PARTS OF DIGESTIVE TRACT: Chronic | ICD-10-CM

## 2019-05-15 DIAGNOSIS — K25.5 CHRONIC OR UNSPECIFIED GASTRIC ULCER WITH PERFORATION: Chronic | ICD-10-CM

## 2019-05-17 ENCOUNTER — RESULT REVIEW (OUTPATIENT)
Age: 64
End: 2019-05-17

## 2019-05-17 ENCOUNTER — APPOINTMENT (OUTPATIENT)
Age: 64
End: 2019-05-17
Payer: MEDICARE

## 2019-05-17 ENCOUNTER — LABORATORY RESULT (OUTPATIENT)
Age: 64
End: 2019-05-17

## 2019-05-17 ENCOUNTER — APPOINTMENT (OUTPATIENT)
Age: 64
End: 2019-05-17

## 2019-05-17 VITALS
SYSTOLIC BLOOD PRESSURE: 129 MMHG | RESPIRATION RATE: 16 BRPM | OXYGEN SATURATION: 96 % | BODY MASS INDEX: 34.53 KG/M2 | HEART RATE: 77 BPM | DIASTOLIC BLOOD PRESSURE: 79 MMHG | TEMPERATURE: 98.1 F | WEIGHT: 227.07 LBS

## 2019-05-17 DIAGNOSIS — M79.10 MYALGIA, UNSPECIFIED SITE: ICD-10-CM

## 2019-05-17 LAB
BASOPHILS # BLD AUTO: 0 K/UL — SIGNIFICANT CHANGE UP (ref 0–0.2)
BASOPHILS NFR BLD AUTO: 0.8 % — SIGNIFICANT CHANGE UP (ref 0–2)
EOSINOPHIL # BLD AUTO: 0.3 K/UL — SIGNIFICANT CHANGE UP (ref 0–0.5)
EOSINOPHIL NFR BLD AUTO: 5.6 % — SIGNIFICANT CHANGE UP (ref 0–6)
HCT VFR BLD CALC: 40.3 % — SIGNIFICANT CHANGE UP (ref 39–50)
HGB BLD-MCNC: 13.9 G/DL — SIGNIFICANT CHANGE UP (ref 13–17)
LYMPHOCYTES # BLD AUTO: 1.5 K/UL — SIGNIFICANT CHANGE UP (ref 1–3.3)
LYMPHOCYTES # BLD AUTO: 29.3 % — SIGNIFICANT CHANGE UP (ref 13–44)
MCHC RBC-ENTMCNC: 29 PG — SIGNIFICANT CHANGE UP (ref 27–34)
MCHC RBC-ENTMCNC: 34.5 G/DL — SIGNIFICANT CHANGE UP (ref 32–36)
MCV RBC AUTO: 84.2 FL — SIGNIFICANT CHANGE UP (ref 80–100)
MONOCYTES # BLD AUTO: 0.6 K/UL — SIGNIFICANT CHANGE UP (ref 0–0.9)
MONOCYTES NFR BLD AUTO: 11.3 % — SIGNIFICANT CHANGE UP (ref 2–14)
NEUTROPHILS # BLD AUTO: 2.8 K/UL — SIGNIFICANT CHANGE UP (ref 1.8–7.4)
NEUTROPHILS NFR BLD AUTO: 53 % — SIGNIFICANT CHANGE UP (ref 43–77)
PLATELET # BLD AUTO: 184 K/UL — SIGNIFICANT CHANGE UP (ref 150–400)
RBC # BLD: 4.78 M/UL — SIGNIFICANT CHANGE UP (ref 4.2–5.8)
RBC # FLD: 12.8 % — SIGNIFICANT CHANGE UP (ref 10.3–14.5)
WBC # BLD: 5.2 K/UL — SIGNIFICANT CHANGE UP (ref 3.8–10.5)
WBC # FLD AUTO: 5.2 K/UL — SIGNIFICANT CHANGE UP (ref 3.8–10.5)

## 2019-05-17 PROCEDURE — 99214 OFFICE O/P EST MOD 30 MIN: CPT

## 2019-05-17 NOTE — HISTORY OF PRESENT ILLNESS
[Disease: _____________________] : Disease: [unfilled] [N: ___] : N[unfilled] [T: ___] : T[unfilled] [AJCC Stage: ____] : AJCC Stage: [unfilled] [de-identified] : 62 M with no significant past medical history, developed progressive abdominal pain in March 2017. In May 2017 he went to an urgent care center and was referred for a CT A/P w/co. This was performed on 5/8/17 and demonstrated a circumferential 6.7 cm mass at the splenic flexure of the colon causing "apple core" luminal narrowing of the colon at that level c/w malignancy. A 2.7 x 2.2 x 2.5 cm ill defined LN below the pancreatic body at the level of the splenic vein is most consistent with a metastasis. On May 9, 2017, Haja saw Dr. Morgan Armenta, colorectal surgery who performed a colonoscopy on 5/15/17. Biopsy c/w invasive moderately differentiated colonic adenocarcinoma a/w necrosis. A PET/CT was performed on 5/30/17 which showed splenic flexure colonic hypermetabolic mass c/w primary carcinoma (SUV 35) and 2.5 cm mesenteric LN hypermetabolic focus c/w regional metastatic disease (SUV 5). \par \par On 6/13/17 patient underwent a open left colectomy, omentectomy and mobilization of the splenic flexure. Final pathology T3NO moderately differentiated carcinoma, loss of nuclear expression of MLH1 and PMS2. No adjuvant therapy was given. Post op developed SOB, hypoxia transferred to the SICU, placed on bipap. A CTA chest/abd/pelvis 6/15/17 c/w Nonspecific patchy opacity in RLL was noted, 3.4 cm collection or cystic lesion in the pancreatic body for which follow up imaging was suggested. \par \par Post op, Haja continued to complain of abdominal pain and an MRI A/P was performed on 7/25/17 which showed a 4.6 cm hyperintense structure inseparable from the distal pancreatic body. The lesion is located in the peripancreatic retroperitoneal space with imaging characteristics suggestive of pancreatic pseudocyst. He was evaluated by surg onc, Dr. Hager and referred for a CT pancreatic protocol which was performed on 9/5 and showed a peripherally enhancing mass in the body of the pancreas measures 4.6 x 3.5 cm. Subsequently had an EUS with FNA on 10/6/17 (GI- Dr. Luz) which confirmed moderately differentiated adenocarcinoma.\par \par On 11/9/17 underwent open ex lap for possible resection of pancreatic adenoca, however, SMA was encased by tumor and therefore could not be resected. Liver bx was performed and negative for malignancy, however, noted to have steatosis with mild steatohepatitis, port inflammation, focal periportal and pericellular fibrosis,2 + iron deposition. \par \par Patient was subsequently referred to rad onc and med onc for further evaluation. \par \par 8 cycles of mFOLFIRINOX 12/5/17 (25% dose reduction due to PS) - 3/13/18 with stable/mild improvement in disease \par \par 3/27/18-5/29/18 : C1-C4 Nivo 3mg/kg (240 mg flat dose)/Ipi 1mg/kg \par 5/31/18: CTCAP: decr in size of pancreatic mass, previously 3.6 to 2.5, new mediastinal LN\par 6/12/18: EBUS with bx of LN: reactive \par 6/26/18-: Nivo single agent Q 2 weeks \par 11/5 - 11/19: hospitalized at [facility] with herpes zoster involving the R periorbital area, no ocular involvement. opiods stopped abruptly s/p withdrawal characterized by diarrhea/n/v. Developed NICOLE (2/2 acyclovir?) with resolution. \par 11/30/18: restarted Nivolumab Q 2 weeks\par 12/13/18: last dose of Nivolumab. \par 12/28/18: Nivolumab held due to Grade 2-3 arthralgias/myalgias. Started on Prednisone 40 mg BID taper over 8 weeks.  \par 1/3/19: CTCAP: interval regression of pancreatic mass and mediastinal LN\par 2/8/19: Nivolumab restarted \par 4/3/19: CT CAP: enlargement of mediastinal LN, ground glass opacities b/l \par 4/6/19: Nivolumab d/c due to myalgias and ?pneumonitis \par \par \par  [de-identified] : moderately differentiated adenocarcinoma [de-identified] : 11/24/17: CA 19.9 - 81.3  CEA 29 [de-identified] : June 2017 - colon resection of tumor T3N0 (0/15 LN) moderately differentiated, loss of nuclear expression of MLH1, PMS2. \par    - IHC:  CK19, and variably positive for CDX2, CK7 and CK20, \par   + BRAF\par \par Nov 2017- attempted pancreatic resection:  CK7, CK20 and CDX2 are positive; CK20 is negative\par     - loss of nuclear expression of MLH1, PMS2.\par \par [facility] - failed report  [de-identified] : Overall continues to feel well. Weight is stable. Arthralgias have improved. Appetite is good. Energy level is unchanged. \par Denies any change in BMs.  [FreeTextEntry1] : surveillance

## 2019-05-18 PROBLEM — M79.10 MYALGIA: Status: ACTIVE | Noted: 2019-04-01

## 2019-05-18 NOTE — HISTORY OF PRESENT ILLNESS
[Disease: _____________________] : Disease: [unfilled] [T: ___] : T[unfilled] [N: ___] : N[unfilled] [AJCC Stage: ____] : AJCC Stage: [unfilled] [de-identified] : 62 M with no significant past medical history, developed progressive abdominal pain in March 2017. In May 2017 he went to an urgent care center and was referred for a CT A/P w/co. This was performed on 5/8/17 and demonstrated a circumferential 6.7 cm mass at the splenic flexure of the colon causing "apple core" luminal narrowing of the colon at that level c/w malignancy. A 2.7 x 2.2 x 2.5 cm ill defined LN below the pancreatic body at the level of the splenic vein is most consistent with a metastasis. On May 9, 2017, Haja saw Dr. Morgan Armenta, colorectal surgery who performed a colonoscopy on 5/15/17. Biopsy c/w invasive moderately differentiated colonic adenocarcinoma a/w necrosis. A PET/CT was performed on 5/30/17 which showed splenic flexure colonic hypermetabolic mass c/w primary carcinoma (SUV 35) and 2.5 cm mesenteric LN hypermetabolic focus c/w regional metastatic disease (SUV 5). \par \par On 6/13/17 patient underwent a open left colectomy, omentectomy and mobilization of the splenic flexure. Final pathology T3NO moderately differentiated carcinoma, loss of nuclear expression of MLH1 and PMS2. No adjuvant therapy was given. Post op developed SOB, hypoxia transferred to the SICU, placed on bipap. A CTA chest/abd/pelvis 6/15/17 c/w Nonspecific patchy opacity in RLL was noted, 3.4 cm collection or cystic lesion in the pancreatic body for which follow up imaging was suggested. \par \par Post op, Haja continued to complain of abdominal pain and an MRI A/P was performed on 7/25/17 which showed a 4.6 cm hyperintense structure inseparable from the distal pancreatic body. The lesion is located in the peripancreatic retroperitoneal space with imaging characteristics suggestive of pancreatic pseudocyst. He was evaluated by surg onc, Dr. Hager and referred for a CT pancreatic protocol which was performed on 9/5 and showed a peripherally enhancing mass in the body of the pancreas measures 4.6 x 3.5 cm. Subsequently had an EUS with FNA on 10/6/17 (GI- Dr. Luz) which confirmed moderately differentiated adenocarcinoma.\par \par On 11/9/17 underwent open ex lap for possible resection of pancreatic adenoca, however, SMA was encased by tumor and therefore could not be resected. Liver bx was performed and negative for malignancy, however, noted to have steatosis with mild steatohepatitis, port inflammation, focal periportal and pericellular fibrosis,2 + iron deposition. \par \par Patient was subsequently referred to rad onc and med onc for further evaluation. \par \par 8 cycles of mFOLFIRINOX 12/5/17 (25% dose reduction due to PS) - 3/13/18 with stable/mild improvement in disease \par \par 3/27/18-5/29/18 : C1-C4 Nivo 3mg/kg (240 mg flat dose)/Ipi 1mg/kg \par 5/31/18: CTCAP: decr in size of pancreatic mass, previously 3.6 to 2.5, new mediastinal LN\par 6/12/18: EBUS with bx of LN: reactive \par 6/26/18-: Nivo single agent Q 2 weeks \par 11/5 - 11/19: hospitalized at [facility] with herpes zoster involving the R periorbital area, no ocular involvement. opiods stopped abruptly s/p withdrawal characterized by diarrhea/n/v. Developed NICOLE (2/2 acyclovir?) with resolution. \par 11/30/18: restarted Nivolumab Q 2 weeks\par 12/13/18: last dose of Nivolumab. \par 12/28/18: Nivolumab held due to Grade 2-3 arthralgias/myalgias. Started on Prednisone 40 mg BID taper over 8 weeks.  \par 1/3/19: CTCAP: interval regression of pancreatic mass and mediastinal LN\par 2/8/19: Nivolumab restarted \par 4/3/19: CT CAP: enlargement of mediastinal LN, ground glass opacities b/l \par 4/6/19: Nivolumab d/c due to myalgias and ?pneumonitis \par \par \par \par  [de-identified] : 11/24/17: CA 19.9 - 81.3  CEA 29 [de-identified] : moderately differentiated adenocarcinoma [de-identified] : Has been off steroids for 15 days, mild myalgias but does not interfere with ADLs. He feels a little tired, but remains active. \par Good appetite. Gained another 10 lbs. HAILE has resolved. Denies any abdominal pain. Has occasional diarrhea.  [de-identified] : June 2017 - colon resection of tumor T3N0 (0/15 LN) moderately differentiated, loss of nuclear expression of MLH1, PMS2. \par    - IHC:  CK19, and variably positive for CDX2, CK7 and CK20, \par   + BRAF\par \par Nov 2017- attempted pancreatic resection:  CK7, CK20 and CDX2 are positive; CK20 is negative\par     - loss of nuclear expression of MLH1, PMS2.\par \par [facility] - failed report

## 2019-06-09 ENCOUNTER — FORM ENCOUNTER (OUTPATIENT)
Age: 64
End: 2019-06-09

## 2019-06-10 ENCOUNTER — APPOINTMENT (OUTPATIENT)
Dept: CT IMAGING | Facility: IMAGING CENTER | Age: 64
End: 2019-06-10
Payer: MEDICARE

## 2019-06-10 ENCOUNTER — OUTPATIENT (OUTPATIENT)
Dept: OUTPATIENT SERVICES | Facility: HOSPITAL | Age: 64
LOS: 1 days | End: 2019-06-10
Payer: MEDICARE

## 2019-06-10 DIAGNOSIS — Z98.890 OTHER SPECIFIED POSTPROCEDURAL STATES: Chronic | ICD-10-CM

## 2019-06-10 DIAGNOSIS — Z90.49 ACQUIRED ABSENCE OF OTHER SPECIFIED PARTS OF DIGESTIVE TRACT: Chronic | ICD-10-CM

## 2019-06-10 DIAGNOSIS — C25.9 MALIGNANT NEOPLASM OF PANCREAS, UNSPECIFIED: ICD-10-CM

## 2019-06-10 DIAGNOSIS — K25.5 CHRONIC OR UNSPECIFIED GASTRIC ULCER WITH PERFORATION: Chronic | ICD-10-CM

## 2019-06-10 PROCEDURE — 74177 CT ABD & PELVIS W/CONTRAST: CPT

## 2019-06-10 PROCEDURE — 71260 CT THORAX DX C+: CPT

## 2019-06-10 PROCEDURE — 71260 CT THORAX DX C+: CPT | Mod: 26

## 2019-06-10 PROCEDURE — 74177 CT ABD & PELVIS W/CONTRAST: CPT | Mod: 26

## 2019-06-13 ENCOUNTER — OUTPATIENT (OUTPATIENT)
Dept: OUTPATIENT SERVICES | Facility: HOSPITAL | Age: 64
LOS: 1 days | Discharge: HOME | End: 2019-06-13

## 2019-06-13 DIAGNOSIS — Z98.890 OTHER SPECIFIED POSTPROCEDURAL STATES: Chronic | ICD-10-CM

## 2019-06-13 DIAGNOSIS — Z90.49 ACQUIRED ABSENCE OF OTHER SPECIFIED PARTS OF DIGESTIVE TRACT: Chronic | ICD-10-CM

## 2019-06-13 DIAGNOSIS — C25.9 MALIGNANT NEOPLASM OF PANCREAS, UNSPECIFIED: ICD-10-CM

## 2019-06-13 DIAGNOSIS — K25.5 CHRONIC OR UNSPECIFIED GASTRIC ULCER WITH PERFORATION: Chronic | ICD-10-CM

## 2019-06-15 ENCOUNTER — TRANSCRIPTION ENCOUNTER (OUTPATIENT)
Age: 64
End: 2019-06-15

## 2019-06-18 ENCOUNTER — RESULT REVIEW (OUTPATIENT)
Age: 64
End: 2019-06-18

## 2019-06-18 ENCOUNTER — APPOINTMENT (OUTPATIENT)
Dept: HEMATOLOGY ONCOLOGY | Facility: CLINIC | Age: 64
End: 2019-06-18
Payer: MEDICARE

## 2019-06-18 VITALS
WEIGHT: 218.35 LBS | TEMPERATURE: 98.1 F | DIASTOLIC BLOOD PRESSURE: 82 MMHG | BODY MASS INDEX: 33.2 KG/M2 | OXYGEN SATURATION: 97 % | SYSTOLIC BLOOD PRESSURE: 130 MMHG | HEART RATE: 97 BPM | RESPIRATION RATE: 18 BRPM

## 2019-06-18 LAB
BASOPHILS # BLD AUTO: 0 K/UL — SIGNIFICANT CHANGE UP (ref 0–0.2)
BASOPHILS NFR BLD AUTO: 0.7 % — SIGNIFICANT CHANGE UP (ref 0–2)
EOSINOPHIL # BLD AUTO: 0.5 K/UL — SIGNIFICANT CHANGE UP (ref 0–0.5)
EOSINOPHIL NFR BLD AUTO: 8.1 % — HIGH (ref 0–6)
HCT VFR BLD CALC: 42.4 % — SIGNIFICANT CHANGE UP (ref 39–50)
HGB BLD-MCNC: 15.4 G/DL — SIGNIFICANT CHANGE UP (ref 13–17)
LYMPHOCYTES # BLD AUTO: 2 K/UL — SIGNIFICANT CHANGE UP (ref 1–3.3)
LYMPHOCYTES # BLD AUTO: 35.4 % — SIGNIFICANT CHANGE UP (ref 13–44)
MCHC RBC-ENTMCNC: 31.2 PG — SIGNIFICANT CHANGE UP (ref 27–34)
MCHC RBC-ENTMCNC: 36.2 G/DL — HIGH (ref 32–36)
MCV RBC AUTO: 86.3 FL — SIGNIFICANT CHANGE UP (ref 80–100)
MONOCYTES # BLD AUTO: 0.5 K/UL — SIGNIFICANT CHANGE UP (ref 0–0.9)
MONOCYTES NFR BLD AUTO: 9.4 % — SIGNIFICANT CHANGE UP (ref 2–14)
NEUTROPHILS # BLD AUTO: 2.7 K/UL — SIGNIFICANT CHANGE UP (ref 1.8–7.4)
NEUTROPHILS NFR BLD AUTO: 46.4 % — SIGNIFICANT CHANGE UP (ref 43–77)
PLATELET # BLD AUTO: 224 K/UL — SIGNIFICANT CHANGE UP (ref 150–400)
RBC # BLD: 4.92 M/UL — SIGNIFICANT CHANGE UP (ref 4.2–5.8)
RBC # FLD: 13.4 % — SIGNIFICANT CHANGE UP (ref 10.3–14.5)
WBC # BLD: 5.8 K/UL — SIGNIFICANT CHANGE UP (ref 3.8–10.5)
WBC # FLD AUTO: 5.8 K/UL — SIGNIFICANT CHANGE UP (ref 3.8–10.5)

## 2019-06-18 PROCEDURE — 99213 OFFICE O/P EST LOW 20 MIN: CPT

## 2019-06-20 LAB
ALBUMIN SERPL ELPH-MCNC: 4.4 G/DL
ALP BLD-CCNC: 53 U/L
ALT SERPL-CCNC: 12 U/L
ANION GAP SERPL CALC-SCNC: 16 MMOL/L
AST SERPL-CCNC: 21 U/L
BILIRUB SERPL-MCNC: 0.6 MG/DL
BUN SERPL-MCNC: 13 MG/DL
CALCIUM SERPL-MCNC: 9.8 MG/DL
CANCER AG19-9 SERPL-ACNC: 14 U/ML
CEA SERPL-MCNC: 2.5 NG/ML
CHLORIDE SERPL-SCNC: 104 MMOL/L
CO2 SERPL-SCNC: 21 MMOL/L
CREAT SERPL-MCNC: 0.84 MG/DL
GLUCOSE SERPL-MCNC: 88 MG/DL
POTASSIUM SERPL-SCNC: 4.1 MMOL/L
PROT SERPL-MCNC: 6.8 G/DL
SODIUM SERPL-SCNC: 141 MMOL/L

## 2019-07-15 ENCOUNTER — OUTPATIENT (OUTPATIENT)
Dept: OUTPATIENT SERVICES | Facility: HOSPITAL | Age: 64
LOS: 1 days | Discharge: HOME | End: 2019-07-15

## 2019-07-15 DIAGNOSIS — Z98.890 OTHER SPECIFIED POSTPROCEDURAL STATES: Chronic | ICD-10-CM

## 2019-07-15 DIAGNOSIS — K25.5 CHRONIC OR UNSPECIFIED GASTRIC ULCER WITH PERFORATION: Chronic | ICD-10-CM

## 2019-07-15 DIAGNOSIS — Z90.49 ACQUIRED ABSENCE OF OTHER SPECIFIED PARTS OF DIGESTIVE TRACT: Chronic | ICD-10-CM

## 2019-07-15 DIAGNOSIS — C25.9 MALIGNANT NEOPLASM OF PANCREAS, UNSPECIFIED: ICD-10-CM

## 2019-07-17 ENCOUNTER — APPOINTMENT (OUTPATIENT)
Dept: HEMATOLOGY ONCOLOGY | Facility: CLINIC | Age: 64
End: 2019-07-17
Payer: MEDICARE

## 2019-07-17 VITALS
HEART RATE: 83 BPM | TEMPERATURE: 98.1 F | RESPIRATION RATE: 16 BRPM | SYSTOLIC BLOOD PRESSURE: 97 MMHG | WEIGHT: 211.64 LBS | OXYGEN SATURATION: 97 % | BODY MASS INDEX: 32.18 KG/M2 | DIASTOLIC BLOOD PRESSURE: 60 MMHG

## 2019-07-17 DIAGNOSIS — Z87.898 PERSONAL HISTORY OF OTHER SPECIFIED CONDITIONS: ICD-10-CM

## 2019-07-17 DIAGNOSIS — R53.83 OTHER FATIGUE: ICD-10-CM

## 2019-07-17 PROCEDURE — 99214 OFFICE O/P EST MOD 30 MIN: CPT

## 2019-07-17 RX ORDER — PREDNISONE 10 MG/1
10 TABLET ORAL DAILY
Qty: 20 | Refills: 1 | Status: DISCONTINUED | COMMUNITY
Start: 2018-12-28 | End: 2019-07-17

## 2019-07-17 RX ORDER — LORAZEPAM 0.5 MG/1
0.5 TABLET ORAL AT BEDTIME
Qty: 30 | Refills: 0 | Status: DISCONTINUED | COMMUNITY
Start: 2018-10-19 | End: 2019-07-17

## 2019-07-17 RX ORDER — GABAPENTIN 100 MG/1
100 CAPSULE ORAL
Refills: 0 | Status: DISCONTINUED | COMMUNITY
Start: 2018-11-23 | End: 2019-07-17

## 2019-07-18 ENCOUNTER — RESULT REVIEW (OUTPATIENT)
Age: 64
End: 2019-07-18

## 2019-07-18 ENCOUNTER — APPOINTMENT (OUTPATIENT)
Age: 64
End: 2019-07-18

## 2019-07-18 LAB
ALBUMIN SERPL ELPH-MCNC: 4.1 G/DL
ALP BLD-CCNC: 53 U/L
ALT SERPL-CCNC: 12 U/L
ANION GAP SERPL CALC-SCNC: 14 MMOL/L
AST SERPL-CCNC: 22 U/L
BASOPHILS # BLD AUTO: 0.1 K/UL — SIGNIFICANT CHANGE UP (ref 0–0.2)
BASOPHILS NFR BLD AUTO: 1.1 % — SIGNIFICANT CHANGE UP (ref 0–2)
BILIRUB SERPL-MCNC: 0.6 MG/DL
BUN SERPL-MCNC: 15 MG/DL
CALCIUM SERPL-MCNC: 9.5 MG/DL
CANCER AG19-9 SERPL-ACNC: 11 U/ML
CEA SERPL-MCNC: 1.3 NG/ML
CHLORIDE SERPL-SCNC: 108 MMOL/L
CO2 SERPL-SCNC: 19 MMOL/L
CREAT SERPL-MCNC: 0.78 MG/DL
EOSINOPHIL # BLD AUTO: 0.3 K/UL — SIGNIFICANT CHANGE UP (ref 0–0.5)
EOSINOPHIL NFR BLD AUTO: 5.7 % — SIGNIFICANT CHANGE UP (ref 0–6)
GLUCOSE SERPL-MCNC: 98 MG/DL
HCT VFR BLD CALC: 41.8 % — SIGNIFICANT CHANGE UP (ref 39–50)
HGB BLD-MCNC: 14.1 G/DL — SIGNIFICANT CHANGE UP (ref 13–17)
LYMPHOCYTES # BLD AUTO: 2 K/UL — SIGNIFICANT CHANGE UP (ref 1–3.3)
LYMPHOCYTES # BLD AUTO: 39.5 % — SIGNIFICANT CHANGE UP (ref 13–44)
MCHC RBC-ENTMCNC: 29.3 PG — SIGNIFICANT CHANGE UP (ref 27–34)
MCHC RBC-ENTMCNC: 33.7 G/DL — SIGNIFICANT CHANGE UP (ref 32–36)
MCV RBC AUTO: 87.2 FL — SIGNIFICANT CHANGE UP (ref 80–100)
MONOCYTES # BLD AUTO: 0.5 K/UL — SIGNIFICANT CHANGE UP (ref 0–0.9)
MONOCYTES NFR BLD AUTO: 10 % — SIGNIFICANT CHANGE UP (ref 2–14)
NEUTROPHILS # BLD AUTO: 2.2 K/UL — SIGNIFICANT CHANGE UP (ref 1.8–7.4)
NEUTROPHILS NFR BLD AUTO: 43.7 % — SIGNIFICANT CHANGE UP (ref 43–77)
PLATELET # BLD AUTO: 181 K/UL — SIGNIFICANT CHANGE UP (ref 150–400)
POTASSIUM SERPL-SCNC: 4 MMOL/L
PROT SERPL-MCNC: 6.4 G/DL
RBC # BLD: 4.8 M/UL — SIGNIFICANT CHANGE UP (ref 4.2–5.8)
RBC # FLD: 12.9 % — SIGNIFICANT CHANGE UP (ref 10.3–14.5)
SODIUM SERPL-SCNC: 141 MMOL/L
TSH SERPL-ACNC: 1.83 UIU/ML
WBC # BLD: 5.1 K/UL — SIGNIFICANT CHANGE UP (ref 3.8–10.5)
WBC # FLD AUTO: 5.1 K/UL — SIGNIFICANT CHANGE UP (ref 3.8–10.5)

## 2019-07-18 NOTE — HISTORY OF PRESENT ILLNESS
[Disease: _____________________] : Disease: [unfilled] [T: ___] : T[unfilled] [N: ___] : N[unfilled] [AJCC Stage: ____] : AJCC Stage: [unfilled] [de-identified] : 62 M with no significant past medical history, developed progressive abdominal pain in March 2017. In May 2017 he went to an urgent care center and was referred for a CT A/P w/co. This was performed on 5/8/17 and demonstrated a circumferential 6.7 cm mass at the splenic flexure of the colon causing "apple core" luminal narrowing of the colon at that level c/w malignancy. A 2.7 x 2.2 x 2.5 cm ill defined LN below the pancreatic body at the level of the splenic vein is most consistent with a metastasis. On May 9, 2017, Haja saw Dr. Morgan Armenta, colorectal surgery who performed a colonoscopy on 5/15/17. Biopsy c/w invasive moderately differentiated colonic adenocarcinoma a/w necrosis. A PET/CT was performed on 5/30/17 which showed splenic flexure colonic hypermetabolic mass c/w primary carcinoma (SUV 35) and 2.5 cm mesenteric LN hypermetabolic focus c/w regional metastatic disease (SUV 5). \par \par On 6/13/17 patient underwent a open left colectomy, omentectomy and mobilization of the splenic flexure. Final pathology T3NO moderately differentiated carcinoma, loss of nuclear expression of MLH1 and PMS2. No adjuvant therapy was given. Post op developed SOB, hypoxia transferred to the SICU, placed on bipap. A CTA chest/abd/pelvis 6/15/17 c/w Nonspecific patchy opacity in RLL was noted, 3.4 cm collection or cystic lesion in the pancreatic body for which follow up imaging was suggested. \par \par Post op, Haja continued to complain of abdominal pain and an MRI A/P was performed on 7/25/17 which showed a 4.6 cm hyperintense structure inseparable from the distal pancreatic body. The lesion is located in the peripancreatic retroperitoneal space with imaging characteristics suggestive of pancreatic pseudocyst. He was evaluated by surg onc, Dr. Hager and referred for a CT pancreatic protocol which was performed on 9/5 and showed a peripherally enhancing mass in the body of the pancreas measures 4.6 x 3.5 cm. Subsequently had an EUS with FNA on 10/6/17 (GI- Dr. uLz) which confirmed moderately differentiated adenocarcinoma.\par \par On 11/9/17 underwent open ex lap for possible resection of pancreatic adenoca, however, SMA was encased by tumor and therefore could not be resected. Liver bx was performed and negative for malignancy, however, noted to have steatosis with mild steatohepatitis, port inflammation, focal periportal and pericellular fibrosis,2 + iron deposition. \par \par Patient was subsequently referred to rad onc and med onc for further evaluation. \par \par 8 cycles of mFOLFIRINOX 12/5/17 (25% dose reduction due to PS) - 3/13/18 with stable/mild improvement in disease \par \par 3/27/18-5/29/18 : C1-C4 Nivo 3mg/kg (240 mg flat dose)/Ipi 1mg/kg \par 5/31/18: CTCAP: decr in size of pancreatic mass, previously 3.6 to 2.5, new mediastinal LN\par 6/12/18: EBUS with bx of LN: reactive \par 6/26/18-: Nivo single agent Q 2 weeks \par 11/5 - 11/19: hospitalized at [facility] with herpes zoster involving the R periorbital area, no ocular involvement. opiods stopped abruptly s/p withdrawal characterized by diarrhea/n/v. Developed NICOLE (2/2 acyclovir?) with resolution. \par 11/30/18: restarted Nivolumab Q 2 weeks\par 12/13/18: last dose of Nivolumab. \par 12/28/18: Nivolumab held due to Grade 2-3 arthralgias/myalgias. Started on Prednisone 40 mg BID taper over 8 weeks.  \par 1/3/19: CTCAP: interval regression of pancreatic mass and mediastinal LN\par 2/8/19: Nivolumab restarted \par 4/3/19: CT CAP: enlargement of mediastinal LN, ground glass opacities b/l \par 4/6/19: Nivolumab d/c due to myalgias and ?pneumonitis \par 6/10/19: CTCAP: stable pancreatic mass, improvement in mediastinal LN \par \par \par \par  [de-identified] : moderately differentiated adenocarcinoma [de-identified] : 11/24/17: CA 19.9 - 81.3  CEA 29 [de-identified] : June 2017 - colon resection of tumor T3N0 (0/15 LN) moderately differentiated, loss of nuclear expression of MLH1, PMS2. \par    - IHC:  CK19, and variably positive for CDX2, CK7 and CK20, \par   + BRAF\par \par Nov 2017- attempted pancreatic resection:  CK7, CK20 and CDX2 are positive; CK20 is negative\par     - loss of nuclear expression of MLH1, PMS2.\par \par [facility] - failed report  [FreeTextEntry1] : surveillance  [de-identified] : sleeping a lot, previously had difficulty falling asleep. Has lost 8 lbs intentionally. Mild arthralgias still persist, but does not interfere with activities. Normal bowel movements. \par Denies any abdominal pain. \par We reviewed results of CT scan.

## 2019-07-18 NOTE — HISTORY OF PRESENT ILLNESS
[Disease: _____________________] : Disease: [unfilled] [T: ___] : T[unfilled] [N: ___] : N[unfilled] [AJCC Stage: ____] : AJCC Stage: [unfilled] [de-identified] : 62 M with no significant past medical history, developed progressive abdominal pain in March 2017. In May 2017 he went to an urgent care center and was referred for a CT A/P w/co. This was performed on 5/8/17 and demonstrated a circumferential 6.7 cm mass at the splenic flexure of the colon causing "apple core" luminal narrowing of the colon at that level c/w malignancy. A 2.7 x 2.2 x 2.5 cm ill defined LN below the pancreatic body at the level of the splenic vein is most consistent with a metastasis. On May 9, 2017, Haja saw Dr. Morgan Armenta, colorectal surgery who performed a colonoscopy on 5/15/17. Biopsy c/w invasive moderately differentiated colonic adenocarcinoma a/w necrosis. A PET/CT was performed on 5/30/17 which showed splenic flexure colonic hypermetabolic mass c/w primary carcinoma (SUV 35) and 2.5 cm mesenteric LN hypermetabolic focus c/w regional metastatic disease (SUV 5). \par \par On 6/13/17 patient underwent a open left colectomy, omentectomy and mobilization of the splenic flexure. Final pathology T3NO moderately differentiated carcinoma, loss of nuclear expression of MLH1 and PMS2. No adjuvant therapy was given. Post op developed SOB, hypoxia transferred to the SICU, placed on bipap. A CTA chest/abd/pelvis 6/15/17 c/w Nonspecific patchy opacity in RLL was noted, 3.4 cm collection or cystic lesion in the pancreatic body for which follow up imaging was suggested. \par \par Post op, Haja continued to complain of abdominal pain and an MRI A/P was performed on 7/25/17 which showed a 4.6 cm hyperintense structure inseparable from the distal pancreatic body. The lesion is located in the peripancreatic retroperitoneal space with imaging characteristics suggestive of pancreatic pseudocyst. He was evaluated by surg onc, Dr. Hager and referred for a CT pancreatic protocol which was performed on 9/5 and showed a peripherally enhancing mass in the body of the pancreas measures 4.6 x 3.5 cm. Subsequently had an EUS with FNA on 10/6/17 (GI- Dr. Luz) which confirmed moderately differentiated adenocarcinoma.\par \par On 11/9/17 underwent open ex lap for possible resection of pancreatic adenoca, however, SMA was encased by tumor and therefore could not be resected. Liver bx was performed and negative for malignancy, however, noted to have steatosis with mild steatohepatitis, port inflammation, focal periportal and pericellular fibrosis,2 + iron deposition. \par \par Patient was subsequently referred to rad onc and med onc for further evaluation. \par \par 8 cycles of mFOLFIRINOX 12/5/17 (25% dose reduction due to PS) - 3/13/18 with stable/mild improvement in disease \par \par 3/27/18-5/29/18 : C1-C4 Nivo 3mg/kg (240 mg flat dose)/Ipi 1mg/kg \par 5/31/18: CTCAP: decr in size of pancreatic mass, previously 3.6 to 2.5, new mediastinal LN\par 6/12/18: EBUS with bx of LN: reactive \par 6/26/18-: Nivo single agent Q 2 weeks \par 11/5 - 11/19: hospitalized at [facility] with herpes zoster involving the R periorbital area, no ocular involvement. opiods stopped abruptly s/p withdrawal characterized by diarrhea/n/v. Developed NICOLE (2/2 acyclovir?) with resolution. \par 11/30/18: restarted Nivolumab Q 2 weeks\par 12/13/18: last dose of Nivolumab. \par 12/28/18: Nivolumab held due to Grade 2-3 arthralgias/myalgias. Started on Prednisone 40 mg BID taper over 8 weeks.  \par 1/3/19: CTCAP: interval regression of pancreatic mass and mediastinal LN\par 2/8/19: Nivolumab restarted \par 4/3/19: CT CAP: enlargement of mediastinal LN, ground glass opacities b/l \par 4/6/19: Nivolumab d/c due to myalgias and ?pneumonitis \par 6/10/19: CTCAP: stable pancreatic mass, improvement in mediastinal LN \par \par \par \par  [de-identified] : moderately differentiated adenocarcinoma [de-identified] : 11/24/17: CA 19.9 - 81.3  CEA 29 [de-identified] : June 2017 - colon resection of tumor T3N0 (0/15 LN) moderately differentiated, loss of nuclear expression of MLH1, PMS2. \par    - IHC:  CK19, and variably positive for CDX2, CK7 and CK20, \par   + BRAF\par \par Nov 2017- attempted pancreatic resection:  CK7, CK20 and CDX2 are positive; CK20 is negative\par     - loss of nuclear expression of MLH1, PMS2.\par \par [facility] - failed report  [FreeTextEntry1] : surveillance  [de-identified] : sleeping a lot, previously had difficulty falling asleep. Has lost 8 lbs intentionally. Mild arthralgias still persist, but does not interfere with activities. Normal bowel movements. \par Denies any abdominal pain. \par We reviewed results of CT scan.

## 2019-08-01 PROBLEM — R53.83 FATIGUE: Status: ACTIVE | Noted: 2019-08-01

## 2019-08-01 PROBLEM — Z87.898 HISTORY OF INSOMNIA: Status: RESOLVED | Noted: 2019-01-31 | Resolved: 2019-08-01

## 2019-08-14 ENCOUNTER — APPOINTMENT (OUTPATIENT)
Dept: DERMATOLOGY | Facility: CLINIC | Age: 64
End: 2019-08-14
Payer: MEDICARE

## 2019-08-14 ENCOUNTER — OTHER (OUTPATIENT)
Age: 64
End: 2019-08-14

## 2019-08-14 ENCOUNTER — OUTPATIENT (OUTPATIENT)
Dept: OUTPATIENT SERVICES | Facility: HOSPITAL | Age: 64
LOS: 1 days | Discharge: HOME | End: 2019-08-14

## 2019-08-14 VITALS — WEIGHT: 211 LBS | HEIGHT: 68 IN | BODY MASS INDEX: 31.98 KG/M2

## 2019-08-14 DIAGNOSIS — Z90.49 ACQUIRED ABSENCE OF OTHER SPECIFIED PARTS OF DIGESTIVE TRACT: Chronic | ICD-10-CM

## 2019-08-14 DIAGNOSIS — C25.9 MALIGNANT NEOPLASM OF PANCREAS, UNSPECIFIED: ICD-10-CM

## 2019-08-14 DIAGNOSIS — Z98.890 OTHER SPECIFIED POSTPROCEDURAL STATES: Chronic | ICD-10-CM

## 2019-08-14 DIAGNOSIS — L82.1 OTHER SEBORRHEIC KERATOSIS: ICD-10-CM

## 2019-08-14 DIAGNOSIS — K25.5 CHRONIC OR UNSPECIFIED GASTRIC ULCER WITH PERFORATION: Chronic | ICD-10-CM

## 2019-08-14 PROCEDURE — 99213 OFFICE O/P EST LOW 20 MIN: CPT

## 2019-08-16 ENCOUNTER — RESULT REVIEW (OUTPATIENT)
Age: 64
End: 2019-08-16

## 2019-08-16 ENCOUNTER — APPOINTMENT (OUTPATIENT)
Age: 64
End: 2019-08-16

## 2019-08-16 ENCOUNTER — APPOINTMENT (OUTPATIENT)
Dept: HEMATOLOGY ONCOLOGY | Facility: CLINIC | Age: 64
End: 2019-08-16
Payer: MEDICARE

## 2019-08-16 ENCOUNTER — LABORATORY RESULT (OUTPATIENT)
Age: 64
End: 2019-08-16

## 2019-08-16 VITALS
SYSTOLIC BLOOD PRESSURE: 112 MMHG | BODY MASS INDEX: 31.34 KG/M2 | OXYGEN SATURATION: 97 % | HEART RATE: 78 BPM | TEMPERATURE: 98.3 F | RESPIRATION RATE: 16 BRPM | WEIGHT: 206.13 LBS | DIASTOLIC BLOOD PRESSURE: 70 MMHG

## 2019-08-16 LAB
BASOPHILS # BLD AUTO: 0.1 K/UL — SIGNIFICANT CHANGE UP (ref 0–0.2)
BASOPHILS NFR BLD AUTO: 1 % — SIGNIFICANT CHANGE UP (ref 0–2)
EOSINOPHIL # BLD AUTO: 0.4 K/UL — SIGNIFICANT CHANGE UP (ref 0–0.5)
EOSINOPHIL NFR BLD AUTO: 7.9 % — HIGH (ref 0–6)
HCT VFR BLD CALC: 38.7 % — LOW (ref 39–50)
HGB BLD-MCNC: 13.5 G/DL — SIGNIFICANT CHANGE UP (ref 13–17)
LYMPHOCYTES # BLD AUTO: 2.2 K/UL — SIGNIFICANT CHANGE UP (ref 1–3.3)
LYMPHOCYTES # BLD AUTO: 38.5 % — SIGNIFICANT CHANGE UP (ref 13–44)
MCHC RBC-ENTMCNC: 30.8 PG — SIGNIFICANT CHANGE UP (ref 27–34)
MCHC RBC-ENTMCNC: 34.7 G/DL — SIGNIFICANT CHANGE UP (ref 32–36)
MCV RBC AUTO: 88.6 FL — SIGNIFICANT CHANGE UP (ref 80–100)
MONOCYTES # BLD AUTO: 0.5 K/UL — SIGNIFICANT CHANGE UP (ref 0–0.9)
MONOCYTES NFR BLD AUTO: 9.4 % — SIGNIFICANT CHANGE UP (ref 2–14)
NEUTROPHILS # BLD AUTO: 2.4 K/UL — SIGNIFICANT CHANGE UP (ref 1.8–7.4)
NEUTROPHILS NFR BLD AUTO: 43.2 % — SIGNIFICANT CHANGE UP (ref 43–77)
PLATELET # BLD AUTO: 199 K/UL — SIGNIFICANT CHANGE UP (ref 150–400)
RBC # BLD: 4.37 M/UL — SIGNIFICANT CHANGE UP (ref 4.2–5.8)
RBC # FLD: 13.8 % — SIGNIFICANT CHANGE UP (ref 10.3–14.5)
WBC # BLD: 5.6 K/UL — SIGNIFICANT CHANGE UP (ref 3.8–10.5)
WBC # FLD AUTO: 5.6 K/UL — SIGNIFICANT CHANGE UP (ref 3.8–10.5)

## 2019-08-16 PROCEDURE — 99214 OFFICE O/P EST MOD 30 MIN: CPT

## 2019-08-16 NOTE — PROGRESS NOTE ADULT - SUBJECTIVE AND OBJECTIVE BOX
Interventional Radiology  Pre-Procedure Note    HPI:  62yM w/ PMH sig for colon CA s/p L colectomy approximately 4 months ago, Pancreatic CA s/p robotic to open distal pancreatectomy 3 weeks ago aborted when mass was found to incorporate SMA. Pt presented to [facility] ED on 11/29/2017 after seeing his oncologist and having a CT scan concerning for gastric perforation. Pt has been having abdominal pain for the past 4 months. Denies any change in characteristics of pain. States he has poor PO intake over the past 4 months secondary to decreased appetite. Has lost approximately 65 pounds over the past 4 months. Pt presents to IR for port placement for chemo administration per medicine & oncology request.  Pt a & o x 3. Informed consent obtained from pt after risks, benefits, alternatives discussion comprehension.    Last lovenox 40 mg 11 am 12/3.    NPO since 12/3 evening.    Allergies: No Known Allergies    Afebrile with stable vital signs.    PAST MEDICAL & SURGICAL HISTORY:  Colon cancer  Cancer of pancreas  Gastric perforation  H/O exploratory laparotomy  H/O colectomy    Current Medications: diphenhydrAMINE   Capsule 25 milliGRAM(s) Oral at bedtime PRN  docusate sodium 100 milliGRAM(s) Oral at bedtime  enoxaparin Injectable 40 milliGRAM(s) SubCutaneous daily  HYDROmorphone   Tablet 14 milliGRAM(s) Oral every 3 hours PRN  HYDROmorphone  Injectable 1 milliGRAM(s) IV Push every 3 hours PRN  lactulose Syrup 15 Gram(s) Oral daily  losartan 25 milliGRAM(s) Oral daily  methadone    Tablet 5 milliGRAM(s) Oral every 8 hours  pantoprazole  Injectable 40 milliGRAM(s) IV Push two times a day  polyethylene glycol 3350 17 Gram(s) Oral daily  senna 2 Tablet(s) Oral at bedtime    FAMILY HISTORY:  No pertinent family history in first degree relatives      Labs:                         13.3   8.90  )-----------( 391      ( 03 Dec 2017 08:24 )             37.6       12-03    134<L>  |  98  |  13  ----------------------------<  98  4.2   |  23  |  0.63    Ca    9.9      03 Dec 2017 08:19            Assessment/Plan:   This is a 62 year old male with history of colon CA s/p sx approximately 4 months ago  & pancreatic CA s/p aborted robotic whipple  last month due to tumor around SMA requiring chemotherapy.  Patient presents to IR for port placement with anesthesia.      Yoana DUENAS BC  ext [phone]  # [phone] = independent

## 2019-08-16 NOTE — PHYSICAL EXAM
[Fully active, able to carry on all pre-disease performance without restriction] : Status 0 - Fully active, able to carry on all pre-disease performance without restriction [Normal] : affect appropriate [de-identified] : limited abduction of R shoulder, soft mobile cyst on outer R upper arm above the elbow

## 2019-08-16 NOTE — HISTORY OF PRESENT ILLNESS
[Disease: _____________________] : Disease: [unfilled] [T: ___] : T[unfilled] [N: ___] : N[unfilled] [AJCC Stage: ____] : AJCC Stage: [unfilled] [de-identified] : 62 M with no significant past medical history, developed progressive abdominal pain in March 2017. In May 2017 he went to an urgent care center and was referred for a CT A/P w/co. This was performed on 5/8/17 and demonstrated a circumferential 6.7 cm mass at the splenic flexure of the colon causing "apple core" luminal narrowing of the colon at that level c/w malignancy. A 2.7 x 2.2 x 2.5 cm ill defined LN below the pancreatic body at the level of the splenic vein is most consistent with a metastasis. On May 9, 2017, Haja saw Dr. Morgan Armenta, colorectal surgery who performed a colonoscopy on 5/15/17. Biopsy c/w invasive moderately differentiated colonic adenocarcinoma a/w necrosis. A PET/CT was performed on 5/30/17 which showed splenic flexure colonic hypermetabolic mass c/w primary carcinoma (SUV 35) and 2.5 cm mesenteric LN hypermetabolic focus c/w regional metastatic disease (SUV 5). \par \par On 6/13/17 patient underwent a open left colectomy, omentectomy and mobilization of the splenic flexure. Final pathology T3NO moderately differentiated carcinoma, loss of nuclear expression of MLH1 and PMS2. No adjuvant therapy was given. Post op developed SOB, hypoxia transferred to the SICU, placed on bipap. A CTA chest/abd/pelvis 6/15/17 c/w Nonspecific patchy opacity in RLL was noted, 3.4 cm collection or cystic lesion in the pancreatic body for which follow up imaging was suggested. \par \par Post op, Haja continued to complain of abdominal pain and an MRI A/P was performed on 7/25/17 which showed a 4.6 cm hyperintense structure inseparable from the distal pancreatic body. The lesion is located in the peripancreatic retroperitoneal space with imaging characteristics suggestive of pancreatic pseudocyst. He was evaluated by surg onc, Dr. Hager and referred for a CT pancreatic protocol which was performed on 9/5 and showed a peripherally enhancing mass in the body of the pancreas measures 4.6 x 3.5 cm. Subsequently had an EUS with FNA on 10/6/17 (GI- Dr. Luz) which confirmed moderately differentiated adenocarcinoma.\par \par On 11/9/17 underwent open ex lap for possible resection of pancreatic adenoca, however, SMA was encased by tumor and therefore could not be resected. Liver bx was performed and negative for malignancy, however, noted to have steatosis with mild steatohepatitis, port inflammation, focal periportal and pericellular fibrosis,2 + iron deposition. \par \par Patient was subsequently referred to rad onc and med onc for further evaluation. \par \par 8 cycles of mFOLFIRINOX 12/5/17 (25% dose reduction due to PS) - 3/13/18 with stable/mild improvement in disease \par \par 3/27/18-5/29/18 : C1-C4 Nivo 3mg/kg (240 mg flat dose)/Ipi 1mg/kg \par 5/31/18: CTCAP: decr in size of pancreatic mass, previously 3.6 to 2.5, new mediastinal LN\par 6/12/18: EBUS with bx of LN: reactive \par 6/26/18-: Nivo single agent Q 2 weeks \par 11/5 - 11/19: hospitalized at [facility] with herpes zoster involving the R periorbital area, no ocular involvement. opiods stopped abruptly s/p withdrawal characterized by diarrhea/n/v. Developed NICOLE (2/2 acyclovir?) with resolution. \par 11/30/18: restarted Nivolumab Q 2 weeks\par 12/13/18: last dose of Nivolumab. \par 12/28/18: Nivolumab held due to Grade 2-3 arthralgias/myalgias. Started on Prednisone 40 mg BID taper over 8 weeks.  \par 1/3/19: CTCAP: interval regression of pancreatic mass and mediastinal LN\par 2/8/19: Nivolumab restarted \par 4/3/19: CT CAP: enlargement of mediastinal LN, ground glass opacities b/l \par 4/6/19: Nivolumab d/c due to myalgias and ?pneumonitis \par 6/10/19: CT CAP: stable/improved disease\par \par \par \par  [de-identified] : moderately differentiated adenocarcinoma [de-identified] : 11/24/17: CA 19.9 - 81.3  CEA 29 [de-identified] : June 2017 - colon resection of tumor T3N0 (0/15 LN) moderately differentiated, loss of nuclear expression of MLH1, PMS2. \par    - IHC:  CK19, and variably positive for CDX2, CK7 and CK20, \par   + BRAF\par \par Nov 2017- attempted pancreatic resection:  CK7, CK20 and CDX2 are positive; CK20 is negative\par     - loss of nuclear expression of MLH1, PMS2.\par \par [facility] - failed report  [FreeTextEntry1] : surveillance  [de-identified] : PT referral resubmitted (previous ) on  \par lost 4 lbs but intentionally. Trying to eat healthier. Appetite is good. \par Still tired no worse, sleeps during the day but up at night watching TV\par right anterior ac pain. Difficulty lifting arm up, but can bend at the elbow without problems.\par Difficultly making a fist with R hand due to pain\par Noticed a soft lump in his upper arm, non tender. Not sure how long has been there. \par denies any abdominal pain. \par Requesting letter for WC and refill of his meds.

## 2019-08-28 ENCOUNTER — NON-APPOINTMENT (OUTPATIENT)
Age: 64
End: 2019-08-28

## 2019-08-28 ENCOUNTER — APPOINTMENT (OUTPATIENT)
Dept: OPHTHALMOLOGY | Facility: CLINIC | Age: 64
End: 2019-08-28
Payer: MEDICARE

## 2019-08-28 PROCEDURE — 92012 INTRM OPH EXAM EST PATIENT: CPT

## 2019-08-31 ENCOUNTER — OUTPATIENT (OUTPATIENT)
Dept: OUTPATIENT SERVICES | Facility: HOSPITAL | Age: 64
LOS: 1 days | End: 2019-08-31
Payer: MEDICARE

## 2019-08-31 ENCOUNTER — APPOINTMENT (OUTPATIENT)
Dept: MRI IMAGING | Facility: IMAGING CENTER | Age: 64
End: 2019-08-31
Payer: MEDICARE

## 2019-08-31 DIAGNOSIS — Z98.890 OTHER SPECIFIED POSTPROCEDURAL STATES: Chronic | ICD-10-CM

## 2019-08-31 DIAGNOSIS — Z90.49 ACQUIRED ABSENCE OF OTHER SPECIFIED PARTS OF DIGESTIVE TRACT: Chronic | ICD-10-CM

## 2019-08-31 DIAGNOSIS — K25.5 CHRONIC OR UNSPECIFIED GASTRIC ULCER WITH PERFORATION: Chronic | ICD-10-CM

## 2019-08-31 DIAGNOSIS — M25.511 PAIN IN RIGHT SHOULDER: ICD-10-CM

## 2019-08-31 DIAGNOSIS — M25.50 PAIN IN UNSPECIFIED JOINT: ICD-10-CM

## 2019-08-31 DIAGNOSIS — M79.10 MYALGIA, UNSPECIFIED SITE: ICD-10-CM

## 2019-08-31 DIAGNOSIS — M54.2 CERVICALGIA: ICD-10-CM

## 2019-08-31 PROCEDURE — 73221 MRI JOINT UPR EXTREM W/O DYE: CPT

## 2019-08-31 PROCEDURE — 73221 MRI JOINT UPR EXTREM W/O DYE: CPT | Mod: 26,RT

## 2019-09-10 ENCOUNTER — OTHER (OUTPATIENT)
Age: 64
End: 2019-09-10

## 2019-09-20 ENCOUNTER — OUTPATIENT (OUTPATIENT)
Dept: OUTPATIENT SERVICES | Facility: HOSPITAL | Age: 64
LOS: 1 days | Discharge: HOME | End: 2019-09-20

## 2019-09-20 DIAGNOSIS — K25.5 CHRONIC OR UNSPECIFIED GASTRIC ULCER WITH PERFORATION: Chronic | ICD-10-CM

## 2019-09-20 DIAGNOSIS — Z98.890 OTHER SPECIFIED POSTPROCEDURAL STATES: Chronic | ICD-10-CM

## 2019-09-20 DIAGNOSIS — Z90.49 ACQUIRED ABSENCE OF OTHER SPECIFIED PARTS OF DIGESTIVE TRACT: Chronic | ICD-10-CM

## 2019-09-20 DIAGNOSIS — C25.9 MALIGNANT NEOPLASM OF PANCREAS, UNSPECIFIED: ICD-10-CM

## 2019-09-23 ENCOUNTER — APPOINTMENT (OUTPATIENT)
Age: 64
End: 2019-09-23
Payer: MEDICARE

## 2019-09-23 VITALS
BODY MASS INDEX: 30.47 KG/M2 | WEIGHT: 200.4 LBS | HEART RATE: 62 BPM | OXYGEN SATURATION: 97 % | SYSTOLIC BLOOD PRESSURE: 151 MMHG | DIASTOLIC BLOOD PRESSURE: 86 MMHG | TEMPERATURE: 97.3 F | RESPIRATION RATE: 16 BRPM

## 2019-09-23 DIAGNOSIS — M25.511 PAIN IN RIGHT SHOULDER: ICD-10-CM

## 2019-09-23 PROCEDURE — 99214 OFFICE O/P EST MOD 30 MIN: CPT

## 2019-09-25 ENCOUNTER — FORM ENCOUNTER (OUTPATIENT)
Age: 64
End: 2019-09-25

## 2019-09-26 ENCOUNTER — OUTPATIENT (OUTPATIENT)
Dept: OUTPATIENT SERVICES | Facility: HOSPITAL | Age: 64
LOS: 1 days | End: 2019-09-26
Payer: MEDICARE

## 2019-09-26 ENCOUNTER — APPOINTMENT (OUTPATIENT)
Dept: CT IMAGING | Facility: IMAGING CENTER | Age: 64
End: 2019-09-26
Payer: MEDICARE

## 2019-09-26 DIAGNOSIS — Z90.49 ACQUIRED ABSENCE OF OTHER SPECIFIED PARTS OF DIGESTIVE TRACT: Chronic | ICD-10-CM

## 2019-09-26 DIAGNOSIS — C25.9 MALIGNANT NEOPLASM OF PANCREAS, UNSPECIFIED: ICD-10-CM

## 2019-09-26 DIAGNOSIS — Z98.890 OTHER SPECIFIED POSTPROCEDURAL STATES: Chronic | ICD-10-CM

## 2019-09-26 DIAGNOSIS — K25.5 CHRONIC OR UNSPECIFIED GASTRIC ULCER WITH PERFORATION: Chronic | ICD-10-CM

## 2019-09-26 PROCEDURE — 71260 CT THORAX DX C+: CPT | Mod: 26

## 2019-09-26 PROCEDURE — 74177 CT ABD & PELVIS W/CONTRAST: CPT | Mod: 26

## 2019-09-26 PROCEDURE — 71260 CT THORAX DX C+: CPT

## 2019-09-26 PROCEDURE — 74177 CT ABD & PELVIS W/CONTRAST: CPT

## 2019-09-26 PROCEDURE — 82565 ASSAY OF CREATININE: CPT

## 2019-10-06 PROBLEM — M25.511 SHOULDER PAIN, RIGHT: Status: ACTIVE | Noted: 2019-06-18

## 2019-10-06 NOTE — CONSULT LETTER
[Dear  ___] : Dear  [unfilled], [Courtesy Letter:] : I had the pleasure of seeing your patient, [unfilled], in my office today. [Please see my note below.] : Please see my note below. [Consult Closing:] : Thank you very much for allowing me to participate in the care of this patient.  If you have any questions, please do not hesitate to contact me. [Sincerely,] : Sincerely, [FreeTextEntry3] : Louann Maloney D.O. \par Attending Physician \par [facility] H[facility]ology\par  \par [facility] \par Tel: [phone]\par Fax: [phone]\par

## 2019-10-06 NOTE — HISTORY OF PRESENT ILLNESS
[Disease: _____________________] : Disease: [unfilled] [T: ___] : T[unfilled] [N: ___] : N[unfilled] [AJCC Stage: ____] : AJCC Stage: [unfilled] [de-identified] : 62 M with no significant past medical history, developed progressive abdominal pain in March 2017. In May 2017 he went to an urgent care center and was referred for a CT A/P w/co. This was performed on 5/8/17 and demonstrated a circumferential 6.7 cm mass at the splenic flexure of the colon causing "apple core" luminal narrowing of the colon at that level c/w malignancy. A 2.7 x 2.2 x 2.5 cm ill defined LN below the pancreatic body at the level of the splenic vein is most consistent with a metastasis. On May 9, 2017, Haja saw Dr. Morgan Armenta, colorectal surgery who performed a colonoscopy on 5/15/17. Biopsy c/w invasive moderately differentiated colonic adenocarcinoma a/w necrosis. A PET/CT was performed on 5/30/17 which showed splenic flexure colonic hypermetabolic mass c/w primary carcinoma (SUV 35) and 2.5 cm mesenteric LN hypermetabolic focus c/w regional metastatic disease (SUV 5). \par \par On 6/13/17 patient underwent a open left colectomy, omentectomy and mobilization of the splenic flexure. Final pathology T3NO moderately differentiated carcinoma, loss of nuclear expression of MLH1 and PMS2. No adjuvant therapy was given. Post op developed SOB, hypoxia transferred to the SICU, placed on bipap. A CTA chest/abd/pelvis 6/15/17 c/w Nonspecific patchy opacity in RLL was noted, 3.4 cm collection or cystic lesion in the pancreatic body for which follow up imaging was suggested. \par \par Post op, Haja continued to complain of abdominal pain and an MRI A/P was performed on 7/25/17 which showed a 4.6 cm hyperintense structure inseparable from the distal pancreatic body. The lesion is located in the peripancreatic retroperitoneal space with imaging characteristics suggestive of pancreatic pseudocyst. He was evaluated by surg onc, Dr. Hager and referred for a CT pancreatic protocol which was performed on 9/5 and showed a peripherally enhancing mass in the body of the pancreas measures 4.6 x 3.5 cm. Subsequently had an EUS with FNA on 10/6/17 (GI- Dr. Luz) which confirmed moderately differentiated adenocarcinoma.\par \par On 11/9/17 underwent open ex lap for possible resection of pancreatic adenoca, however, SMA was encased by tumor and therefore could not be resected. Liver bx was performed and negative for malignancy, however, noted to have steatosis with mild steatohepatitis, port inflammation, focal periportal and pericellular fibrosis,2 + iron deposition. \par \par Patient was subsequently referred to rad onc and med onc for further evaluation. \par \par 8 cycles of mFOLFIRINOX 12/5/17 (25% dose reduction due to PS) - 3/13/18 with stable/mild improvement in disease \par \par 3/27/18-5/29/18 : C1-C4 Nivo 3mg/kg (240 mg flat dose)/Ipi 1mg/kg \par 5/31/18: CTCAP: decr in size of pancreatic mass, previously 3.6 to 2.5, new mediastinal LN\par 6/12/18: EBUS with bx of LN: reactive \par 6/26/18-: Nivo single agent Q 2 weeks \par 11/5 - 11/19: hospitalized at [facility] with herpes zoster involving the R periorbital area, no ocular involvement. opiods stopped abruptly s/p withdrawal characterized by diarrhea/n/v. Developed NICOLE (2/2 acyclovir?) with resolution. \par 11/30/18: restarted Nivolumab Q 2 weeks\par 12/13/18: last dose of Nivolumab. \par 12/28/18: Nivolumab held due to Grade 2-3 arthralgias/myalgias. Started on Prednisone 40 mg BID taper over 8 weeks.  \par 1/3/19: CTCAP: interval regression of pancreatic mass and mediastinal LN\par 2/8/19: Nivolumab restarted \par 4/3/19: CT CAP: enlargement of mediastinal LN, ground glass opacities b/l \par 4/6/19: Nivolumab d/c due to myalgias and ?pneumonitis \par 6/10/19: CT CAP: stable/improved disease\par \par \par \par  [de-identified] : moderately differentiated adenocarcinoma [de-identified] : 11/24/17: CA 19.9 - 81.3  CEA 29 [de-identified] : June 2017 - colon resection of tumor T3N0 (0/15 LN) moderately differentiated, loss of nuclear expression of MLH1, PMS2. \par    - IHC:  CK19, and variably positive for CDX2, CK7 and CK20, \par   + BRAF\par \par Nov 2017- attempted pancreatic resection:  CK7, CK20 and CDX2 are positive; CK20 is negative\par     - loss of nuclear expression of MLH1, PMS2.\par \par [facility] - failed report  [FreeTextEntry1] : surveillance due to inability to tolerate immunotherapy  [de-identified] : c/o persistent R shoulder pain, b/l hand stiffness, pain. Lost 9 lbs intentionally since last visit. \par c/o feeling tired. Sleeps sometimes during the day. Denies any change in BMs. Denies any abdominal pain.

## 2019-10-06 NOTE — PHYSICAL EXAM
[Fully active, able to carry on all pre-disease performance without restriction] : Status 0 - Fully active, able to carry on all pre-disease performance without restriction [Normal] : affect appropriate [de-identified] : limited ROM of R shoulder

## 2019-10-06 NOTE — REVIEW OF SYSTEMS
[Fatigue] : fatigue [Joint Pain] : joint pain [Joint Stiffness] : joint stiffness [Negative] : Allergic/Immunologic

## 2019-10-16 ENCOUNTER — LABORATORY RESULT (OUTPATIENT)
Age: 64
End: 2019-10-16

## 2019-10-16 ENCOUNTER — RESULT REVIEW (OUTPATIENT)
Age: 64
End: 2019-10-16

## 2019-10-16 ENCOUNTER — TRANSCRIPTION ENCOUNTER (OUTPATIENT)
Age: 64
End: 2019-10-16

## 2019-10-16 ENCOUNTER — APPOINTMENT (OUTPATIENT)
Age: 64
End: 2019-10-16

## 2019-10-16 LAB
BASOPHILS # BLD AUTO: 0 K/UL — SIGNIFICANT CHANGE UP (ref 0–0.2)
BASOPHILS NFR BLD AUTO: 0.8 % — SIGNIFICANT CHANGE UP (ref 0–2)
EOSINOPHIL # BLD AUTO: 0.3 K/UL — SIGNIFICANT CHANGE UP (ref 0–0.5)
EOSINOPHIL NFR BLD AUTO: 5.6 % — SIGNIFICANT CHANGE UP (ref 0–6)
HCT VFR BLD CALC: 38.9 % — LOW (ref 39–50)
HGB BLD-MCNC: 13.8 G/DL — SIGNIFICANT CHANGE UP (ref 13–17)
LYMPHOCYTES # BLD AUTO: 2.1 K/UL — SIGNIFICANT CHANGE UP (ref 1–3.3)
LYMPHOCYTES # BLD AUTO: 38.1 % — SIGNIFICANT CHANGE UP (ref 13–44)
MCHC RBC-ENTMCNC: 31.4 PG — SIGNIFICANT CHANGE UP (ref 27–34)
MCHC RBC-ENTMCNC: 35.6 G/DL — SIGNIFICANT CHANGE UP (ref 32–36)
MCV RBC AUTO: 88.2 FL — SIGNIFICANT CHANGE UP (ref 80–100)
MONOCYTES # BLD AUTO: 0.5 K/UL — SIGNIFICANT CHANGE UP (ref 0–0.9)
MONOCYTES NFR BLD AUTO: 8.2 % — SIGNIFICANT CHANGE UP (ref 2–14)
NEUTROPHILS # BLD AUTO: 2.6 K/UL — SIGNIFICANT CHANGE UP (ref 1.8–7.4)
NEUTROPHILS NFR BLD AUTO: 47.2 % — SIGNIFICANT CHANGE UP (ref 43–77)
PLATELET # BLD AUTO: 158 K/UL — SIGNIFICANT CHANGE UP (ref 150–400)
RBC # BLD: 4.41 M/UL — SIGNIFICANT CHANGE UP (ref 4.2–5.8)
RBC # FLD: 12.3 % — SIGNIFICANT CHANGE UP (ref 10.3–14.5)
WBC # BLD: 5.6 K/UL — SIGNIFICANT CHANGE UP (ref 3.8–10.5)
WBC # FLD AUTO: 5.6 K/UL — SIGNIFICANT CHANGE UP (ref 3.8–10.5)

## 2019-10-17 ENCOUNTER — OUTPATIENT (OUTPATIENT)
Dept: OUTPATIENT SERVICES | Facility: HOSPITAL | Age: 64
LOS: 1 days | Discharge: HOME | End: 2019-10-17

## 2019-10-17 DIAGNOSIS — Z90.49 ACQUIRED ABSENCE OF OTHER SPECIFIED PARTS OF DIGESTIVE TRACT: Chronic | ICD-10-CM

## 2019-10-17 DIAGNOSIS — Z98.890 OTHER SPECIFIED POSTPROCEDURAL STATES: Chronic | ICD-10-CM

## 2019-10-17 DIAGNOSIS — K25.5 CHRONIC OR UNSPECIFIED GASTRIC ULCER WITH PERFORATION: Chronic | ICD-10-CM

## 2019-10-17 DIAGNOSIS — C25.9 MALIGNANT NEOPLASM OF PANCREAS, UNSPECIFIED: ICD-10-CM

## 2019-10-23 ENCOUNTER — APPOINTMENT (OUTPATIENT)
Dept: DERMATOLOGY | Facility: CLINIC | Age: 64
End: 2019-10-23
Payer: MEDICARE

## 2019-10-23 ENCOUNTER — APPOINTMENT (OUTPATIENT)
Dept: HEMATOLOGY ONCOLOGY | Facility: CLINIC | Age: 64
End: 2019-10-23
Payer: MEDICARE

## 2019-10-23 VITALS
RESPIRATION RATE: 16 BRPM | WEIGHT: 207.01 LBS | BODY MASS INDEX: 31.48 KG/M2 | HEART RATE: 71 BPM | DIASTOLIC BLOOD PRESSURE: 82 MMHG | TEMPERATURE: 97.5 F | SYSTOLIC BLOOD PRESSURE: 138 MMHG | OXYGEN SATURATION: 99 %

## 2019-10-23 DIAGNOSIS — L82.0 INFLAMED SEBORRHEIC KERATOSIS: ICD-10-CM

## 2019-10-23 DIAGNOSIS — Z86.39 PERSONAL HISTORY OF OTHER ENDOCRINE, NUTRITIONAL AND METABOLIC DISEASE: ICD-10-CM

## 2019-10-23 PROCEDURE — 99213 OFFICE O/P EST LOW 20 MIN: CPT | Mod: 25

## 2019-10-23 PROCEDURE — 17110 DESTRUCTION B9 LES UP TO 14: CPT

## 2019-10-23 PROCEDURE — 99214 OFFICE O/P EST MOD 30 MIN: CPT

## 2019-10-25 ENCOUNTER — APPOINTMENT (OUTPATIENT)
Dept: RHEUMATOLOGY | Facility: CLINIC | Age: 64
End: 2019-10-25
Payer: MEDICARE

## 2019-10-25 VITALS
HEIGHT: 68 IN | DIASTOLIC BLOOD PRESSURE: 82 MMHG | WEIGHT: 207 LBS | HEART RATE: 65 BPM | SYSTOLIC BLOOD PRESSURE: 145 MMHG | TEMPERATURE: 98.3 F | OXYGEN SATURATION: 97 % | BODY MASS INDEX: 31.37 KG/M2

## 2019-10-25 DIAGNOSIS — M79.89 OTHER SPECIFIED SOFT TISSUE DISORDERS: ICD-10-CM

## 2019-10-25 PROCEDURE — 73130 X-RAY EXAM OF HAND: CPT | Mod: LT

## 2019-10-25 PROCEDURE — 99204 OFFICE O/P NEW MOD 45 MIN: CPT

## 2019-10-28 LAB
ANA SER IF-ACNC: NEGATIVE
CCP AB SER IA-ACNC: <8 UNITS
CK SERPL-CCNC: 47 U/L
CRP SERPL-MCNC: 0.87 MG/DL
ERYTHROCYTE [SEDIMENTATION RATE] IN BLOOD BY WESTERGREN METHOD: 12 MM/HR
RF+CCP IGG SER-IMP: NEGATIVE
RHEUMATOID FACT SER QL: <10 IU/ML
URATE SERPL-MCNC: 6.3 MG/DL

## 2019-10-29 NOTE — PHYSICAL EXAM
[Fully active, able to carry on all pre-disease performance without restriction] : Status 0 - Fully active, able to carry on all pre-disease performance without restriction [Normal] : affect appropriate [de-identified] : + L breast with increased tissue c/w R breast, no discrete masses identified [de-identified] : limited ROM of R shoulder

## 2019-10-29 NOTE — HISTORY OF PRESENT ILLNESS
[Disease: _____________________] : Disease: [unfilled] [T: ___] : T[unfilled] [N: ___] : N[unfilled] [AJCC Stage: ____] : AJCC Stage: [unfilled] [de-identified] : 62 M with no significant past medical history, developed progressive abdominal pain in March 2017. In May 2017 he went to an urgent care center and was referred for a CT A/P w/co. This was performed on 5/8/17 and demonstrated a circumferential 6.7 cm mass at the splenic flexure of the colon causing "apple core" luminal narrowing of the colon at that level c/w malignancy. A 2.7 x 2.2 x 2.5 cm ill defined LN below the pancreatic body at the level of the splenic vein is most consistent with a metastasis. On May 9, 2017, Haja saw Dr. Morgan Armenta, colorectal surgery who performed a colonoscopy on 5/15/17. Biopsy c/w invasive moderately differentiated colonic adenocarcinoma a/w necrosis. A PET/CT was performed on 5/30/17 which showed splenic flexure colonic hypermetabolic mass c/w primary carcinoma (SUV 35) and 2.5 cm mesenteric LN hypermetabolic focus c/w regional metastatic disease (SUV 5). \par \par On 6/13/17 patient underwent a open left colectomy, omentectomy and mobilization of the splenic flexure. Final pathology T3NO moderately differentiated carcinoma, loss of nuclear expression of MLH1 and PMS2. No adjuvant therapy was given. Post op developed SOB, hypoxia transferred to the SICU, placed on bipap. A CTA chest/abd/pelvis 6/15/17 c/w Nonspecific patchy opacity in RLL was noted, 3.4 cm collection or cystic lesion in the pancreatic body for which follow up imaging was suggested. \par \par Post op, Haja continued to complain of abdominal pain and an MRI A/P was performed on 7/25/17 which showed a 4.6 cm hyperintense structure inseparable from the distal pancreatic body. The lesion is located in the peripancreatic retroperitoneal space with imaging characteristics suggestive of pancreatic pseudocyst. He was evaluated by surg onc, Dr. Hager and referred for a CT pancreatic protocol which was performed on 9/5 and showed a peripherally enhancing mass in the body of the pancreas measures 4.6 x 3.5 cm. Subsequently had an EUS with FNA on 10/6/17 (GI- Dr. Luz) which confirmed moderately differentiated adenocarcinoma.\par \par On 11/9/17 underwent open ex lap for possible resection of pancreatic adenoca, however, SMA was encased by tumor and therefore could not be resected. Liver bx was performed and negative for malignancy, however, noted to have steatosis with mild steatohepatitis, port inflammation, focal periportal and pericellular fibrosis,2 + iron deposition. \par \par Patient was subsequently referred to rad onc and med onc for further evaluation. \par \par 12/5/17-3/13/18: 8 cycles of mFOLFIRINOX (25% dose reduction due to PS) with stable/mild improvement in disease \par \par 3/27/18-5/29/18 : C1-C4 Nivo 3mg/kg (240 mg flat dose)/Ipi 1mg/kg \par 5/31/18: CTCAP: decr in size of pancreatic mass, previously 3.6 to 2.5, new mediastinal LN\par 6/12/18: EBUS with bx of LN: reactive \par 6/26/18-: Nivo single agent Q 2 weeks \par 11/5 - 11/19: hospitalized at [facility] with herpes zoster involving the R periorbital area, no ocular involvement. opiods stopped abruptly s/p withdrawal characterized by diarrhea/n/v. Developed NICOLE (2/2 acyclovir?) with resolution. \par 11/30/18: restarted Nivolumab Q 2 weeks\par 12/13/18: last dose of Nivolumab. \par 12/28/18: Nivolumab held due to Grade 2-3 arthralgias/myalgias. Started on Prednisone 40 mg BID taper over 8 weeks.  \par 1/3/19: CTCAP: interval regression of pancreatic mass and mediastinal LN\par 2/8/19: Nivolumab restarted \par 4/3/19: CT CAP: enlargement of mediastinal LN, ground glass opacities b/l \par 4/6/19: Nivolumab d/c due to myalgias and ?pneumonitis \par 6/10/19: CT CAP: stable/improved disease\par 9/26/19: CT CAP: stable pancreatic mass, stable mediastinal LN, new L gynecomastia \par \par \par \par  [de-identified] : moderately differentiated adenocarcinoma [de-identified] : 11/24/17: CA 19.9 - 81.3  CEA 29 [de-identified] : June 2017 - colon resection of tumor T3N0 (0/15 LN) moderately differentiated, loss of nuclear expression of MLH1, PMS2. \par    - IHC:  CK19, and variably positive for CDX2, CK7 and CK20, \par   + BRAF\par \par Nov 2017- attempted pancreatic resection:  CK7, CK20 and CDX2 are positive; CK20 is negative\par     - loss of nuclear expression of MLH1, PMS2.\par \par [facility] - failed report  [FreeTextEntry1] : surveillance due to inability to tolerate immunotherapy  [de-identified] : cont to experience R shoulder pain, arthralgias. R shoulder pain unchanged. Saw PCP who recommended rheum eval but pt did not go bc it was a physician affiliated with [facility] and he wants to keep with docs at [facility]. \par Sleeping at night better since starting to take Setraline at night. \par Denies any abdominal pain, change in BMs. Gained 6 lbs since last visit. \par has noticed an enlargement of L breast compared with R breast. Does not palpate discreet lesions\par

## 2019-10-31 ENCOUNTER — APPOINTMENT (OUTPATIENT)
Dept: RHEUMATOLOGY | Facility: CLINIC | Age: 64
End: 2019-10-31
Payer: MEDICARE

## 2019-10-31 VITALS — SYSTOLIC BLOOD PRESSURE: 145 MMHG | DIASTOLIC BLOOD PRESSURE: 80 MMHG

## 2019-10-31 PROCEDURE — 76882 US LMTD JT/FCL EVL NVASC XTR: CPT | Mod: LT

## 2019-11-14 ENCOUNTER — OUTPATIENT (OUTPATIENT)
Dept: OUTPATIENT SERVICES | Facility: HOSPITAL | Age: 64
LOS: 1 days | Discharge: HOME | End: 2019-11-14

## 2019-11-14 DIAGNOSIS — K25.5 CHRONIC OR UNSPECIFIED GASTRIC ULCER WITH PERFORATION: Chronic | ICD-10-CM

## 2019-11-14 DIAGNOSIS — Z98.890 OTHER SPECIFIED POSTPROCEDURAL STATES: Chronic | ICD-10-CM

## 2019-11-14 DIAGNOSIS — Z90.49 ACQUIRED ABSENCE OF OTHER SPECIFIED PARTS OF DIGESTIVE TRACT: Chronic | ICD-10-CM

## 2019-11-14 DIAGNOSIS — C25.9 MALIGNANT NEOPLASM OF PANCREAS, UNSPECIFIED: ICD-10-CM

## 2019-11-17 NOTE — DIETITIAN INITIAL EVALUATION ADULT. - PROBLEM/PLAN-3
Hospitalist Progress Note NAME: Ish Selby :  1939 MRN:  [id number] Assessment / Plan: 
Acute on chronic systolic congestive heart failure; LVEF 31 to 35% in Aug 2019 -480 Coronary artery disease POA Monomorphic V. tach s/p AICD placement 2019 History of paroxysmal atrial fibrillation  
-IV lasix  
-continue on home Metoprolol, ASA, Plavix, Ranexa and statin 
-patient not on ACEI as BP would not previously tolerate 
-cards placedon midodrine. bp improved today. May need snf vs rehab. Cm consult placed 
  
Recurrent Falls: 
-PT/OT seen recs snf 
  
CKD stage III: 
-crt stable. Recheck while on iv lasix. 
  
Diabetes mellitus type 2 POA stable 
-Hold Janumet 
-Sliding scale insulin  
-Diabetic diet 
  
Essential hypertension low-stable 
history of renal artery stenosis left status post stenting  
Peripheral vascular disease Hypercholesterolemia No med changes today as bp improved. with midodrine. 
  
BPH: 
-continue on Flomax and finasteride 
  
Code Status: Full Surrogate Decision Maker: Wife 
  
DVT Prophylaxis: sq heparin GI Prophylaxis: not indicated 
  
Baseline: declining functional status   
  
  
  
 
  
 
 
18.5 - 24.9 Normal weight / Body mass index is 24.82 kg/m². Subjective: Chief Complaint / Reason for Physician Visit \"no reports of pain or chills. \". Discussed with RN events overnight. Review of Systems: 
Symptom Y/N Comments  Symptom Y/N Comments Fever/Chills    Chest Pain Poor Appetite    Edema Cough    Abdominal Pain Sputum    Joint Pain SOB/SIBLEY    Pruritis/Rash Nausea/vomit    Tolerating PT/OT Diarrhea    Tolerating Diet Constipation    Other Could NOT obtain due to:   
 
Objective: VITALS:  
Last 24hrs VS reviewed since prior progress note. Most recent are: 
Patient Vitals for the past 24 hrs: 
 Temp Pulse Resp BP SpO2  
19 0806 98 °F (36.7 °C) 73 20 105/54 97 % 11/17/19 0419 97.3 °F (36.3 °C) 70 20 102/50 96 % 11/16/19 2335 97.3 °F (36.3 °C) 74 20 112/54 97 % 11/16/19 1959 98 °F (36.7 °C) 74 18 115/57 96 % 11/16/19 1457 97.4 °F (36.3 °C) 66 18 106/53 94 % 11/16/19 1131 97.5 °F (36.4 °C) 70 18 109/60 100 % Intake/Output Summary (Last 24 hours) at 11/17/2019 5175 Last data filed at 11/17/2019 0000 Gross per 24 hour Intake 580 ml Output 1300 ml Net -720 ml PHYSICAL EXAM: 
General: WD, WN. Alert, cooperative, no acute distress   
EENT:  EOMI. Anicteric sclerae. MMM Resp:  CTA bilaterally, no wheezing or rales. No accessory muscle use CV:  Regular  rhythm,  No edema GI:  Soft, Non distended, Non tender.  +Bowel sounds Neurologic:  Alert and oriented X 3, normal speech, Psych:   Good insight. Not anxious nor agitated Skin:  No rashes. No jaundice Reviewed most current lab test results and cultures  YES Reviewed most current radiology test results   YES Review and summation of old records today    NO Reviewed patient's current orders and MAR    YES 
PMH/SH reviewed - no change compared to H&P 
________________________________________________________________________ Care Plan discussed with: 
  Comments Patient Family RN Care Manager Consultant Multidiciplinary team rounds were held today with , nursing, pharmacist and clinical coordinator. Patient's plan of care was discussed; medications were reviewed and discharge planning was addressed. ________________________________________________________________________ Total NON critical care TIME:  20   Minutes Total CRITICAL CARE TIME Spent:   Minutes non procedure based Comments >50% of visit spent in counseling and coordination of care    
________________________________________________________________________ Arshelia Seat, DO  
 
Procedures: see electronic medical records for all procedures/Xrays and details which were not copied into this note but were reviewed prior to creation of Plan. LABS: 
I reviewed today's most current labs and imaging studies. Pertinent labs include: 
Recent Labs 11/17/19 
[phone] 11/15/19 
0224 11/14/19 
1605 WBC 7.2 8.0 8.1 HGB 10.6* 10.7* 12.1 HCT 32.4* 32.5* 36.3*  
 175 180 Recent Labs 11/17/19 
994 41 [phone] 11/16/19 
1137 11/15/19 
0224 11/14/19 
1605  134* 134* 135* K 3.6 3.8 4.0 4.4  101 100 100 CO2 27 27 25 25 * 179* 141* 188* BUN 17 20 18 19 CREA 1.08 1.27 1.24 1.38* CA 8.2* 8.5 8.4* 8.6 MG  --   --  2.3  --   
ALB  --   --  2.8* 3.0* TBILI  --   --  1.5* 1.0  
SGOT  --   --  14* 16 ALT  --   --  14 17 Signed: Yovana Wilson DO 
 
 
 
 
 DISPLAY PLAN FREE TEXT

## 2019-11-18 ENCOUNTER — LABORATORY RESULT (OUTPATIENT)
Age: 64
End: 2019-11-18

## 2019-11-18 ENCOUNTER — APPOINTMENT (OUTPATIENT)
Dept: ENDOCRINOLOGY | Facility: CLINIC | Age: 64
End: 2019-11-18
Payer: MEDICARE

## 2019-11-18 VITALS
HEIGHT: 68 IN | DIASTOLIC BLOOD PRESSURE: 74 MMHG | WEIGHT: 211 LBS | BODY MASS INDEX: 31.98 KG/M2 | SYSTOLIC BLOOD PRESSURE: 126 MMHG | HEART RATE: 63 BPM | OXYGEN SATURATION: 98 %

## 2019-11-18 PROCEDURE — 99214 OFFICE O/P EST MOD 30 MIN: CPT

## 2019-11-20 ENCOUNTER — APPOINTMENT (OUTPATIENT)
Dept: HEMATOLOGY ONCOLOGY | Facility: CLINIC | Age: 64
End: 2019-11-20
Payer: MEDICARE

## 2019-11-20 VITALS
DIASTOLIC BLOOD PRESSURE: 76 MMHG | BODY MASS INDEX: 32.28 KG/M2 | WEIGHT: 212.28 LBS | OXYGEN SATURATION: 95 % | RESPIRATION RATE: 17 BRPM | HEART RATE: 77 BPM | TEMPERATURE: 98.9 F | SYSTOLIC BLOOD PRESSURE: 127 MMHG

## 2019-11-20 VITALS
OXYGEN SATURATION: 97 % | HEART RATE: 73 BPM | TEMPERATURE: 98.3 F | SYSTOLIC BLOOD PRESSURE: 127 MMHG | RESPIRATION RATE: 16 BRPM | WEIGHT: 209.88 LBS | BODY MASS INDEX: 31.91 KG/M2 | DIASTOLIC BLOOD PRESSURE: 79 MMHG

## 2019-11-20 DIAGNOSIS — M75.121 COMPLETE ROTATOR CUFF TEAR OR RUPTURE OF RIGHT SHOULDER, NOT SPECIFIED AS TRAUMATIC: ICD-10-CM

## 2019-11-20 DIAGNOSIS — F41.9 ANXIETY DISORDER, UNSPECIFIED: ICD-10-CM

## 2019-11-20 DIAGNOSIS — G47.9 SLEEP DISORDER, UNSPECIFIED: ICD-10-CM

## 2019-11-20 PROCEDURE — 99214 OFFICE O/P EST MOD 30 MIN: CPT

## 2019-11-20 RX ORDER — GABAPENTIN 300 MG/1
300 CAPSULE ORAL
Qty: 30 | Refills: 5 | Status: DISCONTINUED | COMMUNITY
Start: 2019-04-01 | End: 2019-11-20

## 2019-11-20 RX ORDER — DOCUSATE SODIUM 100 MG/1
100 CAPSULE ORAL TWICE DAILY
Qty: 60 | Refills: 1 | Status: DISCONTINUED | COMMUNITY
Start: 2018-09-18 | End: 2019-11-20

## 2019-11-20 RX ORDER — DOCUSATE SODIUM 100 MG/1
100 CAPSULE ORAL TWICE DAILY
Qty: 60 | Refills: 3 | Status: DISCONTINUED | COMMUNITY
Start: 2018-12-07 | End: 2019-11-20

## 2019-11-20 NOTE — DATA REVIEWED
[FreeTextEntry1] : 9/2019 - chest ct\par CHEST WALL AND LOWER NECK: Unilateral prominence of left breast tissue, new since prior exam.

## 2019-11-20 NOTE — ASSESSMENT
[FreeTextEntry1] : 64 year old man with pancreatic cancer  who developed thyroiditis after treatment with immunotherapy, \par   -Clinically euthyroid with elevated TSH one month ago\par  - Repeat TFTs ,consider levothyroxine\par \par Hypocortisolism -currently off prednisone, Repeat AM cortisol and acth \par \par Gynecomastia - check estradiol, lh, fsh, prolactin, testosterone, shbg\par \par f/u 6 months

## 2019-11-20 NOTE — PHYSICAL EXAM
[Alert] : alert [No Acute Distress] : no acute distress [Well Developed] : well developed [Well Nourished] : well nourished [Normal Sclera/Conjunctiva] : normal sclera/conjunctiva [No Proptosis] : no proptosis [Thyroid Not Enlarged] : the thyroid was not enlarged [No Respiratory Distress] : no respiratory distress [Clear to Auscultation] : lungs were clear to auscultation bilaterally [Normal S1, S2] : normal S1 and S2 [Regular Rhythm] : with a regular rhythm [No Edema] : there was no peripheral edema [Normal Bowel Sounds] : normal bowel sounds [Soft] : abdomen soft [Not Tender] : non-tender [No Spinal Tenderness] : no spinal tenderness [Normal Strength/Tone] : muscle strength and tone were normal [Normal Reflexes] : deep tendon reflexes were 2+ and symmetric [No Tremors] : no tremors [Normal Affect] : the affect was normal [Normal Mood] : the mood was normal [de-identified] : decreased range of motion in fingers [de-identified] : left gynecomastia

## 2019-11-20 NOTE — HISTORY OF PRESENT ILLNESS
[FreeTextEntry1] : cc: abnormal thyroid tests\par \par 64 year old man with Pancreatic cancer, treated with immunotherapy ( previously treated with chemotherapy).  After  4 cycles of ipilimumab/nivolumab, blood tests showed hyperthyroidism. He had no symptoms and continued immunotherapy. Subsequent TSH levels were elevated and then TFTs returned to normal.  He had been on Nivo every two weeks, stopped in April 2019\par He has had continued inflammatory arthritis.  Was on prednisone, stopped one week ago\par Blood tests a month ago showed elevated TSH\par He reports no fatigue, constipation, cold intolerance, depression.  Has gained some weight again while on prednisone\par \par He has noted increased size of left breast, which was also noted on recent CT scan\par \par

## 2019-11-21 ENCOUNTER — APPOINTMENT (OUTPATIENT)
Dept: RHEUMATOLOGY | Facility: CLINIC | Age: 64
End: 2019-11-21
Payer: MEDICARE

## 2019-11-21 VITALS
DIASTOLIC BLOOD PRESSURE: 76 MMHG | BODY MASS INDEX: 31.67 KG/M2 | TEMPERATURE: 98.1 F | SYSTOLIC BLOOD PRESSURE: 121 MMHG | RESPIRATION RATE: 16 BRPM | HEART RATE: 78 BPM | OXYGEN SATURATION: 98 % | WEIGHT: 209 LBS | HEIGHT: 68 IN

## 2019-11-21 DIAGNOSIS — Z79.899 OTHER LONG TERM (CURRENT) DRUG THERAPY: ICD-10-CM

## 2019-11-21 DIAGNOSIS — M19.90 UNSPECIFIED OSTEOARTHRITIS, UNSPECIFIED SITE: ICD-10-CM

## 2019-11-21 DIAGNOSIS — Z92.89 PERSONAL HISTORY OF OTHER MEDICAL TREATMENT: ICD-10-CM

## 2019-11-21 PROCEDURE — 99213 OFFICE O/P EST LOW 20 MIN: CPT

## 2019-11-22 ENCOUNTER — MEDICATION RENEWAL (OUTPATIENT)
Age: 64
End: 2019-11-22

## 2019-11-22 DIAGNOSIS — E22.1 HYPERPROLACTINEMIA: ICD-10-CM

## 2019-11-22 LAB
ACTH SER-ACNC: <1.5 PG/ML
CORTIS SERPL-MCNC: 0.3 UG/DL
ESTRADIOL SERPL-MCNC: 16 PG/ML
FSH SERPL-MCNC: 7.4 IU/L
HCG SERPL-MCNC: <1 MIU/ML
LH SERPL-ACNC: 6.3 IU/L
PROLACTIN SERPL-MCNC: 25.3 NG/ML
SHBG SERPL-SCNC: 32 NMOL/L
T3RU NFR SERPL: 1.1 TBI
T4 SERPL-MCNC: 6.4 UG/DL
TESTOST SERPL-MCNC: 335 NG/DL
TSH SERPL-ACNC: 7.83 UIU/ML

## 2019-11-24 PROBLEM — G47.9 TROUBLE IN SLEEPING: Status: ACTIVE | Noted: 2019-11-24

## 2019-11-24 PROBLEM — F41.9 ANXIETY: Status: ACTIVE | Noted: 2017-12-15

## 2019-11-24 NOTE — HISTORY OF PRESENT ILLNESS
[FreeTextEntry1] : 64yoM with locally advanced pancreatic cancer on FOLFIRINOX presents for follow up visit. PMH also significant for BPH, occupational neck injury with chronic neck pain. Of note patient was found to have colonic mass at the splenic flexure in 5/2017 and in 6/2017 underwent an open L colectomy, omentectomy and mobilization of the splenic flexure. (Final pathology T3NO moderately differentiated carcinoma). Postoperative imaging elucidated the pancreatic mass.  s/p FOLFIRINOX, now on Nivo/Ipi.  \par Was hospitalized in 11/2018 with shingles of the face. \par Currently on surveillance since 4/2019 due to persistent arthralgias on immunotherapy. \par \par +continues to have joint pains, R arm pain - has been on a steroid taper\par \par ROS:\par +trouble sleeping \par +gynecomastia - following with Endo\par -anxiety - on sertraline 50mg\par Denies n/v, constipation, diarrhea, appetite loss\par \par I-Stop Ref#: [id number] \par Visit conducted in both [place], a shared common language

## 2019-11-24 NOTE — ASSESSMENT
[FreeTextEntry1] : 64yoM with:\par \par 1. Pancreatic Cancer, metastatic - s/p 8 cycles of mFOLFIRINOX (with Neulasta),  s/p 4 cycles of Nivolumab/Ipilimumab. Now on surveillance. \par \par 2. Anxiety - C/w Sertraline 50mg.\par \par 3. Insomnia - counseled patient on sleep hygiene\par \par 4. Arthralgias - on prednisone per Endo\par \par 5. Encounter for Palliative Care - HCP is daughter, Muriel Amezquita.  Emotional support provided. \par \par RTO 3 months

## 2019-11-24 NOTE — PHYSICAL EXAM
[General Appearance - In No Acute Distress] : in no acute distress [General Appearance - Alert] : alert [Sclera] : the sclera and conjunctiva were normal [Normal Oral Mucosa] : normal oral mucosa [Auscultation Breath Sounds / Voice Sounds] : lungs were clear to auscultation bilaterally [Neck Appearance] : the appearance of the neck was normal [Heart Rate And Rhythm] : heart rate was normal and rhythm regular [Bowel Sounds] : normal bowel sounds [Heart Sounds] : normal S1 and S2 [Edema] : there was no peripheral edema [Abdomen Tenderness] : non-tender [Abdomen Soft] : soft [Skin Color & Pigmentation] : normal skin color and pigmentation [Abnormal Walk] : normal gait [No Focal Deficits] : no focal deficits [Oriented To Time, Place, And Person] : oriented to person, place, and time [Affect] : the affect was normal [FreeTextEntry1] : mild chest congestion

## 2019-11-25 ENCOUNTER — APPOINTMENT (OUTPATIENT)
Dept: ORTHOPEDIC SURGERY | Facility: CLINIC | Age: 64
End: 2019-11-25
Payer: MEDICARE

## 2019-11-25 VITALS
BODY MASS INDEX: 31.98 KG/M2 | SYSTOLIC BLOOD PRESSURE: 132 MMHG | WEIGHT: 211 LBS | DIASTOLIC BLOOD PRESSURE: 63 MMHG | HEART RATE: 67 BPM | HEIGHT: 68 IN

## 2019-11-25 PROCEDURE — 99204 OFFICE O/P NEW MOD 45 MIN: CPT

## 2019-11-25 PROCEDURE — 73030 X-RAY EXAM OF SHOULDER: CPT | Mod: RT

## 2019-12-04 ENCOUNTER — OUTPATIENT (OUTPATIENT)
Dept: OUTPATIENT SERVICES | Facility: HOSPITAL | Age: 64
LOS: 1 days | End: 2019-12-04
Payer: MEDICARE

## 2019-12-04 VITALS
WEIGHT: 210.1 LBS | DIASTOLIC BLOOD PRESSURE: 66 MMHG | HEART RATE: 78 BPM | TEMPERATURE: 98 F | OXYGEN SATURATION: 98 % | HEIGHT: 68 IN | SYSTOLIC BLOOD PRESSURE: 152 MMHG | RESPIRATION RATE: 16 BRPM

## 2019-12-04 DIAGNOSIS — Z98.1 ARTHRODESIS STATUS: ICD-10-CM

## 2019-12-04 DIAGNOSIS — Z98.890 OTHER SPECIFIED POSTPROCEDURAL STATES: Chronic | ICD-10-CM

## 2019-12-04 DIAGNOSIS — D72.819 DECREASED WHITE BLOOD CELL COUNT, UNSPECIFIED: ICD-10-CM

## 2019-12-04 DIAGNOSIS — M75.121 COMPLETE ROTATOR CUFF TEAR OR RUPTURE OF RIGHT SHOULDER, NOT SPECIFIED AS TRAUMATIC: ICD-10-CM

## 2019-12-04 DIAGNOSIS — Z01.818 ENCOUNTER FOR OTHER PREPROCEDURAL EXAMINATION: ICD-10-CM

## 2019-12-04 DIAGNOSIS — K25.5 CHRONIC OR UNSPECIFIED GASTRIC ULCER WITH PERFORATION: Chronic | ICD-10-CM

## 2019-12-04 DIAGNOSIS — M25.511 PAIN IN RIGHT SHOULDER: ICD-10-CM

## 2019-12-04 DIAGNOSIS — Z90.49 ACQUIRED ABSENCE OF OTHER SPECIFIED PARTS OF DIGESTIVE TRACT: Chronic | ICD-10-CM

## 2019-12-04 LAB
ALBUMIN SERPL ELPH-MCNC: 3.4 G/DL — SIGNIFICANT CHANGE UP (ref 3.3–5)
ALP SERPL-CCNC: 70 U/L — SIGNIFICANT CHANGE UP (ref 30–120)
ALT FLD-CCNC: 15 U/L DA — SIGNIFICANT CHANGE UP (ref 10–60)
ANION GAP SERPL CALC-SCNC: 9 MMOL/L — SIGNIFICANT CHANGE UP (ref 5–17)
AST SERPL-CCNC: 14 U/L — SIGNIFICANT CHANGE UP (ref 10–40)
BILIRUB SERPL-MCNC: 0.4 MG/DL — SIGNIFICANT CHANGE UP (ref 0.2–1.2)
BUN SERPL-MCNC: 14 MG/DL — SIGNIFICANT CHANGE UP (ref 7–23)
CALCIUM SERPL-MCNC: 8.7 MG/DL — SIGNIFICANT CHANGE UP (ref 8.4–10.5)
CHLORIDE SERPL-SCNC: 104 MMOL/L — SIGNIFICANT CHANGE UP (ref 96–108)
CO2 SERPL-SCNC: 24 MMOL/L — SIGNIFICANT CHANGE UP (ref 22–31)
CREAT SERPL-MCNC: 0.9 MG/DL — SIGNIFICANT CHANGE UP (ref 0.5–1.3)
GLUCOSE SERPL-MCNC: 105 MG/DL — HIGH (ref 70–99)
HCT VFR BLD CALC: 40 % — SIGNIFICANT CHANGE UP (ref 39–50)
HGB BLD-MCNC: 13.7 G/DL — SIGNIFICANT CHANGE UP (ref 13–17)
MCHC RBC-ENTMCNC: 29.7 PG — SIGNIFICANT CHANGE UP (ref 27–34)
MCHC RBC-ENTMCNC: 34.3 GM/DL — SIGNIFICANT CHANGE UP (ref 32–36)
MCV RBC AUTO: 86.8 FL — SIGNIFICANT CHANGE UP (ref 80–100)
NRBC # BLD: 0 /100 WBCS — SIGNIFICANT CHANGE UP (ref 0–0)
PLATELET # BLD AUTO: 203 K/UL — SIGNIFICANT CHANGE UP (ref 150–400)
POTASSIUM SERPL-MCNC: 4.1 MMOL/L — SIGNIFICANT CHANGE UP (ref 3.5–5.3)
POTASSIUM SERPL-SCNC: 4.1 MMOL/L — SIGNIFICANT CHANGE UP (ref 3.5–5.3)
PROT SERPL-MCNC: 7.5 G/DL — SIGNIFICANT CHANGE UP (ref 6–8.3)
RBC # BLD: 4.61 M/UL — SIGNIFICANT CHANGE UP (ref 4.2–5.8)
RBC # FLD: 12.6 % — SIGNIFICANT CHANGE UP (ref 10.3–14.5)
SODIUM SERPL-SCNC: 137 MMOL/L — SIGNIFICANT CHANGE UP (ref 135–145)
WBC # BLD: 8.8 K/UL — SIGNIFICANT CHANGE UP (ref 3.8–10.5)
WBC # FLD AUTO: 8.8 K/UL — SIGNIFICANT CHANGE UP (ref 3.8–10.5)

## 2019-12-04 PROCEDURE — 93010 ELECTROCARDIOGRAM REPORT: CPT

## 2019-12-04 PROCEDURE — 85027 COMPLETE CBC AUTOMATED: CPT

## 2019-12-04 PROCEDURE — 93005 ELECTROCARDIOGRAM TRACING: CPT

## 2019-12-04 PROCEDURE — 80053 COMPREHEN METABOLIC PANEL: CPT

## 2019-12-04 PROCEDURE — 36415 COLL VENOUS BLD VENIPUNCTURE: CPT

## 2019-12-04 PROCEDURE — G0463: CPT

## 2019-12-04 RX ORDER — LACTULOSE 10 G/15ML
22.5 SOLUTION ORAL
Qty: 0 | Refills: 0 | DISCHARGE

## 2019-12-04 RX ORDER — ALPRAZOLAM 0.25 MG
1 TABLET ORAL
Qty: 0 | Refills: 0 | DISCHARGE

## 2019-12-04 RX ORDER — LOSARTAN POTASSIUM 100 MG/1
1 TABLET, FILM COATED ORAL
Qty: 0 | Refills: 0 | DISCHARGE

## 2019-12-04 NOTE — H&P PST ADULT - NSICDXPROBLEM_GEN_ALL_CORE_FT
PROBLEM DIAGNOSES  Problem: Leukopenia  Assessment and Plan: patient instructed to check with prescriber about any dose changes    Problem: S/P cervical spinal fusion  Assessment and Plan: anesthesia to be aware    Problem: Right shoulder pain  Assessment and Plan: right shoulder arthroscopy, subacromial decompression, rotator cuff repair and any indicated procedures; preop instructions given; medical clearance required

## 2019-12-04 NOTE — H&P PST ADULT - NSICDXPASTSURGICALHX_GEN_ALL_CORE_FT
PAST SURGICAL HISTORY:  Gastric perforation     H/O cervical spine surgery fusion - 2014 with plates and screws    H/O exploratory laparotomy     S/P colon resection 6/2017

## 2019-12-04 NOTE — H&P PST ADULT - NSICDXPASTMEDICALHX_GEN_ALL_CORE_FT
PAST MEDICAL HISTORY:  BPH (benign prostatic hyperplasia)     Buzzing in ear since head injury    Cervical disc disease     Chronic pain neck, lower back; herniated disc cervical spine    Colon cancer had colon surgery    Depression     Enlarged lymph node     Head ache past    Hypertension, unspecified type     Injury forehead & had sutures ( 2013 )    Leukocytopenia 1 week ago-given hydrocortisone    Obesity     Pancreatic cancer had chemo in past; immunotherapy done til 7 mos ago    Smoking quit 2015    Vertigo

## 2019-12-04 NOTE — H&P PST ADULT - HISTORY OF PRESENT ILLNESS
this is a 65 y/o male who has had right shoulder pain for about 5 years; has had several incidents that could have caused it ; tests showed some type of rotator cuff problem; to have repair of same; no pain meds

## 2019-12-09 ENCOUNTER — APPOINTMENT (OUTPATIENT)
Dept: INTERNAL MEDICINE | Facility: CLINIC | Age: 64
End: 2019-12-09

## 2019-12-12 ENCOUNTER — TRANSCRIPTION ENCOUNTER (OUTPATIENT)
Age: 64
End: 2019-12-12

## 2019-12-12 NOTE — ASU PATIENT PROFILE, ADULT - PSH
Gastric perforation    H/O cervical spine surgery  fusion - 2014 with plates and screws  H/O exploratory laparotomy    S/P colon resection  6/2017

## 2019-12-12 NOTE — ASU PATIENT PROFILE, ADULT - PMH
BPH (benign prostatic hyperplasia)    Buzzing in ear  since head injury  Cervical disc disease    Chronic pain  neck, lower back; herniated disc cervical spine  Colon cancer  had colon surgery  Depression    Enlarged lymph node    Head ache  past  Hypertension, unspecified type    Injury  forehead & had sutures ( 2013 )  Leukocytopenia  1 week ago-given hydrocortisone  Obesity    Pancreatic cancer  had chemo in past; immunotherapy done til 7 mos ago  Smoking  quit 2015  Vertigo

## 2019-12-13 ENCOUNTER — APPOINTMENT (OUTPATIENT)
Dept: ORTHOPEDIC SURGERY | Facility: HOSPITAL | Age: 64
End: 2019-12-13

## 2019-12-13 ENCOUNTER — OUTPATIENT (OUTPATIENT)
Dept: OUTPATIENT SERVICES | Facility: HOSPITAL | Age: 64
LOS: 1 days | End: 2019-12-13
Payer: MEDICARE

## 2019-12-13 ENCOUNTER — RESULT REVIEW (OUTPATIENT)
Age: 64
End: 2019-12-13

## 2019-12-13 VITALS
OXYGEN SATURATION: 97 % | DIASTOLIC BLOOD PRESSURE: 64 MMHG | RESPIRATION RATE: 16 BRPM | HEART RATE: 82 BPM | SYSTOLIC BLOOD PRESSURE: 112 MMHG

## 2019-12-13 VITALS
HEART RATE: 88 BPM | TEMPERATURE: 98 F | OXYGEN SATURATION: 96 % | SYSTOLIC BLOOD PRESSURE: 133 MMHG | HEIGHT: 69 IN | DIASTOLIC BLOOD PRESSURE: 66 MMHG | RESPIRATION RATE: 18 BRPM | WEIGHT: 207.9 LBS

## 2019-12-13 DIAGNOSIS — Z98.890 OTHER SPECIFIED POSTPROCEDURAL STATES: Chronic | ICD-10-CM

## 2019-12-13 DIAGNOSIS — Z01.818 ENCOUNTER FOR OTHER PREPROCEDURAL EXAMINATION: ICD-10-CM

## 2019-12-13 DIAGNOSIS — K25.5 CHRONIC OR UNSPECIFIED GASTRIC ULCER WITH PERFORATION: Chronic | ICD-10-CM

## 2019-12-13 DIAGNOSIS — Z90.49 ACQUIRED ABSENCE OF OTHER SPECIFIED PARTS OF DIGESTIVE TRACT: Chronic | ICD-10-CM

## 2019-12-13 DIAGNOSIS — M75.121 COMPLETE ROTATOR CUFF TEAR OR RUPTURE OF RIGHT SHOULDER, NOT SPECIFIED AS TRAUMATIC: ICD-10-CM

## 2019-12-13 PROCEDURE — 29826 SHO ARTHRS SRG DECOMPRESSION: CPT | Mod: RT

## 2019-12-13 PROCEDURE — 29827 SHO ARTHRS SRG RT8TR CUF RPR: CPT | Mod: RT

## 2019-12-13 PROCEDURE — C1713: CPT

## 2019-12-13 PROCEDURE — 88304 TISSUE EXAM BY PATHOLOGIST: CPT

## 2019-12-13 PROCEDURE — 88304 TISSUE EXAM BY PATHOLOGIST: CPT | Mod: 26

## 2019-12-13 RX ORDER — OXYCODONE HYDROCHLORIDE 5 MG/1
5 TABLET ORAL ONCE
Refills: 0 | Status: DISCONTINUED | OUTPATIENT
Start: 2019-12-13 | End: 2019-12-13

## 2019-12-13 RX ORDER — CHLORHEXIDINE GLUCONATE 213 G/1000ML
1 SOLUTION TOPICAL ONCE
Refills: 0 | Status: COMPLETED | OUTPATIENT
Start: 2019-12-13 | End: 2019-12-13

## 2019-12-13 RX ORDER — ONDANSETRON 8 MG/1
4 TABLET, FILM COATED ORAL ONCE
Refills: 0 | Status: DISCONTINUED | OUTPATIENT
Start: 2019-12-13 | End: 2019-12-13

## 2019-12-13 RX ORDER — APREPITANT 80 MG/1
40 CAPSULE ORAL ONCE
Refills: 0 | Status: COMPLETED | OUTPATIENT
Start: 2019-12-13 | End: 2019-12-13

## 2019-12-13 RX ORDER — SODIUM CHLORIDE 9 MG/ML
1000 INJECTION, SOLUTION INTRAVENOUS
Refills: 0 | Status: DISCONTINUED | OUTPATIENT
Start: 2019-12-13 | End: 2019-12-13

## 2019-12-13 RX ORDER — HYDROMORPHONE HYDROCHLORIDE 2 MG/ML
0.5 INJECTION INTRAMUSCULAR; INTRAVENOUS; SUBCUTANEOUS
Refills: 0 | Status: DISCONTINUED | OUTPATIENT
Start: 2019-12-13 | End: 2019-12-13

## 2019-12-13 RX ORDER — CEFAZOLIN SODIUM 1 G
2000 VIAL (EA) INJECTION ONCE
Refills: 0 | Status: COMPLETED | OUTPATIENT
Start: 2019-12-13 | End: 2019-12-13

## 2019-12-13 RX ADMIN — CHLORHEXIDINE GLUCONATE 1 APPLICATION(S): 213 SOLUTION TOPICAL at 08:50

## 2019-12-13 RX ADMIN — APREPITANT 40 MILLIGRAM(S): 80 CAPSULE ORAL at 08:51

## 2019-12-13 NOTE — ASU DISCHARGE PLAN (ADULT/PEDIATRIC) - CARE PROVIDER_API CALL
Nile Glasgow)  Orthopaedic Surgery  [address], [address]  [place] [address]  Phone: [phone]  Fax: [phone]  Follow Up Time:

## 2019-12-13 NOTE — ASU DISCHARGE PLAN (ADULT/PEDIATRIC) - ASU DC SPECIAL INSTRUCTIONSFT
FOLLOW enclosed shoulder arthroscopy brochure.  Call MD for severe pain/fever/chills  Ice to shoulder 20 minutes on and off the first 48 hours after surgery to help decrease pain/swelling  Keep shoulder elevated in sling at all times.  Keep your shoulder close to your body and do not externally rotate shoulder in order to preserve rotator cuff repair.  No heavy lifting/pushing/pulling/twisting/weight bearing with shoulder.  Pump fists 10x an hour to help with circulation  Your most comfortable sleeping position will be propped up  Pain medication as needed, take a stool softener like senna or colace to prevent constipation while taking pain medicine

## 2019-12-13 NOTE — ASU PREOP CHECKLIST - IV STARTED
yes yes/right chest mediport present as per pt. Not accessed as in surgical field as per Dr Glasgow and Dr Pallack

## 2019-12-13 NOTE — ASU DISCHARGE PLAN (ADULT/PEDIATRIC) - ACTIVITY LEVEL
No weight bearing/No heavy lifting/Elevate extremity/Do not move shoulder, keep sling on at all times except when bathing

## 2019-12-16 ENCOUNTER — OUTPATIENT (OUTPATIENT)
Dept: OUTPATIENT SERVICES | Facility: HOSPITAL | Age: 64
LOS: 1 days | Discharge: HOME | End: 2019-12-16

## 2019-12-16 DIAGNOSIS — Z98.890 OTHER SPECIFIED POSTPROCEDURAL STATES: Chronic | ICD-10-CM

## 2019-12-16 DIAGNOSIS — K25.5 CHRONIC OR UNSPECIFIED GASTRIC ULCER WITH PERFORATION: Chronic | ICD-10-CM

## 2019-12-16 DIAGNOSIS — C25.9 MALIGNANT NEOPLASM OF PANCREAS, UNSPECIFIED: ICD-10-CM

## 2019-12-16 DIAGNOSIS — Z90.49 ACQUIRED ABSENCE OF OTHER SPECIFIED PARTS OF DIGESTIVE TRACT: Chronic | ICD-10-CM

## 2019-12-16 PROBLEM — F32.9 MAJOR DEPRESSIVE DISORDER, SINGLE EPISODE, UNSPECIFIED: Chronic | Status: ACTIVE | Noted: 2019-12-04

## 2019-12-16 PROBLEM — G89.29 OTHER CHRONIC PAIN: Chronic | Status: ACTIVE | Noted: 2018-06-11

## 2019-12-16 PROBLEM — H93.19 TINNITUS, UNSPECIFIED EAR: Chronic | Status: ACTIVE | Noted: 2019-12-04

## 2019-12-16 PROBLEM — N40.0 BENIGN PROSTATIC HYPERPLASIA WITHOUT LOWER URINARY TRACT SYMPTOMS: Chronic | Status: ACTIVE | Noted: 2019-12-04

## 2019-12-16 PROBLEM — F17.200 NICOTINE DEPENDENCE, UNSPECIFIED, UNCOMPLICATED: Chronic | Status: ACTIVE | Noted: 2017-06-02

## 2019-12-16 PROBLEM — I10 ESSENTIAL (PRIMARY) HYPERTENSION: Chronic | Status: ACTIVE | Noted: 2017-10-24

## 2019-12-17 LAB — SURGICAL PATHOLOGY STUDY: SIGNIFICANT CHANGE UP

## 2019-12-19 ENCOUNTER — FORM ENCOUNTER (OUTPATIENT)
Age: 64
End: 2019-12-19

## 2019-12-19 PROBLEM — M75.121 COMPLETE TEAR OF RIGHT ROTATOR CUFF: Status: ACTIVE | Noted: 2019-11-25

## 2019-12-19 NOTE — HISTORY OF PRESENT ILLNESS
[Disease: _____________________] : Disease: [unfilled] [N: ___] : N[unfilled] [T: ___] : T[unfilled] [AJCC Stage: ____] : AJCC Stage: [unfilled] [de-identified] : 62 M with no significant past medical history, developed progressive abdominal pain in March 2017. In May 2017 he went to an urgent care center and was referred for a CT A/P w/co. This was performed on 5/8/17 and demonstrated a circumferential 6.7 cm mass at the splenic flexure of the colon causing "apple core" luminal narrowing of the colon at that level c/w malignancy. A 2.7 x 2.2 x 2.5 cm ill defined LN below the pancreatic body at the level of the splenic vein is most consistent with a metastasis. On May 9, 2017, Haja saw Dr. Morgan Armenta, colorectal surgery who performed a colonoscopy on 5/15/17. Biopsy c/w invasive moderately differentiated colonic adenocarcinoma a/w necrosis. A PET/CT was performed on 5/30/17 which showed splenic flexure colonic hypermetabolic mass c/w primary carcinoma (SUV 35) and 2.5 cm mesenteric LN hypermetabolic focus c/w regional metastatic disease (SUV 5). \par \par On 6/13/17 patient underwent a open left colectomy, omentectomy and mobilization of the splenic flexure. Final pathology T3NO moderately differentiated carcinoma, loss of nuclear expression of MLH1 and PMS2. No adjuvant therapy was given. Post op developed SOB, hypoxia transferred to the SICU, placed on bipap. A CTA chest/abd/pelvis 6/15/17 c/w Nonspecific patchy opacity in RLL was noted, 3.4 cm collection or cystic lesion in the pancreatic body for which follow up imaging was suggested. \par \par Post op, Haja continued to complain of abdominal pain and an MRI A/P was performed on 7/25/17 which showed a 4.6 cm hyperintense structure inseparable from the distal pancreatic body. The lesion is located in the peripancreatic retroperitoneal space with imaging characteristics suggestive of pancreatic pseudocyst. He was evaluated by surg onc, Dr. Hager and referred for a CT pancreatic protocol which was performed on 9/5 and showed a peripherally enhancing mass in the body of the pancreas measures 4.6 x 3.5 cm. Subsequently had an EUS with FNA on 10/6/17 (GI- Dr. Luz) which confirmed moderately differentiated adenocarcinoma.\par \par On 11/9/17 underwent open ex lap for possible resection of pancreatic adenoca, however, SMA was encased by tumor and therefore could not be resected. Liver bx was performed and negative for malignancy, however, noted to have steatosis with mild steatohepatitis, port inflammation, focal periportal and pericellular fibrosis,2 + iron deposition. \par \par Patient was subsequently referred to rad onc and med onc for further evaluation. \par \par 12/5/17-3/13/18: 8 cycles of mFOLFIRINOX (25% dose reduction due to PS) with stable/mild improvement in disease \par \par 3/27/18-5/29/18 : C1-C4 Nivo 3mg/kg (240 mg flat dose)/Ipi 1mg/kg \par 5/31/18: CTCAP: decr in size of pancreatic mass, previously 3.6 to 2.5, new mediastinal LN\par 6/12/18: EBUS with bx of LN: reactive \par 6/26/18-: Nivo single agent Q 2 weeks \par 11/5 - 11/19: hospitalized at [facility] with herpes zoster involving the R periorbital area, no ocular involvement. opiods stopped abruptly s/p withdrawal characterized by diarrhea/n/v. Developed NICOLE (2/2 acyclovir?) with resolution. \par 11/30/18: restarted Nivolumab Q 2 weeks\par 12/13/18: last dose of Nivolumab. \par 12/28/18: Nivolumab held due to Grade 2-3 arthralgias/myalgias. Started on Prednisone 40 mg BID taper over 8 weeks.  \par 1/3/19: CTCAP: interval regression of pancreatic mass and mediastinal LN\par 2/8/19: Nivolumab restarted \par 4/3/19: CT CAP: enlargement of mediastinal LN, ground glass opacities b/l \par 4/6/19: Nivolumab d/c due to myalgias and ?pneumonitis \par 6/10/19: CT CAP: stable/improved disease\par 9/26/19: CT CAP: stable pancreatic mass, stable mediastinal LN, new L gynecomastia \par \par \par \par  [de-identified] : 11/24/17: CA 19.9 - 81.3  CEA 29 [de-identified] : moderately differentiated adenocarcinoma [de-identified] : June 2017 - colon resection of tumor T3N0 (0/15 LN) moderately differentiated, loss of nuclear expression of MLH1, PMS2. \par    - IHC:  CK19, and variably positive for CDX2, CK7 and CK20, \par   + BRAF\par \par Nov 2017- attempted pancreatic resection:  CK7, CK20 and CDX2 are positive; CK20 is negative\par     - loss of nuclear expression of MLH1, PMS2.\par \par [facility] - failed report  [FreeTextEntry1] : surveillance due to inability to tolerate immunotherapy  [de-identified] : Completed course of steroids as prescribed by rheum with some improvement in joints of hands. Finished 1 week ago. R hand with difficulty opening and closing hand.\par + numbness at the wrist and fingers. \par Still has not seen ortho. Saw endo recently. \par Denies any abdominal pain, change in stools. Good appetite. Weight is stable. Still tired and sleeps during the day, sleeps poorly at night.

## 2019-12-19 NOTE — PHYSICAL EXAM
[Fully active, able to carry on all pre-disease performance without restriction] : Status 0 - Fully active, able to carry on all pre-disease performance without restriction [Normal] : affect appropriate [de-identified] : L breast enlargement noted c/w R breast  [de-identified] : ROM in R shoulder is limited

## 2019-12-20 ENCOUNTER — APPOINTMENT (OUTPATIENT)
Dept: CT IMAGING | Facility: CLINIC | Age: 64
End: 2019-12-20
Payer: MEDICARE

## 2019-12-20 ENCOUNTER — OUTPATIENT (OUTPATIENT)
Dept: OUTPATIENT SERVICES | Facility: HOSPITAL | Age: 64
LOS: 1 days | End: 2019-12-20
Payer: MEDICARE

## 2019-12-20 DIAGNOSIS — K25.5 CHRONIC OR UNSPECIFIED GASTRIC ULCER WITH PERFORATION: Chronic | ICD-10-CM

## 2019-12-20 DIAGNOSIS — Z98.890 OTHER SPECIFIED POSTPROCEDURAL STATES: Chronic | ICD-10-CM

## 2019-12-20 DIAGNOSIS — C25.9 MALIGNANT NEOPLASM OF PANCREAS, UNSPECIFIED: ICD-10-CM

## 2019-12-20 DIAGNOSIS — Z90.49 ACQUIRED ABSENCE OF OTHER SPECIFIED PARTS OF DIGESTIVE TRACT: Chronic | ICD-10-CM

## 2019-12-20 PROCEDURE — 74177 CT ABD & PELVIS W/CONTRAST: CPT

## 2019-12-20 PROCEDURE — 71260 CT THORAX DX C+: CPT | Mod: 26

## 2019-12-20 PROCEDURE — 74177 CT ABD & PELVIS W/CONTRAST: CPT | Mod: 26

## 2019-12-20 PROCEDURE — 71260 CT THORAX DX C+: CPT

## 2019-12-23 ENCOUNTER — APPOINTMENT (OUTPATIENT)
Dept: HEMATOLOGY ONCOLOGY | Facility: CLINIC | Age: 64
End: 2019-12-23
Payer: MEDICARE

## 2019-12-23 ENCOUNTER — APPOINTMENT (OUTPATIENT)
Dept: ORTHOPEDIC SURGERY | Facility: CLINIC | Age: 64
End: 2019-12-23
Payer: MEDICARE

## 2019-12-23 ENCOUNTER — APPOINTMENT (OUTPATIENT)
Age: 64
End: 2019-12-23

## 2019-12-23 VITALS
BODY MASS INDEX: 31.95 KG/M2 | DIASTOLIC BLOOD PRESSURE: 71 MMHG | OXYGEN SATURATION: 98 % | HEART RATE: 94 BPM | WEIGHT: 210.1 LBS | SYSTOLIC BLOOD PRESSURE: 110 MMHG | TEMPERATURE: 97.9 F | RESPIRATION RATE: 18 BRPM

## 2019-12-23 VITALS — TEMPERATURE: 98.4 F | SYSTOLIC BLOOD PRESSURE: 135 MMHG | DIASTOLIC BLOOD PRESSURE: 84 MMHG | HEART RATE: 70 BPM

## 2019-12-23 DIAGNOSIS — M75.100 UNSPECIFIED ROTATOR CUFF TEAR OR RUPTURE OF UNSPECIFIED SHOULDER, NOT SPECIFIED AS TRAUMATIC: ICD-10-CM

## 2019-12-23 PROCEDURE — 73030 X-RAY EXAM OF SHOULDER: CPT | Mod: 26,RT

## 2019-12-23 PROCEDURE — 99214 OFFICE O/P EST MOD 30 MIN: CPT

## 2019-12-23 PROCEDURE — 99024 POSTOP FOLLOW-UP VISIT: CPT

## 2019-12-24 ENCOUNTER — APPOINTMENT (OUTPATIENT)
Dept: ORTHOPEDIC SURGERY | Facility: CLINIC | Age: 64
End: 2019-12-24

## 2020-01-05 PROBLEM — M75.100 SUPRASPINATUS TENDON TEAR: Status: ACTIVE | Noted: 2019-09-10

## 2020-01-05 NOTE — HISTORY OF PRESENT ILLNESS
[Disease: _____________________] : Disease: [unfilled] [T: ___] : T[unfilled] [N: ___] : N[unfilled] [AJCC Stage: ____] : AJCC Stage: [unfilled] [de-identified] : moderately differentiated adenocarcinoma [de-identified] : 62 M with no significant past medical history, developed progressive abdominal pain in March 2017. In May 2017 he went to an urgent care center and was referred for a CT A/P w/co. This was performed on 5/8/17 and demonstrated a circumferential 6.7 cm mass at the splenic flexure of the colon causing "apple core" luminal narrowing of the colon at that level c/w malignancy. A 2.7 x 2.2 x 2.5 cm ill defined LN below the pancreatic body at the level of the splenic vein is most consistent with a metastasis. On May 9, 2017, Haja saw Dr. Morgan Armenta, colorectal surgery who performed a colonoscopy on 5/15/17. Biopsy c/w invasive moderately differentiated colonic adenocarcinoma a/w necrosis. A PET/CT was performed on 5/30/17 which showed splenic flexure colonic hypermetabolic mass c/w primary carcinoma (SUV 35) and 2.5 cm mesenteric LN hypermetabolic focus c/w regional metastatic disease (SUV 5). \par \par On 6/13/17 patient underwent a open left colectomy, omentectomy and mobilization of the splenic flexure. Final pathology T3NO moderately differentiated carcinoma, loss of nuclear expression of MLH1 and PMS2. No adjuvant therapy was given. Post op developed SOB, hypoxia transferred to the SICU, placed on bipap. A CTA chest/abd/pelvis 6/15/17 c/w Nonspecific patchy opacity in RLL was noted, 3.4 cm collection or cystic lesion in the pancreatic body for which follow up imaging was suggested. \par \par Post op, Haja continued to complain of abdominal pain and an MRI A/P was performed on 7/25/17 which showed a 4.6 cm hyperintense structure inseparable from the distal pancreatic body. The lesion is located in the peripancreatic retroperitoneal space with imaging characteristics suggestive of pancreatic pseudocyst. He was evaluated by surg onc, Dr. Hager and referred for a CT pancreatic protocol which was performed on 9/5 and showed a peripherally enhancing mass in the body of the pancreas measures 4.6 x 3.5 cm. Subsequently had an EUS with FNA on 10/6/17 (GI- Dr. Luz) which confirmed moderately differentiated adenocarcinoma.\par \par On 11/9/17 underwent open ex lap for possible resection of pancreatic adenoca, however, SMA was encased by tumor and therefore could not be resected. Liver bx was performed and negative for malignancy, however, noted to have steatosis with mild steatohepatitis, port inflammation, focal periportal and pericellular fibrosis,2 + iron deposition. \par \par Patient was subsequently referred to rad onc and med onc for further evaluation. \par \par 12/5/17-3/13/18: 8 cycles of mFOLFIRINOX (25% dose reduction due to PS) with stable/mild improvement in disease \par \par 3/27/18-5/29/18 : C1-C4 Nivo 3mg/kg (240 mg flat dose)/Ipi 1mg/kg \par 5/31/18: CTCAP: decr in size of pancreatic mass, previously 3.6 to 2.5, new mediastinal LN\par 6/12/18: EBUS with bx of LN: reactive \par 6/26/18-: Nivo single agent Q 2 weeks \par 11/5 - 11/19: hospitalized at [facility] with herpes zoster involving the R periorbital area, no ocular involvement. opiods stopped abruptly s/p withdrawal characterized by diarrhea/n/v. Developed NICOLE (2/2 acyclovir?) with resolution. \par 11/30/18: restarted Nivolumab Q 2 weeks\par 12/13/18: last dose of Nivolumab. \par 12/28/18: Nivolumab held due to Grade 2-3 arthralgias/myalgias. Started on Prednisone 40 mg BID taper over 8 weeks.  \par 1/3/19: CTCAP: interval regression of pancreatic mass and mediastinal LN\par 2/8/19: Nivolumab restarted \par 4/3/19: CT CAP: enlargement of mediastinal LN, ground glass opacities b/l \par 4/6/19: Nivolumab d/c due to myalgias and ?pneumonitis \par 6/10/19: CT CAP: stable/improved disease\par 9/26/19: CT CAP: stable pancreatic mass, stable mediastinal LN, new L gynecomastia \par 12/120/19: CT CAP: stable exam, gynecomastia \par \par \par \par  [de-identified] : 11/24/17: CA 19.9 - 81.3  CEA 29 [de-identified] : June 2017 - colon resection of tumor T3N0 (0/15 LN) moderately differentiated, loss of nuclear expression of MLH1, PMS2. \par    - IHC:  CK19, and variably positive for CDX2, CK7 and CK20, \par   + BRAF\par \par Nov 2017- attempted pancreatic resection:  CK7, CK20 and CDX2 are positive; CK20 is negative\par     - loss of nuclear expression of MLH1, PMS2.\par \par [facility] - failed report  [FreeTextEntry1] : surveillance due to inability to tolerate immunotherapy  [de-identified] : Had R shoulder surgery on 12/13. Everything went well. Now in a sling. Pain is better controlled. \par No other symptoms. Sleeping better now. \par Appetite is good. Moving bowels well. \par Still has gynecomastia L >R without any palpable lumps or bumps\par Saw endo and was recommended to take hydrocortisone for low cortisol levels but has not started yet as wanted to clear it with me first.

## 2020-01-05 NOTE — PHYSICAL EXAM
[Fully active, able to carry on all pre-disease performance without restriction] : Status 0 - Fully active, able to carry on all pre-disease performance without restriction [Normal] : affect appropriate [de-identified] : + gynecomastia L>R no palpable masses  [de-identified] : R arm in sling

## 2020-01-07 ENCOUNTER — APPOINTMENT (OUTPATIENT)
Dept: ORTHOPEDIC SURGERY | Facility: CLINIC | Age: 65
End: 2020-01-07
Payer: MEDICARE

## 2020-01-07 ENCOUNTER — CHART COPY (OUTPATIENT)
Age: 65
End: 2020-01-07

## 2020-01-07 VITALS
HEART RATE: 88 BPM | HEIGHT: 68 IN | BODY MASS INDEX: 31.83 KG/M2 | WEIGHT: 210 LBS | SYSTOLIC BLOOD PRESSURE: 147 MMHG | DIASTOLIC BLOOD PRESSURE: 84 MMHG

## 2020-01-07 DIAGNOSIS — Z98.890 OTHER SPECIFIED POSTPROCEDURAL STATES: ICD-10-CM

## 2020-01-07 PROCEDURE — 99024 POSTOP FOLLOW-UP VISIT: CPT

## 2020-01-21 ENCOUNTER — FORM ENCOUNTER (OUTPATIENT)
Age: 65
End: 2020-01-21

## 2020-01-22 ENCOUNTER — OUTPATIENT (OUTPATIENT)
Dept: OUTPATIENT SERVICES | Facility: HOSPITAL | Age: 65
LOS: 1 days | End: 2020-01-22
Payer: MEDICARE

## 2020-01-22 ENCOUNTER — APPOINTMENT (OUTPATIENT)
Dept: ULTRASOUND IMAGING | Facility: IMAGING CENTER | Age: 65
End: 2020-01-22
Payer: MEDICARE

## 2020-01-22 ENCOUNTER — APPOINTMENT (OUTPATIENT)
Dept: MAMMOGRAPHY | Facility: IMAGING CENTER | Age: 65
End: 2020-01-22
Payer: MEDICARE

## 2020-01-22 ENCOUNTER — OUTPATIENT (OUTPATIENT)
Dept: OUTPATIENT SERVICES | Facility: HOSPITAL | Age: 65
LOS: 1 days | Discharge: HOME | End: 2020-01-22

## 2020-01-22 DIAGNOSIS — Z98.890 OTHER SPECIFIED POSTPROCEDURAL STATES: Chronic | ICD-10-CM

## 2020-01-22 DIAGNOSIS — K25.5 CHRONIC OR UNSPECIFIED GASTRIC ULCER WITH PERFORATION: Chronic | ICD-10-CM

## 2020-01-22 DIAGNOSIS — Z90.49 ACQUIRED ABSENCE OF OTHER SPECIFIED PARTS OF DIGESTIVE TRACT: Chronic | ICD-10-CM

## 2020-01-22 DIAGNOSIS — N62 HYPERTROPHY OF BREAST: ICD-10-CM

## 2020-01-22 DIAGNOSIS — C25.9 MALIGNANT NEOPLASM OF PANCREAS, UNSPECIFIED: ICD-10-CM

## 2020-01-22 PROCEDURE — G0279: CPT

## 2020-01-22 PROCEDURE — 76641 ULTRASOUND BREAST COMPLETE: CPT | Mod: 26,LT

## 2020-01-22 PROCEDURE — 77066 DX MAMMO INCL CAD BI: CPT

## 2020-01-22 PROCEDURE — 77066 DX MAMMO INCL CAD BI: CPT | Mod: 26

## 2020-01-22 PROCEDURE — G0279: CPT | Mod: 26

## 2020-01-22 PROCEDURE — 76641 ULTRASOUND BREAST COMPLETE: CPT

## 2020-01-29 ENCOUNTER — APPOINTMENT (OUTPATIENT)
Age: 65
End: 2020-01-29
Payer: MEDICARE

## 2020-01-29 ENCOUNTER — RESULT REVIEW (OUTPATIENT)
Age: 65
End: 2020-01-29

## 2020-01-29 VITALS
SYSTOLIC BLOOD PRESSURE: 130 MMHG | RESPIRATION RATE: 18 BRPM | TEMPERATURE: 98.2 F | OXYGEN SATURATION: 99 % | WEIGHT: 211.64 LBS | HEART RATE: 93 BPM | BODY MASS INDEX: 32.18 KG/M2 | DIASTOLIC BLOOD PRESSURE: 70 MMHG

## 2020-01-29 DIAGNOSIS — M25.50 PAIN IN UNSPECIFIED JOINT: ICD-10-CM

## 2020-01-29 LAB
BASOPHILS # BLD AUTO: 0.1 K/UL — SIGNIFICANT CHANGE UP (ref 0–0.2)
BASOPHILS NFR BLD AUTO: 0.6 % — SIGNIFICANT CHANGE UP (ref 0–2)
EOSINOPHIL # BLD AUTO: 0.5 K/UL — SIGNIFICANT CHANGE UP (ref 0–0.5)
EOSINOPHIL NFR BLD AUTO: 6 % — SIGNIFICANT CHANGE UP (ref 0–6)
HCT VFR BLD CALC: 41 % — SIGNIFICANT CHANGE UP (ref 39–50)
HGB BLD-MCNC: 14.3 G/DL — SIGNIFICANT CHANGE UP (ref 13–17)
LYMPHOCYTES # BLD AUTO: 2.4 K/UL — SIGNIFICANT CHANGE UP (ref 1–3.3)
LYMPHOCYTES # BLD AUTO: 26.7 % — SIGNIFICANT CHANGE UP (ref 13–44)
MCHC RBC-ENTMCNC: 30.2 PG — SIGNIFICANT CHANGE UP (ref 27–34)
MCHC RBC-ENTMCNC: 34.8 G/DL — SIGNIFICANT CHANGE UP (ref 32–36)
MCV RBC AUTO: 86.8 FL — SIGNIFICANT CHANGE UP (ref 80–100)
MONOCYTES # BLD AUTO: 0.8 K/UL — SIGNIFICANT CHANGE UP (ref 0–0.9)
MONOCYTES NFR BLD AUTO: 9.6 % — SIGNIFICANT CHANGE UP (ref 2–14)
NEUTROPHILS # BLD AUTO: 5 K/UL — SIGNIFICANT CHANGE UP (ref 1.8–7.4)
NEUTROPHILS NFR BLD AUTO: 57.1 % — SIGNIFICANT CHANGE UP (ref 43–77)
PLATELET # BLD AUTO: 208 K/UL — SIGNIFICANT CHANGE UP (ref 150–400)
RBC # BLD: 4.73 M/UL — SIGNIFICANT CHANGE UP (ref 4.2–5.8)
RBC # FLD: 12.6 % — SIGNIFICANT CHANGE UP (ref 10.3–14.5)
WBC # BLD: 8.8 K/UL — SIGNIFICANT CHANGE UP (ref 3.8–10.5)
WBC # FLD AUTO: 8.8 K/UL — SIGNIFICANT CHANGE UP (ref 3.8–10.5)

## 2020-01-29 PROCEDURE — 99214 OFFICE O/P EST MOD 30 MIN: CPT

## 2020-02-04 ENCOUNTER — APPOINTMENT (OUTPATIENT)
Age: 65
End: 2020-02-04

## 2020-02-08 ENCOUNTER — APPOINTMENT (OUTPATIENT)
Dept: RHEUMATOLOGY | Facility: CLINIC | Age: 65
End: 2020-02-08
Payer: MEDICARE

## 2020-02-08 VITALS
OXYGEN SATURATION: 97 % | HEART RATE: 76 BPM | BODY MASS INDEX: 31.98 KG/M2 | DIASTOLIC BLOOD PRESSURE: 95 MMHG | TEMPERATURE: 97.7 F | SYSTOLIC BLOOD PRESSURE: 147 MMHG | HEIGHT: 68 IN | WEIGHT: 211 LBS

## 2020-02-08 DIAGNOSIS — Z98.1 ARTHRODESIS STATUS: ICD-10-CM

## 2020-02-08 DIAGNOSIS — G62.9 POLYNEUROPATHY, UNSPECIFIED: ICD-10-CM

## 2020-02-08 DIAGNOSIS — M19.049 PRIMARY OSTEOARTHRITIS, UNSPECIFIED HAND: ICD-10-CM

## 2020-02-08 PROCEDURE — 99213 OFFICE O/P EST LOW 20 MIN: CPT

## 2020-02-09 PROBLEM — M25.50 ARTHRALGIA: Status: ACTIVE | Noted: 2018-10-19

## 2020-02-09 NOTE — PHYSICAL EXAM
[Fully active, able to carry on all pre-disease performance without restriction] : Status 0 - Fully active, able to carry on all pre-disease performance without restriction [Normal] : affect appropriate [de-identified] : swelling of the fingers of the L hand > R

## 2020-02-09 NOTE — HISTORY OF PRESENT ILLNESS
[Disease: _____________________] : Disease: [unfilled] [T: ___] : T[unfilled] [N: ___] : N[unfilled] [AJCC Stage: ____] : AJCC Stage: [unfilled] [de-identified] : 62 M with no significant past medical history, developed progressive abdominal pain in March 2017. In May 2017 he went to an urgent care center and was referred for a CT A/P w/co. This was performed on 5/8/17 and demonstrated a circumferential 6.7 cm mass at the splenic flexure of the colon causing "apple core" luminal narrowing of the colon at that level c/w malignancy. A 2.7 x 2.2 x 2.5 cm ill defined LN below the pancreatic body at the level of the splenic vein is most consistent with a metastasis. On May 9, 2017, Haja saw Dr. Morgan Armenta, colorectal surgery who performed a colonoscopy on 5/15/17. Biopsy c/w invasive moderately differentiated colonic adenocarcinoma a/w necrosis. A PET/CT was performed on 5/30/17 which showed splenic flexure colonic hypermetabolic mass c/w primary carcinoma (SUV 35) and 2.5 cm mesenteric LN hypermetabolic focus c/w regional metastatic disease (SUV 5). \par \par On 6/13/17 patient underwent a open left colectomy, omentectomy and mobilization of the splenic flexure. Final pathology T3NO moderately differentiated carcinoma, loss of nuclear expression of MLH1 and PMS2. No adjuvant therapy was given. Post op developed SOB, hypoxia transferred to the SICU, placed on bipap. A CTA chest/abd/pelvis 6/15/17 c/w Nonspecific patchy opacity in RLL was noted, 3.4 cm collection or cystic lesion in the pancreatic body for which follow up imaging was suggested. \par \par Post op, Haja continued to complain of abdominal pain and an MRI A/P was performed on 7/25/17 which showed a 4.6 cm hyperintense structure inseparable from the distal pancreatic body. The lesion is located in the peripancreatic retroperitoneal space with imaging characteristics suggestive of pancreatic pseudocyst. He was evaluated by surg onc, Dr. Hager and referred for a CT pancreatic protocol which was performed on 9/5 and showed a peripherally enhancing mass in the body of the pancreas measures 4.6 x 3.5 cm. Subsequently had an EUS with FNA on 10/6/17 (GI- Dr. Luz) which confirmed moderately differentiated adenocarcinoma.\par \par On 11/9/17 underwent open ex lap for possible resection of pancreatic adenoca, however, SMA was encased by tumor and therefore could not be resected. Liver bx was performed and negative for malignancy, however, noted to have steatosis with mild steatohepatitis, port inflammation, focal periportal and pericellular fibrosis,2 + iron deposition. \par \par Patient was subsequently referred to rad onc and med onc for further evaluation. \par \par 12/5/17-3/13/18: 8 cycles of mFOLFIRINOX (25% dose reduction due to PS) with stable/mild improvement in disease \par \par 3/27/18-5/29/18 : C1-C4 Nivo 3mg/kg (240 mg flat dose)/Ipi 1mg/kg \par 5/31/18: CTCAP: decr in size of pancreatic mass, previously 3.6 to 2.5, new mediastinal LN\par 6/12/18: EBUS with bx of LN: reactive \par 6/26/18-: Nivo single agent Q 2 weeks \par 11/5 - 11/19: hospitalized at [facility] with herpes zoster involving the R periorbital area, no ocular involvement. opiods stopped abruptly s/p withdrawal characterized by diarrhea/n/v. Developed NICOLE (2/2 acyclovir?) with resolution. \par 11/30/18: restarted Nivolumab Q 2 weeks\par 12/13/18: last dose of Nivolumab. \par 12/28/18: Nivolumab held due to Grade 2-3 arthralgias/myalgias. Started on Prednisone 40 mg BID taper over 8 weeks.  \par 1/3/19: CTCAP: interval regression of pancreatic mass and mediastinal LN\par 2/8/19: Nivolumab restarted \par 4/3/19: CT CAP: enlargement of mediastinal LN, ground glass opacities b/l \par 4/6/19: Nivolumab d/c due to myalgias and ?pneumonitis \par 6/10/19: CT CAP: stable/improved disease\par 9/26/19: CT CAP: stable pancreatic mass, stable mediastinal LN, new L gynecomastia \par 12/120/19: CT CAP: stable exam, gynecomastia \par 1/22/20: Mammo/US: no masses noted\par \par \par \par  [de-identified] : moderately differentiated adenocarcinoma [de-identified] : 11/24/17: CA 19.9 - 81.3  CEA 29 [de-identified] : June 2017 - colon resection of tumor T3N0 (0/15 LN) moderately differentiated, loss of nuclear expression of MLH1, PMS2. \par    - IHC:  CK19, and variably positive for CDX2, CK7 and CK20, \par   + BRAF\par \par Nov 2017- attempted pancreatic resection:  CK7, CK20 and CDX2 are positive; CK20 is negative\par     - loss of nuclear expression of MLH1, PMS2.\par \par [facility] - failed report  [FreeTextEntry1] : surveillance due to inability to tolerate immunotherapy  [de-identified] : He has more mobility of his R shoulder. Still doing therapy. \par Continues to have arthralgias of fingers of b/l hand \par Eating well. Energy level is stable. denies any abdominal pain. Normal BMs

## 2020-02-10 PROBLEM — M19.049 HAND ARTHRITIS: Status: ACTIVE | Noted: 2019-10-25

## 2020-02-10 NOTE — HISTORY OF PRESENT ILLNESS
[FreeTextEntry1] : 65 yo M, with colon cancer (s/p resection in June 2017, chemotherapy completed in March 2018), pancreatic cancer (s/p Nivolumab/ipilimumab), sent for evaluation of joint pain/swelling. \par \par -Patient received immunotherapy (Nivolumab/ipilimumab) starting 6/26/18 with good response but developed herpes and infectious complications.\par Restarted back on Nivolumab 11/30, he developed arthralgias believed to be immunotherapy related adverse event , received a steroid taper with resolution of symptoms.\par Restarted Nivolumab on 2/8/19 then stopped on 4/19 due to persistent arthralgias and possible pneumonitis.\par -Presented on 10/16 to  for evaluation of pain and swelling of the right index finger\par diagnosed with gout and prescribed colchicine and indomethacin\par no Xrays/labs done\par -Right shoulder pain\par had an MRI that revealed a Full-thickness, full width tear of the supraspinatus tendon. s/ repair\par \par -prior to the start of therapy, patient denies any history of joint pain or swelling, never diagnosed with gout\par \par

## 2020-02-10 NOTE — PHYSICAL EXAM
[General Appearance - Alert] : alert [Oriented To Time, Place, And Person] : oriented to person, place, and time [General Appearance - In No Acute Distress] : in no acute distress [Sclera] : the sclera and conjunctiva were normal [PERRL With Normal Accommodation] : pupils were equal in size, round, and reactive to light [Hearing Threshold Finger Rub Not [place]] : hearing was normal [Auscultation Breath Sounds / Voice Sounds] : lungs were clear to auscultation bilaterally [Heart Sounds] : normal S1 and S2 [Edema] : there was no peripheral edema [Abdomen Soft] : soft [Abdomen Tenderness] : non-tender [FreeTextEntry1] : no synovitis, non tender joints. restricted ROM at the MCPs, decreased sensation to the left inde finger  [] : no rash

## 2020-02-10 NOTE — ASSESSMENT
[FreeTextEntry1] : 63 yo M, with colon cancer (s/p resection in June 2017, chemotherapy completed in March 2018), pancreatic cancer (s/p Nivolumab/ipilimumab), sent for evaluation of joint pain/swelling. \par Last use of immunotherapy in April 2019. 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\par RF, CCP negative\par US without synovitis \par No evidence of inflammatory arthritis on exam today\par =In view of active malignancy would use Plaquenil for maintenance rather than methotrexate \par Please check G6PD with next labs ( script provided)\par  =*baseline eye exam \par \par ***Stiffness and restricted ROM at the MCPs (without synovitis) and severely decreased sensation at left index finger-Concern for neuropathy ?cervical radiculopathy (history of c-spine injury, restricted ROM on exam)\par Recommend NCV- referral provided\par C-spine Xray\par \par \par RTO after above \par \par Patient aware of my assessment and plan, all questions answered.\par

## 2020-02-10 NOTE — DATA REVIEWED
[FreeTextEntry1] : Xrays of the hands \par subtle marginal erosion at the right MCP\par \par US findings: no erosions, no tendinopathy\par synovial hypertrophy most notable at the radial and ulnar side of the PIP2 joint - Doppler negative and without erosions\par \par RF/CCP negative\par uric acid normal. \par \par

## 2020-02-10 NOTE — REVIEW OF SYSTEMS
[Fever] : no fever [Chills] : no chills [As Noted in HPI] : as noted in HPI [Negative] : Respiratory

## 2020-02-28 ENCOUNTER — APPOINTMENT (OUTPATIENT)
Dept: OPHTHALMOLOGY | Facility: CLINIC | Age: 65
End: 2020-02-28
Payer: MEDICARE

## 2020-02-28 ENCOUNTER — NON-APPOINTMENT (OUTPATIENT)
Age: 65
End: 2020-02-28

## 2020-02-28 PROCEDURE — 92012 INTRM OPH EXAM EST PATIENT: CPT

## 2020-03-24 ENCOUNTER — OUTPATIENT (OUTPATIENT)
Dept: OUTPATIENT SERVICES | Facility: HOSPITAL | Age: 65
LOS: 1 days | Discharge: HOME | End: 2020-03-24

## 2020-03-24 DIAGNOSIS — Z98.890 OTHER SPECIFIED POSTPROCEDURAL STATES: Chronic | ICD-10-CM

## 2020-03-24 DIAGNOSIS — K25.5 CHRONIC OR UNSPECIFIED GASTRIC ULCER WITH PERFORATION: Chronic | ICD-10-CM

## 2020-03-24 DIAGNOSIS — C25.9 MALIGNANT NEOPLASM OF PANCREAS, UNSPECIFIED: ICD-10-CM

## 2020-03-24 DIAGNOSIS — Z90.49 ACQUIRED ABSENCE OF OTHER SPECIFIED PARTS OF DIGESTIVE TRACT: Chronic | ICD-10-CM

## 2020-03-25 ENCOUNTER — APPOINTMENT (OUTPATIENT)
Age: 65
End: 2020-03-25

## 2020-03-30 ENCOUNTER — APPOINTMENT (OUTPATIENT)
Dept: HEMATOLOGY ONCOLOGY | Facility: CLINIC | Age: 65
End: 2020-03-30

## 2020-04-27 ENCOUNTER — OUTPATIENT (OUTPATIENT)
Dept: OUTPATIENT SERVICES | Facility: HOSPITAL | Age: 65
LOS: 1 days | End: 2020-04-27
Payer: MEDICARE

## 2020-04-27 ENCOUNTER — APPOINTMENT (OUTPATIENT)
Dept: CT IMAGING | Facility: IMAGING CENTER | Age: 65
End: 2020-04-27
Payer: MEDICARE

## 2020-04-27 DIAGNOSIS — Z98.890 OTHER SPECIFIED POSTPROCEDURAL STATES: Chronic | ICD-10-CM

## 2020-04-27 DIAGNOSIS — K25.5 CHRONIC OR UNSPECIFIED GASTRIC ULCER WITH PERFORATION: Chronic | ICD-10-CM

## 2020-04-27 DIAGNOSIS — C25.9 MALIGNANT NEOPLASM OF PANCREAS, UNSPECIFIED: ICD-10-CM

## 2020-04-27 DIAGNOSIS — Z90.49 ACQUIRED ABSENCE OF OTHER SPECIFIED PARTS OF DIGESTIVE TRACT: Chronic | ICD-10-CM

## 2020-04-27 PROCEDURE — 71260 CT THORAX DX C+: CPT

## 2020-04-27 PROCEDURE — 74177 CT ABD & PELVIS W/CONTRAST: CPT

## 2020-04-27 PROCEDURE — 74177 CT ABD & PELVIS W/CONTRAST: CPT | Mod: 26

## 2020-04-27 PROCEDURE — 82565 ASSAY OF CREATININE: CPT

## 2020-04-27 PROCEDURE — 71260 CT THORAX DX C+: CPT | Mod: 26

## 2020-04-30 ENCOUNTER — OUTPATIENT (OUTPATIENT)
Dept: OUTPATIENT SERVICES | Facility: HOSPITAL | Age: 65
LOS: 1 days | Discharge: HOME | End: 2020-04-30

## 2020-04-30 ENCOUNTER — APPOINTMENT (OUTPATIENT)
Age: 65
End: 2020-04-30
Payer: MEDICARE

## 2020-04-30 VITALS — WEIGHT: 200 LBS | BODY MASS INDEX: 30.41 KG/M2

## 2020-04-30 DIAGNOSIS — Z98.890 OTHER SPECIFIED POSTPROCEDURAL STATES: Chronic | ICD-10-CM

## 2020-04-30 DIAGNOSIS — K25.5 CHRONIC OR UNSPECIFIED GASTRIC ULCER WITH PERFORATION: Chronic | ICD-10-CM

## 2020-04-30 DIAGNOSIS — Z90.49 ACQUIRED ABSENCE OF OTHER SPECIFIED PARTS OF DIGESTIVE TRACT: Chronic | ICD-10-CM

## 2020-04-30 DIAGNOSIS — C25.9 MALIGNANT NEOPLASM OF PANCREAS, UNSPECIFIED: ICD-10-CM

## 2020-04-30 PROCEDURE — 99213 OFFICE O/P EST LOW 20 MIN: CPT | Mod: 95

## 2020-05-05 ENCOUNTER — LABORATORY RESULT (OUTPATIENT)
Age: 65
End: 2020-05-05

## 2020-05-05 ENCOUNTER — RESULT REVIEW (OUTPATIENT)
Age: 65
End: 2020-05-05

## 2020-05-05 ENCOUNTER — APPOINTMENT (OUTPATIENT)
Age: 65
End: 2020-05-05

## 2020-05-05 LAB
BASOPHILS # BLD AUTO: 0.08 K/UL — SIGNIFICANT CHANGE UP (ref 0–0.2)
BASOPHILS NFR BLD AUTO: 1.3 % — SIGNIFICANT CHANGE UP (ref 0–2)
EOSINOPHIL # BLD AUTO: 0.44 K/UL — SIGNIFICANT CHANGE UP (ref 0–0.5)
EOSINOPHIL NFR BLD AUTO: 7.3 % — HIGH (ref 0–6)
HCT VFR BLD CALC: 38.3 % — LOW (ref 39–50)
HGB BLD-MCNC: 13.8 G/DL — SIGNIFICANT CHANGE UP (ref 13–17)
IMM GRANULOCYTES NFR BLD AUTO: 0.2 % — SIGNIFICANT CHANGE UP (ref 0–1.5)
LYMPHOCYTES # BLD AUTO: 2.37 K/UL — SIGNIFICANT CHANGE UP (ref 1–3.3)
LYMPHOCYTES # BLD AUTO: 39.4 % — SIGNIFICANT CHANGE UP (ref 13–44)
MCHC RBC-ENTMCNC: 30.2 PG — SIGNIFICANT CHANGE UP (ref 27–34)
MCHC RBC-ENTMCNC: 36 GM/DL — SIGNIFICANT CHANGE UP (ref 32–36)
MCV RBC AUTO: 83.8 FL — SIGNIFICANT CHANGE UP (ref 80–100)
MONOCYTES # BLD AUTO: 0.52 K/UL — SIGNIFICANT CHANGE UP (ref 0–0.9)
MONOCYTES NFR BLD AUTO: 8.6 % — SIGNIFICANT CHANGE UP (ref 2–14)
NEUTROPHILS # BLD AUTO: 2.6 K/UL — SIGNIFICANT CHANGE UP (ref 1.8–7.4)
NEUTROPHILS NFR BLD AUTO: 43.2 % — SIGNIFICANT CHANGE UP (ref 43–77)
NRBC # BLD: 0 /100 WBCS — SIGNIFICANT CHANGE UP (ref 0–0)
PLATELET # BLD AUTO: 190 K/UL — SIGNIFICANT CHANGE UP (ref 150–400)
RBC # BLD: 4.57 M/UL — SIGNIFICANT CHANGE UP (ref 4.2–5.8)
RBC # FLD: 12.8 % — SIGNIFICANT CHANGE UP (ref 10.3–14.5)
WBC # BLD: 6.02 K/UL — SIGNIFICANT CHANGE UP (ref 3.8–10.5)
WBC # FLD AUTO: 6.02 K/UL — SIGNIFICANT CHANGE UP (ref 3.8–10.5)

## 2020-05-16 NOTE — PHYSICAL EXAM
[Fully active, able to carry on all pre-disease performance without restriction] : Status 0 - Fully active, able to carry on all pre-disease performance without restriction [Normal] : grossly intact [de-identified] : normal respiratory pattern

## 2020-05-16 NOTE — HISTORY OF PRESENT ILLNESS
[Home] : at home, [unfilled] , at the time of the visit. [Medical Office: ([facility])___] : at the medical office located in  [Spouse] : spouse [Patient] : the patient [Self] : self [Disease: _____________________] : Disease: [unfilled] [T: ___] : T[unfilled] [N: ___] : N[unfilled] [AJCC Stage: ____] : AJCC Stage: [unfilled] [de-identified] : 62 M with no significant past medical history, developed progressive abdominal pain in March 2017. In May 2017 he went to an urgent care center and was referred for a CT A/P w/co. This was performed on 5/8/17 and demonstrated a circumferential 6.7 cm mass at the splenic flexure of the colon causing "apple core" luminal narrowing of the colon at that level c/w malignancy. A 2.7 x 2.2 x 2.5 cm ill defined LN below the pancreatic body at the level of the splenic vein is most consistent with a metastasis. On May 9, 2017, Haja saw Dr. Morgan Armenta, colorectal surgery who performed a colonoscopy on 5/15/17. Biopsy c/w invasive moderately differentiated colonic adenocarcinoma a/w necrosis. A PET/CT was performed on 5/30/17 which showed splenic flexure colonic hypermetabolic mass c/w primary carcinoma (SUV 35) and 2.5 cm mesenteric LN hypermetabolic focus c/w regional metastatic disease (SUV 5). \par \par On 6/13/17 patient underwent a open left colectomy, omentectomy and mobilization of the splenic flexure. Final pathology T3NO moderately differentiated carcinoma, loss of nuclear expression of MLH1 and PMS2. No adjuvant therapy was given. Post op developed SOB, hypoxia transferred to the SICU, placed on bipap. A CTA chest/abd/pelvis 6/15/17 c/w Nonspecific patchy opacity in RLL was noted, 3.4 cm collection or cystic lesion in the pancreatic body for which follow up imaging was suggested. \par \par Post op, Haja continued to complain of abdominal pain and an MRI A/P was performed on 7/25/17 which showed a 4.6 cm hyperintense structure inseparable from the distal pancreatic body. The lesion is located in the peripancreatic retroperitoneal space with imaging characteristics suggestive of pancreatic pseudocyst. He was evaluated by surg onc, Dr. Hager and referred for a CT pancreatic protocol which was performed on 9/5 and showed a peripherally enhancing mass in the body of the pancreas measures 4.6 x 3.5 cm. Subsequently had an EUS with FNA on 10/6/17 (GI- Dr. Luz) which confirmed moderately differentiated adenocarcinoma.\par \par On 11/9/17 underwent open ex lap for possible resection of pancreatic adenoca, however, SMA was encased by tumor and therefore could not be resected. Liver bx was performed and negative for malignancy, however, noted to have steatosis with mild steatohepatitis, port inflammation, focal periportal and pericellular fibrosis,2 + iron deposition. \par \par Patient was subsequently referred to rad onc and med onc for further evaluation. \par \par 12/5/17-3/13/18: 8 cycles of mFOLFIRINOX (25% dose reduction due to PS) with stable/mild improvement in disease \par \par 3/27/18-5/29/18 : C1-C4 Nivo 3mg/kg (240 mg flat dose)/Ipi 1mg/kg \par 5/31/18: CTCAP: decr in size of pancreatic mass, previously 3.6 to 2.5, new mediastinal LN\par 6/12/18: EBUS with bx of LN: reactive \par 6/26/18-: Nivo single agent Q 2 weeks \par 11/5 - 11/19: hospitalized at [facility] with herpes zoster involving the R periorbital area, no ocular involvement. opiods stopped abruptly s/p withdrawal characterized by diarrhea/n/v. Developed NICOLE (2/2 acyclovir?) with resolution. \par 11/30/18: restarted Nivolumab Q 2 weeks\par 12/13/18: last dose of Nivolumab. \par 12/28/18: Nivolumab held due to Grade 2-3 arthralgias/myalgias. Started on Prednisone 40 mg BID taper over 8 weeks.  \par 1/3/19: CTCAP: interval regression of pancreatic mass and mediastinal LN\par 2/8/19: Nivolumab restarted \par 4/3/19: CT CAP: enlargement of mediastinal LN, ground glass opacities b/l \par 4/6/19: Nivolumab d/c due to myalgias and ?pneumonitis \par 6/10/19: CT CAP: stable/improved disease\par 9/26/19: CT CAP: stable pancreatic mass, stable mediastinal LN, new L gynecomastia \par 12/120/19: CT CAP: stable exam, gynecomastia \par 1/22/20: Mammo/US: no masses noted\par 4/27/20: CT CAP: stable exam\par \par \par \par  [de-identified] : June 2017 - colon resection of tumor T3N0 (0/15 LN) moderately differentiated, loss of nuclear expression of MLH1, PMS2. \par    - IHC:  CK19, and variably positive for CDX2, CK7 and CK20, \par   + BRAF\par \par Nov 2017- attempted pancreatic resection:  CK7, CK20 and CDX2 are positive; CK20 is negative\par     - loss of nuclear expression of MLH1, PMS2.\par \par [facility] - failed report  [de-identified] : moderately differentiated adenocarcinoma [de-identified] : 11/24/17: CA 19.9 - 81.3  CEA 29 [FreeTextEntry1] : surveillance due to inability to tolerate immunotherapy  [de-identified] : Feels well. chronic joint pain in hands and shoulder this is stable. Good appetite. Last saw rheum in Feb. No change in BMs. No CP/SOB. No fevers/chills.

## 2020-05-18 ENCOUNTER — LABORATORY RESULT (OUTPATIENT)
Age: 65
End: 2020-05-18

## 2020-05-18 ENCOUNTER — APPOINTMENT (OUTPATIENT)
Dept: ENDOCRINOLOGY | Facility: CLINIC | Age: 65
End: 2020-05-18
Payer: MEDICARE

## 2020-05-18 VITALS
SYSTOLIC BLOOD PRESSURE: 110 MMHG | OXYGEN SATURATION: 98 % | HEART RATE: 73 BPM | BODY MASS INDEX: 31.52 KG/M2 | HEIGHT: 68 IN | DIASTOLIC BLOOD PRESSURE: 70 MMHG | WEIGHT: 208 LBS

## 2020-05-18 VITALS — TEMPERATURE: 97.3 F

## 2020-05-18 PROCEDURE — 99214 OFFICE O/P EST MOD 30 MIN: CPT

## 2020-05-18 NOTE — HISTORY OF PRESENT ILLNESS
[FreeTextEntry1] : cc: abnormal thyroid tests\par \par 64 year old man with Pancreatic cancer, treated with immunotherapy ( previously treated with chemotherapy).  After  4 cycles of ipilimumab/nivolumab, blood tests showed hyperthyroidism. He had no symptoms and continued immunotherapy. Subsequent TSH levels were elevated and then TFTs returned to normal.  He had been on Nivo every two weeks, stopped in April 2019\par Blood tests a month ago showed elevated TSH\par He reports no fatigue, constipation, cold intolerance, depression.  \par Has had more anxiety due to covid\par \par Hypocortisolism - now taking hydrocortisone 10 mg in AM and 5 mg in pm.  Has been feeling well, no lightheadedness, weight loss.\par \par Was found to have gynecomastia.. Mild prolactin elevation, nonfasting.  No galactorrhea.   He is on finasteride\par \par

## 2020-05-18 NOTE — PHYSICAL EXAM
[Alert] : alert [Healthy Appearance] : healthy appearance [No Acute Distress] : no acute distress [Normal Sclera/Conjunctiva] : normal sclera/conjunctiva [EOMI] : extra ocular movement intact [No Respiratory Distress] : no respiratory distress [Clear to Auscultation] : lungs were clear to auscultation bilaterally [Normal S1, S2] : normal S1 and S2 [Regular Rhythm] : with a regular rhythm [No Edema] : no peripheral edema [Gynecomastia] : gynecomastia present [Normal Bowel Sounds] : normal bowel sounds [Not Tender] : non-tender [Soft] : abdomen soft [Normal Strength/Tone] : muscle strength and tone were normal [Normal Reflexes] : deep tendon reflexes were 2+ and symmetric [No Tremors] : no tremors [Normal Affect] : the affect was normal [Normal Mood] : the mood was normal

## 2020-05-18 NOTE — ASSESSMENT
[Sick-Day Management] : sick-day management [FreeTextEntry1] : 64 year old man with pancreatic cancer  who developed thyroiditis after treatment with immunotherapy, \par   -Clinically euthyroid with elevated TSH on last blood tests\par  - Repeat TFTs ,consider levothyroxine\par \par Hypocortisolism - continue hydrocortisone\par \par Gynecomastia - check macroprolactin \par  -Discussed that it may be a side effect of finasteride, recommend he discuss with his PMD\par  - Discussed cosmetic surgery\par \par f/u 6 months

## 2020-05-21 LAB
T3RU NFR SERPL: 1.1 TBI
T4 SERPL-MCNC: 7.8 UG/DL
TSH SERPL-ACNC: 6.82 UIU/ML

## 2020-05-27 LAB
MONOMERIC PROLACTIN (ICMA)*: 18 NG/ML
PERCENT MACROPROLACTIN: 10 %
PROLACTIN, SERUM (ICMA)*: 20 NG/ML

## 2020-07-06 NOTE — ASU PATIENT PROFILE, ADULT - NSCAFFEINETYPE_GEN_ALL_CORE_SD
Initiate Treatment: Tretinoin cream for back at night
Otc Regimen: Benzoyl Peroxide wash for back
Detail Level: Zone
Continue Regimen: Epiduo Forte to face at night \\nClindamycin wipes to face and back in the morning
Initiate Treatment: Clindamycin solution to scalp twice daily
Continue Regimen: Ketoconazole shampoo three times a week
coffee

## 2020-07-15 NOTE — PROGRESS NOTE ADULT - PROBLEM SELECTOR PLAN 2
- Pt. reports 3-4 episodes of non-bloody loose stools since last night; endorses mild belly pain from it  - WBC remains at 4.5; unlikely C. Diff.  - F/u C. Diff PCR, GI PCR, Stool Clx Lab: [id number] Lab: [id number] - Pt. reports multiple episodes of non-bloody loose stools since last night; endorses mild belly pain from it  - WBC remains at 4.8   - C. Diff negative.  - F/u GI PCR and Stool Clx - Pt. reports multiple episodes of non-bloody loose stools since last night; endorses mild belly pain from it  - WBC remains at 4.8   - C. Diff negative.  - F/u GI PCR, Stool Clx, and O&P. - Pt. reports multiple episodes of non-bloody loose stools since last night; endorses mild belly pain from it  - WBC remains at 4.8   - C. Diff negative.  - F/u GI PCR, Stool Clx, and O&P.  - C/w pepto-bismol to treat sxs.

## 2020-08-01 ENCOUNTER — OUTPATIENT (OUTPATIENT)
Dept: OUTPATIENT SERVICES | Facility: HOSPITAL | Age: 65
LOS: 1 days | Discharge: HOME | End: 2020-08-01

## 2020-08-01 DIAGNOSIS — Z98.890 OTHER SPECIFIED POSTPROCEDURAL STATES: Chronic | ICD-10-CM

## 2020-08-01 DIAGNOSIS — Z90.49 ACQUIRED ABSENCE OF OTHER SPECIFIED PARTS OF DIGESTIVE TRACT: Chronic | ICD-10-CM

## 2020-08-01 DIAGNOSIS — K25.5 CHRONIC OR UNSPECIFIED GASTRIC ULCER WITH PERFORATION: Chronic | ICD-10-CM

## 2020-08-01 DIAGNOSIS — C25.9 MALIGNANT NEOPLASM OF PANCREAS, UNSPECIFIED: ICD-10-CM

## 2020-08-02 ENCOUNTER — APPOINTMENT (OUTPATIENT)
Dept: CT IMAGING | Facility: IMAGING CENTER | Age: 65
End: 2020-08-02
Payer: MEDICARE

## 2020-08-02 ENCOUNTER — OUTPATIENT (OUTPATIENT)
Dept: OUTPATIENT SERVICES | Facility: HOSPITAL | Age: 65
LOS: 1 days | End: 2020-08-02
Payer: MEDICARE

## 2020-08-02 DIAGNOSIS — K25.5 CHRONIC OR UNSPECIFIED GASTRIC ULCER WITH PERFORATION: Chronic | ICD-10-CM

## 2020-08-02 DIAGNOSIS — C25.9 MALIGNANT NEOPLASM OF PANCREAS, UNSPECIFIED: ICD-10-CM

## 2020-08-02 DIAGNOSIS — Z98.890 OTHER SPECIFIED POSTPROCEDURAL STATES: Chronic | ICD-10-CM

## 2020-08-02 DIAGNOSIS — Z90.49 ACQUIRED ABSENCE OF OTHER SPECIFIED PARTS OF DIGESTIVE TRACT: Chronic | ICD-10-CM

## 2020-08-02 PROCEDURE — 71260 CT THORAX DX C+: CPT | Mod: 26

## 2020-08-02 PROCEDURE — 71260 CT THORAX DX C+: CPT

## 2020-08-02 PROCEDURE — 74177 CT ABD & PELVIS W/CONTRAST: CPT

## 2020-08-02 PROCEDURE — 74177 CT ABD & PELVIS W/CONTRAST: CPT | Mod: 26

## 2020-08-07 ENCOUNTER — APPOINTMENT (OUTPATIENT)
Age: 65
End: 2020-08-07
Payer: MEDICARE

## 2020-08-07 VITALS
SYSTOLIC BLOOD PRESSURE: 128 MMHG | DIASTOLIC BLOOD PRESSURE: 81 MMHG | WEIGHT: 189.31 LBS | RESPIRATION RATE: 18 BRPM | TEMPERATURE: 98 F | HEART RATE: 67 BPM | OXYGEN SATURATION: 98 % | BODY MASS INDEX: 28.79 KG/M2

## 2020-08-07 PROCEDURE — 99213 OFFICE O/P EST LOW 20 MIN: CPT

## 2020-08-12 ENCOUNTER — RESULT REVIEW (OUTPATIENT)
Age: 65
End: 2020-08-12

## 2020-08-12 ENCOUNTER — LABORATORY RESULT (OUTPATIENT)
Age: 65
End: 2020-08-12

## 2020-08-12 ENCOUNTER — APPOINTMENT (OUTPATIENT)
Age: 65
End: 2020-08-12

## 2020-08-12 LAB
BASOPHILS # BLD AUTO: 0.08 K/UL — SIGNIFICANT CHANGE UP (ref 0–0.2)
BASOPHILS NFR BLD AUTO: 1.5 % — SIGNIFICANT CHANGE UP (ref 0–2)
EOSINOPHIL # BLD AUTO: 0.43 K/UL — SIGNIFICANT CHANGE UP (ref 0–0.5)
EOSINOPHIL NFR BLD AUTO: 8.2 % — HIGH (ref 0–6)
HCT VFR BLD CALC: 39.5 % — SIGNIFICANT CHANGE UP (ref 39–50)
HGB BLD-MCNC: 13.6 G/DL — SIGNIFICANT CHANGE UP (ref 13–17)
IMM GRANULOCYTES NFR BLD AUTO: 0.4 % — SIGNIFICANT CHANGE UP (ref 0–1.5)
LYMPHOCYTES # BLD AUTO: 2.47 K/UL — SIGNIFICANT CHANGE UP (ref 1–3.3)
LYMPHOCYTES # BLD AUTO: 46.9 % — HIGH (ref 13–44)
MCHC RBC-ENTMCNC: 30 PG — SIGNIFICANT CHANGE UP (ref 27–34)
MCHC RBC-ENTMCNC: 34.4 GM/DL — SIGNIFICANT CHANGE UP (ref 32–36)
MCV RBC AUTO: 87 FL — SIGNIFICANT CHANGE UP (ref 80–100)
MONOCYTES # BLD AUTO: 0.48 K/UL — SIGNIFICANT CHANGE UP (ref 0–0.9)
MONOCYTES NFR BLD AUTO: 9.1 % — SIGNIFICANT CHANGE UP (ref 2–14)
NEUTROPHILS # BLD AUTO: 1.79 K/UL — LOW (ref 1.8–7.4)
NEUTROPHILS NFR BLD AUTO: 33.9 % — LOW (ref 43–77)
NRBC # BLD: 0 /100 WBCS — SIGNIFICANT CHANGE UP (ref 0–0)
PLATELET # BLD AUTO: 192 K/UL — SIGNIFICANT CHANGE UP (ref 150–400)
RBC # BLD: 4.54 M/UL — SIGNIFICANT CHANGE UP (ref 4.2–5.8)
RBC # FLD: 12.6 % — SIGNIFICANT CHANGE UP (ref 10.3–14.5)
WBC # BLD: 5.27 K/UL — SIGNIFICANT CHANGE UP (ref 3.8–10.5)
WBC # FLD AUTO: 5.27 K/UL — SIGNIFICANT CHANGE UP (ref 3.8–10.5)

## 2020-08-27 NOTE — PHYSICAL EXAM
[Fully active, able to carry on all pre-disease performance without restriction] : Status 0 - Fully active, able to carry on all pre-disease performance without restriction [Normal] : affect appropriate [de-identified] : normal respiratory pattern  [de-identified] : palpable soft mass, mobile, in paraspinal L5 region

## 2020-08-27 NOTE — HISTORY OF PRESENT ILLNESS
[Disease: _____________________] : Disease: [unfilled] [T: ___] : T[unfilled] [N: ___] : N[unfilled] [AJCC Stage: ____] : AJCC Stage: [unfilled] [de-identified] : 62 M with no significant past medical history, developed progressive abdominal pain in March 2017. In May 2017 he went to an urgent care center and was referred for a CT A/P w/co. This was performed on 5/8/17 and demonstrated a circumferential 6.7 cm mass at the splenic flexure of the colon causing "apple core" luminal narrowing of the colon at that level c/w malignancy. A 2.7 x 2.2 x 2.5 cm ill defined LN below the pancreatic body at the level of the splenic vein is most consistent with a metastasis. On May 9, 2017, Haja saw Dr. Morgan Armenta, colorectal surgery who performed a colonoscopy on 5/15/17. Biopsy c/w invasive moderately differentiated colonic adenocarcinoma a/w necrosis. A PET/CT was performed on 5/30/17 which showed splenic flexure colonic hypermetabolic mass c/w primary carcinoma (SUV 35) and 2.5 cm mesenteric LN hypermetabolic focus c/w regional metastatic disease (SUV 5). \par \par On 6/13/17 patient underwent a open left colectomy, omentectomy and mobilization of the splenic flexure. Final pathology T3NO moderately differentiated carcinoma, loss of nuclear expression of MLH1 and PMS2. No adjuvant therapy was given. Post op developed SOB, hypoxia transferred to the SICU, placed on bipap. A CTA chest/abd/pelvis 6/15/17 c/w Nonspecific patchy opacity in RLL was noted, 3.4 cm collection or cystic lesion in the pancreatic body for which follow up imaging was suggested. \par \par Post op, Haja continued to complain of abdominal pain and an MRI A/P was performed on 7/25/17 which showed a 4.6 cm hyperintense structure inseparable from the distal pancreatic body. The lesion is located in the peripancreatic retroperitoneal space with imaging characteristics suggestive of pancreatic pseudocyst. He was evaluated by surg onc, Dr. Hager and referred for a CT pancreatic protocol which was performed on 9/5 and showed a peripherally enhancing mass in the body of the pancreas measures 4.6 x 3.5 cm. Subsequently had an EUS with FNA on 10/6/17 (GI- Dr. Luz) which confirmed moderately differentiated adenocarcinoma.\par \par On 11/9/17 underwent open ex lap for possible resection of pancreatic adenoca, however, SMA was encased by tumor and therefore could not be resected. Liver bx was performed and negative for malignancy, however, noted to have steatosis with mild steatohepatitis, port inflammation, focal periportal and pericellular fibrosis,2 + iron deposition. \par \par Patient was subsequently referred to rad onc and med onc for further evaluation. \par \par 12/5/17-3/13/18: 8 cycles of mFOLFIRINOX (25% dose reduction due to PS) with stable/mild improvement in disease \par \par 3/27/18-5/29/18 : C1-C4 Nivo 3mg/kg (240 mg flat dose)/Ipi 1mg/kg \par 5/31/18: CTCAP: decr in size of pancreatic mass, previously 3.6 to 2.5, new mediastinal LN\par 6/12/18: EBUS with bx of LN: reactive \par 6/26/18-: Nivo single agent Q 2 weeks \par 11/5 - 11/19: hospitalized at [facility] with herpes zoster involving the R periorbital area, no ocular involvement. opiods stopped abruptly s/p withdrawal characterized by diarrhea/n/v. Developed NICOLE (2/2 acyclovir?) with resolution. \par 11/30/18: restarted Nivolumab Q 2 weeks\par 12/13/18: last dose of Nivolumab. \par 12/28/18: Nivolumab held due to Grade 2-3 arthralgias/myalgias. Started on Prednisone 40 mg BID taper over 8 weeks.  \par 1/3/19: CTCAP: interval regression of pancreatic mass and mediastinal LN\par 2/8/19: Nivolumab restarted \par 4/3/19: CT CAP: enlargement of mediastinal LN, ground glass opacities b/l \par 4/6/19: Nivolumab d/c due to myalgias and ?pneumonitis \par 6/10/19: CT CAP: stable/improved disease\par 9/26/19: CT CAP: stable pancreatic mass, stable mediastinal LN, new L gynecomastia \par 12/120/19: CT CAP: stable exam, gynecomastia \par 1/22/20: Mammo/US: no masses noted\par 4/27/20: CT CAP: stable exam\par 8/2/20: CT CAP: stable disease \par \par \par \par  [de-identified] : moderately differentiated adenocarcinoma [de-identified] : 11/24/17: CA 19.9 - 81.3  CEA 29 [de-identified] : June 2017 - colon resection of tumor T3N0 (0/15 LN) moderately differentiated, loss of nuclear expression of MLH1, PMS2. \par    - IHC:  CK19, and variably positive for CDX2, CK7 and CK20, \par   + BRAF\par \par Nov 2017- attempted pancreatic resection:  CK7, CK20 and CDX2 are positive; CK20 is negative\par     - loss of nuclear expression of MLH1, PMS2.\par \par [facility] - failed report  [FreeTextEntry1] : surveillance due to inability to tolerate immunotherapy  [de-identified] : Overall feeling ok. Lost 20 lbs intentionally by cutting out snacking at night. Good appetite. Energy level is stable. Noticed a soft lump in lower paraspinal area 1 week ago. Its not painful. Gets smaller in the shower.\par Denies any pain.

## 2020-08-28 ENCOUNTER — APPOINTMENT (OUTPATIENT)
Dept: OPHTHALMOLOGY | Facility: CLINIC | Age: 65
End: 2020-08-28

## 2020-08-29 NOTE — PHYSICAL THERAPY INITIAL EVALUATION ADULT - PERTINENT HX OF CURRENT PROBLEM, REHAB EVAL
This is a 62M h/o HTN, colon cancer s/p resection, stage III borderline resectable pancreatic cancer on FOLFIRINOX (last tx 3/13/18) who presents with abdominal and back pain. Improved

## 2020-10-26 ENCOUNTER — APPOINTMENT (OUTPATIENT)
Dept: CT IMAGING | Facility: IMAGING CENTER | Age: 65
End: 2020-10-26
Payer: MEDICARE

## 2020-10-26 ENCOUNTER — OUTPATIENT (OUTPATIENT)
Dept: OUTPATIENT SERVICES | Facility: HOSPITAL | Age: 65
LOS: 1 days | End: 2020-10-26
Payer: MEDICARE

## 2020-10-26 DIAGNOSIS — Z98.890 OTHER SPECIFIED POSTPROCEDURAL STATES: Chronic | ICD-10-CM

## 2020-10-26 DIAGNOSIS — Z90.49 ACQUIRED ABSENCE OF OTHER SPECIFIED PARTS OF DIGESTIVE TRACT: Chronic | ICD-10-CM

## 2020-10-26 DIAGNOSIS — C25.9 MALIGNANT NEOPLASM OF PANCREAS, UNSPECIFIED: ICD-10-CM

## 2020-10-26 DIAGNOSIS — K25.5 CHRONIC OR UNSPECIFIED GASTRIC ULCER WITH PERFORATION: Chronic | ICD-10-CM

## 2020-10-26 PROCEDURE — 74177 CT ABD & PELVIS W/CONTRAST: CPT | Mod: 26

## 2020-10-26 PROCEDURE — 82565 ASSAY OF CREATININE: CPT

## 2020-10-26 PROCEDURE — 74177 CT ABD & PELVIS W/CONTRAST: CPT

## 2020-10-26 PROCEDURE — 71260 CT THORAX DX C+: CPT | Mod: 26

## 2020-10-26 PROCEDURE — 71260 CT THORAX DX C+: CPT

## 2020-10-28 ENCOUNTER — OUTPATIENT (OUTPATIENT)
Dept: OUTPATIENT SERVICES | Facility: HOSPITAL | Age: 65
LOS: 1 days | Discharge: HOME | End: 2020-10-28

## 2020-10-28 DIAGNOSIS — K25.5 CHRONIC OR UNSPECIFIED GASTRIC ULCER WITH PERFORATION: Chronic | ICD-10-CM

## 2020-10-28 DIAGNOSIS — C25.9 MALIGNANT NEOPLASM OF PANCREAS, UNSPECIFIED: ICD-10-CM

## 2020-10-28 DIAGNOSIS — Z90.49 ACQUIRED ABSENCE OF OTHER SPECIFIED PARTS OF DIGESTIVE TRACT: Chronic | ICD-10-CM

## 2020-10-28 DIAGNOSIS — Z98.890 OTHER SPECIFIED POSTPROCEDURAL STATES: Chronic | ICD-10-CM

## 2020-11-02 ENCOUNTER — APPOINTMENT (OUTPATIENT)
Age: 65
End: 2020-11-02
Payer: MEDICARE

## 2020-11-02 VITALS
RESPIRATION RATE: 14 BRPM | SYSTOLIC BLOOD PRESSURE: 124 MMHG | DIASTOLIC BLOOD PRESSURE: 79 MMHG | BODY MASS INDEX: 27.65 KG/M2 | WEIGHT: 181.88 LBS | HEART RATE: 63 BPM | OXYGEN SATURATION: 99 % | TEMPERATURE: 98 F

## 2020-11-02 PROCEDURE — 99213 OFFICE O/P EST LOW 20 MIN: CPT

## 2020-11-03 ENCOUNTER — APPOINTMENT (OUTPATIENT)
Age: 65
End: 2020-11-03

## 2020-11-03 ENCOUNTER — RESULT REVIEW (OUTPATIENT)
Age: 65
End: 2020-11-03

## 2020-11-03 ENCOUNTER — LABORATORY RESULT (OUTPATIENT)
Age: 65
End: 2020-11-03

## 2020-11-03 DIAGNOSIS — Z51.11 ENCOUNTER FOR ANTINEOPLASTIC CHEMOTHERAPY: ICD-10-CM

## 2020-11-03 LAB
BASOPHILS # BLD AUTO: 0.08 K/UL — SIGNIFICANT CHANGE UP (ref 0–0.2)
BASOPHILS NFR BLD AUTO: 1.3 % — SIGNIFICANT CHANGE UP (ref 0–2)
EOSINOPHIL # BLD AUTO: 0.48 K/UL — SIGNIFICANT CHANGE UP (ref 0–0.5)
EOSINOPHIL NFR BLD AUTO: 8 % — HIGH (ref 0–6)
HCT VFR BLD CALC: 37.4 % — LOW (ref 39–50)
HGB BLD-MCNC: 13.4 G/DL — SIGNIFICANT CHANGE UP (ref 13–17)
IMM GRANULOCYTES NFR BLD AUTO: 0.2 % — SIGNIFICANT CHANGE UP (ref 0–1.5)
LYMPHOCYTES # BLD AUTO: 2.82 K/UL — SIGNIFICANT CHANGE UP (ref 1–3.3)
LYMPHOCYTES # BLD AUTO: 47.1 % — HIGH (ref 13–44)
MCHC RBC-ENTMCNC: 30.5 PG — SIGNIFICANT CHANGE UP (ref 27–34)
MCHC RBC-ENTMCNC: 35.8 G/DL — SIGNIFICANT CHANGE UP (ref 32–36)
MCV RBC AUTO: 85 FL — SIGNIFICANT CHANGE UP (ref 80–100)
MONOCYTES # BLD AUTO: 0.42 K/UL — SIGNIFICANT CHANGE UP (ref 0–0.9)
MONOCYTES NFR BLD AUTO: 7 % — SIGNIFICANT CHANGE UP (ref 2–14)
NEUTROPHILS # BLD AUTO: 2.18 K/UL — SIGNIFICANT CHANGE UP (ref 1.8–7.4)
NEUTROPHILS NFR BLD AUTO: 36.4 % — LOW (ref 43–77)
NRBC # BLD: 0 /100 WBCS — SIGNIFICANT CHANGE UP (ref 0–0)
PLATELET # BLD AUTO: 176 K/UL — SIGNIFICANT CHANGE UP (ref 150–400)
RBC # BLD: 4.4 M/UL — SIGNIFICANT CHANGE UP (ref 4.2–5.8)
RBC # FLD: 12.7 % — SIGNIFICANT CHANGE UP (ref 10.3–14.5)
WBC # BLD: 5.99 K/UL — SIGNIFICANT CHANGE UP (ref 3.8–10.5)
WBC # FLD AUTO: 5.99 K/UL — SIGNIFICANT CHANGE UP (ref 3.8–10.5)

## 2020-11-23 ENCOUNTER — APPOINTMENT (OUTPATIENT)
Dept: ENDOCRINOLOGY | Facility: CLINIC | Age: 65
End: 2020-11-23
Payer: MEDICARE

## 2020-11-23 ENCOUNTER — LABORATORY RESULT (OUTPATIENT)
Age: 65
End: 2020-11-23

## 2020-11-23 VITALS
DIASTOLIC BLOOD PRESSURE: 70 MMHG | HEIGHT: 68 IN | SYSTOLIC BLOOD PRESSURE: 122 MMHG | WEIGHT: 180 LBS | HEART RATE: 69 BPM | OXYGEN SATURATION: 97 % | TEMPERATURE: 97.3 F | BODY MASS INDEX: 27.28 KG/M2

## 2020-11-23 PROCEDURE — 99214 OFFICE O/P EST MOD 30 MIN: CPT

## 2020-11-23 NOTE — PHYSICAL EXAM
[Alert] : alert [Healthy Appearance] : healthy appearance [No Acute Distress] : no acute distress [Normal Sclera/Conjunctiva] : normal sclera/conjunctiva [No Proptosis] : no proptosis [No LAD] : no lymphadenopathy [Thyroid Not Enlarged] : the thyroid was not enlarged [No Respiratory Distress] : no respiratory distress [Clear to Auscultation] : lungs were clear to auscultation bilaterally [Normal S1, S2] : normal S1 and S2 [Regular Rhythm] : with a regular rhythm [No Edema] : no peripheral edema [Normal Bowel Sounds] : normal bowel sounds [Not Tender] : non-tender [Soft] : abdomen soft [No Spinal Tenderness] : no spinal tenderness [Normal Reflexes] : deep tendon reflexes were 2+ and symmetric [No Tremors] : no tremors [Normal Affect] : the affect was normal [Normal Mood] : the mood was normal

## 2020-11-23 NOTE — ASSESSMENT
[FreeTextEntry1] : 65 year old man with pancreatic cancer  who developed thyroiditis after treatment with immunotherapy, then subclinical hypothyroidism\par   -Clinically euthyroid \par  - Check TFTs ,consider levothyroxine\par \par Hypocortisolism - check cortisol off hydrocortisone - has been off for about a month without symptoms (if low, will repeat as early morning cortisol and consider acth stim test)\par \par Gynecomastia - macroprolactin was normal\par  -does not want surgery.  Has noted cosmetic improvement with weight loss.\par \par f/u 6 months

## 2020-11-23 NOTE — REVIEW OF SYSTEMS
[Chest Pain] : no chest pain [Shortness Of Breath] : no shortness of breath [Nausea] : no nausea [Abdominal Pain] : no abdominal pain

## 2020-11-23 NOTE — HISTORY OF PRESENT ILLNESS
[FreeTextEntry1] : cc: abnormal thyroid tests\par \par 65 year old man with Pancreatic cancer, treated with immunotherapy ( previously treated with chemotherapy).  After  4 cycles of ipilimumab/nivolumab, blood tests showed hyperthyroidism. He had no symptoms and continued immunotherapy. Subsequent TSH levels were elevated, then TFTs returned to normal and later pt developed subclinical hypothyroidism.  He had been on Nivo every two weeks, stopped in April 2019\par He reports no fatigue, constipation, cold intolerance, has had some depressed mood and difficulty sleeping. No suicidal ideation\par \par \par Hypocortisolism - had been taking hydrocortisone 10 mg in AM and 5 mg in pm.  Ran out about a month ago.  Has been feeling well, he reports no lightheadedness, weight loss. - He had lost weight intentionally with dietary change, has since been stable\par \par Was found to have gynecomastia.. Mild prolactin elevation, nonfasting.  No galactorrhea.   He is on finasteride.  He notes decrease in breast size with weight loss.\par \par

## 2020-11-25 LAB
CORTIS SERPL-MCNC: 0.1 UG/DL
T3 SERPL-MCNC: 141 NG/DL
T3RU NFR SERPL: 1.2 TBI
TSH SERPL-ACNC: 23 UIU/ML

## 2020-11-29 NOTE — HISTORY OF PRESENT ILLNESS
[Disease: _____________________] : Disease: [unfilled] [T: ___] : T[unfilled] [N: ___] : N[unfilled] [AJCC Stage: ____] : AJCC Stage: [unfilled] [de-identified] : 62 M with no significant past medical history, developed progressive abdominal pain in March 2017. In May 2017 he went to an urgent care center and was referred for a CT A/P w/co. This was performed on 5/8/17 and demonstrated a circumferential 6.7 cm mass at the splenic flexure of the colon causing "apple core" luminal narrowing of the colon at that level c/w malignancy. A 2.7 x 2.2 x 2.5 cm ill defined LN below the pancreatic body at the level of the splenic vein is most consistent with a metastasis. On May 9, 2017, Haja saw Dr. Morgan Armenta, colorectal surgery who performed a colonoscopy on 5/15/17. Biopsy c/w invasive moderately differentiated colonic adenocarcinoma a/w necrosis. A PET/CT was performed on 5/30/17 which showed splenic flexure colonic hypermetabolic mass c/w primary carcinoma (SUV 35) and 2.5 cm mesenteric LN hypermetabolic focus c/w regional metastatic disease (SUV 5). \par \par On 6/13/17 patient underwent a open left colectomy, omentectomy and mobilization of the splenic flexure. Final pathology T3NO moderately differentiated carcinoma, loss of nuclear expression of MLH1 and PMS2. No adjuvant therapy was given. Post op developed SOB, hypoxia transferred to the SICU, placed on bipap. A CTA chest/abd/pelvis 6/15/17 c/w Nonspecific patchy opacity in RLL was noted, 3.4 cm collection or cystic lesion in the pancreatic body for which follow up imaging was suggested. \par \par Post op, Haja continued to complain of abdominal pain and an MRI A/P was performed on 7/25/17 which showed a 4.6 cm hyperintense structure inseparable from the distal pancreatic body. The lesion is located in the peripancreatic retroperitoneal space with imaging characteristics suggestive of pancreatic pseudocyst. He was evaluated by surg onc, Dr. Hager and referred for a CT pancreatic protocol which was performed on 9/5 and showed a peripherally enhancing mass in the body of the pancreas measures 4.6 x 3.5 cm. Subsequently had an EUS with FNA on 10/6/17 (GI- Dr. Luz) which confirmed moderately differentiated adenocarcinoma.\par \par On 11/9/17 underwent open ex lap for possible resection of pancreatic adenoca, however, SMA was encased by tumor and therefore could not be resected. Liver bx was performed and negative for malignancy, however, noted to have steatosis with mild steatohepatitis, port inflammation, focal periportal and pericellular fibrosis,2 + iron deposition. \par \par Patient was subsequently referred to rad onc and med onc for further evaluation. \par \par 12/5/17-3/13/18: 8 cycles of mFOLFIRINOX (25% dose reduction due to PS) with stable/mild improvement in disease \par \par 3/27/18-5/29/18 : C1-C4 Nivo 3mg/kg (240 mg flat dose)/Ipi 1mg/kg \par 5/31/18: CTCAP: decr in size of pancreatic mass, previously 3.6 to 2.5, new mediastinal LN\par 6/12/18: EBUS with bx of LN: reactive \par 6/26/18-: Nivo single agent Q 2 weeks \par 11/5 - 11/19: hospitalized at [facility] with herpes zoster involving the R periorbital area, no ocular involvement. opiods stopped abruptly s/p withdrawal characterized by diarrhea/n/v. Developed NICOLE (2/2 acyclovir?) with resolution. \par 11/30/18: restarted Nivolumab Q 2 weeks\par 12/13/18: last dose of Nivolumab. \par 12/28/18: Nivolumab held due to Grade 2-3 arthralgias/myalgias. Started on Prednisone 40 mg BID taper over 8 weeks.  \par 1/3/19: CTCAP: interval regression of pancreatic mass and mediastinal LN\par 2/8/19: Nivolumab restarted \par 4/3/19: CT CAP: enlargement of mediastinal LN, ground glass opacities b/l \par 4/6/19: Nivolumab d/c due to myalgias and ?pneumonitis \par 6/10/19: CT CAP: stable/improved disease\par 9/26/19: CT CAP: stable pancreatic mass, stable mediastinal LN, new L gynecomastia \par 12/120/19: CT CAP: stable exam, gynecomastia \par 1/22/20: Mammo/US: no masses noted\par 4/27/20: CT CAP: stable exam\par 8/2/20: CT CAP: stable disease \par 10/26/20: CT CAP: stable disease \par \par \par  [de-identified] : moderately differentiated adenocarcinoma [de-identified] : 11/24/17: CA 19.9 - 81.3  CEA 29 [de-identified] : June 2017 - colon resection of tumor T3N0 (0/15 LN) moderately differentiated, loss of nuclear expression of MLH1, PMS2. \par    - IHC:  CK19, and variably positive for CDX2, CK7 and CK20, \par   + BRAF\par \par Nov 2017- attempted pancreatic resection:  CK7, CK20 and CDX2 are positive; CK20 is negative\par     - loss of nuclear expression of MLH1, PMS2.\par \par [facility] - failed report  [FreeTextEntry1] : surveillance due to inability to tolerate immunotherapy  [de-identified] : Overall feels well. Denies any c/o. Cont to follow a healthier diet. Good energy level. Denies any pain

## 2020-12-22 ENCOUNTER — OUTPATIENT (OUTPATIENT)
Dept: OUTPATIENT SERVICES | Facility: HOSPITAL | Age: 65
LOS: 1 days | Discharge: HOME | End: 2020-12-22

## 2020-12-22 DIAGNOSIS — K25.5 CHRONIC OR UNSPECIFIED GASTRIC ULCER WITH PERFORATION: Chronic | ICD-10-CM

## 2020-12-22 DIAGNOSIS — C25.9 MALIGNANT NEOPLASM OF PANCREAS, UNSPECIFIED: ICD-10-CM

## 2020-12-22 DIAGNOSIS — Z98.890 OTHER SPECIFIED POSTPROCEDURAL STATES: Chronic | ICD-10-CM

## 2020-12-22 DIAGNOSIS — Z90.49 ACQUIRED ABSENCE OF OTHER SPECIFIED PARTS OF DIGESTIVE TRACT: Chronic | ICD-10-CM

## 2020-12-29 ENCOUNTER — LABORATORY RESULT (OUTPATIENT)
Age: 65
End: 2020-12-29

## 2020-12-29 ENCOUNTER — APPOINTMENT (OUTPATIENT)
Age: 65
End: 2020-12-29

## 2021-02-20 ENCOUNTER — OUTPATIENT (OUTPATIENT)
Dept: OUTPATIENT SERVICES | Facility: HOSPITAL | Age: 66
LOS: 1 days | Discharge: HOME | End: 2021-02-20

## 2021-02-20 DIAGNOSIS — C25.9 MALIGNANT NEOPLASM OF PANCREAS, UNSPECIFIED: ICD-10-CM

## 2021-02-20 DIAGNOSIS — Z98.890 OTHER SPECIFIED POSTPROCEDURAL STATES: Chronic | ICD-10-CM

## 2021-02-20 DIAGNOSIS — Z90.49 ACQUIRED ABSENCE OF OTHER SPECIFIED PARTS OF DIGESTIVE TRACT: Chronic | ICD-10-CM

## 2021-02-20 DIAGNOSIS — K25.5 CHRONIC OR UNSPECIFIED GASTRIC ULCER WITH PERFORATION: Chronic | ICD-10-CM

## 2021-02-23 ENCOUNTER — APPOINTMENT (OUTPATIENT)
Age: 66
End: 2021-02-23

## 2021-02-23 ENCOUNTER — LABORATORY RESULT (OUTPATIENT)
Age: 66
End: 2021-02-23

## 2021-02-23 ENCOUNTER — RESULT REVIEW (OUTPATIENT)
Age: 66
End: 2021-02-23

## 2021-02-23 LAB
BASOPHILS # BLD AUTO: 0.09 K/UL — SIGNIFICANT CHANGE UP (ref 0–0.2)
BASOPHILS NFR BLD AUTO: 1.3 % — SIGNIFICANT CHANGE UP (ref 0–2)
EOSINOPHIL # BLD AUTO: 0.38 K/UL — SIGNIFICANT CHANGE UP (ref 0–0.5)
EOSINOPHIL NFR BLD AUTO: 5.6 % — SIGNIFICANT CHANGE UP (ref 0–6)
HCT VFR BLD CALC: 40.1 % — SIGNIFICANT CHANGE UP (ref 39–50)
HGB BLD-MCNC: 14.1 G/DL — SIGNIFICANT CHANGE UP (ref 13–17)
IMM GRANULOCYTES NFR BLD AUTO: 0.4 % — SIGNIFICANT CHANGE UP (ref 0–1.5)
LYMPHOCYTES # BLD AUTO: 3.05 K/UL — SIGNIFICANT CHANGE UP (ref 1–3.3)
LYMPHOCYTES # BLD AUTO: 45.3 % — HIGH (ref 13–44)
MCHC RBC-ENTMCNC: 30.3 PG — SIGNIFICANT CHANGE UP (ref 27–34)
MCHC RBC-ENTMCNC: 35.2 G/DL — SIGNIFICANT CHANGE UP (ref 32–36)
MCV RBC AUTO: 86.1 FL — SIGNIFICANT CHANGE UP (ref 80–100)
MONOCYTES # BLD AUTO: 0.63 K/UL — SIGNIFICANT CHANGE UP (ref 0–0.9)
MONOCYTES NFR BLD AUTO: 9.3 % — SIGNIFICANT CHANGE UP (ref 2–14)
NEUTROPHILS # BLD AUTO: 2.56 K/UL — SIGNIFICANT CHANGE UP (ref 1.8–7.4)
NEUTROPHILS NFR BLD AUTO: 38.1 % — LOW (ref 43–77)
NRBC # BLD: 0 /100 WBCS — SIGNIFICANT CHANGE UP (ref 0–0)
PLATELET # BLD AUTO: 183 K/UL — SIGNIFICANT CHANGE UP (ref 150–400)
RBC # BLD: 4.66 M/UL — SIGNIFICANT CHANGE UP (ref 4.2–5.8)
RBC # FLD: 12.5 % — SIGNIFICANT CHANGE UP (ref 10.3–14.5)
WBC # BLD: 6.74 K/UL — SIGNIFICANT CHANGE UP (ref 3.8–10.5)
WBC # FLD AUTO: 6.74 K/UL — SIGNIFICANT CHANGE UP (ref 3.8–10.5)

## 2021-02-24 ENCOUNTER — NON-APPOINTMENT (OUTPATIENT)
Age: 66
End: 2021-02-24

## 2021-03-01 ENCOUNTER — APPOINTMENT (OUTPATIENT)
Age: 66
End: 2021-03-01
Payer: MEDICARE

## 2021-03-01 VITALS
DIASTOLIC BLOOD PRESSURE: 79 MMHG | BODY MASS INDEX: 30.71 KG/M2 | RESPIRATION RATE: 14 BRPM | HEART RATE: 66 BPM | SYSTOLIC BLOOD PRESSURE: 146 MMHG | TEMPERATURE: 98 F | WEIGHT: 201.94 LBS | OXYGEN SATURATION: 98 %

## 2021-03-01 PROCEDURE — 99213 OFFICE O/P EST LOW 20 MIN: CPT

## 2021-03-01 NOTE — HISTORY OF PRESENT ILLNESS
[Disease: _____________________] : Disease: [unfilled] [T: ___] : T[unfilled] [N: ___] : N[unfilled] [AJCC Stage: ____] : AJCC Stage: [unfilled] [de-identified] : 62 M with no significant past medical history, developed progressive abdominal pain in March 2017. In May 2017 he went to an urgent care center and was referred for a CT A/P w/co. This was performed on 5/8/17 and demonstrated a circumferential 6.7 cm mass at the splenic flexure of the colon causing "apple core" luminal narrowing of the colon at that level c/w malignancy. A 2.7 x 2.2 x 2.5 cm ill defined LN below the pancreatic body at the level of the splenic vein is most consistent with a metastasis. On May 9, 2017, Haja saw Dr. Morgan Armenta, colorectal surgery who performed a colonoscopy on 5/15/17. Biopsy c/w invasive moderately differentiated colonic adenocarcinoma a/w necrosis. A PET/CT was performed on 5/30/17 which showed splenic flexure colonic hypermetabolic mass c/w primary carcinoma (SUV 35) and 2.5 cm mesenteric LN hypermetabolic focus c/w regional metastatic disease (SUV 5). \par \par On 6/13/17 patient underwent a open left colectomy, omentectomy and mobilization of the splenic flexure. Final pathology T3NO moderately differentiated carcinoma, loss of nuclear expression of MLH1 and PMS2. No adjuvant therapy was given. Post op developed SOB, hypoxia transferred to the SICU, placed on bipap. A CTA chest/abd/pelvis 6/15/17 c/w Nonspecific patchy opacity in RLL was noted, 3.4 cm collection or cystic lesion in the pancreatic body for which follow up imaging was suggested. \par \par Post op, Haja continued to complain of abdominal pain and an MRI A/P was performed on 7/25/17 which showed a 4.6 cm hyperintense structure inseparable from the distal pancreatic body. The lesion is located in the peripancreatic retroperitoneal space with imaging characteristics suggestive of pancreatic pseudocyst. He was evaluated by surg onc, Dr. Hager and referred for a CT pancreatic protocol which was performed on 9/5 and showed a peripherally enhancing mass in the body of the pancreas measures 4.6 x 3.5 cm. Subsequently had an EUS with FNA on 10/6/17 (GI- Dr. Luz) which confirmed moderately differentiated adenocarcinoma.\par \par On 11/9/17 underwent open ex lap for possible resection of pancreatic adenoca, however, SMA was encased by tumor and therefore could not be resected. Liver bx was performed and negative for malignancy, however, noted to have steatosis with mild steatohepatitis, port inflammation, focal periportal and pericellular fibrosis,2 + iron deposition. \par \par Patient was subsequently referred to rad onc and med onc for further evaluation. \par \par 12/5/17-3/13/18: 8 cycles of mFOLFIRINOX (25% dose reduction due to PS) with stable/mild improvement in disease \par \par 3/27/18-5/29/18 : C1-C4 Nivo 3mg/kg (240 mg flat dose)/Ipi 1mg/kg \par 5/31/18: CTCAP: decr in size of pancreatic mass, previously 3.6 to 2.5, new mediastinal LN\par 6/12/18: EBUS with bx of LN: reactive \par 6/26/18-: Nivo single agent Q 2 weeks \par 11/5 - 11/19: hospitalized at [facility] with herpes zoster involving the R periorbital area, no ocular involvement. opiods stopped abruptly s/p withdrawal characterized by diarrhea/n/v. Developed NICOLE (2/2 acyclovir?) with resolution. \par 11/30/18: restarted Nivolumab Q 2 weeks\par 12/13/18: last dose of Nivolumab. \par 12/28/18: Nivolumab held due to Grade 2-3 arthralgias/myalgias. Started on Prednisone 40 mg BID taper over 8 weeks.  \par 1/3/19: CTCAP: interval regression of pancreatic mass and mediastinal LN\par 2/8/19: Nivolumab restarted \par 4/3/19: CT CAP: enlargement of mediastinal LN, ground glass opacities b/l \par 4/6/19: Nivolumab d/c due to myalgias and ?pneumonitis \par 6/10/19: CT CAP: stable/improved disease\par 9/26/19: CT CAP: stable pancreatic mass, stable mediastinal LN, new L gynecomastia \par 12/120/19: CT CAP: stable exam, gynecomastia \par 1/22/20: Mammo/US: no masses noted\par 4/27/20: CT CAP: stable exam\par 8/2/20: CT CAP: stable disease \par 10/26/20: CT CAP: stable disease \par \par \par  [de-identified] : moderately differentiated adenocarcinoma [de-identified] : 11/24/17: CA 19.9 - 81.3  CEA 29 [de-identified] : June 2017 - colon resection of tumor T3N0 (0/15 LN) moderately differentiated, loss of nuclear expression of MLH1, PMS2. \par    - IHC:  CK19, and variably positive for CDX2, CK7 and CK20, \par   + BRAF\par \par Nov 2017- attempted pancreatic resection:  CK7, CK20 and CDX2 are positive; CK20 is negative\par     - loss of nuclear expression of MLH1, PMS2.\par \par [facility] - failed report  [FreeTextEntry1] : surveillance due to inability to tolerate immunotherapy  [de-identified] : Overall doing well. Gained 10 lbs since last vist. Good appetite. Denies any change in BMs. Shoulder pain has been better.

## 2021-03-06 ENCOUNTER — RESULT REVIEW (OUTPATIENT)
Age: 66
End: 2021-03-06

## 2021-03-16 ENCOUNTER — APPOINTMENT (OUTPATIENT)
Dept: GASTROENTEROLOGY | Facility: CLINIC | Age: 66
End: 2021-03-16
Payer: MEDICARE

## 2021-03-16 VITALS
DIASTOLIC BLOOD PRESSURE: 70 MMHG | OXYGEN SATURATION: 97 % | WEIGHT: 183 LBS | SYSTOLIC BLOOD PRESSURE: 130 MMHG | TEMPERATURE: 98.2 F | HEART RATE: 67 BPM | RESPIRATION RATE: 14 BRPM | BODY MASS INDEX: 27.74 KG/M2 | HEIGHT: 68 IN

## 2021-03-16 PROCEDURE — 99203 OFFICE O/P NEW LOW 30 MIN: CPT

## 2021-03-16 NOTE — END OF VISIT
[Time Spent: ___ minutes] : I have spent [unfilled] minutes of time on the encounter. [FreeTextEntry3] : Patient seen, examined, and evaluated with ELAINE Dick.  Agree with H&P and plan as outlined above.\par \par In brief, patient is 66 yo M pmh colon ca s/p chemo and colonic resection, h/o non resectable pancreatic cancer responding to chemotherapy who presents to establish GI care.  Patient due for repeat colonoscopy as has not had one since diagnosis of colon cancer.  Pancreatic cancer being surveilled by Louann Maloney MD. No sign of obstruction.  Otherwise patient doing well

## 2021-03-16 NOTE — HISTORY OF PRESENT ILLNESS
[FreeTextEntry1] : 65 year old man with Stage II Carcinoma of colon, s/p resection in 6/2017, Pancreatic cancer treated with immunotherapy ( was not able to resect,  previously treated with chemotherapy). Referred by Dr. Louann Maloney for colonoscopy.\par  Patient stated he is here since he is overdue for his colonoscopy. He is overall doing well.\par Reports 1-2 bowel movements daily. Formed soft stools sausage shaped. Denies constipation. Occasional loose bowel movement followed by formed stool depending on what  he eats. Denies blood or mucus in stool.\par Patient denies any nausea, vomiting, heartburn, regurgitation of food or fluids, dysphagia, early satiety, abdominal distension.\par Lost 60 lbs over 6 months by strict diet.\par Last colonoscopy 2017: 10 mm polyp sigmoid colon. Adeno carcinoma (s/p resection 6/2017)\par  Pancreatic cancer: Stable off therapy as per ONC Notes.\par HCV ab negative 2017

## 2021-03-16 NOTE — PHYSICAL EXAM
[General Appearance - Alert] : alert [General Appearance - In No Acute Distress] : in no acute distress [General Appearance - Well Nourished] : well nourished [General Appearance - Well Developed] : well developed [Sclera] : the sclera and conjunctiva were normal [Strabismus] : no strabismus was seen [Outer Ear] : the ears and nose were normal in appearance [Hearing Threshold Finger Rub Not [place]] : hearing was normal [Respiration, Rhythm And Depth] : normal respiratory rhythm and effort [Exaggerated Use Of Accessory Muscles For Inspiration] : no accessory muscle use [Heart Rate And Rhythm] : heart rate was normal and rhythm regular [Heart Sounds] : normal S1 and S2 [Arterial Pulses Carotid] : carotid pulses were normal with no bruits [Edema] : there was no peripheral edema [Bowel Sounds] : normal bowel sounds [Abdomen Tenderness] : non-tender [Abnormal Walk] : normal gait [Musculoskeletal - Swelling] : no joint swelling seen [Skin Color & Pigmentation] : normal skin color and pigmentation [Skin Turgor] : normal skin turgor [] : no rash [Skin Lesions] : no skin lesions [No Focal Deficits] : no focal deficits [Oriented To Time, Place, And Person] : oriented to person, place, and time [Affect] : the affect was normal [Mood] : the mood was normal [Neck Appearance] : the appearance of the neck was normal [Neck Cervical Mass (___cm)] : no neck mass was observed [No Spinal Tenderness] : no spinal tenderness [FreeTextEntry1] : Right chest wall mediport.

## 2021-03-16 NOTE — ASSESSMENT
[FreeTextEntry1] : 65 year old man with Stage II Carcinoma of colon, s/p resection in 6/2017, Pancreatic cancer treated with immunotherapy ( was not able to resect,  previously treated with chemotherapy). Referred by Dr. Louann Maloney for colonoscopy. He is overall doing well. Denies any change in bowel habits, blood or mucus in stool. \par The risks, benefits, and alternatives of the procedure Colonoscopy discussed with patient in detail by Dr. Tee and ALESSANDRA. Patient to be on clear liquid diet the day before the procedure. Patient to begin drinking the prep as directed half of the prep starting 6: pm  and the other half 6 hours prior to the test. Patient instructed not to eat or drink anything other than the colon prep after mid night prior to the test.  \par Risks includes bleeding, infection, bowel perforation, missed lesions, and risk of anesthesia. \par All questions were answered. The patient expressed understanding of these risks and is agreeable to proceed. \par \par Impression:\par 1) Pancreatic cancer (157.9) (C25.9) (stable off therapy)\par 2) Colon Cancer s/p resection June 2017\par 3) Screening for HCV - UTD 2017 negative\par \par Plan: \par 1) Colonoscopy with Golytely Prep \par 2) Continue current diet.\par 3) Revisit pending colonoscopy\par 4) discussed at length with patient.\par 5) Pancreatic cancer surveillance program as per Haider AMBRIZ\par

## 2021-03-18 ENCOUNTER — APPOINTMENT (OUTPATIENT)
Dept: CT IMAGING | Facility: IMAGING CENTER | Age: 66
End: 2021-03-18
Payer: MEDICARE

## 2021-03-18 ENCOUNTER — OUTPATIENT (OUTPATIENT)
Dept: OUTPATIENT SERVICES | Facility: HOSPITAL | Age: 66
LOS: 1 days | End: 2021-03-18
Payer: MEDICARE

## 2021-03-18 DIAGNOSIS — K25.5 CHRONIC OR UNSPECIFIED GASTRIC ULCER WITH PERFORATION: Chronic | ICD-10-CM

## 2021-03-18 DIAGNOSIS — C25.9 MALIGNANT NEOPLASM OF PANCREAS, UNSPECIFIED: ICD-10-CM

## 2021-03-18 DIAGNOSIS — Z98.890 OTHER SPECIFIED POSTPROCEDURAL STATES: Chronic | ICD-10-CM

## 2021-03-18 DIAGNOSIS — Z90.49 ACQUIRED ABSENCE OF OTHER SPECIFIED PARTS OF DIGESTIVE TRACT: Chronic | ICD-10-CM

## 2021-03-18 PROCEDURE — 74177 CT ABD & PELVIS W/CONTRAST: CPT | Mod: 26,MG

## 2021-03-18 PROCEDURE — 71260 CT THORAX DX C+: CPT | Mod: 26,MG

## 2021-03-18 PROCEDURE — 74177 CT ABD & PELVIS W/CONTRAST: CPT

## 2021-03-18 PROCEDURE — 71260 CT THORAX DX C+: CPT

## 2021-03-18 PROCEDURE — G1004: CPT

## 2021-04-07 NOTE — H&P ADULT - HISTORY OF PRESENT ILLNESS
64 y/o M with PMH colon ca (s/p resection June 2017), pancreatic ca (s/p chemo, ended April 2018), currently on nivoluma immunotherapy (once every 2 weeks since the last 6 months; last dose on 11/2/18), and HTN presented with nausea, headache, and right forehead/scalp rash since Saturday. Reports that the rash causes burning/itching pain, 6/10 in severity, no radiation. Also reports itchy right eye. Reports headache and mild neck pain, fevers/chills, decreased appetite and fatigue since Saturday. No blurry vision, photophobia, rhinorrhea, sneezing, cough, mucus, chest pain/SOB, abdominal pain, dysuria, sick contacts (including herpes), tick bites, recent travel. In the ED, pt. met the SIRS criteria with T 102.4, , /73, RR 18, SpO2 100%. Received Zofran 4mg IV x 1 for nausea, 2L NS bolus, and empiric Vanc 1g x 1 and Zosyn 3.375g x 1. CXR and CT Chest/Abdomen/Pelvis performed and pt. admitted to medicine for further care.
3

## 2021-04-27 ENCOUNTER — NON-APPOINTMENT (OUTPATIENT)
Age: 66
End: 2021-04-27

## 2021-04-29 ENCOUNTER — OUTPATIENT (OUTPATIENT)
Dept: OUTPATIENT SERVICES | Facility: HOSPITAL | Age: 66
LOS: 1 days | Discharge: HOME | End: 2021-04-29

## 2021-04-29 DIAGNOSIS — Z98.890 OTHER SPECIFIED POSTPROCEDURAL STATES: Chronic | ICD-10-CM

## 2021-04-29 DIAGNOSIS — C25.9 MALIGNANT NEOPLASM OF PANCREAS, UNSPECIFIED: ICD-10-CM

## 2021-04-29 DIAGNOSIS — K25.5 CHRONIC OR UNSPECIFIED GASTRIC ULCER WITH PERFORATION: Chronic | ICD-10-CM

## 2021-04-29 DIAGNOSIS — Z90.49 ACQUIRED ABSENCE OF OTHER SPECIFIED PARTS OF DIGESTIVE TRACT: Chronic | ICD-10-CM

## 2021-05-02 ENCOUNTER — APPOINTMENT (OUTPATIENT)
Dept: DISASTER EMERGENCY | Facility: CLINIC | Age: 66
End: 2021-05-02

## 2021-05-02 LAB — SARS-COV-2 N GENE NPH QL NAA+PROBE: NOT DETECTED

## 2021-05-03 ENCOUNTER — LABORATORY RESULT (OUTPATIENT)
Age: 66
End: 2021-05-03

## 2021-05-03 ENCOUNTER — APPOINTMENT (OUTPATIENT)
Age: 66
End: 2021-05-03

## 2021-05-05 ENCOUNTER — APPOINTMENT (OUTPATIENT)
Dept: GASTROENTEROLOGY | Facility: HOSPITAL | Age: 66
End: 2021-05-05

## 2021-05-05 ENCOUNTER — OUTPATIENT (OUTPATIENT)
Dept: OUTPATIENT SERVICES | Facility: HOSPITAL | Age: 66
LOS: 1 days | End: 2021-05-05
Payer: MEDICARE

## 2021-05-05 ENCOUNTER — RESULT REVIEW (OUTPATIENT)
Age: 66
End: 2021-05-05

## 2021-05-05 VITALS
HEIGHT: 68 IN | DIASTOLIC BLOOD PRESSURE: 77 MMHG | OXYGEN SATURATION: 97 % | HEART RATE: 70 BPM | WEIGHT: 179.9 LBS | TEMPERATURE: 97 F | RESPIRATION RATE: 19 BRPM | SYSTOLIC BLOOD PRESSURE: 136 MMHG

## 2021-05-05 VITALS
DIASTOLIC BLOOD PRESSURE: 62 MMHG | SYSTOLIC BLOOD PRESSURE: 121 MMHG | OXYGEN SATURATION: 100 % | RESPIRATION RATE: 17 BRPM | HEART RATE: 64 BPM

## 2021-05-05 DIAGNOSIS — Z98.890 OTHER SPECIFIED POSTPROCEDURAL STATES: Chronic | ICD-10-CM

## 2021-05-05 DIAGNOSIS — Z90.49 ACQUIRED ABSENCE OF OTHER SPECIFIED PARTS OF DIGESTIVE TRACT: Chronic | ICD-10-CM

## 2021-05-05 DIAGNOSIS — C18.9 MALIGNANT NEOPLASM OF COLON, UNSPECIFIED: ICD-10-CM

## 2021-05-05 DIAGNOSIS — K25.5 CHRONIC OR UNSPECIFIED GASTRIC ULCER WITH PERFORATION: Chronic | ICD-10-CM

## 2021-05-05 PROCEDURE — 45380 COLONOSCOPY AND BIOPSY: CPT

## 2021-05-05 PROCEDURE — 88305 TISSUE EXAM BY PATHOLOGIST: CPT | Mod: 26

## 2021-05-05 PROCEDURE — 88305 TISSUE EXAM BY PATHOLOGIST: CPT

## 2021-05-05 RX ORDER — SODIUM CHLORIDE 9 MG/ML
500 INJECTION INTRAMUSCULAR; INTRAVENOUS; SUBCUTANEOUS
Refills: 0 | Status: DISCONTINUED | OUTPATIENT
Start: 2021-05-05 | End: 2021-05-19

## 2021-05-05 NOTE — ASU PATIENT PROFILE, ADULT - FALL HARM RISK TYPE OF ASSESSMENT
Review of Systems:  Severe or unusual headache: No  Hearing problems: No  Vision problems: No  Sinus problems or allergies: No  Hoarseness or change in voice: No  Problems with your teeth or gums: No  Skin problems: No  Weight loss or gain: No  Chest pain or palpitations: No  Shortness of breath: No  Cough or coughing up blood: No  Stomach pain, nausea or vomiting: Yes  Constipation or diarrhea: No  Blood in bowel movements: No  Problems with urination or blood in urine: No  Muscle aches or joint pain: No  Sexual difficulties: No  Depression, sleep problems or severe stress: Yes  Easy bruising or bleeding, or enlarged glands: No  Speech or memory problems: No  Numbness or tingling: No   Admission

## 2021-05-05 NOTE — PRE PROCEDURE NOTE - PRE PROCEDURE EVALUATION
Attending Physician:              Raulito Tee MD MSEd    Indication for Procedure          Colon Cancer      PAST MEDICAL & SURGICAL HISTORY:  Head ache  past    Obesity    Injury  forehead &amp; had sutures ( 2013 )    Vertigo    Cervical disc disease    Smoking  quit 2015    Hypertension, unspecified type    Colon cancer  had colon surgery    Pancreatic cancer  had chemo in past; immunotherapy done til 7 mos ago    Enlarged lymph node    Chronic pain  neck, lower back; herniated disc cervical spine    Buzzing in ear  since head injury    Depression    BPH (benign prostatic hyperplasia)    Leukocytopenia  1 week ago-given hydrocortisone    Hypothyroid    H/O cervical spine surgery  fusion - 2014 with plates and screws    S/P colon resection  6/2017    H/O exploratory laparotomy    Gastric perforation          See Allscripts Note for further details  ALLERGIES:  No Known Allergies    HOME MEDICATIONS:  finasteride 5 mg oral tablet: 1 tab(s) orally once a day  hydrocortisone 10 mg oral tablet: 1 tab(s) orally once a day  hydrocortisone 5 mg oral tablet: 1 tab(s) orally once a day  levothyroxine 75 mcg (0.075 mg) oral tablet: 1 tab(s) orally once a day  sertraline 50 mg oral tablet: 1 tab(s) orally once a day  tamsulosin 0.4 mg oral capsule: 1 cap(s) orally once a day in pm      See Allscripts Note for further details    AICD/PPM: [ ] yes   [ x] no    PERTINENT LAB DATA:                      PHYSICAL EXAMINATION:    Height (cm): 172.7  Weight (kg): 81.6  BMI (kg/m2): 27.4  BSA (m2): 1.95T(C): --  HR: --  BP: --  RR: --  SpO2: --    Constitutional: NAD  HEENT: PERRLA, EOMI,    Neck:  No JVD  Respiratory: CTAB/L  Cardiovascular: S1 and S2  Gastrointestinal: BS+, soft, NT/ND  Extremities: No peripheral edema  Neurological: A/O x 3, no focal deficits  Psychiatric: Normal mood, normal affect  Skin: No rashes    ASA Class: I [ ]  II [ x]  III [ ]  IV [ ]    COMMENTS:    The patient is a suitable candidate for the planned procedure unless box checked [ ]  No, explain:

## 2021-05-05 NOTE — ASU PATIENT PROFILE, ADULT - PMH
BPH (benign prostatic hyperplasia)    Buzzing in ear  since head injury  Cervical disc disease    Chronic pain  neck, lower back; herniated disc cervical spine  Colon cancer  had colon surgery  Depression    Enlarged lymph node    Head ache  past  Hypertension, unspecified type    Hypothyroid    Injury  forehead & had sutures ( 2013 )  Leukocytopenia  1 week ago-given hydrocortisone  Obesity    Pancreatic cancer  had chemo in past; immunotherapy done til 7 mos ago  Smoking  quit 2015  Vertigo

## 2021-05-06 LAB — SURGICAL PATHOLOGY STUDY: SIGNIFICANT CHANGE UP

## 2021-05-10 PROBLEM — E03.9 HYPOTHYROIDISM, UNSPECIFIED: Chronic | Status: ACTIVE | Noted: 2021-05-05

## 2021-05-11 ENCOUNTER — LABORATORY RESULT (OUTPATIENT)
Age: 66
End: 2021-05-11

## 2021-05-11 ENCOUNTER — RESULT REVIEW (OUTPATIENT)
Age: 66
End: 2021-05-11

## 2021-05-11 ENCOUNTER — APPOINTMENT (OUTPATIENT)
Age: 66
End: 2021-05-11

## 2021-05-11 LAB
BASOPHILS # BLD AUTO: 0.06 K/UL — SIGNIFICANT CHANGE UP (ref 0–0.2)
BASOPHILS NFR BLD AUTO: 1.2 % — SIGNIFICANT CHANGE UP (ref 0–2)
EOSINOPHIL # BLD AUTO: 0.33 K/UL — SIGNIFICANT CHANGE UP (ref 0–0.5)
EOSINOPHIL NFR BLD AUTO: 6.7 % — HIGH (ref 0–6)
HCT VFR BLD CALC: 39.4 % — SIGNIFICANT CHANGE UP (ref 39–50)
HGB BLD-MCNC: 14.1 G/DL — SIGNIFICANT CHANGE UP (ref 13–17)
IMM GRANULOCYTES NFR BLD AUTO: 0 % — SIGNIFICANT CHANGE UP (ref 0–1.5)
LYMPHOCYTES # BLD AUTO: 2.27 K/UL — SIGNIFICANT CHANGE UP (ref 1–3.3)
LYMPHOCYTES # BLD AUTO: 46.3 % — HIGH (ref 13–44)
MCHC RBC-ENTMCNC: 30.1 PG — SIGNIFICANT CHANGE UP (ref 27–34)
MCHC RBC-ENTMCNC: 35.8 G/DL — SIGNIFICANT CHANGE UP (ref 32–36)
MCV RBC AUTO: 84.2 FL — SIGNIFICANT CHANGE UP (ref 80–100)
MONOCYTES # BLD AUTO: 0.45 K/UL — SIGNIFICANT CHANGE UP (ref 0–0.9)
MONOCYTES NFR BLD AUTO: 9.2 % — SIGNIFICANT CHANGE UP (ref 2–14)
NEUTROPHILS # BLD AUTO: 1.79 K/UL — LOW (ref 1.8–7.4)
NEUTROPHILS NFR BLD AUTO: 36.6 % — LOW (ref 43–77)
NRBC # BLD: 0 /100 WBCS — SIGNIFICANT CHANGE UP (ref 0–0)
PLATELET # BLD AUTO: 209 K/UL — SIGNIFICANT CHANGE UP (ref 150–400)
RBC # BLD: 4.68 M/UL — SIGNIFICANT CHANGE UP (ref 4.2–5.8)
RBC # FLD: 12.8 % — SIGNIFICANT CHANGE UP (ref 10.3–14.5)
WBC # BLD: 4.9 K/UL — SIGNIFICANT CHANGE UP (ref 3.8–10.5)
WBC # FLD AUTO: 4.9 K/UL — SIGNIFICANT CHANGE UP (ref 3.8–10.5)

## 2021-05-17 ENCOUNTER — NON-APPOINTMENT (OUTPATIENT)
Age: 66
End: 2021-05-17

## 2021-05-18 NOTE — PROGRESS NOTE ADULT - PROBLEM SELECTOR PLAN 9
"Psychoeducation Group Documentation    PATIENT'S NAME: Carroll Duke  MRN:   [id number]  :   2001  ACCT. NUMBER: [id number]  DATE OF SERVICE: 21  START TIME: 11:00 AM  END TIME: 12:00 PM  FACILITATOR(S): Betina Velez RN  TOPIC: BEH Pyschoeducation  Number of patients attending the group:  5  Group Length:  1 Hours    Dimensions addressed 2    Summary of Group / Topics Discussed:    Health Education:  Substance use.  Risks associated with IV use of any substance.  Short and long term effects on the body and brain resultant from use of stimulants, methamphetamines, hallucinogens, and dissociative drugs.          Group Attendance:  Attended group session    Patient's response to the group topic/interactions:  Cooperative    Patient appeared to be Actively participating.         Client specific details:  Client requires redirection on one occasion for inquiring about what LSD \"is made of\" and indicating a desire to synthesize this drug.  Client accepts redirection readily.  Client does demonstrate ability to identify and verbalize risks associated with substances and practices covered.    " - C/w Hep SubQ - Chronic neck pain  - C/w gabapentin, sertraline, methadone, and dilaudid PRN

## 2021-05-24 ENCOUNTER — APPOINTMENT (OUTPATIENT)
Dept: ENDOCRINOLOGY | Facility: CLINIC | Age: 66
End: 2021-05-24
Payer: MEDICARE

## 2021-05-24 ENCOUNTER — LABORATORY RESULT (OUTPATIENT)
Age: 66
End: 2021-05-24

## 2021-05-24 VITALS
OXYGEN SATURATION: 98 % | BODY MASS INDEX: 30.46 KG/M2 | SYSTOLIC BLOOD PRESSURE: 130 MMHG | WEIGHT: 201 LBS | TEMPERATURE: 98 F | DIASTOLIC BLOOD PRESSURE: 80 MMHG | HEIGHT: 68 IN | HEART RATE: 69 BPM

## 2021-05-24 PROCEDURE — 99214 OFFICE O/P EST MOD 30 MIN: CPT

## 2021-05-24 NOTE — ASSESSMENT
[FreeTextEntry1] : 65 year old man with pancreatic cancer  who developed thyroiditis after treatment with immunotherapy, then  hypothyroidism, on levothyroxine tx.\par   -Clinically euthyroid \par  - Check TFTs adjust levothyroxine dose as needed\par \par Hypocortisolism - Reviewed importance of adherence with hydrocortisone as well as sick day management\par Resume hydrocortisone\par \par Gynecomastia - macroprolactin was normal\par  -does not want surgery.  Has noted cosmetic improvement.\par \par f/u 6 months

## 2021-05-24 NOTE — PHYSICAL EXAM
[Alert] : alert [Healthy Appearance] : healthy appearance [No Acute Distress] : no acute distress [Normal Sclera/Conjunctiva] : normal sclera/conjunctiva [No Proptosis] : no proptosis [No LAD] : no lymphadenopathy [Thyroid Not Enlarged] : the thyroid was not enlarged [No Respiratory Distress] : no respiratory distress [Clear to Auscultation] : lungs were clear to auscultation bilaterally [Normal S1, S2] : normal S1 and S2 [No Edema] : no peripheral edema [Normal Bowel Sounds] : normal bowel sounds [Soft] : abdomen soft [No Spinal Tenderness] : no spinal tenderness [No Involuntary Movements] : no involuntary movements were seen [Normal Strength/Tone] : muscle strength and tone were normal [Normal Reflexes] : deep tendon reflexes were 2+ and symmetric [No Tremors] : no tremors [Normal Affect] : the affect was normal [Normal Mood] : the mood was normal [de-identified] : decrease range of motion left 5th DIp jt

## 2021-05-24 NOTE — HISTORY OF PRESENT ILLNESS
[FreeTextEntry1] : cc: abnormal thyroid tests\par \par 65 year old man with Pancreatic cancer, treated with immunotherapy ( previously treated with chemotherapy).  After  4 cycles of ipilimumab/nivolumab, blood tests showed hyperthyroidism. He had no symptoms and continued immunotherapy. Subsequent TSH levels were elevated, then TFTs returned to normal and later pt developed hypothyroidism.  He had been on Nivo every two weeks, stopped in April 2019.  Now on levothyroxine 75 micrograms daily.  He takes it separate from food and vitamins.\par He reports no , constipation, cold intolerance, fatigue\par Had covid vaccine x 2 - pfizer\par \par \par Hypocortisolism - had been taking hydrocortisone 10 mg in AM and 5 mg in pm.  He resumed after last visit, then ran out again about a week ago.  He has been feeling a little "weak" since then. No lightheadedness, weight loss.\par \par \par Gynecomastia - pt not bothered cosmetically, no symptoms\par \par

## 2021-05-26 LAB
T3RU NFR SERPL: 1.2 TBI
T4 SERPL-MCNC: 7 UG/DL
TSH SERPL-ACNC: 14.8 UIU/ML

## 2021-06-10 NOTE — PROGRESS NOTE ADULT - PROBLEM SELECTOR PLAN 3
Per chart review, pt remains in ICU w/ no reports of DC today therefore pt will continue to not need any transportation assistance for Onc outpt appt today.  SW will assist w/ transportation for next scheduled Onc outpt appt post DC.  RK    - QTc at 500  - C/w reglan 10mg q6hrs PRN - In the ED, pt. met the SIRS criteria with T 102.4, , /73, RR 18, SpO2 100%. Received empiric Vanc 1g x 1 and Zosyn 3.375g x 1.  - CXR negative for PNA.  - RVP and UA negative.  - Blood clx and Urine clx both NGTD.  - Hold off on abx right now given no localizing source.

## 2021-07-24 NOTE — PRE-ANESTHESIA EVALUATION ADULT - WEIGHT IN LBS
Offered today and patient refused  Reports \"what is there is what is there, I do not want to know\"   179.8

## 2021-08-01 ENCOUNTER — OUTPATIENT (OUTPATIENT)
Dept: OUTPATIENT SERVICES | Facility: HOSPITAL | Age: 66
LOS: 1 days | Discharge: HOME | End: 2021-08-01

## 2021-08-01 DIAGNOSIS — K25.5 CHRONIC OR UNSPECIFIED GASTRIC ULCER WITH PERFORATION: Chronic | ICD-10-CM

## 2021-08-01 DIAGNOSIS — Z90.49 ACQUIRED ABSENCE OF OTHER SPECIFIED PARTS OF DIGESTIVE TRACT: Chronic | ICD-10-CM

## 2021-08-01 DIAGNOSIS — Z98.890 OTHER SPECIFIED POSTPROCEDURAL STATES: Chronic | ICD-10-CM

## 2021-08-01 DIAGNOSIS — C25.9 MALIGNANT NEOPLASM OF PANCREAS, UNSPECIFIED: ICD-10-CM

## 2021-08-04 ENCOUNTER — APPOINTMENT (OUTPATIENT)
Dept: HEMATOLOGY ONCOLOGY | Facility: CLINIC | Age: 66
End: 2021-08-04
Payer: MEDICARE

## 2021-08-04 VITALS
DIASTOLIC BLOOD PRESSURE: 92 MMHG | SYSTOLIC BLOOD PRESSURE: 159 MMHG | RESPIRATION RATE: 16 BRPM | BODY MASS INDEX: 30.17 KG/M2 | HEART RATE: 71 BPM | WEIGHT: 198.42 LBS | TEMPERATURE: 98 F | OXYGEN SATURATION: 97 %

## 2021-08-04 PROCEDURE — 99213 OFFICE O/P EST LOW 20 MIN: CPT

## 2021-08-04 RX ORDER — OXYCODONE 5 MG/1
5 TABLET ORAL
Qty: 30 | Refills: 0 | Status: DISCONTINUED | COMMUNITY
Start: 2019-12-23 | End: 2021-08-04

## 2021-08-04 RX ORDER — FINASTERIDE 5 MG/1
5 TABLET, FILM COATED ORAL DAILY
Qty: 90 | Refills: 3 | Status: DISCONTINUED | COMMUNITY
Start: 2017-07-11 | End: 2021-08-04

## 2021-08-04 RX ORDER — POLYETHYLENE GLYCOL 3350 AND ELECTROLYTES WITH LEMON FLAVOR 236; 22.74; 6.74; 5.86; 2.97 G/4L; G/4L; G/4L; G/4L; G/4L
236 POWDER, FOR SOLUTION ORAL
Qty: 1 | Refills: 0 | Status: DISCONTINUED | COMMUNITY
Start: 2021-03-16 | End: 2021-08-04

## 2021-08-04 RX ORDER — DICLOFENAC SODIUM 10 MG/G
1 GEL TOPICAL DAILY
Qty: 1 | Refills: 3 | Status: DISCONTINUED | COMMUNITY
Start: 2018-07-13 | End: 2021-08-04

## 2021-08-04 RX ORDER — PREDNISONE 5 MG/1
5 TABLET ORAL
Qty: 42 | Refills: 0 | Status: DISCONTINUED | COMMUNITY
Start: 2019-10-26 | End: 2021-08-04

## 2021-08-09 ENCOUNTER — APPOINTMENT (OUTPATIENT)
Dept: INFUSION THERAPY | Facility: HOSPITAL | Age: 66
End: 2021-08-09

## 2021-08-10 DIAGNOSIS — R11.2 NAUSEA WITH VOMITING, UNSPECIFIED: ICD-10-CM

## 2021-08-10 DIAGNOSIS — Z51.11 ENCOUNTER FOR ANTINEOPLASTIC CHEMOTHERAPY: ICD-10-CM

## 2021-08-17 ENCOUNTER — APPOINTMENT (OUTPATIENT)
Dept: CT IMAGING | Facility: IMAGING CENTER | Age: 66
End: 2021-08-17
Payer: MEDICARE

## 2021-08-17 ENCOUNTER — OUTPATIENT (OUTPATIENT)
Dept: OUTPATIENT SERVICES | Facility: HOSPITAL | Age: 66
LOS: 1 days | End: 2021-08-17
Payer: MEDICARE

## 2021-08-17 DIAGNOSIS — K25.5 CHRONIC OR UNSPECIFIED GASTRIC ULCER WITH PERFORATION: Chronic | ICD-10-CM

## 2021-08-17 DIAGNOSIS — Z90.49 ACQUIRED ABSENCE OF OTHER SPECIFIED PARTS OF DIGESTIVE TRACT: Chronic | ICD-10-CM

## 2021-08-17 DIAGNOSIS — Z98.890 OTHER SPECIFIED POSTPROCEDURAL STATES: Chronic | ICD-10-CM

## 2021-08-17 DIAGNOSIS — C25.9 MALIGNANT NEOPLASM OF PANCREAS, UNSPECIFIED: ICD-10-CM

## 2021-08-17 PROCEDURE — 71260 CT THORAX DX C+: CPT | Mod: 26,MH

## 2021-08-17 PROCEDURE — 74177 CT ABD & PELVIS W/CONTRAST: CPT | Mod: 26,MH

## 2021-08-17 PROCEDURE — 82565 ASSAY OF CREATININE: CPT

## 2021-08-17 PROCEDURE — 74177 CT ABD & PELVIS W/CONTRAST: CPT

## 2021-08-17 PROCEDURE — 71260 CT THORAX DX C+: CPT

## 2021-08-20 NOTE — ASU PATIENT PROFILE, ADULT - NSCAFFEINEWITH_GEN_ALL_CORE_SD
Detail Level: Detailed Add 26876 Cpt? (Important Note: In 2017 The Use Of 81394 Is Being Tracked By Cms To Determine Future Global Period Reimbursement For Global Periods): no None

## 2021-09-03 NOTE — HISTORY OF PRESENT ILLNESS
[Disease: _____________________] : Disease: [unfilled] [T: ___] : T[unfilled] [N: ___] : N[unfilled] [M: ___] : M[unfilled] [AJCC Stage: ____] : AJCC Stage: [unfilled] [de-identified] : 62 M with no significant past medical history, developed progressive abdominal pain in March 2017. In May 2017 he went to an urgent care center and was referred for a CT A/P w/co. This was performed on 5/8/17 and demonstrated a circumferential 6.7 cm mass at the splenic flexure of the colon causing "apple core" luminal narrowing of the colon at that level c/w malignancy. A 2.7 x 2.2 x 2.5 cm ill defined LN below the pancreatic body at the level of the splenic vein is most consistent with a metastasis. On May 9, 2017, Haja saw Dr. Morgan Armenta, colorectal surgery who performed a colonoscopy on 5/15/17. Biopsy c/w invasive moderately differentiated colonic adenocarcinoma a/w necrosis. A PET/CT was performed on 5/30/17 which showed splenic flexure colonic hypermetabolic mass c/w primary carcinoma (SUV 35) and 2.5 cm mesenteric LN hypermetabolic focus c/w regional metastatic disease (SUV 5). \par \par On 6/13/17 patient underwent a open left colectomy, omentectomy and mobilization of the splenic flexure. Final pathology T3NO moderately differentiated carcinoma, loss of nuclear expression of MLH1 and PMS2. No adjuvant therapy was given. Post op developed SOB, hypoxia transferred to the SICU, placed on bipap. A CTA chest/abd/pelvis 6/15/17 c/w Nonspecific patchy opacity in RLL was noted, 3.4 cm collection or cystic lesion in the pancreatic body for which follow up imaging was suggested. \par \par Post op, Haja continued to complain of abdominal pain and an MRI A/P was performed on 7/25/17 which showed a 4.6 cm hyperintense structure inseparable from the distal pancreatic body. The lesion is located in the peripancreatic retroperitoneal space with imaging characteristics suggestive of pancreatic pseudocyst. He was evaluated by surg onc, Dr. Hager and referred for a CT pancreatic protocol which was performed on 9/5 and showed a peripherally enhancing mass in the body of the pancreas measures 4.6 x 3.5 cm. Subsequently had an EUS with FNA on 10/6/17 (GI- Dr. Luz) which confirmed moderately differentiated adenocarcinoma.\par \par On 11/9/17 underwent open ex lap for possible resection of pancreatic adenoca, however, SMA was encased by tumor and therefore could not be resected. Liver bx was performed and negative for malignancy, however, noted to have steatosis with mild steatohepatitis, port inflammation, focal periportal and pericellular fibrosis,2 + iron deposition. \par \par Patient was subsequently referred to rad onc and med onc for further evaluation. \par \par 12/5/17-3/13/18: 8 cycles of mFOLFIRINOX (25% dose reduction due to PS) with stable/mild improvement in disease \par \par 3/27/18-5/29/18 : C1-C4 Nivo 3mg/kg (240 mg flat dose)/Ipi 1mg/kg \par 5/31/18: CTCAP: decr in size of pancreatic mass, previously 3.6 to 2.5, new mediastinal LN\par 6/12/18: EBUS with bx of LN: reactive \par 6/26/18-: Nivo single agent Q 2 weeks \par 11/5 - 11/19: hospitalized at [facility] with herpes zoster involving the R periorbital area, no ocular involvement. opiods stopped abruptly s/p withdrawal characterized by diarrhea/n/v. Developed NICOLE (2/2 acyclovir?) with resolution. \par 11/30/18: restarted Nivolumab Q 2 weeks\par 12/13/18: last dose of Nivolumab. \par 12/28/18: Nivolumab held due to Grade 2-3 arthralgias/myalgias. Started on Prednisone 40 mg BID taper over 8 weeks.  \par 1/3/19: CTCAP: interval regression of pancreatic mass and mediastinal LN\par 2/8/19: Nivolumab restarted \par 4/3/19: CT CAP: enlargement of mediastinal LN, ground glass opacities b/l \par 4/6/19: Nivolumab d/c due to myalgias and ?pneumonitis \par 6/10/19: CT CAP: stable/improved disease\par 9/26/19: CT CAP: stable pancreatic mass, stable mediastinal LN, new L gynecomastia \par 12/120/19: CT CAP: stable exam, gynecomastia \par 1/22/20: Mammo/US: no masses noted\par 4/27/20: CT CAP: stable exam\par 8/2/20: CT CAP: stable disease \par 10/26/20: CT CAP: stable disease \par 3/18/21: CT CAP: stable disease \par 5/5/21: Colonoscopy: RORY \par  [de-identified] : moderately differentiated adenocarcinoma [de-identified] : 11/24/17: CA 19.9 - 81.3  CEA 29 [de-identified] : June 2017 - colon resection of tumor T3N0 (0/15 LN) moderately differentiated, loss of nuclear expression of MLH1, PMS2. \par    - IHC:  CK19, and variably positive for CDX2, CK7 and CK20, \par   + BRAF\par \par Nov 2017- attempted pancreatic resection:  CK7, CK20 and CDX2 are positive; CK20 is negative\par     - loss of nuclear expression of MLH1, PMS2.\par \par [facility] - failed report  [FreeTextEntry1] : surveillance due to inability to tolerate immunotherapy  [de-identified] : overall feeling well. Denies any c/o. Has been following with PCP and endo. Had colo in may which was ok. Denies any pain. Good appetite and energy level.

## 2021-09-13 ENCOUNTER — APPOINTMENT (OUTPATIENT)
Dept: ENDOCRINOLOGY | Facility: CLINIC | Age: 66
End: 2021-09-13
Payer: MEDICARE

## 2021-09-13 ENCOUNTER — LABORATORY RESULT (OUTPATIENT)
Age: 66
End: 2021-09-13

## 2021-09-13 VITALS
DIASTOLIC BLOOD PRESSURE: 74 MMHG | TEMPERATURE: 98 F | OXYGEN SATURATION: 97 % | HEART RATE: 74 BPM | WEIGHT: 200 LBS | SYSTOLIC BLOOD PRESSURE: 132 MMHG | BODY MASS INDEX: 30.31 KG/M2 | HEIGHT: 68 IN

## 2021-09-13 PROCEDURE — 99214 OFFICE O/P EST MOD 30 MIN: CPT

## 2021-09-13 NOTE — HISTORY OF PRESENT ILLNESS
[FreeTextEntry1] : cc: abnormal thyroid tests\par \par 65 year old man with Pancreatic cancer, treated with immunotherapy ( previously treated with chemotherapy).  After  4 cycles of ipilimumab/nivolumab, blood tests showed hyperthyroidism. He had no symptoms and continued immunotherapy. Subsequent TSH levels were elevated, then TFTs returned to normal and later pt developed hypothyroidism.  He had been on Nivo every two weeks, stopped in April 2019.  Now on levothyroxine 75 micrograms daily.  He takes it separate from food and vitamins, daily. He has been feeling well.  No depression, constipation, cold intolerance, fatigue\par Had covid vaccine x 2 - pfizer\par \par \par Hypocortisolism - Taking hydrocortisone 10 mg in AM and 5 mg in pm.  No lightheadedness, weight loss.\par \par \par Gynecomastia - pt not bothered cosmetically, no symptoms\par \par

## 2021-09-13 NOTE — ASSESSMENT
[FreeTextEntry1] : 66 year old man with pancreatic cancer  who developed thyroiditis after treatment with immunotherapy, then  hypothyroidism, on levothyroxine tx.\par   -Clinically euthyroid \par  - Check TFTs adjust levothyroxine dose as needed\par \par Hypocortisolism \par - continue hydrocortisone, reviewed sick day management\par \par \par Gynecomastia - macroprolactin was normal\par  -does not want surgery.  Had noted cosmetic improvement. now stable\par \par f/u 6 months

## 2021-09-15 ENCOUNTER — NON-APPOINTMENT (OUTPATIENT)
Age: 66
End: 2021-09-15

## 2021-09-15 LAB
T3RU NFR SERPL: 1.1 TBI
T4 SERPL-MCNC: 8.6 UG/DL
TSH SERPL-ACNC: 6.67 UIU/ML

## 2021-10-22 NOTE — ASU PREOP CHECKLIST - VIA
Subjective   George Wilder is a 68 y.o. male.     This patient had a recent episode of unstable angina coronary arteriograms and a stent placed in his anterior descending artery-he is presently set up for cardiac rehab and to follow-up with Dr. Stiles next week      The following portions of the patient's history were reviewed and updated as appropriate: allergies, current medications, past family history, past medical history, past social history, past surgical history and problem list.    Review of Systems   Cardiovascular: Negative for chest pain and leg swelling.        Chest pain resolved after stent placement   Musculoskeletal: Positive for arthralgias and back pain.       Objective   Physical Exam  Vitals and nursing note reviewed.   Constitutional:       Appearance: He is obese.   Cardiovascular:      Rate and Rhythm: Normal rate and regular rhythm.      Pulses: Normal pulses.      Heart sounds: Normal heart sounds.   Pulmonary:      Effort: Pulmonary effort is normal.      Breath sounds: Normal breath sounds.   Musculoskeletal:      Right lower leg: No edema.      Left lower leg: No edema.   Skin:     General: Skin is warm and dry.   Neurological:      General: No focal deficit present.      Mental Status: He is alert and oriented to person, place, and time.   Psychiatric:         Mood and Affect: Mood normal.         Behavior: Behavior normal.         Thought Content: Thought content normal.         Judgment: Judgment normal.         Assessment/Plan   Diagnoses and all orders for this visit:    1. Unstable angina pectoris (HCC) (Primary)       Plan-continue follow-up with cardiologist-lipid profile CMP -recently started Crestor-?  Should he continue Celebrex plus timing of epidurals          ambulate

## 2021-11-03 ENCOUNTER — OUTPATIENT (OUTPATIENT)
Dept: OUTPATIENT SERVICES | Facility: HOSPITAL | Age: 66
LOS: 1 days | Discharge: HOME | End: 2021-11-03

## 2021-11-03 DIAGNOSIS — C25.9 MALIGNANT NEOPLASM OF PANCREAS, UNSPECIFIED: ICD-10-CM

## 2021-11-03 DIAGNOSIS — K25.5 CHRONIC OR UNSPECIFIED GASTRIC ULCER WITH PERFORATION: Chronic | ICD-10-CM

## 2021-11-03 DIAGNOSIS — Z98.890 OTHER SPECIFIED POSTPROCEDURAL STATES: Chronic | ICD-10-CM

## 2021-11-03 DIAGNOSIS — Z90.49 ACQUIRED ABSENCE OF OTHER SPECIFIED PARTS OF DIGESTIVE TRACT: Chronic | ICD-10-CM

## 2021-11-04 ENCOUNTER — APPOINTMENT (OUTPATIENT)
Dept: INFUSION THERAPY | Facility: HOSPITAL | Age: 66
End: 2021-11-04

## 2021-11-04 ENCOUNTER — RESULT REVIEW (OUTPATIENT)
Age: 66
End: 2021-11-04

## 2021-11-04 ENCOUNTER — LABORATORY RESULT (OUTPATIENT)
Age: 66
End: 2021-11-04

## 2021-11-04 LAB
BASOPHILS # BLD AUTO: 0.05 K/UL — SIGNIFICANT CHANGE UP (ref 0–0.2)
BASOPHILS NFR BLD AUTO: 0.6 % — SIGNIFICANT CHANGE UP (ref 0–2)
EOSINOPHIL # BLD AUTO: 0.43 K/UL — SIGNIFICANT CHANGE UP (ref 0–0.5)
EOSINOPHIL NFR BLD AUTO: 5.4 % — SIGNIFICANT CHANGE UP (ref 0–6)
HCT VFR BLD CALC: 39 % — SIGNIFICANT CHANGE UP (ref 39–50)
HGB BLD-MCNC: 13.8 G/DL — SIGNIFICANT CHANGE UP (ref 13–17)
IMM GRANULOCYTES NFR BLD AUTO: 0.4 % — SIGNIFICANT CHANGE UP (ref 0–1.5)
LYMPHOCYTES # BLD AUTO: 1.46 K/UL — SIGNIFICANT CHANGE UP (ref 1–3.3)
LYMPHOCYTES # BLD AUTO: 18.3 % — SIGNIFICANT CHANGE UP (ref 13–44)
MCHC RBC-ENTMCNC: 30.3 PG — SIGNIFICANT CHANGE UP (ref 27–34)
MCHC RBC-ENTMCNC: 35.4 G/DL — SIGNIFICANT CHANGE UP (ref 32–36)
MCV RBC AUTO: 85.5 FL — SIGNIFICANT CHANGE UP (ref 80–100)
MONOCYTES # BLD AUTO: 0.65 K/UL — SIGNIFICANT CHANGE UP (ref 0–0.9)
MONOCYTES NFR BLD AUTO: 8.1 % — SIGNIFICANT CHANGE UP (ref 2–14)
NEUTROPHILS # BLD AUTO: 5.38 K/UL — SIGNIFICANT CHANGE UP (ref 1.8–7.4)
NEUTROPHILS NFR BLD AUTO: 67.2 % — SIGNIFICANT CHANGE UP (ref 43–77)
NRBC # BLD: 0 /100 WBCS — SIGNIFICANT CHANGE UP (ref 0–0)
PLATELET # BLD AUTO: 171 K/UL — SIGNIFICANT CHANGE UP (ref 150–400)
RBC # BLD: 4.56 M/UL — SIGNIFICANT CHANGE UP (ref 4.2–5.8)
RBC # FLD: 12.2 % — SIGNIFICANT CHANGE UP (ref 10.3–14.5)
WBC # BLD: 8 K/UL — SIGNIFICANT CHANGE UP (ref 3.8–10.5)
WBC # FLD AUTO: 8 K/UL — SIGNIFICANT CHANGE UP (ref 3.8–10.5)

## 2021-11-08 NOTE — ED ADULT TRIAGE NOTE - NS ED NOTE AC HIGH RISK COUNTRIES
Attempted to call patient unable to leave a message got a message \"unable to take any calls right now\" portal message was sent to patient - needs covid testing to be done on 11/07/2021- for IOL scheduled for 11/09/2021- await call back . Orders are in - patient just needs to be scheduled for lab appointment - please follow up.   
Called and spoke with patient, she stated she went to the lab and was able to get tested for COVID-19. Confirmed appt date and time for IOL, 11/09/2021 at 12:00pm, pt to arrived at hospital at 11:30 am.       Patient voiced understanding and agreed.       Please see E-advise for 11/05/2021.    
No

## 2021-11-18 ENCOUNTER — LABORATORY RESULT (OUTPATIENT)
Age: 66
End: 2021-11-18

## 2021-11-24 LAB
T3RU NFR SERPL: 1.1 TBI
T4 SERPL-MCNC: 9.3 UG/DL
TSH SERPL-ACNC: 4.44 UIU/ML

## 2022-01-01 NOTE — END OF VISIT
[>50% of Time Spent on Counseling and Coordination of Care for  ___] : Greater than 50% of the encounter time was spent on counseling and coordination of care for [unfilled] 1

## 2022-01-19 ENCOUNTER — RX RENEWAL (OUTPATIENT)
Age: 67
End: 2022-01-19

## 2022-01-20 ENCOUNTER — RESULT REVIEW (OUTPATIENT)
Age: 67
End: 2022-01-20

## 2022-01-20 ENCOUNTER — OUTPATIENT (OUTPATIENT)
Dept: OUTPATIENT SERVICES | Facility: HOSPITAL | Age: 67
LOS: 1 days | Discharge: HOME | End: 2022-01-20

## 2022-01-20 DIAGNOSIS — Z90.49 ACQUIRED ABSENCE OF OTHER SPECIFIED PARTS OF DIGESTIVE TRACT: Chronic | ICD-10-CM

## 2022-01-20 DIAGNOSIS — K25.5 CHRONIC OR UNSPECIFIED GASTRIC ULCER WITH PERFORATION: Chronic | ICD-10-CM

## 2022-01-20 DIAGNOSIS — Z98.890 OTHER SPECIFIED POSTPROCEDURAL STATES: Chronic | ICD-10-CM

## 2022-01-20 DIAGNOSIS — C25.9 MALIGNANT NEOPLASM OF PANCREAS, UNSPECIFIED: ICD-10-CM

## 2022-01-22 ENCOUNTER — APPOINTMENT (OUTPATIENT)
Dept: INFUSION THERAPY | Facility: HOSPITAL | Age: 67
End: 2022-01-22

## 2022-01-25 ENCOUNTER — OUTPATIENT (OUTPATIENT)
Dept: OUTPATIENT SERVICES | Facility: HOSPITAL | Age: 67
LOS: 1 days | End: 2022-01-25
Payer: MEDICARE

## 2022-01-25 ENCOUNTER — APPOINTMENT (OUTPATIENT)
Dept: CT IMAGING | Facility: IMAGING CENTER | Age: 67
End: 2022-01-25
Payer: MEDICARE

## 2022-01-25 DIAGNOSIS — Z98.890 OTHER SPECIFIED POSTPROCEDURAL STATES: Chronic | ICD-10-CM

## 2022-01-25 DIAGNOSIS — C25.9 MALIGNANT NEOPLASM OF PANCREAS, UNSPECIFIED: ICD-10-CM

## 2022-01-25 DIAGNOSIS — Z90.49 ACQUIRED ABSENCE OF OTHER SPECIFIED PARTS OF DIGESTIVE TRACT: Chronic | ICD-10-CM

## 2022-01-25 DIAGNOSIS — K25.5 CHRONIC OR UNSPECIFIED GASTRIC ULCER WITH PERFORATION: Chronic | ICD-10-CM

## 2022-01-25 PROCEDURE — 71260 CT THORAX DX C+: CPT | Mod: 26,MH

## 2022-01-25 PROCEDURE — 82565 ASSAY OF CREATININE: CPT

## 2022-01-25 PROCEDURE — 74177 CT ABD & PELVIS W/CONTRAST: CPT

## 2022-01-25 PROCEDURE — 71260 CT THORAX DX C+: CPT

## 2022-01-25 PROCEDURE — 74177 CT ABD & PELVIS W/CONTRAST: CPT | Mod: 26,MH

## 2022-02-02 ENCOUNTER — APPOINTMENT (OUTPATIENT)
Dept: HEMATOLOGY ONCOLOGY | Facility: CLINIC | Age: 67
End: 2022-02-02
Payer: MEDICARE

## 2022-02-02 ENCOUNTER — RESULT REVIEW (OUTPATIENT)
Age: 67
End: 2022-02-02

## 2022-02-02 VITALS
OXYGEN SATURATION: 99 % | TEMPERATURE: 97 F | SYSTOLIC BLOOD PRESSURE: 130 MMHG | WEIGHT: 198.42 LBS | BODY MASS INDEX: 30.17 KG/M2 | DIASTOLIC BLOOD PRESSURE: 80 MMHG | RESPIRATION RATE: 16 BRPM | HEART RATE: 74 BPM

## 2022-02-02 LAB
BASOPHILS # BLD AUTO: 0.08 K/UL — SIGNIFICANT CHANGE UP (ref 0–0.2)
BASOPHILS NFR BLD AUTO: 1.5 % — SIGNIFICANT CHANGE UP (ref 0–2)
EOSINOPHIL # BLD AUTO: 0.49 K/UL — SIGNIFICANT CHANGE UP (ref 0–0.5)
EOSINOPHIL NFR BLD AUTO: 8.9 % — HIGH (ref 0–6)
HCT VFR BLD CALC: 41.6 % — SIGNIFICANT CHANGE UP (ref 39–50)
HGB BLD-MCNC: 14.5 G/DL — SIGNIFICANT CHANGE UP (ref 13–17)
IMM GRANULOCYTES NFR BLD AUTO: 0.2 % — SIGNIFICANT CHANGE UP (ref 0–1.5)
LYMPHOCYTES # BLD AUTO: 2.49 K/UL — SIGNIFICANT CHANGE UP (ref 1–3.3)
LYMPHOCYTES # BLD AUTO: 45.4 % — HIGH (ref 13–44)
MCHC RBC-ENTMCNC: 30.1 PG — SIGNIFICANT CHANGE UP (ref 27–34)
MCHC RBC-ENTMCNC: 34.9 G/DL — SIGNIFICANT CHANGE UP (ref 32–36)
MCV RBC AUTO: 86.5 FL — SIGNIFICANT CHANGE UP (ref 80–100)
MONOCYTES # BLD AUTO: 0.43 K/UL — SIGNIFICANT CHANGE UP (ref 0–0.9)
MONOCYTES NFR BLD AUTO: 7.8 % — SIGNIFICANT CHANGE UP (ref 2–14)
NEUTROPHILS # BLD AUTO: 1.99 K/UL — SIGNIFICANT CHANGE UP (ref 1.8–7.4)
NEUTROPHILS NFR BLD AUTO: 36.2 % — LOW (ref 43–77)
NRBC # BLD: 0 /100 WBCS — SIGNIFICANT CHANGE UP (ref 0–0)
PLATELET # BLD AUTO: 194 K/UL — SIGNIFICANT CHANGE UP (ref 150–400)
RBC # BLD: 4.81 M/UL — SIGNIFICANT CHANGE UP (ref 4.2–5.8)
RBC # FLD: 12.8 % — SIGNIFICANT CHANGE UP (ref 10.3–14.5)
WBC # BLD: 5.49 K/UL — SIGNIFICANT CHANGE UP (ref 3.8–10.5)
WBC # FLD AUTO: 5.49 K/UL — SIGNIFICANT CHANGE UP (ref 3.8–10.5)

## 2022-02-02 PROCEDURE — 99213 OFFICE O/P EST LOW 20 MIN: CPT

## 2022-02-03 LAB
ALBUMIN SERPL ELPH-MCNC: 4.3 G/DL
ALP BLD-CCNC: 54 U/L
ALT SERPL-CCNC: 14 U/L
ANION GAP SERPL CALC-SCNC: 10 MMOL/L
APTT BLD: 38.8 SEC
AST SERPL-CCNC: 23 U/L
BILIRUB SERPL-MCNC: 0.6 MG/DL
BUN SERPL-MCNC: 16 MG/DL
CALCIUM SERPL-MCNC: 9.3 MG/DL
CANCER AG19-9 SERPL-ACNC: 16 U/ML
CEA SERPL-MCNC: 1.2 NG/ML
CHLORIDE SERPL-SCNC: 106 MMOL/L
CO2 SERPL-SCNC: 24 MMOL/L
CREAT SERPL-MCNC: 1.01 MG/DL
GLUCOSE SERPL-MCNC: 100 MG/DL
INR PPP: 1.12 RATIO
POTASSIUM SERPL-SCNC: 4.2 MMOL/L
PROT SERPL-MCNC: 7.4 G/DL
PT BLD: 13.2 SEC
SODIUM SERPL-SCNC: 140 MMOL/L

## 2022-02-06 NOTE — HISTORY OF PRESENT ILLNESS
[Disease: _____________________] : Disease: [unfilled] [T: ___] : T[unfilled] [N: ___] : N[unfilled] [M: ___] : M[unfilled] [AJCC Stage: ____] : AJCC Stage: [unfilled] [de-identified] : 62 M with no significant past medical history, developed progressive abdominal pain in March 2017. In May 2017 he went to an urgent care center and was referred for a CT A/P w/co. This was performed on 5/8/17 and demonstrated a circumferential 6.7 cm mass at the splenic flexure of the colon causing "apple core" luminal narrowing of the colon at that level c/w malignancy. A 2.7 x 2.2 x 2.5 cm ill defined LN below the pancreatic body at the level of the splenic vein is most consistent with a metastasis. On May 9, 2017, Haja saw Dr. Morgan Armenta, colorectal surgery who performed a colonoscopy on 5/15/17. Biopsy c/w invasive moderately differentiated colonic adenocarcinoma a/w necrosis. A PET/CT was performed on 5/30/17 which showed splenic flexure colonic hypermetabolic mass c/w primary carcinoma (SUV 35) and 2.5 cm mesenteric LN hypermetabolic focus c/w regional metastatic disease (SUV 5). \par \par On 6/13/17 patient underwent a open left colectomy, omentectomy and mobilization of the splenic flexure. Final pathology T3NO moderately differentiated carcinoma, loss of nuclear expression of MLH1 and PMS2. No adjuvant therapy was given. Post op developed SOB, hypoxia transferred to the SICU, placed on bipap. A CTA chest/abd/pelvis 6/15/17 c/w Nonspecific patchy opacity in RLL was noted, 3.4 cm collection or cystic lesion in the pancreatic body for which follow up imaging was suggested. \par \par Post op, Haja continued to complain of abdominal pain and an MRI A/P was performed on 7/25/17 which showed a 4.6 cm hyperintense structure inseparable from the distal pancreatic body. The lesion is located in the peripancreatic retroperitoneal space with imaging characteristics suggestive of pancreatic pseudocyst. He was evaluated by surg onc, Dr. Hager and referred for a CT pancreatic protocol which was performed on 9/5 and showed a peripherally enhancing mass in the body of the pancreas measures 4.6 x 3.5 cm. Subsequently had an EUS with FNA on 10/6/17 (GI- Dr. Luz) which confirmed moderately differentiated adenocarcinoma.\par \par On 11/9/17 underwent open ex lap for possible resection of pancreatic adenoca, however, SMA was encased by tumor and therefore could not be resected. Liver bx was performed and negative for malignancy, however, noted to have steatosis with mild steatohepatitis, port inflammation, focal periportal and pericellular fibrosis,2 + iron deposition. \par \par Patient was subsequently referred to rad onc and med onc for further evaluation. \par \par 12/5/17-3/13/18: 8 cycles of mFOLFIRINOX (25% dose reduction due to PS) with stable/mild improvement in disease \par \par 3/27/18-5/29/18 : C1-C4 Nivo 3mg/kg (240 mg flat dose)/Ipi 1mg/kg \par 5/31/18: CTCAP: decr in size of pancreatic mass, previously 3.6 to 2.5, new mediastinal LN\par 6/12/18: EBUS with bx of LN: reactive \par 6/26/18-: Nivo single agent Q 2 weeks \par 11/5 - 11/19: hospitalized at [facility] with herpes zoster involving the R periorbital area, no ocular involvement. opiods stopped abruptly s/p withdrawal characterized by diarrhea/n/v. Developed NICOLE (2/2 acyclovir?) with resolution. \par 11/30/18: restarted Nivolumab Q 2 weeks\par 12/13/18: last dose of Nivolumab. \par 12/28/18: Nivolumab held due to Grade 2-3 arthralgias/myalgias. Started on Prednisone 40 mg BID taper over 8 weeks.  \par 1/3/19: CTCAP: interval regression of pancreatic mass and mediastinal LN\par 2/8/19: Nivolumab restarted \par 4/3/19: CT CAP: enlargement of mediastinal LN, ground glass opacities b/l \par 4/6/19: Nivolumab d/c due to myalgias and ?pneumonitis \par 6/10/19: CT CAP: stable/improved disease\par 9/26/19: CT CAP: stable pancreatic mass, stable mediastinal LN, new L gynecomastia \par 12/120/19: CT CAP: stable exam, gynecomastia \par 1/22/20: Mammo/US: no masses noted\par 4/27/20: CT CAP: stable exam\par 8/2/20: CT CAP: stable disease \par 10/26/20: CT CAP: stable disease \par 3/18/21: CT CAP: stable disease \par 5/5/21: Colonoscopy: RORY, repeat in 3 yrs \par 1/25/22: CT CAP: stable disease  [de-identified] : moderately differentiated adenocarcinoma [de-identified] : 11/24/17: CA 19.9 - 81.3  CEA 29 [de-identified] : June 2017 - colon resection of tumor T3N0 (0/15 LN) moderately differentiated, loss of nuclear expression of MLH1, PMS2. \par    - IHC:  CK19, and variably positive for CDX2, CK7 and CK20, \par   + BRAF\par \par Nov 2017- attempted pancreatic resection:  CK7, CK20 and CDX2 are positive; CK20 is negative\par     - loss of nuclear expression of MLH1, PMS2.\par \par [facility] - failed report  [FreeTextEntry1] : surveillance due to inability to tolerate immunotherapy  [de-identified] : overall doing well. weight is stable. appetite is good. denies any pain. normal BMs. We reviewed CT scans which show stable disease.

## 2022-03-05 ENCOUNTER — OUTPATIENT (OUTPATIENT)
Dept: OUTPATIENT SERVICES | Facility: HOSPITAL | Age: 67
LOS: 1 days | End: 2022-03-05
Payer: MEDICARE

## 2022-03-05 DIAGNOSIS — Z98.890 OTHER SPECIFIED POSTPROCEDURAL STATES: Chronic | ICD-10-CM

## 2022-03-05 DIAGNOSIS — Z11.52 ENCOUNTER FOR SCREENING FOR COVID-19: ICD-10-CM

## 2022-03-05 DIAGNOSIS — Z90.49 ACQUIRED ABSENCE OF OTHER SPECIFIED PARTS OF DIGESTIVE TRACT: Chronic | ICD-10-CM

## 2022-03-05 DIAGNOSIS — K25.5 CHRONIC OR UNSPECIFIED GASTRIC ULCER WITH PERFORATION: Chronic | ICD-10-CM

## 2022-03-05 LAB — SARS-COV-2 RNA SPEC QL NAA+PROBE: SIGNIFICANT CHANGE UP

## 2022-03-05 PROCEDURE — U0005: CPT

## 2022-03-05 PROCEDURE — C9803: CPT

## 2022-03-05 PROCEDURE — U0003: CPT

## 2022-03-07 ENCOUNTER — RESULT REVIEW (OUTPATIENT)
Age: 67
End: 2022-03-07

## 2022-03-07 ENCOUNTER — OUTPATIENT (OUTPATIENT)
Dept: OUTPATIENT SERVICES | Facility: HOSPITAL | Age: 67
LOS: 1 days | End: 2022-03-07
Payer: MEDICARE

## 2022-03-07 ENCOUNTER — APPOINTMENT (OUTPATIENT)
Dept: ENDOCRINOLOGY | Facility: CLINIC | Age: 67
End: 2022-03-07

## 2022-03-07 VITALS
SYSTOLIC BLOOD PRESSURE: 118 MMHG | OXYGEN SATURATION: 98 % | DIASTOLIC BLOOD PRESSURE: 68 MMHG | HEART RATE: 65 BPM | RESPIRATION RATE: 15 BRPM

## 2022-03-07 VITALS
HEIGHT: 68 IN | WEIGHT: 179.9 LBS | DIASTOLIC BLOOD PRESSURE: 76 MMHG | TEMPERATURE: 98 F | HEART RATE: 77 BPM | SYSTOLIC BLOOD PRESSURE: 135 MMHG | OXYGEN SATURATION: 95 % | RESPIRATION RATE: 18 BRPM

## 2022-03-07 DIAGNOSIS — Z98.890 OTHER SPECIFIED POSTPROCEDURAL STATES: Chronic | ICD-10-CM

## 2022-03-07 DIAGNOSIS — K25.5 CHRONIC OR UNSPECIFIED GASTRIC ULCER WITH PERFORATION: Chronic | ICD-10-CM

## 2022-03-07 DIAGNOSIS — Z90.49 ACQUIRED ABSENCE OF OTHER SPECIFIED PARTS OF DIGESTIVE TRACT: Chronic | ICD-10-CM

## 2022-03-07 DIAGNOSIS — C25.9 MALIGNANT NEOPLASM OF PANCREAS, UNSPECIFIED: ICD-10-CM

## 2022-03-07 PROCEDURE — 77001 FLUOROGUIDE FOR VEIN DEVICE: CPT

## 2022-03-07 PROCEDURE — 36590 REMOVAL TUNNELED CV CATH: CPT

## 2022-03-07 PROCEDURE — 77001 FLUOROGUIDE FOR VEIN DEVICE: CPT | Mod: 26

## 2022-03-07 RX ORDER — HYDROCORTISONE 20 MG
1 TABLET ORAL
Qty: 0 | Refills: 0 | DISCHARGE

## 2022-03-07 NOTE — ASU PREOP CHECKLIST - TEMPERATURE IN FAHRENHEIT (DEGREES F)
Last OV: 11/18/2021  Next OV: 3/10/2022  Last refill:11/18/2021  Most recent Labs: 11/18/2021 TSH  Last EKG (if needed): 11/18/2021
Medication Refill    Medication needing refilled:  amiodarone    Dosage of the medication:  200 mg tablet    How are you taking this medication (QD, BID, TID, QID, PRN):  Take 1 tablet by mouth daily    30 or 90 day supply called in:  80    Which Pharmacy are we sending the medication to?:    [address] [address]   Phone:  [phone]  Fax:  [phone]
98

## 2022-03-07 NOTE — ASU PATIENT PROFILE, ADULT - NSICDXPASTMEDICALHX_GEN_ALL_CORE_FT
PAST MEDICAL HISTORY:  BPH (benign prostatic hyperplasia)     Buzzing in ear since head injury    Cervical disc disease     Chronic pain neck, lower back; herniated disc cervical spine    Colon cancer had colon surgery    Depression     Enlarged lymph node     Head ache past    Hypertension, unspecified type     Hypothyroid     Injury forehead & had sutures ( 2013 )    Leukocytopenia 1 week ago-given hydrocortisone    Obesity     Pancreatic cancer had chemo in past; immunotherapy done til 7 mos ago    Smoking quit 2015    Vertigo

## 2022-03-07 NOTE — ASU DISCHARGE PLAN (ADULT/PEDIATRIC) - NS MD DC FALL RISK RISK
For information on Fall & Injury Prevention, visit: [email or web address] OR  [email or web address] OR  https://www.cdc.gov/steadi/patient.html

## 2022-03-07 NOTE — PROCEDURE NOTE - PROCEDURE FINDINGS AND DETAILS
Vascular & Interventional Radiology Post-Procedure Note    Pre-Procedure Diagnosis: Pancreatic Ca  Post-Procedure Diagnosis: Same as pre.  Indications for Procedure: Completed treatment    Attending: Dr. Hayes  Resident: Dr. Silverman    Procedure Details/Findings: Successful right chest port removal  Access (if applicable): NA    Complications: None  Estimated Blood Loss: Minimal  Specimen: None  Contrast: None  Sedation: IV sedation by anesthesiology  Patient Condition/Disposition: Stable/Recovery then home    Plan:  1.) Keep dressing clean and dry

## 2022-03-07 NOTE — ASU DISCHARGE PLAN (ADULT/PEDIATRIC) - ASU DC SPECIAL INSTRUCTIONSFT
Chest Port Removal    Discharge Instructions  - You have had a chest port removed from your chest.   - You may shower in 48 hours. No soaking or swimming for 2 weeks or until the site is completely healed.  - Keep the area covered and dry for the next 7 days.  - Do not perform any heavy lifting or put tension on the area for the next week or until the site is healed.  - Do not remove Dermabond or steri-strips. They will fall off in the next 2-3 weeks.  - You may resume your normal diet.  - You may resume your normal medications however you should wait 48 hours before restarting aspirin, plavix, or blood thinners.  - It is normal to experience some pain over the site for the next few days. You may take apply ice to the area (20 minutes on, 20 minutes off) and take Tylenol for that pain. Do not take more frequently than every 6 hours and do not exceed more than 3000mg of Tylenol in a 24 hour period.    - You were given conscious sedation which may make you drowsy, therefore you need someone to stay with you until the morning following the procedure.  - Do not drive, engage in heavy lifting or strenuous activity, or drink any alcoholic beverages for the next 24 hours.   - You may resume normal activity in 24 hours.    Notify your primary physician and/or Interventional Radiology IMMEDIATELY if you experience any of the following       - Fever of 100.5F       - Chills or Rigors/ Shakes       - Swelling and/or Redness in the area around the port removal site       - Worsening Pain       - Blood soaked bandages or worsening bleeding       - Lightheadedness and/or dizziness upon standing       - Chest Pain/ Tightness       - Shortness of Breath       - Difficulty walking    If you have a problem that you believe requires IMMEDIATE attention, please go to your NEAREST Emergency Room. If you believe your problem can safely wait until you speak to a physician, please call Interventional Radiology for any concerns.    During Normal Weekday Business Hours- You can contact the Interventional Radiology department during normal business hours via telephone.  During Evenings and Weekends- If you need to contact Interventional Radiology during off hours, do so by calling the hospital and requesting to be connected to the Interventional Radiologist on call.

## 2022-03-07 NOTE — ASU DISCHARGE PLAN (ADULT/PEDIATRIC) - NURSING INSTRUCTIONS
Please feel free to contact us at [phone] if any problems arise. After 6PM, Monday through Friday, on weekends and on holidays, please call [phone] and ask for the radiology resident on call to be paged.

## 2022-03-07 NOTE — PRE PROCEDURE NOTE - PRE PROCEDURE EVALUATION
Interventional Radiology    HPI: 66y Male with history of colon cancer and pancreatic cancer s/p chemo via right chest port now completed treatment here for port removal.    Allergies:   Medications (Abx/Cardiac/Anticoagulation/Blood Products)      Data:  172.7  81.6  T(C): 36.7  HR: 77  BP: 135/76  RR: 18  SpO2: 95%    Exam  General: No acute distress  Chest: Non labored breathing  Abdomen: Non-distended  Extremities: No swelling, warm          Imaging: Reviewed    Plan: 66y Male with right chest port for chemo now completed treatment here for port removal.  - Plan for chest port removal    -Risks/Benefits/alternatives explained with the patient and/or healthcare proxy and witnessed informed consent obtained.   
present x 4 quadrants

## 2022-03-11 DIAGNOSIS — Z45.2 ENCOUNTER FOR ADJUSTMENT AND MANAGEMENT OF VASCULAR ACCESS DEVICE: ICD-10-CM

## 2022-03-14 ENCOUNTER — APPOINTMENT (OUTPATIENT)
Dept: ENDOCRINOLOGY | Facility: CLINIC | Age: 67
End: 2022-03-14
Payer: MEDICARE

## 2022-03-14 VITALS
HEART RATE: 72 BPM | DIASTOLIC BLOOD PRESSURE: 74 MMHG | WEIGHT: 180 LBS | RESPIRATION RATE: 16 BRPM | HEIGHT: 68 IN | SYSTOLIC BLOOD PRESSURE: 120 MMHG | BODY MASS INDEX: 27.28 KG/M2 | TEMPERATURE: 97.7 F | OXYGEN SATURATION: 98 %

## 2022-03-14 PROCEDURE — 99214 OFFICE O/P EST MOD 30 MIN: CPT

## 2022-03-14 NOTE — ASSESSMENT
[FreeTextEntry1] : 66 year old man with pancreatic cancer  who developed thyroiditis after treatment with immunotherapy, then  hypothyroidism, on levothyroxine tx.\par   -Clinically euthyroid \par  - Check TFTs adjust levothyroxine dose as needed\par \par Hypocortisolism \par - continue hydrocortisone, reviewed sick day management\par \par \par Gynecomastia - macroprolactin was normal\par  -does not want surgery.  Had noted cosmetic improvement. continues to be stable\par \par f/u 6 months

## 2022-03-14 NOTE — PHYSICAL EXAM
[Alert] : alert [Healthy Appearance] : healthy appearance [No Acute Distress] : no acute distress [Normal Sclera/Conjunctiva] : normal sclera/conjunctiva [No Proptosis] : no proptosis [No LAD] : no lymphadenopathy [Thyroid Not Enlarged] : the thyroid was not enlarged [No Respiratory Distress] : no respiratory distress [Clear to Auscultation] : lungs were clear to auscultation bilaterally [Normal S1, S2] : normal S1 and S2 [No Edema] : no peripheral edema [Normal Bowel Sounds] : normal bowel sounds [Soft] : abdomen soft [No Spinal Tenderness] : no spinal tenderness [No Tremors] : no tremors [Normal Reflexes] : deep tendon reflexes were 2+ and symmetric [Normal Affect] : the affect was normal [Normal Mood] : the mood was normal

## 2022-03-14 NOTE — HISTORY OF PRESENT ILLNESS
[FreeTextEntry1] : cc: abnormal thyroid tests\par \par 66 year old man with Pancreatic cancer, treated with immunotherapy ( previously treated with chemotherapy).  After  4 cycles of ipilimumab/nivolumab, blood tests showed hyperthyroidism. He had no symptoms and continued immunotherapy. Subsequent TSH levels were elevated, then TFTs returned to normal and later pt developed hypothyroidism.  He had been on Nivo every two weeks, stopped in April 2019.  Now on levothyroxine 100 micrograms daily.  He takes it separate from food and vitamins, daily. He reports feeling well.  No weight gain, fatigue,  depression, \par Had covid vaccine x 3 - pfizer\par \par \par Hypocortisolism - Taking hydrocortisone 10 mg in AM and 5 mg in pm.  No lightheadedness, abdominal aoin, has had intentional weight loss, wants to slowly lose another 10 pounds...\par \par \par Gynecomastia - pt not bothered cosmetically, no symptoms\par \par

## 2022-03-15 LAB
T4 FREE SERPL-MCNC: 1.8 NG/DL
TSH SERPL-ACNC: 0.85 UIU/ML

## 2022-03-30 NOTE — ASU DISCHARGE PLAN (ADULT/PEDIATRIC) - PATIENT EDUCATION MATERIALS PROVIED
"  Assessment & Plan     Primary osteoarthritis of both knees  Physical exam suggests a mild osteoarthritis of both knees on the basis of joint crepitus without effusion, warmth or focal tenderness.  No evidence of Baker's cyst.  Acute inflammatory phase of osteoarthritis most likely.  Can't rule out a reactive arthritis. Treat with NSAID.    Right leg swelling  Unilateral and mild ankle edema without foot edema.  Low risk for DVT and no indicative findings.  Subjective alternative diagnosis that is suggestive but not definitive.  Given the upcoming travel [place] will err on the side of caution with excluding DVT. If D-dimer is negative then no further work-up. If D-dimer is positive then pursue right lower leg ultrasound.  - D dimer, quantitative; Future  - D dimer, quantitative    Aneglito Bone MD  [facility]    Subjective     HPI   Chief complaint of swelling of the ankles. She states that about ten days ago she woke up with an achey feeling in the knees.  Now this has \"gravitated\" down to have some swelling in the ankle on the right side.  The left leg now seems to be normal.  She has noted some \"coming and going\" achiness in the knees in the past. Initially there wasn't really swelling in the knees, just pain which was worse on the right than the left.  The pain is worse with immobility.  Denies prolonged gelling or morning stiffness.  Denies skin rash. She denies myalgias, fevers, fatigue.  She has been managing the discomfort with aspirin and this has been effective.     PMH: HTN, lipids; no prior hospitalizations    PSH: cholecystectomy, wrist fracture repair    FMH: denies inflammatory arthritis.    Review of Systems   Constitutional, HEENT, cardiovascular, pulmonary, gi and gu systems are negative, except as otherwise noted.      Objective    BP (!) 148/80   Pulse 73   Temp 98.6  F (37  C) (Tympanic)   Resp 16   Wt 63.5 kg (140 lb)   SpO2 97%   BMI 23.90 kg/m    Body " mass index is 23.9 kg/m .  Physical Exam   GENERAL APPEARANCE: healthy, alert and no distress  MS: LEFT KNEE: no effusion, or erythema; no focal tenderness; mild crepitus on flexion; stable anterior drawer, varus/valgus stress  MS: RIGHT KNEE: no effusion or erythema; no warmth; no popliteal bulge; no focal tenderness; moderate crepitus on flexion of the knee; stable anterior drawer, varus/valgus stress  MS: RIGHT ANKLE: 1+ pitting soft tissue edema of the ankle, nothing in the foot; Kaylyn's is negative; no calf tenderness; the ankle itself does not show any joint effusion and there is FROM actively and passively without pain or tenderness             Provider pre-printed instructions given

## 2022-04-21 NOTE — PATIENT PROFILE ADULT. - FUNCTIONAL SCREEN CURRENT LEVEL: TRANSFERRING, MLM
"Pt woke up this morning and vomited \"black stringy substance.\" (1 episode)  She denies abdominal pain and did not feel nauseated prior to vomiting. She says she took Senna for constipation last night, had a bowel movement this morning and then vomited 10 minutes ago.   She takes xarelto for afib  Denies CP/SOB/ Fevers/Chills     VSS  " (0) independent

## 2022-05-06 NOTE — ASU DISCHARGE PLAN (ADULT/PEDIATRIC) - CALL YOUR DOCTOR IF YOU HAVE ANY OF THE FOLLOWING:
Bleeding that does not stop/Wound/Surgical Site with redness, or foul smelling discharge or pus/Numbness, tingling, color or temperature change to extremity/Pain not relieved by Medications
today

## 2022-06-07 NOTE — PATIENT PROFILE ADULT. - NS PRO TALK SOMEONE YN
----- Message from Wale Levi MD sent at 6/6/2022  2:38 PM CDT -----  Saw patient in clinic today. Referral last year was from Dr Paez to Dr Lim for an Anorectal Manometry to be done(likely needing Versed). Virtual appt was scheduled but not completed. Spoke with Dr Lim who would like to set up Virtual to discuss setting up ARM for evaluation. Mom on portal so should have virtual capability-please confirm, Copying Alexsander to get her back in loop. I think family would prefer not to drive here for everything! Continued his miralax and started senna. Recommended fiber and scheduled toilet sitting. Sounds like delayed meconium possibly? Getting an xray today to assess. BM    
Called and spoke with mom.  Mom open to scheduling virtual visit with Dr. Lim as next step. Informed mom I will reach out to Dr. Lim to discuss options and will call her back.   
Called parent, no answer, LVM with phone number to call back and schedule virtual visit with Dr. Lim.   
Ok to change to a virtual slot if helps  
no

## 2022-06-17 NOTE — PATIENT PROFILE ADULT - BRADEN FRICTION AND SHEAR
The [facility] & [facility] do not have the PROAIR RESPICLICK in stock pt advised. Requesting a different albuterol inhaler be sent.     Rx pended.    (3) no apparent problem

## 2022-06-22 ENCOUNTER — NON-APPOINTMENT (OUTPATIENT)
Age: 67
End: 2022-06-22

## 2022-07-11 ENCOUNTER — NON-APPOINTMENT (OUTPATIENT)
Age: 67
End: 2022-07-11

## 2022-08-05 ENCOUNTER — OUTPATIENT (OUTPATIENT)
Dept: OUTPATIENT SERVICES | Facility: HOSPITAL | Age: 67
LOS: 1 days | End: 2022-08-05
Payer: MEDICARE

## 2022-08-05 ENCOUNTER — APPOINTMENT (OUTPATIENT)
Dept: CT IMAGING | Facility: IMAGING CENTER | Age: 67
End: 2022-08-05

## 2022-08-05 DIAGNOSIS — Z98.890 OTHER SPECIFIED POSTPROCEDURAL STATES: Chronic | ICD-10-CM

## 2022-08-05 DIAGNOSIS — C25.9 MALIGNANT NEOPLASM OF PANCREAS, UNSPECIFIED: ICD-10-CM

## 2022-08-05 DIAGNOSIS — Z90.49 ACQUIRED ABSENCE OF OTHER SPECIFIED PARTS OF DIGESTIVE TRACT: Chronic | ICD-10-CM

## 2022-08-05 DIAGNOSIS — K25.5 CHRONIC OR UNSPECIFIED GASTRIC ULCER WITH PERFORATION: Chronic | ICD-10-CM

## 2022-08-05 PROCEDURE — 71260 CT THORAX DX C+: CPT

## 2022-08-05 PROCEDURE — 74177 CT ABD & PELVIS W/CONTRAST: CPT | Mod: 26,MH

## 2022-08-05 PROCEDURE — 74177 CT ABD & PELVIS W/CONTRAST: CPT

## 2022-08-05 PROCEDURE — 71260 CT THORAX DX C+: CPT | Mod: 26,MH

## 2022-08-12 ENCOUNTER — OUTPATIENT (OUTPATIENT)
Dept: OUTPATIENT SERVICES | Facility: HOSPITAL | Age: 67
LOS: 1 days | Discharge: HOME | End: 2022-08-12

## 2022-08-12 DIAGNOSIS — Z98.890 OTHER SPECIFIED POSTPROCEDURAL STATES: Chronic | ICD-10-CM

## 2022-08-12 DIAGNOSIS — Z90.49 ACQUIRED ABSENCE OF OTHER SPECIFIED PARTS OF DIGESTIVE TRACT: Chronic | ICD-10-CM

## 2022-08-12 DIAGNOSIS — C25.9 MALIGNANT NEOPLASM OF PANCREAS, UNSPECIFIED: ICD-10-CM

## 2022-08-12 DIAGNOSIS — K25.5 CHRONIC OR UNSPECIFIED GASTRIC ULCER WITH PERFORATION: Chronic | ICD-10-CM

## 2022-08-17 ENCOUNTER — RESULT REVIEW (OUTPATIENT)
Age: 67
End: 2022-08-17

## 2022-08-17 ENCOUNTER — APPOINTMENT (OUTPATIENT)
Dept: HEMATOLOGY ONCOLOGY | Facility: CLINIC | Age: 67
End: 2022-08-17
Payer: MEDICARE

## 2022-08-17 VITALS
OXYGEN SATURATION: 99 % | DIASTOLIC BLOOD PRESSURE: 75 MMHG | WEIGHT: 176.81 LBS | RESPIRATION RATE: 16 BRPM | SYSTOLIC BLOOD PRESSURE: 124 MMHG | TEMPERATURE: 97.6 F | BODY MASS INDEX: 26.8 KG/M2 | HEART RATE: 59 BPM | HEIGHT: 68 IN

## 2022-08-17 LAB
BASOPHILS # BLD AUTO: 0.09 K/UL — SIGNIFICANT CHANGE UP (ref 0–0.2)
BASOPHILS NFR BLD AUTO: 1.4 % — SIGNIFICANT CHANGE UP (ref 0–2)
CANCER AG19-9 SERPL-ACNC: 15 U/ML
CEA SERPL-MCNC: 1.3 NG/ML
EOSINOPHIL # BLD AUTO: 0.53 K/UL — HIGH (ref 0–0.5)
EOSINOPHIL NFR BLD AUTO: 8.2 % — HIGH (ref 0–6)
HCT VFR BLD CALC: 41.4 % — SIGNIFICANT CHANGE UP (ref 39–50)
HGB BLD-MCNC: 14.4 G/DL — SIGNIFICANT CHANGE UP (ref 13–17)
IMM GRANULOCYTES NFR BLD AUTO: 0.2 % — SIGNIFICANT CHANGE UP (ref 0–1.5)
LYMPHOCYTES # BLD AUTO: 2.89 K/UL — SIGNIFICANT CHANGE UP (ref 1–3.3)
LYMPHOCYTES # BLD AUTO: 44.5 % — HIGH (ref 13–44)
MCHC RBC-ENTMCNC: 30.2 PG — SIGNIFICANT CHANGE UP (ref 27–34)
MCHC RBC-ENTMCNC: 34.8 G/DL — SIGNIFICANT CHANGE UP (ref 32–36)
MCV RBC AUTO: 86.8 FL — SIGNIFICANT CHANGE UP (ref 80–100)
MONOCYTES # BLD AUTO: 0.5 K/UL — SIGNIFICANT CHANGE UP (ref 0–0.9)
MONOCYTES NFR BLD AUTO: 7.7 % — SIGNIFICANT CHANGE UP (ref 2–14)
NEUTROPHILS # BLD AUTO: 2.47 K/UL — SIGNIFICANT CHANGE UP (ref 1.8–7.4)
NEUTROPHILS NFR BLD AUTO: 38 % — LOW (ref 43–77)
NRBC # BLD: 0 /100 WBCS — SIGNIFICANT CHANGE UP (ref 0–0)
PLATELET # BLD AUTO: 169 K/UL — SIGNIFICANT CHANGE UP (ref 150–400)
RBC # BLD: 4.77 M/UL — SIGNIFICANT CHANGE UP (ref 4.2–5.8)
RBC # FLD: 12.8 % — SIGNIFICANT CHANGE UP (ref 10.3–14.5)
WBC # BLD: 6.49 K/UL — SIGNIFICANT CHANGE UP (ref 3.8–10.5)
WBC # FLD AUTO: 6.49 K/UL — SIGNIFICANT CHANGE UP (ref 3.8–10.5)

## 2022-08-17 PROCEDURE — 99214 OFFICE O/P EST MOD 30 MIN: CPT

## 2022-08-17 NOTE — HISTORY OF PRESENT ILLNESS
[Disease: _____________________] : Disease: [unfilled] [T: ___] : T[unfilled] [N: ___] : N[unfilled] [M: ___] : M[unfilled] [AJCC Stage: ____] : AJCC Stage: [unfilled] [de-identified] : 67 M with no significant past medical history, developed progressive abdominal pain in March 2017. In May 2017 he went to an urgent care center and was referred for a CT A/P w/co. This was performed on 5/8/17 and demonstrated a circumferential 6.7 cm mass at the splenic flexure of the colon causing "apple core" luminal narrowing of the colon at that level c/w malignancy. A 2.7 x 2.2 x 2.5 cm ill defined LN below the pancreatic body at the level of the splenic vein is most consistent with a metastasis. On May 9, 2017, Haja saw Dr. Morgan Armenta, colorectal surgery who performed a colonoscopy on 5/15/17. Biopsy c/w invasive moderately differentiated colonic adenocarcinoma a/w necrosis. A PET/CT was performed on 5/30/17 which showed splenic flexure colonic hypermetabolic mass c/w primary carcinoma (SUV 35) and 2.5 cm mesenteric LN hypermetabolic focus c/w regional metastatic disease (SUV 5). \par \par On 6/13/17 patient underwent a open left colectomy, omentectomy and mobilization of the splenic flexure. Final pathology T3NO moderately differentiated carcinoma, loss of nuclear expression of MLH1 and PMS2. No adjuvant therapy was given. Post op developed SOB, hypoxia transferred to the SICU, placed on bipap. A CTA chest/abd/pelvis 6/15/17 c/w Nonspecific patchy opacity in RLL was noted, 3.4 cm collection or cystic lesion in the pancreatic body for which follow up imaging was suggested. \par \par Post op, Haja continued to complain of abdominal pain and an MRI A/P was performed on 7/25/17 which showed a 4.6 cm hyperintense structure inseparable from the distal pancreatic body. The lesion is located in the peripancreatic retroperitoneal space with imaging characteristics suggestive of pancreatic pseudocyst. He was evaluated by surg onc, Dr. Hager and referred for a CT pancreatic protocol which was performed on 9/5 and showed a peripherally enhancing mass in the body of the pancreas measures 4.6 x 3.5 cm. Subsequently had an EUS with FNA on 10/6/17 (GI- Dr. Luz) which confirmed moderately differentiated adenocarcinoma.\par \par On 11/9/17 underwent open ex lap for possible resection of pancreatic adenoca, however, SMA was encased by tumor and therefore could not be resected. Liver bx was performed and negative for malignancy, however, noted to have steatosis with mild steatohepatitis, port inflammation, focal periportal and pericellular fibrosis,2 + iron deposition. \par \par Patient was subsequently referred to rad onc and med onc for further evaluation. \par \par 12/5/17-3/13/18: 8 cycles of mFOLFIRINOX (25% dose reduction due to PS) with stable/mild improvement in disease \par \par 3/27/18-5/29/18 : C1-C4 Nivo 3mg/kg (240 mg flat dose)/Ipi 1mg/kg \par 5/31/18: CTCAP: decr in size of pancreatic mass, previously 3.6 to 2.5, new mediastinal LN\par 6/12/18: EBUS with bx of LN: reactive \par 6/26/18-: Nivo single agent Q 2 weeks \par 11/5 - 11/19: hospitalized at [facility] with herpes zoster involving the R periorbital area, no ocular involvement. opiods stopped abruptly s/p withdrawal characterized by diarrhea/n/v. Developed NICOLE (2/2 acyclovir?) with resolution. \par 11/30/18: restarted Nivolumab Q 2 weeks\par 12/13/18: last dose of Nivolumab. \par 12/28/18: Nivolumab held due to Grade 2-3 arthralgias/myalgias. Started on Prednisone 40 mg BID taper over 8 weeks.  \par 1/3/19: CTCAP: interval regression of pancreatic mass and mediastinal LN\par 2/8/19: Nivolumab restarted \par 4/3/19: CT CAP: enlargement of mediastinal LN, ground glass opacities b/l \par 4/6/19: Nivolumab d/c due to myalgias and ?pneumonitis \par 6/10/19: CT CAP: stable/improved disease\par 9/26/19: CT CAP: stable pancreatic mass, stable mediastinal LN, new L gynecomastia \par 12/120/19: CT CAP: stable exam, gynecomastia \par 1/22/20: Mammo/US: no masses noted\par 4/27/20: CT CAP: stable exam\par 8/2/20: CT CAP: stable disease \par 10/26/20: CT CAP: stable disease \par 3/18/21: CT CAP: stable disease \par 5/5/21: Colonoscopy: RORY, repeat in 3 yrs \par 1/25/22: CT CAP: stable disease \par 8/5/22: CT CAP: stable disease  [de-identified] : moderately differentiated adenocarcinoma [de-identified] : 11/24/17: CA 19.9 - 81.3  CEA 29 [de-identified] : June 2017 - colon resection of tumor T3N0 (0/15 LN) moderately differentiated, loss of nuclear expression of MLH1, PMS2. \par    - IHC:  CK19, and variably positive for CDX2, CK7 and CK20, \par   + BRAF\par \par Nov 2017- attempted pancreatic resection:  CK7, CK20 and CDX2 are positive; CK20 is negative\par     - loss of nuclear expression of MLH1, PMS2.\par \par [facility] - failed report  [FreeTextEntry1] : surveillance due to inability to tolerate immunotherapy  [de-identified] : Patient presented to the office for routine surveillance visit; he feels fine and reported no acute complaints. He admitted to adequate appetite, stable weight and normal BMs. He denied experiencing any GI related symptoms at this time.

## 2022-09-15 ENCOUNTER — APPOINTMENT (OUTPATIENT)
Dept: ENDOCRINOLOGY | Facility: CLINIC | Age: 67
End: 2022-09-15
Payer: MEDICARE

## 2022-09-15 VITALS
BODY MASS INDEX: 25.76 KG/M2 | OXYGEN SATURATION: 98 % | WEIGHT: 170 LBS | SYSTOLIC BLOOD PRESSURE: 118 MMHG | TEMPERATURE: 98.2 F | HEART RATE: 81 BPM | DIASTOLIC BLOOD PRESSURE: 70 MMHG | HEIGHT: 68 IN

## 2022-09-15 PROCEDURE — 99214 OFFICE O/P EST MOD 30 MIN: CPT

## 2022-09-15 NOTE — ASSESSMENT
[FreeTextEntry1] : 67 year old man with pancreatic cancer  who developed thyroiditis after treatment with immunotherapy, then  hypothyroidism, on levothyroxine tx.\par   -Clinically euthyroid \par  - Check TFTs adjust levothyroxine dose as needed\par \par Hypocortisolism \par - continue hydrocortisone, reviewed sick day management\par \par \par Gynecomastia - macroprolactin was normal, no evidence of malignancy on mammo\par  -does not want surgery.  Has noted cosmetic improvement. continues to be stable\par \par f/u 6 months

## 2022-09-15 NOTE — HISTORY OF PRESENT ILLNESS
[FreeTextEntry1] : cc: hypothyroidism\par \par 67 year old man with Pancreatic cancer, treated with immunotherapy ( previously treated with chemotherapy).  After  4 cycles of ipilimumab/nivolumab, blood tests showed hyperthyroidism. He had no symptoms and continued immunotherapy. Subsequent TSH levels were elevated, then TFTs returned to normal and later pt developed hypothyroidism.  He had been on Nivo every two weeks, stopped in April 2019.  \par \par Currently taking  levothyroxine 100 micrograms daily, separate from food and vitamins, daily. He has been feeling well.  Reports no weight gain, fatigue,  depression, cold intolerance.\par \par \par Also with hypocortisolism - Taking hydrocortisone 10 mg in AM and 5 mg in pm.  No lightheadedness, abdominal pain, has had further intentional weight loss, now wants to maintain current weight\par \par \par Gynecomastia - pt not bothered cosmetically, no symptoms\par \par

## 2022-09-16 ENCOUNTER — NON-APPOINTMENT (OUTPATIENT)
Age: 67
End: 2022-09-16

## 2022-09-16 LAB
T4 FREE SERPL-MCNC: 1.5 NG/DL
TSH SERPL-ACNC: 8.76 UIU/ML

## 2022-09-29 ENCOUNTER — NON-APPOINTMENT (OUTPATIENT)
Age: 67
End: 2022-09-29

## 2022-10-19 NOTE — ASU PATIENT PROFILE, ADULT - PRO INTERPRETER NEED 2
English Posterior Auricular Interpolation Flap Text: A decision was made to reconstruct the defect utilizing an interpolation axial flap and a staged reconstruction.  A telfa template was made of the defect.  This telfa template was then used to outline the posterior auricular interpolation flap.  The donor area for the pedicle flap was then injected with anesthesia.  The flap was excised through the skin and subcutaneous tissue down to the layer of the underlying musculature.  The pedicle flap was carefully excised within this deep plane to maintain its blood supply.  The edges of the donor site were undermined.   The donor site was closed in a primary fashion.  The pedicle was then rotated into position and sutured.  Once the tube was sutured into place, adequate blood supply was confirmed with blanching and refill.  The pedicle was then wrapped with xeroform gauze and dressed appropriately with a telfa and gauze bandage to ensure continued blood supply and protect the attached pedicle.

## 2022-10-24 ENCOUNTER — RESULT REVIEW (OUTPATIENT)
Age: 67
End: 2022-10-24

## 2022-10-24 ENCOUNTER — APPOINTMENT (OUTPATIENT)
Dept: UROLOGY | Facility: CLINIC | Age: 67
End: 2022-10-24
Payer: MEDICARE

## 2022-10-24 VITALS
HEART RATE: 58 BPM | BODY MASS INDEX: 24.25 KG/M2 | SYSTOLIC BLOOD PRESSURE: 150 MMHG | RESPIRATION RATE: 17 BRPM | DIASTOLIC BLOOD PRESSURE: 76 MMHG | WEIGHT: 160 LBS | HEIGHT: 68 IN

## 2022-10-24 PROCEDURE — 99204 OFFICE O/P NEW MOD 45 MIN: CPT

## 2022-10-24 RX ORDER — OXYCODONE AND ACETAMINOPHEN 5; 325 MG/1; MG/1
5-325 TABLET ORAL
Qty: 40 | Refills: 0 | Status: COMPLETED | COMMUNITY
Start: 2020-01-07 | End: 2022-10-24

## 2022-10-24 NOTE — HISTORY OF PRESENT ILLNESS
[FreeTextEntry1] : Patient is a 67 year old man that comes in today for elevated PSA \par Reports that PSA about a month 14 ng/mL, done at [place] \par He then had his PSA repeated about a week ago and it was 5 ng/mL \par MRI of the prostate was ordered  but patient had not had MR done as of yet\par \par Remains on tamsulosin 0.4 mg. He reports stream is good\par Denies any increased frequency or urgency\par Denies any hematuria, dysuria or incontinence. \par Nocturia x 0-1\par \par Denies any family history of prostate cancer\par Denies ever having a biopsy of the prostate \par \par History of colon cancer and pancreatic cancer, \par S/P colectomy 2017\par PATH T3N0 moderately differentiated carcinoma \par 2017 pancreatic mass seen on imaging, unable to surgically resect \par PATH:moderately differentiated adenocarcinoma T4N0M1\par S/P  chemo therapy and immunotherapy with  Nivolumab 2018

## 2022-10-24 NOTE — ASSESSMENT
[FreeTextEntry1] : Elevated PSA: repeat PSA f/t today.\par MRI prostate ordered.\par BPH: continue tamsulosin.\par Will call with results.

## 2022-10-24 NOTE — REVIEW OF SYSTEMS
[Negative] : Heme/Lymph [Painful Pinson] : painful Pinson [Loss of interest] : loss of interest in sexual activity [Genital bacterial infection] : genital bacterial infection [Poor quality erections] : Poor quality erections [No erections] : no erections [see HPI] : see HPI

## 2022-10-24 NOTE — PHYSICAL EXAM
[Normal Appearance] : normal appearance [Well Groomed] : well groomed [Heart Rate And Rhythm] : Heart rate and rhythm were normal [Edema] : no peripheral edema [Exaggerated Use Of Accessory Muscles For Inspiration] : no accessory muscle use [Bowel Sounds] : normal bowel sounds [Abdomen Soft] : soft [Abdomen Tenderness] : non-tender [Urethral Meatus] : meatus normal [Normal Station and Gait] : the gait and station were normal for the patient's age [] : no rash [Oriented To Time, Place, And Person] : oriented to person, place, and time

## 2022-10-28 ENCOUNTER — RESULT REVIEW (OUTPATIENT)
Age: 67
End: 2022-10-28

## 2022-10-28 ENCOUNTER — APPOINTMENT (OUTPATIENT)
Dept: MRI IMAGING | Facility: IMAGING CENTER | Age: 67
End: 2022-10-28

## 2022-10-28 ENCOUNTER — OUTPATIENT (OUTPATIENT)
Dept: OUTPATIENT SERVICES | Facility: HOSPITAL | Age: 67
LOS: 1 days | End: 2022-10-28
Payer: MEDICARE

## 2022-10-28 DIAGNOSIS — Z98.890 OTHER SPECIFIED POSTPROCEDURAL STATES: Chronic | ICD-10-CM

## 2022-10-28 DIAGNOSIS — Z90.49 ACQUIRED ABSENCE OF OTHER SPECIFIED PARTS OF DIGESTIVE TRACT: Chronic | ICD-10-CM

## 2022-10-28 DIAGNOSIS — R97.20 ELEVATED PROSTATE SPECIFIC ANTIGEN [PSA]: ICD-10-CM

## 2022-10-28 DIAGNOSIS — K25.5 CHRONIC OR UNSPECIFIED GASTRIC ULCER WITH PERFORATION: Chronic | ICD-10-CM

## 2022-10-28 PROCEDURE — 72195 MRI PELVIS W/O DYE: CPT

## 2022-10-28 PROCEDURE — 72197 MRI PELVIS W/O & W/DYE: CPT

## 2022-10-28 PROCEDURE — A9585: CPT

## 2022-10-28 PROCEDURE — 76498P: CUSTOM | Mod: 26,MH

## 2022-10-28 PROCEDURE — 72195 MRI PELVIS W/O DYE: CPT | Mod: 26,MH

## 2022-10-28 PROCEDURE — 76498 UNLISTED MR PROCEDURE: CPT

## 2022-11-07 ENCOUNTER — NON-APPOINTMENT (OUTPATIENT)
Age: 67
End: 2022-11-07

## 2022-11-10 ENCOUNTER — OUTPATIENT (OUTPATIENT)
Dept: OUTPATIENT SERVICES | Facility: HOSPITAL | Age: 67
LOS: 1 days | End: 2022-11-10
Payer: MEDICARE

## 2022-11-10 DIAGNOSIS — K25.5 CHRONIC OR UNSPECIFIED GASTRIC ULCER WITH PERFORATION: Chronic | ICD-10-CM

## 2022-11-10 DIAGNOSIS — R97.20 ELEVATED PROSTATE SPECIFIC ANTIGEN [PSA]: ICD-10-CM

## 2022-11-10 DIAGNOSIS — Z98.890 OTHER SPECIFIED POSTPROCEDURAL STATES: Chronic | ICD-10-CM

## 2022-11-10 DIAGNOSIS — Z90.49 ACQUIRED ABSENCE OF OTHER SPECIFIED PARTS OF DIGESTIVE TRACT: Chronic | ICD-10-CM

## 2022-11-10 PROCEDURE — C8001: CPT

## 2022-11-12 LAB
PSA FREE FLD-MCNC: 38 %
PSA FREE SERPL-MCNC: 2.87 NG/ML
PSA SERPL-MCNC: 7.47 NG/ML

## 2022-11-14 ENCOUNTER — OUTPATIENT (OUTPATIENT)
Dept: OUTPATIENT SERVICES | Facility: HOSPITAL | Age: 67
LOS: 1 days | End: 2022-11-14

## 2022-11-14 ENCOUNTER — NON-APPOINTMENT (OUTPATIENT)
Age: 67
End: 2022-11-14

## 2022-11-14 ENCOUNTER — APPOINTMENT (OUTPATIENT)
Dept: INTERNAL MEDICINE | Facility: CLINIC | Age: 67
End: 2022-11-14
Payer: MEDICARE

## 2022-11-14 VITALS
HEIGHT: 67.5 IN | RESPIRATION RATE: 16 BRPM | OXYGEN SATURATION: 99 % | WEIGHT: 166.01 LBS | DIASTOLIC BLOOD PRESSURE: 80 MMHG | TEMPERATURE: 97 F | HEART RATE: 70 BPM | SYSTOLIC BLOOD PRESSURE: 148 MMHG

## 2022-11-14 VITALS
TEMPERATURE: 98 F | RESPIRATION RATE: 12 BRPM | DIASTOLIC BLOOD PRESSURE: 74 MMHG | OXYGEN SATURATION: 98 % | SYSTOLIC BLOOD PRESSURE: 132 MMHG | HEART RATE: 70 BPM

## 2022-11-14 VITALS — WEIGHT: 165 LBS | HEIGHT: 68 IN | BODY MASS INDEX: 25.01 KG/M2

## 2022-11-14 DIAGNOSIS — E03.9 HYPOTHYROIDISM, UNSPECIFIED: ICD-10-CM

## 2022-11-14 DIAGNOSIS — Z98.890 OTHER SPECIFIED POSTPROCEDURAL STATES: Chronic | ICD-10-CM

## 2022-11-14 DIAGNOSIS — M48.9 SPONDYLOPATHY, UNSPECIFIED: ICD-10-CM

## 2022-11-14 DIAGNOSIS — Z90.49 ACQUIRED ABSENCE OF OTHER SPECIFIED PARTS OF DIGESTIVE TRACT: Chronic | ICD-10-CM

## 2022-11-14 DIAGNOSIS — R97.20 ELEVATED PROSTATE SPECIFIC ANTIGEN [PSA]: ICD-10-CM

## 2022-11-14 DIAGNOSIS — I10 ESSENTIAL (PRIMARY) HYPERTENSION: ICD-10-CM

## 2022-11-14 DIAGNOSIS — Z92.21 PERSONAL HISTORY OF ANTINEOPLASTIC CHEMOTHERAPY: Chronic | ICD-10-CM

## 2022-11-14 DIAGNOSIS — K25.5 CHRONIC OR UNSPECIFIED GASTRIC ULCER WITH PERFORATION: Chronic | ICD-10-CM

## 2022-11-14 LAB
ANION GAP SERPL CALC-SCNC: 11 MMOL/L — SIGNIFICANT CHANGE UP (ref 7–14)
BUN SERPL-MCNC: 18 MG/DL — SIGNIFICANT CHANGE UP (ref 7–23)
CALCIUM SERPL-MCNC: 9.3 MG/DL — SIGNIFICANT CHANGE UP (ref 8.4–10.5)
CHLORIDE SERPL-SCNC: 103 MMOL/L — SIGNIFICANT CHANGE UP (ref 98–107)
CO2 SERPL-SCNC: 25 MMOL/L — SIGNIFICANT CHANGE UP (ref 22–31)
CREAT SERPL-MCNC: 1 MG/DL — SIGNIFICANT CHANGE UP (ref 0.5–1.3)
EGFR: 84 ML/MIN/1.73M2 — SIGNIFICANT CHANGE UP
GLUCOSE SERPL-MCNC: 87 MG/DL — SIGNIFICANT CHANGE UP (ref 70–99)
HCT VFR BLD CALC: 40.4 % — SIGNIFICANT CHANGE UP (ref 39–50)
HGB BLD-MCNC: 13.9 G/DL — SIGNIFICANT CHANGE UP (ref 13–17)
MCHC RBC-ENTMCNC: 29.4 PG — SIGNIFICANT CHANGE UP (ref 27–34)
MCHC RBC-ENTMCNC: 34.4 GM/DL — SIGNIFICANT CHANGE UP (ref 32–36)
MCV RBC AUTO: 85.4 FL — SIGNIFICANT CHANGE UP (ref 80–100)
NRBC # BLD: 0 /100 WBCS — SIGNIFICANT CHANGE UP (ref 0–0)
NRBC # FLD: 0 K/UL — SIGNIFICANT CHANGE UP (ref 0–0)
PLATELET # BLD AUTO: 234 K/UL — SIGNIFICANT CHANGE UP (ref 150–400)
POTASSIUM SERPL-MCNC: 4.3 MMOL/L — SIGNIFICANT CHANGE UP (ref 3.5–5.3)
POTASSIUM SERPL-SCNC: 4.3 MMOL/L — SIGNIFICANT CHANGE UP (ref 3.5–5.3)
RBC # BLD: 4.73 M/UL — SIGNIFICANT CHANGE UP (ref 4.2–5.8)
RBC # FLD: 12.7 % — SIGNIFICANT CHANGE UP (ref 10.3–14.5)
SODIUM SERPL-SCNC: 139 MMOL/L — SIGNIFICANT CHANGE UP (ref 135–145)
WBC # BLD: 6.34 K/UL — SIGNIFICANT CHANGE UP (ref 3.8–10.5)
WBC # FLD AUTO: 6.34 K/UL — SIGNIFICANT CHANGE UP (ref 3.8–10.5)

## 2022-11-14 PROCEDURE — 93000 ELECTROCARDIOGRAM COMPLETE: CPT | Mod: 59

## 2022-11-14 PROCEDURE — 99203 OFFICE O/P NEW LOW 30 MIN: CPT | Mod: 25

## 2022-11-14 RX ORDER — MULTIVITAMIN
TABLET ORAL
Qty: 30 | Refills: 3 | Status: COMPLETED | COMMUNITY
Start: 2019-08-16 | End: 2022-11-14

## 2022-11-14 RX ORDER — OXYCODONE HYDROCHLORIDE 5 MG/1
0 TABLET ORAL
Qty: 0 | Refills: 0 | DISCHARGE

## 2022-11-14 RX ORDER — HYDROCORTISONE 20 MG
1 TABLET ORAL
Qty: 0 | Refills: 0 | DISCHARGE

## 2022-11-14 RX ORDER — IBUPROFEN 200 MG
2 TABLET ORAL
Qty: 0 | Refills: 0 | DISCHARGE

## 2022-11-14 RX ORDER — TAMSULOSIN HYDROCHLORIDE 0.4 MG/1
1 CAPSULE ORAL
Qty: 0 | Refills: 0 | DISCHARGE

## 2022-11-14 RX ORDER — METOPROLOL TARTRATE 50 MG
1 TABLET ORAL
Qty: 0 | Refills: 0 | DISCHARGE

## 2022-11-14 RX ORDER — METOPROLOL TARTRATE 50 MG
2 TABLET ORAL
Qty: 0 | Refills: 0 | DISCHARGE

## 2022-11-14 RX ORDER — FINASTERIDE 5 MG/1
1 TABLET, FILM COATED ORAL
Qty: 0 | Refills: 0 | DISCHARGE

## 2022-11-14 RX ORDER — ONDANSETRON 4 MG/1
4 TABLET ORAL
Qty: 60 | Refills: 1 | Status: COMPLETED | COMMUNITY
Start: 2018-12-28 | End: 2022-11-14

## 2022-11-14 RX ORDER — LEVOTHYROXINE SODIUM 125 MCG
1 TABLET ORAL
Qty: 0 | Refills: 0 | DISCHARGE

## 2022-11-14 RX ORDER — PANTOPRAZOLE SODIUM 20 MG/1
1 TABLET, DELAYED RELEASE ORAL
Qty: 0 | Refills: 0 | DISCHARGE

## 2022-11-14 RX ORDER — LOSARTAN POTASSIUM 100 MG/1
1 TABLET, FILM COATED ORAL
Qty: 0 | Refills: 0 | DISCHARGE

## 2022-11-14 RX ORDER — HYDROMORPHONE HYDROCHLORIDE 2 MG/ML
1 INJECTION INTRAMUSCULAR; INTRAVENOUS; SUBCUTANEOUS
Qty: 0 | Refills: 0 | DISCHARGE

## 2022-11-14 RX ORDER — LACTULOSE 10 G/15ML
22.5 SOLUTION ORAL
Qty: 0 | Refills: 0 | DISCHARGE

## 2022-11-14 RX ORDER — ALPRAZOLAM 2 MG/1
TABLET ORAL
Refills: 0 | Status: COMPLETED | COMMUNITY
End: 2022-11-14

## 2022-11-14 RX ORDER — METOPROLOL TARTRATE 50 MG
0 TABLET ORAL
Qty: 0 | Refills: 0 | DISCHARGE

## 2022-11-14 RX ORDER — ACETAMINOPHEN 500 MG
2 TABLET ORAL
Qty: 0 | Refills: 0 | DISCHARGE

## 2022-11-14 RX ORDER — DOCUSATE SODIUM 100 MG
1 CAPSULE ORAL
Qty: 0 | Refills: 0 | DISCHARGE

## 2022-11-14 RX ORDER — SERTRALINE 25 MG/1
1 TABLET, FILM COATED ORAL
Qty: 0 | Refills: 0 | DISCHARGE

## 2022-11-14 RX ORDER — MORPHINE SULFATE 50 MG/1
0 CAPSULE, EXTENDED RELEASE ORAL
Qty: 0 | Refills: 0 | DISCHARGE

## 2022-11-14 RX ORDER — METHADONE HYDROCHLORIDE 5 MG/5ML
5 SOLUTION ORAL
Refills: 0 | Status: COMPLETED | COMMUNITY
End: 2022-11-14

## 2022-11-14 RX ORDER — ALPRAZOLAM 0.25 MG
1 TABLET ORAL
Qty: 0 | Refills: 0 | DISCHARGE

## 2022-11-14 RX ORDER — MIRTAZAPINE 45 MG/1
1 TABLET, ORALLY DISINTEGRATING ORAL
Qty: 0 | Refills: 0 | DISCHARGE

## 2022-11-14 RX ORDER — AMLODIPINE BESYLATE 2.5 MG/1
1 TABLET ORAL
Qty: 0 | Refills: 0 | DISCHARGE

## 2022-11-14 RX ORDER — IBUPROFEN 200 MG
4 TABLET ORAL
Qty: 0 | Refills: 0 | DISCHARGE

## 2022-11-14 NOTE — PHYSICAL EXAM
[No Acute Distress] : no acute distress [Well Nourished] : well nourished [Well Developed] : well developed [Well-Appearing] : well-appearing [Normal Oropharynx] : the oropharynx was normal [No JVD] : no jugular venous distention [No Respiratory Distress] : no respiratory distress  [No Accessory Muscle Use] : no accessory muscle use [Clear to Auscultation] : lungs were clear to auscultation bilaterally [Normal Rate] : normal rate  [Regular Rhythm] : with a regular rhythm [Normal S1, S2] : normal S1 and S2 [No Murmur] : no murmur heard [No Carotid Bruits] : no carotid bruits [No Edema] : there was no peripheral edema [Soft] : abdomen soft [Non Tender] : non-tender [No Focal Deficits] : no focal deficits [Normal Gait] : normal gait [Normal Affect] : the affect was normal [Normal Insight/Judgement] : insight and judgment were intact

## 2022-11-14 NOTE — H&P PST ADULT - PROBLEM SELECTOR PLAN 1
Pt scheduled for surgery on 11/23/22.  Pre-op instructions provided. Pt verbalized understanding.   Pepcid provided for GI prophylaxis.   Order placed for preop COVID PCR testing.   Copy of medical clearance in chart.

## 2022-11-14 NOTE — H&P PST ADULT - HISTORY OF PRESENT ILLNESS
67 year old male with elevated PSA, presents today for presurgical evaluation for .. 67 year old male with elevated PSA, presents today for presurgical evaluation for MRI Ultrasound Fusion Transperineal Prostate Biopsy.

## 2022-11-14 NOTE — H&P PST ADULT - NSICDXPASTSURGICALHX_GEN_ALL_CORE_FT
PAST SURGICAL HISTORY:  H/O cervical spine surgery fusion - 2014 with plates and screws    H/O exploratory laparotomy     History of chemotherapy chest wall port, later removed    S/P colon resection 6/2017

## 2022-11-14 NOTE — H&P PST ADULT - AIRWAY
MRN:[id number]                      After Visit Summary   6/6/2017    Maldonado Mendiola    MRN: [id number]           Visit Information        Provider Department      6/6/2017 8:30 AM Eliezer Fox LICSW [facility] Generic      Your next 10 appointments already scheduled     Jun 19, 2017 10:00 AM CDT   New Sleep Patient with Sumeet Wheatley MD   [facility] [place], Sleep Study ([facility])    [address] [place] [address]   [phone]            Jun 20, 2017  8:30 AM CDT   Return Visit with [facility]zer [facility] ([facility])    [address] [facility] [address]   [phone]            Jul 11, 2017  8:30 AM CDT   Return Visit with NILA Panda   [facility] ([facility])    [address] [facility] [address]   [phone]            Jul 19, 2017  8:30 AM CDT   (Arrive by 8:15 AM)   MR NECK W CONTRAST ANGIOGRAM with [address] MRI ([facility])    [address] [address] [phone]           Take your medicines as usual, unless your doctor tells you not to. Bring a list of your current medicines to your exam (including vitamins, minerals and over-the-counter drugs).  You will be given intravenous contrast for this exam. To prepare:   The day before your exam, drink extra fluids at least six 8-ounce glasses (unless your doctor tells you to restrict your fluids).   Have a blood test (creatinine test) within 30 days of your exam. Go to your clinic or Diagnostic Imaging Department for this test.  The MRI machine uses a strong magnet. Please wear clothes without metal (snaps, zippers). A sweatsuit works well, or we may give you a hospital gown.  Please remove any body piercings and  hair extensions before you arrive. You will also remove watches, jewelry, hairpins, wallets, dentures, partial dental plates and hearing aids. You may wear contact lenses, and you may be able to wear your rings. We have a safe place to keep your personal items, but it is safer to leave them at home.   **IMPORTANT** THE INSTRUCTIONS BELOW ARE ONLY FOR THOSE PATIENTS WHO HAVE BEEN TOLD THEY WILL RECEIVE SEDATION OR GENERAL ANESTHESIA DURING THEIR MRI PROCEDURE:  IF YOU WILL RECEIVE SEDATION (take medicine to help you relax during your exam):   You must get the medicine from your doctor before you arrive. Bring the medicine to the exam. Do not take it at home.   Arrive one hour early. Bring someone who can take you home after the test. Your medicine will make you sleepy. After the exam, you may not drive, take a bus or take a taxi by yourself.   No eating 8 hours before your exam. You may have clear liquids up until 4 hours before your exam. (Clear liquids include water, clear tea, black coffee and fruit juice without pulp.)  IF YOU WILL RECEIVE ANESTHESIA (be asleep for your exam):   Arrive 1 1/2 hours early. Bring someone who can take you home after the test. You may not drive, take a bus or take a taxi by yourself.   No eating 8 hours before your exam. You may have clear liquids up until 4 hours before your exam. (Clear liquids include water, clear tea, black coffee and fruit juice without pulp.)  Please call the Imaging Department at your exam site with any questions.            Jul 19, 2017  9:15 AM CDT   (Arrive by 9:00 AM)   MR HEAD W/O CONTRAST ANGIOGRAM with [address] [facility] MRI ([facility])    [address] [address] [phone]           Take your medicines as usual, unless your doctor tells you not to. Bring a list of your current medicines to your exam (including vitamins, minerals and over-the-counter drugs). Also bring the  results of similar scans you may have had.  Please remove any body piercings and hair extensions before you arrive.  Follow your doctor s orders. If you do not, we may have to postpone your exam.  You will not have contrast for this exam. You do not need to do anything special to prepare.  The MRI machine uses a strong magnet. Please wear clothes without metal (snaps, zippers). A sweatsuit works well, or we may give you a hospital gown.   **IMPORTANT** THE INSTRUCTIONS BELOW ARE ONLY FOR THOSE PATIENTS WHO HAVE BEEN TOLD THEY WILL RECEIVE SEDATION OR GENERAL ANESTHESIA DURING THEIR MRI PROCEDURE:  IF YOU WILL RECEIVE SEDATION (take medicine to help you relax during your exam):   You must get the medicine from your doctor before you arrive. Bring the medicine to the exam. Do not take it at home.   Arrive one hour early. Bring someone who can take you home after the test. Your medicine will make you sleepy. After the exam, you may not drive, take a bus or take a taxi by yourself.   No eating 8 hours before your exam. You may have clear liquids up until 4 hours before your exam. (Clear liquids include water, clear tea, black coffee and fruit juice without pulp.)  IF YOU WILL RECEIVE ANESTHESIA (be asleep for your exam):   Arrive 1 1/2 hours early. Bring someone who can take you home after the test. You may not drive, take a bus or take a taxi by yourself.   No eating 8 hours before your exam. You may have clear liquids up until 4 hours before your exam. (Clear liquids include water, clear tea, black coffee and fruit juice without pulp.)   You will spend four to five hours in the recovery room.  Please call the Imaging Department at your exam site with any questions.            Jul 19, 2017 10:00 AM CDT   (Arrive by 9:45 AM)   MR BRAIN W/O & W CONTRAST with [address] [facility] MRI ([facility])    [address] [address] [phone]           Take  your medicines as usual, unless your doctor tells you not to. Bring a list of your current medicines to your exam (including vitamins, minerals and over-the-counter drugs).  You will be given intravenous contrast for this exam. To prepare:   The day before your exam, drink extra fluids at least six 8-ounce glasses (unless your doctor tells you to restrict your fluids).   Have a blood test (creatinine test) within 30 days of your exam. Go to your clinic or Diagnostic Imaging Department for this test.  The MRI machine uses a strong magnet. Please wear clothes without metal (snaps, zippers). A sweatsuit works well, or we may give you a hospital gown.  Please remove any body piercings and hair extensions before you arrive. You will also remove watches, jewelry, hairpins, wallets, dentures, partial dental plates and hearing aids. You may wear contact lenses, and you may be able to wear your rings. We have a safe place to keep your personal items, but it is safer to leave them at home.   **IMPORTANT** THE INSTRUCTIONS BELOW ARE ONLY FOR THOSE PATIENTS WHO HAVE BEEN TOLD THEY WILL RECEIVE SEDATION OR GENERAL ANESTHESIA DURING THEIR MRI PROCEDURE:  IF YOU WILL RECEIVE SEDATION (take medicine to help you relax during your exam):   You must get the medicine from your doctor before you arrive. Bring the medicine to the exam. Do not take it at home.   Arrive one hour early. Bring someone who can take you home after the test. Your medicine will make you sleepy. After the exam, you may not drive, take a bus or take a taxi by yourself.   No eating 8 hours before your exam. You may have clear liquids up until 4 hours before your exam. (Clear liquids include water, clear tea, black coffee and fruit juice without pulp.)  IF YOU WILL RECEIVE ANESTHESIA (be asleep for your exam):   Arrive 1 1/2 hours early. Bring someone who can take you home after the test. You may not drive, take a bus or take a taxi by yourself.   No eating 8 hours  before your exam. You may have clear liquids up until 4 hours before your exam. (Clear liquids include water, clear tea, black coffee and fruit juice without pulp.)  Please call the Imaging Department at your exam site with any questions.            Jul 25, 2017  9:00 AM CDT   (Arrive by 8:45 AM)   Return Stroke with Franklin Hidalgo MD   [facility] Neurology ([facility])    [address] [place] [address] [phone]            Aug 23, 2017  8:00 AM CDT   (Arrive by 7:45 AM)   Return Visit with Miguel Beauchamp MD   [facility] Physical Medicine and Rehabilitation ([facility])    [address] [place] [address] [phone]              MyChart Information     [employer] gives you secure access to your electronic health record. If you see a primary care provider, you can also send messages to your care team and make appointments. If you have questions, please call your primary care clinic.  If you do not have a primary care provider, please call [phone] and they will assist you.        Care EveryWhere ID     This is your Care EveryWhere ID. This could be used by other organizations to access your [place] medical records  [id number]         normal

## 2022-11-14 NOTE — H&P PST ADULT - NSICDXPASTMEDICALHX_GEN_ALL_CORE_FT
PAST MEDICAL HISTORY:  BPH (benign prostatic hyperplasia)     Buzzing in ear since head injury    Cervical disc disease     Chronic pain neck, lower back; herniated disc cervical spine    Colon cancer 2017    Depression     Enlarged lymph node     Head ache     Hypertension, unspecified type     Hypothyroid     Injury forehead & had sutures ( 2013 )    Leukocytopenia     Obesity     Pancreatic cancer 2017 s/p chemo, radiation, immunotherapy    Smoking quit 2015    Vertigo

## 2022-11-14 NOTE — DATA REVIEWED
[FreeTextEntry1] : Oncology note from 8/2022 reviewed, re: colon and pancreatic Ca, completed 8 cycles of mFOLFIRINOX in 2018, s/p 4 cycles of Nivo/Ipi, Nivolumab q 2 weeks since 6/26/18, now off med since 2-19 due to\par     persistent arthralgias and ?pneumonitis --> currently on surveillance. Last CT CAP without evidence of progression, plan to repeat in 6 months (early Feb 2023).\par  · - last colo was 5/2021, repeat in 2024.

## 2022-11-15 LAB
CULTURE RESULTS: NO GROWTH — SIGNIFICANT CHANGE UP
SPECIMEN SOURCE: SIGNIFICANT CHANGE UP

## 2022-11-16 NOTE — RESULTS/DATA
[] : results reviewed [de-identified] : EKG today: NSR 1st degree AVB, 61 bpm, no acute ST-T wave abnormalities [de-identified] : Oncology note from 8/2022 reviewed, re: colon and pancreatic Ca, completed 8 cycles of mFOLFIRINOX in 2018, s/p 4 cycles of Nivo/Ipi, Nivolumab q 2 weeks since 6/26/18, now off med since 2019 due to persistent arthralgias and pneumonitis, currently on surveillance. Last CT CAP without evidence of progression, plan to repeat in 6 months (early Feb 2023). Last colonoscopy was 5/2021, repeat in 2024. \par

## 2022-11-16 NOTE — ASSESSMENT
[Patient Optimized for Surgery] : Patient optimized for surgery [No Further Testing Recommended] : no further testing recommended [Continue medications as is] : Continue current medications [As per surgery] : as per surgery [FreeTextEntry4] : 68 y/o M with history as reviewed, medically optimized for surgery, pending review of pre-op testing results.\par Addendum 11/16: Preop CBC, BMP and urine culture all ok. No further recommendations.

## 2022-11-16 NOTE — HISTORY OF PRESENT ILLNESS
[de-identified] : MARISOL ALFRED is a 67 year old male with PMHx. Colon CA (s/p colectomy 2017), Pancreatic CA (2017 pancreatic mass, unable to be surgically resected, s/p chemo/RT), Hypothyroidism, Hypocortisolism, Elevated PSA, Former Smoker who presents for new patient comprehensive exam. [No Adverse Anesthesia Reaction] : no adverse anesthesia reaction in self or family member [(Patient denies any chest pain, claudication, dyspnea on exertion, orthopnea, palpitations or syncope)] : Patient denies any chest pain, claudication, dyspnea on exertion, orthopnea, palpitations or syncope [Moderate (4-6 METs)] : Moderate (4-6 METs) [Aortic Stenosis] : no aortic stenosis [Atrial Fibrillation] : no atrial fibrillation [Coronary Artery Disease] : no coronary artery disease [Recent Myocardial Infarction] : no recent myocardial infarction [Implantable Device/Pacemaker] : no implantable device/pacemaker [Asthma] : no asthma [COPD] : no COPD [Sleep Apnea] : no sleep apnea [Smoker] : not a smoker [Chronic Anticoagulation] : no chronic anticoagulation [Chronic Kidney Disease] : no chronic kidney disease [Diabetes] : no diabetes [FreeTextEntry2] : 11/23/2022 [FreeTextEntry1] : Prostate Biopsy under anesthesia [FreeTextEntry3] : Dr. Jayro Rowell [FreeTextEntry4] : MARISOL ALFRED is a 67 year old male with PMHx. Colon CA (s/p colectomy 2017), Pancreatic CA (2017 pancreatic mass, unable to be surgically resected, s/p chemo/RT), Hypothyroidism, Hypocortisolism, Elevated PSA, Former Smoker who presents for new patient/medical clearance for surgery.\par Overall he feels well today, no acute complaints.

## 2022-11-16 NOTE — RESULTS/DATA
[] : results reviewed [de-identified] : EKG today: NSR 1st degree AVB, 61 bpm, no acute ST-T wave abnormalities [de-identified] : Oncology note from 8/2022 reviewed, re: colon and pancreatic Ca, completed 8 cycles of mFOLFIRINOX in 2018, s/p 4 cycles of Nivo/Ipi, Nivolumab q 2 weeks since 6/26/18, now off med since 2019 due to persistent arthralgias and pneumonitis, currently on surveillance. Last CT CAP without evidence of progression, plan to repeat in 6 months (early Feb 2023). Last colonoscopy was 5/2021, repeat in 2024. \par

## 2022-11-16 NOTE — ASSESSMENT
[Patient Optimized for Surgery] : Patient optimized for surgery [No Further Testing Recommended] : no further testing recommended [Continue medications as is] : Continue current medications [As per surgery] : as per surgery [FreeTextEntry4] : 66 y/o M with history as reviewed, medically optimized for surgery, pending review of pre-op testing results.\par Addendum 11/16: Preop CBC, BMP and urine culture all ok. No further recommendations.

## 2022-11-16 NOTE — HISTORY OF PRESENT ILLNESS
[de-identified] : MARISOL ALFRED is a 67 year old male with PMHx. Colon CA (s/p colectomy 2017), Pancreatic CA (2017 pancreatic mass, unable to be surgically resected, s/p chemo/RT), Hypothyroidism, Hypocortisolism, Elevated PSA, Former Smoker who presents for new patient comprehensive exam. [No Adverse Anesthesia Reaction] : no adverse anesthesia reaction in self or family member [(Patient denies any chest pain, claudication, dyspnea on exertion, orthopnea, palpitations or syncope)] : Patient denies any chest pain, claudication, dyspnea on exertion, orthopnea, palpitations or syncope [Moderate (4-6 METs)] : Moderate (4-6 METs) [Aortic Stenosis] : no aortic stenosis [Atrial Fibrillation] : no atrial fibrillation [Coronary Artery Disease] : no coronary artery disease [Recent Myocardial Infarction] : no recent myocardial infarction [Implantable Device/Pacemaker] : no implantable device/pacemaker [COPD] : no COPD [Asthma] : no asthma [Sleep Apnea] : no sleep apnea [Smoker] : not a smoker [Chronic Anticoagulation] : no chronic anticoagulation [Chronic Kidney Disease] : no chronic kidney disease [Diabetes] : no diabetes [FreeTextEntry2] : 11/23/2022 [FreeTextEntry1] : Prostate Biopsy under anesthesia [FreeTextEntry3] : Dr. Jayro Rowell [FreeTextEntry4] : MARISOL ALFRED is a 67 year old male with PMHx. Colon CA (s/p colectomy 2017), Pancreatic CA (2017 pancreatic mass, unable to be surgically resected, s/p chemo/RT), Hypothyroidism, Hypocortisolism, Elevated PSA, Former Smoker who presents for new patient/medical clearance for surgery.\par Overall he feels well today, no acute complaints.

## 2022-11-21 ENCOUNTER — NON-APPOINTMENT (OUTPATIENT)
Age: 67
End: 2022-11-21

## 2022-11-22 ENCOUNTER — NON-APPOINTMENT (OUTPATIENT)
Age: 67
End: 2022-11-22

## 2022-11-22 LAB — SARS-COV-2 N GENE NPH QL NAA+PROBE: DETECTED

## 2022-12-08 ENCOUNTER — TRANSCRIPTION ENCOUNTER (OUTPATIENT)
Age: 67
End: 2022-12-08

## 2022-12-08 LAB — SARS-COV-2 N GENE NPH QL NAA+PROBE: NOT DETECTED

## 2022-12-09 ENCOUNTER — TRANSCRIPTION ENCOUNTER (OUTPATIENT)
Age: 67
End: 2022-12-09

## 2022-12-09 ENCOUNTER — OUTPATIENT (OUTPATIENT)
Dept: OUTPATIENT SERVICES | Facility: HOSPITAL | Age: 67
LOS: 1 days | Discharge: HOME | End: 2022-12-09

## 2022-12-09 ENCOUNTER — APPOINTMENT (OUTPATIENT)
Dept: UROLOGY | Facility: AMBULATORY SURGERY CENTER | Age: 67
End: 2022-12-09

## 2022-12-09 ENCOUNTER — RESULT REVIEW (OUTPATIENT)
Age: 67
End: 2022-12-09

## 2022-12-09 VITALS
RESPIRATION RATE: 16 BRPM | HEIGHT: 67.5 IN | HEART RATE: 57 BPM | SYSTOLIC BLOOD PRESSURE: 149 MMHG | TEMPERATURE: 97 F | OXYGEN SATURATION: 100 % | WEIGHT: 166.01 LBS | DIASTOLIC BLOOD PRESSURE: 71 MMHG

## 2022-12-09 VITALS
SYSTOLIC BLOOD PRESSURE: 108 MMHG | RESPIRATION RATE: 18 BRPM | OXYGEN SATURATION: 98 % | DIASTOLIC BLOOD PRESSURE: 72 MMHG | HEART RATE: 71 BPM

## 2022-12-09 DIAGNOSIS — R97.20 ELEVATED PROSTATE SPECIFIC ANTIGEN [PSA]: ICD-10-CM

## 2022-12-09 DIAGNOSIS — Z92.21 PERSONAL HISTORY OF ANTINEOPLASTIC CHEMOTHERAPY: Chronic | ICD-10-CM

## 2022-12-09 DIAGNOSIS — Z90.49 ACQUIRED ABSENCE OF OTHER SPECIFIED PARTS OF DIGESTIVE TRACT: Chronic | ICD-10-CM

## 2022-12-09 DIAGNOSIS — Z98.890 OTHER SPECIFIED POSTPROCEDURAL STATES: Chronic | ICD-10-CM

## 2022-12-09 PROCEDURE — 55700: CPT | Mod: 22

## 2022-12-09 PROCEDURE — G0416: CPT | Mod: 26

## 2022-12-09 PROCEDURE — 76942 ECHO GUIDE FOR BIOPSY: CPT | Mod: 26,59

## 2022-12-09 RX ORDER — SODIUM CHLORIDE 9 MG/ML
1000 INJECTION, SOLUTION INTRAVENOUS
Refills: 0 | Status: DISCONTINUED | OUTPATIENT
Start: 2022-12-09 | End: 2022-12-23

## 2022-12-09 NOTE — ASU PREOP CHECKLIST - ISOLATION PRECAUTIONS
Problem: Falls - Risk of:  Goal: Will remain free from falls  Description: Will remain free from falls  Outcome: Met This Shift  Goal: Absence of physical injury  Description: Absence of physical injury  Outcome: Met This Shift     Problem: Tissue Perfusion - Cardiopulmonary, Altered:  Goal: Absence of angina  Description: Absence of angina  Outcome: Met This Shift  Goal: Hemodynamic stability will improve  Description: Hemodynamic stability will improve  Outcome: Met This Shift none

## 2022-12-09 NOTE — ASU DISCHARGE PLAN (ADULT/PEDIATRIC) - CARE PROVIDER_API CALL
Jayro Rowell)  Urology  [address] [place] [address], [place] [address]  Phone: [phone]  Fax: [phone]  Established Patient  Follow Up Time: Routine

## 2022-12-09 NOTE — ASU DISCHARGE PLAN (ADULT/PEDIATRIC) - NS MD DC FALL RISK RISK
For information on Fall & Injury Prevention, visit: [email or web address] OR  [email or web address] OR  https://www.cdc.gov/steadi/patient.html

## 2022-12-12 ENCOUNTER — APPOINTMENT (OUTPATIENT)
Dept: UROLOGY | Facility: CLINIC | Age: 67
End: 2022-12-12

## 2022-12-13 LAB — SURGICAL PATHOLOGY STUDY: SIGNIFICANT CHANGE UP

## 2023-01-18 NOTE — PROGRESS NOTE ADULT - SUBJECTIVE AND OBJECTIVE BOX
Cepacol for sore throat. Nasal saline rinse plus flonase twice daily as needed for congestion. Schedule telemed appt if sx do not improve   Chief Complaint: pancreatic ca    INTERVAL HPI/OVERNIGHT EVENTS:    MEDICATIONS  (STANDING):  docusate sodium 100 milliGRAM(s) Oral at bedtime  enoxaparin Injectable 40 milliGRAM(s) SubCutaneous daily  fluorouracil IVPB 2400 milliGRAM(s) IV Intermittent daily  irinotecan IVPB 300 milliGRAM(s) IV Intermittent once  lactulose Syrup 15 Gram(s) Oral daily  losartan 25 milliGRAM(s) Oral daily  methadone    Tablet 10 milliGRAM(s) Oral <User Schedule>  ondansetron  IVPB 16 milliGRAM(s) IV Intermittent daily  OXALIplatin IVPB 140 milliGRAM(s) IV Intermittent once  pantoprazole  Injectable 40 milliGRAM(s) IV Push two times a day  senna 2 Tablet(s) Oral at bedtime    MEDICATIONS  (PRN):  ALPRAZolam 0.5 milliGRAM(s) Oral every 6 hours PRN Anxiety  atropine Injectable 0.25 milliGRAM(s) SubCutaneous once PRN cramps,flushing,diarrhea immediately and during chemo administration  diphenhydrAMINE   Capsule 25 milliGRAM(s) Oral at bedtime PRN Insomnia  HYDROmorphone   Tablet 14 milliGRAM(s) Oral every 3 hours PRN Severe Pain (7 - 10)  metoclopramide Injectable 10 milliGRAM(s) IV Push every 6 hours PRN nausea and or vomiting      Allergies    No Known Allergies    Intolerances        ROS: as above     Vital Signs Last 24 Hrs  T(C): 36.4 (07 Dec 2017 16:32), Max: 37.2 (06 Dec 2017 22:04)  T(F): 97.6 (07 Dec 2017 16:32), Max: 98.9 (06 Dec 2017 22:04)  HR: 83 (07 Dec 2017 16:32) (83 - 98)  BP: 155/87 (07 Dec 2017 16:32) (130/72 - 155/87)  BP(mean): --  RR: 18 (07 Dec 2017 16:32) (18 - 18)  SpO2: 97% (07 Dec 2017 16:32) (94% - 99%)    Physical Exam:   AAO x 3, NAD   RRR S1S2  CTA b/l   soft NTNDBS+  no c/c/e    LABS:                        11.6   10.69 )-----------( 398      ( 07 Dec 2017 08:30 )             33.9     12-07    133<L>  |  95<L>  |  19  ----------------------------<  108<H>  4.2   |  26  |  0.69    Ca    9.6      07 Dec 2017 08:32    TPro  6.8  /  Alb  3.4  /  TBili  0.2  /  DBili  x   /  AST  12  /  ALT  9<L>  /  AlkPhos  81  12-07 Chief Complaint: pancreatic ca    INTERVAL HPI/OVERNIGHT EVENTS: Tolerating 5FU well. Denies any n/v. Eating well. Pain is controlled. Plan to be d/c tomorrow.     MEDICATIONS  (STANDING):  docusate sodium 100 milliGRAM(s) Oral at bedtime  enoxaparin Injectable 40 milliGRAM(s) SubCutaneous daily  fluorouracil IVPB 2400 milliGRAM(s) IV Intermittent daily  irinotecan IVPB 300 milliGRAM(s) IV Intermittent once  lactulose Syrup 15 Gram(s) Oral daily  losartan 25 milliGRAM(s) Oral daily  methadone    Tablet 10 milliGRAM(s) Oral <User Schedule>  ondansetron  IVPB 16 milliGRAM(s) IV Intermittent daily  OXALIplatin IVPB 140 milliGRAM(s) IV Intermittent once  pantoprazole  Injectable 40 milliGRAM(s) IV Push two times a day  senna 2 Tablet(s) Oral at bedtime    MEDICATIONS  (PRN):  ALPRAZolam 0.5 milliGRAM(s) Oral every 6 hours PRN Anxiety  atropine Injectable 0.25 milliGRAM(s) SubCutaneous once PRN cramps,flushing,diarrhea immediately and during chemo administration  diphenhydrAMINE   Capsule 25 milliGRAM(s) Oral at bedtime PRN Insomnia  HYDROmorphone   Tablet 14 milliGRAM(s) Oral every 3 hours PRN Severe Pain (7 - 10)  metoclopramide Injectable 10 milliGRAM(s) IV Push every 6 hours PRN nausea and or vomiting      Allergies    No Known Allergies    Intolerances        ROS: as above     Vital Signs Last 24 Hrs  T(C): 36.4 (07 Dec 2017 16:32), Max: 37.2 (06 Dec 2017 22:04)  T(F): 97.6 (07 Dec 2017 16:32), Max: 98.9 (06 Dec 2017 22:04)  HR: 83 (07 Dec 2017 16:32) (83 - 98)  BP: 155/87 (07 Dec 2017 16:32) (130/72 - 155/87)  BP(mean): --  RR: 18 (07 Dec 2017 16:32) (18 - 18)  SpO2: 97% (07 Dec 2017 16:32) (94% - 99%)    Physical Exam:   AAO x 3, NAD   RRR S1S2  CTA b/l   soft NTNDBS+  no c/c/e    LABS:                        11.6   10.69 )-----------( 398      ( 07 Dec 2017 08:30 )             33.9     12-07    133<L>  |  95<L>  |  19  ----------------------------<  108<H>  4.2   |  26  |  0.69    Ca    9.6      07 Dec 2017 08:32    TPro  6.8  /  Alb  3.4  /  TBili  0.2  /  DBili  x   /  AST  12  /  ALT  9<L>  /  AlkPhos  81  12-07

## 2023-02-03 PROBLEM — C18.9 MALIGNANT NEOPLASM OF COLON, UNSPECIFIED: Chronic | Status: ACTIVE | Noted: 2017-11-29

## 2023-02-03 PROBLEM — D72.819 DECREASED WHITE BLOOD CELL COUNT, UNSPECIFIED: Chronic | Status: ACTIVE | Noted: 2019-12-04

## 2023-02-03 PROBLEM — C25.9 MALIGNANT NEOPLASM OF PANCREAS, UNSPECIFIED: Chronic | Status: ACTIVE | Noted: 2018-06-11

## 2023-02-06 ENCOUNTER — RESULT REVIEW (OUTPATIENT)
Age: 68
End: 2023-02-06

## 2023-02-07 ENCOUNTER — APPOINTMENT (OUTPATIENT)
Dept: CT IMAGING | Facility: IMAGING CENTER | Age: 68
End: 2023-02-07
Payer: MEDICARE

## 2023-02-07 ENCOUNTER — OUTPATIENT (OUTPATIENT)
Dept: OUTPATIENT SERVICES | Facility: HOSPITAL | Age: 68
LOS: 1 days | End: 2023-02-07
Payer: COMMERCIAL

## 2023-02-07 DIAGNOSIS — Z98.890 OTHER SPECIFIED POSTPROCEDURAL STATES: Chronic | ICD-10-CM

## 2023-02-07 DIAGNOSIS — Z92.21 PERSONAL HISTORY OF ANTINEOPLASTIC CHEMOTHERAPY: Chronic | ICD-10-CM

## 2023-02-07 DIAGNOSIS — Z90.49 ACQUIRED ABSENCE OF OTHER SPECIFIED PARTS OF DIGESTIVE TRACT: Chronic | ICD-10-CM

## 2023-02-07 DIAGNOSIS — C25.9 MALIGNANT NEOPLASM OF PANCREAS, UNSPECIFIED: ICD-10-CM

## 2023-02-07 PROCEDURE — 74177 CT ABD & PELVIS W/CONTRAST: CPT

## 2023-02-07 PROCEDURE — 71260 CT THORAX DX C+: CPT

## 2023-02-07 PROCEDURE — 74177 CT ABD & PELVIS W/CONTRAST: CPT | Mod: 26

## 2023-02-07 PROCEDURE — 71260 CT THORAX DX C+: CPT | Mod: 26

## 2023-02-08 ENCOUNTER — OUTPATIENT (OUTPATIENT)
Dept: OUTPATIENT SERVICES | Facility: HOSPITAL | Age: 68
LOS: 1 days | Discharge: HOME | End: 2023-02-08

## 2023-02-08 DIAGNOSIS — C25.9 MALIGNANT NEOPLASM OF PANCREAS, UNSPECIFIED: ICD-10-CM

## 2023-02-08 DIAGNOSIS — Z98.890 OTHER SPECIFIED POSTPROCEDURAL STATES: Chronic | ICD-10-CM

## 2023-02-08 DIAGNOSIS — Z90.49 ACQUIRED ABSENCE OF OTHER SPECIFIED PARTS OF DIGESTIVE TRACT: Chronic | ICD-10-CM

## 2023-02-08 DIAGNOSIS — Z92.21 PERSONAL HISTORY OF ANTINEOPLASTIC CHEMOTHERAPY: Chronic | ICD-10-CM

## 2023-02-15 ENCOUNTER — RESULT REVIEW (OUTPATIENT)
Age: 68
End: 2023-02-15

## 2023-02-15 ENCOUNTER — APPOINTMENT (OUTPATIENT)
Dept: HEMATOLOGY ONCOLOGY | Facility: CLINIC | Age: 68
End: 2023-02-15
Payer: COMMERCIAL

## 2023-02-15 VITALS
OXYGEN SATURATION: 98 % | HEART RATE: 69 BPM | RESPIRATION RATE: 14 BRPM | SYSTOLIC BLOOD PRESSURE: 111 MMHG | HEIGHT: 67.99 IN | DIASTOLIC BLOOD PRESSURE: 72 MMHG | WEIGHT: 157.63 LBS | BODY MASS INDEX: 23.89 KG/M2 | TEMPERATURE: 98.1 F

## 2023-02-15 DIAGNOSIS — R79.89 OTHER SPECIFIED ABNORMAL FINDINGS OF BLOOD CHEMISTRY: ICD-10-CM

## 2023-02-15 LAB
ALBUMIN SERPL ELPH-MCNC: 4.2 G/DL
ALP BLD-CCNC: 57 U/L
ALT SERPL-CCNC: <5 U/L
ANION GAP SERPL CALC-SCNC: 12 MMOL/L
AST SERPL-CCNC: 16 U/L
BASOPHILS # BLD AUTO: 0.11 K/UL — SIGNIFICANT CHANGE UP (ref 0–0.2)
BASOPHILS NFR BLD AUTO: 1.6 % — SIGNIFICANT CHANGE UP (ref 0–2)
BILIRUB SERPL-MCNC: 0.4 MG/DL
BUN SERPL-MCNC: 15 MG/DL
CALCIUM SERPL-MCNC: 9.2 MG/DL
CANCER AG19-9 SERPL-ACNC: 17 U/ML
CEA SERPL-MCNC: 1.7 NG/ML
CHLORIDE SERPL-SCNC: 104 MMOL/L
CO2 SERPL-SCNC: 22 MMOL/L
CREAT SERPL-MCNC: 0.98 MG/DL
EGFR: 86 ML/MIN/1.73M2
EOSINOPHIL # BLD AUTO: 0.63 K/UL — HIGH (ref 0–0.5)
EOSINOPHIL NFR BLD AUTO: 9.3 % — HIGH (ref 0–6)
GLUCOSE SERPL-MCNC: 93 MG/DL
HCT VFR BLD CALC: 37.2 % — LOW (ref 39–50)
HGB BLD-MCNC: 12.8 G/DL — LOW (ref 13–17)
IMM GRANULOCYTES NFR BLD AUTO: 0 % — SIGNIFICANT CHANGE UP (ref 0–0.9)
LYMPHOCYTES # BLD AUTO: 2.61 K/UL — SIGNIFICANT CHANGE UP (ref 1–3.3)
LYMPHOCYTES # BLD AUTO: 38.5 % — SIGNIFICANT CHANGE UP (ref 13–44)
MCHC RBC-ENTMCNC: 29 PG — SIGNIFICANT CHANGE UP (ref 27–34)
MCHC RBC-ENTMCNC: 34.4 G/DL — SIGNIFICANT CHANGE UP (ref 32–36)
MCV RBC AUTO: 84.4 FL — SIGNIFICANT CHANGE UP (ref 80–100)
MONOCYTES # BLD AUTO: 0.51 K/UL — SIGNIFICANT CHANGE UP (ref 0–0.9)
MONOCYTES NFR BLD AUTO: 7.5 % — SIGNIFICANT CHANGE UP (ref 2–14)
NEUTROPHILS # BLD AUTO: 2.92 K/UL — SIGNIFICANT CHANGE UP (ref 1.8–7.4)
NEUTROPHILS NFR BLD AUTO: 43.1 % — SIGNIFICANT CHANGE UP (ref 43–77)
NRBC # BLD: 0 /100 WBCS — SIGNIFICANT CHANGE UP (ref 0–0)
PLATELET # BLD AUTO: 191 K/UL — SIGNIFICANT CHANGE UP (ref 150–400)
POTASSIUM SERPL-SCNC: 4.6 MMOL/L
PROT SERPL-MCNC: 7.4 G/DL
RBC # BLD: 4.41 M/UL — SIGNIFICANT CHANGE UP (ref 4.2–5.8)
RBC # FLD: 12.8 % — SIGNIFICANT CHANGE UP (ref 10.3–14.5)
SODIUM SERPL-SCNC: 138 MMOL/L
T3FREE SERPL-MCNC: 3.57 PG/ML
T4 FREE SERPL-MCNC: 1.6 NG/DL
TSH SERPL-ACNC: 2.21 UIU/ML
WBC # BLD: 6.78 K/UL — SIGNIFICANT CHANGE UP (ref 3.8–10.5)
WBC # FLD AUTO: 6.78 K/UL — SIGNIFICANT CHANGE UP (ref 3.8–10.5)

## 2023-02-15 PROCEDURE — 99214 OFFICE O/P EST MOD 30 MIN: CPT

## 2023-02-15 NOTE — HISTORY OF PRESENT ILLNESS
[Disease: _____________________] : Disease: [unfilled] [T: ___] : T[unfilled] [N: ___] : N[unfilled] [M: ___] : M[unfilled] [AJCC Stage: ____] : AJCC Stage: [unfilled] [de-identified] : 67 M with no significant past medical history, developed progressive abdominal pain in March 2017. In May 2017 he went to an urgent care center and was referred for a CT A/P w/co. This was performed on 5/8/17 and demonstrated a circumferential 6.7 cm mass at the splenic flexure of the colon causing "apple core" luminal narrowing of the colon at that level c/w malignancy. A 2.7 x 2.2 x 2.5 cm ill defined LN below the pancreatic body at the level of the splenic vein is most consistent with a metastasis. On May 9, 2017, Haja saw Dr. Morgan Armenta, colorectal surgery who performed a colonoscopy on 5/15/17. Biopsy c/w invasive moderately differentiated colonic adenocarcinoma a/w necrosis. A PET/CT was performed on 5/30/17 which showed splenic flexure colonic hypermetabolic mass c/w primary carcinoma (SUV 35) and 2.5 cm mesenteric LN hypermetabolic focus c/w regional metastatic disease (SUV 5). \par \par On 6/13/17 patient underwent a open left colectomy, omentectomy and mobilization of the splenic flexure. Final pathology T3NO moderately differentiated carcinoma, loss of nuclear expression of MLH1 and PMS2. No adjuvant therapy was given. Post op developed SOB, hypoxia transferred to the SICU, placed on bipap. A CTA chest/abd/pelvis 6/15/17 c/w Nonspecific patchy opacity in RLL was noted, 3.4 cm collection or cystic lesion in the pancreatic body for which follow up imaging was suggested. \par \par Post op, Haja continued to complain of abdominal pain and an MRI A/P was performed on 7/25/17 which showed a 4.6 cm hyperintense structure inseparable from the distal pancreatic body. The lesion is located in the peripancreatic retroperitoneal space with imaging characteristics suggestive of pancreatic pseudocyst. He was evaluated by surg onc, Dr. Hager and referred for a CT pancreatic protocol which was performed on 9/5 and showed a peripherally enhancing mass in the body of the pancreas measures 4.6 x 3.5 cm. Subsequently had an EUS with FNA on 10/6/17 (GI- Dr. Luz) which confirmed moderately differentiated adenocarcinoma.\par \par On 11/9/17 underwent open ex lap for possible resection of pancreatic adenoca, however, SMA was encased by tumor and therefore could not be resected. Liver bx was performed and negative for malignancy, however, noted to have steatosis with mild steatohepatitis, port inflammation, focal periportal and pericellular fibrosis,2 + iron deposition. \par \par Patient was subsequently referred to rad onc and med onc for further evaluation. \par \par 12/5/17-3/13/18: 8 cycles of mFOLFIRINOX (25% dose reduction due to PS) with stable/mild improvement in disease \par \par 3/27/18-5/29/18 : C1-C4 Nivo 3mg/kg (240 mg flat dose)/Ipi 1mg/kg \par 5/31/18: CTCAP: decr in size of pancreatic mass, previously 3.6 to 2.5, new mediastinal LN\par 6/12/18: EBUS with bx of LN: reactive \par 6/26/18-: Nivo single agent Q 2 weeks \par 11/5 - 11/19: hospitalized at [facility] with herpes zoster involving the R periorbital area, no ocular involvement. opiods stopped abruptly s/p withdrawal characterized by diarrhea/n/v. Developed NICOLE (2/2 acyclovir?) with resolution. \par 11/30/18: restarted Nivolumab Q 2 weeks\par 12/13/18: last dose of Nivolumab. \par 12/28/18: Nivolumab held due to Grade 2-3 arthralgias/myalgias. Started on Prednisone 40 mg BID taper over 8 weeks.  \par 1/3/19: CTCAP: interval regression of pancreatic mass and mediastinal LN\par 2/8/19: Nivolumab restarted \par 4/3/19: CT CAP: enlargement of mediastinal LN, ground glass opacities b/l \par 4/6/19: Nivolumab d/c due to myalgias and ?pneumonitis \par 6/10/19: CT CAP: stable/improved disease\par 9/26/19: CT CAP: stable pancreatic mass, stable mediastinal LN, new L gynecomastia \par 12/120/19: CT CAP: stable exam, gynecomastia \par 1/22/20: Mammo/US: no masses noted\par 4/27/20: CT CAP: stable exam\par 8/2/20: CT CAP: stable disease \par 10/26/20: CT CAP: stable disease \par 3/18/21: CT CAP: stable disease \par 5/5/21: Colonoscopy: RORY, repeat in 3 yrs \par 1/25/22: CT CAP: stable disease \par 8/5/22: CT CAP: stable disease \par 2/7/23: CT CAP: stable disease [de-identified] : moderately differentiated adenocarcinoma [de-identified] : 11/24/17: CA 19.9 - 81.3  CEA 29 [de-identified] : June 2017 - colon resection of tumor T3N0 (0/15 LN) moderately differentiated, loss of nuclear expression of MLH1, PMS2. \par    - IHC:  CK19, and variably positive for CDX2, CK7 and CK20, \par   + BRAF\par \par Nov 2017- attempted pancreatic resection:  CK7, CK20 and CDX2 are positive; CK20 is negative\par     - loss of nuclear expression of MLH1, PMS2.\par \par [facility] - failed report  [FreeTextEntry1] : surveillance due to inability to tolerate immunotherapy  [de-identified] : lost 50 lbs in 1 yr. admits his appetite is poor. he has cut out meat which he attributes to weight loss. Denies any pain, change in BMs, dysphagia. Energy level is stable. Recent CT scans do not show any evidence of cancer. Synthroid was increased in Sept but pt never had f/u blood work.

## 2023-02-17 ENCOUNTER — NON-APPOINTMENT (OUTPATIENT)
Age: 68
End: 2023-02-17

## 2023-02-20 NOTE — H&P PST ADULT - SYMPTOMS
Patient Quality Outreach    Patient is due for the following:   Colon Cancer Screening    Next Steps:   Schedule a Colonoscopy    Type of outreach:    Sent [employer] Video message.    Next Steps:  Reach out within 90 days via [employer] Video.    Max number of attempts reached: No. Will try again in 90 days if patient still on fail list.    Questions for provider review:    None     Deepali Santizo MA  Chart routed to Care Team.         none

## 2023-02-24 ENCOUNTER — NON-APPOINTMENT (OUTPATIENT)
Age: 68
End: 2023-02-24

## 2023-03-09 ENCOUNTER — APPOINTMENT (OUTPATIENT)
Dept: ENDOCRINOLOGY | Facility: CLINIC | Age: 68
End: 2023-03-09
Payer: MEDICARE

## 2023-03-09 VITALS
OXYGEN SATURATION: 97 % | SYSTOLIC BLOOD PRESSURE: 100 MMHG | DIASTOLIC BLOOD PRESSURE: 70 MMHG | HEIGHT: 67.99 IN | BODY MASS INDEX: 23.19 KG/M2 | HEART RATE: 75 BPM | WEIGHT: 153 LBS

## 2023-03-09 PROCEDURE — 99214 OFFICE O/P EST MOD 30 MIN: CPT

## 2023-03-09 RX ORDER — HYDROCORTISONE 10 MG/1
10 TABLET ORAL
Qty: 180 | Refills: 3 | Status: ACTIVE | COMMUNITY
Start: 2019-11-22 | End: 1900-01-01

## 2023-03-09 NOTE — ASSESSMENT
[FreeTextEntry1] : 67 year old man with pancreatic cancer  who developed thyroiditis after treatment with immunotherapy, then  hypothyroidism, on levothyroxine tx.\par   -Clinically euthyroid check tfts and adjust dose as needed\par \par Hypocortisolism \par - Pt with unexplained weight loss.  Will increase hydrocortisone to 15 mg in morning and 5 afternoon.  Reviewed sick day management\par \par \par Gynecomastia - macroprolactin was normal, no evidence of malignancy on mammo\par  -does not want surgery.  Has noted cosmetic improvement. continues to be stable\par \par f/u 6 months

## 2023-03-09 NOTE — HISTORY OF PRESENT ILLNESS
[FreeTextEntry1] : cc: hypothyroidism\par \par 67 year old man with Pancreatic cancer, treated with immunotherapy ( previously treated with chemotherapy).  After  4 cycles of ipilimumab/nivolumab, blood tests showed hyperthyroidism. He had no symptoms and continued immunotherapy. Subsequent TSH levels were elevated, then TFTs returned to normal and later pt developed hypothyroidism.  He had been on Nivo every two weeks, stopped in April 2019.  \par \par He has been  taking  levothyroxine 100 micrograms daily, separate from food and vitamins, daily. He reports  feeling well.  No weight gain, depression, cold intolerance,  fatigue.\par \par \par Also with hypocortisolism - Taking hydrocortisone 10 mg in AM and 5 mg in pm.  No lightheadedness, abdominal pain, +weight loss.  Met with RD.  Reports he has been eating 3 meals a day, and cookies and juice.  \par \par \par Gynecomastia - pt not bothered cosmetically, no symptoms\par \par

## 2023-03-15 LAB
T4 FREE SERPL-MCNC: 1.5 NG/DL
TSH SERPL-ACNC: 1.78 UIU/ML

## 2023-03-17 ENCOUNTER — APPOINTMENT (OUTPATIENT)
Dept: INTERNAL MEDICINE | Facility: CLINIC | Age: 68
End: 2023-03-17
Payer: MEDICARE

## 2023-03-17 VITALS
TEMPERATURE: 98 F | WEIGHT: 154 LBS | HEIGHT: 67.72 IN | SYSTOLIC BLOOD PRESSURE: 120 MMHG | BODY MASS INDEX: 23.61 KG/M2 | DIASTOLIC BLOOD PRESSURE: 80 MMHG | OXYGEN SATURATION: 98 %

## 2023-03-17 DIAGNOSIS — G44.209 TENSION-TYPE HEADACHE, UNSPECIFIED, NOT INTRACTABLE: ICD-10-CM

## 2023-03-17 DIAGNOSIS — J33.0 POLYP OF NASAL CAVITY: ICD-10-CM

## 2023-03-17 DIAGNOSIS — R97.20 ELEVATED PROSTATE, SPECIFIC ANTIGEN [PSA]: ICD-10-CM

## 2023-03-17 DIAGNOSIS — H65.20 CHRONIC SEROUS OTITIS MEDIA, UNSPECIFIED EAR: ICD-10-CM

## 2023-03-17 DIAGNOSIS — M48.061 SPINAL STENOSIS, LUMBAR REGION WITHOUT NEUROGENIC CLAUDICATION: ICD-10-CM

## 2023-03-17 DIAGNOSIS — Z01.818 ENCOUNTER FOR OTHER PREPROCEDURAL EXAMINATION: ICD-10-CM

## 2023-03-17 PROCEDURE — 99214 OFFICE O/P EST MOD 30 MIN: CPT

## 2023-03-17 RX ORDER — LORATADINE 10 MG/1
10 TABLET ORAL
Refills: 0 | Status: ACTIVE | COMMUNITY
Start: 2023-03-17

## 2023-03-17 RX ORDER — FLUTICASONE PROPIONATE 0.5 MG/ML
0.05 LOTION TOPICAL
Refills: 0 | Status: ACTIVE | COMMUNITY
Start: 2023-03-17

## 2023-03-17 NOTE — PHYSICAL EXAM
Airway       Patient location during procedure: OR       Procedure Start/Stop Times: 3/2/2022 2:49 PM  Staff -        CRNA: Aleja Farfan APRN CRNA       Performed By: CRNA  Consent for Airway        Urgency: elective  Indications and Patient Condition       Indications for airway management: anoop-procedural       Induction type:intravenous       Mask difficulty assessment: 1 - vent by mask    Final Airway Details       Final airway type: endotracheal airway       Successful airway: ETT - single  Endotracheal Airway Details        Cuffed: yes       Successful intubation technique: video laryngoscopy       VL Blade Size: Glidescope 3       Grade View of Cords: 1       Adjucts: stylet       Position: Right       Measured from: lips       Bite block used: Oral Airway    Post intubation assessment        Placement verified by: capnometry, equal breath sounds and chest rise        Number of attempts at approach: 1       Number of other approaches attempted: 0       Secured with: plastic tape       Ease of procedure: easy       Dentition: Intact and Unchanged           [No Acute Distress] : no acute distress [Well Nourished] : well nourished [Normal Oropharynx] : the oropharynx was normal [No Accessory Muscle Use] : no accessory muscle use [Clear to Auscultation] : lungs were clear to auscultation bilaterally [Regular Rhythm] : with a regular rhythm [Normal S1, S2] : normal S1 and S2 [No Murmur] : no murmur heard [Soft] : abdomen soft [Non Tender] : non-tender [Non-distended] : non-distended [No Masses] : no abdominal mass palpated [No HSM] : no HSM [Normal Bowel Sounds] : normal bowel sounds [Normal] : the cranial nerves were intact [Sensation Tactile Decrease] : light touch was intact [2+] : left 2+ [Normal Affect] : the affect was normal [Normal Insight/Judgement] : insight and judgment were intact [No Lymphadenopathy] : no lymphadenopathy [Supple] : supple [Thyroid Normal, No Nodules] : the thyroid was normal and there were no nodules present [de-identified] : b/l serous effusion (L>R) ; ?L. nasal polyp at medial aspect of nares [de-identified] : slight erythema of glans penis w/o increased warmth

## 2023-03-17 NOTE — HISTORY OF PRESENT ILLNESS
[FreeTextEntry8] : cc: L. ear pressure, Left nasal complaint\par \par years of L. ear pressure\par 6 months felt L. nasal growth \par \par 2 weeks of L. ear pressure - used Nasonex only for 2 days \par \par had mcneal in L. frontal/occipital side - 8/10 - that resolved with "relaxation" - no pain currently\par no weakness; no f/chills \par baseline paresthesia of L. leg 2/2 lumbar spinal stenosis/degenerative disc \par \par recently saw oncology - going for PET CT for weight loss which pt states is intentional \par \par

## 2023-03-23 ENCOUNTER — APPOINTMENT (OUTPATIENT)
Dept: OTOLARYNGOLOGY | Facility: CLINIC | Age: 68
End: 2023-03-23
Payer: MEDICARE

## 2023-03-23 VITALS
SYSTOLIC BLOOD PRESSURE: 120 MMHG | HEART RATE: 57 BPM | WEIGHT: 154 LBS | TEMPERATURE: 98.1 F | DIASTOLIC BLOOD PRESSURE: 64 MMHG | HEIGHT: 67 IN | BODY MASS INDEX: 24.17 KG/M2

## 2023-03-23 DIAGNOSIS — H61.22 IMPACTED CERUMEN, LEFT EAR: ICD-10-CM

## 2023-03-23 DIAGNOSIS — J34.89 OTHER SPECIFIED DISORDERS OF NOSE AND NASAL SINUSES: ICD-10-CM

## 2023-03-23 DIAGNOSIS — H93.13 TINNITUS, BILATERAL: ICD-10-CM

## 2023-03-23 PROCEDURE — 99204 OFFICE O/P NEW MOD 45 MIN: CPT | Mod: 25

## 2023-03-23 PROCEDURE — 69210 REMOVE IMPACTED EAR WAX UNI: CPT

## 2023-03-23 PROCEDURE — 31231 NASAL ENDOSCOPY DX: CPT

## 2023-03-23 NOTE — HISTORY OF PRESENT ILLNESS
[de-identified] : Patient has a baseline tinnitus in both ears but recently has been hearing a swishing noise in the left ear as if there is water in the ears. When he moves he feels and hears some fluid moving aroudn in the left ear. He did have a fusion of the neck about 5 years ago \par He denies dizziness but he does have issues with hearing as the tinnitus makes it hard to hear. \par He does admit that the noises in the ears have been present for so long he is able to ignore it and sleeps ok \par He has had a lot of nasal crusting and congestion in the left nasal cavity and has been trying to clean it with avail. He denies nosebleeds.  He uses Ayr gel as well

## 2023-03-23 NOTE — REVIEW OF SYSTEMS
[Hearing Loss] : hearing loss [Ear Noises] : ear noises [As Noted in HPI] : as noted in HPI [Nasal Congestion] : nasal congestion [Negative] : Heme/Lymph

## 2023-03-23 NOTE — ASSESSMENT
[FreeTextEntry1] : Patient 67-year-old gentleman diagnosed with pancreatic CA back in 911 volunteer for some voluntary treatment and was successful he is doing great comes in today because he feels some swishing in his left ear which on examination is due to wax which is curetted out resolved his issue.  Improvement understands is no treatment.  Endoscopically he is got some crusting that occurs he is using AYA R gel and he will follow-up with us as needed.  Has a long history of tinnitus that had a full work-up with no

## 2023-03-23 NOTE — CONSULT LETTER
[Dear  ___] : Dear  [unfilled], [Consult Letter:] : I had the pleasure of evaluating your patient, [unfilled]. [Please see my note below.] : Please see my note below. [Consult Closing:] : Thank you very much for allowing me to participate in the care of this patient.  If you have any questions, please do not hesitate to contact me. [Sincerely,] : Sincerely, [FreeTextEntry3] : Binu Marcus MD\par [facility]\par Otolaryngology and Facial Plastics\par Associated Professor, [employer]\par

## 2023-03-23 NOTE — END OF VISIT
[FreeTextEntry3] : I, Dr. Marcus personally performed the evaluation and management (E/M) services including all necessary procedures, for this new patient. That E/M includes conducting the clinically appropriate initial history &/or exam, assessing all conditions, and establishing the plan of care. Today, my RIVER, Zuleika Bryant, was here to observe &/or participate in the visit & follow plan of care established by me.\par \par \par

## 2023-03-26 ENCOUNTER — OUTPATIENT (OUTPATIENT)
Dept: OUTPATIENT SERVICES | Facility: HOSPITAL | Age: 68
LOS: 1 days | End: 2023-03-26
Payer: MEDICARE

## 2023-03-26 ENCOUNTER — APPOINTMENT (OUTPATIENT)
Dept: NUCLEAR MEDICINE | Facility: IMAGING CENTER | Age: 68
End: 2023-03-26
Payer: MEDICARE

## 2023-03-26 DIAGNOSIS — Z98.890 OTHER SPECIFIED POSTPROCEDURAL STATES: Chronic | ICD-10-CM

## 2023-03-26 DIAGNOSIS — Z90.49 ACQUIRED ABSENCE OF OTHER SPECIFIED PARTS OF DIGESTIVE TRACT: Chronic | ICD-10-CM

## 2023-03-26 DIAGNOSIS — R63.4 ABNORMAL WEIGHT LOSS: ICD-10-CM

## 2023-03-26 DIAGNOSIS — C18.9 MALIGNANT NEOPLASM OF COLON, UNSPECIFIED: ICD-10-CM

## 2023-03-26 DIAGNOSIS — C25.9 MALIGNANT NEOPLASM OF PANCREAS, UNSPECIFIED: ICD-10-CM

## 2023-03-26 DIAGNOSIS — Z92.21 PERSONAL HISTORY OF ANTINEOPLASTIC CHEMOTHERAPY: Chronic | ICD-10-CM

## 2023-03-26 PROCEDURE — 78815 PET IMAGE W/CT SKULL-THIGH: CPT

## 2023-03-26 PROCEDURE — 78815 PET IMAGE W/CT SKULL-THIGH: CPT | Mod: 26,PI,MH

## 2023-03-26 PROCEDURE — A9552: CPT

## 2023-03-30 ENCOUNTER — NON-APPOINTMENT (OUTPATIENT)
Age: 68
End: 2023-03-30

## 2023-04-11 ENCOUNTER — NON-APPOINTMENT (OUTPATIENT)
Age: 68
End: 2023-04-11

## 2023-04-11 ENCOUNTER — APPOINTMENT (OUTPATIENT)
Dept: GASTROENTEROLOGY | Facility: CLINIC | Age: 68
End: 2023-04-11
Payer: MEDICARE

## 2023-04-11 DIAGNOSIS — R63.4 ABNORMAL WEIGHT LOSS: ICD-10-CM

## 2023-04-11 DIAGNOSIS — K21.9 GASTRO-ESOPHAGEAL REFLUX DISEASE W/OUT ESOPHAGITIS: ICD-10-CM

## 2023-04-11 DIAGNOSIS — R94.8 ABNORMAL RESULTS OF FUNCTION STUDIES OF OTHER ORGANS AND SYSTEMS: ICD-10-CM

## 2023-04-11 PROCEDURE — 99442: CPT | Mod: 95

## 2023-04-12 ENCOUNTER — OUTPATIENT (OUTPATIENT)
Dept: OUTPATIENT SERVICES | Facility: HOSPITAL | Age: 68
LOS: 1 days | End: 2023-04-12
Payer: MEDICARE

## 2023-04-12 VITALS
WEIGHT: 166.01 LBS | HEIGHT: 68 IN | TEMPERATURE: 98 F | DIASTOLIC BLOOD PRESSURE: 75 MMHG | RESPIRATION RATE: 18 BRPM | HEART RATE: 59 BPM | SYSTOLIC BLOOD PRESSURE: 149 MMHG | OXYGEN SATURATION: 98 %

## 2023-04-12 DIAGNOSIS — Z90.49 ACQUIRED ABSENCE OF OTHER SPECIFIED PARTS OF DIGESTIVE TRACT: Chronic | ICD-10-CM

## 2023-04-12 DIAGNOSIS — Z92.21 PERSONAL HISTORY OF ANTINEOPLASTIC CHEMOTHERAPY: Chronic | ICD-10-CM

## 2023-04-12 DIAGNOSIS — Z98.890 OTHER SPECIFIED POSTPROCEDURAL STATES: Chronic | ICD-10-CM

## 2023-04-12 DIAGNOSIS — R63.4 ABNORMAL WEIGHT LOSS: ICD-10-CM

## 2023-04-12 DIAGNOSIS — Z01.818 ENCOUNTER FOR OTHER PREPROCEDURAL EXAMINATION: ICD-10-CM

## 2023-04-12 DIAGNOSIS — R94.8 ABNORMAL RESULTS OF FUNCTION STUDIES OF OTHER ORGANS AND SYSTEMS: ICD-10-CM

## 2023-04-12 LAB
ANION GAP SERPL CALC-SCNC: 10 MMOL/L — SIGNIFICANT CHANGE UP (ref 5–17)
BUN SERPL-MCNC: 22 MG/DL — SIGNIFICANT CHANGE UP (ref 7–23)
CALCIUM SERPL-MCNC: 10 MG/DL — SIGNIFICANT CHANGE UP (ref 8.4–10.5)
CHLORIDE SERPL-SCNC: 102 MMOL/L — SIGNIFICANT CHANGE UP (ref 96–108)
CO2 SERPL-SCNC: 27 MMOL/L — SIGNIFICANT CHANGE UP (ref 22–31)
CREAT SERPL-MCNC: 0.88 MG/DL — SIGNIFICANT CHANGE UP (ref 0.5–1.3)
EGFR: 95 ML/MIN/1.73M2 — SIGNIFICANT CHANGE UP
GLUCOSE SERPL-MCNC: 85 MG/DL — SIGNIFICANT CHANGE UP (ref 70–99)
HCT VFR BLD CALC: 39.8 % — SIGNIFICANT CHANGE UP (ref 39–50)
HGB BLD-MCNC: 13.1 G/DL — SIGNIFICANT CHANGE UP (ref 13–17)
MCHC RBC-ENTMCNC: 29.4 PG — SIGNIFICANT CHANGE UP (ref 27–34)
MCHC RBC-ENTMCNC: 32.9 GM/DL — SIGNIFICANT CHANGE UP (ref 32–36)
MCV RBC AUTO: 89.4 FL — SIGNIFICANT CHANGE UP (ref 80–100)
NRBC # BLD: 0 /100 WBCS — SIGNIFICANT CHANGE UP (ref 0–0)
PLATELET # BLD AUTO: 189 K/UL — SIGNIFICANT CHANGE UP (ref 150–400)
POTASSIUM SERPL-MCNC: 5.2 MMOL/L — SIGNIFICANT CHANGE UP (ref 3.5–5.3)
POTASSIUM SERPL-SCNC: 5.2 MMOL/L — SIGNIFICANT CHANGE UP (ref 3.5–5.3)
RBC # BLD: 4.45 M/UL — SIGNIFICANT CHANGE UP (ref 4.2–5.8)
RBC # FLD: 14.4 % — SIGNIFICANT CHANGE UP (ref 10.3–14.5)
SODIUM SERPL-SCNC: 139 MMOL/L — SIGNIFICANT CHANGE UP (ref 135–145)
WBC # BLD: 7.83 K/UL — SIGNIFICANT CHANGE UP (ref 3.8–10.5)
WBC # FLD AUTO: 7.83 K/UL — SIGNIFICANT CHANGE UP (ref 3.8–10.5)

## 2023-04-12 PROCEDURE — 80048 BASIC METABOLIC PNL TOTAL CA: CPT

## 2023-04-12 PROCEDURE — 36415 COLL VENOUS BLD VENIPUNCTURE: CPT

## 2023-04-12 PROCEDURE — G0463: CPT

## 2023-04-12 PROCEDURE — 85027 COMPLETE CBC AUTOMATED: CPT

## 2023-04-12 NOTE — H&P PST ADULT - ASSESSMENT
DASI score: 5.29 mets   DASI activity: able to walk 4 blocks, climb 1 flight  Loose teeth or denture: upper and lower dentures     MP 2

## 2023-04-12 NOTE — H&P PST ADULT - HISTORY OF PRESENT ILLNESS
68 yo M former smoker with PMHx significant for Colon CA (s/p colectomy 2017), Pancreatic CA (2017 pancreatic mass, unable to be surgically resected, s/p chemo/RT/immunotherapy), Hypothyroidism, Hypocortisolism, BPH, sp DDD, cervical fusion, and HTN who reports weight loss of > 50 lbs over 8 months. He is scheduled for EGD Colonoscopy on 4/20/23.     Denies Recent travel, Exposure or Covid symptoms

## 2023-04-20 ENCOUNTER — APPOINTMENT (OUTPATIENT)
Dept: GASTROENTEROLOGY | Facility: HOSPITAL | Age: 68
End: 2023-04-20

## 2023-04-20 ENCOUNTER — TRANSCRIPTION ENCOUNTER (OUTPATIENT)
Age: 68
End: 2023-04-20

## 2023-04-20 ENCOUNTER — RESULT REVIEW (OUTPATIENT)
Age: 68
End: 2023-04-20

## 2023-04-20 ENCOUNTER — OUTPATIENT (OUTPATIENT)
Dept: OUTPATIENT SERVICES | Facility: HOSPITAL | Age: 68
LOS: 1 days | End: 2023-04-20
Payer: MEDICARE

## 2023-04-20 VITALS
RESPIRATION RATE: 17 BRPM | TEMPERATURE: 98 F | DIASTOLIC BLOOD PRESSURE: 70 MMHG | HEART RATE: 62 BPM | WEIGHT: 160.06 LBS | SYSTOLIC BLOOD PRESSURE: 164 MMHG | HEIGHT: 65 IN | OXYGEN SATURATION: 100 %

## 2023-04-20 VITALS
DIASTOLIC BLOOD PRESSURE: 67 MMHG | RESPIRATION RATE: 20 BRPM | HEART RATE: 55 BPM | OXYGEN SATURATION: 98 % | SYSTOLIC BLOOD PRESSURE: 150 MMHG

## 2023-04-20 DIAGNOSIS — Z90.49 ACQUIRED ABSENCE OF OTHER SPECIFIED PARTS OF DIGESTIVE TRACT: Chronic | ICD-10-CM

## 2023-04-20 DIAGNOSIS — Z98.890 OTHER SPECIFIED POSTPROCEDURAL STATES: Chronic | ICD-10-CM

## 2023-04-20 DIAGNOSIS — R63.4 ABNORMAL WEIGHT LOSS: ICD-10-CM

## 2023-04-20 DIAGNOSIS — R94.8 ABNORMAL RESULTS OF FUNCTION STUDIES OF OTHER ORGANS AND SYSTEMS: ICD-10-CM

## 2023-04-20 DIAGNOSIS — Z92.21 PERSONAL HISTORY OF ANTINEOPLASTIC CHEMOTHERAPY: Chronic | ICD-10-CM

## 2023-04-20 PROCEDURE — 45385 COLONOSCOPY W/LESION REMOVAL: CPT

## 2023-04-20 PROCEDURE — 43239 EGD BIOPSY SINGLE/MULTIPLE: CPT

## 2023-04-20 PROCEDURE — 88305 TISSUE EXAM BY PATHOLOGIST: CPT

## 2023-04-20 PROCEDURE — 45380 COLONOSCOPY AND BIOPSY: CPT | Mod: XU

## 2023-04-20 PROCEDURE — 88305 TISSUE EXAM BY PATHOLOGIST: CPT | Mod: 26

## 2023-04-20 PROCEDURE — 88304 TISSUE EXAM BY PATHOLOGIST: CPT

## 2023-04-20 PROCEDURE — 88304 TISSUE EXAM BY PATHOLOGIST: CPT | Mod: 26

## 2023-04-20 PROCEDURE — 45380 COLONOSCOPY AND BIOPSY: CPT | Mod: XS

## 2023-04-20 DEVICE — KIT A-LINE 1LUM 20G X 12CM SAFE KIT: Type: IMPLANTABLE DEVICE | Status: FUNCTIONAL

## 2023-04-20 DEVICE — CATH THERMODIL PACE 7.5FR: Type: IMPLANTABLE DEVICE | Status: FUNCTIONAL

## 2023-04-20 DEVICE — KIT CVC 2LUM MAC 9FR CHG: Type: IMPLANTABLE DEVICE | Status: FUNCTIONAL

## 2023-04-20 RX ORDER — METOPROLOL TARTRATE 50 MG
1 TABLET ORAL
Qty: 0 | Refills: 0 | DISCHARGE

## 2023-04-20 RX ORDER — SERTRALINE 25 MG/1
1 TABLET, FILM COATED ORAL
Qty: 0 | Refills: 0 | DISCHARGE

## 2023-04-20 RX ORDER — TAMSULOSIN HYDROCHLORIDE 0.4 MG/1
1 CAPSULE ORAL
Qty: 0 | Refills: 0 | DISCHARGE

## 2023-04-20 RX ORDER — SODIUM CHLORIDE 9 MG/ML
500 INJECTION INTRAMUSCULAR; INTRAVENOUS; SUBCUTANEOUS
Refills: 0 | Status: DISCONTINUED | OUTPATIENT
Start: 2023-04-20 | End: 2023-05-05

## 2023-04-20 RX ORDER — HYDROCORTISONE 20 MG
1 TABLET ORAL
Refills: 0 | DISCHARGE

## 2023-04-20 RX ORDER — LEVOTHYROXINE SODIUM 125 MCG
1 TABLET ORAL
Qty: 0 | Refills: 0 | DISCHARGE

## 2023-04-20 RX ORDER — LIDOCAINE HCL 20 MG/ML
4 VIAL (ML) INJECTION ONCE
Refills: 0 | Status: DISCONTINUED | OUTPATIENT
Start: 2023-04-20 | End: 2023-05-05

## 2023-04-20 NOTE — PRE PROCEDURE NOTE - PRE PROCEDURE EVALUATION
Attending Physician:              Raulito Tee MD MSEd    Indication for Procedure          abnormal pet ct      PAST MEDICAL & SURGICAL HISTORY:  Head ache      Obesity      Injury  forehead & had sutures ( 2013 )      Vertigo      Cervical disc disease      Smoking  quit 2015      Hypertension, unspecified type      Colon cancer  2017      Pancreatic cancer  2017 s/p chemo, radiation, immunotherapy      Enlarged lymph node      Chronic pain  neck, lower back; herniated disc cervical spine      Buzzing in ear  since head injury      Depression      BPH (benign prostatic hyperplasia)      Leukocytopenia      Hypothyroid      H/O cervical spine surgery  fusion - 2014 with plates and screws      S/P colon resection  6/2017      H/O exploratory laparotomy      History of chemotherapy  chest wall port, later removed            See Allscripts Note for further details  ALLERGIES:  No Known Allergies    HOME MEDICATIONS:  hydrocortisone 10 mg oral tablet: 1 orally once a day  levothyroxine 100 mcg (0.1 mg) oral tablet: 1 tab(s) orally once a day  metoprolol tartrate 25 mg oral tablet: 1 tab(s) orally 2 times a day  multivitamin: 1 tab(s) orally once a day. last dose 11/14/2022  sertraline 50 mg oral tablet: 1 tab(s) orally once a day (at bedtime)  tamsulosin 0.4 mg oral capsule: 1 cap(s) orally once a day am      See Allscripts Note for further details    AICD/PPM: [ ] yes   [x ] no    PERTINENT LAB DATA:                      PHYSICAL EXAMINATION:    Height (cm): 165.1  Weight (kg): 72.6  BMI (kg/m2): 26.6  BSA (m2): 1.8T(C): --  HR: --  BP: --  RR: --  SpO2: --    Constitutional: NAD  HEENT: PERRLA, EOMI,    Neck:  No JVD  Respiratory: CTAB/L  Cardiovascular: S1 and S2  Gastrointestinal: BS+, soft, NT/ND  Extremities: No peripheral edema  Neurological: A/O x 3, no focal deficits  Psychiatric: Normal mood, normal affect  Skin: No rashes    ASA Class: I [ ]  II [ ]  III [ x]  IV [ ]    COMMENTS:    The patient is a suitable candidate for the planned procedure unless box checked [ ]  No, explain:

## 2023-04-20 NOTE — ASU PATIENT PROFILE, ADULT - FALL HARM RISK - TYPE OF ASSESSMENT
1125: Discharge instructions reviewed via phone with permission with Nell Hamilton point of contact. Opportunity for questions provided. Admission

## 2023-04-20 NOTE — ASU DISCHARGE PLAN (ADULT/PEDIATRIC) - ASU DISCHARGE DATE/TIME

## 2023-04-20 NOTE — ASU DISCHARGE PLAN (ADULT/PEDIATRIC) - NS MD DC FALL RISK RISK
For information on Fall & Injury Prevention, visit: [email or web address] OR  [email or web address] OR  https://www.cdc.gov/steadi/patient.html

## 2023-04-27 LAB — SURGICAL PATHOLOGY STUDY: SIGNIFICANT CHANGE UP

## 2023-04-28 ENCOUNTER — NON-APPOINTMENT (OUTPATIENT)
Age: 68
End: 2023-04-28

## 2023-04-28 NOTE — DISCHARGE NOTE ADULT - LEARNING BEHAVIORAL ACTIVITIES TO COPE WITH URGES. FOR EXAMPLE, DISTRACTION AND CHANGING ROUTINES.
FYI  MRI schedule for today 4/28/23 of the brain at [place]. Unable to get pt. In till 5/1/23 for cervical MRI. [facility] called back and states they can get pt. In for cervical MRI tomorrow but  Will not be able to read it until Monday. Pt. Notified in office of appt. Details.    Statement Selected

## 2023-05-30 NOTE — ASU DISCHARGE PLAN (ADULT/PEDIATRIC) - HAVE YOU HAD A FIRST COVID-19 BOOSTER?
Patient had NT change today with Dr. Eduardo Ledesma. VSS. Dressing in place, CDI to lower left back. Small amount of clear yellow urine in drainage bag. Patient tolerating po intake. Recovery time completed. Patient ambulated in room and in cedeno, tolerated well. Discharge instructions reviewed. IV D/C'd. Patient discharged to [facility] by wheelchair with belongings. Patient's sister is . Yes

## 2023-06-08 NOTE — PROGRESS NOTE ADULT - SKIN
Problem: Pneumonia  Goal: Resolution of Infection Signs and Symptoms  Outcome: Progressing  Intervention: Prevent Infection Progression  Recent Flowsheet Documentation  Taken 6/8/2023 1136 by Tiffany Soto RN  Isolation Precautions: contact precautions maintained    Pt is non verbal. Unable to assess orientation. Family by bedside and assisting with communication and answering questions. VSS. Lung sounds coarse with crackles. RT following and performing neb treatments. Will continue to monitor and intervene as needed. Bed bound per baseline. Has R sided weakness and contractions to extremities.  GJ tube in place. Holding off on feedings d/t aspiration pneumonia. Speech consulted and will follow up for further plan of care. NPO. Purewick don.   
No lesions; no rash

## 2023-06-30 NOTE — PHYSICAL THERAPY INITIAL EVALUATION ADULT - LEVEL OF INDEPENDENCE: SIT/STAND, REHAB EVAL
Psychoeducation Group Note    PATIENT'S NAME: Randi Cleary  MRN:   [id number]  :   1951  ACCT. NUMBER: [id number]  DATE OF SERVICE: 23  START TIME:  2:00 PM  END TIME:  2:50 PM  FACILITATOR: Orly Durant LICSW  TOPIC:  Wellness Group: Health Maintenance  [facility]  TRACK: Clic 1    NUMBER OF PARTICIPANTS: 3    Summary of Group / Topics Discussed:  Health Maintenance: Weekend planning: Patients were given time to complete a weekend plan of what they will do to promote wellness and sobriety over the weekend when they do not have the structure of group. Patients were encouraged to review progress on their treatment goals and were challenged to identify ways to work toward meeting them. Patients identified and discussed foreseeable barriers to success over the weekend and then developed a plan to overcome them. Patients reviewed their distress coping skills and social support network and discussed this with the group.       Patient Session Goals / Objectives:    ?    Identified activities to engage in that promote balance in wellness  ?    Distinguished possible barriers to success over the weekend and created a plan to overcome them  ?    Listed distress coping skills and identified social support network to utilize if in crisis during the weekend        Patient Participation / Response:  Fully participated with the group by sharing personal reflections / insights and openly received / provided feedback with other participants.    Demonstrated understanding of topics discussed through group discussion and participation    Treatment Plan:  Patient has a current master individualized treatment plan.  See Epic treatment plan for more information.    ASHKAN Reeves       supervision/independent

## 2023-07-02 ENCOUNTER — NON-APPOINTMENT (OUTPATIENT)
Age: 68
End: 2023-07-02

## 2023-08-07 ENCOUNTER — OUTPATIENT (OUTPATIENT)
Dept: OUTPATIENT SERVICES | Facility: HOSPITAL | Age: 68
LOS: 1 days | Discharge: HOME | End: 2023-08-07

## 2023-08-07 DIAGNOSIS — C25.9 MALIGNANT NEOPLASM OF PANCREAS, UNSPECIFIED: ICD-10-CM

## 2023-08-07 DIAGNOSIS — Z98.890 OTHER SPECIFIED POSTPROCEDURAL STATES: Chronic | ICD-10-CM

## 2023-08-07 DIAGNOSIS — Z92.21 PERSONAL HISTORY OF ANTINEOPLASTIC CHEMOTHERAPY: Chronic | ICD-10-CM

## 2023-08-07 DIAGNOSIS — Z90.49 ACQUIRED ABSENCE OF OTHER SPECIFIED PARTS OF DIGESTIVE TRACT: Chronic | ICD-10-CM

## 2023-08-14 ENCOUNTER — APPOINTMENT (OUTPATIENT)
Dept: HEMATOLOGY ONCOLOGY | Facility: CLINIC | Age: 68
End: 2023-08-14
Payer: MEDICARE

## 2023-08-14 ENCOUNTER — RESULT REVIEW (OUTPATIENT)
Age: 68
End: 2023-08-14

## 2023-08-14 VITALS
OXYGEN SATURATION: 98 % | HEART RATE: 70 BPM | SYSTOLIC BLOOD PRESSURE: 146 MMHG | BODY MASS INDEX: 32.11 KG/M2 | WEIGHT: 205 LBS | TEMPERATURE: 97.3 F | DIASTOLIC BLOOD PRESSURE: 76 MMHG | RESPIRATION RATE: 16 BRPM

## 2023-08-14 DIAGNOSIS — R63.4 ABNORMAL WEIGHT LOSS: ICD-10-CM

## 2023-08-14 LAB
BASOPHILS # BLD AUTO: 0.08 K/UL — SIGNIFICANT CHANGE UP (ref 0–0.2)
BASOPHILS NFR BLD AUTO: 1.1 % — SIGNIFICANT CHANGE UP (ref 0–2)
EOSINOPHIL # BLD AUTO: 0.32 K/UL — SIGNIFICANT CHANGE UP (ref 0–0.5)
EOSINOPHIL NFR BLD AUTO: 4.2 % — SIGNIFICANT CHANGE UP (ref 0–6)
HCT VFR BLD CALC: 40.5 % — SIGNIFICANT CHANGE UP (ref 39–50)
HGB BLD-MCNC: 13.9 G/DL — SIGNIFICANT CHANGE UP (ref 13–17)
IMM GRANULOCYTES NFR BLD AUTO: 0.4 % — SIGNIFICANT CHANGE UP (ref 0–0.9)
LYMPHOCYTES # BLD AUTO: 2.13 K/UL — SIGNIFICANT CHANGE UP (ref 1–3.3)
LYMPHOCYTES # BLD AUTO: 28.3 % — SIGNIFICANT CHANGE UP (ref 13–44)
MCHC RBC-ENTMCNC: 29.8 PG — SIGNIFICANT CHANGE UP (ref 27–34)
MCHC RBC-ENTMCNC: 34.3 G/DL — SIGNIFICANT CHANGE UP (ref 32–36)
MCV RBC AUTO: 86.9 FL — SIGNIFICANT CHANGE UP (ref 80–100)
MONOCYTES # BLD AUTO: 0.4 K/UL — SIGNIFICANT CHANGE UP (ref 0–0.9)
MONOCYTES NFR BLD AUTO: 5.3 % — SIGNIFICANT CHANGE UP (ref 2–14)
NEUTROPHILS # BLD AUTO: 4.57 K/UL — SIGNIFICANT CHANGE UP (ref 1.8–7.4)
NEUTROPHILS NFR BLD AUTO: 60.7 % — SIGNIFICANT CHANGE UP (ref 43–77)
NRBC # BLD: 0 /100 WBCS — SIGNIFICANT CHANGE UP (ref 0–0)
PLATELET # BLD AUTO: 187 K/UL — SIGNIFICANT CHANGE UP (ref 150–400)
RBC # BLD: 4.66 M/UL — SIGNIFICANT CHANGE UP (ref 4.2–5.8)
RBC # FLD: 12.3 % — SIGNIFICANT CHANGE UP (ref 10.3–14.5)
WBC # BLD: 7.53 K/UL — SIGNIFICANT CHANGE UP (ref 3.8–10.5)
WBC # FLD AUTO: 7.53 K/UL — SIGNIFICANT CHANGE UP (ref 3.8–10.5)

## 2023-08-14 PROCEDURE — 99213 OFFICE O/P EST LOW 20 MIN: CPT

## 2023-08-14 NOTE — HISTORY OF PRESENT ILLNESS
[Disease: _____________________] : Disease: [unfilled] [T: ___] : T[unfilled] [N: ___] : N[unfilled] [M: ___] : M[unfilled] [AJCC Stage: ____] : AJCC Stage: [unfilled] [de-identified] : 67 M with no significant past medical history, developed progressive abdominal pain in March 2017. In May 2017 he went to an urgent care center and was referred for a CT A/P w/co. This was performed on 5/8/17 and demonstrated a circumferential 6.7 cm mass at the splenic flexure of the colon causing "apple core" luminal narrowing of the colon at that level c/w malignancy. A 2.7 x 2.2 x 2.5 cm ill defined LN below the pancreatic body at the level of the splenic vein is most consistent with a metastasis. On May 9, 2017, Haja saw Dr. Morgan Armenta, colorectal surgery who performed a colonoscopy on 5/15/17. Biopsy c/w invasive moderately differentiated colonic adenocarcinoma a/w necrosis. A PET/CT was performed on 5/30/17 which showed splenic flexure colonic hypermetabolic mass c/w primary carcinoma (SUV 35) and 2.5 cm mesenteric LN hypermetabolic focus c/w regional metastatic disease (SUV 5). \par  \par  On 6/13/17 patient underwent a open left colectomy, omentectomy and mobilization of the splenic flexure. Final pathology T3NO moderately differentiated carcinoma, loss of nuclear expression of MLH1 and PMS2. No adjuvant therapy was given. Post op developed SOB, hypoxia transferred to the SICU, placed on bipap. A CTA chest/abd/pelvis 6/15/17 c/w Nonspecific patchy opacity in RLL was noted, 3.4 cm collection or cystic lesion in the pancreatic body for which follow up imaging was suggested. \par  \par  Post op, Haja continued to complain of abdominal pain and an MRI A/P was performed on 7/25/17 which showed a 4.6 cm hyperintense structure inseparable from the distal pancreatic body. The lesion is located in the peripancreatic retroperitoneal space with imaging characteristics suggestive of pancreatic pseudocyst. He was evaluated by surg onc, Dr. Hager and referred for a CT pancreatic protocol which was performed on 9/5 and showed a peripherally enhancing mass in the body of the pancreas measures 4.6 x 3.5 cm. Subsequently had an EUS with FNA on 10/6/17 (GI- Dr. Luz) which confirmed moderately differentiated adenocarcinoma.\par  \par  On 11/9/17 underwent open ex lap for possible resection of pancreatic adenoca, however, SMA was encased by tumor and therefore could not be resected. Liver bx was performed and negative for malignancy, however, noted to have steatosis with mild steatohepatitis, port inflammation, focal periportal and pericellular fibrosis,2 + iron deposition. \par  \par  Patient was subsequently referred to rad onc and med onc for further evaluation. \par  \par  12/5/17-3/13/18: 8 cycles of mFOLFIRINOX (25% dose reduction due to PS) with stable/mild improvement in disease \par  \par  3/27/18-5/29/18 : C1-C4 Nivo 3mg/kg (240 mg flat dose)/Ipi 1mg/kg \par  5/31/18: CTCAP: decr in size of pancreatic mass, previously 3.6 to 2.5, new mediastinal LN\par  6/12/18: EBUS with bx of LN: reactive \par  6/26/18-: Nivo single agent Q 2 weeks \par  11/5 - 11/19: hospitalized at [facility] with herpes zoster involving the R periorbital area, no ocular involvement. opiods stopped abruptly s/p withdrawal characterized by diarrhea/n/v. Developed NICOLE (2/2 acyclovir?) with resolution. \par  11/30/18: restarted Nivolumab Q 2 weeks\par  12/13/18: last dose of Nivolumab. \par  12/28/18: Nivolumab held due to Grade 2-3 arthralgias/myalgias. Started on Prednisone 40 mg BID taper over 8 weeks.  \par  1/3/19: CTCAP: interval regression of pancreatic mass and mediastinal LN\par  2/8/19: Nivolumab restarted \par  4/3/19: CT CAP: enlargement of mediastinal LN, ground glass opacities b/l \par  4/6/19: Nivolumab d/c due to myalgias and ?pneumonitis \par  6/10/19: CT CAP: stable/improved disease\par  9/26/19: CT CAP: stable pancreatic mass, stable mediastinal LN, new L gynecomastia \par  12/120/19: CT CAP: stable exam, gynecomastia \par  1/22/20: Mammo/US: no masses noted\par  4/27/20: CT CAP: stable exam\par  8/2/20: CT CAP: stable disease \par  10/26/20: CT CAP: stable disease \par  3/18/21: CT CAP: stable disease \par  5/5/21: Colonoscopy: RORY, repeat in 3 yrs \par  1/25/22: CT CAP: stable disease \par  8/5/22: CT CAP: stable disease \par  2/7/23: CT CAP: stable disease [de-identified] : moderately differentiated adenocarcinoma [de-identified] : 11/24/17: CA 19.9 - 81.3  CEA 29 [de-identified] : June 2017 - colon resection of tumor T3N0 (0/15 LN) moderately differentiated, loss of nuclear expression of MLH1, PMS2. \par     - IHC:  CK19, and variably positive for CDX2, CK7 and CK20, \par    + BRAF\par  \par  Nov 2017- attempted pancreatic resection:  CK7, CK20 and CDX2 are positive; CK20 is negative\par      - loss of nuclear expression of MLH1, PMS2.\par  \par  [facility] - failed report  [FreeTextEntry1] : surveillance due to inability to tolerate immunotherapy  [de-identified] : Patient is seen today, offers no complaints. Says his energy levels are good. Able to preform all ADLs independently. Weight is stable, he is gaining weight since last visit and has a good appetite. Gained 60 lbs in several mos.  Denies any pain, change in BMs, dysphagia, N/V.

## 2023-08-16 LAB
ALBUMIN SERPL ELPH-MCNC: 4.5 G/DL
ALP BLD-CCNC: 66 U/L
ALT SERPL-CCNC: 12 U/L
ANION GAP SERPL CALC-SCNC: 12 MMOL/L
AST SERPL-CCNC: 19 U/L
BILIRUB SERPL-MCNC: 0.4 MG/DL
BUN SERPL-MCNC: 20 MG/DL
CALCIUM SERPL-MCNC: 9.8 MG/DL
CANCER AG19-9 SERPL-ACNC: 24 U/ML
CEA SERPL-MCNC: 1.6 NG/ML
CHLORIDE SERPL-SCNC: 102 MMOL/L
CO2 SERPL-SCNC: 26 MMOL/L
CREAT SERPL-MCNC: 1.06 MG/DL
EGFR: 77 ML/MIN/1.73M2
GLUCOSE SERPL-MCNC: 110 MG/DL
POTASSIUM SERPL-SCNC: 5.6 MMOL/L
PROT SERPL-MCNC: 7.3 G/DL
SODIUM SERPL-SCNC: 139 MMOL/L

## 2023-08-17 NOTE — PACU DISCHARGE NOTE - NAUSEA/VOMITING:
Patient with the above history and physical  Referral to orthopedic and comprehensive spine. Principal reason for orthopedic consultation is to follow-up on the left calf "mass ". None

## 2023-08-23 ENCOUNTER — APPOINTMENT (OUTPATIENT)
Dept: HEMATOLOGY ONCOLOGY | Facility: CLINIC | Age: 68
End: 2023-08-23

## 2023-08-25 LAB
ANION GAP SERPL CALC-SCNC: 12 MMOL/L
BUN SERPL-MCNC: 20 MG/DL
CALCIUM SERPL-MCNC: 9.5 MG/DL
CHLORIDE SERPL-SCNC: 102 MMOL/L
CO2 SERPL-SCNC: 26 MMOL/L
CREAT SERPL-MCNC: 1.11 MG/DL
EGFR: 73 ML/MIN/1.73M2
GLUCOSE SERPL-MCNC: 92 MG/DL
POTASSIUM SERPL-SCNC: 4.9 MMOL/L
SODIUM SERPL-SCNC: 140 MMOL/L

## 2023-09-03 NOTE — ASU PREOP CHECKLIST - VIA
CC:Patient is a 89y old  Female who presents with a chief complaint of LLE hematoma (03 Sep 2023 10:50)      Subjective:  Pt seen and examined at bedside with chaperone. Pt is AAOx3, pt in no acute distress. Pt denied c/o fever, chills, chest pain, SOB, abd pain, N/V/D, extremity dysfunction, hemoptysis, hematemesis, hematuria, hematochexia, headache, diplopia, vertigo, dizzyness. Left leg pain well controlled w/ meds    ROS:  otherwise as abovementioned ROS    Vital Signs Last 24 Hrs  T(C): 36.4 (03 Sep 2023 08:02), Max: 36.5 (02 Sep 2023 20:23)  T(F): 97.5 (03 Sep 2023 08:02), Max: 97.7 (02 Sep 2023 20:23)  HR: 108 (03 Sep 2023 08:02) (73 - 108)  BP: 102/63 (03 Sep 2023 08:02) (86/50 - 106/59)  BP(mean): --  RR: 18 (03 Sep 2023 08:02) (18 - 18)  SpO2: 99% (03 Sep 2023 08:02) (96% - 99%)    Parameters below as of 03 Sep 2023 08:02  Patient On (Oxygen Delivery Method): nasal cannula  O2 Flow (L/min): 2      Labs:                                10.0   7.42  )-----------( 177      ( 02 Sep 2023 06:07 )             31.1     CBC Full  -  ( 02 Sep 2023 06:07 )  WBC Count : 7.42 K/uL  RBC Count : 2.92 M/uL  Hemoglobin : 10.0 g/dL  Hematocrit : 31.1 %  Platelet Count - Automated : 177 K/uL  Mean Cell Volume : 106.5 fl  Mean Cell Hemoglobin : 34.2 pg  Mean Cell Hemoglobin Concentration : 32.2 gm/dL  Auto Neutrophil # : 5.17 K/uL  Auto Lymphocyte # : 1.25 K/uL  Auto Monocyte # : 0.82 K/uL  Auto Eosinophil # : 0.12 K/uL  Auto Basophil # : 0.04 K/uL  Auto Neutrophil % : 69.7 %  Auto Lymphocyte % : 16.8 %  Auto Monocyte % : 11.1 %  Auto Eosinophil % : 1.6 %  Auto Basophil % : 0.5 %    09-02    137  |  99  |  34<H>  ----------------------------<  116<H>  3.7   |  36<H>  |  1.03    Ca    7.9<L>      02 Sep 2023 06:07  Mg     2.0     09-02              Meds:  acetaminophen     Tablet .. 650 milliGRAM(s) Oral every 6 hours PRN  acetaminophen   IVPB .. 1000 milliGRAM(s) IV Intermittent once PRN  allopurinol 100 milliGRAM(s) Oral daily  ampicillin/sulbactam  IVPB      ampicillin/sulbactam  IVPB 3 Gram(s) IV Intermittent every 12 hours  calcium carbonate 1250 mG  + Vitamin D (OsCal 500 + D) 1 Tablet(s) Oral daily  furosemide Infusion 5 mG/Hr IV Continuous <Continuous>  levothyroxine 125 MICROGram(s) Oral daily  melatonin 3 milliGRAM(s) Oral at bedtime PRN  metoprolol succinate ER 12.5 milliGRAM(s) Oral daily  multivitamin/minerals 1 Tablet(s) Oral daily  ondansetron Injectable 4 milliGRAM(s) IV Push every 6 hours PRN  polyethylene glycol 3350 17 Gram(s) Oral daily  senna 2 Tablet(s) Oral at bedtime  spironolactone 25 milliGRAM(s) Oral two times a day      Radiology:      Physical exam:  GCS of 15  Pt is aaox3  Pt in no acute distress  Airway is patent  Breathing is symmetric and unlabored  Neuro: CN II-XII grossly intact  Psych: normal affect  HEENT: normocephalic, BART, EOM wnl, no gross craniofacial bony pathology to exam  Neck: No tracheal deviation, no JVD, no crepitus, no ecchymosis, no hematoma  Chest: No gross rib or sternal pathology or tenderness to exam, no crepitus, no ecchymosis, no hematoma  Resp: CTAB  CVS: S1S2(+)  ABD: bowel sounds (+), soft, nontender, non distended, no rebound, no guarding, no rigidity, no pelvic instability to exam  EXT: + left lower leg dressing intact to known hematoma site, pt has good capillary refill in all digits. Sensoromotor function grossly intact to limited exam, on VTE prophylaxis ambulate Closure 2 Information: This tab is for additional flaps and grafts, including complex repair and grafts and complex repair and flaps. You can also specify a different location for the additional defect, if the location is the same you do not need to select a new one. We will insert the automated text for the repair you select below just as we do for solitary flaps and grafts. Please note that at this time if you select a location with a different insurance zone you will need to override the ICD10 and CPT if appropriate.

## 2023-09-07 ENCOUNTER — APPOINTMENT (OUTPATIENT)
Dept: ENDOCRINOLOGY | Facility: CLINIC | Age: 68
End: 2023-09-07
Payer: MEDICARE

## 2023-09-07 VITALS
SYSTOLIC BLOOD PRESSURE: 120 MMHG | BODY MASS INDEX: 32.65 KG/M2 | WEIGHT: 208 LBS | HEIGHT: 67 IN | HEART RATE: 61 BPM | DIASTOLIC BLOOD PRESSURE: 68 MMHG

## 2023-09-07 DIAGNOSIS — E27.49 OTHER ADRENOCORTICAL INSUFFICIENCY: ICD-10-CM

## 2023-09-07 PROCEDURE — 99214 OFFICE O/P EST MOD 30 MIN: CPT

## 2023-09-07 NOTE — ASSESSMENT
[FreeTextEntry1] : 67 year old man with pancreatic cancer  who developed thyroiditis after treatment with immunotherapy, then  hypothyroidism, on levothyroxine tx.   -Pt with significant weight gain, which he reports is intentional, due to instruction from MD, check TFTs to evaluate tor hypothyroidism and adjust  levothyroxine as needed  Hypocortisolism  - Now with weight gain, will decrease hydrocortisone back to 10 mg in morning and 5 in afternoon.  Reviewed sick day management   Gynecomastia - macroprolactin was normal, no evidence of malignancy on mammo  -does not want surgery.  Has noted cosmetic improvement. continues to be stable  f/u 6 months

## 2023-09-07 NOTE — HISTORY OF PRESENT ILLNESS
[FreeTextEntry1] : cc: hypothyroidism  68 year old man with Pancreatic cancer, treated with immunotherapy ( previously treated with chemotherapy).  After  4 cycles of ipilimumab/nivolumab, blood tests showed hyperthyroidism. He had no symptoms and continued immunotherapy. Subsequent TSH levels were elevated, then TFTs returned to normal and later pt developed hypothyroidism.  He had been on Nivo every two weeks, stopped in April 2019.    Taking levothyroxine 100 micrograms daily, separate from food and vitamins, daily. He reports + weight gain, no depression, cold intolerance,  fatigue.  No palpitations, tremor   Also with hypocortisolism - Taking hydrocortisone 15 mg in AM and 5 mg in pm.  No lightheadedness, abdominal pain, He had lost weight, had evaluation, which was normal, changed diet and gained back weight.     Gynecomastia - pt not bothered cosmetically, no symptoms   EMT/paramedic

## 2023-09-08 LAB
T4 FREE SERPL-MCNC: 1.3 NG/DL
TSH SERPL-ACNC: 4.85 UIU/ML

## 2023-09-11 RX ORDER — LEVOTHYROXINE SODIUM 0.12 MG/1
125 TABLET ORAL
Qty: 90 | Refills: 1 | Status: ACTIVE | COMMUNITY
Start: 2020-11-25 | End: 1900-01-01

## 2023-09-22 RX ORDER — FLUCONAZOLE 150 MG/1
150 TABLET ORAL
Qty: 2 | Refills: 0 | Status: DISCONTINUED | COMMUNITY
Start: 2023-03-17 | End: 2023-09-22

## 2023-09-22 RX ORDER — POLYETHYLENE GLYCOL 3350 AND ELECTROLYTES WITH LEMON FLAVOR 236; 22.74; 6.74; 5.86; 2.97 G/4L; G/4L; G/4L; G/4L; G/4L
236 POWDER, FOR SOLUTION ORAL
Qty: 1 | Refills: 0 | Status: DISCONTINUED | COMMUNITY
Start: 2023-03-31 | End: 2023-09-22

## 2023-10-02 ENCOUNTER — LABORATORY RESULT (OUTPATIENT)
Age: 68
End: 2023-10-02

## 2023-10-03 NOTE — PATIENT PROFILE ADULT. - PACKS YRS CALCULATION
15 Complex Repair And Dorsal Nasal Flap Text: The defect edges were debeveled with a #15 scalpel blade.  The primary defect was closed partially with a complex linear closure.  Given the location of the remaining defect, shape of the defect and the proximity to free margins a dorsal nasal flap was deemed most appropriate for complete closure of the defect.  Using a sterile surgical marker, an appropriate flap was drawn incorporating the defect and placing the expected incisions within the relaxed skin tension lines where possible.    The area thus outlined was incised deep to adipose tissue with a #15 scalpel blade.  The skin margins were undermined to an appropriate distance in all directions utilizing iris scissors.

## 2023-10-16 NOTE — DISCHARGE NOTE ADULT - NSTOBACCOWEBSITE_GEN_A_NCS
[place] Website --- [email or web address] website --- [email or web address] Sotyktu Counseling:  I discussed the most common side effects of Sotyktu including: common cold, sore throat, sinus infections, cold sores, canker sores, folliculitis, and acne.  I also discussed more serious side effects of Sotyktu including but not limited to: serious allergic reactions; increased risk for infections such as TB; cancers such as lymphomas; rhabdomyolysis and elevated CPK; and elevated triglycerides and liver enzymes.

## 2023-10-23 NOTE — PHYSICAL EXAM
See urinary retention from BPH     [Alert] : alert [Healthy Appearance] : healthy appearance [No Acute Distress] : no acute distress [Normal Sclera/Conjunctiva] : normal sclera/conjunctiva [EOMI] : extra ocular movement intact [PERRL] : pupils equal, round and reactive to light [No LAD] : no lymphadenopathy [Thyroid Not Enlarged] : the thyroid was not enlarged [No Respiratory Distress] : no respiratory distress [Clear to Auscultation] : lungs were clear to auscultation bilaterally [Normal S1, S2] : normal S1 and S2 [Regular Rhythm] : with a regular rhythm [No Edema] : no peripheral edema [Normal Bowel Sounds] : normal bowel sounds [Not Tender] : non-tender [Soft] : abdomen soft [No Spinal Tenderness] : no spinal tenderness [No Involuntary Movements] : no involuntary movements were seen [Normal Strength/Tone] : muscle strength and tone were normal [Normal Reflexes] : deep tendon reflexes were 2+ and symmetric [No Tremors] : no tremors [Normal Affect] : the affect was normal [Normal Mood] : the mood was normal

## 2023-11-03 NOTE — ASU PATIENT PROFILE, ADULT - TEACHING/LEARNING FACTORS INFLUENCE READINESS TO LEARN
Alert Team Note:    Contacted by [facility], spoke to Aaliyah. Re faxed infection control form as requested.    none

## 2023-11-18 ENCOUNTER — OUTPATIENT (OUTPATIENT)
Dept: OUTPATIENT SERVICES | Facility: HOSPITAL | Age: 68
LOS: 1 days | Discharge: HOME | End: 2023-11-18

## 2023-11-18 DIAGNOSIS — Z98.890 OTHER SPECIFIED POSTPROCEDURAL STATES: Chronic | ICD-10-CM

## 2023-11-18 DIAGNOSIS — C25.9 MALIGNANT NEOPLASM OF PANCREAS, UNSPECIFIED: ICD-10-CM

## 2023-11-18 DIAGNOSIS — Z90.49 ACQUIRED ABSENCE OF OTHER SPECIFIED PARTS OF DIGESTIVE TRACT: Chronic | ICD-10-CM

## 2023-11-18 DIAGNOSIS — Z92.21 PERSONAL HISTORY OF ANTINEOPLASTIC CHEMOTHERAPY: Chronic | ICD-10-CM

## 2023-11-23 ENCOUNTER — NON-APPOINTMENT (OUTPATIENT)
Age: 68
End: 2023-11-23

## 2023-11-27 ENCOUNTER — RESULT REVIEW (OUTPATIENT)
Age: 68
End: 2023-11-27

## 2023-11-27 ENCOUNTER — APPOINTMENT (OUTPATIENT)
Dept: HEMATOLOGY ONCOLOGY | Facility: CLINIC | Age: 68
End: 2023-11-27
Payer: MEDICARE

## 2023-11-27 VITALS
OXYGEN SATURATION: 95 % | DIASTOLIC BLOOD PRESSURE: 81 MMHG | SYSTOLIC BLOOD PRESSURE: 143 MMHG | WEIGHT: 205.03 LBS | TEMPERATURE: 97.4 F | RESPIRATION RATE: 16 BRPM | BODY MASS INDEX: 32.11 KG/M2 | HEART RATE: 68 BPM

## 2023-11-27 LAB
BASOPHILS # BLD AUTO: 0.1 K/UL — SIGNIFICANT CHANGE UP (ref 0–0.2)
BASOPHILS # BLD AUTO: 0.1 K/UL — SIGNIFICANT CHANGE UP (ref 0–0.2)
BASOPHILS NFR BLD AUTO: 1.3 % — SIGNIFICANT CHANGE UP (ref 0–2)
BASOPHILS NFR BLD AUTO: 1.3 % — SIGNIFICANT CHANGE UP (ref 0–2)
EOSINOPHIL # BLD AUTO: 0.36 K/UL — SIGNIFICANT CHANGE UP (ref 0–0.5)
EOSINOPHIL # BLD AUTO: 0.36 K/UL — SIGNIFICANT CHANGE UP (ref 0–0.5)
EOSINOPHIL NFR BLD AUTO: 4.8 % — SIGNIFICANT CHANGE UP (ref 0–6)
EOSINOPHIL NFR BLD AUTO: 4.8 % — SIGNIFICANT CHANGE UP (ref 0–6)
HCT VFR BLD CALC: 40.1 % — SIGNIFICANT CHANGE UP (ref 39–50)
HCT VFR BLD CALC: 40.1 % — SIGNIFICANT CHANGE UP (ref 39–50)
HGB BLD-MCNC: 14 G/DL — SIGNIFICANT CHANGE UP (ref 13–17)
HGB BLD-MCNC: 14 G/DL — SIGNIFICANT CHANGE UP (ref 13–17)
IMM GRANULOCYTES NFR BLD AUTO: 0.4 % — SIGNIFICANT CHANGE UP (ref 0–0.9)
IMM GRANULOCYTES NFR BLD AUTO: 0.4 % — SIGNIFICANT CHANGE UP (ref 0–0.9)
LYMPHOCYTES # BLD AUTO: 1.74 K/UL — SIGNIFICANT CHANGE UP (ref 1–3.3)
LYMPHOCYTES # BLD AUTO: 1.74 K/UL — SIGNIFICANT CHANGE UP (ref 1–3.3)
LYMPHOCYTES # BLD AUTO: 23 % — SIGNIFICANT CHANGE UP (ref 13–44)
LYMPHOCYTES # BLD AUTO: 23 % — SIGNIFICANT CHANGE UP (ref 13–44)
MCHC RBC-ENTMCNC: 29.4 PG — SIGNIFICANT CHANGE UP (ref 27–34)
MCHC RBC-ENTMCNC: 29.4 PG — SIGNIFICANT CHANGE UP (ref 27–34)
MCHC RBC-ENTMCNC: 34.9 G/DL — SIGNIFICANT CHANGE UP (ref 32–36)
MCHC RBC-ENTMCNC: 34.9 G/DL — SIGNIFICANT CHANGE UP (ref 32–36)
MCV RBC AUTO: 84.1 FL — SIGNIFICANT CHANGE UP (ref 80–100)
MCV RBC AUTO: 84.1 FL — SIGNIFICANT CHANGE UP (ref 80–100)
MONOCYTES # BLD AUTO: 0.54 K/UL — SIGNIFICANT CHANGE UP (ref 0–0.9)
MONOCYTES # BLD AUTO: 0.54 K/UL — SIGNIFICANT CHANGE UP (ref 0–0.9)
MONOCYTES NFR BLD AUTO: 7.2 % — SIGNIFICANT CHANGE UP (ref 2–14)
MONOCYTES NFR BLD AUTO: 7.2 % — SIGNIFICANT CHANGE UP (ref 2–14)
NEUTROPHILS # BLD AUTO: 4.78 K/UL — SIGNIFICANT CHANGE UP (ref 1.8–7.4)
NEUTROPHILS # BLD AUTO: 4.78 K/UL — SIGNIFICANT CHANGE UP (ref 1.8–7.4)
NEUTROPHILS NFR BLD AUTO: 63.3 % — SIGNIFICANT CHANGE UP (ref 43–77)
NEUTROPHILS NFR BLD AUTO: 63.3 % — SIGNIFICANT CHANGE UP (ref 43–77)
NRBC # BLD: 0 /100 WBCS — SIGNIFICANT CHANGE UP (ref 0–0)
NRBC # BLD: 0 /100 WBCS — SIGNIFICANT CHANGE UP (ref 0–0)
PLATELET # BLD AUTO: 203 K/UL — SIGNIFICANT CHANGE UP (ref 150–400)
PLATELET # BLD AUTO: 203 K/UL — SIGNIFICANT CHANGE UP (ref 150–400)
RBC # BLD: 4.77 M/UL — SIGNIFICANT CHANGE UP (ref 4.2–5.8)
RBC # BLD: 4.77 M/UL — SIGNIFICANT CHANGE UP (ref 4.2–5.8)
RBC # FLD: 12.2 % — SIGNIFICANT CHANGE UP (ref 10.3–14.5)
RBC # FLD: 12.2 % — SIGNIFICANT CHANGE UP (ref 10.3–14.5)
WBC # BLD: 7.55 K/UL — SIGNIFICANT CHANGE UP (ref 3.8–10.5)
WBC # BLD: 7.55 K/UL — SIGNIFICANT CHANGE UP (ref 3.8–10.5)
WBC # FLD AUTO: 7.55 K/UL — SIGNIFICANT CHANGE UP (ref 3.8–10.5)
WBC # FLD AUTO: 7.55 K/UL — SIGNIFICANT CHANGE UP (ref 3.8–10.5)

## 2023-11-27 PROCEDURE — 99213 OFFICE O/P EST LOW 20 MIN: CPT

## 2023-11-29 LAB
ALBUMIN SERPL ELPH-MCNC: 4.5 G/DL
ALP BLD-CCNC: 55 U/L
ALT SERPL-CCNC: 14 U/L
ANION GAP SERPL CALC-SCNC: 11 MMOL/L
AST SERPL-CCNC: 23 U/L
BILIRUB SERPL-MCNC: 0.4 MG/DL
BUN SERPL-MCNC: 17 MG/DL
CALCIUM SERPL-MCNC: 9.4 MG/DL
CANCER AG19-9 SERPL-ACNC: 19 U/ML
CEA SERPL-MCNC: 1.4 NG/ML
CHLORIDE SERPL-SCNC: 103 MMOL/L
CO2 SERPL-SCNC: 23 MMOL/L
CREAT SERPL-MCNC: 1.01 MG/DL
EGFR: 82 ML/MIN/1.73M2
GLUCOSE SERPL-MCNC: 99 MG/DL
POTASSIUM SERPL-SCNC: 4.6 MMOL/L
PROT SERPL-MCNC: 7.6 G/DL
SODIUM SERPL-SCNC: 138 MMOL/L

## 2023-12-07 ENCOUNTER — RESULT REVIEW (OUTPATIENT)
Age: 68
End: 2023-12-07

## 2023-12-11 ENCOUNTER — OUTPATIENT (OUTPATIENT)
Dept: OUTPATIENT SERVICES | Facility: HOSPITAL | Age: 68
LOS: 1 days | End: 2023-12-11
Payer: MEDICARE

## 2023-12-11 ENCOUNTER — APPOINTMENT (OUTPATIENT)
Dept: CT IMAGING | Facility: CLINIC | Age: 68
End: 2023-12-11
Payer: MEDICARE

## 2023-12-11 DIAGNOSIS — C25.9 MALIGNANT NEOPLASM OF PANCREAS, UNSPECIFIED: ICD-10-CM

## 2023-12-11 DIAGNOSIS — Z92.21 PERSONAL HISTORY OF ANTINEOPLASTIC CHEMOTHERAPY: Chronic | ICD-10-CM

## 2023-12-11 DIAGNOSIS — Z98.890 OTHER SPECIFIED POSTPROCEDURAL STATES: Chronic | ICD-10-CM

## 2023-12-11 DIAGNOSIS — Z90.49 ACQUIRED ABSENCE OF OTHER SPECIFIED PARTS OF DIGESTIVE TRACT: Chronic | ICD-10-CM

## 2023-12-11 PROCEDURE — 71260 CT THORAX DX C+: CPT | Mod: 26,MH

## 2023-12-11 PROCEDURE — 74177 CT ABD & PELVIS W/CONTRAST: CPT | Mod: 26,MH

## 2023-12-11 PROCEDURE — 74177 CT ABD & PELVIS W/CONTRAST: CPT

## 2023-12-11 PROCEDURE — 71260 CT THORAX DX C+: CPT

## 2024-01-08 NOTE — ED PROVIDER NOTE - EKG #1 DATE/TIME
January 9, 2024    [place]  [address] [facility] [address]    To whom it may concern,    I am writing on behalf of the above patient in regards to her medical fitness for duty. I am a Certified Nurse Practitioner who functions as her primary care provider.     Susanne's current diagnoses include:  Recurrent Major Depressive Disorder  Attention Deficit Hyperactivity Disorder (ADHD), combined type    Her current medications for these conditions include:  Escitalopram (Lexparo) 10mg daily. She has been on this medication since May of 2023.   Amphetamine-dextroamphetamine (ADDERALL XR) 20mg 24 hour capsule daily. This medication was started in October of 2023 with good results.    There are no indications for routine blood monitoring with those two medications. Patient's mental health would worsen if these medications were not taken or were lost however she would remain functional.     It should be noted that Susanne is compliant with and stable on these medications. Her historical behavior health evaluation has included a comprehensive evaluation by a specialty provider who confirmed the above diagnoses. She has been treated for her depression off and on for a period of years and her ADHD was just recently diagnosed in the last 90 days. Her treatment plan includes continued prescriptions and cognitive behavioral therapy as needed.  She does not necessitate inpatient treatment. I believe her prognosis for both of these things to be excellent and I have been treating her over the last 8 months. She participates in regular office visits (every 3-6 months) regarding this treatment plan.    In my professional opinion, I do believe that Susanne will be able to remain stable under the stress of combat. I do not believe her to be a threat to themselves or others if given a weapon. She denies any active or historical thoughts of homicide or suicide.    Please reach out to myself or our clinic with any additional  questions.        KAT Salazar CNP      24-Oct-2017 15:21

## 2024-03-07 ENCOUNTER — APPOINTMENT (OUTPATIENT)
Dept: ENDOCRINOLOGY | Facility: CLINIC | Age: 69
End: 2024-03-07
Payer: MEDICARE

## 2024-03-07 VITALS
HEART RATE: 86 BPM | WEIGHT: 201 LBS | HEIGHT: 67 IN | BODY MASS INDEX: 31.55 KG/M2 | SYSTOLIC BLOOD PRESSURE: 126 MMHG | DIASTOLIC BLOOD PRESSURE: 80 MMHG | OXYGEN SATURATION: 98 %

## 2024-03-07 DIAGNOSIS — E03.8 OTHER SPECIFIED HYPOTHYROIDISM: ICD-10-CM

## 2024-03-07 DIAGNOSIS — N62 HYPERTROPHY OF BREAST: ICD-10-CM

## 2024-03-07 PROCEDURE — 99214 OFFICE O/P EST MOD 30 MIN: CPT

## 2024-03-07 PROCEDURE — G2211 COMPLEX E/M VISIT ADD ON: CPT

## 2024-03-07 NOTE — REVIEW OF SYSTEMS
[Chest Pain] : no chest pain [Shortness Of Breath] : no shortness of breath [Nausea] : no nausea [Headaches] : headaches

## 2024-03-07 NOTE — ASSESSMENT
[FreeTextEntry1] : 68 year old man with pancreatic cancer  who developed thyroiditis after treatment with immunotherapy, then  hypothyroidism, on levothyroxine tx.  -clinically euthyroid   -check TFTs to evaluate tor hypothyroidism and adjust  levothyroxine as needed  Hypocortisolism  - continue hydrocortisone back 10 mg in morning and 5 in afternoon.  Reviewed sick day management  - check bmp   Gynecomastia - macroprolactin was normal, no evidence of malignancy on mammo  -does not want surgery.  Has noted cosmetic improvement. continues to be stable  f/u 6 months

## 2024-03-07 NOTE — HISTORY OF PRESENT ILLNESS
[FreeTextEntry1] : cc: hypothyroidism  68 year old man with Pancreatic cancer, treated with immunotherapy ( previously treated with chemotherapy).  After  4 cycles of ipilimumab/nivolumab, blood tests showed hyperthyroidism. He had no symptoms and continued immunotherapy. Subsequent TSH levels were elevated, then TFTs returned to normal and later pt developed hypothyroidism.  He had been on Nivo every two weeks, stopped in April 2019.    He has been taking levothyroxine 125 micrograms daily, separate from food and vitamins. He reports no depression, cold intolerance,  fatigue.  No palpitations, tremor   Also with hypocortisolism - Taking hydrocortisone 10 mg in AM and 5 mg in pm.  No lightheadedness, abdominal pain. weight loss.   Gynecomastia - pt not bothered cosmetically, no symptoms

## 2024-03-08 LAB
ANION GAP SERPL CALC-SCNC: 14 MMOL/L
BUN SERPL-MCNC: 18 MG/DL
CALCIUM SERPL-MCNC: 9.9 MG/DL
CHLORIDE SERPL-SCNC: 103 MMOL/L
CO2 SERPL-SCNC: 23 MMOL/L
CREAT SERPL-MCNC: 0.99 MG/DL
EGFR: 84 ML/MIN/1.73M2
GLUCOSE SERPL-MCNC: 96 MG/DL
POTASSIUM SERPL-SCNC: 4.4 MMOL/L
SODIUM SERPL-SCNC: 140 MMOL/L
T4 FREE SERPL-MCNC: 1.3 NG/DL
TSH SERPL-ACNC: 3.86 UIU/ML

## 2024-04-22 NOTE — H&P PST ADULT - URINARY CATHETER
no

## 2024-04-29 ENCOUNTER — OUTPATIENT (OUTPATIENT)
Dept: OUTPATIENT SERVICES | Facility: HOSPITAL | Age: 69
LOS: 1 days | Discharge: HOME | End: 2024-04-29

## 2024-04-29 DIAGNOSIS — Z92.21 PERSONAL HISTORY OF ANTINEOPLASTIC CHEMOTHERAPY: Chronic | ICD-10-CM

## 2024-04-29 DIAGNOSIS — Z98.890 OTHER SPECIFIED POSTPROCEDURAL STATES: Chronic | ICD-10-CM

## 2024-04-29 DIAGNOSIS — Z90.49 ACQUIRED ABSENCE OF OTHER SPECIFIED PARTS OF DIGESTIVE TRACT: Chronic | ICD-10-CM

## 2024-04-29 DIAGNOSIS — C25.9 MALIGNANT NEOPLASM OF PANCREAS, UNSPECIFIED: ICD-10-CM

## 2024-05-03 ENCOUNTER — APPOINTMENT (OUTPATIENT)
Dept: INTERNAL MEDICINE | Facility: CLINIC | Age: 69
End: 2024-05-03
Payer: MEDICARE

## 2024-05-03 DIAGNOSIS — Z87.438 PERSONAL HISTORY OF OTHER DISEASES OF MALE GENITAL ORGANS: ICD-10-CM

## 2024-05-03 DIAGNOSIS — N13.8 BENIGN PROSTATIC HYPERPLASIA WITH LOWER URINARY TRACT SYMPMS: ICD-10-CM

## 2024-05-03 DIAGNOSIS — I10 ESSENTIAL (PRIMARY) HYPERTENSION: ICD-10-CM

## 2024-05-03 DIAGNOSIS — L25.9 UNSPECIFIED CONTACT DERMATITIS, UNSPECIFIED CAUSE: ICD-10-CM

## 2024-05-03 DIAGNOSIS — N40.1 BENIGN PROSTATIC HYPERPLASIA WITH LOWER URINARY TRACT SYMPMS: ICD-10-CM

## 2024-05-03 PROCEDURE — G2211 COMPLEX E/M VISIT ADD ON: CPT

## 2024-05-03 PROCEDURE — 99214 OFFICE O/P EST MOD 30 MIN: CPT

## 2024-05-03 RX ORDER — TAMSULOSIN HYDROCHLORIDE 0.4 MG/1
0.4 CAPSULE ORAL
Qty: 90 | Refills: 2 | Status: ACTIVE | COMMUNITY
Start: 2017-07-11 | End: 1900-01-01

## 2024-05-03 RX ORDER — TRIAMCINOLONE ACETONIDE 5 MG/G
0.5 CREAM TOPICAL
Qty: 45 | Refills: 0 | Status: ACTIVE | COMMUNITY
Start: 2024-05-03 | End: 1900-01-01

## 2024-05-03 RX ORDER — SERTRALINE HYDROCHLORIDE 50 MG/1
50 TABLET, FILM COATED ORAL
Qty: 90 | Refills: 2 | Status: ACTIVE | COMMUNITY
Start: 2018-08-27 | End: 1900-01-01

## 2024-05-03 RX ORDER — METOPROLOL TARTRATE 25 MG/1
25 TABLET, FILM COATED ORAL TWICE DAILY
Qty: 90 | Refills: 2 | Status: ACTIVE | COMMUNITY
Start: 2018-01-02 | End: 1900-01-01

## 2024-05-05 ENCOUNTER — NON-APPOINTMENT (OUTPATIENT)
Age: 69
End: 2024-05-05

## 2024-05-06 ENCOUNTER — APPOINTMENT (OUTPATIENT)
Dept: HEMATOLOGY ONCOLOGY | Facility: CLINIC | Age: 69
End: 2024-05-06
Payer: MEDICARE

## 2024-05-06 VITALS
DIASTOLIC BLOOD PRESSURE: 68 MMHG | TEMPERATURE: 97 F | SYSTOLIC BLOOD PRESSURE: 143 MMHG | BODY MASS INDEX: 32.06 KG/M2 | RESPIRATION RATE: 16 BRPM | OXYGEN SATURATION: 98 % | HEART RATE: 72 BPM | WEIGHT: 204.7 LBS

## 2024-05-06 VITALS — SYSTOLIC BLOOD PRESSURE: 124 MMHG | DIASTOLIC BLOOD PRESSURE: 80 MMHG

## 2024-05-06 DIAGNOSIS — C25.9 MALIGNANT NEOPLASM OF PANCREAS, UNSPECIFIED: ICD-10-CM

## 2024-05-06 DIAGNOSIS — C18.9 MALIGNANT NEOPLASM OF COLON, UNSPECIFIED: ICD-10-CM

## 2024-05-06 PROBLEM — L25.9 CONTACT DERMATITIS: Status: ACTIVE | Noted: 2024-05-03

## 2024-05-06 PROBLEM — N40.1 BPH WITH OBSTRUCTION/LOWER URINARY TRACT SYMPTOMS: Status: ACTIVE | Noted: 2017-07-11

## 2024-05-06 PROBLEM — Z87.438 HISTORY OF BALANITIS: Status: RESOLVED | Noted: 2023-03-17 | Resolved: 2024-05-06

## 2024-05-06 PROBLEM — I10 HYPERTENSION: Status: ACTIVE | Noted: 2017-11-21

## 2024-05-06 PROCEDURE — 99213 OFFICE O/P EST LOW 20 MIN: CPT

## 2024-05-06 NOTE — PHYSICAL EXAM
[No Acute Distress] : no acute distress [Well Nourished] : well nourished [No Accessory Muscle Use] : no accessory muscle use [Clear to Auscultation] : lungs were clear to auscultation bilaterally [Regular Rhythm] : with a regular rhythm [Normal S1, S2] : normal S1 and S2 [No Murmur] : no murmur heard [Soft] : abdomen soft [Non Tender] : non-tender [Non-distended] : non-distended [No Masses] : no abdominal mass palpated [No HSM] : no HSM [Normal Bowel Sounds] : normal bowel sounds [Normal Insight/Judgement] : insight and judgment were intact [de-identified] : L. knee - lateral aspect with small patch ~2 x 3cm area of erythema w/o increased warmth or tenderness

## 2024-05-06 NOTE — HISTORY OF PRESENT ILLNESS
[Spouse] : spouse [FreeTextEntry1] :  f/u - needs renewal of medicines   [de-identified] :  HTN - is on metoprolol 25mg bid  is on sertraline  BPH - is on flomax  also 2 weeks ago , driving, brushed up L. knee against dashboard - has non pruritic rash there

## 2024-05-18 PROBLEM — C25.9 PANCREATIC CANCER: Status: ACTIVE | Noted: 2017-11-20

## 2024-05-18 PROBLEM — C18.9 STAGE II CARCINOMA OF COLON: Status: ACTIVE | Noted: 2020-11-29

## 2024-05-18 NOTE — REASON FOR VISIT
[Follow-Up Visit] : a follow-up [Spouse] : spouse [FreeTextEntry2] : Locally advanced pancreatic cancer, h/o stage II colon cancer [facility], on surveillance

## 2024-05-18 NOTE — HISTORY OF PRESENT ILLNESS
[Disease: _____________________] : Disease: [unfilled] [T: ___] : T[unfilled] [N: ___] : N[unfilled] [M: ___] : M[unfilled] [AJCC Stage: ____] : AJCC Stage: [unfilled] [de-identified] : 68 M with no significant past medical history, developed progressive abdominal pain in March 2017. In May 2017 he went to an urgent care center and was referred for a CT A/P w/co. This was performed on 5/8/17 and demonstrated a circumferential 6.7 cm mass at the splenic flexure of the colon causing "apple core" luminal narrowing of the colon at that level c/w malignancy. A 2.7 x 2.2 x 2.5 cm ill defined LN below the pancreatic body at the level of the splenic vein is most consistent with a metastasis. On May 9, 2017, Haja saw Dr. Morgan Armenta, colorectal surgery who performed a colonoscopy on 5/15/17. Biopsy c/w invasive moderately differentiated colonic adenocarcinoma a/w necrosis. A PET/CT was performed on 5/30/17 which showed splenic flexure colonic hypermetabolic mass c/w primary carcinoma (SUV 35) and 2.5 cm mesenteric LN hypermetabolic focus c/w regional metastatic disease (SUV 5).   On 6/13/17 patient underwent a open left colectomy, omentectomy and mobilization of the splenic flexure. Final pathology T3NO moderately differentiated carcinoma, loss of nuclear expression of MLH1 and PMS2. No adjuvant therapy was given. Post op developed SOB, hypoxia transferred to the SICU, placed on bipap. A CTA chest/abd/pelvis 6/15/17 c/w Nonspecific patchy opacity in RLL was noted, 3.4 cm collection or cystic lesion in the pancreatic body for which follow up imaging was suggested.   Post op, Haja continued to complain of abdominal pain and an MRI A/P was performed on 7/25/17 which showed a 4.6 cm hyperintense structure inseparable from the distal pancreatic body. The lesion is located in the peripancreatic retroperitoneal space with imaging characteristics suggestive of pancreatic pseudocyst. He was evaluated by surg onc, Dr. Hager and referred for a CT pancreatic protocol which was performed on 9/5 and showed a peripherally enhancing mass in the body of the pancreas measures 4.6 x 3.5 cm. Subsequently had an EUS with FNA on 10/6/17 (GI- Dr. Luz) which confirmed moderately differentiated adenocarcinoma.  On 11/9/17 underwent open ex lap for possible resection of pancreatic adenoca, however, SMA was encased by tumor and therefore could not be resected. Liver bx was performed and negative for malignancy, however, noted to have steatosis with mild steatohepatitis, port inflammation, focal periportal and pericellular fibrosis,2 + iron deposition.   Patient was subsequently referred to rad onc and med onc for further evaluation.   12/5/17-3/13/18: 8 cycles of mFOLFIRINOX (25% dose reduction due to PS) with stable/mild improvement in disease   3/27/18-5/29/18 : C1-C4 Nivo 3mg/kg (240 mg flat dose)/Ipi 1mg/kg  5/31/18: CTCAP: decr in size of pancreatic mass, previously 3.6 to 2.5, new mediastinal LN 6/12/18: EBUS with bx of LN: reactive  6/26/18-: Nivo single agent Q 2 weeks  11/5 - 11/19: hospitalized at [facility] with herpes zoster involving the R periorbital area, no ocular involvement. opiods stopped abruptly s/p withdrawal characterized by diarrhea/n/v. Developed NICOLE (2/2 acyclovir?) with resolution.  11/30/18: restarted Nivolumab Q 2 weeks 12/13/18: last dose of Nivolumab.  12/28/18: Nivolumab held due to Grade 2-3 arthralgias/myalgias. Started on Prednisone 40 mg BID taper over 8 weeks.   1/3/19: CTCAP: interval regression of pancreatic mass and mediastinal LN 2/8/19: Nivolumab restarted  4/3/19: CT CAP: enlargement of mediastinal LN, ground glass opacities b/l  4/6/19: Nivolumab d/c due to myalgias and ?pneumonitis  6/10/19: CT CAP: stable/improved disease 9/26/19: CT CAP: stable pancreatic mass, stable mediastinal LN, new L gynecomastia  12/120/19: CT CAP: stable exam, gynecomastia  1/22/20: Mammo/US: no masses noted 4/27/20: CT CAP: stable exam 8/2/20: CT CAP: stable disease  10/26/20: CT CAP: stable disease  3/18/21: CT CAP: stable disease  5/5/21: Colonoscopy: RORY, repeat in 3 yrs  1/25/22: CT CAP: stable disease  3/23/23: PET/CT: fullness at the tail of pancreas without abnormal uptake, consider MRI, increase activity in ascending colon, malignancy not excluded, R perihilar focus, nonspecific 12/11/23: CT CAP: stable  4/20/23: EGD/[place]: performed due to weight loss and abnormal PET, colo RORY, repeat in 3 yrs Esophageal polyp: squamous mucosa suggestive of lymphocytic esophagitis    [de-identified] : moderately differentiated adenocarcinoma [de-identified] : 11/24/17: CA 19.9 - 81.3  CEA 29 [de-identified] : June 2017 - colon resection of tumor T3N0 (0/15 LN) moderately differentiated, loss of nuclear expression of MLH1, PMS2. \par     - IHC:  CK19, and variably positive for CDX2, CK7 and CK20, \par    + BRAF\par  \par  Nov 2017- attempted pancreatic resection:  CK7, CK20 and CDX2 are positive; CK20 is negative\par      - loss of nuclear expression of MLH1, PMS2.\par  \par  [facility] - failed report  [FreeTextEntry1] : surveillance [de-identified] : overall feels well. maintaining weight. denies any c/o.

## 2024-07-14 NOTE — PATIENT PROFILE ADULT. - TEACHING/LEARNING CULTURAL CONSIDERATIONS
Problem: Safety - Adult  Goal: Free from fall injury  Outcome: Progressing     Problem: Chronic Conditions and Co-morbidities  Goal: Patient's chronic conditions and co-morbidity symptoms are monitored and maintained or improved  Outcome: Progressing     Problem: Discharge Planning  Goal: Discharge to home or other facility with appropriate resources  Outcome: Progressing     Problem: ABCDS Injury Assessment  Goal: Absence of physical injury  Outcome: Progressing     Problem: Respiratory - Adult  Goal: Achieves optimal ventilation and oxygenation  7/14/2024 0304 by Jillian Nguyen RN  Outcome: Progressing  7/14/2024 0054 by Rebecca Galeano RCP  Outcome: Progressing      none

## 2024-07-24 NOTE — ASU DISCHARGE PLAN (ADULT/PEDIATRIC) - NSDISCH_STOMACH_ENDO_ALL_CORE_FT
[FreeTextEntry1] : 50-year-old man, with a past medical history of CAD (dRCA PCI 2017, [facility]), and active cigarette smoking, presenting for follow-up. Patient presently states that he is able to ambulate >10 blocks and >2 flights of stairs; but experiences dyspnea with extended ambulation. He otherwise denies CP, palpitations, dizziness/LOC, PND, orthopnea, HAs, abdominal pain, and LE swelling. He actively smoked cigarettes, though his use has decreased form 1 ppd for 15 years to now smoking 3-4 cigarettes per day.  You might feel nauseated today. Eat lightly until you are feeling better. If nausea continues for more than 24 hours, please call your doctor. If you do not feel nauseated, you may eat anything you like for the rest of the day.

## 2024-08-19 ENCOUNTER — LABORATORY RESULT (OUTPATIENT)
Age: 69
End: 2024-08-19

## 2024-08-19 ENCOUNTER — NON-APPOINTMENT (OUTPATIENT)
Age: 69
End: 2024-08-19

## 2024-08-19 ENCOUNTER — APPOINTMENT (OUTPATIENT)
Dept: INTERNAL MEDICINE | Facility: CLINIC | Age: 69
End: 2024-08-19
Payer: MEDICARE

## 2024-08-19 VITALS
HEIGHT: 67 IN | SYSTOLIC BLOOD PRESSURE: 100 MMHG | BODY MASS INDEX: 29.98 KG/M2 | DIASTOLIC BLOOD PRESSURE: 60 MMHG | WEIGHT: 191 LBS

## 2024-08-19 VITALS — SYSTOLIC BLOOD PRESSURE: 90 MMHG | DIASTOLIC BLOOD PRESSURE: 60 MMHG

## 2024-08-19 DIAGNOSIS — H91.90 UNSPECIFIED HEARING LOSS, UNSPECIFIED EAR: ICD-10-CM

## 2024-08-19 DIAGNOSIS — R21 ALLERGY, UNSPECIFIED, INITIAL ENCOUNTER: ICD-10-CM

## 2024-08-19 DIAGNOSIS — I10 ESSENTIAL (PRIMARY) HYPERTENSION: ICD-10-CM

## 2024-08-19 DIAGNOSIS — M72.0 PALMAR FASCIAL FIBROMATOSIS [DUPUYTREN]: ICD-10-CM

## 2024-08-19 DIAGNOSIS — T78.40XA ALLERGY, UNSPECIFIED, INITIAL ENCOUNTER: ICD-10-CM

## 2024-08-19 DIAGNOSIS — Z86.69 PERSONAL HISTORY OF OTHER DISEASES OF THE NERVOUS SYSTEM AND SENSE ORGANS: ICD-10-CM

## 2024-08-19 DIAGNOSIS — R79.89 OTHER SPECIFIED ABNORMAL FINDINGS OF BLOOD CHEMISTRY: ICD-10-CM

## 2024-08-19 DIAGNOSIS — R97.20 ELEVATED PROSTATE, SPECIFIC ANTIGEN [PSA]: ICD-10-CM

## 2024-08-19 DIAGNOSIS — Z00.00 ENCOUNTER FOR GENERAL ADULT MEDICAL EXAMINATION W/OUT ABNORMAL FINDINGS: ICD-10-CM

## 2024-08-19 PROCEDURE — 93000 ELECTROCARDIOGRAM COMPLETE: CPT

## 2024-08-19 PROCEDURE — 99214 OFFICE O/P EST MOD 30 MIN: CPT | Mod: 25

## 2024-08-19 PROCEDURE — G0439: CPT

## 2024-08-19 PROCEDURE — 36415 COLL VENOUS BLD VENIPUNCTURE: CPT

## 2024-08-19 RX ORDER — TRIAMCINOLONE ACETONIDE 1 MG/G
0.1 CREAM TOPICAL
Qty: 1 | Refills: 0 | Status: ACTIVE | COMMUNITY
Start: 2024-08-19 | End: 1900-01-01

## 2024-08-19 NOTE — PLAN
[FreeTextEntry1] : 69 c history of colon cancer RORY, pancreatic cancer seeing oncology for survelliance, fatty liver here for cpe

## 2024-08-19 NOTE — HEALTH RISK ASSESSMENT
[With Patient/Caregiver] : , with patient/caregiver [Designated Healthcare Proxy] : Designated healthcare proxy [Name: ___] : Health Care Proxy's Name: [unfilled]  [Relationship: ___] : Relationship: [unfilled] [Aggressive treatment] : aggressive treatment [I will adhere to the patient's wishes.] : I will adhere to the patient's wishes. [Time Spent: ___ minutes] : Time Spent: [unfilled] minutes [No falls in past year] : Patient reported no falls in the past year [Patient reported colonoscopy was abnormal] : Patient reported colonoscopy was abnormal [Fully functional (bathing, dressing, toileting, transferring, walking, feeding)] : Fully functional (bathing, dressing, toileting, transferring, walking, feeding) [Fully functional (using the telephone, shopping, preparing meals, housekeeping, doing laundry, using] : Fully functional and needs no help or supervision to perform IADLs (using the telephone, shopping, preparing meals, housekeeping, doing laundry, using transportation, managing medications and managing finances) [ColonoscopyDate] : 4/23 [AdvancecareDate] : 8/19/24

## 2024-08-19 NOTE — REVIEW OF SYSTEMS
[Negative] : Musculoskeletal [FreeTextEntry4] : +intermittent tinnitus - saw ent last year  [de-identified] : noticed rash for past few days on arms - used new soap in the past [place]

## 2024-08-19 NOTE — HISTORY OF PRESENT ILLNESS
[FreeTextEntry1] :   CPE  [de-identified] : Diet: good   Exercise: walk& uses stationary bike- with cane    in interval - saw oncologist Dr. Louann Maloney - went for repeat CT Chest/Abdomen/Pelvis

## 2024-08-19 NOTE — ASSESSMENT
[FreeTextEntry1] : 69M c history of colon cancer RORY, history of pancreatic cancer s/p immunotherapy RORY e(sees oncology), fatty liver, hypothyroidism after developing thyroiditis after immunotherapy treatment for pancreatic cancer sees endo, hypercortisolism, gynecomastia, HTN, GERD, elevated PSA s/p benign prostate biopsy (12/22), BPH, history of tinnitus saw ENT in 2023, Dupuytren's contracture R. 2nd digit & b/l 5th digit  here for cpe and likely allergic rash of bilateral forearm for past 5-7 days    [facility] - check fasting bw in office  physical ecg performed - ecg sinus bradycardia saw oncology in fall 2023  last colonoscopy 4/23 -due for repeat in 3 years  utd with endocrinology visit utd with optho & dental exam  to get hearing checked  states had shingrix vaccine  advised flu shot  advised FBSE with dermatology   f/u in 3months for bp check - consider stopping anti-HTN next visit if BP okay

## 2024-08-19 NOTE — PHYSICAL EXAM
[No Acute Distress] : no acute distress [Well Nourished] : well nourished [PERRL] : pupils equal round and reactive to light [Normal Oropharynx] : the oropharynx was normal [Normal TMs] : both tympanic membranes were normal [Normal Nasal Mucosa] : the nasal mucosa was normal [No Lymphadenopathy] : no lymphadenopathy [Supple] : supple [Thyroid Normal, No Nodules] : the thyroid was normal and there were no nodules present [No Accessory Muscle Use] : no accessory muscle use [Clear to Auscultation] : lungs were clear to auscultation bilaterally [Regular Rhythm] : with a regular rhythm [Normal S1, S2] : normal S1 and S2 [No Murmur] : no murmur heard [No Edema] : there was no peripheral edema [Soft] : abdomen soft [Non Tender] : non-tender [Non-distended] : non-distended [No Masses] : no abdominal mass palpated [No HSM] : no HSM [Normal Bowel Sounds] : normal bowel sounds [Normal Supraclavicular Nodes] : no supraclavicular lymphadenopathy [Normal Posterior Cervical Nodes] : no posterior cervical lymphadenopathy [Normal Anterior Cervical Nodes] : no anterior cervical lymphadenopathy [Normal Affect] : the affect was normal [Normal Insight/Judgement] : insight and judgment were intact [de-identified] : deferred to gu [de-identified] : excellent  strength; Dupuytren's contracture R. 2nd digit & b/l 5th digit [de-identified] : hive-like  erythematous rash on b/l forearm (scattered, minimal)

## 2024-08-20 LAB
ALBUMIN SERPL ELPH-MCNC: 4.4 G/DL
ALP BLD-CCNC: 51 U/L
ALT SERPL-CCNC: 13 U/L
ANION GAP SERPL CALC-SCNC: 14 MMOL/L
AST SERPL-CCNC: 24 U/L
BASOPHILS # BLD AUTO: 0.08 K/UL
BASOPHILS NFR BLD AUTO: 1 %
BILIRUB SERPL-MCNC: 0.6 MG/DL
BUN SERPL-MCNC: 15 MG/DL
CALCIUM SERPL-MCNC: 9.8 MG/DL
CHLORIDE SERPL-SCNC: 103 MMOL/L
CHOLEST SERPL-MCNC: 128 MG/DL
CO2 SERPL-SCNC: 22 MMOL/L
CREAT SERPL-MCNC: 0.98 MG/DL
CREAT SPEC-SCNC: 236 MG/DL
EGFR: 85 ML/MIN/1.73M2
EOSINOPHIL # BLD AUTO: 0.37 K/UL
EOSINOPHIL NFR BLD AUTO: 4.9 %
ESTIMATED AVERAGE GLUCOSE: 105 MG/DL
FOLATE SERPL-MCNC: 4.6 NG/ML
GLUCOSE SERPL-MCNC: 108 MG/DL
HBA1C MFR BLD HPLC: 5.3 %
HCT VFR BLD CALC: 43.7 %
HDLC SERPL-MCNC: 23 MG/DL
HGB BLD-MCNC: 14.8 G/DL
IMM GRANULOCYTES NFR BLD AUTO: 0.4 %
LDLC SERPL CALC-MCNC: 72 MG/DL
LYMPHOCYTES # BLD AUTO: 1.64 K/UL
LYMPHOCYTES NFR BLD AUTO: 21.5 %
MAN DIFF?: NORMAL
MCHC RBC-ENTMCNC: 28.6 PG
MCHC RBC-ENTMCNC: 33.9 GM/DL
MCV RBC AUTO: 84.4 FL
MICROALBUMIN 24H UR DL<=1MG/L-MCNC: 26.2 MG/DL
MICROALBUMIN/CREAT 24H UR-RTO: 111 MG/G
MONOCYTES # BLD AUTO: 0.43 K/UL
MONOCYTES NFR BLD AUTO: 5.6 %
NEUTROPHILS # BLD AUTO: 5.07 K/UL
NEUTROPHILS NFR BLD AUTO: 66.6 %
NONHDLC SERPL-MCNC: 106 MG/DL
PLATELET # BLD AUTO: 253 K/UL
POTASSIUM SERPL-SCNC: 5 MMOL/L
PROT SERPL-MCNC: 7.5 G/DL
PSA SERPL-MCNC: 6.82 NG/ML
RBC # BLD: 5.18 M/UL
RBC # FLD: 13.2 %
SODIUM SERPL-SCNC: 138 MMOL/L
T4 FREE SERPL-MCNC: 1.6 NG/DL
TRIGL SERPL-MCNC: 198 MG/DL
TSH SERPL-ACNC: 0.96 UIU/ML
VIT B12 SERPL-MCNC: 364 PG/ML
WBC # FLD AUTO: 7.62 K/UL

## 2024-08-21 LAB
APPEARANCE: ABNORMAL
BILIRUBIN URINE: ABNORMAL
BLOOD URINE: NEGATIVE
COLOR: NORMAL
GLUCOSE QUALITATIVE U: NEGATIVE MG/DL
KETONES URINE: NEGATIVE MG/DL
LEUKOCYTE ESTERASE URINE: ABNORMAL
NITRITE URINE: NEGATIVE
PH URINE: 5.5
PROTEIN URINE: 100 MG/DL
SPECIFIC GRAVITY URINE: 1.02
UROBILINOGEN URINE: 1 MG/DL

## 2024-09-03 ENCOUNTER — APPOINTMENT (OUTPATIENT)
Dept: ENDOCRINOLOGY | Facility: CLINIC | Age: 69
End: 2024-09-03
Payer: MEDICARE

## 2024-09-03 VITALS
HEIGHT: 67 IN | DIASTOLIC BLOOD PRESSURE: 70 MMHG | OXYGEN SATURATION: 98 % | BODY MASS INDEX: 29.98 KG/M2 | WEIGHT: 191 LBS | SYSTOLIC BLOOD PRESSURE: 116 MMHG | HEART RATE: 70 BPM

## 2024-09-03 DIAGNOSIS — E03.8 OTHER SPECIFIED HYPOTHYROIDISM: ICD-10-CM

## 2024-09-03 DIAGNOSIS — N62 HYPERTROPHY OF BREAST: ICD-10-CM

## 2024-09-03 DIAGNOSIS — E27.49 OTHER ADRENOCORTICAL INSUFFICIENCY: ICD-10-CM

## 2024-09-03 PROCEDURE — 99214 OFFICE O/P EST MOD 30 MIN: CPT

## 2024-09-03 PROCEDURE — G2211 COMPLEX E/M VISIT ADD ON: CPT

## 2024-09-03 NOTE — REVIEW OF SYSTEMS
[Chest Pain] : no chest pain [Shortness Of Breath] : no shortness of breath [Nausea] : no nausea [de-identified] : Has had some difficulty with balance, will be seeing neuroplogist

## 2024-09-03 NOTE — ASSESSMENT
[FreeTextEntry1] : 69 year old man with pancreatic cancer  who developed thyroiditis after treatment with immunotherapy, then  hypothyroidism, on levothyroxine tx.  -clinically  and biochemically euthyroid, continue levothyroxine   Hypocortisolism  - continue hydrocortisone back 10 mg in morning and 5 in afternoon.  Reviewed sick day management   Gynecomastia - macroprolactin was normal, no evidence of malignancy on mammo  -does not want surgery.  Has noted cosmetic improvement. continues to be stable  loss of balance - since head injury 11 years ago, has f/u with neurologist scheduled  f/u 6 months

## 2024-09-03 NOTE — HISTORY OF PRESENT ILLNESS
[FreeTextEntry1] : cc: hypothyroidism  68 year old man with Pancreatic cancer, treated with immunotherapy ( previously treated with chemotherapy).  After  4 cycles of ipilimumab/nivolumab, blood tests showed hyperthyroidism. He had no symptoms and continued immunotherapy. Subsequent TSH levels were elevated, then TFTs returned to normal and later pt developed hypothyroidism.  He had been on Nivo every two weeks, stopped in April 2019.    He has been taking levothyroxine 125 micrograms daily, separate from food and vitamins. He reports no fatigue, depression, cold intolerance,  .  No tremor, palpitations   Also with hypocortisolism - Taking hydrocortisone 10 mg in AM and 5 mg in pm.  No weight loss, lightheadedness, abdominal pain.    Gynecomastia - pt not bothered cosmetically, no symptoms

## 2024-09-26 NOTE — H&P PST ADULT - TEACHING/LEARNING FACTORS IMPACT ABILITY TO LEARN
INFECTIOUS DISEASE PROGRESS NOTE    Saige Morgan  64 year old female  MRN : [id number]    Date: 9/26/2024     Attending physician: Zia Bond MD    Reason for consult / chief complaint: altered mental status, neurologic deterioration, fever    Interval history: Tmax 100.4 F, WBC 14.2 K.    Subjective: remains intubated.     Vitals:   Vitals:    09/26/24 1600   BP: 117/57   Pulse: 63   Resp:    Temp: 99.5 °F (37.5 °C)     Physical Examination:  General: Intubated  HEENT: Head is normocephalic, atraumatic. PERLLA. No scleral icterus. Oral mucosa moist without lesions. Good dentition.   Neck: Supple, No LAD. No thyromegaly.   Lungs: NAD, Clear to auscultation bilaterally. No wheezes, crackles, or rhonchi. No accessory muscle use.   Heart: Regular rate and rhythm. No murmurs, gallops, or rubs.  Abdomen: Soft, positive bowel sounds, Non tender, Non distended. No masses or hepatosplenomegaly appreciated.   Musculoskeletal: No clubbing, cyanosis, or edema in bilateral lower extremities.  Skin: Warm and dry, No lesions, rashes, or ulcers.  Neurological: deferred  Psych: deferred    Current medications: Reviewed    Laboratory data:    Recent Labs   Lab 09/26/24  0542 09/25/24  0359 09/24/24  0415 09/23/24  2140 09/23/24  0428 09/22/24  0531 09/20/24  1758 09/20/24  0503   WBC 14.2* 9.4 8.3  --  10.2 10.2   < > 8.5   HCT 31.8* 32.9* 32.6*  --  32.0* 32.4*   < > 33.8*   HGB 10.4* 10.6* 10.7*  --  10.6* 10.6*   < > 11.2*    219 199  --  172 185   < > 176   SODIUM 139 137 142  --  143 143  142   < > 147*   POTASSIUM 3.7 3.7 3.7   < > 3.8 3.8  3.7   < > 3.4   CHLORIDE 106 105 104  --  105 104  105   < > 110   CO2 30 29 36*  --  38* 33*  35*   < > 30   CALCIUM 8.3* 8.5 8.8  --  8.3* 8.1*  8.0*   < > 8.8   GLUCOSE 150* 138* 102*  --  92 145*  144*   < > 111*   BUN 22* 22* 18  --  18 16  16   < > 18   CREATININE 0.64 0.72 0.59  --  0.66 0.70  0.68   < > 0.70   AST  --   --   --   --  20 11  --   --    GPT  --   --   --    --  26 17  --   --    ALKPT  --   --   --   --  52 150*  --   --    BILIRUBIN  --   --   --   --  0.4 0.3  --   --    ALBUMIN  --   --   --   --  2.4* 2.6*  --   --    PHOS  --   --   --   --  2.6  --   --  3.1    < > = values in this interval not displayed.     Imaging: Reviewed  CTA chest/abd/pelvis 24:   1. No evidence of aortic dissection, aneurysm, or acute aortic syndrome.  2. Incompletely assessed sclerotic lesion within the right medial clavicle. Recommend follow-up radiograph in 3 to 6 months for further assessment.  3. Prominent opacities within the dependent portion of the lungs is likely related to atelectasis although superimposed infection cannot entirely be excluded.     MRI yemi w/wo contrast 24:   No acute intracranial abnormality or pathologic intracranial enhancement.     CT head 24:   No acute intracranial findings.  No other significant findings.     Culture data: Reviewed  Influenza, RSV PCR : negative   Blood : NGTD   Lumbar puncture : 0 nucleated cells, glucose 90, protein 59  CSF cryptococcal Ag : negative  CSF rapid panel : negative  CSF bacterial, AFB, fungal culture : negative  CSF VDRL : negative   CSF WNV Ab : negative   CSF Lyme AB : pending  HIV : negative  Serum West Nile Ab : negative  BAL : Staph aureus, mixed gram-negative/ gram-positive ned  MRSA nares : Negative  Sputum : Pending     COVID status: Negative      Isolation: None     Antimicrobials:   IV Acyclovir -   Vancomycin -  Ceftriaxone -  BAT 24        Assessment / Diagnoses:  1.  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 Progressed to descending paralysis (complete) with ophthalmoplegia, non-reactive pupils, absent reflexes.   -lumbar puncture with 0 nucleated cells, protein 59, elevated glucose. Not consistent with meningitis.   -received  botulinum antitoxin 9/20.   -Testing 9/20 positive for botulinum toxin A.    2. Hypertensive urgency   -etiology unclear. Was normotensive at PCP follow up 9/11.  -initial      3. Acute hypoxic hypercapnic respiratory failure, due to #1  -intubated 9/16.      Plan / Recommendations:  1.  Monitor closely off antimicrobials.  2.  Follow sputum culture from 9/25.  3.  CXR unremarkable.  4.  Supportive care per ICU team.  Further recs pending course.    D/w pt's RN, critical care residents, Dr. Gallegos.    Shelton Julien PA-C  Infectious Disease  Pager: [phone]  Office: [phone]      Physician addendum :  Patient seen and examined.  Chart reviewed.  Case discussed in detail with Shelton Julien PA-C.  I have repeated the pertinent parts of the history and physical examination.  I have reviewed the patient's vitals, laboratory findings, microbiological data, and radiographic imaging.  The assessment and plan, as noted above, have been have been formulated by me and discussed with the physician assistant.  I agree with the note as documented by the physician assistant with the exceptions, if any, as noted below.       Lungs clear, heart S1S2, abdomen soft.  Intubated  Labs, imaging, cultures reviewed    Plan detailed in the above note.  BAL with Staph aureus, low colony count, likely colonization.  CXR with no new findings.  Rising WBC count noted.  Continue supportive ICU care.  May take several weeks before patient recovers.        Felton Velasco MD  [place] infectious disease  Pager : [phone]  Answering service : [phone]    Communication preferences :  Weekdays   6 AM - 9 PM : Epic chat or pager  9 PM - 6 AM : Call answering service to connect      none

## 2024-10-15 ENCOUNTER — NON-APPOINTMENT (OUTPATIENT)
Age: 69
End: 2024-10-15

## 2024-11-18 ENCOUNTER — APPOINTMENT (OUTPATIENT)
Dept: INTERNAL MEDICINE | Facility: CLINIC | Age: 69
End: 2024-11-18
Payer: MEDICARE

## 2024-11-18 VITALS — WEIGHT: 184 LBS | SYSTOLIC BLOOD PRESSURE: 120 MMHG | BODY MASS INDEX: 28.82 KG/M2 | DIASTOLIC BLOOD PRESSURE: 80 MMHG

## 2024-11-18 DIAGNOSIS — R79.89 OTHER SPECIFIED ABNORMAL FINDINGS OF BLOOD CHEMISTRY: ICD-10-CM

## 2024-11-18 DIAGNOSIS — E53.8 DEFICIENCY OF OTHER SPECIFIED B GROUP VITAMINS: ICD-10-CM

## 2024-11-18 DIAGNOSIS — Z23 ENCOUNTER FOR IMMUNIZATION: ICD-10-CM

## 2024-11-18 DIAGNOSIS — I10 ESSENTIAL (PRIMARY) HYPERTENSION: ICD-10-CM

## 2024-11-18 DIAGNOSIS — N13.8 BENIGN PROSTATIC HYPERPLASIA WITH LOWER URINARY TRACT SYMPMS: ICD-10-CM

## 2024-11-18 DIAGNOSIS — N40.1 BENIGN PROSTATIC HYPERPLASIA WITH LOWER URINARY TRACT SYMPMS: ICD-10-CM

## 2024-11-18 DIAGNOSIS — R97.20 ELEVATED PROSTATE, SPECIFIC ANTIGEN [PSA]: ICD-10-CM

## 2024-11-18 DIAGNOSIS — Z00.00 ENCOUNTER FOR GENERAL ADULT MEDICAL EXAMINATION W/OUT ABNORMAL FINDINGS: ICD-10-CM

## 2024-11-18 PROCEDURE — G2211 COMPLEX E/M VISIT ADD ON: CPT

## 2024-11-18 PROCEDURE — 36415 COLL VENOUS BLD VENIPUNCTURE: CPT

## 2024-11-18 PROCEDURE — 99214 OFFICE O/P EST MOD 30 MIN: CPT

## 2024-11-19 LAB
ALBUMIN SERPL ELPH-MCNC: 4.3 G/DL
ALP BLD-CCNC: 48 U/L
ALT SERPL-CCNC: 9 U/L
ANION GAP SERPL CALC-SCNC: 11 MMOL/L
APPEARANCE: CLEAR
AST SERPL-CCNC: 21 U/L
BACTERIA: NEGATIVE /HPF
BASOPHILS # BLD AUTO: 0.09 K/UL
BASOPHILS NFR BLD AUTO: 1.4 %
BILIRUB SERPL-MCNC: 0.5 MG/DL
BILIRUBIN URINE: ABNORMAL
BLOOD URINE: NEGATIVE
BUN SERPL-MCNC: 15 MG/DL
CALCIUM SERPL-MCNC: 9.8 MG/DL
CAST: 4 /LPF
CHLORIDE SERPL-SCNC: 103 MMOL/L
CHOLEST SERPL-MCNC: 127 MG/DL
CO2 SERPL-SCNC: 25 MMOL/L
COLOR: NORMAL
CREAT SERPL-MCNC: 1.03 MG/DL
CREAT SPEC-SCNC: 241 MG/DL
CREAT SPEC-SCNC: 241 MG/DL
CREAT/PROT UR: 0.3 RATIO
EGFR: 80 ML/MIN/1.73M2
EOSINOPHIL # BLD AUTO: 0.46 K/UL
EOSINOPHIL NFR BLD AUTO: 7.1 %
EPITHELIAL CELLS: 2 /HPF
ESTIMATED AVERAGE GLUCOSE: 105 MG/DL
FOLATE SERPL-MCNC: 3.1 NG/ML
GLUCOSE QUALITATIVE U: NEGATIVE MG/DL
GLUCOSE SERPL-MCNC: 94 MG/DL
HBA1C MFR BLD HPLC: 5.3 %
HCT VFR BLD CALC: 42.8 %
HDLC SERPL-MCNC: 24 MG/DL
HGB BLD-MCNC: 14.5 G/DL
IMM GRANULOCYTES NFR BLD AUTO: 0.3 %
KETONES URINE: NEGATIVE MG/DL
LDLC SERPL CALC-MCNC: 74 MG/DL
LEUKOCYTE ESTERASE URINE: ABNORMAL
LYMPHOCYTES # BLD AUTO: 2.7 K/UL
LYMPHOCYTES NFR BLD AUTO: 41.5 %
MAN DIFF?: NORMAL
MCHC RBC-ENTMCNC: 29.6 PG
MCHC RBC-ENTMCNC: 33.9 G/DL
MCV RBC AUTO: 87.3 FL
MICROALBUMIN 24H UR DL<=1MG/L-MCNC: 43.3 MG/DL
MICROALBUMIN/CREAT 24H UR-RTO: 180 MG/G
MICROSCOPIC-UA: NORMAL
MONOCYTES # BLD AUTO: 0.54 K/UL
MONOCYTES NFR BLD AUTO: 8.3 %
NEUTROPHILS # BLD AUTO: 2.7 K/UL
NEUTROPHILS NFR BLD AUTO: 41.4 %
NITRITE URINE: NEGATIVE
NONHDLC SERPL-MCNC: 103 MG/DL
PH URINE: 5.5
PLATELET # BLD AUTO: 248 K/UL
POTASSIUM SERPL-SCNC: 4.6 MMOL/L
PROT SERPL-MCNC: 7.3 G/DL
PROT UR-MCNC: 76 MG/DL
PROTEIN URINE: 100 MG/DL
PSA SERPL-MCNC: 6.1 NG/ML
RBC # BLD: 4.9 M/UL
RBC # FLD: 13.2 %
RED BLOOD CELLS URINE: 3 /HPF
SODIUM SERPL-SCNC: 139 MMOL/L
SPECIFIC GRAVITY URINE: 1.02
TRIGL SERPL-MCNC: 165 MG/DL
UROBILINOGEN URINE: 1 MG/DL
VIT B12 SERPL-MCNC: 384 PG/ML
WBC # FLD AUTO: 6.51 K/UL
WHITE BLOOD CELLS URINE: 14 /HPF

## 2024-11-20 LAB — BACTERIA UR CULT: NORMAL

## 2024-12-11 DIAGNOSIS — R80.9 PROTEINURIA, UNSPECIFIED: ICD-10-CM

## 2024-12-11 DIAGNOSIS — E53.8 DEFICIENCY OF OTHER SPECIFIED B GROUP VITAMINS: ICD-10-CM

## 2024-12-11 RX ORDER — FOLIC ACID 1 MG/1
1 TABLET ORAL
Qty: 90 | Refills: 1 | Status: ACTIVE | COMMUNITY
Start: 2024-12-11 | End: 1900-01-01

## 2024-12-30 ENCOUNTER — APPOINTMENT (OUTPATIENT)
Dept: INTERNAL MEDICINE | Facility: CLINIC | Age: 69
End: 2024-12-30
Payer: MEDICARE

## 2024-12-30 VITALS — DIASTOLIC BLOOD PRESSURE: 70 MMHG | SYSTOLIC BLOOD PRESSURE: 120 MMHG

## 2024-12-30 DIAGNOSIS — R80.9 PROTEINURIA, UNSPECIFIED: ICD-10-CM

## 2024-12-30 DIAGNOSIS — E53.8 DEFICIENCY OF OTHER SPECIFIED B GROUP VITAMINS: ICD-10-CM

## 2024-12-30 DIAGNOSIS — I10 ESSENTIAL (PRIMARY) HYPERTENSION: ICD-10-CM

## 2024-12-30 DIAGNOSIS — K92.1 MELENA: ICD-10-CM

## 2024-12-30 PROCEDURE — 99214 OFFICE O/P EST MOD 30 MIN: CPT

## 2024-12-30 PROCEDURE — G2211 COMPLEX E/M VISIT ADD ON: CPT

## 2025-01-21 ENCOUNTER — APPOINTMENT (OUTPATIENT)
Dept: UROLOGY | Facility: CLINIC | Age: 70
End: 2025-01-21
Payer: MEDICARE

## 2025-01-21 ENCOUNTER — NON-APPOINTMENT (OUTPATIENT)
Age: 70
End: 2025-01-21

## 2025-01-21 VITALS
HEIGHT: 67 IN | OXYGEN SATURATION: 97 % | SYSTOLIC BLOOD PRESSURE: 155 MMHG | TEMPERATURE: 97.2 F | RESPIRATION RATE: 16 BRPM | BODY MASS INDEX: 28.88 KG/M2 | HEART RATE: 73 BPM | DIASTOLIC BLOOD PRESSURE: 84 MMHG | WEIGHT: 184 LBS

## 2025-01-21 DIAGNOSIS — N48.1 BALANITIS: ICD-10-CM

## 2025-01-21 DIAGNOSIS — N13.8 BENIGN PROSTATIC HYPERPLASIA WITH LOWER URINARY TRACT SYMPMS: ICD-10-CM

## 2025-01-21 DIAGNOSIS — N40.1 BENIGN PROSTATIC HYPERPLASIA WITH LOWER URINARY TRACT SYMPMS: ICD-10-CM

## 2025-01-21 PROCEDURE — 99213 OFFICE O/P EST LOW 20 MIN: CPT

## 2025-01-21 PROCEDURE — G2211 COMPLEX E/M VISIT ADD ON: CPT

## 2025-01-21 RX ORDER — CLOTRIMAZOLE AND BETAMETHASONE DIPROPIONATE 10; .5 MG/G; MG/G
1-0.05 CREAM TOPICAL TWICE DAILY
Qty: 1 | Refills: 3 | Status: ACTIVE | COMMUNITY
Start: 2025-01-21 | End: 1900-01-01

## 2025-02-14 NOTE — PROVIDER CONTACT NOTE (OTHER) - ASSESSMENT
Patient alert and oriented times 4. Vitals signs stable except HR and sat low 90s
Patient complains of pain.
Patient just moved from chair to bed. Asymptomatic
Pt alert and oriented and c/o severe abdominal pain. Pt not due for any pain medications yet.
Pt alert and oriented. Pt denies headache, SOB, and chest pain. Pt c/o 9/10 abdominal pain and is not due for any PRN pain meds.
Pt alert and oriented. Pt was assessed but surgery team at bedside. Pt denies chest pain and headache. pt c/o 6/10 abdominal pain. Pt also due for amlodipine and metoprolol this AM.
post vasotec 126HR, 161/81BP, 90% on supplemental O2. afebrile.
Self Administrated

## 2025-03-03 ENCOUNTER — APPOINTMENT (OUTPATIENT)
Dept: INTERNAL MEDICINE | Facility: CLINIC | Age: 70
End: 2025-03-03

## 2025-03-27 ENCOUNTER — APPOINTMENT (OUTPATIENT)
Dept: INTERNAL MEDICINE | Facility: CLINIC | Age: 70
End: 2025-03-27
Payer: MEDICARE

## 2025-03-27 VITALS
HEIGHT: 67 IN | DIASTOLIC BLOOD PRESSURE: 70 MMHG | SYSTOLIC BLOOD PRESSURE: 120 MMHG | BODY MASS INDEX: 28.25 KG/M2 | WEIGHT: 180 LBS

## 2025-03-27 DIAGNOSIS — I10 ESSENTIAL (PRIMARY) HYPERTENSION: ICD-10-CM

## 2025-03-27 DIAGNOSIS — K21.9 GASTRO-ESOPHAGEAL REFLUX DISEASE W/OUT ESOPHAGITIS: ICD-10-CM

## 2025-03-27 DIAGNOSIS — E53.8 DEFICIENCY OF OTHER SPECIFIED B GROUP VITAMINS: ICD-10-CM

## 2025-03-27 DIAGNOSIS — E27.49 OTHER ADRENOCORTICAL INSUFFICIENCY: ICD-10-CM

## 2025-03-27 DIAGNOSIS — N13.8 BENIGN PROSTATIC HYPERPLASIA WITH LOWER URINARY TRACT SYMPMS: ICD-10-CM

## 2025-03-27 DIAGNOSIS — R97.20 ELEVATED PROSTATE, SPECIFIC ANTIGEN [PSA]: ICD-10-CM

## 2025-03-27 DIAGNOSIS — N40.1 BENIGN PROSTATIC HYPERPLASIA WITH LOWER URINARY TRACT SYMPMS: ICD-10-CM

## 2025-03-27 PROCEDURE — 99214 OFFICE O/P EST MOD 30 MIN: CPT

## 2025-03-27 PROCEDURE — G2211 COMPLEX E/M VISIT ADD ON: CPT

## 2025-03-27 PROCEDURE — 36415 COLL VENOUS BLD VENIPUNCTURE: CPT

## 2025-03-28 LAB
ALBUMIN SERPL ELPH-MCNC: 4.7 G/DL
ALP BLD-CCNC: 55 U/L
ALT SERPL-CCNC: 9 U/L
ANION GAP SERPL CALC-SCNC: 12 MMOL/L
APPEARANCE: CLEAR
AST SERPL-CCNC: 19 U/L
BACTERIA UR CULT: NORMAL
BACTERIA: NEGATIVE /HPF
BASOPHILS # BLD AUTO: 0.1 K/UL
BASOPHILS NFR BLD AUTO: 1 %
BILIRUB SERPL-MCNC: 0.5 MG/DL
BILIRUBIN URINE: NEGATIVE
BLOOD URINE: NEGATIVE
BUN SERPL-MCNC: 20 MG/DL
CALCIUM SERPL-MCNC: 9.8 MG/DL
CAST: 1 /LPF
CHLORIDE SERPL-SCNC: 103 MMOL/L
CHOLEST SERPL-MCNC: 182 MG/DL
CO2 SERPL-SCNC: 26 MMOL/L
COLOR: NORMAL
CREAT SERPL-MCNC: 1.03 MG/DL
CREAT SPEC-SCNC: 209 MG/DL
CREAT SPEC-SCNC: 209 MG/DL
CREAT/PROT UR: 0.7 RATIO
EGFRCR SERPLBLD CKD-EPI 2021: 80 ML/MIN/1.73M2
EOSINOPHIL # BLD AUTO: 0.42 K/UL
EOSINOPHIL NFR BLD AUTO: 4.4 %
EPITHELIAL CELLS: 1 /HPF
ESTIMATED AVERAGE GLUCOSE: 105 MG/DL
FOLATE SERPL-MCNC: 6.3 NG/ML
GLUCOSE QUALITATIVE U: NEGATIVE MG/DL
GLUCOSE SERPL-MCNC: 94 MG/DL
HBA1C MFR BLD HPLC: 5.3 %
HCT VFR BLD CALC: 44.1 %
HDLC SERPL-MCNC: 35 MG/DL
HGB BLD-MCNC: 15 G/DL
IMM GRANULOCYTES NFR BLD AUTO: 0.4 %
KETONES URINE: NEGATIVE MG/DL
LDLC SERPL-MCNC: 107 MG/DL
LEUKOCYTE ESTERASE URINE: ABNORMAL
LYMPHOCYTES # BLD AUTO: 2.11 K/UL
LYMPHOCYTES NFR BLD AUTO: 22 %
MAN DIFF?: NORMAL
MCHC RBC-ENTMCNC: 29.3 PG
MCHC RBC-ENTMCNC: 34 G/DL
MCV RBC AUTO: 86.1 FL
MICROALBUMIN 24H UR DL<=1MG/L-MCNC: 92 MG/DL
MICROALBUMIN/CREAT 24H UR-RTO: 441 MG/G
MICROSCOPIC-UA: NORMAL
MONOCYTES # BLD AUTO: 0.43 K/UL
MONOCYTES NFR BLD AUTO: 4.5 %
NEUTROPHILS # BLD AUTO: 6.48 K/UL
NEUTROPHILS NFR BLD AUTO: 67.7 %
NITRITE URINE: NEGATIVE
NONHDLC SERPL-MCNC: 147 MG/DL
PH URINE: 5
PLATELET # BLD AUTO: 237 K/UL
POTASSIUM SERPL-SCNC: 4.6 MMOL/L
PROT SERPL-MCNC: 7.8 G/DL
PROT UR-MCNC: 143 MG/DL
PROTEIN URINE: 100 MG/DL
PSA SERPL-MCNC: 9.03 NG/ML
RBC # BLD: 5.12 M/UL
RBC # FLD: 13.2 %
RED BLOOD CELLS URINE: 1 /HPF
SODIUM SERPL-SCNC: 142 MMOL/L
SPECIFIC GRAVITY URINE: 1.02
T4 FREE SERPL-MCNC: 1.7 NG/DL
TRIGL SERPL-MCNC: 226 MG/DL
TSH SERPL-ACNC: 2.78 UIU/ML
UROBILINOGEN URINE: 0.2 MG/DL
WBC # FLD AUTO: 9.58 K/UL
WHITE BLOOD CELLS URINE: 7 /HPF

## 2025-05-01 ENCOUNTER — OUTPATIENT (OUTPATIENT)
Dept: OUTPATIENT SERVICES | Facility: HOSPITAL | Age: 70
LOS: 1 days | Discharge: HOME | End: 2025-05-01

## 2025-05-01 DIAGNOSIS — Z98.890 OTHER SPECIFIED POSTPROCEDURAL STATES: Chronic | ICD-10-CM

## 2025-05-01 DIAGNOSIS — Z90.49 ACQUIRED ABSENCE OF OTHER SPECIFIED PARTS OF DIGESTIVE TRACT: Chronic | ICD-10-CM

## 2025-05-01 DIAGNOSIS — Z92.21 PERSONAL HISTORY OF ANTINEOPLASTIC CHEMOTHERAPY: Chronic | ICD-10-CM

## 2025-05-01 DIAGNOSIS — C25.9 MALIGNANT NEOPLASM OF PANCREAS, UNSPECIFIED: ICD-10-CM

## 2025-05-05 ENCOUNTER — NON-APPOINTMENT (OUTPATIENT)
Age: 70
End: 2025-05-05

## 2025-05-05 ENCOUNTER — APPOINTMENT (OUTPATIENT)
Dept: HEMATOLOGY ONCOLOGY | Facility: CLINIC | Age: 70
End: 2025-05-05

## 2025-05-05 VITALS
RESPIRATION RATE: 16 BRPM | DIASTOLIC BLOOD PRESSURE: 80 MMHG | BODY MASS INDEX: 30.1 KG/M2 | SYSTOLIC BLOOD PRESSURE: 128 MMHG | HEART RATE: 69 BPM | HEIGHT: 67 IN | TEMPERATURE: 97.4 F | OXYGEN SATURATION: 97 % | WEIGHT: 191.8 LBS

## 2025-05-05 DIAGNOSIS — C25.9 MALIGNANT NEOPLASM OF PANCREAS, UNSPECIFIED: ICD-10-CM

## 2025-05-05 DIAGNOSIS — C18.9 MALIGNANT NEOPLASM OF COLON, UNSPECIFIED: ICD-10-CM

## 2025-05-05 PROCEDURE — 99213 OFFICE O/P EST LOW 20 MIN: CPT

## 2025-05-05 PROCEDURE — G2211 COMPLEX E/M VISIT ADD ON: CPT

## 2025-05-06 ENCOUNTER — APPOINTMENT (OUTPATIENT)
Facility: CLINIC | Age: 70
End: 2025-05-06
Payer: MEDICARE

## 2025-05-06 VITALS
SYSTOLIC BLOOD PRESSURE: 145 MMHG | WEIGHT: 191 LBS | DIASTOLIC BLOOD PRESSURE: 66 MMHG | BODY MASS INDEX: 29.98 KG/M2 | HEART RATE: 61 BPM | TEMPERATURE: 97 F | HEIGHT: 67 IN

## 2025-05-06 DIAGNOSIS — E27.49 OTHER ADRENOCORTICAL INSUFFICIENCY: ICD-10-CM

## 2025-05-06 DIAGNOSIS — E03.8 OTHER SPECIFIED HYPOTHYROIDISM: ICD-10-CM

## 2025-05-06 PROCEDURE — 99214 OFFICE O/P EST MOD 30 MIN: CPT

## 2025-05-06 PROCEDURE — G2211 COMPLEX E/M VISIT ADD ON: CPT

## 2025-05-30 ENCOUNTER — APPOINTMENT (OUTPATIENT)
Dept: CT IMAGING | Facility: CLINIC | Age: 70
End: 2025-05-30
Payer: MEDICARE

## 2025-05-30 ENCOUNTER — OUTPATIENT (OUTPATIENT)
Dept: OUTPATIENT SERVICES | Facility: HOSPITAL | Age: 70
LOS: 1 days | End: 2025-05-30
Payer: MEDICARE

## 2025-05-30 DIAGNOSIS — Z98.890 OTHER SPECIFIED POSTPROCEDURAL STATES: Chronic | ICD-10-CM

## 2025-05-30 DIAGNOSIS — Z90.49 ACQUIRED ABSENCE OF OTHER SPECIFIED PARTS OF DIGESTIVE TRACT: Chronic | ICD-10-CM

## 2025-05-30 DIAGNOSIS — Z00.00 ENCOUNTER FOR GENERAL ADULT MEDICAL EXAMINATION WITHOUT ABNORMAL FINDINGS: ICD-10-CM

## 2025-05-30 DIAGNOSIS — Z92.21 PERSONAL HISTORY OF ANTINEOPLASTIC CHEMOTHERAPY: Chronic | ICD-10-CM

## 2025-05-30 PROCEDURE — 74177 CT ABD & PELVIS W/CONTRAST: CPT | Mod: 26

## 2025-05-30 PROCEDURE — 71260 CT THORAX DX C+: CPT | Mod: 26

## 2025-05-30 PROCEDURE — 74177 CT ABD & PELVIS W/CONTRAST: CPT

## 2025-05-30 PROCEDURE — 71260 CT THORAX DX C+: CPT

## 2025-06-11 ENCOUNTER — APPOINTMENT (OUTPATIENT)
Dept: UROLOGY | Facility: CLINIC | Age: 70
End: 2025-06-11
Payer: MEDICARE

## 2025-06-11 VITALS
HEIGHT: 67 IN | RESPIRATION RATE: 16 BRPM | WEIGHT: 191 LBS | HEART RATE: 63 BPM | SYSTOLIC BLOOD PRESSURE: 145 MMHG | TEMPERATURE: 98.2 F | DIASTOLIC BLOOD PRESSURE: 79 MMHG | BODY MASS INDEX: 29.98 KG/M2

## 2025-06-11 PROCEDURE — 99213 OFFICE O/P EST LOW 20 MIN: CPT

## 2025-06-11 PROCEDURE — G2211 COMPLEX E/M VISIT ADD ON: CPT

## 2025-06-19 LAB
PSA FREE FLD-MCNC: 29 %
PSA FREE SERPL-MCNC: 2.41 NG/ML
PSA SERPL-MCNC: 8.2 NG/ML

## 2025-07-03 ENCOUNTER — APPOINTMENT (OUTPATIENT)
Dept: MRI IMAGING | Facility: CLINIC | Age: 70
End: 2025-07-03

## 2025-07-03 ENCOUNTER — OUTPATIENT (OUTPATIENT)
Dept: OUTPATIENT SERVICES | Facility: HOSPITAL | Age: 70
LOS: 1 days | End: 2025-07-03
Payer: MEDICARE

## 2025-07-03 DIAGNOSIS — R97.20 ELEVATED PROSTATE SPECIFIC ANTIGEN [PSA]: ICD-10-CM

## 2025-07-03 DIAGNOSIS — Z98.890 OTHER SPECIFIED POSTPROCEDURAL STATES: Chronic | ICD-10-CM

## 2025-07-03 DIAGNOSIS — Z92.21 PERSONAL HISTORY OF ANTINEOPLASTIC CHEMOTHERAPY: Chronic | ICD-10-CM

## 2025-07-03 PROCEDURE — 72197 MRI PELVIS W/O & W/DYE: CPT

## 2025-07-03 PROCEDURE — 76498 UNLISTED MR PROCEDURE: CPT

## 2025-07-03 PROCEDURE — 72197 MRI PELVIS W/O & W/DYE: CPT | Mod: 26

## 2025-07-03 PROCEDURE — 76498P: CUSTOM | Mod: 26

## 2025-07-15 ENCOUNTER — NON-APPOINTMENT (OUTPATIENT)
Age: 70
End: 2025-07-15

## 2025-08-27 ENCOUNTER — APPOINTMENT (OUTPATIENT)
Dept: INTERNAL MEDICINE | Facility: CLINIC | Age: 70
End: 2025-08-27
Payer: MEDICARE

## 2025-08-27 VITALS
TEMPERATURE: 98.1 F | OXYGEN SATURATION: 97 % | BODY MASS INDEX: 30.13 KG/M2 | SYSTOLIC BLOOD PRESSURE: 120 MMHG | HEIGHT: 67 IN | DIASTOLIC BLOOD PRESSURE: 70 MMHG | WEIGHT: 192 LBS | RESPIRATION RATE: 12 BRPM

## 2025-08-27 DIAGNOSIS — E22.1 HYPERPROLACTINEMIA: ICD-10-CM

## 2025-08-27 DIAGNOSIS — E03.8 OTHER SPECIFIED HYPOTHYROIDISM: ICD-10-CM

## 2025-08-27 DIAGNOSIS — J06.9 ACUTE UPPER RESPIRATORY INFECTION, UNSPECIFIED: ICD-10-CM

## 2025-08-27 PROCEDURE — G2211 COMPLEX E/M VISIT ADD ON: CPT

## 2025-08-27 PROCEDURE — 99214 OFFICE O/P EST MOD 30 MIN: CPT

## 2025-08-29 LAB
INFLUENZA A RESULT: NOT DETECTED
INFLUENZA B RESULT: NOT DETECTED
RESP SYN VIRUS RESULT: NOT DETECTED
SARS-COV-2 RESULT: NOT DETECTED

## 2025-09-08 ENCOUNTER — APPOINTMENT (OUTPATIENT)
Dept: INTERNAL MEDICINE | Facility: CLINIC | Age: 70
End: 2025-09-08
Payer: MEDICARE

## 2025-09-08 PROCEDURE — 36415 COLL VENOUS BLD VENIPUNCTURE: CPT

## (undated) DEVICE — IRRIGATOR BIO SHIELD

## (undated) DEVICE — CLAMP BX HOT RAD JAW 3

## (undated) DEVICE — TUBING IV SET GRAVITY 3Y 100" MACRO

## (undated) DEVICE — TUBING SUCTION 20FT

## (undated) DEVICE — POLY TRAP ETRAP

## (undated) DEVICE — POSITIONER FOAM EGG CRATE ULNAR 2PCS (PINK)

## (undated) DEVICE — SENSOR O2 FINGER ADULT

## (undated) DEVICE — BIOPSY FORCEP RADIAL JAW 4 STANDARD WITH NEEDLE

## (undated) DEVICE — FORCEP RADIAL JAW 4 JUMBO 2.8MM 3.2MM 240CM ORANGE DISP

## (undated) DEVICE — DRAINAGE BAG URINARY 2L

## (undated) DEVICE — DRSG TELFA 3 X 8

## (undated) DEVICE — GRID BRACHYTHERAPY EZ 18G

## (undated) DEVICE — GLV 7.5 PROTEXIS (WHITE)

## (undated) DEVICE — SYR LUER LOK 50CC

## (undated) DEVICE — BALLOON ENDOCAVITY 2X14CM

## (undated) DEVICE — SYR ALLIANCE II INFLATION 60ML

## (undated) DEVICE — BALLOON US ENDO

## (undated) DEVICE — SNARE EXACTO COLD 9MMX230CM

## (undated) DEVICE — VENODYNE/SCD SLEEVE CALF LARGE

## (undated) DEVICE — GOWN TRIMAX LG

## (undated) DEVICE — DRAPE C ARM BAND BAG

## (undated) DEVICE — PACK IV START WITH CHG

## (undated) DEVICE — BASIN SET DOUBLE

## (undated) DEVICE — BITE BLOCK ADULT 20 X 27MM (GREEN)

## (undated) DEVICE — ELCTR GROUNDING PAD ADULT COVIDIEN

## (undated) DEVICE — NDL MAX CORE 18G X 25CM

## (undated) DEVICE — PACK CYSTOSCOPY

## (undated) DEVICE — FOLEY HOLDER STATLOCK 2 WAY ADULT

## (undated) DEVICE — SOL INJ NS 0.9% 500ML 2 PORT

## (undated) DEVICE — TUBING SUCTION CONN 6FT STERILE

## (undated) DEVICE — SUCTION YANKAUER NO CONTROL VENT

## (undated) DEVICE — WARMING BLANKET UPPER ADULT

## (undated) DEVICE — FOLEY CATH 2-WAY 18FR 5CC LATEX HYDROGEL

## (undated) DEVICE — CATH IV SAFE BC 20G X 1.16" (PINK)

## (undated) DEVICE — CATH IV SAFE BC 22G X 1" (BLUE)

## (undated) DEVICE — BRUSH COLONOSCOPY CYTOLOGY